# Patient Record
Sex: MALE | Race: WHITE | NOT HISPANIC OR LATINO | Employment: OTHER | URBAN - METROPOLITAN AREA
[De-identification: names, ages, dates, MRNs, and addresses within clinical notes are randomized per-mention and may not be internally consistent; named-entity substitution may affect disease eponyms.]

---

## 2017-01-06 ENCOUNTER — GENERIC CONVERSION - ENCOUNTER (OUTPATIENT)
Dept: OTHER | Facility: OTHER | Age: 82
End: 2017-01-06

## 2017-01-24 ENCOUNTER — GENERIC CONVERSION - ENCOUNTER (OUTPATIENT)
Dept: OTHER | Facility: OTHER | Age: 82
End: 2017-01-24

## 2017-01-24 ENCOUNTER — TRANSCRIBE ORDERS (OUTPATIENT)
Dept: ADMINISTRATIVE | Facility: HOSPITAL | Age: 82
End: 2017-01-24

## 2017-01-24 DIAGNOSIS — N31.9 NEUROGENIC DYSFUNCTION OF THE URINARY BLADDER: Primary | ICD-10-CM

## 2017-01-25 ENCOUNTER — HOSPITAL ENCOUNTER (OUTPATIENT)
Dept: RADIOLOGY | Age: 82
Discharge: HOME | End: 2017-01-25
Payer: MEDICARE

## 2017-01-25 DIAGNOSIS — N31.9 NEUROGENIC DYSFUNCTION OF THE URINARY BLADDER: ICD-10-CM

## 2017-01-25 PROCEDURE — 76770 US EXAM ABDO BACK WALL COMP: CPT

## 2017-03-06 DIAGNOSIS — M16.12 PRIMARY OSTEOARTHRITIS OF LEFT HIP: ICD-10-CM

## 2017-03-06 DIAGNOSIS — I73.9 PERIPHERAL VASCULAR DISEASE (HCC): ICD-10-CM

## 2017-03-06 DIAGNOSIS — J43.9 PULMONARY EMPHYSEMA (HCC): ICD-10-CM

## 2017-03-06 DIAGNOSIS — I35.0 NONRHEUMATIC AORTIC VALVE STENOSIS: ICD-10-CM

## 2017-03-06 DIAGNOSIS — I25.10 ATHEROSCLEROTIC HEART DISEASE OF NATIVE CORONARY ARTERY WITHOUT ANGINA PECTORIS: ICD-10-CM

## 2017-03-06 DIAGNOSIS — K21.9 GASTRO-ESOPHAGEAL REFLUX DISEASE WITHOUT ESOPHAGITIS: ICD-10-CM

## 2017-03-06 DIAGNOSIS — I65.23 OCCLUSION AND STENOSIS OF BILATERAL CAROTID ARTERIES: ICD-10-CM

## 2017-03-06 DIAGNOSIS — R73.01 IMPAIRED FASTING GLUCOSE: ICD-10-CM

## 2017-03-06 DIAGNOSIS — I10 ESSENTIAL (PRIMARY) HYPERTENSION: ICD-10-CM

## 2017-03-06 DIAGNOSIS — I48.91 ATRIAL FIBRILLATION (HCC): ICD-10-CM

## 2017-03-06 DIAGNOSIS — E78.5 HYPERLIPIDEMIA: ICD-10-CM

## 2017-03-06 DIAGNOSIS — N40.0 ENLARGED PROSTATE WITHOUT LOWER URINARY TRACT SYMPTOMS (LUTS): ICD-10-CM

## 2017-03-09 ENCOUNTER — ALLSCRIPTS OFFICE VISIT (OUTPATIENT)
Dept: OTHER | Facility: OTHER | Age: 82
End: 2017-03-09

## 2017-03-21 ENCOUNTER — HOSPITAL ENCOUNTER (OUTPATIENT)
Dept: NON INVASIVE DIAGNOSTICS | Facility: CLINIC | Age: 82
Discharge: HOME | End: 2017-03-21
Payer: MEDICARE

## 2017-03-21 DIAGNOSIS — I65.23 BILATERAL CAROTID ARTERY STENOSIS: ICD-10-CM

## 2017-03-21 DIAGNOSIS — I73.9 PERIPHERAL ARTERIAL DISEASE (HCC): ICD-10-CM

## 2017-03-21 PROCEDURE — 93880 EXTRACRANIAL BILAT STUDY: CPT

## 2017-03-21 PROCEDURE — 93923 UPR/LXTR ART STDY 3+ LVLS: CPT

## 2017-03-21 PROCEDURE — 93925 LOWER EXTREMITY STUDY: CPT

## 2017-03-23 ENCOUNTER — TRANSCRIBE ORDERS (OUTPATIENT)
Dept: ADMINISTRATIVE | Age: 82
End: 2017-03-23

## 2017-03-23 ENCOUNTER — APPOINTMENT (OUTPATIENT)
Dept: LAB | Age: 82
End: 2017-03-23
Payer: MEDICARE

## 2017-03-23 DIAGNOSIS — R73.01 IMPAIRED FASTING GLUCOSE: ICD-10-CM

## 2017-03-23 DIAGNOSIS — M16.12 PRIMARY OSTEOARTHRITIS OF LEFT HIP: ICD-10-CM

## 2017-03-23 DIAGNOSIS — K21.9 GASTRO-ESOPHAGEAL REFLUX DISEASE WITHOUT ESOPHAGITIS: ICD-10-CM

## 2017-03-23 DIAGNOSIS — I48.91 ATRIAL FIBRILLATION (HCC): ICD-10-CM

## 2017-03-23 DIAGNOSIS — I35.0 NONRHEUMATIC AORTIC VALVE STENOSIS: ICD-10-CM

## 2017-03-23 DIAGNOSIS — I25.10 ATHEROSCLEROTIC HEART DISEASE OF NATIVE CORONARY ARTERY WITHOUT ANGINA PECTORIS: ICD-10-CM

## 2017-03-23 DIAGNOSIS — I73.9 PERIPHERAL VASCULAR DISEASE (HCC): ICD-10-CM

## 2017-03-23 DIAGNOSIS — I10 ESSENTIAL (PRIMARY) HYPERTENSION: ICD-10-CM

## 2017-03-23 DIAGNOSIS — J43.9 PULMONARY EMPHYSEMA (HCC): ICD-10-CM

## 2017-03-23 DIAGNOSIS — E78.5 HYPERLIPIDEMIA: ICD-10-CM

## 2017-03-23 DIAGNOSIS — N40.0 ENLARGED PROSTATE WITHOUT LOWER URINARY TRACT SYMPTOMS (LUTS): ICD-10-CM

## 2017-03-23 LAB
ALBUMIN SERPL BCP-MCNC: 3.6 G/DL (ref 3.5–5)
ALP SERPL-CCNC: 107 U/L (ref 46–116)
ALT SERPL W P-5'-P-CCNC: 29 U/L (ref 12–78)
ANION GAP SERPL CALCULATED.3IONS-SCNC: 6 MMOL/L (ref 4–13)
AST SERPL W P-5'-P-CCNC: 19 U/L (ref 5–45)
BASOPHILS # BLD AUTO: 0.01 THOUSANDS/ΜL (ref 0–0.1)
BASOPHILS NFR BLD AUTO: 0 % (ref 0–1)
BILIRUB SERPL-MCNC: 0.47 MG/DL (ref 0.2–1)
BUN SERPL-MCNC: 13 MG/DL (ref 5–25)
CALCIUM SERPL-MCNC: 8.9 MG/DL (ref 8.3–10.1)
CHLORIDE SERPL-SCNC: 105 MMOL/L (ref 100–108)
CHOLEST SERPL-MCNC: 94 MG/DL (ref 50–200)
CO2 SERPL-SCNC: 30 MMOL/L (ref 21–32)
CREAT SERPL-MCNC: 0.86 MG/DL (ref 0.6–1.3)
EOSINOPHIL # BLD AUTO: 0.25 THOUSAND/ΜL (ref 0–0.61)
EOSINOPHIL NFR BLD AUTO: 4 % (ref 0–6)
ERYTHROCYTE [DISTWIDTH] IN BLOOD BY AUTOMATED COUNT: 13.1 % (ref 11.6–15.1)
EST. AVERAGE GLUCOSE BLD GHB EST-MCNC: 123 MG/DL
GFR SERPL CREATININE-BSD FRML MDRD: >60 ML/MIN/1.73SQ M
GLUCOSE P FAST SERPL-MCNC: 102 MG/DL (ref 65–99)
HBA1C MFR BLD: 5.9 % (ref 4.2–6.3)
HCT VFR BLD AUTO: 43.8 % (ref 36.5–49.3)
HDLC SERPL-MCNC: 43 MG/DL (ref 40–60)
HGB BLD-MCNC: 14.7 G/DL (ref 12–17)
LDLC SERPL CALC-MCNC: 34 MG/DL (ref 0–100)
LYMPHOCYTES # BLD AUTO: 1.57 THOUSANDS/ΜL (ref 0.6–4.47)
LYMPHOCYTES NFR BLD AUTO: 24 % (ref 14–44)
MCH RBC QN AUTO: 32.1 PG (ref 26.8–34.3)
MCHC RBC AUTO-ENTMCNC: 33.6 G/DL (ref 31.4–37.4)
MCV RBC AUTO: 96 FL (ref 82–98)
MONOCYTES # BLD AUTO: 0.95 THOUSAND/ΜL (ref 0.17–1.22)
MONOCYTES NFR BLD AUTO: 15 % (ref 4–12)
NEUTROPHILS # BLD AUTO: 3.74 THOUSANDS/ΜL (ref 1.85–7.62)
NEUTS SEG NFR BLD AUTO: 57 % (ref 43–75)
NRBC BLD AUTO-RTO: 0 /100 WBCS
PLATELET # BLD AUTO: 173 THOUSANDS/UL (ref 149–390)
PMV BLD AUTO: 11.1 FL (ref 8.9–12.7)
POTASSIUM SERPL-SCNC: 4.1 MMOL/L (ref 3.5–5.3)
PROT SERPL-MCNC: 7.2 G/DL (ref 6.4–8.2)
RBC # BLD AUTO: 4.58 MILLION/UL (ref 3.88–5.62)
SODIUM SERPL-SCNC: 141 MMOL/L (ref 136–145)
TRIGL SERPL-MCNC: 85 MG/DL
TSH SERPL DL<=0.05 MIU/L-ACNC: 6.74 UIU/ML (ref 0.36–3.74)
WBC # BLD AUTO: 6.53 THOUSAND/UL (ref 4.31–10.16)

## 2017-03-23 PROCEDURE — 80053 COMPREHEN METABOLIC PANEL: CPT

## 2017-03-23 PROCEDURE — 36415 COLL VENOUS BLD VENIPUNCTURE: CPT

## 2017-03-23 PROCEDURE — 80061 LIPID PANEL: CPT

## 2017-03-23 PROCEDURE — 84443 ASSAY THYROID STIM HORMONE: CPT

## 2017-03-23 PROCEDURE — 85025 COMPLETE CBC W/AUTO DIFF WBC: CPT

## 2017-03-23 PROCEDURE — 83036 HEMOGLOBIN GLYCOSYLATED A1C: CPT

## 2017-03-26 ENCOUNTER — GENERIC CONVERSION - ENCOUNTER (OUTPATIENT)
Dept: OTHER | Facility: OTHER | Age: 82
End: 2017-03-26

## 2017-06-23 ENCOUNTER — ALLSCRIPTS OFFICE VISIT (OUTPATIENT)
Dept: OTHER | Facility: OTHER | Age: 82
End: 2017-06-23

## 2017-07-28 ENCOUNTER — ALLSCRIPTS OFFICE VISIT (OUTPATIENT)
Dept: OTHER | Facility: OTHER | Age: 82
End: 2017-07-28

## 2017-09-01 DIAGNOSIS — I48.91 ATRIAL FIBRILLATION (HCC): ICD-10-CM

## 2017-09-01 DIAGNOSIS — E78.5 HYPERLIPIDEMIA: ICD-10-CM

## 2017-09-01 DIAGNOSIS — I25.10 ATHEROSCLEROTIC HEART DISEASE OF NATIVE CORONARY ARTERY WITHOUT ANGINA PECTORIS: ICD-10-CM

## 2017-09-01 DIAGNOSIS — I73.9 PERIPHERAL VASCULAR DISEASE (HCC): ICD-10-CM

## 2017-09-01 DIAGNOSIS — I65.23 OCCLUSION AND STENOSIS OF BILATERAL CAROTID ARTERIES: ICD-10-CM

## 2017-09-01 DIAGNOSIS — G20 PARKINSON'S DISEASE (HCC): ICD-10-CM

## 2017-09-01 DIAGNOSIS — M16.12 PRIMARY OSTEOARTHRITIS OF LEFT HIP: ICD-10-CM

## 2017-09-01 DIAGNOSIS — I10 ESSENTIAL (PRIMARY) HYPERTENSION: ICD-10-CM

## 2017-09-01 DIAGNOSIS — Z00.00 ENCOUNTER FOR GENERAL ADULT MEDICAL EXAMINATION WITHOUT ABNORMAL FINDINGS: ICD-10-CM

## 2017-09-01 DIAGNOSIS — J43.9 PULMONARY EMPHYSEMA (HCC): ICD-10-CM

## 2017-09-01 DIAGNOSIS — N40.0 ENLARGED PROSTATE WITHOUT LOWER URINARY TRACT SYMPTOMS (LUTS): ICD-10-CM

## 2017-09-01 DIAGNOSIS — K21.9 GASTRO-ESOPHAGEAL REFLUX DISEASE WITHOUT ESOPHAGITIS: ICD-10-CM

## 2017-09-01 DIAGNOSIS — I35.0 NONRHEUMATIC AORTIC VALVE STENOSIS: ICD-10-CM

## 2017-09-01 DIAGNOSIS — R73.01 IMPAIRED FASTING GLUCOSE: ICD-10-CM

## 2017-09-07 ENCOUNTER — TRANSCRIBE ORDERS (OUTPATIENT)
Dept: ADMINISTRATIVE | Age: 82
End: 2017-09-07

## 2017-09-07 ENCOUNTER — APPOINTMENT (OUTPATIENT)
Dept: LAB | Age: 82
End: 2017-09-07
Payer: MEDICARE

## 2017-09-07 DIAGNOSIS — I25.10 ATHEROSCLEROTIC HEART DISEASE OF NATIVE CORONARY ARTERY WITHOUT ANGINA PECTORIS: ICD-10-CM

## 2017-09-07 DIAGNOSIS — E78.5 HYPERLIPIDEMIA: ICD-10-CM

## 2017-09-07 DIAGNOSIS — G20 PARKINSON'S DISEASE (HCC): ICD-10-CM

## 2017-09-07 DIAGNOSIS — J43.9 PULMONARY EMPHYSEMA (HCC): ICD-10-CM

## 2017-09-07 DIAGNOSIS — I65.23 OCCLUSION AND STENOSIS OF BILATERAL CAROTID ARTERIES: ICD-10-CM

## 2017-09-07 DIAGNOSIS — I48.91 ATRIAL FIBRILLATION (HCC): ICD-10-CM

## 2017-09-07 DIAGNOSIS — M16.12 PRIMARY OSTEOARTHRITIS OF LEFT HIP: ICD-10-CM

## 2017-09-07 DIAGNOSIS — Z00.00 ENCOUNTER FOR GENERAL ADULT MEDICAL EXAMINATION WITHOUT ABNORMAL FINDINGS: ICD-10-CM

## 2017-09-07 DIAGNOSIS — I10 ESSENTIAL (PRIMARY) HYPERTENSION: ICD-10-CM

## 2017-09-07 DIAGNOSIS — N40.0 ENLARGED PROSTATE WITHOUT LOWER URINARY TRACT SYMPTOMS (LUTS): ICD-10-CM

## 2017-09-07 DIAGNOSIS — I35.0 NONRHEUMATIC AORTIC VALVE STENOSIS: ICD-10-CM

## 2017-09-07 DIAGNOSIS — R73.01 IMPAIRED FASTING GLUCOSE: ICD-10-CM

## 2017-09-07 DIAGNOSIS — I73.9 PERIPHERAL VASCULAR DISEASE (HCC): ICD-10-CM

## 2017-09-07 DIAGNOSIS — K21.9 GASTRO-ESOPHAGEAL REFLUX DISEASE WITHOUT ESOPHAGITIS: ICD-10-CM

## 2017-09-07 LAB
ALBUMIN SERPL BCP-MCNC: 3.7 G/DL (ref 3.5–5)
ALP SERPL-CCNC: 116 U/L (ref 46–116)
ALT SERPL W P-5'-P-CCNC: 26 U/L (ref 12–78)
ANION GAP SERPL CALCULATED.3IONS-SCNC: 7 MMOL/L (ref 4–13)
AST SERPL W P-5'-P-CCNC: 22 U/L (ref 5–45)
BASOPHILS # BLD AUTO: 0 THOUSANDS/ΜL (ref 0–0.1)
BASOPHILS NFR BLD AUTO: 0 % (ref 0–1)
BILIRUB SERPL-MCNC: 0.53 MG/DL (ref 0.2–1)
BUN SERPL-MCNC: 15 MG/DL (ref 5–25)
CALCIUM SERPL-MCNC: 8.9 MG/DL (ref 8.3–10.1)
CHLORIDE SERPL-SCNC: 105 MMOL/L (ref 100–108)
CHOLEST SERPL-MCNC: 91 MG/DL (ref 50–200)
CO2 SERPL-SCNC: 28 MMOL/L (ref 21–32)
CREAT SERPL-MCNC: 1.06 MG/DL (ref 0.6–1.3)
EOSINOPHIL # BLD AUTO: 0.21 THOUSAND/ΜL (ref 0–0.61)
EOSINOPHIL NFR BLD AUTO: 4 % (ref 0–6)
ERYTHROCYTE [DISTWIDTH] IN BLOOD BY AUTOMATED COUNT: 13.1 % (ref 11.6–15.1)
EST. AVERAGE GLUCOSE BLD GHB EST-MCNC: 120 MG/DL
GFR SERPL CREATININE-BSD FRML MDRD: 64 ML/MIN/1.73SQ M
GLUCOSE P FAST SERPL-MCNC: 99 MG/DL (ref 65–99)
HBA1C MFR BLD: 5.8 % (ref 4.2–6.3)
HCT VFR BLD AUTO: 44 % (ref 36.5–49.3)
HDLC SERPL-MCNC: 38 MG/DL (ref 40–60)
HGB BLD-MCNC: 14.6 G/DL (ref 12–17)
LDLC SERPL CALC-MCNC: 34 MG/DL (ref 0–100)
LYMPHOCYTES # BLD AUTO: 1.16 THOUSANDS/ΜL (ref 0.6–4.47)
LYMPHOCYTES NFR BLD AUTO: 23 % (ref 14–44)
MCH RBC QN AUTO: 31.7 PG (ref 26.8–34.3)
MCHC RBC AUTO-ENTMCNC: 33.2 G/DL (ref 31.4–37.4)
MCV RBC AUTO: 95 FL (ref 82–98)
MONOCYTES # BLD AUTO: 0.71 THOUSAND/ΜL (ref 0.17–1.22)
MONOCYTES NFR BLD AUTO: 14 % (ref 4–12)
NEUTROPHILS # BLD AUTO: 2.89 THOUSANDS/ΜL (ref 1.85–7.62)
NEUTS SEG NFR BLD AUTO: 59 % (ref 43–75)
NRBC BLD AUTO-RTO: 0 /100 WBCS
PLATELET # BLD AUTO: 163 THOUSANDS/UL (ref 149–390)
PMV BLD AUTO: 11.1 FL (ref 8.9–12.7)
POTASSIUM SERPL-SCNC: 4.5 MMOL/L (ref 3.5–5.3)
PROT SERPL-MCNC: 7.2 G/DL (ref 6.4–8.2)
RBC # BLD AUTO: 4.61 MILLION/UL (ref 3.88–5.62)
SODIUM SERPL-SCNC: 140 MMOL/L (ref 136–145)
T4 FREE SERPL-MCNC: 1.11 NG/DL (ref 0.76–1.46)
TRIGL SERPL-MCNC: 94 MG/DL
TSH SERPL DL<=0.05 MIU/L-ACNC: 6.66 UIU/ML (ref 0.36–3.74)
WBC # BLD AUTO: 4.99 THOUSAND/UL (ref 4.31–10.16)

## 2017-09-07 PROCEDURE — 80061 LIPID PANEL: CPT

## 2017-09-07 PROCEDURE — 36415 COLL VENOUS BLD VENIPUNCTURE: CPT

## 2017-09-07 PROCEDURE — 83036 HEMOGLOBIN GLYCOSYLATED A1C: CPT

## 2017-09-07 PROCEDURE — 84443 ASSAY THYROID STIM HORMONE: CPT

## 2017-09-07 PROCEDURE — 86376 MICROSOMAL ANTIBODY EACH: CPT

## 2017-09-07 PROCEDURE — 85025 COMPLETE CBC W/AUTO DIFF WBC: CPT

## 2017-09-07 PROCEDURE — 84439 ASSAY OF FREE THYROXINE: CPT

## 2017-09-07 PROCEDURE — 80053 COMPREHEN METABOLIC PANEL: CPT

## 2017-09-08 LAB — THYROPEROXIDASE AB SERPL-ACNC: 7 IU/ML (ref 0–34)

## 2017-09-15 ENCOUNTER — ALLSCRIPTS OFFICE VISIT (OUTPATIENT)
Dept: OTHER | Facility: OTHER | Age: 82
End: 2017-09-15

## 2017-10-27 ENCOUNTER — GENERIC CONVERSION - ENCOUNTER (OUTPATIENT)
Dept: OTHER | Facility: OTHER | Age: 82
End: 2017-10-27

## 2017-10-30 DIAGNOSIS — E03.9 HYPOTHYROIDISM: ICD-10-CM

## 2017-11-03 ENCOUNTER — APPOINTMENT (OUTPATIENT)
Dept: LAB | Age: 82
End: 2017-11-03
Payer: MEDICARE

## 2017-11-03 ENCOUNTER — TRANSCRIBE ORDERS (OUTPATIENT)
Dept: ADMINISTRATIVE | Age: 82
End: 2017-11-03

## 2017-11-03 DIAGNOSIS — E03.9 HYPOTHYROIDISM: ICD-10-CM

## 2017-11-03 LAB
T4 FREE SERPL-MCNC: 1.05 NG/DL (ref 0.76–1.46)
TSH SERPL DL<=0.05 MIU/L-ACNC: 4.52 UIU/ML (ref 0.36–3.74)

## 2017-11-03 PROCEDURE — 84439 ASSAY OF FREE THYROXINE: CPT

## 2017-11-03 PROCEDURE — 84443 ASSAY THYROID STIM HORMONE: CPT

## 2017-11-03 PROCEDURE — 36415 COLL VENOUS BLD VENIPUNCTURE: CPT

## 2017-11-05 ENCOUNTER — GENERIC CONVERSION - ENCOUNTER (OUTPATIENT)
Dept: OTHER | Facility: OTHER | Age: 82
End: 2017-11-05

## 2017-12-18 DIAGNOSIS — E03.9 HYPOTHYROIDISM: ICD-10-CM

## 2017-12-19 ENCOUNTER — APPOINTMENT (OUTPATIENT)
Dept: LAB | Age: 82
End: 2017-12-19
Payer: MEDICARE

## 2017-12-19 ENCOUNTER — TRANSCRIBE ORDERS (OUTPATIENT)
Dept: ADMINISTRATIVE | Age: 82
End: 2017-12-19

## 2017-12-19 DIAGNOSIS — E03.9 HYPOTHYROIDISM: ICD-10-CM

## 2017-12-19 LAB — TSH SERPL DL<=0.05 MIU/L-ACNC: 2.59 UIU/ML (ref 0.36–3.74)

## 2017-12-19 PROCEDURE — 36415 COLL VENOUS BLD VENIPUNCTURE: CPT

## 2017-12-19 PROCEDURE — 84443 ASSAY THYROID STIM HORMONE: CPT

## 2017-12-21 ENCOUNTER — GENERIC CONVERSION - ENCOUNTER (OUTPATIENT)
Dept: OTHER | Facility: OTHER | Age: 82
End: 2017-12-21

## 2018-01-10 NOTE — RESULT NOTES
Message  Mr Dennis Gutierrez I have enclosed a copy of your recent labs  all results are normal except for an elevated fasting blood sugar 107 normal < 100 watch diet  your TSH or thyroid test was borderline abnormal "slightly underactive" this would not require treatment at this time        Results/Data     (1) COMPREHENSIVE METABOLIC PANEL   SODIUM: 606 mmol/L Reference Range 136-145  POTASSIUM: 4 4 mmol/L Reference Range 3 5-5 3  CHLORIDE: 100 mmol/L Reference Range 100-108  CARBON DIOXIDE: 33 mmol/L Abnormal High Reference Range 21-32  ANION GAP (CALC): 4 mmol/L Reference Range 4-13  BLOOD UREA NITROGEN: 12 mg/dL Reference Range 5-25  CREATININE: 1 02 mg/dL Reference Range 0 60-1 30  GLUCOSE,RANDM: 107 mg/dL Reference Range   CALCIUM: 9 1 mg/dL Reference Range 8 3-10 1  AST(SGOT): 15 U/L Reference Range 5-45  ALT (SGPT): 29 U/L Reference Range 12-78  ALK PHOSPHATAS: 103 U/L Reference Range   TOTAL PROTEIN: 7 1 g/dL Reference Range 6 4-8 2  ALBUMIN: 3 7 g/dL Reference Range 3 5-5 0  BILI, TOTAL: 0 46 mg/dL Reference Range 0 20-1 00  eGFR Non-: >60 0 ml/min/1 73sq m  (1) TSH WITH FT4 REFLEX   TSH: 4 180 uIU/mL Abnormal High Reference Range 0 358-3 740  T4,FREE: 1 08 ng/dL Reference Range 0 76-1 46  (1) LIPID PANEL, FASTING   CHOLESTEROL: 94 mg/dL Reference Range   TRIGLYCERIDES: 91 mg/dL Reference Range <=150  HDL,DIRECT: 40 mg/dL Reference Range 40-60  LDL CHOLESTEROL CALCULATED: 36 mg/dL Reference Range 0-100  (1) CBC/PLT/DIFF   WBC COUNT: 5 71 Thousand/uL Reference Range 4 31-10 16  RBC COUNT: 4 59 Million/uL Reference Range 3 88-5 62  HEMOGLOBIN: 14 7 g/dL Reference Range 12 0-17 0  HEMATOCRIT: 43 8 % Reference Range 36 5-49 3  MCV: 95 fL Reference Range 82-98  MCH: 32 0 pg Reference Range 26 8-34 3  MCHC: 33 6 g/dL Reference Range 31 4-37 4  RDW: 12 5 % Reference Range 11 6-15 1  MPV: 10 6 fL Reference Range 8 9-12 7  PLATELET COUNT: 738 Thousands/uL Reference Range 149-390  nRBC AUTOMATED: 0 /100 WBCs  NEUTROPHILS RELATIVE PERCENT: 55 % Reference Range 43-75  LYMPHOCYTES RELATIVE PERCENT: 28 % Reference Range 14-44  MONOCYTES RELATIVE PERCENT: 13 % Abnormal High Reference Range 4-12  EOSINOPHILS RELATIVE PERCENT: 4 % Reference Range 0-6  BASOPHILS RELATIVE PERCENT: 0 % Reference Range 0-1  NEUTROPHILS ABSOLUTE COUNT: 3 10 Thousands/?L Reference Range 1 85-7 62  LYMPHOCYTES ABSOLUTE COUNT: 1 61 Thousands/?L Reference Range 0 60-4 47  MONOCYTES ABSOLUTE COUNT: 0 75 Thousand/?L Reference Range 0 17-1 22  EOSINOPHILS ABSOLUTE COUNT: 0 21 Thousand/?L Reference Range 0 00-0 61  BASOPHILS ABSOLUTE COUNT: 0 02 Thousands/?L Reference Range 0 00-0 10    Signatures   Electronically signed by : SHARRI Velasco ; Sep 23 2016  2:09PM EST                       (Author)

## 2018-01-13 VITALS
DIASTOLIC BLOOD PRESSURE: 78 MMHG | HEIGHT: 66 IN | SYSTOLIC BLOOD PRESSURE: 136 MMHG | WEIGHT: 160.2 LBS | RESPIRATION RATE: 18 BRPM | HEART RATE: 72 BPM | BODY MASS INDEX: 25.75 KG/M2 | TEMPERATURE: 97.1 F

## 2018-01-14 VITALS
HEIGHT: 66 IN | WEIGHT: 159 LBS | TEMPERATURE: 96.1 F | DIASTOLIC BLOOD PRESSURE: 70 MMHG | BODY MASS INDEX: 25.55 KG/M2 | SYSTOLIC BLOOD PRESSURE: 110 MMHG | HEART RATE: 84 BPM | RESPIRATION RATE: 16 BRPM

## 2018-01-14 VITALS
HEIGHT: 66 IN | DIASTOLIC BLOOD PRESSURE: 68 MMHG | HEART RATE: 69 BPM | SYSTOLIC BLOOD PRESSURE: 126 MMHG | BODY MASS INDEX: 25.94 KG/M2 | WEIGHT: 161.38 LBS

## 2018-01-16 ENCOUNTER — ALLSCRIPTS OFFICE VISIT (OUTPATIENT)
Dept: OTHER | Facility: OTHER | Age: 83
End: 2018-01-16

## 2018-01-16 NOTE — RESULT NOTES
Message  Mr Vane Trevino I have enclosed a copy of your recent labs  all results are normal except for fasting blood sugar of 102 normal less than 100 and a mildly abnormal TSH or thyroid test  no treatment needed at this time  I would repeat labs prior to your next visit in 6 months  Plan  Aortic stenosis, Atrial fibrillation, BPH (benign prostatic hypertrophy), Carotid stenosis,  bilateral, Coronary artery disease, Health Maintenance, Esophageal reflux,  Hyperlipidemia, Hypertension, Impaired fasting glucose, Osteoarthritis of left hip,  Parkinson's disease, Peripheral arterial disease, Pulmonary emphysema    · (1) CBC/PLT/DIFF; Status:Active; Requested for:2017;    · (1) COMPREHENSIVE METABOLIC PANEL; Status:Active; Requested for:2017;    · (1) HEMOGLOBIN A1C; Status:Active; Requested for:2017;    · (1) LIPID PANEL, FASTING; Status:Active; Requested for:2017;    · (1) THYROID MICROSOMAL ANTIBODY; Status:Active; Requested for:2017;    · (1) TSH WITH FT4 REFLEX; Status:Active;  Requested for:2017;     Results/Data     (1) COMPREHENSIVE METABOLIC PANEL   SODIUM: 176 mmol/L Reference Range 136-145  POTASSIUM: 4 1 mmol/L Reference Range 3 5-5 3  CHLORIDE: 105 mmol/L Reference Range 100-108  CARBON DIOXIDE: 30 mmol/L Reference Range 21-32  ANION GAP (CALC): 6 mmol/L Reference Range 4-13  BLOOD UREA NITROGEN: 13 mg/dL Reference Range 5-25  CREATININE: 0 86 mg/dL Reference Range 0 60-1 30  GLUCOSE FASTIN mg/dL Abnormal High Reference Range 65-99  CALCIUM: 8 9 mg/dL Reference Range 8 3-10 1  AST(SGOT): 19 U/L Reference Range 5-45  ALT (SGPT): 29 U/L Reference Range 12-78  ALK PHOSPHATAS: 107 U/L Reference Range   TOTAL PROTEIN: 7 2 g/dL Reference Range 6 4-8 2  ALBUMIN: 3 6 g/dL Reference Range 3 5-5 0  BILI, TOTAL: 0 47 mg/dL Reference Range 0 20-1 00  eGFR Non-: >60 0 ml/min/1 73sq m  (1) CBC/PLT/DIFF   WBC COUNT: 6 53 Thousand/uL Reference Range 4 31-10 16  RBC COUNT: 4 58 Million/uL Reference Range 3 88-5 62  HEMOGLOBIN: 14 7 g/dL Reference Range 12 0-17 0  HEMATOCRIT: 43 8 % Reference Range 36 5-49 3  MCV: 96 fL Reference Range 82-98  MCH: 32 1 pg Reference Range 26 8-34 3  MCHC: 33 6 g/dL Reference Range 31 4-37 4  RDW: 13 1 % Reference Range 11 6-15 1  MPV: 11 1 fL Reference Range 8 9-12 7  PLATELET COUNT: 781 Thousands/uL Reference Range 149-390  nRBC AUTOMATED: 0 /100 WBCs  NEUTROPHILS RELATIVE PERCENT: 57 % Reference Range 43-75  LYMPHOCYTES RELATIVE PERCENT: 24 % Reference Range 14-44  MONOCYTES RELATIVE PERCENT: 15 % Abnormal High Reference Range 4-12  EOSINOPHILS RELATIVE PERCENT: 4 % Reference Range 0-6  BASOPHILS RELATIVE PERCENT: 0 % Reference Range 0-1  NEUTROPHILS ABSOLUTE COUNT: 3 74 Thousands/? ??L Reference Range 1 85-7 62  LYMPHOCYTES ABSOLUTE COUNT: 1 57 Thousands/? ??L Reference Range 0 60-4 47  MONOCYTES ABSOLUTE COUNT: 0 95 Thousand/? ??L Reference Range 0 17-1 22  EOSINOPHILS ABSOLUTE COUNT: 0 25 Thousand/? ??L Reference Range 0 00-0 61  BASOPHILS ABSOLUTE COUNT: 0 01 Thousands/? ??L Reference Range 0 00-0 10  (1) LIPID PANEL, FASTING   CHOLESTEROL: 94 mg/dL Reference Range   TRIGLYCERIDES: 85 mg/dL Reference Range <=150  HDL,DIRECT: 43 mg/dL Reference Range 40-60  LDL CHOLESTEROL CALCULATED: 34 mg/dL Reference Range 0-100  (1) TSH   TSH: 6 740 uIU/mL Abnormal High Reference Range 0 358-3 740  (1) HEMOGLOBIN A1C   HEMOGLOBIN A1C: 5 9 % Reference Range 4 2-6 3  EST  AVG   GLUCOSE: 123 mg/dl    Signatures   Electronically signed by : SHARRI Gamez ; Mar 26 2017  8:39AM EST                       (Author)
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
## 2018-01-17 NOTE — PROGRESS NOTES
Assessment   Assessed    1  Atrial fibrillation (427 31) (I48 91)   2  Aortic stenosis (424 1) (I35 0)   3  Coronary artery disease (414 00) (I25 10)   4  Hyperlipidemia (272 4) (E78 5)   5  Hypertension (401 9) (I10)    Plan   Aortic stenosis, Atrial fibrillation, Coronary artery disease, Hyperlipidemia, Hypertension    · Follow-up visit in 6 months Evaluation and Treatment  Follow-up  Status: Complete     Done: 80KXW4440   Ordered; For: Aortic stenosis, Atrial fibrillation, Coronary artery disease, Hyperlipidemia, Hypertension; Ordered By: Madonna Cueva Performed:  Due: 73CVV9105; Last Updated By: Yariel Sutton; 1/16/2018 11:46:11 AM  Atrial fibrillation    · EKG/ECG- POC; Status:Complete;   Done: 89KTN9301   Perform: In Office; SCP:45USL2878; Last Updated By:Nathalia Cole; 1/16/2018 11:28:43 AM;Ordered; For:Atrial fibrillation; Ordered By:Domo Dooley; Discussion/Summary   Cardiology Discussion Summary Free Text Note Form [facility]:    80year old man with coronary artery disease, mild aortic stenosis, and paroxysmal atrial fibrillation returns for follow up  No chest pain, significant shortness of breath, or palpitations  Has had several episodes of chest pain rarely, but not on exertion  Does get dyspneic on exertion  Does have some edema  No improvement with Ranexa  Coronary artery disease - stable  Paroxysmal atrial fibrillation - in normal sinus rhythm  Vasovagal syndrome - stable  Dyslipidemia - on statin  Aortic stenosis - mild to moderate  Peripheral arterial disease - stable  Dyspnea - stable  No obvious ischemic or cardiac etiology  Most likely due to COPD  Continue current medications  Follow up in 6 months  Chief Complaint   Chief Complaint Free Text Note Form: patient at the office for 6 months follow up  patient express shortness of breath  Chief Complaint Chronic Condition [facility]: Patient is here today for follow up of chronic conditions described in HPI        History of Present Illness   Cardiology HPI Free Text Note Form [facility]: Mr Leatha García is an 80year old man with coronary artery disease, mild aortic stenosis, and paroxysmal atrial fibrillation returns for follow up  No chest pain, significant shortness of breath, or palpitations  Has had several episodes of chest pain rarely, but not on exertion  Does get dyspneic on exertion  Does have some edema  No improvement with Ranexa  Review of Systems   Cardiology Male ROS:         Cardiac: has swelling in the , but-- No complaints of chest pain, no palpitations, no fainiting  Skin: No complaints of nonhealing sores or skin rash  Genitourinary: No complaints of recurrent urinary tract infections, frequent urination at night, difficult urination, blood in urine, kidney stones, loss of bladder control, no kidney or prostate problems, no erectile dysfunction  Psychological: No complaints of feeling depressed, anxiety, panic attacks, or difficulty concentrating  General: lack of energy/fatigue  Respiratory: No complaints of shortness of breath, cough with sputum, or wheezing  HEENT: No complaints of serious problems, hearing problems, nose problems, throat problems, or snoring  Gastrointestinal: No complaints of liver problems, nausea, vomiting, heartburn, constipation, bloody stools, diarrhea, problems swallowing, adbominal pain, or rectal bleeding  Hematologic: No complaints of bleeding disorders, anemia, blood clots, or excessive brusing  Neurological: weakness, but-- No complaints of numbness, tingling, dizziness, weakness, seizures, headaches, syncope or fainting, AM fatigue, daytime sleepiness, no witnessed apnea episodes  Musculoskeletal: arthritis      Active Problems   Problems    1  Advance directive discussed with patient (V65 49) (Z71 89)   2  Aortic stenosis (424 1) (I35 0)   3  Atrial fibrillation (427 31) (I48 91)   4  BPH (benign prostatic hypertrophy) (600 00) (N40 0)   5  Carotid stenosis, bilateral (433 10,433 30) (I65 23)   6  Coronary artery disease (414 00) (I25 10)   7  Esophageal reflux (530 81) (K21 9)   8  Generalized osteoarthritis of multiple sites (715 09) (M15 9)   9  Hyperlipidemia (272 4) (E78 5)   10  Hypertension (401 9) (I10)   11  Hypothyroidism (244 9) (E03 9)   12  Impaired fasting glucose (790 21) (R73 01)   13  Left foot drop (736 79) (M21 372)   14  Need for influenza vaccination (V04 81) (Z23)   15  Osteoarthritis of left hip (715 95) (M16 12)   16  Parkinson's disease (332 0) (G20)   17  Peripheral arterial disease (443 9) (I73 9)   18  Pulmonary emphysema (492 8) (J43 9)   19  Right bundle branch block (426 4) (I45 10)   20  Screening for genitourinary condition (V81 6) (Z13 89)   21  Screening for neurological condition (V80 09) (Z13 89)    Past Medical History   Problems    1  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)   2  History of anemia (V12 3) (Z86 2)   3  History of transient cerebral ischemia (V12 54) (Z86 73)   4  History of Peptic Ulcer (V12 71)   5  Personal history of multiple pulmonary nodules (V12 69) (Z87 09)  Active Problems And Past Medical History Reviewed: The active problems and past medical history were reviewed and updated today  Surgical History   Problems    1  History of Back Surgery   2  History of Bypass Graft Using Vein: Femoral-tibial   3  History of CABG   4  History of Elbow Surgery   5  History of Endarterectomy Common Carotid   6  History of Knee Replacement   7  History of Shoulder Surgery   8  History of Simple Bunion Exostectomy (Silver Procedure)   9  History of Spinal Diskectomy Cervical  Surgical History Reviewed: The surgical history was reviewed and updated today  Family History   Sister    1  Family history of Coronary Artery Disease (V17 49)  Family History Reviewed: The family history was reviewed and updated today         Social History   Problems    · Being A Social Drinker   · Former smoker (T25 05) (Y90 158)   · Marital History - Currently   Social History Reviewed: The social history was reviewed and updated today  The social history was reviewed and is unchanged  Current Meds    1  Aspirin 81 MG TABS; TAKE 1 TABLET DAILY; Therapy: (Recorded:11Aug2014) to Recorded   2  Colchicine 0 6 MG Oral Tablet; TAKE 1 TABLET DAILY AS DIRECTED; Therapy: (Recorded:24Jun2015) to Recorded   3  Furosemide 20 MG Oral Tablet; TAKE 1 TABLET DAILY AS DIRECTED; Therapy: 45WYL4231 to (Evaluate:90Ueg8334)  Requested for: 43Uhg5416; Last     Rx:18Ttf8127 Ordered   4  HM Vitamin D3 2000 UNIT Oral Capsule; Take 1 tablet daily; Therapy: (Recorded:30Zre1960) to Recorded   5  Hydrocodone-Acetaminophen 5-325 MG Oral Tablet; TAKE 1 TABLET EVERY 6 HOURS     AS NEEDED FOR PAIN;     Therapy: 72Qou0655 to (Evaluate:55Etp3134); Last Rx:28Ndj7118 Ordered   6  Levothyroxine Sodium 50 MCG Oral Tablet; TAKE 1 TABLET DAILY; Therapy: 34Bnb2131 to (Evaluate:62Mui3354)  Requested for: 20Nov2017; Last     Rx:20Nov2017 Ordered   7  Metoprolol Tartrate 25 MG Oral Tablet; Take 1/2 daily  Requested for: 22Nov2017; Last     Rx:22Nov2017 Ordered   8  Multivital TABS; TAKE 1 TABLET DAILY; Therapy: (Recorded:03Xtd5487) to Recorded   9  Omeprazole 20 MG Oral Tablet Delayed Release; Take 1 tablet daily; Therapy: (Recorded:11Aug2014) to Recorded   10  Simvastatin 20 MG Oral Tablet; TAKE 1 TABLET AT BEDTIME; Therapy: 57JEJ7138 to (([phone] [phone])  Requested for: 05XRX5909; Last      Rx:63Spn5828 Ordered   11  Spiriva Respimat 2 5 MCG/ACT Inhalation Aerosol Solution; inhale 2 puffs by mouth once      daily; Therapy: 71FUW2522 to (Evaluate:03Jan2019)  Requested for: 17HUK3696; Last      Rx:08Jan2018 Ordered   12  Uroxatral 10 MG Oral Tablet Extended Release 24 Hour; TAKE 1 TABLET DAILY; Therapy: (Recorded:53Jxd0092) to Recorded  Medication List Reviewed:     The medication list was reviewed and updated today  Allergies   Medication    1  Aleve CAPS    Vitals   Vital Signs    Recorded: 64PGQ5534 11:28AM   Heart Rate 85   Systolic 860, LUE, Sitting   Diastolic 70, LUE, Sitting   Height 5 ft 6 in   Weight 154 lb 9 oz   BMI Calculated 24 95   BSA Calculated 1 79     Physical Exam        Constitutional      General appearance: No acute distress, well appearing and well nourished  Eyes      Conjunctiva and Sclera examination: Conjunctiva pink, sclera anicteric  Ears, Nose, Mouth, and Throat - External inspection of ears and nose: Normal without deformities or discharge  Neck      Neck and thyroid: Normal, supple, trachea midline, no thyromegaly  Pulmonary      Respiratory effort: No increased work of breathing or signs of respiratory distress  Cardiovascular      Auscultation of heart: Abnormal   A grade 2 systolic murmur was heard at the RUSB  Carotid pulses: Normal, 2+ bilaterally  Pedal pulses: Normal, 2+ bilaterally  Examination of extremities for edema and/or varicosities: Abnormal   bilateral ankle Trace+ pitting edema  Chest - Chest: Normal       Abdomen      Abdomen: Non-tender and no distention  Musculoskeletal Gait and station: Abnormal  Gait evaluation demonstrated left sided weakness  -- Walks with a cane  Skin - Skin and subcutaneous tissue: Normal without rashes or lesions  Skin is warm and well perfused, normal turgor  Neurologic - Speech: Normal        Psychiatric - Orientation to person, place, and time: Normal -- Mood and affect: Normal       Results/Data   ECG Report:      Rhythm and rate:  ventricular rate is 85 beats per minute  -- normal sinus rhythm  QRS: right bundle branch block      Future Appointments      Date/Time Provider Specialty Site   03/21/2018 11:00 AM SHARRI Roldan   [facility] [id number] [facility]     Signatures    Electronically signed by : SHARRI Tolbert ; Jan 16 2018 11:46AM EST (Author)

## 2018-01-22 VITALS
RESPIRATION RATE: 18 BRPM | OXYGEN SATURATION: 94 % | HEIGHT: 66 IN | SYSTOLIC BLOOD PRESSURE: 118 MMHG | BODY MASS INDEX: 24.43 KG/M2 | WEIGHT: 152 LBS | DIASTOLIC BLOOD PRESSURE: 70 MMHG | HEART RATE: 62 BPM | TEMPERATURE: 97.7 F

## 2018-01-22 VITALS
TEMPERATURE: 96.3 F | SYSTOLIC BLOOD PRESSURE: 124 MMHG | HEIGHT: 66 IN | BODY MASS INDEX: 25.07 KG/M2 | DIASTOLIC BLOOD PRESSURE: 72 MMHG | WEIGHT: 156 LBS | HEART RATE: 88 BPM | RESPIRATION RATE: 18 BRPM

## 2018-01-23 VITALS
DIASTOLIC BLOOD PRESSURE: 70 MMHG | HEIGHT: 66 IN | SYSTOLIC BLOOD PRESSURE: 118 MMHG | BODY MASS INDEX: 24.84 KG/M2 | HEART RATE: 85 BPM | WEIGHT: 154.56 LBS

## 2018-01-23 NOTE — CONSULTS
I had the pleasure of evaluating your patient, Patton Siemens  My full evaluation follows:      Chief Complaint  patient at the office for 6 months follow up  patient express shortness of breath  Patient is here today for follow up of chronic conditions described in HPI  History of Present Illness  Mr Olegario Kang is an 80year old man with coronary artery disease, mild aortic stenosis, and paroxysmal atrial fibrillation returns for follow up  No chest pain, significant shortness of breath, or palpitations  Has had several episodes of chest pain rarely, but not on exertion  Does get dyspneic on exertion  Does have some edema  No improvement with Ranexa  Review of Systems      Cardiac: has swelling in the , but No complaints of chest pain, no palpitations, no fainiting  Skin: No complaints of nonhealing sores or skin rash  Genitourinary: No complaints of recurrent urinary tract infections, frequent urination at night, difficult urination, blood in urine, kidney stones, loss of bladder control, no kidney or prostate problems, no erectile dysfunction  Psychological: No complaints of feeling depressed, anxiety, panic attacks, or difficulty concentrating  General: lack of energy/fatigue  Respiratory: No complaints of shortness of breath, cough with sputum, or wheezing  HEENT: No complaints of serious problems, hearing problems, nose problems, throat problems, or snoring  Gastrointestinal: No complaints of liver problems, nausea, vomiting, heartburn, constipation, bloody stools, diarrhea, problems swallowing, adbominal pain, or rectal bleeding  Hematologic: No complaints of bleeding disorders, anemia, blood clots, or excessive brusing  Neurological: weakness, but No complaints of numbness, tingling, dizziness, weakness, seizures, headaches, syncope or fainting, AM fatigue, daytime sleepiness, no witnessed apnea episodes  Musculoskeletal: arthritis      Active Problems    1   Advance directive discussed with patient (V65 49) (Z71 89)   2  Aortic stenosis (424 1) (I35 0)   3  Atrial fibrillation (427 31) (I48 91)   4  BPH (benign prostatic hypertrophy) (600 00) (N40 0)   5  Carotid stenosis, bilateral (433 10,433 30) (I65 23)   6  Coronary artery disease (414 00) (I25 10)   7  Esophageal reflux (530 81) (K21 9)   8  Generalized osteoarthritis of multiple sites (715 09) (M15 9)   9  Hyperlipidemia (272 4) (E78 5)   10  Hypertension (401 9) (I10)   11  Hypothyroidism (244 9) (E03 9)   12  Impaired fasting glucose (790 21) (R73 01)   13  Left foot drop (736 79) (M21 372)   14  Need for influenza vaccination (V04 81) (Z23)   15  Osteoarthritis of left hip (715 95) (M16 12)   16  Parkinson's disease (332 0) (G20)   17  Peripheral arterial disease (443 9) (I73 9)   18  Pulmonary emphysema (492 8) (J43 9)   19  Right bundle branch block (426 4) (I45 10)   20  Screening for genitourinary condition (V81 6) (Z13 89)   21  Screening for neurological condition (V80 09) (Z13 89)    Past Medical History    · 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)   · History of anemia (V12 3) (Z86 2)   · History of transient cerebral ischemia (V12 54) (Z86 73)   · History of Peptic Ulcer (V12 71)   · Personal history of multiple pulmonary nodules (V12 69) (Z87 09)    The active problems and past medical history were reviewed and updated today  Surgical History    · History of Back Surgery   · History of Bypass Graft Using Vein: Femoral-tibial   · History of CABG   · History of Elbow Surgery   · History of Endarterectomy Common Carotid   · History of Knee Replacement   · History of Shoulder Surgery   · History of Simple Bunion Exostectomy (Silver Procedure)   · History of Spinal Diskectomy Cervical    The surgical history was reviewed and updated today  Family History    · Family history of Coronary Artery Disease (V17 49)    The family history was reviewed and updated today         Social History    · Being A Social Drinker   · Former smoker (X99 28) (U82 862)   · Marital History - Currently   The social history was reviewed and updated today  The social history was reviewed and is unchanged  Current Meds   1  Aspirin 81 MG TABS; TAKE 1 TABLET DAILY; Therapy: (Recorded:11Aug2014) to Recorded   2  Colchicine 0 6 MG Oral Tablet; TAKE 1 TABLET DAILY AS DIRECTED; Therapy: (Recorded:24Jun2015) to Recorded   3  Furosemide 20 MG Oral Tablet; TAKE 1 TABLET DAILY AS DIRECTED; Therapy: 60BWQ3596 to (Evaluate:53Xtp8256)  Requested for: 63Bji7247; Last   Rx:20Cxx3460 Ordered   4  HM Vitamin D3 2000 UNIT Oral Capsule; Take 1 tablet daily; Therapy: (Recorded:40Hdy2687) to Recorded   5  Hydrocodone-Acetaminophen 5-325 MG Oral Tablet; TAKE 1 TABLET EVERY 6 HOURS   AS NEEDED FOR PAIN;   Therapy: 25Fsl0717 to (Evaluate:17Gqw3083); Last Rx:77Imu4724 Ordered   6  Levothyroxine Sodium 50 MCG Oral Tablet; TAKE 1 TABLET DAILY; Therapy: 38Pww2635 to (Evaluate:61Wef3487)  Requested for: 20Nov2017; Last   Rx:20Nov2017 Ordered   7  Metoprolol Tartrate 25 MG Oral Tablet; Take 1/2 daily  Requested for: 22Nov2017; Last   Rx:22Nov2017 Ordered   8  Multivital TABS; TAKE 1 TABLET DAILY; Therapy: (Recorded:28Fkt0201) to Recorded   9  Omeprazole 20 MG Oral Tablet Delayed Release; Take 1 tablet daily; Therapy: (Recorded:11Aug2014) to Recorded   10  Simvastatin 20 MG Oral Tablet; TAKE 1 TABLET AT BEDTIME; Therapy: 28IZE7690 to ()  Requested for: 91IGX5358; Last    Rx:03Fwb5174 Ordered   11  Spiriva Respimat 2 5 MCG/ACT Inhalation Aerosol Solution; inhale 2 puffs by mouth once    daily; Therapy: 52YUG1298 to (Evaluate:03Jan2019)  Requested for: 76FCH6752; Last    Rx:08Jan2018 Ordered   12  Uroxatral 10 MG Oral Tablet Extended Release 24 Hour; TAKE 1 TABLET DAILY; Therapy: (Recorded:68Uxa8565) to Recorded    The medication list was reviewed and updated today  Allergies    1   Aleve CAPS    Vitals   Recorded: 77PQR1140 11:28AM   Heart Rate 85   Systolic 511, LUE, Sitting   Diastolic 70, LUE, Sitting   Height 5 ft 6 in   Weight 154 lb 9 oz   BMI Calculated 24 95   BSA Calculated 1 79     Physical Exam    Constitutional   General appearance: No acute distress, well appearing and well nourished  Eyes   Conjunctiva and Sclera examination: Conjunctiva pink, sclera anicteric  Ears, Nose, Mouth, and Throat - External inspection of ears and nose: Normal without deformities or discharge  Neck   Neck and thyroid: Normal, supple, trachea midline, no thyromegaly  Pulmonary   Respiratory effort: No increased work of breathing or signs of respiratory distress  Cardiovascular   Auscultation of heart: Abnormal   A grade 2 systolic murmur was heard at the RUSB  Carotid pulses: Normal, 2+ bilaterally  Pedal pulses: Normal, 2+ bilaterally  Examination of extremities for edema and/or varicosities: Abnormal   bilateral ankle Trace+ pitting edema  Chest - Chest: Normal    Abdomen   Abdomen: Non-tender and no distention  Musculoskeletal Gait and station: Abnormal  Gait evaluation demonstrated left sided weakness  Walks with a cane  Skin - Skin and subcutaneous tissue: Normal without rashes or lesions  Skin is warm and well perfused, normal turgor  Neurologic - Speech: Normal     Psychiatric - Orientation to person, place, and time: Normal  Mood and affect: Normal       Results/Data    Rhythm and rate:  ventricular rate is 85 beats per minute  normal sinus rhythm  QRS: right bundle branch block      Assessment    1  Atrial fibrillation (427 31) (I48 91)   2  Aortic stenosis (424 1) (I35 0)   3  Coronary artery disease (414 00) (I25 10)   4  Hyperlipidemia (272 4) (E78 5)   5   Hypertension (401 9) (I10)    Plan  Aortic stenosis, Atrial fibrillation, Coronary artery disease, Hyperlipidemia, Hypertension    · Follow-up visit in 6 months Evaluation and Treatment  Follow-up  Status: Complete   Done: 68FFM2346 Ordered; For: Aortic stenosis, Atrial fibrillation, Coronary artery disease, Hyperlipidemia, Hypertension; Ordered By: Lisa Horton Performed:  Due: 36UWN7733; Last Updated By: Sabina Urbano; 1/16/2018 11:46:11 AM  Atrial fibrillation    · EKG/ECG- POC; Status:Complete;   Done: 62EHJ2479   Perform: In Office; XWV:84HUW1263; Last Updated By:Dyana Cole; 1/16/2018 11:28:43 AM;Ordered; For:Atrial fibrillation; Ordered By:Domo Dooley; Discussion/Summary    80year old man with coronary artery disease, mild aortic stenosis, and paroxysmal atrial fibrillation returns for follow up  No chest pain, significant shortness of breath, or palpitations  Has had several episodes of chest pain rarely, but not on exertion  Does get dyspneic on exertion  Does have some edema  No improvement with Ranexa  Impression:  1  Coronary artery disease - stable  2  Paroxysmal atrial fibrillation - in normal sinus rhythm  3  Vasovagal syndrome - stable  4  Dyslipidemia - on statin  5  Aortic stenosis - mild to moderate  6  Peripheral arterial disease - stable  7  Dyspnea - stable  No obvious ischemic or cardiac etiology  Most likely due to COPD  Recommendations:  1  Continue current medications  2  Follow up in 6 months  Thank you very much for allowing me to participate in the care of this patient  If you have any questions, please do not hesitate to contact me        Future Appointments    Signatures   Electronically signed by : SHARRI Ward ; Jan 16 2018 11:46AM EST                       (Author)

## 2018-01-23 NOTE — RESULT NOTES
Verified Results  (1) TSH WITH FT4 REFLEX 49CFY0116 12:14PM Piedad Bhat     Test Name Result Flag Reference   TSH 2 590 uIU/mL  0 358-3 740   Patients undergoing fluorescein dye angiography may retain small amounts of fluorescein in the body for 48-72 hours post procedure  Samples containing fluorescein can produce falsely depressed TSH values  If the patient had this procedure,a specimen should be resubmitted post fluorescein clearance

## 2018-01-30 ENCOUNTER — OFFICE VISIT (OUTPATIENT)
Dept: UROLOGY | Facility: CLINIC | Age: 83
End: 2018-01-30
Payer: MEDICARE

## 2018-01-30 ENCOUNTER — APPOINTMENT (OUTPATIENT)
Dept: LAB | Facility: HOSPITAL | Age: 83
End: 2018-01-30
Payer: MEDICARE

## 2018-01-30 VITALS
DIASTOLIC BLOOD PRESSURE: 64 MMHG | WEIGHT: 155 LBS | SYSTOLIC BLOOD PRESSURE: 122 MMHG | BODY MASS INDEX: 24.91 KG/M2 | HEIGHT: 66 IN

## 2018-01-30 DIAGNOSIS — R31.29 MICROSCOPIC HEMATURIA: ICD-10-CM

## 2018-01-30 DIAGNOSIS — Z85.51 HISTORY OF BLADDER CANCER: Primary | ICD-10-CM

## 2018-01-30 PROCEDURE — 87086 URINE CULTURE/COLONY COUNT: CPT | Performed by: UROLOGY

## 2018-01-30 PROCEDURE — 81002 URINALYSIS NONAUTO W/O SCOPE: CPT | Performed by: UROLOGY

## 2018-01-30 PROCEDURE — 52000 CYSTOURETHROSCOPY: CPT | Performed by: UROLOGY

## 2018-01-30 RX ORDER — ALFUZOSIN HYDROCHLORIDE 10 MG/1
1 TABLET, EXTENDED RELEASE ORAL DAILY
COMMUNITY
End: 2018-03-16 | Stop reason: SDUPTHER

## 2018-01-30 RX ORDER — GABAPENTIN 300 MG/1
CAPSULE ORAL
COMMUNITY
Start: 2014-10-14 | End: 2018-05-23 | Stop reason: ALTCHOICE

## 2018-01-30 RX ORDER — SIMVASTATIN 20 MG
1 TABLET ORAL
COMMUNITY
Start: 2011-04-25 | End: 2018-06-27 | Stop reason: SDUPTHER

## 2018-01-30 RX ORDER — KETOCONAZOLE 20 MG/ML
SHAMPOO TOPICAL
COMMUNITY
Start: 2015-04-13 | End: 2018-05-23 | Stop reason: ALTCHOICE

## 2018-01-30 RX ORDER — FUROSEMIDE 20 MG/1
1 TABLET ORAL DAILY
COMMUNITY
Start: 2015-05-08 | End: 2018-10-29 | Stop reason: SDUPTHER

## 2018-01-30 RX ORDER — PREDNISOLONE ACETATE 10 MG/ML
SUSPENSION/ DROPS OPHTHALMIC
Refills: 0 | COMMUNITY
Start: 2017-10-25 | End: 2018-05-23 | Stop reason: ALTCHOICE

## 2018-01-30 RX ORDER — LEVOTHYROXINE SODIUM 0.05 MG/1
1 TABLET ORAL DAILY
COMMUNITY
Start: 2017-09-15 | End: 2018-11-12 | Stop reason: SDUPTHER

## 2018-01-30 RX ORDER — COLCHICINE 0.6 MG/1
1 TABLET ORAL DAILY
COMMUNITY
End: 2018-05-23 | Stop reason: ALTCHOICE

## 2018-01-30 RX ORDER — DOXYCYCLINE HYCLATE 100 MG/1
CAPSULE ORAL
COMMUNITY
Start: 2015-05-20 | End: 2018-05-23 | Stop reason: ALTCHOICE

## 2018-01-30 RX ORDER — LEVOTHYROXINE SODIUM 0.05 MG/1
TABLET ORAL
Refills: 1 | COMMUNITY
Start: 2017-11-20 | End: 2018-05-23 | Stop reason: ALTCHOICE

## 2018-01-30 RX ORDER — OMEPRAZOLE 20 MG/1
1 CAPSULE, DELAYED RELEASE ORAL DAILY
COMMUNITY
End: 2021-02-08

## 2018-01-30 RX ORDER — SIMVASTATIN 20 MG
TABLET ORAL
COMMUNITY
Start: 2012-04-27 | End: 2018-05-23 | Stop reason: ALTCHOICE

## 2018-01-30 RX ORDER — HYDROCODONE BITARTRATE AND ACETAMINOPHEN 5; 325 MG/1; MG/1
1 TABLET ORAL EVERY 6 HOURS PRN
COMMUNITY
Start: 2017-09-15 | End: 2018-05-23 | Stop reason: SDUPTHER

## 2018-01-30 NOTE — PROGRESS NOTES
Cystoscopy  Date/Time: 1/30/2018 5:54 PM  Performed by: Re Smalls  Authorized by: Re Smalls     Procedure details: cystoscopy         this patient has a past history of bladder cancer     he is brought to the cysto room identified and transferred to the table  He was placed supine and the penis was prepped with Betadine and draped  Lidocaine gel was instilled  After several minutes, cystoscopy follows  The urethra is unremarkable  The prostate shows lateral lobe hypertrophy grade 2  Bladder is entered  There is grade 2-3 trabeculation  There is no evidence of recurrence tumor  The ureteral orifices are unremarkable        Os documented history of bladder cancer was high-grade T1 lesion from February 2004

## 2018-01-31 LAB — BACTERIA UR CULT: NORMAL

## 2018-03-01 DIAGNOSIS — I65.23 OCCLUSION AND STENOSIS OF BILATERAL CAROTID ARTERIES: ICD-10-CM

## 2018-03-01 DIAGNOSIS — I35.0 NONRHEUMATIC AORTIC VALVE STENOSIS: ICD-10-CM

## 2018-03-01 DIAGNOSIS — I73.9 PERIPHERAL VASCULAR DISEASE (HCC): ICD-10-CM

## 2018-03-01 DIAGNOSIS — K21.9 GASTRO-ESOPHAGEAL REFLUX DISEASE WITHOUT ESOPHAGITIS: ICD-10-CM

## 2018-03-01 DIAGNOSIS — E03.9 HYPOTHYROIDISM: ICD-10-CM

## 2018-03-01 DIAGNOSIS — E78.5 HYPERLIPIDEMIA: ICD-10-CM

## 2018-03-01 DIAGNOSIS — N40.0 ENLARGED PROSTATE WITHOUT LOWER URINARY TRACT SYMPTOMS (LUTS): ICD-10-CM

## 2018-03-01 DIAGNOSIS — G20 PARKINSON'S DISEASE (HCC): ICD-10-CM

## 2018-03-01 DIAGNOSIS — I25.10 ATHEROSCLEROTIC HEART DISEASE OF NATIVE CORONARY ARTERY WITHOUT ANGINA PECTORIS: ICD-10-CM

## 2018-03-01 DIAGNOSIS — I10 ESSENTIAL (PRIMARY) HYPERTENSION: ICD-10-CM

## 2018-03-01 DIAGNOSIS — R73.01 IMPAIRED FASTING GLUCOSE: ICD-10-CM

## 2018-03-01 DIAGNOSIS — J43.9 PULMONARY EMPHYSEMA (HCC): ICD-10-CM

## 2018-03-01 DIAGNOSIS — I48.91 ATRIAL FIBRILLATION (HCC): ICD-10-CM

## 2018-03-14 ENCOUNTER — APPOINTMENT (OUTPATIENT)
Dept: LAB | Age: 83
End: 2018-03-14
Payer: MEDICARE

## 2018-03-14 ENCOUNTER — TRANSCRIBE ORDERS (OUTPATIENT)
Dept: ADMINISTRATIVE | Age: 83
End: 2018-03-14

## 2018-03-14 DIAGNOSIS — I48.91 ATRIAL FIBRILLATION (HCC): ICD-10-CM

## 2018-03-14 DIAGNOSIS — J43.9 PULMONARY EMPHYSEMA (HCC): ICD-10-CM

## 2018-03-14 DIAGNOSIS — I35.0 NONRHEUMATIC AORTIC VALVE STENOSIS: ICD-10-CM

## 2018-03-14 DIAGNOSIS — R73.01 IMPAIRED FASTING GLUCOSE: ICD-10-CM

## 2018-03-14 DIAGNOSIS — I10 ESSENTIAL (PRIMARY) HYPERTENSION: ICD-10-CM

## 2018-03-14 DIAGNOSIS — I25.10 ATHEROSCLEROTIC HEART DISEASE OF NATIVE CORONARY ARTERY WITHOUT ANGINA PECTORIS: ICD-10-CM

## 2018-03-14 DIAGNOSIS — I73.9 PERIPHERAL VASCULAR DISEASE (HCC): ICD-10-CM

## 2018-03-14 DIAGNOSIS — G20 PARKINSON'S DISEASE (HCC): ICD-10-CM

## 2018-03-14 DIAGNOSIS — I65.23 OCCLUSION AND STENOSIS OF BILATERAL CAROTID ARTERIES: ICD-10-CM

## 2018-03-14 DIAGNOSIS — K21.9 GASTRO-ESOPHAGEAL REFLUX DISEASE WITHOUT ESOPHAGITIS: ICD-10-CM

## 2018-03-14 DIAGNOSIS — E78.5 HYPERLIPIDEMIA: ICD-10-CM

## 2018-03-14 DIAGNOSIS — N40.0 ENLARGED PROSTATE WITHOUT LOWER URINARY TRACT SYMPTOMS (LUTS): ICD-10-CM

## 2018-03-14 LAB
ALBUMIN SERPL BCP-MCNC: 3.6 G/DL (ref 3.5–5)
ALP SERPL-CCNC: 99 U/L (ref 46–116)
ALT SERPL W P-5'-P-CCNC: 23 U/L (ref 12–78)
ANION GAP SERPL CALCULATED.3IONS-SCNC: 7 MMOL/L (ref 4–13)
AST SERPL W P-5'-P-CCNC: 15 U/L (ref 5–45)
BILIRUB SERPL-MCNC: 0.41 MG/DL (ref 0.2–1)
BUN SERPL-MCNC: 14 MG/DL (ref 5–25)
CALCIUM SERPL-MCNC: 8.5 MG/DL (ref 8.3–10.1)
CHLORIDE SERPL-SCNC: 104 MMOL/L (ref 100–108)
CHOLEST SERPL-MCNC: 96 MG/DL (ref 50–200)
CO2 SERPL-SCNC: 29 MMOL/L (ref 21–32)
CREAT SERPL-MCNC: 0.93 MG/DL (ref 0.6–1.3)
ERYTHROCYTE [DISTWIDTH] IN BLOOD BY AUTOMATED COUNT: 12.6 % (ref 11.6–15.1)
EST. AVERAGE GLUCOSE BLD GHB EST-MCNC: 108 MG/DL
GFR SERPL CREATININE-BSD FRML MDRD: 75 ML/MIN/1.73SQ M
GLUCOSE P FAST SERPL-MCNC: 93 MG/DL (ref 65–99)
HBA1C MFR BLD: 5.4 % (ref 4.2–6.3)
HCT VFR BLD AUTO: 42.5 % (ref 36.5–49.3)
HDLC SERPL-MCNC: 40 MG/DL (ref 40–60)
HGB BLD-MCNC: 14.1 G/DL (ref 12–17)
LDLC SERPL CALC-MCNC: 40 MG/DL (ref 0–100)
MCH RBC QN AUTO: 31.8 PG (ref 26.8–34.3)
MCHC RBC AUTO-ENTMCNC: 33.2 G/DL (ref 31.4–37.4)
MCV RBC AUTO: 96 FL (ref 82–98)
PLATELET # BLD AUTO: 210 THOUSANDS/UL (ref 149–390)
PMV BLD AUTO: 10.5 FL (ref 8.9–12.7)
POTASSIUM SERPL-SCNC: 4 MMOL/L (ref 3.5–5.3)
PROT SERPL-MCNC: 7.1 G/DL (ref 6.4–8.2)
RBC # BLD AUTO: 4.44 MILLION/UL (ref 3.88–5.62)
SODIUM SERPL-SCNC: 140 MMOL/L (ref 136–145)
TRIGL SERPL-MCNC: 79 MG/DL
WBC # BLD AUTO: 7.1 THOUSAND/UL (ref 4.31–10.16)

## 2018-03-14 PROCEDURE — 36415 COLL VENOUS BLD VENIPUNCTURE: CPT

## 2018-03-14 PROCEDURE — 80053 COMPREHEN METABOLIC PANEL: CPT

## 2018-03-14 PROCEDURE — 80061 LIPID PANEL: CPT

## 2018-03-14 PROCEDURE — 85027 COMPLETE CBC AUTOMATED: CPT

## 2018-03-14 PROCEDURE — 83036 HEMOGLOBIN GLYCOSYLATED A1C: CPT

## 2018-03-16 DIAGNOSIS — N40.1 BPH WITH OBSTRUCTION/LOWER URINARY TRACT SYMPTOMS: Primary | ICD-10-CM

## 2018-03-16 DIAGNOSIS — N13.8 BPH WITH OBSTRUCTION/LOWER URINARY TRACT SYMPTOMS: Primary | ICD-10-CM

## 2018-03-16 RX ORDER — ALFUZOSIN HYDROCHLORIDE 10 MG/1
10 TABLET, EXTENDED RELEASE ORAL DAILY
Qty: 90 TABLET | Refills: 3 | Status: SHIPPED | OUTPATIENT
Start: 2018-03-16 | End: 2019-03-19 | Stop reason: SDUPTHER

## 2018-03-26 ENCOUNTER — TRANSCRIBE ORDERS (OUTPATIENT)
Dept: ADMINISTRATIVE | Facility: HOSPITAL | Age: 83
End: 2018-03-26

## 2018-03-26 DIAGNOSIS — I73.9 INTERMITTENT CLAUDICATION (HCC): ICD-10-CM

## 2018-03-26 DIAGNOSIS — I65.23 BILATERAL CAROTID ARTERY STENOSIS: Primary | ICD-10-CM

## 2018-04-05 ENCOUNTER — HOSPITAL ENCOUNTER (OUTPATIENT)
Dept: NON INVASIVE DIAGNOSTICS | Facility: CLINIC | Age: 83
Discharge: HOME | End: 2018-04-05
Payer: MEDICARE

## 2018-04-05 DIAGNOSIS — I73.9 INTERMITTENT CLAUDICATION (HCC): ICD-10-CM

## 2018-04-05 DIAGNOSIS — I65.23 BILATERAL CAROTID ARTERY STENOSIS: ICD-10-CM

## 2018-04-05 PROCEDURE — 93880 EXTRACRANIAL BILAT STUDY: CPT

## 2018-04-05 PROCEDURE — 93925 LOWER EXTREMITY STUDY: CPT | Performed by: SURGERY

## 2018-04-05 PROCEDURE — 93922 UPR/L XTREMITY ART 2 LEVELS: CPT | Performed by: SURGERY

## 2018-04-05 PROCEDURE — 93925 LOWER EXTREMITY STUDY: CPT

## 2018-04-05 PROCEDURE — 93923 UPR/LXTR ART STDY 3+ LVLS: CPT

## 2018-04-10 PROCEDURE — 93880 EXTRACRANIAL BILAT STUDY: CPT | Performed by: SURGERY

## 2018-05-09 PROCEDURE — 88305 TISSUE EXAM BY PATHOLOGIST: CPT | Performed by: PATHOLOGY

## 2018-05-10 ENCOUNTER — LAB REQUISITION (OUTPATIENT)
Dept: LAB | Facility: HOSPITAL | Age: 83
End: 2018-05-10
Payer: MEDICARE

## 2018-05-10 DIAGNOSIS — K64.4 RESIDUAL HEMORRHOIDAL SKIN TAGS: ICD-10-CM

## 2018-05-10 DIAGNOSIS — K57.30 DIVERTICULOSIS OF LARGE INTESTINE WITHOUT PERFORATION OR ABSCESS WITHOUT BLEEDING: ICD-10-CM

## 2018-05-10 DIAGNOSIS — Z86.010 HISTORY OF COLONIC POLYPS: ICD-10-CM

## 2018-05-10 DIAGNOSIS — D12.3 BENIGN NEOPLASM OF TRANSVERSE COLON: ICD-10-CM

## 2018-05-15 DIAGNOSIS — E03.9 ACQUIRED HYPOTHYROIDISM: Primary | ICD-10-CM

## 2018-05-15 RX ORDER — LEVOTHYROXINE SODIUM 0.05 MG/1
TABLET ORAL
Qty: 90 TABLET | Refills: 1 | Status: SHIPPED | OUTPATIENT
Start: 2018-05-15 | End: 2018-05-23 | Stop reason: ALTCHOICE

## 2018-05-23 ENCOUNTER — OFFICE VISIT (OUTPATIENT)
Dept: FAMILY MEDICINE CLINIC | Facility: CLINIC | Age: 83
End: 2018-05-23
Payer: MEDICARE

## 2018-05-23 VITALS
BODY MASS INDEX: 26.03 KG/M2 | SYSTOLIC BLOOD PRESSURE: 122 MMHG | TEMPERATURE: 97.1 F | DIASTOLIC BLOOD PRESSURE: 66 MMHG | HEART RATE: 64 BPM | RESPIRATION RATE: 16 BRPM | HEIGHT: 66 IN | WEIGHT: 162 LBS

## 2018-05-23 DIAGNOSIS — E03.9 ACQUIRED HYPOTHYROIDISM: ICD-10-CM

## 2018-05-23 DIAGNOSIS — I65.23 CAROTID STENOSIS, BILATERAL: ICD-10-CM

## 2018-05-23 DIAGNOSIS — M15.9 GENERALIZED OSTEOARTHRITIS OF MULTIPLE SITES: ICD-10-CM

## 2018-05-23 DIAGNOSIS — I35.0 NONRHEUMATIC AORTIC VALVE STENOSIS: ICD-10-CM

## 2018-05-23 DIAGNOSIS — I48.0 PAROXYSMAL ATRIAL FIBRILLATION (HCC): ICD-10-CM

## 2018-05-23 DIAGNOSIS — R73.01 IMPAIRED FASTING GLUCOSE: Primary | ICD-10-CM

## 2018-05-23 DIAGNOSIS — I10 ESSENTIAL HYPERTENSION: ICD-10-CM

## 2018-05-23 DIAGNOSIS — E78.00 HYPERCHOLESTEREMIA: ICD-10-CM

## 2018-05-23 DIAGNOSIS — J43.9 PULMONARY EMPHYSEMA, UNSPECIFIED EMPHYSEMA TYPE (HCC): ICD-10-CM

## 2018-05-23 DIAGNOSIS — I25.10 CORONARY ARTERY DISEASE INVOLVING NATIVE CORONARY ARTERY OF NATIVE HEART WITHOUT ANGINA PECTORIS: ICD-10-CM

## 2018-05-23 PROBLEM — G25.0 ESSENTIAL AND OTHER SPECIFIED FORMS OF TREMOR: Status: ACTIVE | Noted: 2017-01-06

## 2018-05-23 PROBLEM — G25.2 ESSENTIAL AND OTHER SPECIFIED FORMS OF TREMOR: Status: ACTIVE | Noted: 2017-01-06

## 2018-05-23 PROCEDURE — 99214 OFFICE O/P EST MOD 30 MIN: CPT | Performed by: FAMILY MEDICINE

## 2018-05-23 RX ORDER — HYDROCODONE BITARTRATE AND ACETAMINOPHEN 5; 325 MG/1; MG/1
1 TABLET ORAL EVERY 6 HOURS PRN
Qty: 30 TABLET | Refills: 0 | Status: SHIPPED | OUTPATIENT
Start: 2018-05-23 | End: 2018-06-22
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
## 2018-05-24 ENCOUNTER — OFFICE VISIT (OUTPATIENT)
Dept: PULMONOLOGY | Facility: CLINIC | Age: 83
End: 2018-05-24
Payer: MEDICARE

## 2018-05-24 VITALS
BODY MASS INDEX: 26.03 KG/M2 | SYSTOLIC BLOOD PRESSURE: 120 MMHG | TEMPERATURE: 97.7 F | OXYGEN SATURATION: 96 % | DIASTOLIC BLOOD PRESSURE: 60 MMHG | RESPIRATION RATE: 16 BRPM | WEIGHT: 162 LBS | HEIGHT: 66 IN | HEART RATE: 74 BPM

## 2018-05-24 DIAGNOSIS — J43.9 PULMONARY EMPHYSEMA, UNSPECIFIED EMPHYSEMA TYPE (HCC): Primary | ICD-10-CM

## 2018-05-24 PROCEDURE — 99213 OFFICE O/P EST LOW 20 MIN: CPT | Performed by: INTERNAL MEDICINE

## 2018-05-25 NOTE — PROGRESS NOTES
Progress note - Pulmonary Medicine   Arlene Motta 80 y o  male MRN: [id number]       Impression & Plan:     Pulmonary emphysema ([address] )  · He has been stable from respiratory standpoint  · Still with shortness of breath with sustained exertion  · Does developed lower extremity swelling sometimes more so than others  Discussed possibly taking an additional furosemide tablet when legs are more swollen and he has difficulty placing his braces on the legs     I willsee Iban in 6 months for continued follow-up  We will obtain a chest x-ray prior to the next visit as it has been almost 5 years since his last pulmonary imaging    ______________________________________________________________________    HPI:    Arlene Motta presents today for follow-up of emphysema and shortness of breath with exertion  He has been doing well on Spiriva  He reports no new pulmonary symptoms but does get short of breath certain activities, particularly sustain walking  He has concomitant aortic stenosis with some mild congestive heart failure and fluid retention  He is on furosemide but still has significant lower extremity swelling at times  He still reports that he has difficulty placing his lower extremity braces on when the swelling is more significant  The swelling typically goes down at nighttime but will start to return in the morning at breakfast    He does not report any cough phlegm  He is not wheezing  He is on Spiriva which seems to help his breathing somewhat  He has been doing well with this medication  He denies any chest pain or palpitations  He does not have any headache, sore throat, or other upper respiratory complaints  He denies any abdominal pain or GERD symptoms  Review of Systems:  Review of Systems   All other systems reviewed and are negative          Past medical history, surgical history, and family history were reviewed and updated as appropriate    Social history updates:  History Smoking Status    Former Smoker    Packs/day: 1 00    Years: 50 00    Quit date: 1990   Smokeless Tobacco    Never Used       PhysicalExamination:  Vitals:   /60   Pulse 74   Temp 97 7 °F (36 5 °C)   Resp 16   Ht 5' 6" (1 676 m)   Wt 73 5 kg (162 lb)   SpO2 96%   BMI 26 15 kg/m²     Gen: Naa Holt Comfortable on room air Appears well today  Chest:  Chest excursion symmetric  Lungs are hyperinflated  Breath sounds are distant bilaterally but otherwise clear  Hyper-resonant to percussion  Cardiac:  Murmur of aortic stenosis  Regular rate and rhythm  Abdomen: Soft and nontender  Benign  Extremities:  1+ extremity swelling bilaterally  He does have lower leg braces on for foot drop        Diagnostic Data:  Labs:   I personally reviewed the most recent laboratory data pertinent to today's visit    Lab Results   Component Value Date    WBC 7 10 03/14/2018    HGB 14 1 03/14/2018    HCT 42 5 03/14/2018    MCV 96 03/14/2018     03/14/2018     Lab Results   Component Value Date    GLUCOSE 107 09/22/2016    CALCIUM 8 5 03/14/2018     03/14/2018    K 4 0 03/14/2018    CO2 29 03/14/2018     03/14/2018    BUN 14 03/14/2018    CREATININE 0 93 03/14/2018     No results found for: IGE  Lab Results   Component Value Date    ALT 23 03/14/2018    AST 15 03/14/2018    ALKPHOS 99 03/14/2018    BILITOT 0 41 03/14/2018     No recent PFTs or chest x-ray imaging    Cinthia English MD

## 2018-05-25 NOTE — ASSESSMENT & PLAN NOTE
· He has been stable from respiratory standpoint  · Still with shortness of breath with sustained exertion  · Does developed lower extremity swelling sometimes more so than others    Discussed possibly taking an additional furosemide tablet when legs are more swollen and he has difficulty placing his braces on the legs

## 2018-06-27 DIAGNOSIS — E78.5 DYSLIPIDEMIA: Primary | ICD-10-CM

## 2018-06-27 RX ORDER — SIMVASTATIN 20 MG
TABLET ORAL
Qty: 90 TABLET | Refills: 3 | Status: SHIPPED | OUTPATIENT
Start: 2018-06-27 | End: 2019-07-02 | Stop reason: SDUPTHER

## 2018-07-31 ENCOUNTER — OFFICE VISIT (OUTPATIENT)
Dept: UROLOGY | Facility: CLINIC | Age: 83
End: 2018-07-31
Payer: MEDICARE

## 2018-07-31 VITALS — SYSTOLIC BLOOD PRESSURE: 122 MMHG | DIASTOLIC BLOOD PRESSURE: 66 MMHG | HEART RATE: 68 BPM

## 2018-07-31 DIAGNOSIS — N40.1 BPH WITH OBSTRUCTION/LOWER URINARY TRACT SYMPTOMS: Primary | ICD-10-CM

## 2018-07-31 DIAGNOSIS — Z85.51 HISTORY OF BLADDER CANCER: ICD-10-CM

## 2018-07-31 DIAGNOSIS — R31.29 MICROSCOPIC HEMATURIA: ICD-10-CM

## 2018-07-31 DIAGNOSIS — N13.8 BPH WITH OBSTRUCTION/LOWER URINARY TRACT SYMPTOMS: Primary | ICD-10-CM

## 2018-07-31 LAB
POST-VOID RESIDUAL VOLUME, ML POC: 242 ML
SL AMB  POCT GLUCOSE, UA: NORMAL
SL AMB LEUKOCYTE ESTERASE,UA: NORMAL
SL AMB POCT BILIRUBIN,UA: NORMAL
SL AMB POCT BLOOD,UA: NORMAL
SL AMB POCT CLARITY,UA: CLEAR
SL AMB POCT COLOR,UA: YELLOW
SL AMB POCT KETONES,UA: NORMAL
SL AMB POCT NITRITE,UA: NORMAL
SL AMB POCT PH,UA: 6
SL AMB POCT SPECIFIC GRAVITY,UA: 1.01
SL AMB POCT URINE PROTEIN: NORMAL
SL AMB POCT UROBILINOGEN: NORMAL

## 2018-07-31 PROCEDURE — 81002 URINALYSIS NONAUTO W/O SCOPE: CPT | Performed by: UROLOGY

## 2018-07-31 PROCEDURE — 51798 US URINE CAPACITY MEASURE: CPT | Performed by: UROLOGY

## 2018-07-31 PROCEDURE — 99213 OFFICE O/P EST LOW 20 MIN: CPT | Performed by: UROLOGY

## 2018-07-31 NOTE — LETTER
July 31, 2018     Yemi Loya MD  [address] [place] [address]    Patient: Naeem Allan   YOB: 1933   Date of Visit: 7/31/2018       Dear Dr Gayathri Denise:    Thank you for referring [facility] to me for evaluation  Below are my notes for this consultation  If you have questions, please do not hesitate to call me  I look forward to following your patient along with you  Sincerely,        Jan Reese MD        CC: No Recipients  Jan Reese MD  7/31/2018  3:11 PM  Sign at close encounter  Progress Note - Urology  Naeem Allan 80 y o  male MRN: [id number]  Encounter: [id number]      Chief Complaint:   Chief Complaint   Patient presents with    Benign Prostatic Hypertrophy     6 Month Follow Up    Other     Pt was unable to give a urine sample today  HPI:   80-year-old male with past history of bladder cancer last occurrence was 2004  Cystoscopic examination is for 10 years beyond were negative for recurrent disease  His cytology studies also were negative  He does present with progressive outlet symptoms  He is on alpha blocker Uroxatral   His AUA index is 24  Complaining of a weak stream and not feeling empty  No UTI symptoms  No hematuria  Prior cystoscopic evaluations revealed minimal prostatic hypertrophy not judged to be significantly occlusive      MEDS:    Current Outpatient Prescriptions:     alfuzosin (UROXATRAL) 10 mg 24 hr tablet, Take 1 tablet (10 mg total) by mouth daily, Disp: 90 tablet, Rfl: 3    aspirin 81 MG tablet, Take 81 mg by mouth, Disp: , Rfl:     Cholecalciferol (HM VITAMIN D3) 2000 units CAPS, Take 1 tablet by mouth daily, Disp: , Rfl:     furosemide (LASIX) 20 mg tablet, Take 1 tablet by mouth daily, Disp: , Rfl:     levothyroxine 50 mcg tablet, Take 1 tablet by mouth daily, Disp: , Rfl:     metoprolol tartrate (LOPRESSOR) 25 mg tablet, Take 12 5 mg by mouth daily  , Disp: , Rfl:     Multiple Vitamins-Minerals (MULTIVITAL-M) TABS, Take 1 tablet by mouth daily, Disp: , Rfl:     omeprazole (PriLOSEC) 20 mg delayed release capsule, Take 1 tablet by mouth daily, Disp: , Rfl:     simvastatin (ZOCOR) 20 mg tablet, TAKE 1 TABLET BY MOUTH AT BEDTIME, Disp: 90 tablet, Rfl: 3    Tiotropium Bromide Monohydrate (SPIRIVA RESPIMAT) 2 5 MCG/ACT AERS, Inhale 2 Act daily  , Disp: , Rfl:       PMH:  Past Medical History:   Diagnosis Date    Anemia     [facility]     Carotid artery occlusion     Cataract     [facility]     Coronary artery disease     Hyperlipidemia     Hypertension     Hypothyroidism 9/15/2017    Multiple pulmonary nodules     Parkinson disease ([facility] )     Peptic ulcer     Scoliosis     Transient cerebral ischemia     Tremors of nervous system          PSH  Past Surgical History:   Procedure Laterality Date   [facility] SURGERY      1973, 1987, 2005    BUNIONECTOMY Left     Simple exostectomy (silver procedure)    CAROTID ENDARTERECTOMY Right 09/10/2008    Common  Tere LEDBETTER MD    CATARACT EXTRACTION, BILATERAL      CERVICAL DISCECTOMY  1969    CORONARY ARTERY BYPASS GRAFT  10/20/2010    x3 (LIMA-LAD, SVG-OM, SVG-RCA)    CYSTOSCOPY      ELBOW SURGERY Right 07/2012    FEMORAL ARTERY - TIBIAL ARTERY BYPASS GRAFT  12/05/2011    using reversed saphenous vein from right leg  Tere LEDBETTER MD    REPLACEMENT TOTAL KNEE Left     SHOULDER SURGERY Right 1997         ROS:  Review of Systems      Vitals:  Blood pressure 122/66, pulse 68  Physical Exam:     Elderly male in no acute distress using a walker  Abdomen is soft without mass  Genital structures unremarkable for age  Rectal examination shows normal sphincter tone  The prostate is symmetrical benign roughly 28 grams  Lab, Imaging and other studies:  No results found for this or any previous visit (from the past 672 hour(s))  IMPRESSION:    1   Bladder outlet symptoms   2  History transitional cell bladder cancer    3  Hypotonic detrusor    PLAN:    His residual 240 mL today  He had a renal ultrasound 1 year ago which showed a residual of 277 m L  Upper tracts were not dilated  Renal function on recent comp metabolic profile is normal   Urinalysis today shows no hematuria  In view of the circumstances I did discuss with the patient and his wife  We will continue to follow conservatively  I did discuss possibility of a PNE trial to improve bladder emptying but he is not active and will continue with conservative management  He will contact me should there be any change such as infection or bleeding   Follow-up in 6 months check renal function at that time

## 2018-07-31 NOTE — PROGRESS NOTES
Progress Note - Urology  Hope Ferny 80 y o  male MRN: [id number]  Encounter: [id number]      Chief Complaint:   Chief Complaint   Patient presents with    Benign Prostatic Hypertrophy     6 Month Follow Up    Other     Pt was unable to give a urine sample today  HPI:   42-year-old male with past history of bladder cancer last occurrence was 2004  Cystoscopic examination is for 10 years beyond were negative for recurrent disease  His cytology studies also were negative  He does present with progressive outlet symptoms  He is on alpha blocker Uroxatral   His AUA index is 24  Complaining of a weak stream and not feeling empty  No UTI symptoms  No hematuria  Prior cystoscopic evaluations revealed minimal prostatic hypertrophy not judged to be significantly occlusive      MEDS:    Current Outpatient Prescriptions:     alfuzosin (UROXATRAL) 10 mg 24 hr tablet, Take 1 tablet (10 mg total) by mouth daily, Disp: 90 tablet, Rfl: 3    aspirin 81 MG tablet, Take 81 mg by mouth, Disp: , Rfl:     Cholecalciferol (HM VITAMIN D3) 2000 units CAPS, Take 1 tablet by mouth daily, Disp: , Rfl:     furosemide (LASIX) 20 mg tablet, Take 1 tablet by mouth daily, Disp: , Rfl:     levothyroxine 50 mcg tablet, Take 1 tablet by mouth daily, Disp: , Rfl:     metoprolol tartrate (LOPRESSOR) 25 mg tablet, Take 12 5 mg by mouth daily  , Disp: , Rfl:     Multiple Vitamins-Minerals (MULTIVITAL-M) TABS, Take 1 tablet by mouth daily, Disp: , Rfl:     omeprazole (PriLOSEC) 20 mg delayed release capsule, Take 1 tablet by mouth daily, Disp: , Rfl:     simvastatin (ZOCOR) 20 mg tablet, TAKE 1 TABLET BY MOUTH AT BEDTIME, Disp: 90 tablet, Rfl: 3    Tiotropium Bromide Monohydrate (SPIRIVA RESPIMAT) 2 5 MCG/ACT AERS, Inhale 2 Act daily  , Disp: , Rfl:       PMH:  Past Medical History:   Diagnosis Date    Anemia     [facility]     Carotid artery occlusion     Cataract     [facility]     Coronary artery disease     Hyperlipidemia     Hypertension     Hypothyroidism 9/15/2017    Multiple pulmonary nodules     Parkinson disease ([address] )     Peptic ulcer     Scoliosis     Transient cerebral ischemia     Tremors of nervous system          PSH  Past Surgical History:   Procedure Laterality Date   [facility] SURGERY      1973, 1987, 2005    BUNIONECTOMY Left     Simple exostectomy (silver procedure)    CAROTID ENDARTERECTOMY Right 09/10/2008    Common  [place] LEDBETTER MD    CATARACT EXTRACTION, BILATERAL      CERVICAL DISCECTOMY  1969    CORONARY ARTERY BYPASS GRAFT  10/20/2010    x3 (LIMA-LAD, SVG-OM, SVG-RCA)    CYSTOSCOPY      ELBOW SURGERY Right 07/2012    FEMORAL ARTERY - TIBIAL ARTERY BYPASS GRAFT  12/05/2011    using reversed saphenous vein from right leg  [place]annah LEDBETTER MD    REPLACEMENT TOTAL KNEE Left     SHOULDER SURGERY Right 1997         ROS:  Review of Systems      Vitals:  Blood pressure 122/66, pulse 68  Physical Exam:     Elderly male in no acute distress using a walker  Abdomen is soft without mass  Genital structures unremarkable for age  Rectal examination shows normal sphincter tone  The prostate is symmetrical benign roughly 28 grams  Lab, Imaging and other studies:  No results found for this or any previous visit (from the past 672 hour(s))  IMPRESSION:    1  Bladder outlet symptoms   2  History transitional cell bladder cancer    3  Hypotonic detrusor    PLAN:    His residual 240 mL today  He had a renal ultrasound 1 year ago which showed a residual of 277 m L  Upper tracts were not dilated  Renal function on recent comp metabolic profile is normal   Urinalysis today shows no hematuria  In view of the circumstances I did discuss with the patient and his wife  We will continue to follow conservatively    I did discuss possibility of a PNE trial to improve bladder emptying but he is not active and will continue with conservative management  He will contact me should there be any change such as infection or bleeding   Follow-up in 6 months check renal function at that time

## 2018-08-30 ENCOUNTER — OFFICE VISIT (OUTPATIENT)
Dept: CARDIOLOGY CLINIC | Facility: CLINIC | Age: 83
End: 2018-08-30
Payer: MEDICARE

## 2018-08-30 VITALS
SYSTOLIC BLOOD PRESSURE: 122 MMHG | HEART RATE: 83 BPM | HEIGHT: 66 IN | BODY MASS INDEX: 25.38 KG/M2 | WEIGHT: 157.9 LBS | DIASTOLIC BLOOD PRESSURE: 60 MMHG

## 2018-08-30 DIAGNOSIS — I25.10 CORONARY ARTERY DISEASE INVOLVING NATIVE CORONARY ARTERY OF NATIVE HEART WITHOUT ANGINA PECTORIS: ICD-10-CM

## 2018-08-30 DIAGNOSIS — I73.9 PERIPHERAL ARTERIAL DISEASE (HCC): ICD-10-CM

## 2018-08-30 DIAGNOSIS — I10 ESSENTIAL HYPERTENSION: ICD-10-CM

## 2018-08-30 DIAGNOSIS — I48.91 ATRIAL FIBRILLATION, UNSPECIFIED TYPE (HCC): Primary | ICD-10-CM

## 2018-08-30 DIAGNOSIS — I35.0 NONRHEUMATIC AORTIC VALVE STENOSIS: ICD-10-CM

## 2018-08-30 PROBLEM — E78.5 DYSLIPIDEMIA: Status: ACTIVE | Noted: 2018-08-30

## 2018-08-30 PROCEDURE — 93000 ELECTROCARDIOGRAM COMPLETE: CPT | Performed by: INTERNAL MEDICINE

## 2018-08-30 PROCEDURE — 99214 OFFICE O/P EST MOD 30 MIN: CPT | Performed by: INTERNAL MEDICINE

## 2018-08-30 NOTE — PROGRESS NOTES
Cardiology   Leslie Bejarano 80 y o  male MRN: [id number]        Impression:  1  Coronary artery disease - stable  2  Paroxysmal atrial fibrillation - in normal sinus rhythm  3  Vasovagal syndrome - stable  4  Dyslipidemia - on statin  5  Aortic stenosis - mild to moderate  6  Peripheral arterial disease - stable  7  Dyspnea - stable  No obvious ischemic or cardiac etiology  Most likely due to COPD  Recommendations:  1  Continue current medications  2  Follow up in 6 months  HPI: Leslie Bejarano is a 80y o  year old male with coronary artery disease, mild aortic stenosis, and paroxysmal atrial fibrillation returns for follow up  No chest pain, significant shortness of breath, or palpitations  Has had several episodes of chest pain rarely, but not on exertion  Does get dyspneic on exertion  Does have some edema  Review of Systems   Constitutional: Negative  HENT: Negative  Eyes: Negative  Respiratory: Positive for shortness of breath  Negative for chest tightness  Cardiovascular: Positive for leg swelling  Negative for chest pain and palpitations  Gastrointestinal: Negative  Endocrine: Negative  Genitourinary: Negative  Musculoskeletal: Negative  Skin: Negative  Allergic/Immunologic: Negative  Neurological: Negative  Hematological: Negative  Psychiatric/Behavioral: Negative  All other systems reviewed and are negative          Past Medical History:   Diagnosis Date    Anemia     Bladder cancer ([facility] [address] )     Carotid artery occlusion     Cataract     COPD (chronic obstructive pulmonary disease) (HCC)     Coronary artery disease     Hyperlipidemia     Hypertension     Hypothyroidism 9/15/2017    Multiple pulmonary nodules     Parkinson disease ([facility] [address] )     Peptic ulcer     Scoliosis     Transient cerebral ischemia     Tremors of nervous system      Past Surgical History:   Procedure Laterality Date   [facility] SURGERY      1973, 1987, 2005  BUNIONECTOMY Left     Simple exostectomy (silver procedure)    CAROTID ENDARTERECTOMY Right 09/10/2008    Randy LEDBETTER MD    CATARACT EXTRACTION, BILATERAL      CERVICAL DISCECTOMY  1969    CORONARY ARTERY BYPASS GRAFT  10/20/2010    x3 (LIMA-LAD, SVG-OM, SVG-RCA)    CYSTOSCOPY      ELBOW SURGERY Right 07/2012    FEMORAL ARTERY - TIBIAL ARTERY BYPASS GRAFT  12/05/2011    using reversed saphenous vein from right leg  Joann Mathews MD    REPLACEMENT TOTAL KNEE Left     SHOULDER SURGERY Right 1997     History   Alcohol Use    Yes     Comment: Social      History   Drug Use No     History   Smoking Status    Former Smoker    Packs/day: 1 00    Years: 50 00    Quit date: 1990   Smokeless Tobacco    Never Used     Family History   Problem Relation Age of Onset    Cervical cancer Mother     Cervical cancer Sister     Heart disease Sister     Coronary artery disease Sister     Lung cancer Brother        Allergies: Allergies   Allergen Reactions    Aleve [Naproxen]      Other reaction(s): FACIAL SWELLING  Category: Allergy;  Annotation - [id number]: lip/eye edema       Medications:     Current Outpatient Prescriptions:     alfuzosin (UROXATRAL) 10 mg 24 hr tablet, Take 1 tablet (10 mg total) by mouth daily, Disp: 90 tablet, Rfl: 3    aspirin 81 MG tablet, Take 81 mg by mouth, Disp: , Rfl:     Cholecalciferol (HM VITAMIN D3) 2000 units CAPS, Take 1 tablet by mouth daily, Disp: , Rfl:     furosemide (LASIX) 20 mg tablet, Take 1 tablet by mouth daily, Disp: , Rfl:     levothyroxine 50 mcg tablet, Take 1 tablet by mouth daily, Disp: , Rfl:     metoprolol tartrate (LOPRESSOR) 25 mg tablet, Take 12 5 mg by mouth daily  , Disp: , Rfl:     Multiple Vitamins-Minerals (MULTIVITAL-M) TABS, Take 1 tablet by mouth daily, Disp: , Rfl:     omeprazole (PriLOSEC) 20 mg delayed release capsule, Take 1 tablet by mouth daily, Disp: , Rfl:     simvastatin (ZOCOR) 20 mg tablet, TAKE 1 TABLET BY MOUTH AT BEDTIME, Disp: 90 tablet, Rfl: 3    Tiotropium Bromide Monohydrate (SPIRIVA RESPIMAT) 2 5 MCG/ACT AERS, Inhale 2 Act daily  , Disp: , Rfl:       Wt Readings from Last 3 Encounters:   08/30/18 71 6 kg (157 lb 14 4 oz)   05/24/18 73 5 kg (162 lb)   05/23/18 73 5 kg (162 lb)     Temp Readings from Last 3 Encounters:   05/24/18 97 7 °F (36 5 °C)   05/23/18 (!) 97 1 °F (36 2 °C)   10/27/17 97 7 °F (36 5 °C)     BP Readings from Last 3 Encounters:   08/30/18 122/60   07/31/18 122/66   05/24/18 120/60     Pulse Readings from Last 3 Encounters:   08/30/18 83   07/31/18 68   05/24/18 74         Physical Exam   Constitutional: He is oriented to person, place, and time  He appears well-developed  HENT:   Head: Atraumatic  Eyes: EOM are normal    Neck: Normal range of motion  Cardiovascular: Normal rate and regular rhythm  Exam reveals no gallop and no friction rub  Murmur heard  Systolic murmur is present with a grade of 2/6   Trace B LE edema   Pulmonary/Chest: Effort normal and breath sounds normal  No respiratory distress  He has no wheezes  He has no rales  Abdominal: Soft  Musculoskeletal: Normal range of motion  Neurological: He is alert and oriented to person, place, and time  Skin: Skin is warm and dry  Psychiatric: He has a normal mood and affect           Laboratory Studies:  CMP:  Lab Results   Component Value Date     03/14/2018    K 4 0 03/14/2018     03/14/2018    CO2 29 03/14/2018    ANIONGAP 7 06/25/2015    BUN 14 03/14/2018    CREATININE 0 93 03/14/2018    GLUCOSE 95 06/25/2015    AST 15 03/14/2018    ALT 23 03/14/2018    BILITOT 0 47 09/15/2015    EGFR 75 03/14/2018       Lipid Profile:   Lab Results   Component Value Date    CHOL 121 06/25/2015     Lab Results   Component Value Date    HDL 40 03/14/2018     Lab Results   Component Value Date    LDLCALC 40 03/14/2018     Lab Results   Component Value Date    TRIG 79 03/14/2018       Cardiac testing:   EKG reviewed personally: NSR 83 1st deg AVB RBBB  Results for orders placed in visit on 09/16/15   Echo complete with contrast if indicated    Narrative [address]   [facility] [place] [facility]   Phone: [phone] ECHOCARDIOGRAM   2D, M-MODE, DOPPLER, AND COLOR DOPPLER   Study date:  16-Sep-2015   Patient: Arik Vásquez   MR number: [id number]   Account number: [de-identified]   : 1933   Age: 80 years   Gender: Male   Status: Outpatient   Location: [address] [place] [address] [facility] [place]   Height: 68 in   Weight: 159 7 lb   BP: 108/ 68 mmHg   Indications: Assess the aortic valve  Diagnoses: 424 1 - AORTIC VALVE DISORDER   Sonographer:  BINU Mandujano   Primary Physician:  Miriam Amaya MD   Referring Physician:  Antwon Barbour MD   Group:  [id number]   Interpreting Physician:  Sage Dubon MD   SUMMARY   LEFT VENTRICLE:   Systolic function was normal by visual assessment  Ejection fraction was   estimated to be 55 %  There were no regional wall motion abnormalities  Wall thickness was mildly increased  Doppler parameters were consistent with abnormal left ventricular relaxation   (grade 1 diastolic dysfunction)  VENTRICULAR SEPTUM:   There was "bounce" motion  RIGHT VENTRICLE:   The ventricle was mildly to moderately dilated  Systolic function was mildly reduced  Systolic pressure was mildly increased  Estimated peak pressure was 35 mmHg  RIGHT ATRIUM:   The atrium was mildly dilated  MITRAL VALVE:   There was mild regurgitation  TRANSTHORACIC ECHOCARDIOGRAM   Patient: Arik Vásquez   MR number: [id number]    ------ Page 2   AORTIC VALVE:   The valve was trileaflet  Leaflets exhibited mildly to moderately increased   thickness, moderate calcification, and moderately reduced cuspal separation  Transaortic velocity was increased due to valvular stenosis  There was mild to moderate stenosis     There was mild regurgitation  TRICUSPID VALVE:   There was mild to moderate regurgitation  PULMONIC VALVE:   There was trace regurgitation  COMPARISONS:   There has been no significant interval change  Comparison was made with the   previous study of 11-Apr-2014  HISTORY: PRIOR HISTORY: CAD, CABG, hypertension, atrial fibrillation,   dyslipidemia   PROCEDURE: The study was performed in the [address] [facility] [place]  This was a routine study  The transthoracic approach was used  The study   included complete 2D imaging, M-mode, complete spectral Doppler, and color   Doppler  Echocardiographic views were limited due to lung interference  This   was a technically difficult study  LEFT VENTRICLE: Size was normal  Systolic function was normal by visual   assessment  Ejection fraction was estimated to be 55 %  There were no regional   wall motion abnormalities  Wall thickness was mildly increased  DOPPLER: There   was an increased relative contribution of atrial contraction to ventricular   filling  Doppler parameters were consistent with abnormal left ventricular   relaxation (grade 1 diastolic dysfunction)  VENTRICULAR SEPTUM: There was "bounce" motion  RIGHT VENTRICLE: The ventricle was mildly to moderately dilated  Systolic   function was mildly reduced  DOPPLER: Systolic pressure was mildly increased  Estimated peak pressure was 35 mmHg  LEFT ATRIUM: Size was at the upper limits of normal    RIGHT ATRIUM: The atrium was mildly dilated  MITRAL VALVE: Valve structure was normal  There was normal leaflet separation  DOPPLER: The transmitral velocity was within the normal range  There was no   evidence for stenosis  There was mild regurgitation  AORTIC VALVE: The valve was trileaflet   Leaflets exhibited mildly to    moderately   increased thickness, moderate calcification, and moderately reduced cuspal   TRANSTHORACIC ECHOCARDIOGRAM   Patient: Fe Valdez   MR number: [id number]    ------ Page 3   separation  DOPPLER: Transaortic velocity was increased due to valvular   stenosis  There was mild to moderate stenosis  There was mild regurgitation  TRICUSPID VALVE: The valve structure was normal  There was normal leaflet   separation  DOPPLER: The transtricuspid velocity was within the normal range  There was no evidence for stenosis  There was mild to moderate regurgitation  PULMONIC VALVE: Leaflets exhibited normal thickness, no calcification, and   normal cuspal separation  DOPPLER: The transpulmonic velocity was within the   normal range  There was trace regurgitation  PERICARDIUM: There was no pericardial effusion  AORTA: The root exhibited normal size  SYSTEMIC VEINS: IVC: The inferior vena cava was normal in size and course  Respirophasic changes were normal    SYSTEM MEASUREMENT TABLES   2D   %FS: 23 99 %   Ao Diam: 3 35 cm   EDV(Teich): 85 95 ml   EF(Cube): 56 08 %   EF(Teich): 48 04 %   ESV(Cube): 36 47 ml   ESV(Teich): 44 66 ml   IVSd: 1 1 cm   LA Area: 13 1 cm2   LA Diam: 4 06 cm   LVEDV MOD A4C: 107 22 ml   LVEF MOD A4C: 58 55 %   LVESV MOD A4C: 44 44 ml   LVIDd: 4 36 cm   LVIDs: 3 32 cm   LVLd A4C: 7 8 cm   LVLs A4C: 7 39 cm   LVOT Diam: 2 1 cm   LVPWd: 1 09 cm   RA Area: 18 71 cm2   RV Diam : 4 31 cm   SI(Cube): 25 04 ml/m2   SI(Teich): 22 2 ml/m2   SV MOD A4C: 62 78 ml   SV(Cube): 46 57 ml   SV(Teich): 41 29 ml   CW   AV Env  Ti: 340 11 ms   TRANSTHORACIC ECHOCARDIOGRAM   Patient: Tarun Nathan   MR number: [id number]    ------ Page 4   AV VTI: 54 32 cm   AV Vmax: 2 3 m/s   AV Vmean: 1 6 m/s   AV maxP 22 mmHg   AV meanP 31 mmHg   TR Vmax: 2 73 m/s   TR maxP mmHg   MM   TAPSE: 1 4 cm   PW   OSMAR (VTI): 1 12 cm2   OSMAR Vmax: 1 13 cm2   E': 0 07 m/s   E/E': 9 05   HR: 184 43 BPM   LVCI Dopp: 6 04 l/minm2   LVCO Dopp: 11 23 L/min   LVOT Env  Ti: 325 32 ms   LVOT VTI: 17 66 cm   LVOT Vmax: 0 76 m/s   LVOT Vmean: 0 54 m/s   LVOT maxP 29 mmHg   LVOT meanP 31 mmHg LVSI Dopp: 32 73 ml/m2   LVSV Dopp: 60 88 ml   MV A [place]on: 0 73 m/s   MV Dec Hendricks: 2 86 m/s2   MV DecT: 214 47 ms   MV E [place]on: 0 61 m/s   MV E/A Ratio: 0 84   [facility] Accredited Echocardiography Laboratory   Prepared and electronically signed by   Darcel Cranker, MD   Signed 16-Sep-2015 15:59:57

## 2018-10-29 DIAGNOSIS — I10 HTN (HYPERTENSION): Primary | ICD-10-CM

## 2018-10-29 RX ORDER — FUROSEMIDE 20 MG/1
TABLET ORAL
Qty: 90 TABLET | Refills: 0 | Status: SHIPPED | OUTPATIENT
Start: 2018-10-29 | End: 2019-02-01 | Stop reason: SDUPTHER

## 2018-11-07 ENCOUNTER — APPOINTMENT (OUTPATIENT)
Dept: RADIOLOGY | Age: 83
End: 2018-11-07
Payer: MEDICARE

## 2018-11-07 ENCOUNTER — TRANSCRIBE ORDERS (OUTPATIENT)
Dept: ADMINISTRATIVE | Age: 83
End: 2018-11-07

## 2018-11-07 DIAGNOSIS — J43.9 PULMONARY EMPHYSEMA, UNSPECIFIED EMPHYSEMA TYPE (HCC): ICD-10-CM

## 2018-11-07 PROCEDURE — 71046 X-RAY EXAM CHEST 2 VIEWS: CPT

## 2018-11-10 DIAGNOSIS — E03.9 ACQUIRED HYPOTHYROIDISM: ICD-10-CM

## 2018-11-10 RX ORDER — LEVOTHYROXINE SODIUM 0.05 MG/1
TABLET ORAL
Qty: 90 TABLET | Refills: 3 | Status: SHIPPED | OUTPATIENT
Start: 2018-11-10 | End: 2018-11-26 | Stop reason: SDUPTHER

## 2018-11-12 ENCOUNTER — OFFICE VISIT (OUTPATIENT)
Dept: PULMONOLOGY | Facility: CLINIC | Age: 83
End: 2018-11-12
Payer: MEDICARE

## 2018-11-12 VITALS
DIASTOLIC BLOOD PRESSURE: 74 MMHG | HEART RATE: 94 BPM | BODY MASS INDEX: 23.63 KG/M2 | HEIGHT: 66 IN | OXYGEN SATURATION: 94 % | WEIGHT: 147 LBS | SYSTOLIC BLOOD PRESSURE: 118 MMHG | TEMPERATURE: 96.2 F

## 2018-11-12 DIAGNOSIS — J43.9 PULMONARY EMPHYSEMA, UNSPECIFIED EMPHYSEMA TYPE (HCC): Primary | ICD-10-CM

## 2018-11-12 PROCEDURE — 99213 OFFICE O/P EST LOW 20 MIN: CPT | Performed by: INTERNAL MEDICINE

## 2018-11-12 NOTE — PROGRESS NOTES
Progress note - Pulmonary Medicine   Sheyla Griffin 80 y o  male MRN: [id number]       Impression & Plan:     Pulmonary emphysema ([address] )  · Doing well on Spiriva  · Occasional cough throughout the day with some pale yellow phlegm but this is not particularly bothersome to him  · Chest x-ray shows emphysema but otherwise normal  · No new or worsening symptoms  · Up-to-date with influenza and pneumococcal vaccinations  · Follow-up in 6 months    ______________________________________________________________________    HPI:    Sheyla Griffin presents today for follow-up of emphysema  He has not had any new or worsening symptoms  He has a cough typically first thing in the morning and then scattered throughout the daytime  He produces small amounts of pale yellow phlegm with the cough  The cough is not particularly bothersome to him  He does not have chest pain  He denies wheezing  He denies exertional shortness of breath for the amount of activity that he performs  He does have limited exercise capacity due to aortic stenosis as well as abnormality of gait  He has had a stable weight  He denies any change in appetite  He has not had any lightheadedness, dizziness, or headache  He is followed closely by Cardiology  He has no worsening congestive heart failure symptoms  He denies any abdominal pain or GERD symptoms  He tells me he received his influenza vaccination [facility]  He is otherwise up-to-date with his pneumococcal vaccinations  Review of Systems:  Review of Systems   All other systems reviewed and are negative          Past medical history, surgical history, and family history were reviewed and updated as appropriate    Social history updates:  History   Smoking Status    Former Smoker    Packs/day: 1 00    Years: 50 00    Quit date: 1990   Smokeless Tobacco    Never Used       PhysicalExamination:  Vitals:   /74 (BP Location: Left arm, Patient Position: Sitting)   Pulse 94   Temp (!) 96 2 °F (35 7 °C) (Tympanic)   Ht 5' 6" (1 676 m)   Wt 66 7 kg (147 lb)   SpO2 94%   BMI 23 73 kg/m²     Gen:  Appears comfortable on room air at rest  HEENT: PERRL  Oropharynx is clear, moist  Neck: Supple  There is no JVD, lymphadenopathy or thyromegaly  Trachea is midline  Chest:  Chest excursion symmetric  Lungs are hyperinflated  Breath sounds are distant bilaterally but otherwise clear  Hyper-resonant to percussion  Cardiac:  Murmur of aortic stenosis  Abdomen: Soft and nontender  Benign  Extremities: No clubbing, cyanosis or edema  Diagnostic Data:  Labs: I personally reviewed the most recent laboratory data pertinent to today's visit    Lab Results   Component Value Date    WBC 7 10 03/14/2018    HGB 14 1 03/14/2018    HCT 42 5 03/14/2018    MCV 96 03/14/2018     03/14/2018     Lab Results   Component Value Date    GLUCOSE 95 06/25/2015    CALCIUM 8 5 03/14/2018     06/25/2015    K 4 0 03/14/2018    CO2 29 03/14/2018     03/14/2018    BUN 14 03/14/2018    CREATININE 0 93 03/14/2018       PFT results: The most recent pulmonary function tests were reviewed  Last complete pulmonary function test was in 2011  Spirometry demonstrated severe obstruction with an FEV1 of 1 32 L which is 52% predicted  FEV1/FVC ratio 47%  Unable to perform lung volumes  Diffusing capacity 35% predicted      Imaging:  I personally reviewed the images on the [facility] system pertinent to today's visit  Chest x-ray November 2018 was viewed personally on the [facility] system    This demonstrates emphysema but no other abnormality      Ashley John MD

## 2018-11-12 NOTE — ASSESSMENT & PLAN NOTE
· Doing well on Spiriva  · Occasional cough throughout the day with some pale yellow phlegm but this is not particularly bothersome to him    · Chest x-ray shows emphysema but otherwise normal  · No new or worsening symptoms  · Up-to-date with influenza and pneumococcal vaccinations  · Follow-up in 6 months

## 2018-11-19 ENCOUNTER — APPOINTMENT (OUTPATIENT)
Dept: LAB | Age: 83
End: 2018-11-19
Payer: MEDICARE

## 2018-11-19 ENCOUNTER — TRANSCRIBE ORDERS (OUTPATIENT)
Dept: ADMINISTRATIVE | Age: 83
End: 2018-11-19

## 2018-11-19 DIAGNOSIS — R73.01 IMPAIRED FASTING GLUCOSE: ICD-10-CM

## 2018-11-19 DIAGNOSIS — E03.9 MYXEDEMA HEART DISEASE: Primary | ICD-10-CM

## 2018-11-19 DIAGNOSIS — I51.9 MYXEDEMA HEART DISEASE: ICD-10-CM

## 2018-11-19 DIAGNOSIS — I51.9 MYXEDEMA HEART DISEASE: Primary | ICD-10-CM

## 2018-11-19 DIAGNOSIS — E03.9 MYXEDEMA HEART DISEASE: ICD-10-CM

## 2018-11-19 LAB
ALBUMIN SERPL BCP-MCNC: 3.5 G/DL (ref 3.5–5)
ALP SERPL-CCNC: 107 U/L (ref 46–116)
ALT SERPL W P-5'-P-CCNC: 19 U/L (ref 12–78)
ANION GAP SERPL CALCULATED.3IONS-SCNC: 4 MMOL/L (ref 4–13)
AST SERPL W P-5'-P-CCNC: 17 U/L (ref 5–45)
BASOPHILS # BLD AUTO: 0.03 THOUSANDS/ΜL (ref 0–0.1)
BASOPHILS NFR BLD AUTO: 1 % (ref 0–1)
BILIRUB SERPL-MCNC: 0.46 MG/DL (ref 0.2–1)
BUN SERPL-MCNC: 13 MG/DL (ref 5–25)
CALCIUM SERPL-MCNC: 8.3 MG/DL (ref 8.3–10.1)
CHLORIDE SERPL-SCNC: 102 MMOL/L (ref 100–108)
CHOLEST SERPL-MCNC: 90 MG/DL (ref 50–200)
CO2 SERPL-SCNC: 28 MMOL/L (ref 21–32)
CREAT SERPL-MCNC: 0.95 MG/DL (ref 0.6–1.3)
EOSINOPHIL # BLD AUTO: 0.22 THOUSAND/ΜL (ref 0–0.61)
EOSINOPHIL NFR BLD AUTO: 4 % (ref 0–6)
ERYTHROCYTE [DISTWIDTH] IN BLOOD BY AUTOMATED COUNT: 12.2 % (ref 11.6–15.1)
GFR SERPL CREATININE-BSD FRML MDRD: 73 ML/MIN/1.73SQ M
GLUCOSE P FAST SERPL-MCNC: 102 MG/DL (ref 65–99)
HCT VFR BLD AUTO: 43.7 % (ref 36.5–49.3)
HDLC SERPL-MCNC: 38 MG/DL (ref 40–60)
HGB BLD-MCNC: 14.3 G/DL (ref 12–17)
IMM GRANULOCYTES # BLD AUTO: 0.01 THOUSAND/UL (ref 0–0.2)
IMM GRANULOCYTES NFR BLD AUTO: 0 % (ref 0–2)
LDLC SERPL CALC-MCNC: 32 MG/DL (ref 0–100)
LYMPHOCYTES # BLD AUTO: 1.26 THOUSANDS/ΜL (ref 0.6–4.47)
LYMPHOCYTES NFR BLD AUTO: 22 % (ref 14–44)
MCH RBC QN AUTO: 32.2 PG (ref 26.8–34.3)
MCHC RBC AUTO-ENTMCNC: 32.7 G/DL (ref 31.4–37.4)
MCV RBC AUTO: 98 FL (ref 82–98)
MONOCYTES # BLD AUTO: 0.83 THOUSAND/ΜL (ref 0.17–1.22)
MONOCYTES NFR BLD AUTO: 15 % (ref 4–12)
NEUTROPHILS # BLD AUTO: 3.29 THOUSANDS/ΜL (ref 1.85–7.62)
NEUTS SEG NFR BLD AUTO: 58 % (ref 43–75)
NONHDLC SERPL-MCNC: 52 MG/DL
NRBC BLD AUTO-RTO: 0 /100 WBCS
PLATELET # BLD AUTO: 165 THOUSANDS/UL (ref 149–390)
PMV BLD AUTO: 10.1 FL (ref 8.9–12.7)
POTASSIUM SERPL-SCNC: 4.3 MMOL/L (ref 3.5–5.3)
PROT SERPL-MCNC: 7 G/DL (ref 6.4–8.2)
RBC # BLD AUTO: 4.44 MILLION/UL (ref 3.88–5.62)
SODIUM SERPL-SCNC: 134 MMOL/L (ref 136–145)
T4 FREE SERPL-MCNC: 1.08 NG/DL (ref 0.76–1.46)
TRIGL SERPL-MCNC: 99 MG/DL
TSH SERPL DL<=0.05 MIU/L-ACNC: 6.49 UIU/ML (ref 0.36–3.74)
WBC # BLD AUTO: 5.64 THOUSAND/UL (ref 4.31–10.16)

## 2018-11-19 PROCEDURE — 80061 LIPID PANEL: CPT

## 2018-11-19 PROCEDURE — 36415 COLL VENOUS BLD VENIPUNCTURE: CPT

## 2018-11-19 PROCEDURE — 83036 HEMOGLOBIN GLYCOSYLATED A1C: CPT

## 2018-11-19 PROCEDURE — 84443 ASSAY THYROID STIM HORMONE: CPT

## 2018-11-19 PROCEDURE — 84439 ASSAY OF FREE THYROXINE: CPT

## 2018-11-19 PROCEDURE — 85025 COMPLETE CBC W/AUTO DIFF WBC: CPT

## 2018-11-19 PROCEDURE — 80053 COMPREHEN METABOLIC PANEL: CPT

## 2018-11-20 LAB
EST. AVERAGE GLUCOSE BLD GHB EST-MCNC: 117 MG/DL
HBA1C MFR BLD: 5.7 % (ref 4.2–6.3)
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
## 2018-11-26 ENCOUNTER — OFFICE VISIT (OUTPATIENT)
Dept: FAMILY MEDICINE CLINIC | Facility: CLINIC | Age: 83
End: 2018-11-26
Payer: MEDICARE

## 2018-11-26 VITALS
BODY MASS INDEX: 26.03 KG/M2 | DIASTOLIC BLOOD PRESSURE: 66 MMHG | HEART RATE: 74 BPM | RESPIRATION RATE: 16 BRPM | HEIGHT: 66 IN | TEMPERATURE: 96.5 F | SYSTOLIC BLOOD PRESSURE: 110 MMHG | WEIGHT: 162 LBS

## 2018-11-26 DIAGNOSIS — E78.2 MIXED HYPERLIPIDEMIA: ICD-10-CM

## 2018-11-26 DIAGNOSIS — I48.0 PAF (PAROXYSMAL ATRIAL FIBRILLATION) (HCC): ICD-10-CM

## 2018-11-26 DIAGNOSIS — I65.23 CAROTID STENOSIS, BILATERAL: ICD-10-CM

## 2018-11-26 DIAGNOSIS — I73.9 PERIPHERAL ARTERIAL DISEASE (HCC): ICD-10-CM

## 2018-11-26 DIAGNOSIS — I25.10 CORONARY ARTERY DISEASE INVOLVING NATIVE CORONARY ARTERY OF NATIVE HEART WITHOUT ANGINA PECTORIS: ICD-10-CM

## 2018-11-26 DIAGNOSIS — I35.0 NONRHEUMATIC AORTIC VALVE STENOSIS: ICD-10-CM

## 2018-11-26 DIAGNOSIS — I10 ESSENTIAL HYPERTENSION: Primary | ICD-10-CM

## 2018-11-26 DIAGNOSIS — K21.9 GASTROESOPHAGEAL REFLUX DISEASE WITHOUT ESOPHAGITIS: ICD-10-CM

## 2018-11-26 DIAGNOSIS — R73.01 IMPAIRED FASTING GLUCOSE: ICD-10-CM

## 2018-11-26 DIAGNOSIS — E03.9 ACQUIRED HYPOTHYROIDISM: ICD-10-CM

## 2018-11-26 PROBLEM — E78.5 DYSLIPIDEMIA: Status: RESOLVED | Noted: 2018-08-30 | Resolved: 2018-11-26

## 2018-11-26 PROCEDURE — 99214 OFFICE O/P EST MOD 30 MIN: CPT | Performed by: FAMILY MEDICINE

## 2018-11-26 RX ORDER — LEVOTHYROXINE SODIUM 0.07 MG/1
75 TABLET ORAL DAILY
Qty: 90 TABLET | Refills: 3 | Status: SHIPPED | OUTPATIENT
Start: 2018-11-26 | End: 2020-01-12

## 2019-01-07 ENCOUNTER — APPOINTMENT (OUTPATIENT)
Dept: LAB | Age: 84
End: 2019-01-07
Payer: MEDICARE

## 2019-01-07 LAB — TSH SERPL DL<=0.05 MIU/L-ACNC: 2.74 UIU/ML (ref 0.36–3.74)

## 2019-01-07 PROCEDURE — 36415 COLL VENOUS BLD VENIPUNCTURE: CPT | Performed by: FAMILY MEDICINE

## 2019-01-07 PROCEDURE — 84443 ASSAY THYROID STIM HORMONE: CPT | Performed by: FAMILY MEDICINE

## 2019-01-09 ENCOUNTER — TELEPHONE (OUTPATIENT)
Dept: FAMILY MEDICINE CLINIC | Facility: CLINIC | Age: 84
End: 2019-01-09

## 2019-01-29 ENCOUNTER — APPOINTMENT (OUTPATIENT)
Dept: LAB | Age: 84
End: 2019-01-29
Payer: MEDICARE

## 2019-01-29 DIAGNOSIS — N40.1 BPH WITH OBSTRUCTION/LOWER URINARY TRACT SYMPTOMS: ICD-10-CM

## 2019-01-29 DIAGNOSIS — N13.8 BPH WITH OBSTRUCTION/LOWER URINARY TRACT SYMPTOMS: ICD-10-CM

## 2019-01-29 LAB
ANION GAP SERPL CALCULATED.3IONS-SCNC: 4 MMOL/L (ref 4–13)
BUN SERPL-MCNC: 18 MG/DL (ref 5–25)
CALCIUM SERPL-MCNC: 8.7 MG/DL (ref 8.3–10.1)
CHLORIDE SERPL-SCNC: 103 MMOL/L (ref 100–108)
CO2 SERPL-SCNC: 29 MMOL/L (ref 21–32)
CREAT SERPL-MCNC: 0.97 MG/DL (ref 0.6–1.3)
GFR SERPL CREATININE-BSD FRML MDRD: 71 ML/MIN/1.73SQ M
GLUCOSE P FAST SERPL-MCNC: 131 MG/DL (ref 65–99)
POTASSIUM SERPL-SCNC: 4.1 MMOL/L (ref 3.5–5.3)
SODIUM SERPL-SCNC: 136 MMOL/L (ref 136–145)

## 2019-01-29 PROCEDURE — 80048 BASIC METABOLIC PNL TOTAL CA: CPT

## 2019-01-29 PROCEDURE — 36415 COLL VENOUS BLD VENIPUNCTURE: CPT

## 2019-02-01 DIAGNOSIS — I10 HTN (HYPERTENSION): ICD-10-CM

## 2019-02-01 DIAGNOSIS — J44.9 CHRONIC OBSTRUCTIVE PULMONARY DISEASE, UNSPECIFIED COPD TYPE (HCC): Primary | ICD-10-CM

## 2019-02-01 RX ORDER — FUROSEMIDE 20 MG/1
TABLET ORAL
Qty: 90 TABLET | Refills: 0 | Status: SHIPPED | OUTPATIENT
Start: 2019-02-01 | End: 2019-05-06 | Stop reason: SDUPTHER

## 2019-02-01 RX ORDER — TIOTROPIUM BROMIDE INHALATION SPRAY 3.12 UG/1
SPRAY, METERED RESPIRATORY (INHALATION)
Qty: 12 G | Refills: 6 | Status: SHIPPED | OUTPATIENT
Start: 2019-02-01 | End: 2019-05-06 | Stop reason: SDUPTHER

## 2019-02-05 ENCOUNTER — OFFICE VISIT (OUTPATIENT)
Dept: UROLOGY | Facility: CLINIC | Age: 84
End: 2019-02-05
Payer: MEDICARE

## 2019-02-05 VITALS
WEIGHT: 160 LBS | HEIGHT: 66 IN | HEART RATE: 95 BPM | DIASTOLIC BLOOD PRESSURE: 74 MMHG | SYSTOLIC BLOOD PRESSURE: 120 MMHG | BODY MASS INDEX: 25.71 KG/M2

## 2019-02-05 DIAGNOSIS — N40.1 BPH WITH OBSTRUCTION/LOWER URINARY TRACT SYMPTOMS: Primary | ICD-10-CM

## 2019-02-05 DIAGNOSIS — N13.8 BPH WITH OBSTRUCTION/LOWER URINARY TRACT SYMPTOMS: Primary | ICD-10-CM

## 2019-02-05 LAB — POST-VOID RESIDUAL VOLUME, ML POC: 358 ML

## 2019-02-05 PROCEDURE — 99213 OFFICE O/P EST LOW 20 MIN: CPT | Performed by: PHYSICIAN ASSISTANT

## 2019-02-05 PROCEDURE — 51798 US URINE CAPACITY MEASURE: CPT | Performed by: PHYSICIAN ASSISTANT

## 2019-02-06 NOTE — PROGRESS NOTES
UROLOGY PROGRESS NOTE   Patient Identifiers: Ranjana Thorne (MRN [id number])  Date of Service: 2/5/2019    Subjective:     59-year-old man history of bladder cancer with last recurrence 2004  He cleared 10 years of surveillance and urine cytology studies were also negative  He is on Uroxatral for his outlet symptoms  AUA index score is 18  He is comfortable and feels improved  He has no burning and no hematuria  Patient has  no complaints  Objective:     VITALS:    Vitals:    02/05/19 1057   BP: 120/74   Pulse: 95     AUA SYMPTOM SCORE      Most Recent Value   AUA SYMPTOM SCORE   How often have you had a sensation of not emptying your bladder completely after you finished urinating? 1   How often have you had to urinate again less than two hours after you finished urinating? 4   How often have you found you stopped and started again several times when you urinate? 4   How often have you found it difficult to postpone urination? 2   How often have you had a weak urinary stream?  4   How often have you had to push or strain to begin urination? 2   How many times did you most typically get up to urinate from the time you went to bed at night until the time you got up in the morning?   1   Quality of Life: If you were to spend the rest of your life with your urinary condition just the way it is now, how would you feel about that?  5   AUA SYMPTOM SCORE  18            LABS:  Lab Results   Component Value Date    HGB 14 3 11/19/2018    HCT 43 7 11/19/2018    WBC 5 64 11/19/2018     11/19/2018   ]    Lab Results   Component Value Date     06/25/2015    K 4 1 01/29/2019     01/29/2019    CO2 29 01/29/2019    BUN 18 01/29/2019    CREATININE 0 97 01/29/2019    CALCIUM 8 7 01/29/2019    GLUCOSE 95 06/25/2015   ]        INPATIENT MEDS:    Current Outpatient Prescriptions:     alfuzosin (UROXATRAL) 10 mg 24 hr tablet, Take 1 tablet (10 mg total) by mouth daily, Disp: 90 tablet, Rfl: 3   aspirin 81 MG tablet, Take 81 mg by mouth, Disp: , Rfl:     Cholecalciferol (HM VITAMIN D3) 2000 units CAPS, Take 1 tablet by mouth daily, Disp: , Rfl:     furosemide (LASIX) 20 mg tablet, TAKE 1 TABLET BY MOUTH DAILY AS DIRECTED, Disp: 90 tablet, Rfl: 0    levothyroxine 75 mcg tablet, Take 1 tablet (75 mcg total) by mouth daily for 90 days, Disp: 90 tablet, Rfl: 3    metoprolol tartrate (LOPRESSOR) 25 mg tablet, Take 12 5 mg by mouth daily  , Disp: , Rfl:     Multiple Vitamins-Minerals (MULTIVITAL-M) TABS, Take 1 tablet by mouth daily, Disp: , Rfl:     omeprazole (PriLOSEC) 20 mg delayed release capsule, Take 1 tablet by mouth daily, Disp: , Rfl:     simvastatin (ZOCOR) 20 mg tablet, TAKE 1 TABLET BY MOUTH AT BEDTIME, Disp: 90 tablet, Rfl: 3    SPIRIVA RESPIMAT 2 5 MCG/ACT AERS inhaler, INHALE 2 PUFFS BY MOUTH ONCE DAILY, Disp: 12 g, Rfl: 6      Physical Exam:   /74 (BP Location: Right arm, Patient Position: Sitting, Cuff Size: Adult)   Pulse 95   Ht 5' 6" (1 676 m)   Wt 72 6 kg (160 lb)   BMI 25 82 kg/m²   GEN: no acute distress    RESP: breathing comfortably with no accessory muscle use    ABD: soft, non-tender, non-distended   INCISION:    EXT: no significant peripheral edema   (Male): Penis uncircumcised, phallus normal, meatus patent  Testicles descended into scrotum bilaterally without masses nor tenderness  No inguinal hernias bilaterally  PEGGY: Prostate is enlarged at 35 grams  The prostate is not boggy  The prostate is not tender  No nodules noted      RADIOLOGY:     None     Assessment:    1   BPH   2  Personal history of bladder cancer     Plan:   - follow-up in 6 months  -  -  -

## 2019-02-23 DIAGNOSIS — I10 ESSENTIAL HYPERTENSION: Primary | ICD-10-CM

## 2019-03-19 DIAGNOSIS — N40.1 BPH WITH OBSTRUCTION/LOWER URINARY TRACT SYMPTOMS: ICD-10-CM

## 2019-03-19 DIAGNOSIS — N13.8 BPH WITH OBSTRUCTION/LOWER URINARY TRACT SYMPTOMS: ICD-10-CM

## 2019-03-20 RX ORDER — ALFUZOSIN HYDROCHLORIDE 10 MG/1
TABLET, EXTENDED RELEASE ORAL
Qty: 90 TABLET | Refills: 3 | Status: SHIPPED | OUTPATIENT
Start: 2019-03-20 | End: 2020-03-16

## 2019-03-22 ENCOUNTER — OFFICE VISIT (OUTPATIENT)
Dept: CARDIOLOGY CLINIC | Facility: CLINIC | Age: 84
End: 2019-03-22
Payer: MEDICARE

## 2019-03-22 VITALS
SYSTOLIC BLOOD PRESSURE: 100 MMHG | HEIGHT: 66 IN | WEIGHT: 155.6 LBS | BODY MASS INDEX: 25.01 KG/M2 | HEART RATE: 66 BPM | DIASTOLIC BLOOD PRESSURE: 56 MMHG

## 2019-03-22 DIAGNOSIS — I25.10 CORONARY ARTERY DISEASE INVOLVING NATIVE CORONARY ARTERY OF NATIVE HEART WITHOUT ANGINA PECTORIS: ICD-10-CM

## 2019-03-22 DIAGNOSIS — I65.23 CAROTID STENOSIS, BILATERAL: ICD-10-CM

## 2019-03-22 DIAGNOSIS — I48.0 PAF (PAROXYSMAL ATRIAL FIBRILLATION) (HCC): ICD-10-CM

## 2019-03-22 DIAGNOSIS — I10 ESSENTIAL HYPERTENSION: ICD-10-CM

## 2019-03-22 DIAGNOSIS — I73.9 PERIPHERAL ARTERIAL DISEASE (HCC): ICD-10-CM

## 2019-03-22 DIAGNOSIS — I35.0 NONRHEUMATIC AORTIC VALVE STENOSIS: ICD-10-CM

## 2019-03-22 DIAGNOSIS — I48.0 PAROXYSMAL ATRIAL FIBRILLATION (HCC): Primary | ICD-10-CM

## 2019-03-22 PROCEDURE — 99214 OFFICE O/P EST MOD 30 MIN: CPT | Performed by: INTERNAL MEDICINE

## 2019-03-22 PROCEDURE — 93000 ELECTROCARDIOGRAM COMPLETE: CPT | Performed by: INTERNAL MEDICINE

## 2019-03-22 NOTE — PROGRESS NOTES
Cardiology   Joshua Escobedo 80 y o  male MRN: [id number]        Impression:  1  Coronary artery disease - stable  2  Paroxysmal atrial fibrillation - in normal sinus rhythm  3  Vasovagal syndrome - stable  4  Dyslipidemia - on statin  5  Aortic stenosis - mild to moderate  6  Peripheral arterial disease - stable  7  Dyspnea - stable  No obvious ischemic or cardiac etiology  Most likely due to COPD       Recommendations:  1  Continue current medications  2  Follow up in 6 months           HPI: Joshua Escobedo is a 80y o  year old male with coronary artery disease, mild aortic stenosis, and paroxysmal atrial fibrillation returns for follow up  No chest pain, significant shortness of breath, or palpitations  Does get dyspneic on exertion  Twice felt as if he was going to pass out  Has chronic lower extremity edema  Review of Systems   Constitutional: Negative  HENT: Negative  Eyes: Negative  Respiratory: Positive for shortness of breath  Negative for chest tightness  Cardiovascular: Positive for leg swelling  Negative for chest pain and palpitations  Gastrointestinal: Negative  Endocrine: Negative  Genitourinary: Negative  Musculoskeletal: Negative  Skin: Negative  Allergic/Immunologic: Negative  Neurological: Negative  Hematological: Negative  Psychiatric/Behavioral: Negative  All other systems reviewed and are negative          Past Medical History:   Diagnosis Date    Anemia     Bladder [facility] ([facility] [address] )     Carotid artery occlusion     Cataract     COPD (chronic obstructive pulmonary disease) (HCC)     Coronary artery disease     Hyperlipidemia     Hypertension     Hypothyroidism 9/15/2017    Multiple pulmonary nodules     Parkinson disease ([address] )     Peptic ulcer     Scoliosis     Transient cerebral ischemia     Tremors of nervous system      Past Surgical History:   Procedure Laterality Date   [facility] SURGERY      1973, 1987, 2005    BUNIONECTOMY Left     Simple exostectomy (silver procedure)    CAROTID ENDARTERECTOMY Right 09/10/2008    Randy LEDBETTER MD    CATARACT EXTRACTION, BILATERAL      CERVICAL DISCECTOMY  1969    CORONARY ARTERY BYPASS GRAFT  10/20/2010    x3 (LIMA-LAD, SVG-OM, SVG-RCA)    CYSTOSCOPY      ELBOW SURGERY Right 2012    FEMORAL ARTERY - TIBIAL ARTERY BYPASS GRAFT  2011    using reversed saphenous vein from right leg  [employer] [employer] MD    REPLACEMENT TOTAL KNEE Left     SHOULDER SURGERY Right      Social History     Substance and Sexual Activity   Alcohol Use Yes    Comment: Social      Social History     Substance and Sexual Activity   Drug Use No     Social History     Tobacco Use   Smoking Status Former Smoker    Packs/day: 1 00    Years: 50 00    Pack years: 50 00    Last attempt to quit: Christine Foster Years since quittin 2   Smokeless Tobacco Never Used     Family History   Problem Relation Age of Onset    Cervical cancer Mother     Cervical cancer Sister     Heart disease Sister     Coronary artery disease Sister     Lung cancer Brother        Allergies: Allergies   Allergen Reactions    Aleve [Naproxen]      Other reaction(s): FACIAL SWELLING  Category: Allergy;  Annotation - [id number]: lip/eye edema       Medications:     Current Outpatient Medications:     alfuzosin (UROXATRAL) 10 mg 24 hr tablet, TAKE 1 TABLET(10 MG) BY MOUTH DAILY, Disp: 90 tablet, Rfl: 3    aspirin 81 MG tablet, Take 81 mg by mouth, Disp: , Rfl:     Cholecalciferol (HM VITAMIN D3) 2000 units CAPS, Take 1 tablet by mouth daily, Disp: , Rfl:     furosemide (LASIX) 20 mg tablet, TAKE 1 TABLET BY MOUTH DAILY AS DIRECTED, Disp: 90 tablet, Rfl: 0    levothyroxine 75 mcg tablet, Take 1 tablet (75 mcg total) by mouth daily for 90 days, Disp: 90 tablet, Rfl: 3    metoprolol tartrate (LOPRESSOR) 25 mg tablet, TAKE 1/2 TABLET BY MOUTH DAILY, Disp: 45 tablet, Rfl: 0    Multiple Vitamins-Minerals (MULTIVITAL-M) TABS, Take 1 tablet by mouth daily, Disp: , Rfl:     omeprazole (PriLOSEC) 20 mg delayed release capsule, Take 1 tablet by mouth daily, Disp: , Rfl:     simvastatin (ZOCOR) 20 mg tablet, TAKE 1 TABLET BY MOUTH AT BEDTIME, Disp: 90 tablet, Rfl: 3    SPIRIVA RESPIMAT 2 5 MCG/ACT AERS inhaler, INHALE 2 PUFFS BY MOUTH ONCE DAILY, Disp: 12 g, Rfl: 6      Wt Readings from Last 3 Encounters:   03/22/19 70 6 kg (155 lb 9 6 oz)   02/05/19 72 6 kg (160 lb)   11/26/18 73 5 kg (162 lb)     Temp Readings from Last 3 Encounters:   11/26/18 (!) 96 5 °F (35 8 °C)   11/12/18 (!) 96 2 °F (35 7 °C) (Tympanic)   05/24/18 97 7 °F (36 5 °C)     BP Readings from Last 3 Encounters:   03/22/19 100/56   02/05/19 120/74   11/26/18 110/66     Pulse Readings from Last 3 Encounters:   03/22/19 66   02/05/19 95   11/26/18 74         Physical Exam   Constitutional: He is oriented to person, place, and time  He appears well-developed  HENT:   Head: Atraumatic  Eyes: EOM are normal    Neck: Normal range of motion  Cardiovascular: Normal rate and regular rhythm  Exam reveals no gallop and no friction rub  Murmur heard  Systolic murmur is present with a grade of 2/6  Pulmonary/Chest: Effort normal and breath sounds normal  No stridor  No respiratory distress  He has no wheezes  Abdominal: Soft  Musculoskeletal: Normal range of motion  Neurological: He is alert and oriented to person, place, and time  Skin: Skin is warm and dry  Psychiatric: He has a normal mood and affect           Laboratory Studies:  CMP:  Lab Results   Component Value Date     06/25/2015    K 4 1 01/29/2019     01/29/2019    CO2 29 01/29/2019    ANIONGAP 7 06/25/2015    BUN 18 01/29/2019    CREATININE 0 97 01/29/2019    GLUCOSE 95 06/25/2015    AST 17 11/19/2018    ALT 19 11/19/2018    BILITOT 0 47 09/15/2015    EGFR 71 01/29/2019       Lipid Profile:   Lab Results   Component Value Date    CHOL 121 06/25/2015     Lab Results Component Value Date    HDL 38 (L) 11/19/2018     Lab Results   Component Value Date    LDLCALC 32 11/19/2018     Lab Results   Component Value Date    TRIG 99 11/19/2018       Cardiac testing:   EKG reviewed personally: NSR 66 1st deg AV block RBBB

## 2019-03-29 DIAGNOSIS — I65.23 CAROTID STENOSIS, BILATERAL: Primary | ICD-10-CM

## 2019-03-29 DIAGNOSIS — I73.9 PERIPHERAL VASCULAR DISEASE, UNSPECIFIED (HCC): ICD-10-CM

## 2019-04-12 ENCOUNTER — HOSPITAL ENCOUNTER (OUTPATIENT)
Dept: NON INVASIVE DIAGNOSTICS | Facility: CLINIC | Age: 84
Discharge: HOME | End: 2019-04-12
Payer: MEDICARE

## 2019-04-12 DIAGNOSIS — I73.9 PERIPHERAL VASCULAR DISEASE, UNSPECIFIED (HCC): ICD-10-CM

## 2019-04-12 DIAGNOSIS — I65.23 CAROTID STENOSIS, BILATERAL: ICD-10-CM

## 2019-04-12 PROCEDURE — 93925 LOWER EXTREMITY STUDY: CPT | Performed by: SURGERY

## 2019-04-12 PROCEDURE — 93925 LOWER EXTREMITY STUDY: CPT

## 2019-04-12 PROCEDURE — 93922 UPR/L XTREMITY ART 2 LEVELS: CPT | Performed by: SURGERY

## 2019-04-12 PROCEDURE — 93880 EXTRACRANIAL BILAT STUDY: CPT | Performed by: SURGERY

## 2019-04-12 PROCEDURE — 93880 EXTRACRANIAL BILAT STUDY: CPT

## 2019-04-12 PROCEDURE — 93923 UPR/LXTR ART STDY 3+ LVLS: CPT

## 2019-05-06 ENCOUNTER — OFFICE VISIT (OUTPATIENT)
Dept: PULMONOLOGY | Facility: CLINIC | Age: 84
End: 2019-05-06
Payer: MEDICARE

## 2019-05-06 VITALS
WEIGHT: 160 LBS | BODY MASS INDEX: 25.71 KG/M2 | RESPIRATION RATE: 14 BRPM | OXYGEN SATURATION: 99 % | TEMPERATURE: 97.9 F | HEART RATE: 83 BPM | DIASTOLIC BLOOD PRESSURE: 80 MMHG | HEIGHT: 66 IN | SYSTOLIC BLOOD PRESSURE: 120 MMHG

## 2019-05-06 DIAGNOSIS — I10 HTN (HYPERTENSION): ICD-10-CM

## 2019-05-06 DIAGNOSIS — J43.9 PULMONARY EMPHYSEMA, UNSPECIFIED EMPHYSEMA TYPE (HCC): Primary | ICD-10-CM

## 2019-05-06 DIAGNOSIS — J44.9 CHRONIC OBSTRUCTIVE PULMONARY DISEASE, UNSPECIFIED COPD TYPE (HCC): ICD-10-CM

## 2019-05-06 PROCEDURE — 99213 OFFICE O/P EST LOW 20 MIN: CPT | Performed by: INTERNAL MEDICINE

## 2019-05-07 RX ORDER — FUROSEMIDE 20 MG/1
TABLET ORAL
Qty: 90 TABLET | Refills: 0 | Status: SHIPPED | OUTPATIENT
Start: 2019-05-07 | End: 2019-08-15 | Stop reason: SDUPTHER

## 2019-05-25 ENCOUNTER — APPOINTMENT (OUTPATIENT)
Dept: LAB | Age: 84
End: 2019-05-25
Payer: MEDICARE

## 2019-05-25 ENCOUNTER — TRANSCRIBE ORDERS (OUTPATIENT)
Dept: ADMINISTRATIVE | Age: 84
End: 2019-05-25

## 2019-05-25 DIAGNOSIS — E03.9 MYXEDEMA HEART DISEASE: ICD-10-CM

## 2019-05-25 DIAGNOSIS — I51.9 MYXEDEMA HEART DISEASE: ICD-10-CM

## 2019-05-25 DIAGNOSIS — E03.9 MYXEDEMA HEART DISEASE: Primary | ICD-10-CM

## 2019-05-25 DIAGNOSIS — I51.9 MYXEDEMA HEART DISEASE: Primary | ICD-10-CM

## 2019-05-25 DIAGNOSIS — I10 ESSENTIAL HYPERTENSION: ICD-10-CM

## 2019-05-25 LAB
ALBUMIN SERPL BCP-MCNC: 3.8 G/DL (ref 3.5–5)
ALP SERPL-CCNC: 100 U/L (ref 46–116)
ALT SERPL W P-5'-P-CCNC: 21 U/L (ref 12–78)
ANION GAP SERPL CALCULATED.3IONS-SCNC: 6 MMOL/L (ref 4–13)
AST SERPL W P-5'-P-CCNC: 13 U/L (ref 5–45)
BASOPHILS # BLD AUTO: 0.02 THOUSANDS/ΜL (ref 0–0.1)
BASOPHILS NFR BLD AUTO: 0 % (ref 0–1)
BILIRUB SERPL-MCNC: 0.6 MG/DL (ref 0.2–1)
BUN SERPL-MCNC: 15 MG/DL (ref 5–25)
CALCIUM SERPL-MCNC: 8.5 MG/DL (ref 8.3–10.1)
CHLORIDE SERPL-SCNC: 102 MMOL/L (ref 100–108)
CHOLEST SERPL-MCNC: 90 MG/DL (ref 50–200)
CO2 SERPL-SCNC: 27 MMOL/L (ref 21–32)
CREAT SERPL-MCNC: 0.97 MG/DL (ref 0.6–1.3)
EOSINOPHIL # BLD AUTO: 0.13 THOUSAND/ΜL (ref 0–0.61)
EOSINOPHIL NFR BLD AUTO: 2 % (ref 0–6)
ERYTHROCYTE [DISTWIDTH] IN BLOOD BY AUTOMATED COUNT: 12.2 % (ref 11.6–15.1)
EST. AVERAGE GLUCOSE BLD GHB EST-MCNC: 114 MG/DL
GFR SERPL CREATININE-BSD FRML MDRD: 70 ML/MIN/1.73SQ M
GLUCOSE P FAST SERPL-MCNC: 92 MG/DL (ref 65–99)
HBA1C MFR BLD: 5.6 % (ref 4.2–6.3)
HCT VFR BLD AUTO: 43.6 % (ref 36.5–49.3)
HDLC SERPL-MCNC: 39 MG/DL (ref 40–60)
HGB BLD-MCNC: 14.2 G/DL (ref 12–17)
IMM GRANULOCYTES # BLD AUTO: 0.01 THOUSAND/UL (ref 0–0.2)
IMM GRANULOCYTES NFR BLD AUTO: 0 % (ref 0–2)
LDLC SERPL CALC-MCNC: 38 MG/DL (ref 0–100)
LYMPHOCYTES # BLD AUTO: 1.24 THOUSANDS/ΜL (ref 0.6–4.47)
LYMPHOCYTES NFR BLD AUTO: 21 % (ref 14–44)
MCH RBC QN AUTO: 31.8 PG (ref 26.8–34.3)
MCHC RBC AUTO-ENTMCNC: 32.6 G/DL (ref 31.4–37.4)
MCV RBC AUTO: 98 FL (ref 82–98)
MONOCYTES # BLD AUTO: 0.93 THOUSAND/ΜL (ref 0.17–1.22)
MONOCYTES NFR BLD AUTO: 16 % (ref 4–12)
NEUTROPHILS # BLD AUTO: 3.65 THOUSANDS/ΜL (ref 1.85–7.62)
NEUTS SEG NFR BLD AUTO: 61 % (ref 43–75)
NONHDLC SERPL-MCNC: 51 MG/DL
NRBC BLD AUTO-RTO: 0 /100 WBCS
PLATELET # BLD AUTO: 162 THOUSANDS/UL (ref 149–390)
PMV BLD AUTO: 10.5 FL (ref 8.9–12.7)
POTASSIUM SERPL-SCNC: 3.7 MMOL/L (ref 3.5–5.3)
PROT SERPL-MCNC: 7.3 G/DL (ref 6.4–8.2)
RBC # BLD AUTO: 4.47 MILLION/UL (ref 3.88–5.62)
SODIUM SERPL-SCNC: 135 MMOL/L (ref 136–145)
TRIGL SERPL-MCNC: 67 MG/DL
TSH SERPL DL<=0.05 MIU/L-ACNC: 2.84 UIU/ML (ref 0.36–3.74)
WBC # BLD AUTO: 5.98 THOUSAND/UL (ref 4.31–10.16)

## 2019-05-25 PROCEDURE — 85025 COMPLETE CBC W/AUTO DIFF WBC: CPT | Performed by: FAMILY MEDICINE

## 2019-05-25 PROCEDURE — 80061 LIPID PANEL: CPT | Performed by: FAMILY MEDICINE

## 2019-05-25 PROCEDURE — 83036 HEMOGLOBIN GLYCOSYLATED A1C: CPT | Performed by: FAMILY MEDICINE

## 2019-05-25 PROCEDURE — 84443 ASSAY THYROID STIM HORMONE: CPT | Performed by: FAMILY MEDICINE

## 2019-05-25 PROCEDURE — 80053 COMPREHEN METABOLIC PANEL: CPT | Performed by: FAMILY MEDICINE

## 2019-05-25 PROCEDURE — 36415 COLL VENOUS BLD VENIPUNCTURE: CPT | Performed by: FAMILY MEDICINE

## 2019-05-30 ENCOUNTER — OFFICE VISIT (OUTPATIENT)
Dept: FAMILY MEDICINE CLINIC | Facility: CLINIC | Age: 84
End: 2019-05-30
Payer: MEDICARE

## 2019-05-30 VITALS
BODY MASS INDEX: 24.8 KG/M2 | SYSTOLIC BLOOD PRESSURE: 128 MMHG | HEIGHT: 67 IN | TEMPERATURE: 97.1 F | WEIGHT: 158 LBS | HEART RATE: 84 BPM | RESPIRATION RATE: 16 BRPM | DIASTOLIC BLOOD PRESSURE: 80 MMHG

## 2019-05-30 DIAGNOSIS — E03.9 ACQUIRED HYPOTHYROIDISM: ICD-10-CM

## 2019-05-30 DIAGNOSIS — I10 ESSENTIAL HYPERTENSION: Primary | ICD-10-CM

## 2019-05-30 DIAGNOSIS — N40.1 BENIGN PROSTATIC HYPERPLASIA WITH NOCTURIA: ICD-10-CM

## 2019-05-30 DIAGNOSIS — Z00.00 MEDICARE ANNUAL WELLNESS VISIT, SUBSEQUENT: ICD-10-CM

## 2019-05-30 DIAGNOSIS — I65.23 CAROTID STENOSIS, BILATERAL: ICD-10-CM

## 2019-05-30 DIAGNOSIS — K21.9 GASTROESOPHAGEAL REFLUX DISEASE WITHOUT ESOPHAGITIS: ICD-10-CM

## 2019-05-30 DIAGNOSIS — R35.1 BENIGN PROSTATIC HYPERPLASIA WITH NOCTURIA: ICD-10-CM

## 2019-05-30 DIAGNOSIS — E78.2 MIXED HYPERLIPIDEMIA: ICD-10-CM

## 2019-05-30 DIAGNOSIS — I48.0 PAF (PAROXYSMAL ATRIAL FIBRILLATION) (HCC): ICD-10-CM

## 2019-05-30 DIAGNOSIS — M15.9 GENERALIZED OSTEOARTHRITIS OF MULTIPLE SITES: ICD-10-CM

## 2019-05-30 DIAGNOSIS — J43.9 PULMONARY EMPHYSEMA, UNSPECIFIED EMPHYSEMA TYPE (HCC): ICD-10-CM

## 2019-05-30 DIAGNOSIS — I73.9 PERIPHERAL ARTERIAL DISEASE (HCC): ICD-10-CM

## 2019-05-30 DIAGNOSIS — I35.0 NONRHEUMATIC AORTIC VALVE STENOSIS: ICD-10-CM

## 2019-05-30 DIAGNOSIS — R73.01 IMPAIRED FASTING GLUCOSE: ICD-10-CM

## 2019-05-30 DIAGNOSIS — I25.10 CORONARY ARTERY DISEASE INVOLVING NATIVE CORONARY ARTERY OF NATIVE HEART WITHOUT ANGINA PECTORIS: ICD-10-CM

## 2019-05-30 PROCEDURE — 99214 OFFICE O/P EST MOD 30 MIN: CPT | Performed by: FAMILY MEDICINE

## 2019-05-30 PROCEDURE — G0439 PPPS, SUBSEQ VISIT: HCPCS | Performed by: FAMILY MEDICINE

## 2019-07-02 DIAGNOSIS — E78.5 DYSLIPIDEMIA: ICD-10-CM

## 2019-07-02 RX ORDER — SIMVASTATIN 20 MG
TABLET ORAL
Qty: 90 TABLET | Refills: 0 | Status: SHIPPED | OUTPATIENT
Start: 2019-07-02 | End: 2019-09-29 | Stop reason: SDUPTHER

## 2019-08-07 ENCOUNTER — OFFICE VISIT (OUTPATIENT)
Dept: UROLOGY | Facility: CLINIC | Age: 84
End: 2019-08-07
Payer: MEDICARE

## 2019-08-07 VITALS — DIASTOLIC BLOOD PRESSURE: 70 MMHG | SYSTOLIC BLOOD PRESSURE: 122 MMHG | HEART RATE: 93 BPM

## 2019-08-07 DIAGNOSIS — N40.1 BPH WITH OBSTRUCTION/LOWER URINARY TRACT SYMPTOMS: Primary | ICD-10-CM

## 2019-08-07 DIAGNOSIS — N13.8 BPH WITH OBSTRUCTION/LOWER URINARY TRACT SYMPTOMS: Primary | ICD-10-CM

## 2019-08-07 LAB — POST-VOID RESIDUAL VOLUME, ML POC: 178 ML

## 2019-08-07 PROCEDURE — 99213 OFFICE O/P EST LOW 20 MIN: CPT | Performed by: PHYSICIAN ASSISTANT

## 2019-08-07 PROCEDURE — 51798 US URINE CAPACITY MEASURE: CPT | Performed by: PHYSICIAN ASSISTANT

## 2019-08-13 DIAGNOSIS — I10 HTN (HYPERTENSION): ICD-10-CM

## 2019-08-15 DIAGNOSIS — I10 HTN (HYPERTENSION): ICD-10-CM

## 2019-08-15 RX ORDER — FUROSEMIDE 20 MG/1
20 TABLET ORAL DAILY
Qty: 90 TABLET | Refills: 1 | Status: SHIPPED | OUTPATIENT
Start: 2019-08-15 | End: 2020-02-12

## 2019-08-19 DIAGNOSIS — I10 ESSENTIAL HYPERTENSION: ICD-10-CM

## 2019-08-21 DIAGNOSIS — I10 ESSENTIAL HYPERTENSION: ICD-10-CM

## 2019-08-23 RX ORDER — FUROSEMIDE 20 MG/1
TABLET ORAL
Qty: 90 TABLET | Refills: 0 | Status: SHIPPED | OUTPATIENT
Start: 2019-08-23 | End: 2019-09-09 | Stop reason: SDUPTHER

## 2019-08-27 NOTE — PROGRESS NOTES
UROLOGY PROGRESS NOTE   Patient Identifiers: Alexx Dupont (MRN [id number])  Date of Service: 8/27/2019    Subjective:     66-year-old man history of bladder cancer last recurrence in 2004  He is cleared 10 years of surveillance and urine cytology studies were negative  He is on Uroxatral for his outlet symptoms  He seems like his symptoms have improved  Postvoid residual 178 mL  Patient has  no complaints        Objective:     VITALS:    Vitals:    08/07/19 1014   BP: 122/70   Pulse: 93           LABS:  Lab Results   Component Value Date    HGB 14 2 05/25/2019    HCT 43 6 05/25/2019    WBC 5 98 05/25/2019     05/25/2019   ]    Lab Results   Component Value Date     06/25/2015    K 3 7 05/25/2019     05/25/2019    CO2 27 05/25/2019    BUN 15 05/25/2019    CREATININE 0 97 05/25/2019    CALCIUM 8 5 05/25/2019    GLUCOSE 95 06/25/2015   ]        INPATIENT MEDS:    Current Outpatient Medications:     alfuzosin (UROXATRAL) 10 mg 24 hr tablet, TAKE 1 TABLET(10 MG) BY MOUTH DAILY, Disp: 90 tablet, Rfl: 3    aspirin 81 MG tablet, Take 81 mg by mouth, Disp: , Rfl:     Cholecalciferol (HM VITAMIN D3) 2000 units CAPS, Take 1 tablet by mouth daily, Disp: , Rfl:     levothyroxine 75 mcg tablet, Take 1 tablet (75 mcg total) by mouth daily for 90 days, Disp: 90 tablet, Rfl: 3    Multiple Vitamins-Minerals (MULTIVITAL-M) TABS, Take 1 tablet by mouth daily, Disp: , Rfl:     omeprazole (PriLOSEC) 20 mg delayed release capsule, Take 1 tablet by mouth daily, Disp: , Rfl:     simvastatin (ZOCOR) 20 mg tablet, TAKE 1 TABLET BY MOUTH AT BEDTIME, Disp: 90 tablet, Rfl: 0    tiotropium (SPIRIVA RESPIMAT) 2 5 MCG/ACT AERS inhaler, Inhale 2 puffs daily, Disp: 12 g, Rfl: 6    furosemide (LASIX) 20 mg tablet, TAKE 1 TABLET BY MOUTH DAILY AS DIRECTED, Disp: 90 tablet, Rfl: 0    furosemide (LASIX) 20 mg tablet, Take 1 tablet (20 mg total) by mouth daily, Disp: 90 tablet, Rfl: 1    metoprolol tartrate (LOPRESSOR) 25 mg tablet, TAKE 1/2 TABLET BY MOUTH DAILY, Disp: 45 tablet, Rfl: 0    metoprolol tartrate (LOPRESSOR) 25 mg tablet, Take 0 5 tablets (12 5 mg total) by mouth daily, Disp: 45 tablet, Rfl: 1      Physical Exam:   /70 (BP Location: Left arm, Patient Position: Sitting, Cuff Size: Adult)   Pulse 93   GEN: no acute distress    RESP: breathing comfortably with no accessory muscle use    ABD: soft, non-tender, non-distended   INCISION:    EXT: no significant peripheral edema   (Male): Penis circumcised, phallus normal, meatus patent  Testicles descended into scrotum bilaterally without masses nor tenderness  No inguinal hernias bilaterally  PEGGY: Prostate is enlarged at 35 grams  The prostate is not boggy  The prostate is not tender  No nodules noted      RADIOLOGY:     None     Assessment:    1   BPH   2  Personal history of bladder cancer     Plan:   - he is satisfied with his current condition  - follow-up in 1 year for his annual exam  -  -

## 2019-09-03 ENCOUNTER — TELEPHONE (OUTPATIENT)
Dept: CARDIOLOGY CLINIC | Facility: CLINIC | Age: 84
End: 2019-09-03

## 2019-09-03 NOTE — TELEPHONE ENCOUNTER
Pt experienced intermittent CP for 15 minutes on Saturday  No further CP, or any other symptoms since Saturday  Spouse asking for a sooner appt with Dr Rosana Valdez or NP  Scheduled with NP Monday 9/9/19

## 2019-09-09 ENCOUNTER — OFFICE VISIT (OUTPATIENT)
Dept: CARDIOLOGY CLINIC | Facility: CLINIC | Age: 84
End: 2019-09-09
Payer: MEDICARE

## 2019-09-09 VITALS
DIASTOLIC BLOOD PRESSURE: 60 MMHG | SYSTOLIC BLOOD PRESSURE: 108 MMHG | WEIGHT: 157.3 LBS | HEIGHT: 67 IN | BODY MASS INDEX: 24.69 KG/M2 | HEART RATE: 80 BPM | OXYGEN SATURATION: 97 %

## 2019-09-09 DIAGNOSIS — R07.89 CHEST PRESSURE: Primary | ICD-10-CM

## 2019-09-09 DIAGNOSIS — I10 ESSENTIAL HYPERTENSION: ICD-10-CM

## 2019-09-09 DIAGNOSIS — I35.0 NONRHEUMATIC AORTIC VALVE STENOSIS: ICD-10-CM

## 2019-09-09 DIAGNOSIS — E78.5 DYSLIPIDEMIA: ICD-10-CM

## 2019-09-09 PROCEDURE — 99215 OFFICE O/P EST HI 40 MIN: CPT | Performed by: NURSE PRACTITIONER

## 2019-09-09 PROCEDURE — 93000 ELECTROCARDIOGRAM COMPLETE: CPT | Performed by: NURSE PRACTITIONER

## 2019-09-09 RX ORDER — NITROGLYCERIN 0.4 MG/1
0.4 TABLET SUBLINGUAL
Qty: 30 TABLET | Refills: 3 | Status: SHIPPED | OUTPATIENT
Start: 2019-09-09 | End: 2020-10-08 | Stop reason: HOSPADM

## 2019-09-09 NOTE — PROGRESS NOTES
Cardiology Follow Up    Brigido Late  1933  [id number]  [address] [facility]  [address] [facility] TATE Mullins [address] [facility]  [phone]    1  Chest pressure  POCT ECG       Interval History:  Mr Jason Huffman  Presents our office today with complaints of chest pain  Erwin Singh is accompanied by his wife  Erwin Singh began to  Experience mid sternal chest pressure 1 week ago  He last experienced mid  Sternal chest pain last evening after going to bed, pain 3-4/10, radiating to his left arm and associated with dyspnea, lasting a few minutes        CAD  CABG in 2010  Dyslipidemia  PAD  PAF  COPD  9/16/15 TTE LVEF 55%, grade 1 DD  Mild to mod AS  Mild MR  Mild to mod TR    Patient Active Problem List   Diagnosis    Aortic stenosis    PAF (paroxysmal atrial fibrillation) (HCC)    Benign prostatic hyperplasia    Carotid stenosis, bilateral    Coronary artery disease    Esophageal reflux    Essential and other specified forms of tremor    Generalized osteoarthritis of multiple sites    Hyperlipidemia    Hypertension    Hypothyroidism    Impaired fasting glucose    Left foot drop    Mononeuritis    Osteoarthritis of left hip    Parkinson's disease ([address]    Peripheral arterial disease ([address]    Pulmonary emphysema ([address] )    RBBB     Past Medical History:   Diagnosis Date    Anemia     Bladder cancer ([address]     Carotid artery occlusion     Cataract     COPD (chronic obstructive pulmonary disease) ([address]     Coronary artery disease     Hyperlipidemia     Hypertension     Hypothyroidism 9/15/2017    Multiple pulmonary nodules     Parkinson disease ([address]     Peptic ulcer     Scoliosis     Transient cerebral ischemia     Tremors of nervous system      Social History     Socioeconomic History    Marital status: /Civil Union     Spouse name: Not on file    Number of children: Not on file    Years of education: Not on file    Highest education level: Not on file   Occupational History    Occupation: Retired   Social Needs    Financial resource strain: Not on file    Food insecurity:     Worry: Not on file     Inability: Not on file   Age[employer]q needs:     Medical: Not on file     Non-medical: Not on file   Tobacco Use    Smoking status: Former Smoker     Packs/day: 1 00     Years: 50 00     Pack years: 50 00     Types: Cigarettes     Last attempt to quit:      Years since quittin 7    Smokeless tobacco: Never Used   Substance and Sexual Activity    Alcohol use: Yes     Frequency: Monthly or less     Drinks per session: 1 or 2     Binge frequency: Never     Comment: Social     Drug use: No    Sexual activity: Not on file   Lifestyle    Physical activity:     Days per week: Not on file     Minutes per session: Not on file    Stress: Not on file   Relationships    Social connections:     Talks on phone: Not on file     Gets together: Not on file     Attends Bahai service: Not on file     Active member of club or organization: Not on file     Attends meetings of clubs or organizations: Not on file     Relationship status: Not on file    Intimate partner violence:     Fear of current or ex partner: Not on file     Emotionally abused: Not on file     Physically abused: Not on file     Forced sexual activity: Not on file   Other Topics Concern    Not on file   Social History Narrative    Not on file      Family History   Problem Relation Age of Onset    Cervical cancer Mother     Cervical cancer Sister     Heart disease Sister     Coronary artery disease Sister     Lung cancer Brother      Past Surgical History:   Procedure Laterality Date   [facility] SURGERY      , ,     BUNIONECTOMY Left     Simple exostectomy (silver procedure)    CAROTID ENDARTERECTOMY Right 09/10/2008    Randy Bhat MD    CATARACT EXTRACTION, BILATERAL      CERVICAL DISCECTOMY  1969    CORONARY ARTERY BYPASS GRAFT  10/20/2010    x3 (LIMA-LAD, SVG-OM, SVG-RCA)    CYSTOSCOPY      ELBOW SURGERY Right 07/2012    FEMORAL ARTERY - TIBIAL ARTERY BYPASS GRAFT  12/05/2011    using reversed saphenous vein from right leg  Lauren LEDBETTER MD    REPLACEMENT TOTAL KNEE Left     SHOULDER SURGERY Right 1997       Current Outpatient Medications:     alfuzosin (UROXATRAL) 10 mg 24 hr tablet, TAKE 1 TABLET(10 MG) BY MOUTH DAILY, Disp: 90 tablet, Rfl: 3    aspirin 81 MG tablet, Take 81 mg by mouth, Disp: , Rfl:     Cholecalciferol (HM VITAMIN D3) 2000 units CAPS, Take 1 tablet by mouth daily, Disp: , Rfl:     furosemide (LASIX) 20 mg tablet, Take 1 tablet (20 mg total) by mouth daily, Disp: 90 tablet, Rfl: 1    levothyroxine 75 mcg tablet, Take 1 tablet (75 mcg total) by mouth daily for 90 days, Disp: 90 tablet, Rfl: 3    metoprolol tartrate (LOPRESSOR) 25 mg tablet, TAKE 1/2 TABLET BY MOUTH DAILY, Disp: 45 tablet, Rfl: 0    Multiple Vitamins-Minerals (MULTIVITAL-M) TABS, Take 1 tablet by mouth daily, Disp: , Rfl:     omeprazole (PriLOSEC) 20 mg delayed release capsule, Take 1 tablet by mouth daily, Disp: , Rfl:     simvastatin (ZOCOR) 20 mg tablet, TAKE 1 TABLET BY MOUTH AT BEDTIME, Disp: 90 tablet, Rfl: 0    tiotropium (SPIRIVA RESPIMAT) 2 5 MCG/ACT AERS inhaler, Inhale 2 puffs daily, Disp: 12 g, Rfl: 6  Allergies   Allergen Reactions    Aleve [Naproxen]      Other reaction(s): FACIAL SWELLING  Category: Allergy; Annotation - [id number]: lip/eye edema       Labs:  Office Visit on 08/07/2019   Component Date Value    POST-VOID RESIDUAL VOLUM* 08/07/2019 178      Imaging: No results found  Review of Systems:  Review of Systems   HENT: Positive for hearing loss  Respiratory: Positive for shortness of breath  Cardiovascular: Positive for chest pain and leg swelling  Musculoskeletal: Positive for arthralgias and gait problem         Physical Exam:  Physical Exam   Constitutional: He appears well-developed  Elderly male   HENT:   Head: Normocephalic  Neck: Normal range of motion  No JVD present  Cardiovascular: Normal rate, regular rhythm, intact distal pulses and normal pulses  Pulmonary/Chest: Effort normal and breath sounds normal    Abdominal: Soft  Bowel sounds are normal    Musculoskeletal: Normal range of motion  Right lower leg: He exhibits edema  Left lower leg: He exhibits edema  Neurological: He is alert  Skin: Skin is warm and dry  Capillary refill takes less than 2 seconds  Psychiatric: He has a normal mood and affect  His behavior is normal    Vitals reviewed  Discussion/Summary:  1  Chest pain-  Known hx of CAD sp CABG in 2010, chest pain appears typical,   Lexiscan stress test evaluate for ischemia  NTG 0 4mg SL PRN Chest pain  Instructions on proper use of NTG provided  [facility] is aware if CP worsens to present to nearest ED for evaluation   2  Dyslipidemia- 5/25/19  Cholesterol 90, triglycerides 67, HDL 39, LDL 38 continue on Zocor 20mg daily   3  Mild to mod AS asymptomatics continue to monitor   4   HTN- /56, continue on Metoprolol tartrate 12 5mg Q12 hours, instructed on a 2gm sodium diet, hand out information provided

## 2019-09-09 NOTE — PATIENT INSTRUCTIONS
2gm sodium low fat low cholesterol diet, eating fresh is best, fresh fruit, fresh vegetables, lean protein     Stress test, do not take Metoprolol the morning of stress test

## 2019-09-16 ENCOUNTER — HOSPITAL ENCOUNTER (OUTPATIENT)
Dept: NON INVASIVE DIAGNOSTICS | Facility: CLINIC | Age: 84
Discharge: HOME | End: 2019-09-16
Payer: MEDICARE

## 2019-09-16 ENCOUNTER — TELEPHONE (OUTPATIENT)
Dept: CARDIOLOGY CLINIC | Facility: CLINIC | Age: 84
End: 2019-09-16

## 2019-09-16 DIAGNOSIS — I10 ESSENTIAL HYPERTENSION: ICD-10-CM

## 2019-09-16 DIAGNOSIS — R07.89 CHEST PRESSURE: ICD-10-CM

## 2019-09-16 LAB
CHEST PAIN STATEMENT: NORMAL
MAX DIASTOLIC BP: 80 MMHG
MAX HEART RATE: 101 BPM
MAX PREDICTED HEART RATE: 134 BPM
MAX. SYSTOLIC BP: 148 MMHG
PROTOCOL NAME: NORMAL
REASON FOR TERMINATION: NORMAL
TARGET HR FORMULA: NORMAL
TEST INDICATION: NORMAL
TIME IN EXERCISE PHASE: NORMAL

## 2019-09-16 PROCEDURE — 93017 CV STRESS TEST TRACING ONLY: CPT

## 2019-09-16 PROCEDURE — 78452 HT MUSCLE IMAGE SPECT MULT: CPT

## 2019-09-16 PROCEDURE — 78452 HT MUSCLE IMAGE SPECT MULT: CPT | Performed by: INTERNAL MEDICINE

## 2019-09-16 PROCEDURE — A9502 TC99M TETROFOSMIN: HCPCS

## 2019-09-16 PROCEDURE — 93018 CV STRESS TEST I&R ONLY: CPT | Performed by: INTERNAL MEDICINE

## 2019-09-16 PROCEDURE — 93016 CV STRESS TEST SUPVJ ONLY: CPT | Performed by: INTERNAL MEDICINE

## 2019-09-16 RX ADMIN — REGADENOSON 0.4 MG: 0.08 INJECTION, SOLUTION INTRAVENOUS at 13:55

## 2019-09-16 NOTE — TELEPHONE ENCOUNTER
----- Message from [employer] sent at 9/16/2019  4:18 PM EDT -----  Please call Fareed Ramon to inform him the stress test was normal     Called pt and spoke with wife re: normal ST rslts

## 2019-09-29 DIAGNOSIS — E78.5 DYSLIPIDEMIA: ICD-10-CM

## 2019-09-30 RX ORDER — SIMVASTATIN 20 MG
TABLET ORAL
Qty: 90 TABLET | Refills: 0 | Status: SHIPPED | OUTPATIENT
Start: 2019-09-30 | End: 2020-01-03 | Stop reason: SDUPTHER

## 2019-10-16 ENCOUNTER — OFFICE VISIT (OUTPATIENT)
Dept: CARDIOLOGY CLINIC | Facility: CLINIC | Age: 84
End: 2019-10-16
Payer: MEDICARE

## 2019-10-16 VITALS
HEART RATE: 76 BPM | WEIGHT: 159 LBS | BODY MASS INDEX: 24.96 KG/M2 | HEIGHT: 67 IN | SYSTOLIC BLOOD PRESSURE: 118 MMHG | DIASTOLIC BLOOD PRESSURE: 60 MMHG

## 2019-10-16 DIAGNOSIS — I48.0 PAF (PAROXYSMAL ATRIAL FIBRILLATION) (HCC): ICD-10-CM

## 2019-10-16 DIAGNOSIS — I73.9 PERIPHERAL ARTERIAL DISEASE (HCC): ICD-10-CM

## 2019-10-16 DIAGNOSIS — I35.0 NONRHEUMATIC AORTIC VALVE STENOSIS: ICD-10-CM

## 2019-10-16 DIAGNOSIS — I25.10 CORONARY ARTERY DISEASE INVOLVING NATIVE CORONARY ARTERY OF NATIVE HEART WITHOUT ANGINA PECTORIS: ICD-10-CM

## 2019-10-16 DIAGNOSIS — I10 ESSENTIAL HYPERTENSION: ICD-10-CM

## 2019-10-16 DIAGNOSIS — I48.0 PAROXYSMAL ATRIAL FIBRILLATION (HCC): Primary | ICD-10-CM

## 2019-10-16 PROCEDURE — 93000 ELECTROCARDIOGRAM COMPLETE: CPT | Performed by: INTERNAL MEDICINE

## 2019-10-16 PROCEDURE — 99214 OFFICE O/P EST MOD 30 MIN: CPT | Performed by: INTERNAL MEDICINE

## 2019-10-16 NOTE — PROGRESS NOTES
Cardiology   Spenser Rocha 80 y o  male MRN: [id number]        Impression:  1  Coronary artery disease - stable    2  Paroxysmal atrial fibrillation - in normal sinus rhythm    3  Vasovagal syndrome - stable    4  Dyslipidemia - on statin    5  Aortic stenosis - mild to moderate    6  Peripheral arterial disease - stable    7  Dyspnea - stable  No obvious ischemic or cardiac etiology  Most likely due to COPD       Recommendations:  1  Continue current medications    2  Follow up in 6 months        HPI: Spenser Rocha is a 80y o  year old male with coronary artery disease, mild aortic stenosis, and paroxysmal atrial fibrillation returns for follow up  Does get dyspneic on exertion  Had chest pain, but stress test was normal 9/19  No further lightheadedness  Has chronic lower extremity edema  Has dyspnea on exertion  Review of Systems   Constitutional: Negative  HENT: Negative  Eyes: Negative  Respiratory: Positive for shortness of breath  Negative for chest tightness  Cardiovascular: Positive for leg swelling  Negative for chest pain and palpitations  Gastrointestinal: Negative  Endocrine: Negative  Genitourinary: Negative  Musculoskeletal: Negative  Skin: Negative  Allergic/Immunologic: Negative  Neurological: Negative  Hematological: Negative  Psychiatric/Behavioral: Negative  All other systems reviewed and are negative          Past Medical History:   Diagnosis Date    Anemia     [facility] [facility]     Carotid artery occlusion     Cataract     COPD (chronic obstructive pulmonary disease) (HCC)     Coronary artery disease     Hyperlipidemia     Hypertension     Hypothyroidism 9/15/2017    Multiple pulmonary nodules     Parkinson disease ([address]     Peptic ulcer     Scoliosis     Transient cerebral ischemia     Tremors of nervous system      Past Surgical History:   Procedure Laterality Date   [facility] SURGERY      1973, 1987, 2005    BUNIONECTOMY Left     Simple exostectomy (silver procedure)    CAROTID ENDARTERECTOMY Right 09/10/2008    Randy LEDBETTER MD    CATARACT EXTRACTION, BILATERAL      CERVICAL DISCECTOMY  1969    CORONARY ARTERY BYPASS GRAFT  10/20/2010    x3 (LIMA-LAD, SVG-OM, SVG-RCA)    CYSTOSCOPY      ELBOW SURGERY Right 2012    FEMORAL ARTERY - TIBIAL ARTERY BYPASS GRAFT  2011    using reversed saphenous vein from right leg  [id number] MD    REPLACEMENT TOTAL KNEE Left     SHOULDER SURGERY Right      Social History     Substance and Sexual Activity   Alcohol Use Yes    Frequency: Monthly or less    Drinks per session: 1 or 2    Binge frequency: Never    Comment: Social      Social History     Substance and Sexual Activity   Drug Use No     Social History     Tobacco Use   Smoking Status Former Smoker    Packs/day: 1 00    Years: 50 00    Pack years: 50 00    Types: Cigarettes    Last attempt to quit: Donn Lemme Years since quittin 8   Smokeless Tobacco Never Used     Family History   Problem Relation Age of Onset    Cervical cancer Mother     Cervical cancer Sister     Heart disease Sister     Coronary artery disease Sister     Lung cancer Brother        Allergies: Allergies   Allergen Reactions    Aleve [Naproxen]      Other reaction(s): FACIAL SWELLING  Category: Allergy;  [facility] - [id number]: lip/eye edema       Medications:     Current Outpatient Medications:     alfuzosin (UROXATRAL) 10 mg 24 hr tablet, TAKE 1 TABLET(10 MG) BY MOUTH DAILY, Disp: 90 tablet, Rfl: 3    aspirin 81 MG tablet, Take 81 mg by mouth, Disp: , Rfl:     Cholecalciferol (HM VITAMIN D3) 2000 units CAPS, Take 1 tablet by mouth daily, Disp: , Rfl:     furosemide (LASIX) 20 mg tablet, Take 1 tablet (20 mg total) by mouth daily, Disp: 90 tablet, Rfl: 1    levothyroxine 75 mcg tablet, Take 1 tablet (75 mcg total) by mouth daily for 90 days, Disp: 90 tablet, Rfl: 3    metoprolol tartrate (LOPRESSOR) 25 mg tablet, Take 0 5 tablets (12 5 mg total) by mouth every 12 (twelve) hours, Disp: 60 tablet, Rfl: 3    Multiple Vitamins-Minerals (MULTIVITAL-M) TABS, Take 1 tablet by mouth daily, Disp: , Rfl:     omeprazole (PriLOSEC) 20 mg delayed release capsule, Take 1 tablet by mouth daily, Disp: , Rfl:     simvastatin (ZOCOR) 20 mg tablet, TAKE 1 TABLET BY MOUTH AT BEDTIME, Disp: 90 tablet, Rfl: 0    tiotropium (SPIRIVA RESPIMAT) 2 5 MCG/ACT AERS inhaler, Inhale 2 puffs daily, Disp: 12 g, Rfl: 6    nitroglycerin (NITROSTAT) 0 4 mg SL tablet, Place 1 tablet (0 4 mg total) under the tongue every 5 (five) minutes as needed for chest pain (Patient not taking: Reported on 10/16/2019), Disp: 30 tablet, Rfl: 3      Wt Readings from Last 3 Encounters:   10/16/19 72 1 kg (159 lb)   09/09/19 71 4 kg (157 lb 4 8 oz)   05/30/19 71 7 kg (158 lb)     Temp Readings from Last 3 Encounters:   05/30/19 (!) 97 1 °F (36 2 °C)   05/06/19 97 9 °F (36 6 °C)   11/26/18 (!) 96 5 °F (35 8 °C)     BP Readings from Last 3 Encounters:   10/16/19 118/60   09/09/19 108/60   08/07/19 122/70     Pulse Readings from Last 3 Encounters:   10/16/19 76   09/09/19 80   08/07/19 93         Physical Exam   Constitutional: He is oriented to person, place, and time  He appears well-developed  HENT:   Head: Atraumatic  Eyes: EOM are normal    Cardiovascular: Normal rate and regular rhythm  Exam reveals no gallop and no friction rub  Murmur heard  Systolic murmur is present with a grade of 2/6   1+ chronic B LE edema   Pulmonary/Chest: Effort normal and breath sounds normal    Abdominal: Soft  Musculoskeletal: Normal range of motion  Neurological: He is alert and oriented to person, place, and time  Skin: Skin is warm and dry  Psychiatric: He has a normal mood and affect           Laboratory Studies:  CMP:  Lab Results   Component Value Date     06/25/2015    K 3 7 05/25/2019     05/25/2019    CO2 27 05/25/2019    ANIONGAP 7 06/25/2015 BUN 15 05/25/2019    CREATININE 0 97 05/25/2019    GLUCOSE 95 06/25/2015    AST 13 05/25/2019    ALT 21 05/25/2019    BILITOT 0 47 09/15/2015    EGFR 70 05/25/2019       Lipid Profile:   Lab Results   Component Value Date    CHOL 121 06/25/2015     Lab Results   Component Value Date    HDL 39 (L) 05/25/2019     Lab Results   Component Value Date    LDLCALC 38 05/25/2019     Lab Results   Component Value Date    TRIG 67 05/25/2019       Cardiac testing:   EKG reviewed personally:  NSR 61 1st deg AV block RBBB

## 2019-11-04 ENCOUNTER — OFFICE VISIT (OUTPATIENT)
Dept: PULMONOLOGY | Facility: CLINIC | Age: 84
End: 2019-11-04
Payer: MEDICARE

## 2019-11-04 VITALS
HEART RATE: 74 BPM | TEMPERATURE: 98 F | WEIGHT: 157 LBS | OXYGEN SATURATION: 96 % | BODY MASS INDEX: 24.64 KG/M2 | HEIGHT: 67 IN | RESPIRATION RATE: 16 BRPM | SYSTOLIC BLOOD PRESSURE: 124 MMHG | DIASTOLIC BLOOD PRESSURE: 74 MMHG

## 2019-11-04 DIAGNOSIS — J43.9 PULMONARY EMPHYSEMA, UNSPECIFIED EMPHYSEMA TYPE (HCC): Primary | ICD-10-CM

## 2019-11-04 DIAGNOSIS — J44.9 CHRONIC OBSTRUCTIVE PULMONARY DISEASE, UNSPECIFIED COPD TYPE (HCC): ICD-10-CM

## 2019-11-04 PROCEDURE — 99213 OFFICE O/P EST LOW 20 MIN: CPT | Performed by: INTERNAL MEDICINE

## 2019-11-04 NOTE — PROGRESS NOTES
Progress note - Pulmonary Medicine   Ashley Townsend 80 y o  male MRN: [id number]       Impression & Plan:     Pulmonary emphysema ([address] )  · Stable symptoms on Spiriva  · Not particularly active at baseline and gets slightly short of breath if he pushes himself  · Up-to-date with vaccinations  · Continue six-month follow-up        ______________________________________________________________________    HPI:    Ashley Townsend presents today for follow-up of emphysema  He is doing well on Spiriva  He reports no new symptoms  He does get short of breath if he pushes himself but does not typically wheeze or have a chronic cough  No chest pain  No palpitations  He is followed by a cardiologist who recently saw him for his atrial fibrillation and he is doing well  He does have spinal stenosis and has gait dysfunction  He walks with a walker  He reports no other new symptoms  No fever or chills  No weight or appetite changes    He does get cold very easily but that has been a chronic issue for him    Current Medications:    Current Outpatient Medications:     alfuzosin (UROXATRAL) 10 mg 24 hr tablet, TAKE 1 TABLET(10 MG) BY MOUTH DAILY, Disp: 90 tablet, Rfl: 3    aspirin 81 MG tablet, Take 81 mg by mouth, Disp: , Rfl:     Cholecalciferol (HM VITAMIN D3) 2000 units CAPS, Take 1 tablet by mouth daily, Disp: , Rfl:     furosemide (LASIX) 20 mg tablet, Take 1 tablet (20 mg total) by mouth daily, Disp: 90 tablet, Rfl: 1    metoprolol tartrate (LOPRESSOR) 25 mg tablet, Take 0 5 tablets (12 5 mg total) by mouth every 12 (twelve) hours, Disp: 60 tablet, Rfl: 3    Multiple Vitamins-Minerals (MULTIVITAL-M) TABS, Take 1 tablet by mouth daily, Disp: , Rfl:     nitroglycerin (NITROSTAT) 0 4 mg SL tablet, Place 1 tablet (0 4 mg total) under the tongue every 5 (five) minutes as needed for chest pain, Disp: 30 tablet, Rfl: 3    omeprazole (PriLOSEC) 20 mg delayed release capsule, Take 1 tablet by mouth daily, Disp: , Rfl:     simvastatin (ZOCOR) 20 mg tablet, TAKE 1 TABLET BY MOUTH AT BEDTIME, Disp: 90 tablet, Rfl: 0    tiotropium (SPIRIVA RESPIMAT) 2 5 MCG/ACT AERS inhaler, Inhale 2 puffs daily, Disp: 12 g, Rfl: 6    levothyroxine 75 mcg tablet, Take 1 tablet (75 mcg total) by mouth daily for 90 days, Disp: 90 tablet, Rfl: 3    Review of Systems:  Aside from what is mentioned in the HPI is otherwise negative  Past medical history, surgical history, and family history were reviewed and updated as appropriate    Social history updates:  Social History     Tobacco Use   Smoking Status Former Smoker    Packs/day: 1 00    Years: 50 00    Pack years: 50 00    Types: Cigarettes    Last attempt to quit:     Years since quittin 8   Smokeless Tobacco Never Used       PhysicalExamination:  Vitals:   /74   Pulse 74   Temp 98 °F (36 7 °C)   Resp 16   Ht 5' 7" (1 702 m)   Wt 71 2 kg (157 lb)   SpO2 96%   BMI 24 59 kg/m²     Gen: Comfortable  Non-labored  HEENT: PERRL  O/P: clear  moist  Neck: Trachea is midline  No JVD  No adenopathy  Chest:  Breath sounds are distant and hyper-resonant  No wheeze or rhonchi  Cardiac:  Irregular  no murmur  Abdomen: Soft and nontender  Bowel sounds are present  Extremities: No edema  Walks with a rolling walker    Stooped posture    Diagnostic Data:    No new pertinent diagnostic data    Fahad Garibay MD

## 2019-11-04 NOTE — ASSESSMENT & PLAN NOTE
· Stable symptoms on Spiriva  · Not particularly active at baseline and gets slightly short of breath if he pushes himself  · Up-to-date with vaccinations  · Continue six-month follow-up

## 2019-12-18 ENCOUNTER — OFFICE VISIT (OUTPATIENT)
Dept: URGENT CARE | Age: 84
End: 2019-12-18
Payer: MEDICARE

## 2019-12-18 VITALS
TEMPERATURE: 97.9 F | HEART RATE: 73 BPM | DIASTOLIC BLOOD PRESSURE: 57 MMHG | OXYGEN SATURATION: 95 % | RESPIRATION RATE: 18 BRPM | SYSTOLIC BLOOD PRESSURE: 113 MMHG

## 2019-12-18 DIAGNOSIS — J01.00 ACUTE NON-RECURRENT MAXILLARY SINUSITIS: Primary | ICD-10-CM

## 2019-12-18 PROCEDURE — 99203 OFFICE O/P NEW LOW 30 MIN: CPT | Performed by: PHYSICIAN ASSISTANT

## 2019-12-18 PROCEDURE — G0463 HOSPITAL OUTPT CLINIC VISIT: HCPCS | Performed by: PHYSICIAN ASSISTANT

## 2019-12-18 RX ORDER — AMOXICILLIN AND CLAVULANATE POTASSIUM 875; 125 MG/1; MG/1
1 TABLET, FILM COATED ORAL EVERY 12 HOURS SCHEDULED
Qty: 20 TABLET | Refills: 0 | Status: SHIPPED | OUTPATIENT
Start: 2019-12-18 | End: 2019-12-21 | Stop reason: HOSPADM

## 2019-12-18 NOTE — PATIENT INSTRUCTIONS
-start antibiotics as directed  1  Drink plenty fluids  2   Take probiotics [i e  Yogurt, Acidophilus, Florastor (liquid)] daily  3   Over-the-counter medications as needed for symptomatic care  4    Advance activities as tolerated  5    Follow-up with your primary care physician in 3-4 days  6   Go to emergency room if symptoms are worsening

## 2019-12-18 NOTE — PROGRESS NOTES
[email or web address]        NAME: Pasha Armando is a 80 y o  male  : 1933    MRN: [id number]  DATE: 2019  TIME: 12:26 PM    Assessment and Plan   Acute non-recurrent maxillary sinusitis [J01 00]  1  Acute non-recurrent maxillary sinusitis  amoxicillin-clavulanate (AUGMENTIN) 875-125 mg per tablet         Patient Instructions     -start antibiotics instructed  -over-the-counter cold medicines as needed  -drink plenty of  fluids  -probiotics  Follow up with PCP in 3-5 days  Proceed to  ER if symptoms worsen  Chief Complaint     Chief Complaint   Patient presents with    Cold Like Symptoms     x monday, cough, nasal congestion, achy joints, and chest congestion, and sore throat  with coughing          History of Present Illness       Patient presents with his wife for evaluation of a sore throat, sinus congestion, cough since Monday  He states it is getting worse  He denies any shortness of breath or chest pain  He denies any fevers  He has tried NyQuil and Mucinex without relief  Review of Systems   Review of Systems   Constitutional: Negative  HENT: Positive for congestion and sore throat  Respiratory: Positive for cough  Negative for shortness of breath and wheezing  Cardiovascular: Negative  Gastrointestinal: Negative  Musculoskeletal: Negative  Neurological: Negative  Psychiatric/Behavioral: Negative            Current Medications       Current Outpatient Medications:     alfuzosin (UROXATRAL) 10 mg 24 hr tablet, TAKE 1 TABLET(10 MG) BY MOUTH DAILY, Disp: 90 tablet, Rfl: 3    amoxicillin-clavulanate (AUGMENTIN) 875-125 mg per tablet, Take 1 tablet by mouth every 12 (twelve) hours for 10 days, Disp: 20 tablet, Rfl: 0    aspirin 81 MG tablet, Take 81 mg by mouth, Disp: , Rfl:     Cholecalciferol (HM VITAMIN D3) 2000 units CAPS, Take 1 tablet by mouth daily, Disp: , Rfl:     furosemide (LASIX) 20 mg tablet, Take 1 tablet (20 mg total) by mouth daily, Disp: 90 tablet, Rfl: 1    hydrocortisone 2 5 % cream, RENA TO POSTERIOR EARS AND ARMS ONCE A DAY PRN FOR 1-2 WEEKS, Disp: , Rfl: 1    levothyroxine 75 mcg tablet, Take 1 tablet (75 mcg total) by mouth daily for 90 days, Disp: 90 tablet, Rfl: 3    metoprolol tartrate (LOPRESSOR) 25 mg tablet, Take 0 5 tablets (12 5 mg total) by mouth every 12 (twelve) hours, Disp: 60 tablet, Rfl: 3    Multiple Vitamins-Minerals (MULTIVITAL-M) TABS, Take 1 tablet by mouth daily, Disp: , Rfl:     nitroglycerin (NITROSTAT) 0 4 mg SL tablet, Place 1 tablet (0 4 mg total) under the tongue every 5 (five) minutes as needed for chest pain, Disp: 30 tablet, Rfl: 3    omeprazole (PriLOSEC) 20 mg delayed release capsule, Take 1 tablet by mouth daily, Disp: , Rfl:     simvastatin (ZOCOR) 20 mg tablet, TAKE 1 TABLET BY MOUTH AT BEDTIME, Disp: 90 tablet, Rfl: 0    tiotropium (SPIRIVA RESPIMAT) 2 5 MCG/ACT AERS inhaler, Inhale 2 puffs daily, Disp: 12 g, Rfl: 6    Current Allergies     Allergies as of 12/18/2019 - Reviewed 12/18/2019   Allergen Reaction Noted    Aleve [naproxen]  08/24/2013            The following portions of the patient's history were reviewed and updated as appropriate: allergies, current medications, past family history, past medical history, past social history, past surgical history and problem list      Past Medical History:   Diagnosis Date    Anemia     [facility]     Carotid artery occlusion     Cataract     COPD (chronic obstructive pulmonary disease) ([address]     Coronary artery disease     Hyperlipidemia     Hypertension     Hypothyroidism 9/15/2017    Multiple pulmonary nodules     Parkinson disease ([facility]     Peptic ulcer     Scoliosis     Transient cerebral ischemia     Tremors of nervous system        Past Surgical History:   Procedure Laterality Date   [facility] SURGERY      1973, 1987, 2005    BUNIONECTOMY Left     Simple exostectomy (silver procedure)    CAROTID ENDARTERECTOMY Right 09/10/2008    Randy LEDBETTER MD    CATARACT EXTRACTION, BILATERAL      CERVICAL DISCECTOMY  1969    CORONARY ARTERY BYPASS GRAFT  10/20/2010    x3 (LIMA-LAD, SVG-OM, SVG-RCA)    CYSTOSCOPY      ELBOW SURGERY Right 07/2012    FEMORAL ARTERY - TIBIAL ARTERY BYPASS GRAFT  12/05/2011    using reversed saphenous vein from right leg  Rona Mullins MD    REPLACEMENT TOTAL KNEE Left     SHOULDER SURGERY Right 1997       Family History   Problem Relation Age of Onset    Cervical cancer Mother     Cervical cancer Sister     Heart disease Sister     Coronary artery disease Sister     Lung cancer Brother          Medications have been verified  Objective   /57   Pulse 73   Temp 97 9 °F (36 6 °C) (Temporal)   Resp 18   SpO2 95%        Physical Exam     Physical Exam   Constitutional: He is oriented to person, place, and time  He appears well-developed and well-nourished  No distress  HENT:   Head: Normocephalic and atraumatic  Right Ear: External ear normal    Left Ear: External ear normal    Nose: Nose normal    Mouth/Throat: No oropharyngeal exudate  mild erythema pharynx, tenderness palpation of  maxillary sinus   Cardiovascular: Normal rate, regular rhythm and normal heart sounds  Pulmonary/Chest: Effort normal and breath sounds normal    Neurological: He is alert and oriented to person, place, and time  Skin: Skin is warm and dry  He is not diaphoretic  Nursing note and vitals reviewed

## 2019-12-19 ENCOUNTER — APPOINTMENT (EMERGENCY)
Dept: RADIOLOGY | Facility: HOSPITAL | Age: 84
DRG: 202 | End: 2019-12-19
Payer: MEDICARE

## 2019-12-19 ENCOUNTER — HOSPITAL ENCOUNTER (INPATIENT)
Facility: HOSPITAL | Age: 84
LOS: 1 days | Discharge: HOME | DRG: 202 | End: 2019-12-21
Attending: EMERGENCY MEDICINE | Admitting: INTERNAL MEDICINE
Payer: MEDICARE

## 2019-12-19 DIAGNOSIS — G89.29 CHRONIC BACK PAIN: ICD-10-CM

## 2019-12-19 DIAGNOSIS — R50.9 FEVER: ICD-10-CM

## 2019-12-19 DIAGNOSIS — R26.2 AMBULATORY DYSFUNCTION: ICD-10-CM

## 2019-12-19 DIAGNOSIS — R53.1 WEAKNESS: Primary | ICD-10-CM

## 2019-12-19 DIAGNOSIS — M54.9 CHRONIC BACK PAIN: ICD-10-CM

## 2019-12-19 DIAGNOSIS — J21.0 RSV (ACUTE BRONCHIOLITIS DUE TO RESPIRATORY SYNCYTIAL VIRUS): ICD-10-CM

## 2019-12-19 PROBLEM — R65.10 SIRS (SYSTEMIC INFLAMMATORY RESPONSE SYNDROME) (HCC): Status: ACTIVE | Noted: 2019-12-19

## 2019-12-19 LAB
ALBUMIN SERPL BCP-MCNC: 4 G/DL (ref 3.5–5)
ALP SERPL-CCNC: 114 U/L (ref 46–116)
ALT SERPL W P-5'-P-CCNC: 24 U/L (ref 12–78)
ANION GAP SERPL CALCULATED.3IONS-SCNC: 5 MMOL/L (ref 4–13)
AST SERPL W P-5'-P-CCNC: 19 U/L (ref 5–45)
ATRIAL RATE: 93 BPM
BASOPHILS # BLD AUTO: 0.01 THOUSANDS/ΜL (ref 0–0.1)
BASOPHILS NFR BLD AUTO: 0 % (ref 0–1)
BILIRUB SERPL-MCNC: 0.66 MG/DL (ref 0.2–1)
BILIRUB UR QL STRIP: NEGATIVE
BUN SERPL-MCNC: 17 MG/DL (ref 5–25)
CALCIUM SERPL-MCNC: 8.9 MG/DL (ref 8.3–10.1)
CHLORIDE SERPL-SCNC: 98 MMOL/L (ref 100–108)
CLARITY UR: CLEAR
CO2 SERPL-SCNC: 28 MMOL/L (ref 21–32)
COLOR UR: YELLOW
COLOR, POC: NORMAL
CREAT SERPL-MCNC: 1.07 MG/DL (ref 0.6–1.3)
EOSINOPHIL # BLD AUTO: 0.09 THOUSAND/ΜL (ref 0–0.61)
EOSINOPHIL NFR BLD AUTO: 1 % (ref 0–6)
ERYTHROCYTE [DISTWIDTH] IN BLOOD BY AUTOMATED COUNT: 12.5 % (ref 11.6–15.1)
FLUAV RNA NPH QL NAA+PROBE: ABNORMAL
FLUBV RNA NPH QL NAA+PROBE: ABNORMAL
GFR SERPL CREATININE-BSD FRML MDRD: 63 ML/MIN/1.73SQ M
GLUCOSE SERPL-MCNC: 96 MG/DL (ref 65–140)
GLUCOSE UR STRIP-MCNC: NEGATIVE MG/DL
HCT VFR BLD AUTO: 46.2 % (ref 36.5–49.3)
HGB BLD-MCNC: 15.2 G/DL (ref 12–17)
HGB UR QL STRIP.AUTO: NEGATIVE
IMM GRANULOCYTES # BLD AUTO: 0.01 THOUSAND/UL (ref 0–0.2)
IMM GRANULOCYTES NFR BLD AUTO: 0 % (ref 0–2)
KETONES UR STRIP-MCNC: ABNORMAL MG/DL
LACTATE SERPL-SCNC: 1.5 MMOL/L (ref 0.5–2)
LEUKOCYTE ESTERASE UR QL STRIP: NEGATIVE
LIPASE SERPL-CCNC: 31 U/L (ref 73–393)
LYMPHOCYTES # BLD AUTO: 0.36 THOUSANDS/ΜL (ref 0.6–4.47)
LYMPHOCYTES NFR BLD AUTO: 5 % (ref 14–44)
MCH RBC QN AUTO: 31.5 PG (ref 26.8–34.3)
MCHC RBC AUTO-ENTMCNC: 32.9 G/DL (ref 31.4–37.4)
MCV RBC AUTO: 96 FL (ref 82–98)
MONOCYTES # BLD AUTO: 0.43 THOUSAND/ΜL (ref 0.17–1.22)
MONOCYTES NFR BLD AUTO: 6 % (ref 4–12)
NEUTROPHILS # BLD AUTO: 5.77 THOUSANDS/ΜL (ref 1.85–7.62)
NEUTS SEG NFR BLD AUTO: 88 % (ref 43–75)
NITRITE UR QL STRIP: NEGATIVE
NRBC BLD AUTO-RTO: 0 /100 WBCS
P AXIS: 59 DEGREES
PH UR STRIP.AUTO: 6 [PH] (ref 4.5–8)
PLATELET # BLD AUTO: 112 THOUSANDS/UL (ref 149–390)
PLATELET # BLD AUTO: 141 THOUSANDS/UL (ref 149–390)
PMV BLD AUTO: 10 FL (ref 8.9–12.7)
PMV BLD AUTO: 9.9 FL (ref 8.9–12.7)
POTASSIUM SERPL-SCNC: 3.9 MMOL/L (ref 3.5–5.3)
PR INTERVAL: 224 MS
PROCALCITONIN SERPL-MCNC: 0.05 NG/ML
PROT SERPL-MCNC: 8.2 G/DL (ref 6.4–8.2)
PROT UR STRIP-MCNC: NEGATIVE MG/DL
QRS AXIS: -13 DEGREES
QRSD INTERVAL: 146 MS
QT INTERVAL: 382 MS
QTC INTERVAL: 474 MS
RBC # BLD AUTO: 4.83 MILLION/UL (ref 3.88–5.62)
RSV RNA NPH QL NAA+PROBE: DETECTED
SODIUM SERPL-SCNC: 131 MMOL/L (ref 136–145)
SP GR UR STRIP.AUTO: 1.01 (ref 1–1.03)
T WAVE AXIS: 73 DEGREES
TROPONIN I SERPL-MCNC: <0.02 NG/ML
UROBILINOGEN UR QL STRIP.AUTO: 0.2 E.U./DL
VENTRICULAR RATE: 93 BPM
WBC # BLD AUTO: 6.67 THOUSAND/UL (ref 4.31–10.16)

## 2019-12-19 PROCEDURE — 99285 EMERGENCY DEPT VISIT HI MDM: CPT | Performed by: EMERGENCY MEDICINE

## 2019-12-19 PROCEDURE — 96375 TX/PRO/DX INJ NEW DRUG ADDON: CPT

## 2019-12-19 PROCEDURE — 96374 THER/PROPH/DIAG INJ IV PUSH: CPT

## 2019-12-19 PROCEDURE — 71046 X-RAY EXAM CHEST 2 VIEWS: CPT

## 2019-12-19 PROCEDURE — 93010 ELECTROCARDIOGRAM REPORT: CPT | Performed by: INTERNAL MEDICINE

## 2019-12-19 PROCEDURE — 94760 N-INVAS EAR/PLS OXIMETRY 1: CPT

## 2019-12-19 PROCEDURE — 99219 PR INITIAL OBSERVATION CARE/DAY 50 MINUTES: CPT | Performed by: GENERAL PRACTICE

## 2019-12-19 PROCEDURE — 85025 COMPLETE CBC W/AUTO DIFF WBC: CPT | Performed by: EMERGENCY MEDICINE

## 2019-12-19 PROCEDURE — 84484 ASSAY OF TROPONIN QUANT: CPT | Performed by: EMERGENCY MEDICINE

## 2019-12-19 PROCEDURE — 83605 ASSAY OF LACTIC ACID: CPT | Performed by: EMERGENCY MEDICINE

## 2019-12-19 PROCEDURE — 87040 BLOOD CULTURE FOR BACTERIA: CPT | Performed by: EMERGENCY MEDICINE

## 2019-12-19 PROCEDURE — 84145 PROCALCITONIN (PCT): CPT | Performed by: PHYSICIAN ASSISTANT

## 2019-12-19 PROCEDURE — 81003 URINALYSIS AUTO W/O SCOPE: CPT

## 2019-12-19 PROCEDURE — 1124F ACP DISCUSS-NO DSCNMKR DOCD: CPT | Performed by: EMERGENCY MEDICINE

## 2019-12-19 PROCEDURE — 83690 ASSAY OF LIPASE: CPT | Performed by: EMERGENCY MEDICINE

## 2019-12-19 PROCEDURE — 87631 RESP VIRUS 3-5 TARGETS: CPT | Performed by: PHYSICIAN ASSISTANT

## 2019-12-19 PROCEDURE — 96376 TX/PRO/DX INJ SAME DRUG ADON: CPT

## 2019-12-19 PROCEDURE — 96361 HYDRATE IV INFUSION ADD-ON: CPT

## 2019-12-19 PROCEDURE — 74177 CT ABD & PELVIS W/CONTRAST: CPT

## 2019-12-19 PROCEDURE — 93005 ELECTROCARDIOGRAM TRACING: CPT

## 2019-12-19 PROCEDURE — 99285 EMERGENCY DEPT VISIT HI MDM: CPT

## 2019-12-19 PROCEDURE — 85049 AUTOMATED PLATELET COUNT: CPT | Performed by: PHYSICIAN ASSISTANT

## 2019-12-19 PROCEDURE — 80053 COMPREHEN METABOLIC PANEL: CPT | Performed by: EMERGENCY MEDICINE

## 2019-12-19 PROCEDURE — 36415 COLL VENOUS BLD VENIPUNCTURE: CPT | Performed by: EMERGENCY MEDICINE

## 2019-12-19 RX ORDER — ACETAMINOPHEN 325 MG/1
650 TABLET ORAL EVERY 6 HOURS PRN
Status: DISCONTINUED | OUTPATIENT
Start: 2019-12-19 | End: 2019-12-21 | Stop reason: HOSPADM

## 2019-12-19 RX ORDER — ONDANSETRON 2 MG/ML
4 INJECTION INTRAMUSCULAR; INTRAVENOUS ONCE
Status: COMPLETED | OUTPATIENT
Start: 2019-12-19 | End: 2019-12-19

## 2019-12-19 RX ORDER — HEPARIN SODIUM 5000 [USP'U]/ML
5000 INJECTION, SOLUTION INTRAVENOUS; SUBCUTANEOUS EVERY 8 HOURS SCHEDULED
Status: DISCONTINUED | OUTPATIENT
Start: 2019-12-19 | End: 2019-12-21 | Stop reason: HOSPADM

## 2019-12-19 RX ORDER — OSELTAMIVIR PHOSPHATE 30 MG/1
30 CAPSULE ORAL EVERY 12 HOURS SCHEDULED
Status: DISCONTINUED | OUTPATIENT
Start: 2019-12-19 | End: 2019-12-20

## 2019-12-19 RX ORDER — SODIUM CHLORIDE 9 MG/ML
75 INJECTION, SOLUTION INTRAVENOUS ONCE
Status: COMPLETED | OUTPATIENT
Start: 2019-12-19 | End: 2019-12-19

## 2019-12-19 RX ORDER — PANTOPRAZOLE SODIUM 40 MG/1
40 TABLET, DELAYED RELEASE ORAL
Status: DISCONTINUED | OUTPATIENT
Start: 2019-12-20 | End: 2019-12-21 | Stop reason: HOSPADM

## 2019-12-19 RX ORDER — HYDROMORPHONE HCL/PF 1 MG/ML
1 SYRINGE (ML) INJECTION ONCE
Status: COMPLETED | OUTPATIENT
Start: 2019-12-19 | End: 2019-12-19

## 2019-12-19 RX ORDER — PRAVASTATIN SODIUM 40 MG
40 TABLET ORAL
Status: DISCONTINUED | OUTPATIENT
Start: 2019-12-20 | End: 2019-12-21 | Stop reason: HOSPADM

## 2019-12-19 RX ORDER — TAMSULOSIN HYDROCHLORIDE 0.4 MG/1
0.4 CAPSULE ORAL
Status: DISCONTINUED | OUTPATIENT
Start: 2019-12-20 | End: 2019-12-21 | Stop reason: HOSPADM

## 2019-12-19 RX ORDER — HYDROMORPHONE HCL/PF 1 MG/ML
0.5 SYRINGE (ML) INJECTION ONCE
Status: COMPLETED | OUTPATIENT
Start: 2019-12-19 | End: 2019-12-19

## 2019-12-19 RX ORDER — ACETAMINOPHEN 325 MG/1
650 TABLET ORAL ONCE
Status: COMPLETED | OUTPATIENT
Start: 2019-12-19 | End: 2019-12-19

## 2019-12-19 RX ORDER — ASPIRIN 81 MG/1
81 TABLET, CHEWABLE ORAL DAILY
Status: DISCONTINUED | OUTPATIENT
Start: 2019-12-20 | End: 2019-12-21 | Stop reason: HOSPADM

## 2019-12-19 RX ORDER — CALCIUM CARBONATE 200(500)MG
1000 TABLET,CHEWABLE ORAL DAILY PRN
Status: DISCONTINUED | OUTPATIENT
Start: 2019-12-19 | End: 2019-12-20

## 2019-12-19 RX ORDER — LEVOTHYROXINE SODIUM 0.07 MG/1
75 TABLET ORAL
Status: DISCONTINUED | OUTPATIENT
Start: 2019-12-20 | End: 2019-12-21 | Stop reason: HOSPADM

## 2019-12-19 RX ORDER — ONDANSETRON 2 MG/ML
4 INJECTION INTRAMUSCULAR; INTRAVENOUS EVERY 6 HOURS PRN
Status: DISCONTINUED | OUTPATIENT
Start: 2019-12-19 | End: 2019-12-20

## 2019-12-19 RX ORDER — GUAIFENESIN 600 MG
600 TABLET, EXTENDED RELEASE 12 HR ORAL EVERY 12 HOURS SCHEDULED
Status: DISCONTINUED | OUTPATIENT
Start: 2019-12-19 | End: 2019-12-20

## 2019-12-19 RX ORDER — NITROGLYCERIN 0.4 MG/1
0.4 TABLET SUBLINGUAL
Status: DISCONTINUED | OUTPATIENT
Start: 2019-12-19 | End: 2019-12-21 | Stop reason: HOSPADM

## 2019-12-19 RX ADMIN — ACETAMINOPHEN 650 MG: 325 TABLET ORAL at 15:17

## 2019-12-19 RX ADMIN — HYDROMORPHONE HYDROCHLORIDE 1 MG: 1 INJECTION, SOLUTION INTRAMUSCULAR; INTRAVENOUS; SUBCUTANEOUS at 12:36

## 2019-12-19 RX ADMIN — SODIUM CHLORIDE 1000 ML: 0.9 INJECTION, SOLUTION INTRAVENOUS at 10:46

## 2019-12-19 RX ADMIN — ONDANSETRON 4 MG: 2 INJECTION INTRAMUSCULAR; INTRAVENOUS at 10:58

## 2019-12-19 RX ADMIN — HEPARIN SODIUM 5000 UNITS: 5000 INJECTION INTRAVENOUS; SUBCUTANEOUS at 21:22

## 2019-12-19 RX ADMIN — IOHEXOL 100 ML: 350 INJECTION, SOLUTION INTRAVENOUS at 12:56

## 2019-12-19 RX ADMIN — SODIUM CHLORIDE 500 ML: 0.9 INJECTION, SOLUTION INTRAVENOUS at 16:16

## 2019-12-19 RX ADMIN — SODIUM CHLORIDE 75 ML/HR: 0.9 INJECTION, SOLUTION INTRAVENOUS at 17:40

## 2019-12-19 RX ADMIN — GUAIFENESIN 600 MG: 600 TABLET, EXTENDED RELEASE ORAL at 21:22

## 2019-12-19 RX ADMIN — HYDROMORPHONE HYDROCHLORIDE 0.5 MG: 1 INJECTION, SOLUTION INTRAMUSCULAR; INTRAVENOUS; SUBCUTANEOUS at 10:59

## 2019-12-19 RX ADMIN — OSELTAMIVIR PHOSPHATE 30 MG: 30 CAPSULE ORAL at 21:23

## 2019-12-19 NOTE — ASSESSMENT & PLAN NOTE
Present on admission, fever, tachycardia  Started on Augmentin after visiting urgent care 12/18 for sinusitis  Suspect viral syndrome given sinusitis, vomiting, diarrhea   CXR negative, CT a/p negative for infx etio   Rule out influenza, contact precautions  Start Tamiflu empirically, discontinue Augmentin  Check procalcitonin  Lactic acid normal  F/u bl cx  Rule out C diff   Supportive care  IVF bolus x 500cc now, then continuous  Hypotension likely 2/2 dilaudid (for back pain) he received in ER
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
## 2019-12-19 NOTE — ASSESSMENT & PLAN NOTE
Known to Outpatient Neurology  Continue alpha blocker  P r n   Bladder scan, urinary retention protocol

## 2019-12-19 NOTE — ED NOTES
Pt requesting pain medication  Dr Yumiko Mcduffie made aware       Parviz Richards, RN  39/25/37 5479

## 2019-12-19 NOTE — ASSESSMENT & PLAN NOTE
Follows Outpatient Cardiology  Stress test September 2019 normal  Has known mild aortic stenosis  Continue beta-blocker, statin, asa

## 2019-12-19 NOTE — ED NOTES
Per Dr Yanet Basurto, reassess BP in 30 minutes  If pt maintains BP over 90 systolic, pt okay to go to floor       Adrien Savage RN  12/19/19 9649

## 2019-12-19 NOTE — ED NOTES
Admitting team, Dr Olu Wilburn, contacted regarding pt's blood pressures over the past hour     Domingo Paredes, [facility]  12/19/19 8090

## 2019-12-19 NOTE — ED NOTES
Pt provided with lunchbox and updated on why he has not gone upstairs   Pt verbalized understanding     Criss Lesch, RN  12/19/19 0706

## 2019-12-19 NOTE — PLAN OF CARE
Problem: INFECTION - ADULT  Goal: Absence or prevention of progression during hospitalization  Description  INTERVENTIONS:  - Assess and monitor for signs and symptoms of infection  - Monitor lab/diagnostic results  - Monitor all insertion sites, i e  indwelling lines, tubes, and drains  - Monitor endotracheal if appropriate and nasal secretions for changes in amount and color  - [place] appropriate cooling/warming therapies per order  - Administer medications as ordered  - Instruct and encourage patient and family to use good hand hygiene technique  - Identify and instruct in appropriate isolation precautions for identified infection/condition  Outcome: Progressing  Goal: Absence of fever/infection during neutropenic period  Description  INTERVENTIONS:  - Monitor WBC    Outcome: Progressing     Problem: SAFETY ADULT  Goal: Patient will remain free of falls  Description  INTERVENTIONS:  - Assess patient frequently for physical needs  -  Identify cognitive and physical deficits and behaviors that affect risk of falls    -  [place] fall precautions as indicated by assessment   - Educate patient/family on patient safety including physical limitations  - Instruct patient to call for assistance with activity based on assessment  - Modify environment to reduce risk of injury  - Consider OT/PT consult to assist with strengthening/mobility  Outcome: Progressing  Goal: Maintain or return to baseline ADL function  Description  INTERVENTIONS:  -  Assess patient's ability to carry out ADLs; assess patient's baseline for ADL function and identify physical deficits which impact ability to perform ADLs (bathing, care of mouth/teeth, toileting, grooming, dressing, etc )  - Assess/evaluate cause of self-care deficits   - Assess range of motion  - Assess patient's mobility; develop plan if impaired  - Assess patient's need for assistive devices and provide as appropriate  - Encourage maximum independence but intervene and supervise when necessary  - Involve family in performance of ADLs  - Assess for home care needs following discharge   - Consider OT consult to assist with ADL evaluation and planning for discharge  - Provide patient education as appropriate  Outcome: Progressing  Goal: Maintain or return mobility status to optimal level  Description  INTERVENTIONS:  - Assess patient's baseline mobility status (ambulation, transfers, stairs, etc )    - Identify cognitive and physical deficits and behaviors that affect mobility  - Identify mobility aids required to assist with transfers and/or ambulation (gait belt, sit-to-stand, lift, walker, cane, etc )  - [place] fall precautions as indicated by assessment  - Record patient progress and toleration of activity level on Mobility SBAR; progress patient to next Phase/Stage  - Instruct patient to call for assistance with activity based on assessment  - Consider rehabilitation consult to assist with strengthening/weightbearing, etc   Outcome: Progressing     Problem: DISCHARGE PLANNING  Goal: Discharge to home or other facility with appropriate resources  Description  INTERVENTIONS:  - Identify barriers to discharge w/patient and caregiver  - Arrange for needed discharge resources and transportation as appropriate  - Identify discharge learning needs (meds, wound care, etc )  - Arrange for interpretive services to assist at discharge as needed  - Refer to Case Management Department for coordinating discharge planning if the patient needs post-hospital services based on physician/advanced practitioner order or complex needs related to functional status, cognitive ability, or social support system  Outcome: Progressing     Problem: Knowledge Deficit  Goal: Patient/family/caregiver demonstrates understanding of disease process, treatment plan, medications, and discharge instructions  Description  Complete learning assessment and assess knowledge base    Interventions:  - Provide teaching at level of understanding  - Provide teaching via preferred learning methods  Outcome: Progressing

## 2019-12-19 NOTE — ASSESSMENT & PLAN NOTE
Now hypotensive, received 0 5mg dilaudid at 11, and 1mg at 1230 and has pinpoint pupils  Stat 500cc IVF bolus     Hold Lopressor for now    Lasix on hold for IVF hydration

## 2019-12-19 NOTE — ED PROVIDER NOTES
Emergency Department Note- Bradley Ramirez 80 y o  male MRN: [id number]    Unit/Bed#: ED 10 Encounter: [id number]        History of Present Illness   HPI:  Bradley Ramirez is a 80 y o  male who presents with vomiting and diarrhea and generalized weakness  Patient states he has been having upper respiratory symptoms for the last few days and went to the urgent care yesterday at which time he was prescribed Augmentin for a sinusitis  Patient states he has not been feeling febrile but last night after taking the Augmentin and he started having profuse diarrhea and vomiting throughout the night  Patient became so weak that he was unable to even walk with his walker  Patient with no chest pain mild shortness of breath and mild abdominal pain  Patient with no urinary complaints no dysuria  Patient with no numbness tingling or weakness in the extremities  Patient has no history of previous abdominal surgeries however has had previous back surgery carotid endarterectomy and CABG  REVIEW OF SYSTEMS    Constitutional: Negative for chills, fatigue and fever  HENT: Negative for ear pain, sore throat and trouble swallowing  Eyes: Negative for photophobia, pain and visual disturbance  Respiratory: Negative for cough, chest tightness and positive shortness of breath  Cardiovascular: Negative for chest pain and palpitations  Gastrointestinal: Negative for abdominal pain, constipation, positive diarrhea, nausea and vomiting  Genitourinary: Negative for dysuria, flank pain, frequency and hematuria  Musculoskeletal: Negative for back pain and neck pain  Skin: Negative for color change and rash  Neurological: Negative for dizziness, weakness, light-headedness and headaches  Psychiatric/Behavioral: Negative for confusion  The patient is not nervous/anxious      All systems reviewed and negative except as noted above or in HPI         Historical Information   Past Medical History:   Diagnosis Date    Anemia     [facility] cancer ([facility] )     Carotid artery occlusion     Cataract     COPD (chronic obstructive pulmonary disease) (HCC)     Coronary artery disease     Hyperlipidemia     Hypertension     Hypothyroidism 9/15/2017    Multiple pulmonary nodules     Parkinson disease ([facility] )     Peptic ulcer     Scoliosis     Transient cerebral ischemia     Tremors of nervous system      Past Surgical History:   Procedure Laterality Date   [facility] SURGERY      1973, ,     BUNIONECTOMY Left     Simple exostectomy (silver procedure)    CAROTID ENDARTERECTOMY Right 09/10/2008    Common Corrinne Peek P MD    CATARACT EXTRACTION, BILATERAL      CERVICAL DISCECTOMY  1969    CORONARY ARTERY BYPASS GRAFT  10/20/2010    x3 (LIMA-LAD, SVG-OM, SVG-RCA)    CYSTOSCOPY      ELBOW SURGERY Right 2012    FEMORAL ARTERY - TIBIAL ARTERY BYPASS GRAFT  2011    using reversed saphenous vein from right leg  [address] MD    REPLACEMENT TOTAL KNEE Left     SHOULDER SURGERY Right      Social History   Social History     Substance and Sexual Activity   Alcohol Use Yes    Frequency: Monthly or less    Drinks per session: 1 or 2    Binge frequency: Never    Comment: Social      Social History     Substance and Sexual Activity   Drug Use No     Social History     Tobacco Use   Smoking Status Former Smoker    Packs/day: 1 00    Years: 50 00    Pack years: 50 00    Types: Cigarettes    Last attempt to quit: Hope Downing Years since quittin 9   Smokeless Tobacco Never Used     Family History:   Family History   Problem Relation Age of Onset    Cervical cancer Mother     Cervical cancer Sister     Heart disease Sister     Coronary artery disease Sister     Lung cancer Brother        Meds/Allergies     (Not in a hospital admission)  Allergies   Allergen Reactions    Aleve [Naproxen]      Other reaction(s): FACIAL SWELLING  Category: Allergy;  Annotation - [id number]: lip/eye edema Objective   Vitals: Blood pressure 111/61, pulse 94, temperature 98 7 °F (37 1 °C), temperature source Oral, resp  rate 18, weight 65 8 kg (145 lb), SpO2 96 %  PHYSICAL EXAM     General Appearance: alert and oriented, nad, non toxic appearing  Skin:  Warm, dry, intact  HEENT: atraumatic, normocephalic, eomi, perll   Neck: Supple, no JVD, no lymphadenopathy, trachea midline, no bruit  Cardiac: rrr, no murmurs, rub, gallops  Pulmonary: lungs cta, no wheezes, rales, rhonchi  Gastrointestinal: abdomen soft mild diffuse tender, good bs, no mass or bruits, no cva tenderness  Extremities: no pedal edema, good pulses, no calf tenderness, no clubbing, no cyanosis tender paraspinal lumbar  Neuro:  no focal motor or sensory deficits, cn intact  Psych:  Normal mood and affect, normal judgement and insight      Lab Results: Lab Results: I have personally reviewed pertinent lab results  Imaging: I have personally reviewed pertinent reports  EKG, Pathology, and Other Studies: I have personally reviewed pertinent films in PACS    Assessment/Plan     ED Medical Decision Making:  Patient likely dehydrated from vomiting and diarrhea and maybe secondary to his Augmentin  Will check labs cardiac workup and CT abdomen pelvis and give the patient IV fluids and Zofran  ECG 12 Lead Documentation  Date/Time: today/date: 12/19/2019  Performed by: Angeli Najera  Authorized by: Angeli Najera     ECG reviewed by me, the ED Provider: yes    Patient location:  ED   Previous ECG:  Compared to current, no change   Rate:  ECG rate assessment: normal    Rhythm: sinus rhythm    Ectopy:  : none    QRS axis:  Normal  Intervals: normal   Q waves: None   ST segments:  Normal  T waves: normal      Impression:  Right bundle-branch block unchanged from previous      Portions of the record may have been created with voice recognition software   Occasional wrong word or "sound a like" substitutions may have occurred due to the inherent limitations of voice recognition software  Read the chart carefully and recognize, using context, where substitutions have occurred  Alyson Gr MD  12/19/19 1034    Patient with no acute findings on CT scan does have a 4 cm infrarenal aneurysm patient however is still feeling weak and requesting admission  Patient will be admitted under observation for IV fluids and physical therapy evaluation         Alyson Gr MD  12/19/19 4997

## 2019-12-19 NOTE — ED NOTES
Pt c/o nausea after rolling and adjusting patient  Dr Musa Lugo made aware of temp and nausea       Lee Gu RN  22/57/79 3729

## 2019-12-20 PROBLEM — J96.01 ACUTE RESPIRATORY FAILURE WITH HYPOXIA (HCC): Status: ACTIVE | Noted: 2019-12-20

## 2019-12-20 PROBLEM — R09.02 HYPOXIA: Status: ACTIVE | Noted: 2019-12-20

## 2019-12-20 LAB
ANION GAP SERPL CALCULATED.3IONS-SCNC: 5 MMOL/L (ref 4–13)
BASOPHILS # BLD AUTO: 0 THOUSANDS/ΜL (ref 0–0.1)
BASOPHILS NFR BLD AUTO: 0 % (ref 0–1)
BUN SERPL-MCNC: 14 MG/DL (ref 5–25)
CALCIUM SERPL-MCNC: 7.7 MG/DL (ref 8.3–10.1)
CHLORIDE SERPL-SCNC: 105 MMOL/L (ref 100–108)
CO2 SERPL-SCNC: 26 MMOL/L (ref 21–32)
CREAT SERPL-MCNC: 0.88 MG/DL (ref 0.6–1.3)
EOSINOPHIL # BLD AUTO: 0.13 THOUSAND/ΜL (ref 0–0.61)
EOSINOPHIL NFR BLD AUTO: 3 % (ref 0–6)
ERYTHROCYTE [DISTWIDTH] IN BLOOD BY AUTOMATED COUNT: 12.7 % (ref 11.6–15.1)
GFR SERPL CREATININE-BSD FRML MDRD: 78 ML/MIN/1.73SQ M
GLUCOSE P FAST SERPL-MCNC: 91 MG/DL (ref 65–99)
GLUCOSE SERPL-MCNC: 91 MG/DL (ref 65–140)
HCT VFR BLD AUTO: 38.5 % (ref 36.5–49.3)
HGB BLD-MCNC: 12.6 G/DL (ref 12–17)
IMM GRANULOCYTES # BLD AUTO: 0.01 THOUSAND/UL (ref 0–0.2)
IMM GRANULOCYTES NFR BLD AUTO: 0 % (ref 0–2)
LYMPHOCYTES # BLD AUTO: 0.71 THOUSANDS/ΜL (ref 0.6–4.47)
LYMPHOCYTES NFR BLD AUTO: 14 % (ref 14–44)
MCH RBC QN AUTO: 31.7 PG (ref 26.8–34.3)
MCHC RBC AUTO-ENTMCNC: 32.7 G/DL (ref 31.4–37.4)
MCV RBC AUTO: 97 FL (ref 82–98)
MONOCYTES # BLD AUTO: 0.88 THOUSAND/ΜL (ref 0.17–1.22)
MONOCYTES NFR BLD AUTO: 17 % (ref 4–12)
NEUTROPHILS # BLD AUTO: 3.32 THOUSANDS/ΜL (ref 1.85–7.62)
NEUTS SEG NFR BLD AUTO: 66 % (ref 43–75)
NRBC BLD AUTO-RTO: 0 /100 WBCS
PLATELET # BLD AUTO: 113 THOUSANDS/UL (ref 149–390)
PMV BLD AUTO: 10.6 FL (ref 8.9–12.7)
POTASSIUM SERPL-SCNC: 3.8 MMOL/L (ref 3.5–5.3)
PROCALCITONIN SERPL-MCNC: <0.05 NG/ML
RBC # BLD AUTO: 3.97 MILLION/UL (ref 3.88–5.62)
SODIUM SERPL-SCNC: 136 MMOL/L (ref 136–145)
WBC # BLD AUTO: 5.05 THOUSAND/UL (ref 4.31–10.16)

## 2019-12-20 PROCEDURE — 85025 COMPLETE CBC W/AUTO DIFF WBC: CPT | Performed by: PHYSICIAN ASSISTANT

## 2019-12-20 PROCEDURE — 94760 N-INVAS EAR/PLS OXIMETRY 1: CPT

## 2019-12-20 PROCEDURE — G8988 SELF CARE GOAL STATUS: HCPCS

## 2019-12-20 PROCEDURE — G8987 SELF CARE CURRENT STATUS: HCPCS

## 2019-12-20 PROCEDURE — 97167 OT EVAL HIGH COMPLEX 60 MIN: CPT

## 2019-12-20 PROCEDURE — 94668 MNPJ CHEST WALL SBSQ: CPT

## 2019-12-20 PROCEDURE — 94640 AIRWAY INHALATION TREATMENT: CPT

## 2019-12-20 PROCEDURE — 99232 SBSQ HOSP IP/OBS MODERATE 35: CPT | Performed by: PHYSICIAN ASSISTANT

## 2019-12-20 PROCEDURE — 80048 BASIC METABOLIC PNL TOTAL CA: CPT | Performed by: PHYSICIAN ASSISTANT

## 2019-12-20 PROCEDURE — 84145 PROCALCITONIN (PCT): CPT | Performed by: PHYSICIAN ASSISTANT

## 2019-12-20 RX ORDER — SODIUM CHLORIDE FOR INHALATION 0.9 %
3 VIAL, NEBULIZER (ML) INHALATION
Status: DISCONTINUED | OUTPATIENT
Start: 2019-12-20 | End: 2019-12-21 | Stop reason: HOSPADM

## 2019-12-20 RX ORDER — GUAIFENESIN 600 MG
1200 TABLET, EXTENDED RELEASE 12 HR ORAL EVERY 12 HOURS SCHEDULED
Status: DISCONTINUED | OUTPATIENT
Start: 2019-12-20 | End: 2019-12-21 | Stop reason: HOSPADM

## 2019-12-20 RX ORDER — LEVALBUTEROL 1.25 MG/.5ML
1.25 SOLUTION, CONCENTRATE RESPIRATORY (INHALATION)
Status: DISCONTINUED | OUTPATIENT
Start: 2019-12-20 | End: 2019-12-21 | Stop reason: HOSPADM

## 2019-12-20 RX ORDER — ONDANSETRON 2 MG/ML
4 INJECTION INTRAMUSCULAR; INTRAVENOUS EVERY 4 HOURS PRN
Status: DISCONTINUED | OUTPATIENT
Start: 2019-12-20 | End: 2019-12-21 | Stop reason: HOSPADM

## 2019-12-20 RX ORDER — SODIUM CHLORIDE FOR INHALATION 0.9 %
3 VIAL, NEBULIZER (ML) INHALATION ONCE
Status: COMPLETED | OUTPATIENT
Start: 2019-12-20 | End: 2019-12-20

## 2019-12-20 RX ORDER — MAGNESIUM HYDROXIDE/ALUMINUM HYDROXICE/SIMETHICONE 120; 1200; 1200 MG/30ML; MG/30ML; MG/30ML
30 SUSPENSION ORAL EVERY 4 HOURS PRN
Status: DISCONTINUED | OUTPATIENT
Start: 2019-12-20 | End: 2019-12-21 | Stop reason: HOSPADM

## 2019-12-20 RX ORDER — PREDNISONE 20 MG/1
40 TABLET ORAL DAILY
Status: DISCONTINUED | OUTPATIENT
Start: 2019-12-20 | End: 2019-12-21 | Stop reason: HOSPADM

## 2019-12-20 RX ORDER — SODIUM CHLORIDE 9 MG/ML
75 INJECTION, SOLUTION INTRAVENOUS CONTINUOUS
Status: DISCONTINUED | OUTPATIENT
Start: 2019-12-20 | End: 2019-12-20

## 2019-12-20 RX ORDER — CALCIUM CARBONATE 200(500)MG
1000 TABLET,CHEWABLE ORAL 3 TIMES DAILY PRN
Status: DISCONTINUED | OUTPATIENT
Start: 2019-12-20 | End: 2019-12-21 | Stop reason: HOSPADM

## 2019-12-20 RX ORDER — LEVALBUTEROL 1.25 MG/.5ML
1.25 SOLUTION, CONCENTRATE RESPIRATORY (INHALATION) ONCE
Status: COMPLETED | OUTPATIENT
Start: 2019-12-20 | End: 2019-12-20

## 2019-12-20 RX ORDER — BENZONATATE 100 MG/1
200 CAPSULE ORAL 3 TIMES DAILY PRN
Status: DISCONTINUED | OUTPATIENT
Start: 2019-12-20 | End: 2019-12-21 | Stop reason: HOSPADM

## 2019-12-20 RX ADMIN — ONDANSETRON 4 MG: 2 INJECTION INTRAMUSCULAR; INTRAVENOUS at 05:17

## 2019-12-20 RX ADMIN — HEPARIN SODIUM 5000 UNITS: 5000 INJECTION INTRAVENOUS; SUBCUTANEOUS at 05:18

## 2019-12-20 RX ADMIN — PRAVASTATIN SODIUM 40 MG: 40 TABLET ORAL at 17:52

## 2019-12-20 RX ADMIN — ALUMINUM HYDROXIDE, MAGNESIUM HYDROXIDE, AND SIMETHICONE 30 ML: 200; 200; 20 SUSPENSION ORAL at 06:40

## 2019-12-20 RX ADMIN — ASPIRIN 81 MG 81 MG: 81 TABLET ORAL at 09:56

## 2019-12-20 RX ADMIN — LEVALBUTEROL HYDROCHLORIDE 1.25 MG: 1.25 SOLUTION, CONCENTRATE RESPIRATORY (INHALATION) at 19:00

## 2019-12-20 RX ADMIN — HEPARIN SODIUM 5000 UNITS: 5000 INJECTION INTRAVENOUS; SUBCUTANEOUS at 13:43

## 2019-12-20 RX ADMIN — SODIUM CHLORIDE 75 ML/HR: 0.9 INJECTION, SOLUTION INTRAVENOUS at 06:51

## 2019-12-20 RX ADMIN — ISODIUM CHLORIDE 3 ML: 0.03 SOLUTION RESPIRATORY (INHALATION) at 10:25

## 2019-12-20 RX ADMIN — CALCIUM CARBONATE (ANTACID) CHEW TAB 500 MG 1000 MG: 500 CHEW TAB at 14:02

## 2019-12-20 RX ADMIN — LEVOTHYROXINE SODIUM 75 MCG: 125 TABLET ORAL at 05:17

## 2019-12-20 RX ADMIN — LEVALBUTEROL HYDROCHLORIDE 1.25 MG: 1.25 SOLUTION, CONCENTRATE RESPIRATORY (INHALATION) at 14:23

## 2019-12-20 RX ADMIN — TAMSULOSIN HYDROCHLORIDE 0.4 MG: 0.4 CAPSULE ORAL at 17:52

## 2019-12-20 RX ADMIN — GUAIFENESIN 1200 MG: 600 TABLET, EXTENDED RELEASE ORAL at 22:15

## 2019-12-20 RX ADMIN — LEVALBUTEROL HYDROCHLORIDE 1.25 MG: 1.25 SOLUTION, CONCENTRATE RESPIRATORY (INHALATION) at 10:25

## 2019-12-20 RX ADMIN — TIOTROPIUM BROMIDE 18 MCG: 18 CAPSULE ORAL; RESPIRATORY (INHALATION) at 09:58

## 2019-12-20 RX ADMIN — ISODIUM CHLORIDE 3 ML: 0.03 SOLUTION RESPIRATORY (INHALATION) at 19:00

## 2019-12-20 RX ADMIN — HEPARIN SODIUM 5000 UNITS: 5000 INJECTION INTRAVENOUS; SUBCUTANEOUS at 22:15

## 2019-12-20 RX ADMIN — PANTOPRAZOLE SODIUM 40 MG: 40 TABLET, DELAYED RELEASE ORAL at 06:40

## 2019-12-20 RX ADMIN — GUAIFENESIN 1200 MG: 600 TABLET, EXTENDED RELEASE ORAL at 09:56

## 2019-12-20 RX ADMIN — CALCIUM CARBONATE (ANTACID) CHEW TAB 500 MG 1000 MG: 500 CHEW TAB at 18:06

## 2019-12-20 RX ADMIN — BENZONATATE 200 MG: 100 CAPSULE ORAL at 06:40

## 2019-12-20 RX ADMIN — CALCIUM CARBONATE (ANTACID) CHEW TAB 500 MG 1000 MG: 500 CHEW TAB at 05:23

## 2019-12-20 RX ADMIN — ISODIUM CHLORIDE 3 ML: 0.03 SOLUTION RESPIRATORY (INHALATION) at 14:23

## 2019-12-20 RX ADMIN — PREDNISONE 40 MG: 20 TABLET ORAL at 13:42

## 2019-12-20 NOTE — ASSESSMENT & PLAN NOTE
Now hypotensive, received 0 5mg dilaudid at 11, and 1mg at 1230 and has pinpoint pupils      Continue to hold Lopressor for now    Lasix on hold for IVF hydration

## 2019-12-20 NOTE — RESPIRATORY THERAPY NOTE
RT Protocol Note  Monik Casas 80 y o  male MRN: [id number]  Unit/Bed#: -01 Encounter: [id number]    Assessment    Principal Problem:    SIRS (systemic inflammatory response syndrome) (HCC)  Active Problems:     Aortic stenosis    PAF (paroxysmal atrial fibrillation) (HCC)    Benign prostatic hyperplasia    Coronary artery disease    Esophageal reflux    Hyperlipidemia    Hypertension    Hypothyroidism    Parkinson's disease ([facility] )    Peripheral arterial disease (HCC)    Pulmonary emphysema (HCC)    Hypoxia      Home Pulmonary Medications:  spiriva       Past Medical History:   Diagnosis Date    Anemia     Bladder cancer ([place] [address] )     Carotid artery occlusion     Cataract     COPD (chronic obstructive pulmonary disease) ([place] [address] )     Coronary artery disease     Hyperlipidemia     Hypertension     Hypothyroidism 9/15/2017    Multiple pulmonary nodules     Parkinson disease ([place] [address] )     Peptic ulcer     Scoliosis     Transient cerebral ischemia     Tremors of nervous system      Social History     Socioeconomic History    Marital status: /Civil Union     Spouse name: None    Number of children: None    Years of education: None    Highest education level: None   Occupational History    Occupation: Retired   Social Needs    Financial resource strain: None    Food insecurity:     Worry: None     Inability: None    Transportation needs:     Medical: None     Non-medical: None   Tobacco Use    Smoking status: Former Smoker     Packs/day: 1 00     Years: 50 00     Pack years: 50 00     Types: Cigarettes     Last attempt to quit:      Years since quittin 9    Smokeless tobacco: Never Used   Substance and Sexual Activity    Alcohol use: Never     Frequency: Patient refused     Drinks per session: Patient refused     Binge frequency: Patient refused     Comment: Social     Drug use: Never    Sexual activity: Not Currently   Lifestyle    Physical activity:     Days per week: None Minutes per session: None    Stress: None   Relationships    Social connections:     Talks on phone: None     Gets together: None     Attends Jain service: None     Active member of club or organization: None     Attends meetings of clubs or organizations: None     Relationship status: None    Intimate partner violence:     Fear of current or ex partner: None     Emotionally abused: None     Physically abused: None     Forced sexual activity: None   Other Topics Concern    None   Social History Narrative    None       Subjective    Subjective Data: Pt currently in no resp distress, no complaints of SOB or increased WOB  Pt states a loose cough however  BS currently diminished/clear, SpO2 90% on room air  Pt states he takes no recuse inhalers at home, never has taken any  No indication for resp UDN's at this time  Will D/C resp protocol    Objective    Physical Exam:   Assessment Type: Pre-treatment  General Appearance: Alert, Awake  Respiratory Pattern: Normal  Chest Assessment: Chest expansion symmetrical  Bilateral Breath Sounds: Expiratory wheezes  Cough: Productive, Congested    Vitals:  Blood pressure (!) 92/49, pulse 84, temperature 98 2 °F (36 8 °C), resp  rate 16, height 5' 8" (1 727 m), weight 70 9 kg (156 lb 4 9 oz), SpO2 99 %  Imaging and other studies: I have personally reviewed pertinent reports        O2 Device: room air     Plan    Respiratory Plan: Home Bronchodilator Patient pathway  Airway Clearance Plan: Flutter     Resp Comments: pt assessed for flutter valve; frequent cough bringing up mod amt yellow phlegm; BS with exp wheezes; will order airway clearance and resp protocols as well as TID bronchodilators

## 2019-12-20 NOTE — PROGRESS NOTES
Noticed patients O2 stats started dropping into the mid 80s  Walked into patients room to assess  He is sleeping and in no distress  Applied 2L nasal cannula  Stats remained at 87-89  Bumped the oxygen up to 3L which is giving him 91-95 percent oxygen  Will continue to monitor patient throughout the night  Paged [facility] on call to notify them  Awaiting call back

## 2019-12-20 NOTE — PROGRESS NOTES
Progress Note - Claudine Backer 1933, 80 y o  male MRN: [id number]  Unit/Bed#: -01 Encounter: [id number] DOS: 12/20/19  Primary Care Provider: Frederick Boxer, MD   Date and time admitted to hospital: 12/19/2019 10:10 AM    * SIRS (systemic inflammatory response syndrome) ([facility] )  Assessment & Plan  Present on admission, fever, tachycardia  Started on Augmentin after visiting urgent care 12/18 for sinusitis  Suspect 2/2 viral syndrome given sinusitis, vomiting, diarrhea, likely RSV positive tracheobronchitis with mild bronchospasm  CXR negative, CT a/p negative for infx etio   Normal procalcitonin  Lactic acid normal  Abx not indicated  F/u bl cx  Add prednisone 40mg x 5 days   Unlikely c-diff as not diarrhea since admission - d/c PCR and contact   Supportive care    Hypoxia  Assessment & Plan  O2 reportedly 87% on RA per jessica  Unclear if true hypoxia as patient not in respiratory distress or SOB, and fingers are very cold  CXR on admission normal, do not need to repeat at this time   Discontinue IVF   Flutter valve, incentive spirometry     Pulmonary emphysema [facility])  [address] pulmonology in the outpatient setting  Hx tobacco abuse  Continue Spiriva   No acute exacerbation   Respiratory protocol    Hypothyroidism  Assessment & Plan  Continue Synthroid    Hypertension  Assessment & Plan  Now hypotensive, received 0 5mg dilaudid at 11, and 1mg at 1230 and has pinpoint pupils  Continue to hold Lopressor for now    Lasix on hold for IVF hydration     Esophageal reflux  Assessment & Plan  Continue GERD    Coronary artery disease  Assessment & Plan  Follows Outpatient Cardiology  Stress test September 2019 normal  Has known mild aortic stenosis  Continue beta-blocker, statin, asa     Benign prostatic hyperplasia  Assessment & Plan  Known to Outpatient Neurology  Continue alpha blocker  P r n   Bladder scan, urinary retention protocol     PAF (paroxysmal atrial fibrillation) [facility])  Assessment & Plan  Follows Outpatient Cardiology  Continue Lopressor, asa   Not on anticoagulation    VTE Pharmacologic Prophylaxis:   Pharmacologic: Heparin  Mechanical VTE Prophylaxis in Place: Yes    Patient Centered Rounds: I have performed bedside rounds with nursing staff today  Discussions with Specialists or Other Care Team Provider: nursing     Education and Discussions with Family / Patient: patient, call wife Estephanie Williamson     Time Spent for Care: 30 minutes  More than 50% of total time spent on counseling and coordination of care as described above  Current Length of Stay: 0 day(s)    Current Patient Status: Inpatient   Certification Statement: The patient will continue to require additional inpatient hospital stay due to pending improvement in respiratory status with ATC nebulizations, supplemental O2, prednisone, nausea and vomiting unable to toelrate PO     Discharge Plan: pending clinical improvement likely in 24 hours     Code Status: Level 1 - Full Code    Subjective:   Pt seen and examined at bedside  Had some N/V this AM after breakfast  States his breakfast was lousy but has trial of soup for lunch coming  No chest pain or SOB but has thick yellowish sputum and harsh cough  Feels improving slowly  Objective:     Vitals:   Temp (24hrs), Av 8 °F (37 1 °C), Min:97 7 °F (36 5 °C), Max:101 1 °F (38 4 °C)    Temp:  [97 7 °F (36 5 °C)-101 1 °F (38 4 °C)] 98 2 °F (36 8 °C)  HR:  [] 84  Resp:  [16-20] 16  BP: ()/(49-61) 92/49  SpO2:  [87 %-99 %] 99 %  Body mass index is 23 77 kg/m²  Input and Output Summary (last 24 hours): Intake/Output Summary (Last 24 hours) at 2019 1214  Last data filed at 2019 5173  Gross per 24 hour   Intake 1680 ml   Output 950 ml   Net 730 ml     Physical Exam:     Physical Exam   Constitutional: He is oriented to person, place, and time  He appears well-developed and well-nourished  No distress     HENT:   Head: Normocephalic and atraumatic  Eyes: EOM are normal  No scleral icterus  Neck: Normal range of motion  Neck supple  Cardiovascular: Normal rate, regular rhythm and normal heart sounds  No murmur heard  Pulmonary/Chest: Effort normal  He has wheezes  Abdominal: Soft  Bowel sounds are normal    Musculoskeletal: Normal range of motion  He exhibits no edema  Neurological: He is alert and oriented to person, place, and time  Skin: Skin is warm and dry  There is pallor  Psychiatric: He has a normal mood and affect  Additional Data:     Labs:    Results from last 7 days   Lab Units 12/20/19  0653   WBC Thousand/uL 5 05   HEMOGLOBIN g/dL 12 6   HEMATOCRIT % 38 5   PLATELETS Thousands/uL 113*   NEUTROS PCT % 66   LYMPHS PCT % 14   MONOS PCT % 17*   EOS PCT % 3     Results from last 7 days   Lab Units 12/20/19  0653 12/19/19  1033   SODIUM mmol/L 136 131*   POTASSIUM mmol/L 3 8 3 9   CHLORIDE mmol/L 105 98*   CO2 mmol/L 26 28   BUN mg/dL 14 17   CREATININE mg/dL 0 88 1 07   ANION GAP mmol/L 5 5   CALCIUM mg/dL 7 7* 8 9   ALBUMIN g/dL  --  4 0   TOTAL BILIRUBIN mg/dL  --  0 66   ALK PHOS U/L  --  114   ALT U/L  --  24   AST U/L  --  19   GLUCOSE RANDOM mg/dL 91 96                 Results from last 7 days   Lab Units 12/20/19  0653 12/19/19  1548 12/19/19  1033   LACTIC ACID mmol/L  --   --  1 5   PROCALCITONIN ng/ml <0 05 0 05  --      * I Have Reviewed All Lab Data Listed Above  * Additional Pertinent Lab Tests Reviewed: [facility]    Imaging:    Imaging Reports Reviewed Today Include: all  Imaging Personally Reviewed by Myself Includes:  none    Recent Cultures (last 7 days):     Results from last 7 days   Lab Units 12/19/19  1548 12/19/19  1033   BLOOD CULTURE  Received in Microbiology Lab  Culture in Progress  Received in Microbiology Lab  Culture in Progress         Last 24 Hours Medication List:     Current Facility-Administered Medications:  acetaminophen 650 mg Oral Q6H PRN Brett Sauceda JULIAN Morris PA-C   aluminum-magnesium hydroxide-simethicone 30 mL Oral Q4H PRN Ade E Held, THAD   aspirin 81 mg Oral Daily Kosta Morris PA-C   benzonatate 200 mg Oral TID PRN Ade E Held, THAD   calcium carbonate 1,000 mg Oral TID PRN Ade E Held, THAD   guaiFENesin 1,200 mg Oral Q12H [address] Ade E Held, THAD   heparin (porcine) 5,000 Units Subcutaneous Q8H [address] Kosta Morris PA-C   levalbuterol 1 25 mg Nebulization TID [facility]   levothyroxine 75 mcg Oral Early Morning Kosta Morris PA-C   nitroglycerin 0 4 mg Sublingual Q5 Min PRN Kosta Morris PA-C   ondansetron 4 mg Intravenous Q4H PRN Kosta Morris PA-C   pantoprazole 40 mg Oral Daily Before Breakfast Kosta Morris PA-C   pravastatin 40 mg Oral Daily With LandJanet Morris PA-C   predniSONE 40 mg Oral Daily Kosta Morris PA-C   sodium chloride 3 mL Nebulization TID [facility]   tamsulosin 0 4 mg Oral Daily With Dinner Kosta Morris PA-C   tiotropium 18 mcg Inhalation Daily Shawn Sanchez PA-C        Today, Patient Was Seen By: Shawn Sanchez PA-C    ** Please Note: Dictation voice to text software may have been used in the creation of this document   **

## 2019-12-20 NOTE — UTILIZATION REVIEW
Initial Clinical Review    Admission: Date/Time/Statement:  12/19/19 1450  Observation changed to inpatient 12-20-19 1214 for continued treatment of Sirs    Inpatient Admission Once     Transfer Service: General Medicine       Question Answer   Admitting Physician LEROY Oglesby   Level of Care Med Surg   Estimated length of stay More than 2 Midnights   Certification I certify that inpatient services are medically necessary for this patient for a duration of greater than two midnights  See H&P and MD Progress Notes for additional information about the patient's course of treatment  ED Arrival Information     Expected Arrival Acuity Means of Arrival Escorted By Service Admission Type    - 12/19/2019 10:10 Emergent Ambulance [facility] General Medicine Emergency    Arrival Complaint    wheezing        Chief Complaint   Patient presents with    Weakness - Generalized     Pt comes from home, not feeling well  Generalized weakness, too weak to walk  Pt diagnosed with sinusitis and just started augmentin last night  diarrhea yesterday  Assessment/Plan:     80year old male presents to ed from home for evaluation and treatment of vomiting, diarrhea and generalized weakness  On arrival he reports upper respiratory symptoms and was prescribed augmentin for sinusitis by urgent care  His weakness has progressed until now he is short of breath andcan ambulate only a short distance his walker  PMHX  Carotid endarterectomy and CABG  Patient with temp of 101, heart rate 90 to 103,  O2 sats  87 -90% on room air and blood pressure 85/51  + RSV  Blood an durine cultures sent  Treated in ed with iv fluids,  Iv zofran and iv dilaudid, po tylenol  Admit to observation for SIRS    Plan monitor cbc, procalcitonin , rule out c diff, start tamiful, discontinue augmentin    ED Triage Vitals   12/19/19 1017 12/19/19 1017 12/19/19 1017 12/19/19 1017 12/19/19 1017   98 7 °F (37 1 °C) 94 18 111/61 96 %      Oral          Pain Score       8       12/19/19 70 9 kg (156 lb 4 9 oz)     Additional Vital Signs:     12/20/19 0104            91 %  Nasal cannula   12/20/19 0100            87 %Abnormal      12/19/19 21:49:40  97 7 °F (36 5 °C)  62  18  115/61  79  95 %     12/19/19 2014            90 %  None (Room air)   12/19/19 18:57:55  98 1 °F (36 7 °C)  84  16  105/57  73  96 %     12/19/19 1839    94  16  102/53    95 %  None (Room air)   12/19/19 1800  99 3 °F (37 4 °C)  94  17  99/57    96 %  None (Room air)   12/19/19 1727    95  16  91/54    95 %     12/19/19 1655    93  16  85/51  Abnormal     96 %  None (Room air)   12/19/19 1556    103  16  96/52    96 %  None (Room air)   12/19/19 1445  101 1 °F (38 4 °C)Abnormal                12/19/19 1400    102  16  98/56    95 %  None (Room air)   12/19/19 1130    90  22  126/66    95 %          12/19 1400 12/19 1556 12/19 1655 12/19 1800 12/19 1839 12/19 2014 12/20 0104 12/20 0108   O2 Device None (R  None (R  None (R  None (R  None (R  None (R  Nasal c  Nasal c  O2 Flow Rate (L/min) (L/min)       3 3         Pertinent Labs/Diagnostic Test Results:     XR chest 2 views   Final  (12/19 1330)      No acute cardiopulmonary disease  CT abdomen pelvis w contrast   Final  (12/19 1353)         1  No acute abnormality in the abdomen or pelvis  2   4 cm infrarenal abdominal aneurysm with ectasia of the common iliac arteries  Bilateral internal iliac aneurysms also noted the largest measuring 3 cm on the right side  3   Diverticulosis without acute diverticulitis, appendicitis, or bowel obstruction  4   Distended, trabeculated bladder  Collection Time Result Time Vent R Atrial R WI Int  QRSD Int  QT Int  QTC Int   P Axis QRS Axis T Wave Ax    12/19/19 10:27:05 12/19/19 13:12:59 93 93 224 146 382 474 59 -13 73       Sinus rhythm with 1st degree A-V block  Right bundle branch block  Inferior infarct (cited on or before 10-MAY-2015)  T wave abnormality, consider lateral ischemia  Abnormal ECG  When compared with ECG of 10-MAY-2015 08:22,  No significant change since prior tracing        Results from last 7 days   Lab Units 12/20/19  0653 12/19/19  2104 12/19/19  1033   WBC Thousand/uL 5 05  --  6 67   HEMOGLOBIN g/dL 12 6  --  15 2   HEMATOCRIT % 38 5  --  46 2   PLATELETS Thousands/uL 113* 112* 141*   NEUTROS ABS Thousands/µL 3 32  --  5 77         Results from last 7 days   Lab Units 12/20/19  0653 12/19/19  1033   SODIUM mmol/L 136 131*   POTASSIUM mmol/L 3 8 3 9   CHLORIDE mmol/L 105 98*   CO2 mmol/L 26 28   ANION GAP mmol/L 5 5   BUN mg/dL 14 17   CREATININE mg/dL 0 88 1 07   EGFR ml/min/1 73sq m 78 63   CALCIUM mg/dL 7 7* 8 9     Results from last 7 days   Lab Units 12/19/19  1033   AST U/L 19   ALT U/L 24   ALK PHOS U/L 114   TOTAL PROTEIN g/dL 8 2   ALBUMIN g/dL 4 0   TOTAL BILIRUBIN mg/dL 0 66         Results from last 7 days   Lab Units 12/20/19  0653 12/19/19  1033   GLUCOSE RANDOM mg/dL 91 96       Results from last 7 days   Lab Units 12/19/19  1033   TROPONIN I ng/mL <0 02     Results from last 7 days   Lab Units 12/20/19  0653 12/19/19  1548   PROCALCITONIN ng/ml <0 05 0 05     Results from last 7 days   Lab Units 12/19/19  1033   LACTIC ACID mmol/L 1 5     Results from last 7 days   Lab Units 12/19/19  1033   LIPASE u/L 31*     Results from last 7 days   Lab Units 12/19/19  1554 12/19/19  1553   CLARITY UA  Clear  --    COLOR UA  Yellow      SPEC GRAV UA  1 010  --    PH UA  6 0  --    GLUCOSE UA mg/dl Negative  --    KETONES UA mg/dl 15 (1+)*  --    BLOOD UA  Negative  --    PROTEIN UA mg/dl Negative  --    NITRITE UA  Negative  --    BILIRUBIN UA  Negative  --    UROBILINOGEN UA E U /dl 0 2  --    LEUKOCYTES UA  Negative  --      Results from last 7 days   Lab Units 12/19/19  1618   INFLUENZA A PCR  None Detected   RSV PCR  Detected*       Results from last 7 days   Lab Units 12/19/19  1548 12/19/19  1033   BLOOD CULTURE  Received in Microbiology Lab  Culture in Progress  Received in Microbiology Lab  Culture in Progress       ED Treatment:   Medication Administration from 12/19/2019 1010 to 12/19/2019 1845       Date/Time Order Dose Route Action     12/19/2019 1046 sodium chloride 0 9 % bolus 1,000 mL 1,000 mL Intravenous New Bag     12/19/2019 1058 ondansetron (ZOFRAN) injection 4 mg 4 mg Intravenous Given     12/19/2019 1059 HYDROmorphone (DILAUDID) injection 0 5 mg 0 5 mg Intravenous Given     12/19/2019 1236 HYDROmorphone (DILAUDID) injection 1 mg 1 mg Intravenous Given     12/19/2019 1517 acetaminophen (TYLENOL) tablet 650 mg 650 mg Oral Given     12/19/2019 1839 sodium chloride 0 9 % infusion 75 mL/hr Intravenous Rate/Dose Change     12/19/2019 1758 sodium chloride 0 9 % infusion 500 mL/hr Intravenous Rate/Dose Change     12/19/2019 1740 sodium chloride 0 9 % infusion 75 mL/hr Intravenous New Bag     12/19/2019 1616 sodium chloride 0 9 % bolus 500 mL 500 mL Intravenous New Bag        Past Medical History:   Diagnosis Date    Anemia     [facility]     Carotid artery occlusion     Cataract     COPD [facility]chronic obstructive pulmonary disease) [facility]     Coronary artery disease     Hyperlipidemia     Hypertension     Hypothyroidism 9/15/2017    Multiple pulmonary nodules     Parkinson disease ([facility] )     Peptic ulcer     Scoliosis     Transient cerebral ischemia     Tremors of nervous system      Present on Admission:   Aortic stenosis   Benign prostatic hyperplasia   Coronary artery disease   Esophageal reflux   Hyperlipidemia   Hypertension   Hypothyroidism   Parkinson's disease ([facility] )   PAF (paroxysmal atrial fibrillation) ([facility] )   Pulmonary emphysema ([facility] )   [facility]      Admitting Diagnosis:   Wheezing [R06 2]  Weakness [R53 1]  Chronic back pain [M54 9, G89 29]  Fever [R50 9]  Ambulatory dysfunction [R26 2]    Age/Sex: 80 y o  male     Admission Orders:  Scheduled Medications:    Medications:  aspirin 81 mg Oral Daily   guaiFENesin 1,200 mg Oral Q12H [address]   heparin (porcine) 5,000 Units Subcutaneous Q8H [address]   levalbuterol 1 25 mg Nebulization TID   levothyroxine 75 mcg Oral Early Morning   pantoprazole 40 mg Oral Daily Before Breakfast   pravastatin 40 mg Oral Daily With Dinner   sodium chloride 3 mL Nebulization TID   tamsulosin 0 4 mg Oral Daily With Dinner   tiotropium 18 mcg Inhalation Daily     Continuous IV Infusions:     PRN Meds:    acetaminophen 650 mg Oral Q6H PRN   aluminum-magnesium hydroxide-simethicone 30 mL Oral Q4H PRN   benzonatate 200 mg Oral TID PRN   calcium carbonate 1,000 mg Oral TID PRN   nitroglycerin 0 4 mg Sublingual Q5 Min PRN   ondansetron 4 mg Intravenous Q6H PRN       None    Network Utilization Review Department  [email or web address]  ATTENTION: Please call with any questions or concerns to [phone] and carefully listen to the prompts so that you are directed to the right person  All voicemails are confidential   Robert Hallmark all requests for admission clinical reviews, approved or denied determinations and any other requests to dedicated fax number below belonging to the campus where the patient is receiving treatment   List of dedicated fax numbers for the Facilities:  [address] DENIALS (Administrative/Medical Necessity) [phone]   [address] (Maternity/NICU/Pediatrics) [phone]   [place] [facility] [phone]   Sharon Herman [phone]   [facility] [phone]   [address] [place] [address]  [phone]   [address] [place] [address] [phone]   [facility]  [phone]   [address] [facility] [address]  [address] [facility] [address] [facility] [phone]

## 2019-12-20 NOTE — RESPIRATORY THERAPY NOTE
RT Protocol Note  Julio Todd 80 y o  male MRN: [id number]  Unit/Bed#: -01 Encounter: [id number]    Assessment    Principal Problem:    SIRS (systemic inflammatory response syndrome) (HCC)  Active Problems:     Aortic stenosis    PAF (paroxysmal atrial fibrillation) ([facility])    Benign prostatic hyperplasia    Coronary artery disease    Esophageal reflux    Hyperlipidemia    Hypertension    Hypothyroidism    Parkinson's disease ([place] [address] )    Peripheral arterial disease (HCC)    Pulmonary emphysema (HCC)      Home Pulmonary Medications:  Spiriva       Past Medical History:   Diagnosis Date    Anemia     Bladder cancer ([place] [address] )     Carotid artery occlusion     Cataract     COPD (chronic obstructive pulmonary disease) ([place] [address] )     Coronary artery disease     Hyperlipidemia     Hypertension     Hypothyroidism 9/15/2017    Multiple pulmonary nodules     Parkinson disease ([place] [address] )     Peptic ulcer     Scoliosis     Transient cerebral ischemia     Tremors of nervous system      Social History     Socioeconomic History    Marital status: /Civil Union     Spouse name: None    Number of children: None    Years of education: None    Highest education level: None   Occupational History    Occupation: Retired   Social Needs    Financial resource strain: None    Food insecurity:     Worry: None     Inability: None    Transportation needs:     Medical: None     Non-medical: None   Tobacco Use    Smoking status: Former Smoker     Packs/day: 1 00     Years: 50 00     Pack years: 50 00     Types: Cigarettes     Last attempt to quit:      Years since quittin 9    Smokeless tobacco: Never Used   Substance and Sexual Activity    Alcohol use: Yes     Frequency: Monthly or less     Drinks per session: 1 or 2     Binge frequency: Never     Comment: Social     Drug use: No    Sexual activity: None   Lifestyle    Physical activity:     Days per week: None     Minutes per session: None    Stress: None Relationships    Social connections:     Talks on phone: None     Gets together: None     Attends Latter-day service: None     Active member of club or organization: None     Attends meetings of clubs or organizations: None     Relationship status: None    Intimate partner violence:     Fear of current or ex partner: None     Emotionally abused: None     Physically abused: None     Forced sexual activity: None   Other Topics Concern    None   Social History Narrative    None       Subjective    Subjective Data: Pt currently in no resp distress, no complaints of SOB or increased WOB  Pt states a loose cough however  BS currently diminished/clear, SpO2 90% on room air  Pt states he takes no recuse inhalers at home, never has taken any  No indication for resp UDN's at this time  Will D/C resp protocol    Objective    Physical Exam:   Assessment Type: Assess only  General Appearance: Alert, Awake  Respiratory Pattern: Normal  Chest Assessment: Chest expansion symmetrical  Bilateral Breath Sounds: Clear, Diminished  O2 Device: room air    Vitals:  Blood pressure 105/57, pulse 84, temperature 98 1 °F (36 7 °C), temperature source Oral, resp  rate 16, height 5' 8" (1 727 m), weight 70 9 kg (156 lb 4 9 oz), SpO2 90 %  Imaging and other studies: I have personally reviewed pertinent reports  O2 Device: room air     Plan: No resp UDN's indicated at this time  D/C resp protocol             Resp Comments: pt assessed per resp protocol  pt presents with SIRS, r/o flu  Pt has PMH of COPD, follows pulmonary as outpatient  Last visit on 11/4 with Dr Yvette Lopez  Pt takes only Spiriva at home   + smoking hx

## 2019-12-20 NOTE — SOCIAL WORK
Pt reviewed with [facility] provider, pt will plan for d/c tomorrow  Pt lives with spouse, independent pta  No CM needs  CM met with pt at bedside to discuss CM role in dcp  Pt lives in ranch style home, [address]  Pt lives with primary caregiver, Steff Olea [phone] who drives pt, pt reports he does not drive  Pt reports independent with adl's and ambulates with walker at baseline  Pt has rollator that he uses when he leaves his home  Pt confirms PCP and pharmacy; [address] Pt confirms hx with IP [facility] and [facility]  Pt denies IPMH, MH, and drug/alcohol  CM reviewed d/c planning process including the following: identifying help at home, patient preference for d/c planning needs, Discharge Lounge, [facility] Meds to Bed program, availability of treatment team to discuss questions or concerns patient and/or family may have regarding understanding medications and recognizing signs and symptoms once discharged  CM also encouraged patient to follow up with all recommended appointments after discharge  Patient advised of importance for patient and family to participate in managing patients medical well being

## 2019-12-20 NOTE — OCCUPATIONAL THERAPY NOTE
[address] Evaluation     Patient Name: Jg Hugo  SUBQN'Z Date: 12/20/2019  Problem List  Principal Problem:    SIRS (systemic inflammatory response syndrome) ([facility] )  Active Problems: Aortic stenosis    PAF (paroxysmal atrial fibrillation) (HCC)    Benign prostatic hyperplasia    Coronary artery disease    Esophageal reflux    Hyperlipidemia    Hypertension    Hypothyroidism    Parkinson's disease ([facility] )    Peripheral arterial disease ([facility] )    Pulmonary emphysema (HCC)    Hypoxia    Past Medical History  Past Medical History:   Diagnosis Date    Anemia     Bladder cancer ([facility] )     Carotid artery occlusion     Cataract     COPD (chronic obstructive pulmonary disease) ([facility] )     Coronary artery disease     Hyperlipidemia     Hypertension     Hypothyroidism 9/15/2017    Multiple pulmonary nodules     Parkinson disease ([facility] )     Peptic ulcer     Scoliosis     Transient cerebral ischemia     Tremors of nervous system      Past Surgical History  Past Surgical History:   Procedure Laterality Date   [facility] SURGERY      1973, 1987, 2005    BUNIONECTOMY Left     Simple exostectomy (silver procedure)    CAROTID ENDARTERECTOMY Right 09/10/2008    Common  Colton LEDBETTER MD    CATARACT EXTRACTION, BILATERAL      CERVICAL DISCECTOMY  1969    CORONARY ARTERY BYPASS GRAFT  10/20/2010    x3 (LIMA-LAD, SVG-OM, SVG-RCA)    CYSTOSCOPY      ELBOW SURGERY Right 07/2012    FEMORAL ARTERY - TIBIAL ARTERY BYPASS GRAFT  12/05/2011    using reversed saphenous vein from right leg  [address] MD    REPLACEMENT TOTAL KNEE Left     SHOULDER SURGERY Right 1997 12/20/19 1704   Note Type   Note type Eval only   Restrictions/Precautions   Weight Bearing Precautions Per Order No   Other Precautions Bed Alarm;Multiple lines; Fall Risk;O2;Pain   Pain Assessment   Pain Assessment FLACC   Pain Rating: FLACC (Rest) - Face 0   Pain Rating: FLACC (Rest) - Legs 0   Pain Rating: FLACC (Rest) - Activity 0   Pain Rating: FLACC (Rest) - Cry 0   Pain Rating: FLACC (Rest) - Consolability 0   Score: FLACC (Rest) 0   Pain Rating: FLACC (Activity) - Face 1   Pain Rating: FLACC (Activity) - Legs 1   Pain Rating: FLACC (Activity) - Activity 0   Pain Rating: FLACC (Activity) - Cry 1   Pain Rating: FLACC (Activity) - Consolability 1   Score: FLACC (Activity) 4   Home Living   Type of Home House  ([facility] with [address]STE)   [address] [place] [address] chair   Home Equipment Walker   Additional Comments Pt resides with his wife of 72 years in a [facility] with 1STE  Pt reports that his wife is similar age as him, but in good health  Prior Function   Level of [place] Needs assistance with IADLs; Needs assistance with ADLs and functional mobility   Lives With Spouse   Receives Help From Family   ADL Assistance   (PRN A with LB ADLs)   IADLs Needs assistance   Falls in the last 6 months   ((?) 1 that occured 6-8 months ago)   Vocational Retired   Lifestyle   Autonomy Pt reports that he has "bad legs" and on days that he feels weaker his wife provides PRN A with LB ADLs, wife assists with IADLs (including med mgmt and driving) and pt ambulates I'ly with use of rw in the community and rollator in the house  Reciprocal Relationships wife Ty Aden of 72 years    Service to Others retired  for [employer] enjoys fishing, makes his own fishing rods  Goes out to dinner with his wife weekly    Used to enjoy hunting, but reports he had to stop due to difficulty with ambulation on uneven terrain   Psychosocial   Psychosocial (WDL) WDL   ADL   Eating Assistance 5  Supervision/Setup   Grooming Assistance 5  Supervision/Setup   UB Bathing Assistance 5  Supervision/Setup   LB Bathing Assistance 3  Moderate Assistance   UB Dressing Assistance 5  Supervision/Setup   LB Dressing Assistance 2  Maximal Assistance   Toileting Assistance  5  Supervision/Setup   Bed Mobility   Supine to Sit 3  Moderate assistance   Additional items Assist x 1; Increased time required;Verbal cues;LE management   Additional Comments Pt requested to be left seated EOB to eat dinner  Bed alarm activated  Pt is able to sit with no lateral or posterior lean, good endurance   Transfers   Sit to Stand 3  Moderate assistance   Additional items Assist x 1; Increased time required;Verbal cues   Stand to Sit 4  Minimal assistance   Additional items Assist x 1; Increased time required;Verbal cues   Additional Comments Pt requires cues for safe hand placement/positioning during functional transfers with use of rw   Functional Mobility   Functional Mobility 3  Moderate assistance   Additional Comments Ax1 with use of rw to walk ~4 ft forwards and backwards with rw  Pt unsteady and reports pain with L LE movement   Balance   Static Sitting Good   Dynamic Sitting Fair +   Static Standing Fair -   Dynamic Standing Poor +   Ambulatory Poor +   Activity Tolerance   Activity Tolerance Patient limited by pain; Patient limited by fatigue   Nurse Made Aware Pt cleared by SAUL Moreno for OT evaluation and OOB mobility   RUE Assessment   RUE Assessment   (4/5 grossly)   LUE Assessment   LUE Assessment   (4/5 grossly)   Cognition   Overall Cognitive Status [facility]   Arousal/Participation Alert; Responsive; Cooperative   Attention Attends with cues to redirect   Orientation Level Oriented X4   Memory Within functional limits   Following Commands Follows one step commands without difficulty   Comments Pt is very pleasant and cooperative throughout OT evaluation  Assessment   Limitation Decreased ADL status; Decreased endurance;Decreased self-care trans;Decreased high-level ADLs   Prognosis Fair   Assessment Pt is an 80year old male seen for initial OT evaluation 12/20/19 s/p admission to [facility] with n/v and diarrhea  Pt dx'd with SIRs    Additional active problems include: pulmonary emphysema, hypothyroidism, HTN, esophageal reflux, CAD, benign prostatic hyperplasia, and PAF  Pt with active OT orders and cleared by RN Rey Closs for OT evaluation and OOB mobility  Pt resides with his wife in a [facility] with 1STE  Pt reports that his wife assists as needed with ADLs, IADLs, and pt typically ambulates with use of rollator in the house and rw in the community  Pt is currently demonstrating the following occupational deficits: eating with set-up, grooming with set-up, UB bathing with set-up, LB bathing with modA, UB dressing with set-up, LB dressing with maxA, toileting with supervision, bed mobility with modA, functional transfers with modA, and functional mobility with modA  Factors currently limiting pt's independence in these areas include: generalized weakness, endurance, balance, functional mobility, and limited support at home (wife able to assist, but likely not able to provide significant physical assist)  From an OT standpoint, anticipate that pt will progress in order to d/c home with OP OT services  However, presently not safe to d/c to home with wife's assistance  Pt is to continue to benefit from skilled occupational therapy services while in the hospital to maximize functioning and independence with daily activities  See below for OT goals to be addressed 3-5x/wk  Goals   Patient Goals to go home    Plan   Treatment Interventions ADL retraining;Functional transfer training; Endurance training;Patient/family training;Equipment evaluation/education; Compensatory technique education;Continued evaluation; Activityengagement   Goal Expiration Date 12/30/19   OT Treatment Day 1   OT Frequency 3-5x/wk   Recommendation   OT Discharge Recommendation Outpatient OT   OT - OK to Discharge No  (PENDING PROGRESS, NO TO HOME)   Barthel Index   Feeding 10   Bathing 0   Grooming Score 5   Dressing Score 5   Bladder Score 10   Bowels Score 10   Toilet Use Score 5   Transfers (Bed/Chair) Score 5   Mobility (Level Surface) Score 0   Stairs Score 0   Barthel Index Score 50     Goals:  Pt will complete UB self care with Aydee  Pt will complete LB self care with Erik with use of DME/AD as appropriate  Pt will improve functional transfers to Mod I on/off all surfaces using DME as needed w/ G balance/safety     Pt will improve functional mobility during ADL/IADL/leisure tasks to Mod I using DME as needed w/ G balance/safety     Pt will participate in simulated IADL management task to increase independence to Mod I w/ G safety and endurance    Pt will demonstrate G carryover of pt/caregiver education and training as appropriate w/o cues w/ good tolerance to increase safety during functional tasks    Pt will maintain standing with Aydee for at least 5 minutes while completing a dynamic functional activity with good safety, balance and endurance      Mago Dumont, MOT, OTR/L

## 2019-12-20 NOTE — ASSESSMENT & PLAN NOTE
Present on admission, fever, tachycardia  Started on Augmentin after visiting urgent care 12/18 for sinusitis  Suspect 2/2 viral syndrome given sinusitis, vomiting, diarrhea, likely RSV positive tracheobronchitis with mild bronchospasm  CXR negative, CT a/p negative for infx etio   Normal procalcitonin  Lactic acid normal  Abx not indicated  F/u bl cx     Add prednisone 40mg x 5 days   Unlikely c-diff as not diarrhea since admission - d/c PCR and contact   Supportive care

## 2019-12-20 NOTE — ASSESSMENT & PLAN NOTE
O2 reportedly 87% on RA per jessica  Unclear if true hypoxia as patient not in respiratory distress or SOB, and fingers are very cold     CXR on admission normal, do not need to repeat at this time   Discontinue IVF   Flutter valve, incentive spirometry

## 2019-12-20 NOTE — ASSESSMENT & PLAN NOTE
Follows pulmonology in the outpatient setting  Hx tobacco abuse     Continue Spiriva   No acute exacerbation   Respiratory protocol

## 2019-12-20 NOTE — PLAN OF CARE
Problem: OCCUPATIONAL THERAPY ADULT  Goal: Performs self-care activities at highest level of function for planned discharge setting  See evaluation for individualized goals  Description  Treatment Interventions: ADL retraining, Functional transfer training, Endurance training, Patient/family training, Equipment evaluation/education, Compensatory technique education, Continued evaluation, Activityengagement          See flowsheet documentation for full assessment, interventions and recommendations  Note:   Limitation: Decreased ADL status, Decreased endurance, Decreased self-care trans, Decreased high-level ADLs  Prognosis: Fair  Assessment: Pt is an 80year old male seen for initial OT evaluation 12/20/19 s/p admission to [facility] with n/v and diarrhea  Pt dx'd with SIRs  Additional active problems include: pulmonary emphysema, hypothyroidism, HTN, esophageal reflux, CAD, benign prostatic hyperplasia, and PAF  Pt with active OT orders and cleared by SAUL Goodwin for OT evaluation and OOB mobility  Pt resides with his wife in a [facility] with [facility]  Pt reports that his wife assists as needed with ADLs, IADLs, and pt typically ambulates with use of rollator in the house and rw in the community  Pt is currently demonstrating the following occupational deficits: eating with set-up, grooming with set-up, UB bathing with set-up, LB bathing with modA, UB dressing with set-up, LB dressing with maxA, toileting with supervision, bed mobility with modA, functional transfers with modA, and functional mobility with modA  Factors currently limiting pt's independence in these areas include: generalized weakness, endurance, balance, functional mobility, and limited support at home (wife able to assist, but likely not able to provide significant physical assist)  From an OT standpoint, anticipate that pt will progress in order to d/c home with OP OT services  However, presently not safe to d/c to home with wife's assistance    Pt is to continue to benefit from skilled occupational therapy services while in the hospital to maximize functioning and independence with daily activities  See below for OT goals to be addressed 3-5x/wk       OT Discharge Recommendation: Outpatient OT  OT - OK to Discharge: (S) No(PENDING PROGRESS, NO TO HOME)

## 2019-12-21 VITALS
TEMPERATURE: 98.2 F | SYSTOLIC BLOOD PRESSURE: 106 MMHG | OXYGEN SATURATION: 94 % | RESPIRATION RATE: 16 BRPM | WEIGHT: 156.31 LBS | HEART RATE: 80 BPM | DIASTOLIC BLOOD PRESSURE: 60 MMHG | HEIGHT: 68 IN | BODY MASS INDEX: 23.69 KG/M2

## 2019-12-21 PROCEDURE — 94640 AIRWAY INHALATION TREATMENT: CPT

## 2019-12-21 PROCEDURE — 94760 N-INVAS EAR/PLS OXIMETRY 1: CPT

## 2019-12-21 PROCEDURE — 99239 HOSP IP/OBS DSCHRG MGMT >30: CPT | Performed by: PHYSICIAN ASSISTANT

## 2019-12-21 RX ORDER — PREDNISONE 20 MG/1
40 TABLET ORAL DAILY
Qty: 6 TABLET | Refills: 0 | Status: SHIPPED | OUTPATIENT
Start: 2019-12-22 | End: 2019-12-25

## 2019-12-21 RX ADMIN — GUAIFENESIN 1200 MG: 600 TABLET, EXTENDED RELEASE ORAL at 09:41

## 2019-12-21 RX ADMIN — PANTOPRAZOLE SODIUM 40 MG: 40 TABLET, DELAYED RELEASE ORAL at 06:12

## 2019-12-21 RX ADMIN — LEVALBUTEROL HYDROCHLORIDE 1.25 MG: 1.25 SOLUTION, CONCENTRATE RESPIRATORY (INHALATION) at 14:25

## 2019-12-21 RX ADMIN — PRAVASTATIN SODIUM 40 MG: 40 TABLET ORAL at 17:23

## 2019-12-21 RX ADMIN — HEPARIN SODIUM 5000 UNITS: 5000 INJECTION INTRAVENOUS; SUBCUTANEOUS at 06:14

## 2019-12-21 RX ADMIN — ISODIUM CHLORIDE 3 ML: 0.03 SOLUTION RESPIRATORY (INHALATION) at 08:55

## 2019-12-21 RX ADMIN — LEVOTHYROXINE SODIUM 75 MCG: 125 TABLET ORAL at 06:12

## 2019-12-21 RX ADMIN — TIOTROPIUM BROMIDE 18 MCG: 18 CAPSULE ORAL; RESPIRATORY (INHALATION) at 09:48

## 2019-12-21 RX ADMIN — PREDNISONE 40 MG: 20 TABLET ORAL at 09:40

## 2019-12-21 RX ADMIN — TAMSULOSIN HYDROCHLORIDE 0.4 MG: 0.4 CAPSULE ORAL at 17:23

## 2019-12-21 RX ADMIN — LEVALBUTEROL HYDROCHLORIDE 1.25 MG: 1.25 SOLUTION, CONCENTRATE RESPIRATORY (INHALATION) at 08:55

## 2019-12-21 RX ADMIN — CALCIUM CARBONATE (ANTACID) CHEW TAB 500 MG 1000 MG: 500 CHEW TAB at 09:45

## 2019-12-21 RX ADMIN — ISODIUM CHLORIDE 3 ML: 0.03 SOLUTION RESPIRATORY (INHALATION) at 14:25

## 2019-12-21 RX ADMIN — ASPIRIN 81 MG 81 MG: 81 TABLET ORAL at 09:41

## 2019-12-21 NOTE — DISCHARGE SUMMARY
Discharge- Sindi Monsivais 1933, 80 y o  male MRN: [id number]  Unit/Bed#: -01 Encounter: [id number] DOS: 12/21/19  Primary Care Provider: Tori Heath MD   Date and time admitted to hospital: 12/19/2019 10:10 AM    * SIRS (systemic inflammatory response syndrome) ([facility] )  Assessment & Plan  Present on admission, fever, tachycardia  Started on Augmentin after visiting urgent care 12/18 for sinusitis  Suspect 2/2 viral syndrome given sinusitis, vomiting, diarrhea, likely RSV positive tracheobronchitis with mild bronchospasm  CXR negative, CT a/p negative for infx etio   Normal procalcitonin  Lactic acid normal  Abx not indicated  Blood cx neg to date x2  Prednisone 40mg x 5 days   Unlikely c-diff as not diarrhea since admission - d/c PCR    Supportive care    Hypoxia  Assessment & Plan  O2 reportedly 87% on RA per jessica 12/20   Unclear if true hypoxia as patient not in respiratory distress or SOB, and fingers are very cold  CXR on admission normal   Flutter valve, incentive spirometry     Pulmonary emphysema [facility])  Assessment & Plan  Follows pulmonology in the outpatient setting  Hx tobacco abuse  Continue Spiriva   No acute exacerbation   Respiratory protocol    Hypothyroidism  Assessment & Plan  Continue Synthroid    Hypertension  Assessment & Plan  Now hypotensive, received 0 5mg dilaudid at 11, and 1mg at 1230 and has pinpoint pupils      Continue home meds on d/c    Esophageal reflux  Assessment & Plan  Continue GERD    Coronary artery disease  Assessment & Plan  Follows Outpatient Cardiology  Stress test September 2019 normal  Has known mild aortic stenosis  Continue beta-blocker, statin, asa     Benign prostatic hyperplasia  Assessment & Plan  Known to Outpatient urology  Continue alpha blocker    PAF (paroxysmal atrial fibrillation) [facility])  Assessment & Plan  Follows Outpatient Cardiology  Continue Lopressor, asa   Not on anticoagulation    Discharging Physician / Practitioner: Tori Villaseñor Loyd Reed PA-C  PCP: Gerardo Artis MD  Admission Date:   Admission Orders (From admission, onward)     Ordered        12/20/19 1214  Inpatient Admission  Once         12/19/19 1450  Place in Observation  Once                   Discharge Date: 12/21/19    Resolved Problems  Date Reviewed: 12/21/2019    None          Consultations During Hospital Stay:  · None     Procedures Performed:   · None     Significant Findings / Test Results:   · SIRS   · RSV positive tracheobronchitis   · CXR negative for cardiopulm disease   · Dehydration  · Hypotension 2/2 opioid   · bl cx negat 24 hours   · Normal lactic acid     Incidental Findings:   · None      Test Results Pending at Discharge (will require follow up): · None      Outpatient Tests Requested:  · Follow up with PCP as scheduled in Jan     Complications:  None     Reason for Admission: vomiting     Hospital Course:     Arnol Jefferson is a 80 y o  male patient with PMH significant for AS, BPH, CAD, GERD, hyperten, hypothyroidism, emphysema, PAD, who originally presented to the hospital on 12/19/2019 due to N/V/diarrhea  Patient reportedly was seen in urgent care for sinusitis and rx Augmentin  He took 1 dose and developed vomiting and diarrhea and presented to ER  He was found to be dehydrated and hypotensive after getting 1 5mg dilaudid for back pain and was admitted for further workup and IVF hydration  He was found to be RXV positive and pct was normal  He was noted to be hypoxic on room yung 12/20 on jessica however his fingers were cold  He was on room air at time of discharge  He was no SOB or in respiratory distress  He had slight wheezes and started on short burst of steroids  He is medically stable for d/c home with close outpatient follow up  He was recommended for outpatient therapy however he declined  Please see above list of diagnoses and related plan for additional information       Condition at Discharge: stable     Discharge Day Visit / Exam: Subjective:  tolerating reg diet  Cough improved, less sputum, easier to cough up  No fevers or chills  Feels well to go home  Vitals: Blood Pressure: 119/74 (12/21/19 0722)  Pulse: 80 (12/21/19 1425)  Temperature: 97 6 °F (36 4 °C) (12/21/19 0722)  Temp Source: Oral (12/20/19 1536)  Respirations: 19 (12/21/19 0722)  Height: 5' 8" (172 7 cm) (12/19/19 1900)  Weight - Scale: 70 9 kg (156 lb 4 9 oz) (12/19/19 1900)  SpO2: 94 % (12/21/19 1425)    Exam:   Physical Exam   Constitutional: He is oriented to person, place, and time  He appears well-developed and well-nourished  No distress  HENT:   Head: Normocephalic and atraumatic  Eyes: EOM are normal  No scleral icterus  Neck: Normal range of motion  Neck supple  Cardiovascular: Normal rate, regular rhythm and normal heart sounds  No murmur heard  Pulmonary/Chest: Effort normal and breath sounds normal    Abdominal: Soft  Bowel sounds are normal    Musculoskeletal: Normal range of motion  He exhibits no edema  Neurological: He is alert and oriented to person, place, and time  Skin: Skin is warm and dry  Psychiatric: He has a normal mood and affect  Discussion with Family: wife at bedside     Discharge instructions/Information to patient and family:   See after visit summary for information provided to patient and family  Provisions for Follow-Up Care:  See after visit summary for information related to follow-up care and any pertinent home health orders  Disposition:     Home    For Discharges to [facility] SNF:   · Not Applicable to this Patient - Not Applicable to this Patient    Planned Readmission: none      Discharge Statement:  I spent 45 minutes discharging the patient  This time was spent on the day of discharge  I had direct contact with the patient on the day of discharge   Greater than 50% of the total time was spent examining patient, answering all patient questions, arranging and discussing plan of care with patient as well as directly providing post-discharge instructions  Additional time then spent on discharge activities  Discharge Medications:  See after visit summary for reconciled discharge medications provided to patient and family        ** Please Note: This note has been constructed using a voice recognition system **

## 2019-12-21 NOTE — ASSESSMENT & PLAN NOTE
Now hypotensive, received 0 5mg dilaudid at 11, and 1mg at 1230 and has pinpoint pupils      Continue home meds on d/c

## 2019-12-21 NOTE — ASSESSMENT & PLAN NOTE
O2 reportedly 87% on RA per jessica 12/20   Unclear if true hypoxia as patient not in respiratory distress or SOB, and fingers are very cold     CXR on admission normal   Flutter valve, incentive spirometry

## 2019-12-21 NOTE — DISCHARGE INSTRUCTIONS
Acute Bronchitis   WHAT YOU SHOULD KNOW:   Acute bronchitis is swelling and irritation in the air passages of your lungs  This irritation may cause you to cough or have other breathing problems  Acute bronchitis often starts because of another viral illness, such as a cold or the flu  The illness spreads from your nose and throat to your windpipe and airways  Bronchitis is often called a chest cold  Acute bronchitis lasts about 2 weeks and is usually not a serious illness  CARE AGREEMENT:   You have the right to help plan your care  Learn about your health condition and how it may be treated  Discuss treatment options with your caregivers to decide what care you want to receive  You always have the right to refuse treatment  RISKS:   Your bronchitis may turn into a serious infection, such as pneumonia  The chance your bronchitis will become a serious illness is increased if you have other health problems  WHILE YOU ARE HERE:   Informed consent is a legal document that explains the tests, treatments, or procedures that you may need  Informed consent means you understand what will be done and can make decisions about what you want  You give your permission when you sign the consent form  You can have someone sign this form for you if you are not able to sign it  You have the right to understand your medical care in words you know  Before you sign the consent form, understand the risks and benefits of what will be done  Make sure all your questions are answered  An IV is a small tube placed in your vein that is used to give you medicine or liquids  You may need extra oxygen if your blood oxygen level is lower than it should be  You may get oxygen through a mask placed over your nose and mouth or through small tubes placed in your nostrils  Ask your healthcare provider before you take off the mask or oxygen tubing  Rest: Keep the head of your bed raised to help you breathe easier   You can also raise your head and shoulders up on pillows or rest in a reclining chair  If you feel short of breath, let caregivers know right away  Monitoring:   · Vital signs: Caregivers will check your blood pressure, heart rate, breathing rate, and temperature  They will also ask about your pain  These vital signs give caregivers information about your current health       · Pulse oximeter: A pulse oximeter is a device that measures the amount of oxygen in your blood  A cord with a clip or sticky strip is placed on your finger, ear, or toe  The other end of the cord is hooked to a machine  Never turn the pulse oximeter or alarm off  An alarm will sound if your oxygen level is low or cannot be read       · Heart monitor: This is also called an ECG or EKG  Sticky pads placed on your skin record your heart's electrical activity  Tests:   · Blood tests: You may need blood taken to give caregivers information about how your body is working  The blood may be taken from your hand, arm, or IV       · Blood gases: This is also called an arterial blood gas, or ABG  Blood is taken from an artery (blood vessel) in your wrist, arm, or groin  Your blood is tested for the amount of oxygen and carbon dioxide in it  The results can tell caregivers how well your lungs are working      · Chest x-ray: This is a picture of your lungs and heart  Caregivers use it to see how your lungs and heart are doing  Caregivers may use the x-ray to look for signs of infection like pneumonia, or to look for collapsed lungs  Chest x-rays may show tumors, broken ribs, or fluid around the heart and lungs       · Sputum sample: Sputum (mucus from your lungs) is collected in a cup when you cough  The sample is sent to a lab to be tested for the germ that is causing your illness  It can also help your caregiver choose the best medicine to treat the infection  Medicines:   · Ibuprofen or acetaminophen: Ibuprofen or acetaminophen are medicines that help decrease your fever  You do not need a doctor's order for these medicines      · Steroid medicine: Steroid medicine helps open your air passages so you can breathe easier      · Antiviral medicine: Antiviral medicine may be given to fight an infection caused by a virus  Treatments:   · Breathing treatments: You may need breathing treatments to help open your airways so you can breathe easier  A machine is used to change liquid medicine into a mist  You will breathe the mist into your lungs through tubing and a mouthpiece  Inhaled mist medicines act quickly on your airways and lungs to relieve your symptoms       · Deep breathing and coughing: Deep breathing helps open the air passages in your lungs  Coughing helps bring up sputum (mucus) from your lungs  To do this, take a deep breath and hold the breath in as long as you can  Then push the air out of your lungs with a deep, strong cough  Put any coughed-up sputum into a tissue and throw it in the trash  Take 10 deep breaths each hour that you are awake  Follow each deep breath with a cough       · Postural drainage (PD): This treatment uses body position and gravity to help bring up sputum (mucus) from your lungs  Your caregiver will place you in different positions to help the sputum drain to larger air passages  Then you can cough it out more easily  During postural drainage, your caregiver may also lightly clap on your back and chest with their hands, or use a small machine that vibrates on your skin  This breaks up the sputum in your lungs, making it easier to cough up  Postural drainage may make it easier for you to breathe, decrease the chance of infection, and help you get better faster       · Ventilator: If your bronchitis is severe, you may need a ventilator  This is a machine that can breathe for you if you cannot breathe well on your own  You may have an endotracheal tube (ET tube) in your mouth or nose   A tube called a trach may go into an incision in the front of your neck  The ET tube or trach is hooked to a ventilator  Oxygen can also be given to you by the ventilator  © 2014 [employer] is for End User's use only and may not be sold, redistributed or otherwise used for commercial purposes  All illustrations and images included in CareNotes® are the copyrighted property of [employer]  or Dmitriy Montana  The above information is an  only  It is not intended as medical advice for individual conditions or treatments   Talk to your doctor, nurse or pharmacist before following any medical regimen to see if it is safe and effective for you

## 2019-12-21 NOTE — ASSESSMENT & PLAN NOTE
Present on admission, fever, tachycardia  Started on Augmentin after visiting urgent care 12/18 for sinusitis  Suspect 2/2 viral syndrome given sinusitis, vomiting, diarrhea, likely RSV positive tracheobronchitis with mild bronchospasm  CXR negative, CT a/p negative for infx etio   Normal procalcitonin  Lactic acid normal  Abx not indicated     Blood cx neg to date x2  Prednisone 40mg x 5 days   Unlikely c-diff as not diarrhea since admission - d/c PCR    Supportive care

## 2019-12-23 ENCOUNTER — TELEPHONE (OUTPATIENT)
Dept: FAMILY MEDICINE CLINIC | Facility: CLINIC | Age: 84
End: 2019-12-23

## 2019-12-23 ENCOUNTER — TRANSITIONAL CARE MANAGEMENT (OUTPATIENT)
Dept: FAMILY MEDICINE CLINIC | Facility: CLINIC | Age: 84
End: 2019-12-23

## 2019-12-23 NOTE — TELEPHONE ENCOUNTER
Left message for patient or his wife to call back and schedule a TCM appointment  Please send call back to me

## 2019-12-24 LAB
BACTERIA BLD CULT: NORMAL
BACTERIA BLD CULT: NORMAL

## 2019-12-26 ENCOUNTER — OFFICE VISIT (OUTPATIENT)
Dept: FAMILY MEDICINE CLINIC | Facility: CLINIC | Age: 84
End: 2019-12-26
Payer: MEDICARE

## 2019-12-26 VITALS
HEIGHT: 68 IN | HEART RATE: 78 BPM | DIASTOLIC BLOOD PRESSURE: 60 MMHG | BODY MASS INDEX: 23.64 KG/M2 | TEMPERATURE: 97.2 F | WEIGHT: 156 LBS | SYSTOLIC BLOOD PRESSURE: 100 MMHG

## 2019-12-26 DIAGNOSIS — R73.01 IMPAIRED FASTING GLUCOSE: ICD-10-CM

## 2019-12-26 DIAGNOSIS — J06.9 ACUTE URI: ICD-10-CM

## 2019-12-26 DIAGNOSIS — I48.0 PAF (PAROXYSMAL ATRIAL FIBRILLATION) (HCC): ICD-10-CM

## 2019-12-26 DIAGNOSIS — I25.10 CORONARY ARTERY DISEASE INVOLVING NATIVE CORONARY ARTERY OF NATIVE HEART WITHOUT ANGINA PECTORIS: ICD-10-CM

## 2019-12-26 DIAGNOSIS — D69.6 THROMBOCYTOPENIA (HCC): ICD-10-CM

## 2019-12-26 DIAGNOSIS — I71.4 ABDOMINAL AORTIC ANEURYSM (AAA) WITHOUT RUPTURE (HCC): ICD-10-CM

## 2019-12-26 DIAGNOSIS — J43.9 PULMONARY EMPHYSEMA, UNSPECIFIED EMPHYSEMA TYPE (HCC): ICD-10-CM

## 2019-12-26 DIAGNOSIS — G20 PARKINSON'S DISEASE (HCC): ICD-10-CM

## 2019-12-26 DIAGNOSIS — I73.9 PERIPHERAL ARTERIAL DISEASE (HCC): ICD-10-CM

## 2019-12-26 DIAGNOSIS — R65.10 SIRS (SYSTEMIC INFLAMMATORY RESPONSE SYNDROME) (HCC): Primary | ICD-10-CM

## 2019-12-26 DIAGNOSIS — E03.9 ACQUIRED HYPOTHYROIDISM: ICD-10-CM

## 2019-12-26 DIAGNOSIS — I35.0 NONRHEUMATIC AORTIC VALVE STENOSIS: ICD-10-CM

## 2019-12-26 DIAGNOSIS — I10 ESSENTIAL HYPERTENSION: ICD-10-CM

## 2019-12-26 PROCEDURE — 99496 TRANSJ CARE MGMT HIGH F2F 7D: CPT | Performed by: FAMILY MEDICINE

## 2019-12-26 RX ORDER — AZITHROMYCIN 250 MG/1
TABLET, FILM COATED ORAL
Qty: 6 TABLET | Refills: 0 | Status: SHIPPED | OUTPATIENT
Start: 2019-12-26 | End: 2019-12-30
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
## 2019-12-30 NOTE — PHYSICIAN ADVISOR
Current patient class: Inpatient  The patient is currently on Hospital Day: 3 at [address] [place] [address]      The patient was admitted to the hospital at  on 12/20/19 for the following diagnosis:  Wheezing [R06 2]  Weakness [R53 1]  Chronic back pain [M54 9, G89 29]  Fever [R50 9]  Ambulatory dysfunction [R26 2]       There is documentation in the medical record of an expected length of stay of at least 2 midnights  The patient is therefore expected to satisfy the 2 midnight benchmark and given the 2 midnight presumption is appropriate for INPATIENT ADMISSION  Given this expectation of a satisfying stay, CMS instructs us that the patient is most often appropriate for inpatient admission under part A provided medical necessity is documented in the chart  After review of the relevant documentation, labs, vital signs and test results, the patient is appropriate for INPATIENT ADMISSION  Admission to the hospital as an inpatient is a complex decision making process which requires the practitioner to consider the patients presenting complaint, history and physical examination and all relevant testing  With this in mind, in this case, the patient was deemed appropriate for INPATIENT ADMISSION  After review of the documentation and testing available at the time of the admission I concur with this clinical determination of medical necessity  Rationale is as follows: The patient is a 80 yrs old Male who presented to the ED at 12/19/2019 10:10 AM with a chief complaint of Weakness - Generalized (Pt comes from home, not feeling well  Generalized weakness, too weak to walk  Pt diagnosed with sinusitis and just started augmentin last night  diarrhea yesterday  )  Patient also presented with nausea and vomiting  He was noted to have a temperature of a 101° F  Patient came in and had a sepsis workup as well as workup for C diff and the flu    He also did receive a 500 mL fluid bolus in the emergency department  Patient did come back RSV positive and was put on supportive treatment with scheduled nebulizer treatments  On day 2 of admission the patient was still having nausea and vomiting  Patient apparently had to require supplemental oxygen with his oxygen levels dropping to the mid 80s on room air  Given the need for further monitoring an 80year-old male patient came with nausea vomiting with fevers noted to have RSV in need of scheduled nebulizer treatments as well as being hypoxic he did cross the 2 midnight benchmark as set by Medicare and is therefore inpatient status appropriate  The patients vitals on arrival were ED Triage Vitals   Temperature Pulse Respirations Blood Pressure SpO2   12/19/19 1017 12/19/19 1017 12/19/19 1017 12/19/19 1017 12/19/19 1017   98 7 °F (37 1 °C) 94 18 111/61 96 %      Temp Source Heart Rate Source Patient Position - Orthostatic VS BP Location FiO2 (%)   12/19/19 1017 12/19/19 1017 12/19/19 1655 12/19/19 1655 --   Oral Monitor Lying Right arm       Pain Score       12/19/19 1017       8           Past Medical History:   Diagnosis Date    Anemia     Bl[facility] [address] [facility]     Carotid artery occlusion     Cataract     COPD (chronic obstructive pulmonary disease) (HCC)     Coronary artery disease     Hyperlipidemia     Hypertension     Hypothyroidism 9/15/2017    Multiple pulmonary nodules     Parkinson disease ([facility] [address] [facility]     Peptic ulcer     Scoliosis     Transient cerebral ischemia     Tremors of nervous system      Past Surgical History:   Procedure Laterality Date   [facility] SURGERY      1973, 1987, 2005    BUNIONECTOMY Left     Simple exostectomy (silver procedure)    CAROTID ENDARTERECTOMY Right 09/10/2008    [place] [id number] [place] MD    CATARACT EXTRACTION, BILATERAL      CERVICAL DISCECTOMY  1969    CORONARY ARTERY BYPASS GRAFT  10/20/2010    x3 (LIMA-LAD, SVG-OM, SVG-RCA)    CYSTOSCOPY      ELBOW SURGERY Right 07/2012    FEMORAL ARTERY - TIBIAL ARTERY BYPASS GRAFT  12/05/2011    using reversed saphenous vein from right leg  Nhi Sutton MD    REPLACEMENT TOTAL KNEE Left     SHOULDER SURGERY Right 1997           Consults have been placed to:   None    Vitals:    12/21/19 0722 12/21/19 0855 12/21/19 1425 12/21/19 1609   BP: 119/74   106/60   BP Location:       Pulse: 77 78 80    Resp: 19   16   Temp: 97 6 °F (36 4 °C)   98 2 °F (36 8 °C)   TempSrc:       SpO2: 94% 92% 94%    Weight:       Height:           Most recent labs:    No results for input(s): WBC, HGB, HCT, PLT, K, NA, CALCIUM, BUN, CREATININE, LIPASE, AMYLASE, INR, TROPONINI, CKTOTAL, AST, ALT, ALKPHOS, BILITOT in the last 72 hours  Scheduled Meds:  Continuous Infusions:  No current facility-administered medications for this encounter  PRN Meds:      Surgical procedures (if appropriate):

## 2020-01-03 DIAGNOSIS — E78.5 DYSLIPIDEMIA: ICD-10-CM

## 2020-01-03 RX ORDER — SIMVASTATIN 20 MG
20 TABLET ORAL
Qty: 90 TABLET | Refills: 1 | Status: SHIPPED | OUTPATIENT
Start: 2020-01-03 | End: 2020-07-05

## 2020-01-12 DIAGNOSIS — E03.9 ACQUIRED HYPOTHYROIDISM: ICD-10-CM

## 2020-01-12 RX ORDER — LEVOTHYROXINE SODIUM 0.07 MG/1
TABLET ORAL
Qty: 90 TABLET | Refills: 3 | Status: SHIPPED | OUTPATIENT
Start: 2020-01-12 | End: 2021-01-21

## 2020-01-13 ENCOUNTER — TELEPHONE (OUTPATIENT)
Dept: FAMILY MEDICINE CLINIC | Facility: CLINIC | Age: 85
End: 2020-01-13

## 2020-01-13 DIAGNOSIS — J06.9 ACUTE URI: Primary | ICD-10-CM

## 2020-01-13 RX ORDER — AMOXICILLIN AND CLAVULANATE POTASSIUM 875; 125 MG/1; MG/1
1 TABLET, FILM COATED ORAL
Qty: 14 TABLET | Refills: 0 | Status: SHIPPED | OUTPATIENT
Start: 2020-01-13 | End: 2020-01-20

## 2020-01-13 RX ORDER — BENZONATATE 200 MG/1
200 CAPSULE ORAL 3 TIMES DAILY PRN
Qty: 20 CAPSULE | Refills: 0 | Status: SHIPPED | OUTPATIENT
Start: 2020-01-13 | End: 2020-08-25 | Stop reason: ALTCHOICE

## 2020-01-13 NOTE — TELEPHONE ENCOUNTER
Call I sent in a different antibiotic and cough medication  F/u OV if no better in one week or if symptoms worsen   CXR 12/2019 normal

## 2020-01-13 NOTE — TELEPHONE ENCOUNTER
Patient's wife called with update to 12/26/19 visit  Patient finished zpack but still has a terrible productive cough  Wife noticed a little tinge of blood in mucous today  She wants to know if something else could be called in or should he be seen? She noted that he does have a cough normally from COPD but this is worse      Lele Walker [phone]

## 2020-01-14 DIAGNOSIS — J06.9 ACUTE URI: Primary | ICD-10-CM

## 2020-01-14 RX ORDER — DOXYCYCLINE HYCLATE 100 MG/1
100 CAPSULE ORAL
Qty: 14 CAPSULE | Refills: 0 | Status: SHIPPED | OUTPATIENT
Start: 2020-01-14 | End: 2020-01-21

## 2020-01-14 NOTE — TELEPHONE ENCOUNTER
Patient's wife called to report patient had an allergic reaction to AUGMENTIN in the past with vomitting & diarrhea  Please note in chart  Can a different antibiotic be ordered for patient?      [address]

## 2020-01-26 ENCOUNTER — APPOINTMENT (OUTPATIENT)
Dept: LAB | Age: 85
End: 2020-01-26
Payer: MEDICARE

## 2020-01-26 LAB
BASOPHILS # BLD AUTO: 0.02 THOUSANDS/ΜL (ref 0–0.1)
BASOPHILS NFR BLD AUTO: 0 % (ref 0–1)
EOSINOPHIL # BLD AUTO: 0.11 THOUSAND/ΜL (ref 0–0.61)
EOSINOPHIL NFR BLD AUTO: 2 % (ref 0–6)
ERYTHROCYTE [DISTWIDTH] IN BLOOD BY AUTOMATED COUNT: 12.5 % (ref 11.6–15.1)
FOLATE SERPL-MCNC: >20 NG/ML (ref 3.1–17.5)
HCT VFR BLD AUTO: 40.1 % (ref 36.5–49.3)
HGB BLD-MCNC: 13.2 G/DL (ref 12–17)
IMM GRANULOCYTES # BLD AUTO: 0.02 THOUSAND/UL (ref 0–0.2)
IMM GRANULOCYTES NFR BLD AUTO: 0 % (ref 0–2)
LYMPHOCYTES # BLD AUTO: 0.95 THOUSANDS/ΜL (ref 0.6–4.47)
LYMPHOCYTES NFR BLD AUTO: 15 % (ref 14–44)
MCH RBC QN AUTO: 32.3 PG (ref 26.8–34.3)
MCHC RBC AUTO-ENTMCNC: 32.9 G/DL (ref 31.4–37.4)
MCV RBC AUTO: 98 FL (ref 82–98)
MONOCYTES # BLD AUTO: 0.84 THOUSAND/ΜL (ref 0.17–1.22)
MONOCYTES NFR BLD AUTO: 13 % (ref 4–12)
NEUTROPHILS # BLD AUTO: 4.36 THOUSANDS/ΜL (ref 1.85–7.62)
NEUTS SEG NFR BLD AUTO: 70 % (ref 43–75)
NRBC BLD AUTO-RTO: 0 /100 WBCS
PLATELET # BLD AUTO: 196 THOUSANDS/UL (ref 149–390)
PMV BLD AUTO: 10.3 FL (ref 8.9–12.7)
RBC # BLD AUTO: 4.09 MILLION/UL (ref 3.88–5.62)
VIT B12 SERPL-MCNC: 780 PG/ML (ref 100–900)
WBC # BLD AUTO: 6.3 THOUSAND/UL (ref 4.31–10.16)

## 2020-01-26 PROCEDURE — 36415 COLL VENOUS BLD VENIPUNCTURE: CPT | Performed by: FAMILY MEDICINE

## 2020-01-26 PROCEDURE — 82607 VITAMIN B-12: CPT | Performed by: FAMILY MEDICINE

## 2020-01-26 PROCEDURE — 82746 ASSAY OF FOLIC ACID SERUM: CPT | Performed by: FAMILY MEDICINE

## 2020-01-26 PROCEDURE — 85025 COMPLETE CBC W/AUTO DIFF WBC: CPT | Performed by: FAMILY MEDICINE

## 2020-01-28 ENCOUNTER — TELEPHONE (OUTPATIENT)
Dept: FAMILY MEDICINE CLINIC | Facility: CLINIC | Age: 85
End: 2020-01-28

## 2020-01-28 NOTE — TELEPHONE ENCOUNTER
Call re labs  Repeat Hgb normal at 13 2  Additional testing including normal vitamin T18 and folic acid levels  No additional testing needed at this time       Lab Results   Component Value Date    WBC 6 30 01/26/2020    HGB 13 2 01/26/2020    HCT 40 1 01/26/2020    MCV 98 01/26/2020     01/26/2020

## 2020-02-11 DIAGNOSIS — I10 HTN (HYPERTENSION): ICD-10-CM

## 2020-02-12 RX ORDER — FUROSEMIDE 20 MG/1
TABLET ORAL
Qty: 90 TABLET | Refills: 1 | Status: SHIPPED | OUTPATIENT
Start: 2020-02-12 | End: 2020-08-10

## 2020-03-16 DIAGNOSIS — N40.1 BPH WITH OBSTRUCTION/LOWER URINARY TRACT SYMPTOMS: ICD-10-CM

## 2020-03-16 DIAGNOSIS — N13.8 BPH WITH OBSTRUCTION/LOWER URINARY TRACT SYMPTOMS: ICD-10-CM

## 2020-03-16 RX ORDER — ALFUZOSIN HYDROCHLORIDE 10 MG/1
TABLET, EXTENDED RELEASE ORAL
Qty: 90 TABLET | Refills: 3 | Status: SHIPPED | OUTPATIENT
Start: 2020-03-16 | End: 2021-02-08

## 2020-04-06 ENCOUNTER — TELEMEDICINE (OUTPATIENT)
Dept: CARDIOLOGY CLINIC | Facility: CLINIC | Age: 85
End: 2020-04-06
Payer: MEDICARE

## 2020-04-06 DIAGNOSIS — I10 ESSENTIAL HYPERTENSION: ICD-10-CM

## 2020-04-06 DIAGNOSIS — I73.9 PERIPHERAL ARTERIAL DISEASE (HCC): ICD-10-CM

## 2020-04-06 DIAGNOSIS — R07.89 CHEST PRESSURE: ICD-10-CM

## 2020-04-06 DIAGNOSIS — E78.2 MIXED HYPERLIPIDEMIA: ICD-10-CM

## 2020-04-06 DIAGNOSIS — I35.0 NONRHEUMATIC AORTIC VALVE STENOSIS: ICD-10-CM

## 2020-04-06 DIAGNOSIS — I48.0 PAF (PAROXYSMAL ATRIAL FIBRILLATION) (HCC): ICD-10-CM

## 2020-04-06 DIAGNOSIS — I25.10 CORONARY ARTERY DISEASE INVOLVING NATIVE CORONARY ARTERY OF NATIVE HEART WITHOUT ANGINA PECTORIS: Primary | ICD-10-CM

## 2020-04-06 PROCEDURE — 99213 OFFICE O/P EST LOW 20 MIN: CPT | Performed by: INTERNAL MEDICINE

## 2020-04-22 ENCOUNTER — TELEPHONE (OUTPATIENT)
Dept: PULMONOLOGY | Facility: CLINIC | Age: 85
End: 2020-04-22

## 2020-05-04 ENCOUNTER — TELEMEDICINE (OUTPATIENT)
Dept: PULMONOLOGY | Facility: CLINIC | Age: 85
End: 2020-05-04
Payer: MEDICARE

## 2020-05-04 VITALS
HEART RATE: 65 BPM | WEIGHT: 155 LBS | DIASTOLIC BLOOD PRESSURE: 68 MMHG | HEIGHT: 66 IN | BODY MASS INDEX: 24.91 KG/M2 | SYSTOLIC BLOOD PRESSURE: 116 MMHG

## 2020-05-04 DIAGNOSIS — J43.9 PULMONARY EMPHYSEMA, UNSPECIFIED EMPHYSEMA TYPE (HCC): Primary | ICD-10-CM

## 2020-05-04 PROCEDURE — 99213 OFFICE O/P EST LOW 20 MIN: CPT | Performed by: PHYSICIAN ASSISTANT

## 2020-05-20 DIAGNOSIS — I65.23 CAROTID STENOSIS, BILATERAL: Primary | ICD-10-CM

## 2020-05-20 DIAGNOSIS — I73.9 PERIPHERAL ARTERIAL DISEASE (HCC): Primary | ICD-10-CM

## 2020-05-26 ENCOUNTER — TELEPHONE (OUTPATIENT)
Dept: ADMINISTRATIVE | Facility: HOSPITAL | Age: 85
End: 2020-05-26

## 2020-06-12 ENCOUNTER — HOSPITAL ENCOUNTER (OUTPATIENT)
Dept: NON INVASIVE DIAGNOSTICS | Facility: CLINIC | Age: 85
Discharge: HOME | End: 2020-06-12
Payer: MEDICARE

## 2020-06-12 DIAGNOSIS — I65.23 CAROTID STENOSIS, BILATERAL: ICD-10-CM

## 2020-06-12 DIAGNOSIS — I73.9 PERIPHERAL ARTERIAL DISEASE (HCC): ICD-10-CM

## 2020-06-12 PROCEDURE — 93880 EXTRACRANIAL BILAT STUDY: CPT

## 2020-06-12 PROCEDURE — 93925 LOWER EXTREMITY STUDY: CPT | Performed by: SURGERY

## 2020-06-12 PROCEDURE — 93925 LOWER EXTREMITY STUDY: CPT

## 2020-06-12 PROCEDURE — 93923 UPR/LXTR ART STDY 3+ LVLS: CPT

## 2020-06-12 PROCEDURE — 93922 UPR/L XTREMITY ART 2 LEVELS: CPT | Performed by: SURGERY

## 2020-06-12 PROCEDURE — 93880 EXTRACRANIAL BILAT STUDY: CPT | Performed by: SURGERY

## 2020-06-14 DIAGNOSIS — J44.9 CHRONIC OBSTRUCTIVE PULMONARY DISEASE, UNSPECIFIED COPD TYPE (HCC): ICD-10-CM

## 2020-06-15 RX ORDER — TIOTROPIUM BROMIDE INHALATION SPRAY 3.12 UG/1
SPRAY, METERED RESPIRATORY (INHALATION)
Qty: 12 G | Refills: 6 | Status: SHIPPED | OUTPATIENT
Start: 2020-06-15 | End: 2021-07-15 | Stop reason: ALTCHOICE

## 2020-06-17 ENCOUNTER — HOSPITAL ENCOUNTER (OUTPATIENT)
Dept: RADIOLOGY | Age: 85
Discharge: HOME | End: 2020-06-17
Payer: MEDICARE

## 2020-06-17 DIAGNOSIS — I71.4 ABDOMINAL AORTIC ANEURYSM (AAA) WITHOUT RUPTURE (HCC): ICD-10-CM

## 2020-06-17 PROCEDURE — 76775 US EXAM ABDO BACK WALL LIM: CPT

## 2020-06-18 ENCOUNTER — TELEMEDICINE (OUTPATIENT)
Dept: VASCULAR SURGERY | Facility: CLINIC | Age: 85
End: 2020-06-18
Payer: MEDICARE

## 2020-06-18 DIAGNOSIS — I71.4 ABDOMINAL AORTIC ANEURYSM (AAA) WITHOUT RUPTURE (HCC): ICD-10-CM

## 2020-06-18 DIAGNOSIS — I73.9 PERIPHERAL ARTERIAL DISEASE (HCC): Primary | ICD-10-CM

## 2020-06-18 DIAGNOSIS — I65.23 CAROTID STENOSIS, BILATERAL: ICD-10-CM

## 2020-06-18 PROCEDURE — 99214 OFFICE O/P EST MOD 30 MIN: CPT | Performed by: NURSE PRACTITIONER

## 2020-07-05 DIAGNOSIS — E78.5 DYSLIPIDEMIA: ICD-10-CM

## 2020-07-05 RX ORDER — SIMVASTATIN 20 MG
TABLET ORAL
Qty: 90 TABLET | Refills: 1 | Status: SHIPPED | OUTPATIENT
Start: 2020-07-05 | End: 2021-01-21

## 2020-07-20 ENCOUNTER — HOSPITAL ENCOUNTER (OUTPATIENT)
Dept: NON INVASIVE DIAGNOSTICS | Facility: CLINIC | Age: 85
Discharge: HOME | End: 2020-07-20
Payer: MEDICARE

## 2020-07-20 DIAGNOSIS — I35.0 NONRHEUMATIC AORTIC VALVE STENOSIS: ICD-10-CM

## 2020-07-20 PROCEDURE — 93306 TTE W/DOPPLER COMPLETE: CPT | Performed by: INTERNAL MEDICINE

## 2020-07-20 PROCEDURE — 93306 TTE W/DOPPLER COMPLETE: CPT

## 2020-08-10 DIAGNOSIS — I10 HTN (HYPERTENSION): ICD-10-CM

## 2020-08-10 RX ORDER — FUROSEMIDE 20 MG/1
TABLET ORAL
Qty: 90 TABLET | Refills: 1 | Status: ON HOLD | OUTPATIENT
Start: 2020-08-10 | End: 2021-04-18 | Stop reason: SDUPTHER

## 2020-08-12 ENCOUNTER — OFFICE VISIT (OUTPATIENT)
Dept: CARDIOLOGY CLINIC | Facility: CLINIC | Age: 85
End: 2020-08-12
Payer: MEDICARE

## 2020-08-12 VITALS
DIASTOLIC BLOOD PRESSURE: 74 MMHG | HEIGHT: 66 IN | TEMPERATURE: 98.2 F | HEART RATE: 77 BPM | WEIGHT: 152 LBS | SYSTOLIC BLOOD PRESSURE: 112 MMHG | BODY MASS INDEX: 24.43 KG/M2

## 2020-08-12 DIAGNOSIS — I25.10 CORONARY ARTERY DISEASE INVOLVING NATIVE CORONARY ARTERY OF NATIVE HEART WITHOUT ANGINA PECTORIS: ICD-10-CM

## 2020-08-12 DIAGNOSIS — I48.0 PAF (PAROXYSMAL ATRIAL FIBRILLATION) (HCC): Primary | ICD-10-CM

## 2020-08-12 DIAGNOSIS — I73.9 PERIPHERAL ARTERIAL DISEASE (HCC): ICD-10-CM

## 2020-08-12 DIAGNOSIS — E78.2 MIXED HYPERLIPIDEMIA: ICD-10-CM

## 2020-08-12 DIAGNOSIS — I35.0 NONRHEUMATIC AORTIC VALVE STENOSIS: ICD-10-CM

## 2020-08-12 DIAGNOSIS — I71.4 ABDOMINAL AORTIC ANEURYSM (AAA) WITHOUT RUPTURE (HCC): ICD-10-CM

## 2020-08-12 PROCEDURE — 3008F BODY MASS INDEX DOCD: CPT | Performed by: INTERNAL MEDICINE

## 2020-08-12 PROCEDURE — 1036F TOBACCO NON-USER: CPT | Performed by: INTERNAL MEDICINE

## 2020-08-12 PROCEDURE — 93000 ELECTROCARDIOGRAM COMPLETE: CPT | Performed by: INTERNAL MEDICINE

## 2020-08-12 PROCEDURE — 3074F SYST BP LT 130 MM HG: CPT | Performed by: INTERNAL MEDICINE

## 2020-08-12 PROCEDURE — 3078F DIAST BP <80 MM HG: CPT | Performed by: INTERNAL MEDICINE

## 2020-08-12 PROCEDURE — 1160F RVW MEDS BY RX/DR IN RCRD: CPT | Performed by: INTERNAL MEDICINE

## 2020-08-12 PROCEDURE — 99214 OFFICE O/P EST MOD 30 MIN: CPT | Performed by: INTERNAL MEDICINE

## 2020-08-12 PROCEDURE — 4040F PNEUMOC VAC/ADMIN/RCVD: CPT | Performed by: INTERNAL MEDICINE

## 2020-08-12 NOTE — PATIENT INSTRUCTIONS
Recommendations:  1  Continue current medications    2  Refer to Cardiac Surgery for TAVR evaluation     3  Follow up in 3 months

## 2020-08-12 NOTE — PROGRESS NOTES
Cardiology   Cale Sanon 80 y o  male MRN: [id number]        Impression:  1  Coronary artery disease - stable    2  Paroxysmal atrial fibrillation - in normal sinus rhythm    3  Vasovagal syndrome - stable    4  Dyslipidemia - on statin    5  Aortic stenosis - severe  Concerning this is etiology of chest pain     6  Peripheral arterial disease - stable    7  Dyspnea - stable      Recommendations:  1  Continue current medications    2  Refer to Cardiac Surgery for TAVR evaluation  3  Follow up in 3 months          HPI: Cale Sanon is a 80y o  year old male with coronary artery disease, mild aortic stenosis, and paroxysmal atrial fibrillation returns for follow up  Does get dyspneic on exertion  Has been having episodes of chest pain and lightheadedness  Lasts 5 minutes  Took SL NTG with improvement of symptoms  Review of Systems   Constitutional: Negative  HENT: Negative  Eyes: Negative  Respiratory: Negative for shortness of breath  Cardiovascular: Positive for chest pain  Negative for palpitations and leg swelling  Gastrointestinal: Negative  Endocrine: Negative  Genitourinary: Negative  Musculoskeletal: Positive for gait problem  Skin: Negative  Allergic/Immunologic: Negative  Hematological: Negative  Psychiatric/Behavioral: Negative  All other systems reviewed and are negative          Past Medical History:   Diagnosis Date    Anemia     Bladder [facility] ([facility] [address] [facility]     Carotid artery occlusion     Cataract     [facility]     Coronary artery disease     Hyperlipidemia     Hypertension     Hypothyroidism 9/15/2017    Multiple pulmonary nodules     Parkinson disease ([address]     Peptic ulcer     Scoliosis     Transient cerebral ischemia     Tremors of nervous system      Past Surgical History:   Procedure Laterality Date   [facility] SURGERY      1973, 1987, 2005    BUNIONECTOMY Left     Simple exostectomy (silver procedure)    CAROTID ENDARTERECTOMY Right 09/10/2008    Randy LEDBETTER MD    CATARACT EXTRACTION, BILATERAL      CERVICAL DISCECTOMY  1969    CORONARY ARTERY BYPASS GRAFT  10/20/2010    x3 (LIMA-LAD, SVG-OM, SVG-RCA)    CYSTOSCOPY      ELBOW SURGERY Right 2012    FEMORAL ARTERY - TIBIAL ARTERY BYPASS GRAFT  2011    using reversed saphenous vein from right leg  [address] MD    REPLACEMENT TOTAL KNEE Left     SHOULDER SURGERY Right      Social History     Substance and Sexual Activity   Alcohol Use Never    Frequency: Patient refused    Drinks per session: Patient refused    Binge frequency: Patient refused    Comment: Social      Social History     Substance and Sexual Activity   Drug Use Never     Social History     Tobacco Use   Smoking Status Former Smoker    Packs/day: 1 00    Years: 50 00    Pack years: 50 00    Types: Cigarettes    Start date:     Last attempt to quit: [place] Years since quittin 6   Smokeless Tobacco Never Used     Family History   Problem Relation Age of Onset    Cervical cancer Mother     Cervical cancer Sister     Heart disease Sister     Coronary artery disease Sister     Lung cancer Brother        Allergies: Allergies   Allergen Reactions    Aleve [Naproxen]      Other reaction(s): FACIAL SWELLING  Category: Allergy;  Annotation - [id number]: lip/eye edema       Medications:     Current Outpatient Medications:     alfuzosin (UROXATRAL) 10 mg 24 hr tablet, TAKE 1 TABLET(10 MG) BY MOUTH DAILY, Disp: 90 tablet, Rfl: 3    aspirin 81 MG tablet, Take 81 mg by mouth, Disp: , Rfl:     Cholecalciferol (HM VITAMIN D3) 2000 units CAPS, Take 1 tablet by mouth daily, Disp: , Rfl:     furosemide (LASIX) 20 mg tablet, TAKE 1 TABLET(20 MG) BY MOUTH DAILY, Disp: 90 tablet, Rfl: 1    hydrocortisone 2 5 % cream, RENA TO POSTERIOR EARS AND ARMS ONCE A DAY PRN FOR 1-2 WEEKS, Disp: , Rfl: 1    levothyroxine 75 mcg tablet, TAKE 1 TABLET(75 MCG) BY MOUTH DAILY, Disp: 90 tablet, Rfl: 3    metoprolol tartrate (LOPRESSOR) 25 mg tablet, Take 0 5 tablets (12 5 mg total) by mouth every 12 (twelve) hours, Disp: 90 tablet, Rfl: 3    Multiple Vitamins-Minerals (MULTIVITAL-M) TABS, Take 1 tablet by mouth daily, Disp: , Rfl:     nitroglycerin (NITROSTAT) 0 4 mg SL tablet, Place 1 tablet (0 4 mg total) under the tongue every 5 (five) minutes as needed for chest pain, Disp: 30 tablet, Rfl: 3    omeprazole (PriLOSEC) 20 mg delayed release capsule, Take 1 tablet by mouth daily, Disp: , Rfl:     simvastatin (ZOCOR) 20 mg tablet, TAKE 1 TABLET(20 MG) BY MOUTH DAILY AT BEDTIME, Disp: 90 tablet, Rfl: 1    SPIRIVA RESPIMAT 2 5 MCG/ACT AERS inhaler, INHALE 2 PUFFS BY MOUTH DAILY, Disp: 12 g, Rfl: 6    benzonatate (TESSALON) 200 MG capsule, Take 1 capsule (200 mg total) by mouth 3 (three) times a day as needed for cough (Patient not taking: Reported on 5/4/2020), Disp: 20 capsule, Rfl: 0      Wt Readings from Last 3 Encounters:   08/12/20 68 9 kg (152 lb)   05/04/20 70 3 kg (155 lb)   12/26/19 70 8 kg (156 lb)     Temp Readings from Last 3 Encounters:   08/12/20 98 2 °F (36 8 °C)   12/26/19 (!) 97 2 °F (36 2 °C)   12/21/19 98 2 °F (36 8 °C)     BP Readings from Last 3 Encounters:   08/12/20 112/74   05/04/20 116/68   12/26/19 100/60     Pulse Readings from Last 3 Encounters:   08/12/20 77   05/04/20 65   12/26/19 78         Physical Exam  HENT:      Head: Atraumatic  Mouth/Throat:      Mouth: Mucous membranes are moist    Eyes:      Extraocular Movements: Extraocular movements intact  Neck:      Musculoskeletal: Normal range of motion  Cardiovascular:      Rate and Rhythm: Normal rate and regular rhythm  Heart sounds: Murmur present  Systolic murmur present with a grade of 2/6  Comments: Trace B LE edema  Pulmonary:      Effort: Pulmonary effort is normal       Breath sounds: Normal breath sounds     Abdominal:      General: Abdomen is flat    Musculoskeletal: Normal range of motion  Skin:     General: Skin is warm  Neurological:      General: No focal deficit present  Mental Status: He is alert and oriented to person, place, and time  Psychiatric:         Mood and Affect: Mood normal            Laboratory Studies:  CMP:  Lab Results   Component Value Date     2015    K 3 8 2019     2019    CO2 26 2019    ANIONGAP 7 2015    BUN 14 2019    CREATININE 0 88 2019    GLUCOSE 95 2015    AST 19 2019    ALT 24 2019    BILITOT 0 47 09/15/2015    EGFR 78 2019       Lipid Profile:   Lab Results   Component Value Date    CHOL 121 2015     Lab Results   Component Value Date    HDL 39 (L) 2019     Lab Results   Component Value Date    LDLCALC 38 2019     Lab Results   Component Value Date    TRIG 67 2019       Cardiac testing:   EKG reviewed personally: NSR 77 1st deg AV block RBBB  Results for orders placed during the hospital encounter of 20   Echo complete with contrast if indicated    Narrative [facility] [address] [place] [address]  [phone]    Transthoracic Echocardiogram  2D, M-mode, Doppler, and Color Doppler    Study date:  2020    Patient: Archie Edwards  MR number: [id number]  Account number: [de-identified]  : 1933  Age: 80 years  Gender: Male  Status: Outpatient  Location: [address] [place] [address] [facility] [place]  Height: 66 in  Weight: 154 7 lb  BP: 116/ 68 mmHg    Indications:  Aortic valve stenosis    Diagnoses: I35 0 - Nonrheumatic aortic (valve) stenosis    Sonographer:  Breann Mar BS, RDCS, RVT, RDMS  Interpreting Physician:  Clara Stewart MD  Primary Physician:  Marina Golden MD  Referring Physician:  Will Mckeon MD  Group:  [facility]  Cardiology Fellow:  Rodney Judge DO    SUMMARY    COMPARISONS:  Aortic stenosis has worsened  LEFT VENTRICLE:  Systolic function was normal  Ejection fraction was estimated to be 55 %  There were no regional wall motion abnormalities  Wall thickness was mildly increased  There was mild concentric hypertrophy  Left ventricular diastolic function parameters were abnormal     RIGHT VENTRICLE:  The ventricle was mildly dilated  RIGHT ATRIUM:  The atrium was mildly dilated  MITRAL VALVE:  There was mild annular calcification  There was mild regurgitation  AORTIC VALVE:  The valve was probably trileaflet  Leaflets exhibited mildly to moderately increased thickness, moderate calcification, and moderately reduced cuspal separation  There was severe stenosis  There was at least mild regurgitation  Valve mean gradient was 20 mmHg  The aortic valve obstructive index (by VTI) was 0 22  Estimated aortic valve area (by VTI) was 0 84 cmï¾²  TRICUSPID VALVE:  There was mild to moderate regurgitation  Estimated peak PA pressure was 53 mmHg  The findings suggest moderate pulmonary hypertension  PULMONIC VALVE:  There was mild to moderate regurgitation  COMPARISONS:  Comparison was made with the previous study of 16-Sep-2015  Aortic stenosis has worsened  HISTORY: PRIOR HISTORY: CAD, CABG, PAF, RBBB, AAA, TIA, PAD, AS, former smoker, pulmonary emphysema, Parkinson's disease  PROCEDURE: The study was performed in the [address] [facility] [place]  This was a routine study  The transthoracic approach was used  The study included complete 2D imaging, M-mode, complete spectral Doppler, and color Doppler  The  heart rate was 65 bpm, at the start of the study  Images were obtained from the parasternal, apical, subcostal, and suprasternal notch acoustic windows  Image quality was adequate  LEFT VENTRICLE: Size was normal  Systolic function was normal  Ejection fraction was estimated to be 55 %  There were no regional wall motion abnormalities   Wall thickness was mildly increased  There was mild concentric hypertrophy  DOPPLER: Left ventricular diastolic function parameters were abnormal     RIGHT VENTRICLE: The ventricle was mildly dilated  Systolic function was normal  Wall thickness was normal     LEFT ATRIUM: Size was at the upper limits of normal     RIGHT ATRIUM: The atrium was mildly dilated  MITRAL VALVE: There was mild annular calcification  There was normal leaflet separation  DOPPLER: The transmitral velocity was within the normal range  There was no evidence for stenosis  There was mild regurgitation  AORTIC VALVE: The valve was probably trileaflet  Leaflets exhibited mildly to moderately increased thickness, moderate calcification, and moderately reduced cuspal separation  DOPPLER: There was severe stenosis  There was at least mild  regurgitation  TRICUSPID VALVE: The valve structure was normal  There was normal leaflet separation  DOPPLER: The transtricuspid velocity was within the normal range  There was no evidence for stenosis  There was mild to moderate regurgitation  Estimated  peak PA pressure was 53 mmHg  The findings suggest moderate pulmonary hypertension  PULMONIC VALVE: Leaflets exhibited normal thickness, no calcification, and normal cuspal separation  DOPPLER: The transpulmonic velocity was within the normal range  There was mild to moderate regurgitation  PERICARDIUM: There was no pericardial effusion  AORTA: The root exhibited normal size      MEASUREMENT TABLES    2D MEASUREMENTS  LVOT   (Reference normals)  Diam   22 mm   (--)    DOPPLER MEASUREMENTS  LVOT   (Reference normals)  Peak thai   75 cm/s   (--)  Mean thai   46 cm/s   (--)  VTI   18 cm   (--)  Peak gradient   2 mmHg   (--)  Mean gradient   1 mmHg   (--)  Stroke vol   68 42 ml   (--)  Aortic valve   (Reference normals)  Peak thai   299 cm/s   (--)  Mean thai   211 cm/s   (--)  VTI   82 cm   (--)  Peak gradient   36 mmHg   (--)  Mean gradient   20 mmHg   (--)  Obstr index, VTI 0 22    (--)  Valve area, VTI   0 84 cmï¾²   (--)  Obstr index, Vmax   0 25    (--)  Valve area, Vmax   0 95 cmï¾²   (--)  Obstr index, Vmean   0 22    (--)  Valve area, Vmean   0 84 cmï¾²   (--)    SYSTEM MEASUREMENT TABLES    2D  %FS: 39 56 %  Ao Diam: 3 5 cm  EDV(Teich): 72 19 ml  EF(Cube): 77 92 %  EF(Teich): 70 63 %  ESV(Cube): 14 69 ml  ESV(Teich): 21 2 ml  IVSd: 1 12 cm  LA Area: 18 06 cm2  LA Diam: 4 14 cm  LVEDV MOD A4C: 89 36 ml  LVEF MOD A4C: 63 75 %  LVESV MOD A4C: 32 39 ml  LVIDd: 4 05 cm  LVIDs: 2 45 cm  LVLd A4C: 8 38 cm  LVLs A4C: 7 55 cm  LVOT Diam: 2 18 cm  LVPWd: 1 02 cm  RA Area: 21 55 cm2  RV Diam : 4 44 cm  SI(Cube): 28 81 ml/m2  SI(Teich): 28 32 ml/m2  SV MOD A4C: 56 97 ml  SV(Cube): 51 85 ml  SV(Teich): 50 98 ml    CW  AV Env  Ti: 388 17 ms  AV VTI: 81 97 cm  AV Vmax: 2 99 m/s  AV Vmean: 2 11 m/s  AV maxP 78 mmHg  AV meanP 95 mmHg  PRend P 59 mmHg  PRend Vmax: 1 47 m/s  TR Vmax: 3 27 m/s  TR maxP 86 mmHg    MM  TAPSE: 1 47 cm    PW  OSMAR (VTI): 0 8 cm2  OSMAR Vmax: 0 93 cm2  E': 0 07 m/s  E/E': 7 63  LVOT Env  Ti: 379 85 ms  LVOT VTI: 17 56 cm  LVOT Vmax: 0 75 m/s  LVOT Vmean: 0 46 m/s  LVOT maxP 22 mmHg  LVOT meanP 03 mmHg  LVSI Dopp: 36 35 ml/m2  LVSV Dopp: 65 42 ml  MV A [place]: 0 77 m/s  MV Dec Yancey: 1 48 m/s2  MV DecT: 379 83 ms  MV E [place]: 0 56 m/s  MV E/A Ratio: 0 73    [facility] Accredited Echocardiography Laboratory    Prepared and electronically signed by    Marcia Soulier, MD  Signed 2020 15:04:35       No results found for this or any previous visit  No results found for this or any previous visit  No results found for this or any previous visit

## 2020-08-25 ENCOUNTER — OFFICE VISIT (OUTPATIENT)
Dept: FAMILY MEDICINE CLINIC | Facility: CLINIC | Age: 85
End: 2020-08-25
Payer: MEDICARE

## 2020-08-25 VITALS
DIASTOLIC BLOOD PRESSURE: 78 MMHG | TEMPERATURE: 96.9 F | HEART RATE: 72 BPM | BODY MASS INDEX: 23.54 KG/M2 | HEIGHT: 67 IN | WEIGHT: 150 LBS | RESPIRATION RATE: 17 BRPM | SYSTOLIC BLOOD PRESSURE: 118 MMHG | OXYGEN SATURATION: 97 %

## 2020-08-25 DIAGNOSIS — K21.9 GASTROESOPHAGEAL REFLUX DISEASE WITHOUT ESOPHAGITIS: ICD-10-CM

## 2020-08-25 DIAGNOSIS — I72.3 ILIAC ARTERY ANEURYSM, BILATERAL (HCC): ICD-10-CM

## 2020-08-25 DIAGNOSIS — I73.9 PERIPHERAL ARTERIAL DISEASE (HCC): ICD-10-CM

## 2020-08-25 DIAGNOSIS — I25.10 CORONARY ARTERY DISEASE INVOLVING NATIVE CORONARY ARTERY OF NATIVE HEART WITHOUT ANGINA PECTORIS: ICD-10-CM

## 2020-08-25 DIAGNOSIS — J43.9 PULMONARY EMPHYSEMA, UNSPECIFIED EMPHYSEMA TYPE (HCC): ICD-10-CM

## 2020-08-25 DIAGNOSIS — I48.0 PAF (PAROXYSMAL ATRIAL FIBRILLATION) (HCC): ICD-10-CM

## 2020-08-25 DIAGNOSIS — E03.9 ACQUIRED HYPOTHYROIDISM: ICD-10-CM

## 2020-08-25 DIAGNOSIS — G20 PARKINSON'S DISEASE (HCC): ICD-10-CM

## 2020-08-25 DIAGNOSIS — R73.01 IMPAIRED FASTING GLUCOSE: ICD-10-CM

## 2020-08-25 DIAGNOSIS — I35.0 NONRHEUMATIC AORTIC VALVE STENOSIS: ICD-10-CM

## 2020-08-25 DIAGNOSIS — E78.2 MIXED HYPERLIPIDEMIA: Primary | ICD-10-CM

## 2020-08-25 DIAGNOSIS — I65.23 CAROTID STENOSIS, BILATERAL: ICD-10-CM

## 2020-08-25 DIAGNOSIS — I71.4 ABDOMINAL AORTIC ANEURYSM (AAA) WITHOUT RUPTURE (HCC): ICD-10-CM

## 2020-08-25 PROBLEM — D69.6 THROMBOCYTOPENIA (HCC): Status: ACTIVE | Noted: 2020-08-25

## 2020-08-25 PROBLEM — M11.80 ARTHRITIS DUE TO PYROPHOSPHATE CRYSTAL DEPOSITION: Status: ACTIVE | Noted: 2020-08-25

## 2020-08-25 PROBLEM — I65.29 STENOSIS OF CAROTID ARTERY: Status: ACTIVE | Noted: 2020-08-25

## 2020-08-25 PROBLEM — R65.10 SIRS (SYSTEMIC INFLAMMATORY RESPONSE SYNDROME) (HCC): Status: RESOLVED | Noted: 2019-12-19 | Resolved: 2020-08-25

## 2020-08-25 PROBLEM — R09.02 HYPOXIA: Status: RESOLVED | Noted: 2019-12-20 | Resolved: 2020-08-25

## 2020-08-25 PROBLEM — D69.6 THROMBOCYTOPENIA (HCC): Status: RESOLVED | Noted: 2020-08-25 | Resolved: 2020-08-25

## 2020-08-25 PROCEDURE — 3008F BODY MASS INDEX DOCD: CPT | Performed by: FAMILY MEDICINE

## 2020-08-25 PROCEDURE — 3074F SYST BP LT 130 MM HG: CPT | Performed by: FAMILY MEDICINE

## 2020-08-25 PROCEDURE — 3078F DIAST BP <80 MM HG: CPT | Performed by: FAMILY MEDICINE

## 2020-08-25 PROCEDURE — 99214 OFFICE O/P EST MOD 30 MIN: CPT | Performed by: FAMILY MEDICINE

## 2020-08-25 PROCEDURE — 4040F PNEUMOC VAC/ADMIN/RCVD: CPT | Performed by: FAMILY MEDICINE

## 2020-08-25 PROCEDURE — 1036F TOBACCO NON-USER: CPT | Performed by: FAMILY MEDICINE

## 2020-08-25 PROCEDURE — 1160F RVW MEDS BY RX/DR IN RCRD: CPT | Performed by: FAMILY MEDICINE
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
## 2020-08-26 ENCOUNTER — OFFICE VISIT (OUTPATIENT)
Dept: CARDIAC SURGERY | Facility: CLINIC | Age: 85
End: 2020-08-26
Payer: MEDICARE

## 2020-08-26 VITALS
HEIGHT: 66 IN | DIASTOLIC BLOOD PRESSURE: 70 MMHG | HEART RATE: 80 BPM | TEMPERATURE: 97.9 F | RESPIRATION RATE: 18 BRPM | BODY MASS INDEX: 23.95 KG/M2 | SYSTOLIC BLOOD PRESSURE: 110 MMHG | WEIGHT: 149 LBS

## 2020-08-26 DIAGNOSIS — Z01.810 PRE-OPERATIVE CARDIOVASCULAR EXAMINATION: ICD-10-CM

## 2020-08-26 DIAGNOSIS — I35.0 SEVERE AORTIC STENOSIS: Primary | ICD-10-CM

## 2020-08-26 DIAGNOSIS — Z01.818 PREOP TESTING: ICD-10-CM

## 2020-08-26 PROCEDURE — 99204 OFFICE O/P NEW MOD 45 MIN: CPT | Performed by: NURSE PRACTITIONER
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
## 2020-08-28 ENCOUNTER — TELEPHONE (OUTPATIENT)
Dept: FAMILY MEDICINE CLINIC | Facility: CLINIC | Age: 85
End: 2020-08-28

## 2020-08-28 ENCOUNTER — TELEPHONE (OUTPATIENT)
Dept: CARDIOLOGY CLINIC | Facility: CLINIC | Age: 85
End: 2020-08-28

## 2020-08-28 ENCOUNTER — APPOINTMENT (OUTPATIENT)
Dept: LAB | Age: 85
End: 2020-08-28
Payer: MEDICARE

## 2020-08-28 DIAGNOSIS — I35.0 SEVERE AORTIC STENOSIS: ICD-10-CM

## 2020-08-28 DIAGNOSIS — Z01.818 PREOP TESTING: ICD-10-CM

## 2020-08-28 DIAGNOSIS — Z01.810 PRE-OPERATIVE CARDIOVASCULAR EXAMINATION: ICD-10-CM

## 2020-08-28 LAB
ALBUMIN SERPL BCP-MCNC: 3.9 G/DL (ref 3.5–5)
ALP SERPL-CCNC: 101 U/L (ref 46–116)
ALT SERPL W P-5'-P-CCNC: 19 U/L (ref 12–78)
ANION GAP SERPL CALCULATED.3IONS-SCNC: 3 MMOL/L (ref 4–13)
AST SERPL W P-5'-P-CCNC: 14 U/L (ref 5–45)
BILIRUB SERPL-MCNC: 0.59 MG/DL (ref 0.2–1)
BUN SERPL-MCNC: 17 MG/DL (ref 5–25)
CALCIUM SERPL-MCNC: 8.9 MG/DL (ref 8.3–10.1)
CHLORIDE SERPL-SCNC: 104 MMOL/L (ref 100–108)
CHOLEST SERPL-MCNC: 87 MG/DL (ref 50–200)
CO2 SERPL-SCNC: 28 MMOL/L (ref 21–32)
CREAT SERPL-MCNC: 1.05 MG/DL (ref 0.6–1.3)
ERYTHROCYTE [DISTWIDTH] IN BLOOD BY AUTOMATED COUNT: 12.1 % (ref 11.6–15.1)
GFR SERPL CREATININE-BSD FRML MDRD: 64 ML/MIN/1.73SQ M
GLUCOSE P FAST SERPL-MCNC: 99 MG/DL (ref 65–99)
HCT VFR BLD AUTO: 41.9 % (ref 36.5–49.3)
HDLC SERPL-MCNC: 42 MG/DL
HGB BLD-MCNC: 13.9 G/DL (ref 12–17)
LDLC SERPL CALC-MCNC: 29 MG/DL (ref 0–100)
MCH RBC QN AUTO: 32.1 PG (ref 26.8–34.3)
MCHC RBC AUTO-ENTMCNC: 33.2 G/DL (ref 31.4–37.4)
MCV RBC AUTO: 97 FL (ref 82–98)
NONHDLC SERPL-MCNC: 45 MG/DL
PLATELET # BLD AUTO: 149 THOUSANDS/UL (ref 149–390)
PMV BLD AUTO: 10.4 FL (ref 8.9–12.7)
POTASSIUM SERPL-SCNC: 4.3 MMOL/L (ref 3.5–5.3)
PROT SERPL-MCNC: 7.3 G/DL (ref 6.4–8.2)
RBC # BLD AUTO: 4.33 MILLION/UL (ref 3.88–5.62)
SODIUM SERPL-SCNC: 135 MMOL/L (ref 136–145)
T4 FREE SERPL-MCNC: 1.12 NG/DL (ref 0.76–1.46)
TRIGL SERPL-MCNC: 81 MG/DL
TSH SERPL DL<=0.05 MIU/L-ACNC: 4.74 UIU/ML (ref 0.36–3.74)
WBC # BLD AUTO: 5.13 THOUSAND/UL (ref 4.31–10.16)

## 2020-08-28 PROCEDURE — 80053 COMPREHEN METABOLIC PANEL: CPT

## 2020-08-28 PROCEDURE — 85027 COMPLETE CBC AUTOMATED: CPT

## 2020-08-28 PROCEDURE — 80061 LIPID PANEL: CPT | Performed by: FAMILY MEDICINE

## 2020-08-28 PROCEDURE — 84443 ASSAY THYROID STIM HORMONE: CPT | Performed by: FAMILY MEDICINE

## 2020-08-28 PROCEDURE — 84439 ASSAY OF FREE THYROXINE: CPT | Performed by: FAMILY MEDICINE

## 2020-08-28 PROCEDURE — 36415 COLL VENOUS BLD VENIPUNCTURE: CPT

## 2020-08-28 NOTE — TELEPHONE ENCOUNTER
COVID Pre-Visit Screening     1  Is this a family member screening? Yes  2  Have you traveled outside of your state in the past 2 weeks? No  3  Do you presently have a fever or flu-like symptoms? No  4  Do you have symptoms of an upper respiratory infection like runny nose, sore throat, or cough? No  5  Are you suffering from new headache that you have not had in the past?  No  6  Do you have/have you experienced any new shortness of breath recently? No  7  Do you have any new diarrhea, nausea or vomiting? No  8  Have you been in contact with anyone who has been sick or diagnosed with COVID-19? No  9  Do you have any new loss of taste or smell? No  10  Are you able to wear a mask without a valve for the entire visit? Yes    Patient schedule for [facility] on 9/10/20 with Dr Annie Gamboa  Patient wife aware of general instructions and medications holds (Lasix)  Patient cover under medicare

## 2020-08-28 NOTE — TELEPHONE ENCOUNTER
----- Message from Angel Wood sent at 8/26/2020  9:16 AM EDT -----  Regarding: Heart Cath  Please schedule R&L Heart Cath to be done at [facility]  Pt given bloodwork scripts to done this week  He has a f/u appt with our office on 9/18  He has medicare as primary insurance  Thank you!

## 2020-08-28 NOTE — TELEPHONE ENCOUNTER
Call patient regarding labs  TSH or thyroid test slightly out of the range of normal   I would not increase his dose of levothyroxine at this time  make sure he is taking his levothyroxine on empty stomach waiting 30-60 minutes to eat or drink-not to be taken with any other medications or supplements  Repeat TSH in 6 months  cholesterol normal at 87 less than 200 normal   Kidney and liver test normal   No anemia    FBS 99 less than 100 normal    Recent Results (from the past 336 hour(s))   Lipid panel    Collection Time: 08/28/20  7:18 AM   Result Value Ref Range    Cholesterol 87 50 - 200 mg/dL    Triglycerides 81 <=150 mg/dL    HDL, Direct 42 >=40 mg/dL    LDL Calculated 29 0 - 100 mg/dL    Non-HDL-Chol (CHOL-HDL) 45 mg/dl   TSH, 3rd generation with Free T4 reflex    Collection Time: 08/28/20  7:18 AM   Result Value Ref Range    TSH 3RD GENERATON 4 740 (H) 0 358 - 3 740 uIU/mL   CBC    Collection Time: 08/28/20  7:18 AM   Result Value Ref Range    WBC 5 13 4 31 - 10 16 Thousand/uL    RBC 4 33 3 88 - 5 62 Million/uL    Hemoglobin 13 9 12 0 - 17 0 g/dL    Hematocrit 41 9 36 5 - 49 3 %    MCV 97 82 - 98 fL    MCH 32 1 26 8 - 34 3 pg    MCHC 33 2 31 4 - 37 4 g/dL    RDW 12 1 11 6 - 15 1 %    Platelets 778 409 - 328 Thousands/uL    MPV 10 4 8 9 - 12 7 fL   Comprehensive metabolic panel    Collection Time: 08/28/20  7:18 AM   Result Value Ref Range    Sodium 135 (L) 136 - 145 mmol/L    Potassium 4 3 3 5 - 5 3 mmol/L    Chloride 104 100 - 108 mmol/L    CO2 28 21 - 32 mmol/L    ANION GAP 3 (L) 4 - 13 mmol/L    BUN 17 5 - 25 mg/dL    Creatinine 1 05 0 60 - 1 30 mg/dL    Glucose, Fasting 99 65 - 99 mg/dL    Calcium 8 9 8 3 - 10 1 mg/dL    AST 14 5 - 45 U/L    ALT 19 12 - 78 U/L    Alkaline Phosphatase 101 46 - 116 U/L    Total Protein 7 3 6 4 - 8 2 g/dL    Albumin 3 9 3 5 - 5 0 g/dL    Total Bilirubin 0 59 0 20 - 1 00 mg/dL    eGFR 64 ml/min/1 73sq m   T4, free    Collection Time: 08/28/20  7:18 AM   Result Value Ref Range    Free T4 1 12 0 76 - 1 46 ng/dL

## 2020-09-02 ENCOUNTER — OFFICE VISIT (OUTPATIENT)
Dept: UROLOGY | Facility: AMBULATORY SURGERY CENTER | Age: 85
End: 2020-09-02
Payer: MEDICARE

## 2020-09-02 VITALS
WEIGHT: 149 LBS | TEMPERATURE: 97.5 F | DIASTOLIC BLOOD PRESSURE: 72 MMHG | BODY MASS INDEX: 23.95 KG/M2 | SYSTOLIC BLOOD PRESSURE: 110 MMHG | HEART RATE: 72 BPM | HEIGHT: 66 IN

## 2020-09-02 DIAGNOSIS — N40.1 BPH WITH OBSTRUCTION/LOWER URINARY TRACT SYMPTOMS: ICD-10-CM

## 2020-09-02 DIAGNOSIS — N13.8 BPH WITH OBSTRUCTION/LOWER URINARY TRACT SYMPTOMS: ICD-10-CM

## 2020-09-02 DIAGNOSIS — R33.9 URINARY RETENTION: Primary | ICD-10-CM

## 2020-09-02 LAB — POST-VOID RESIDUAL VOLUME, ML POC: 635 ML

## 2020-09-02 PROCEDURE — 99214 OFFICE O/P EST MOD 30 MIN: CPT | Performed by: NURSE PRACTITIONER

## 2020-09-02 PROCEDURE — 51798 US URINE CAPACITY MEASURE: CPT

## 2020-09-02 PROCEDURE — 51702 INSERT TEMP BLADDER CATH: CPT

## 2020-09-02 RX ORDER — FINASTERIDE 5 MG/1
5 TABLET, FILM COATED ORAL DAILY
Qty: 90 TABLET | Refills: 3 | Status: SHIPPED | OUTPATIENT
Start: 2020-09-02 | End: 2021-02-24 | Stop reason: ALTCHOICE

## 2020-09-02 NOTE — PROGRESS NOTES
Bladder catheterization    Date/Time: 9/2/2020 9:30 AM  Performed by: Angelique Lee MA  Authorized by: MANI Sousa     Patient location:  Bedside  Consent:     Consent obtained:  Verbal    Consent given by:  Patient  Pre-procedure details:     Procedure purpose:  Therapeutic    Preparation: Patient was prepped and draped in usual sterile fashion    Anesthesia (see MAR for exact dosages): Anesthesia method:  None  Procedure details:     Bladder irrigation: no      Catheter insertion:  Indwelling    Approach: natural orifice      Catheter type: Porter and latex    Catheter size:  18 Fr    Number of attempts:  1    Successful placement: yes      Urine characteristics:  Clear and yellow  Post-procedure details:     Patient tolerance of procedure: Tolerated well, no immediate complications  Comments:      Attached to leg bag

## 2020-09-03 ENCOUNTER — HOSPITAL ENCOUNTER (OUTPATIENT)
Dept: RADIOLOGY | Facility: HOSPITAL | Age: 85
Discharge: HOME | End: 2020-09-03
Payer: MEDICARE

## 2020-09-03 ENCOUNTER — TELEPHONE (OUTPATIENT)
Dept: UROLOGY | Facility: AMBULATORY SURGERY CENTER | Age: 85
End: 2020-09-03

## 2020-09-03 ENCOUNTER — TRANSCRIBE ORDERS (OUTPATIENT)
Dept: RADIOLOGY | Facility: HOSPITAL | Age: 85
End: 2020-09-03

## 2020-09-03 DIAGNOSIS — Z01.810 PRE-OPERATIVE CARDIOVASCULAR EXAMINATION: ICD-10-CM

## 2020-09-03 DIAGNOSIS — Z01.818 PREOP TESTING: ICD-10-CM

## 2020-09-03 DIAGNOSIS — I35.0 SEVERE AORTIC STENOSIS: ICD-10-CM

## 2020-09-03 DIAGNOSIS — R31.9 HEMATURIA, UNSPECIFIED TYPE: Primary | ICD-10-CM

## 2020-09-03 PROCEDURE — G1004 CDSM NDSC: HCPCS

## 2020-09-03 PROCEDURE — 75572 CT HRT W/3D IMAGE: CPT

## 2020-09-03 PROCEDURE — 74174 CTA ABD&PLVS W/CONTRAST: CPT

## 2020-09-03 RX ADMIN — IODIXANOL 120 ML: 320 INJECTION, SOLUTION INTRAVASCULAR at 11:05

## 2020-09-03 NOTE — TELEPHONE ENCOUNTER
Patient previously managed by Dr Julius De Leon last seen by Daniela Christensen in [facility] [place], [place] with history of BPH  Received call from Deepti in Radiology reporting Patient had chavis placed in office 9/02/2020 and is experiencing hematuria  Spoke with Patient who reports difficulty chavis placement yesterday  States urine was yellow when he left office, slightly red by last night, and is now a "dark red"  Denies clots, pain, or any other complaints at this time and urine is flowing freely  Encouraged Patient to hydrate well with water, as Patient states he only had tea this morning, avoid bladder irritants and monitor  Orders placed for UA C&S to rule out urinary tract infection  Office will follow up as results become available usually within 48-72 hours  Patient encouraged to hydrate well with water and avoid bladder irritants  Patient instructed to call back with worsening symptoms, fever, chills, blood in the urine or difficulty urinating  Patient repeats back understanding and agrees with plan

## 2020-09-03 NOTE — TELEPHONE ENCOUNTER
Spoke to Patient's wife Seble Blunt, as per communication consent, who had some questions about US protocol for upcoming appointment on 9/09/2020  Informed that would behoove her to speak with Radiology to obtain appropriate information and gave radiology's number  Discussed urine testing and utilizing plug to obtain optimal sample - Seble Blunt taught back information  Plug left at desk for Patient to

## 2020-09-03 NOTE — TELEPHONE ENCOUNTER
Agree with nurse's recommendations to obtain urine testing  If hematuria does not improve with increased hydration, he may need to be brought in for PVR assessment and irrigation of bladder with nurse

## 2020-09-03 NOTE — TELEPHONE ENCOUNTER
Patients wife called and is asking for clarification on her  having an [address] on 9/9/20  Please call her back at [phone]

## 2020-09-04 ENCOUNTER — APPOINTMENT (OUTPATIENT)
Dept: LAB | Age: 85
DRG: 699 | End: 2020-09-04
Payer: MEDICARE

## 2020-09-04 DIAGNOSIS — N30.00 ACUTE CYSTITIS WITHOUT HEMATURIA: Primary | ICD-10-CM

## 2020-09-04 DIAGNOSIS — R31.9 HEMATURIA, UNSPECIFIED TYPE: ICD-10-CM

## 2020-09-04 LAB
BACTERIA UR QL AUTO: ABNORMAL /HPF
BILIRUB UR QL STRIP: ABNORMAL
CLARITY UR: ABNORMAL
COLOR UR: ABNORMAL
GLUCOSE UR STRIP-MCNC: NEGATIVE MG/DL
HGB UR QL STRIP.AUTO: ABNORMAL
HYALINE CASTS #/AREA URNS LPF: ABNORMAL /LPF
KETONES UR STRIP-MCNC: ABNORMAL MG/DL
LEUKOCYTE ESTERASE UR QL STRIP: ABNORMAL
NITRITE UR QL STRIP: POSITIVE
NON-SQ EPI CELLS URNS QL MICRO: ABNORMAL /HPF
PH UR STRIP.AUTO: 6.5 [PH]
PROT UR STRIP-MCNC: ABNORMAL MG/DL
RBC #/AREA URNS AUTO: ABNORMAL /HPF
SP GR UR STRIP.AUTO: 1.03 (ref 1–1.03)
UROBILINOGEN UR QL STRIP.AUTO: 1 E.U./DL
WBC #/AREA URNS AUTO: ABNORMAL /HPF

## 2020-09-04 PROCEDURE — 87086 URINE CULTURE/COLONY COUNT: CPT

## 2020-09-04 PROCEDURE — 87077 CULTURE AEROBIC IDENTIFY: CPT

## 2020-09-04 PROCEDURE — 81001 URINALYSIS AUTO W/SCOPE: CPT

## 2020-09-04 PROCEDURE — 87186 SC STD MICRODIL/AGAR DIL: CPT

## 2020-09-04 RX ORDER — NITROFURANTOIN 25; 75 MG/1; MG/1
100 CAPSULE ORAL 2 TIMES DAILY
Qty: 14 CAPSULE | Refills: 0 | Status: SHIPPED | OUTPATIENT
Start: 2020-09-04 | End: 2020-09-10 | Stop reason: HOSPADM

## 2020-09-04 NOTE — TELEPHONE ENCOUNTER
Spoke to Patient's wife Myrtle Davis and relayed provider information and recommendations  Repeats back understanding  Knows we will contact her if different antibiotic is needed and to call office with questions or concerns

## 2020-09-04 NOTE — TELEPHONE ENCOUNTER
Message   Received: Today   Message Contents   Jossue Paniagua, [address]  [facility]               Urine is nitrite positive   I have sent a prescription of Macrobid to the patient's preferred pharmacy  [facility] does not have any previous positive cultures to base this on   We will monitor for culture results and adjust antibiotic if needed

## 2020-09-06 LAB
BACTERIA UR CULT: ABNORMAL
BACTERIA UR CULT: ABNORMAL

## 2020-09-07 ENCOUNTER — NURSE TRIAGE (OUTPATIENT)
Dept: OTHER | Facility: OTHER | Age: 85
End: 2020-09-07

## 2020-09-07 ENCOUNTER — HOSPITAL ENCOUNTER (INPATIENT)
Facility: HOSPITAL | Age: 85
LOS: 3 days | Discharge: NURSING, REHAB OR LONG-TERM CARE | DRG: 699 | End: 2020-09-10
Attending: EMERGENCY MEDICINE | Admitting: INTERNAL MEDICINE
Payer: MEDICARE

## 2020-09-07 DIAGNOSIS — I95.9 HYPOTENSION: ICD-10-CM

## 2020-09-07 DIAGNOSIS — I35.0 SEVERE AORTIC STENOSIS: ICD-10-CM

## 2020-09-07 DIAGNOSIS — Z01.818 PREOP TESTING: ICD-10-CM

## 2020-09-07 DIAGNOSIS — Z01.810 PRE-OPERATIVE CARDIOVASCULAR EXAMINATION: ICD-10-CM

## 2020-09-07 DIAGNOSIS — N39.0 UTI (URINARY TRACT INFECTION): Primary | ICD-10-CM

## 2020-09-07 DIAGNOSIS — R53.1 GENERALIZED WEAKNESS: ICD-10-CM

## 2020-09-07 PROBLEM — E87.1 HYPONATREMIA: Status: ACTIVE | Noted: 2020-09-07

## 2020-09-07 PROBLEM — R33.9 URINE RETENTION: Status: ACTIVE | Noted: 2020-09-07

## 2020-09-07 LAB
ALBUMIN SERPL BCP-MCNC: 3.4 G/DL (ref 3.5–5)
ALP SERPL-CCNC: 94 U/L (ref 46–116)
ALT SERPL W P-5'-P-CCNC: 13 U/L (ref 12–78)
ANION GAP SERPL CALCULATED.3IONS-SCNC: 7 MMOL/L (ref 4–13)
APTT PPP: 41 SECONDS (ref 23–37)
AST SERPL W P-5'-P-CCNC: 17 U/L (ref 5–45)
BACTERIA UR QL AUTO: ABNORMAL /HPF
BASOPHILS # BLD AUTO: 0.01 THOUSANDS/ΜL (ref 0–0.1)
BASOPHILS NFR BLD AUTO: 0 % (ref 0–1)
BILIRUB SERPL-MCNC: 0.84 MG/DL (ref 0.2–1)
BILIRUB UR QL STRIP: NEGATIVE
BUN SERPL-MCNC: 17 MG/DL (ref 5–25)
CALCIUM SERPL-MCNC: 8.4 MG/DL (ref 8.3–10.1)
CHLORIDE SERPL-SCNC: 100 MMOL/L (ref 100–108)
CLARITY UR: ABNORMAL
CO2 SERPL-SCNC: 24 MMOL/L (ref 21–32)
COLOR UR: ABNORMAL
CREAT SERPL-MCNC: 0.94 MG/DL (ref 0.6–1.3)
EOSINOPHIL # BLD AUTO: 0.03 THOUSAND/ΜL (ref 0–0.61)
EOSINOPHIL NFR BLD AUTO: 0 % (ref 0–6)
ERYTHROCYTE [DISTWIDTH] IN BLOOD BY AUTOMATED COUNT: 11.9 % (ref 11.6–15.1)
FINE GRAN CASTS URNS QL MICRO: ABNORMAL /LPF
GFR SERPL CREATININE-BSD FRML MDRD: 73 ML/MIN/1.73SQ M
GLUCOSE SERPL-MCNC: 107 MG/DL (ref 65–140)
GLUCOSE UR STRIP-MCNC: NEGATIVE MG/DL
HCT VFR BLD AUTO: 37.3 % (ref 36.5–49.3)
HGB BLD-MCNC: 12.7 G/DL (ref 12–17)
HGB UR QL STRIP.AUTO: ABNORMAL
IMM GRANULOCYTES # BLD AUTO: 0.03 THOUSAND/UL (ref 0–0.2)
IMM GRANULOCYTES NFR BLD AUTO: 0 % (ref 0–2)
INR PPP: 1.05 (ref 0.84–1.19)
KETONES UR STRIP-MCNC: ABNORMAL MG/DL
LACTATE SERPL-SCNC: 1.3 MMOL/L (ref 0.5–2)
LEUKOCYTE ESTERASE UR QL STRIP: ABNORMAL
LYMPHOCYTES # BLD AUTO: 0.66 THOUSANDS/ΜL (ref 0.6–4.47)
LYMPHOCYTES NFR BLD AUTO: 7 % (ref 14–44)
MCH RBC QN AUTO: 32.2 PG (ref 26.8–34.3)
MCHC RBC AUTO-ENTMCNC: 34 G/DL (ref 31.4–37.4)
MCV RBC AUTO: 95 FL (ref 82–98)
MONOCYTES # BLD AUTO: 1.26 THOUSAND/ΜL (ref 0.17–1.22)
MONOCYTES NFR BLD AUTO: 12 % (ref 4–12)
NEUTROPHILS # BLD AUTO: 8.19 THOUSANDS/ΜL (ref 1.85–7.62)
NEUTS SEG NFR BLD AUTO: 81 % (ref 43–75)
NITRITE UR QL STRIP: POSITIVE
NON-SQ EPI CELLS URNS QL MICRO: ABNORMAL /HPF
NRBC BLD AUTO-RTO: 0 /100 WBCS
PH UR STRIP.AUTO: 7.5 [PH]
PLATELET # BLD AUTO: 143 THOUSANDS/UL (ref 149–390)
PMV BLD AUTO: 10.5 FL (ref 8.9–12.7)
POTASSIUM SERPL-SCNC: 4.1 MMOL/L (ref 3.5–5.3)
PROCALCITONIN SERPL-MCNC: <0.05 NG/ML
PROCALCITONIN SERPL-MCNC: <0.05 NG/ML
PROT SERPL-MCNC: 7 G/DL (ref 6.4–8.2)
PROT UR STRIP-MCNC: ABNORMAL MG/DL
PROTHROMBIN TIME: 13.7 SECONDS (ref 11.6–14.5)
RBC # BLD AUTO: 3.94 MILLION/UL (ref 3.88–5.62)
RBC #/AREA URNS AUTO: ABNORMAL /HPF
SODIUM SERPL-SCNC: 131 MMOL/L (ref 136–145)
SP GR UR STRIP.AUTO: 1.02 (ref 1–1.03)
TSH SERPL DL<=0.05 MIU/L-ACNC: 1.76 UIU/ML (ref 0.36–3.74)
UROBILINOGEN UR QL STRIP.AUTO: 1 E.U./DL
WBC # BLD AUTO: 10.18 THOUSAND/UL (ref 4.31–10.16)
WBC #/AREA URNS AUTO: ABNORMAL /HPF

## 2020-09-07 PROCEDURE — 93005 ELECTROCARDIOGRAM TRACING: CPT

## 2020-09-07 PROCEDURE — 84145 PROCALCITONIN (PCT): CPT | Performed by: EMERGENCY MEDICINE

## 2020-09-07 PROCEDURE — 80053 COMPREHEN METABOLIC PANEL: CPT | Performed by: EMERGENCY MEDICINE

## 2020-09-07 PROCEDURE — 96365 THER/PROPH/DIAG IV INF INIT: CPT

## 2020-09-07 PROCEDURE — 87040 BLOOD CULTURE FOR BACTERIA: CPT | Performed by: EMERGENCY MEDICINE

## 2020-09-07 PROCEDURE — 87077 CULTURE AEROBIC IDENTIFY: CPT

## 2020-09-07 PROCEDURE — 99285 EMERGENCY DEPT VISIT HI MDM: CPT

## 2020-09-07 PROCEDURE — 83605 ASSAY OF LACTIC ACID: CPT | Performed by: EMERGENCY MEDICINE

## 2020-09-07 PROCEDURE — 99223 1ST HOSP IP/OBS HIGH 75: CPT | Performed by: HOSPITALIST

## 2020-09-07 PROCEDURE — 85730 THROMBOPLASTIN TIME PARTIAL: CPT | Performed by: EMERGENCY MEDICINE

## 2020-09-07 PROCEDURE — 85610 PROTHROMBIN TIME: CPT | Performed by: EMERGENCY MEDICINE

## 2020-09-07 PROCEDURE — 84443 ASSAY THYROID STIM HORMONE: CPT | Performed by: HOSPITALIST

## 2020-09-07 PROCEDURE — 87086 URINE CULTURE/COLONY COUNT: CPT | Performed by: EMERGENCY MEDICINE

## 2020-09-07 PROCEDURE — 99285 EMERGENCY DEPT VISIT HI MDM: CPT | Performed by: EMERGENCY MEDICINE

## 2020-09-07 PROCEDURE — 81001 URINALYSIS AUTO W/SCOPE: CPT | Performed by: EMERGENCY MEDICINE

## 2020-09-07 PROCEDURE — 96361 HYDRATE IV INFUSION ADD-ON: CPT

## 2020-09-07 PROCEDURE — 36415 COLL VENOUS BLD VENIPUNCTURE: CPT | Performed by: EMERGENCY MEDICINE

## 2020-09-07 PROCEDURE — 85025 COMPLETE CBC W/AUTO DIFF WBC: CPT | Performed by: EMERGENCY MEDICINE

## 2020-09-07 PROCEDURE — 1124F ACP DISCUSS-NO DSCNMKR DOCD: CPT | Performed by: EMERGENCY MEDICINE

## 2020-09-07 RX ORDER — ACETAMINOPHEN 325 MG/1
650 TABLET ORAL EVERY 6 HOURS PRN
Status: DISCONTINUED | OUTPATIENT
Start: 2020-09-07 | End: 2020-09-09

## 2020-09-07 RX ORDER — SODIUM CHLORIDE, SODIUM GLUCONATE, SODIUM ACETATE, POTASSIUM CHLORIDE, MAGNESIUM CHLORIDE, SODIUM PHOSPHATE, DIBASIC, AND POTASSIUM PHOSPHATE .53; .5; .37; .037; .03; .012; .00082 G/100ML; G/100ML; G/100ML; G/100ML; G/100ML; G/100ML; G/100ML
50 INJECTION, SOLUTION INTRAVENOUS CONTINUOUS
Status: DISPENSED | OUTPATIENT
Start: 2020-09-08 | End: 2020-09-08

## 2020-09-07 RX ORDER — ONDANSETRON 2 MG/ML
4 INJECTION INTRAMUSCULAR; INTRAVENOUS EVERY 6 HOURS PRN
Status: DISCONTINUED | OUTPATIENT
Start: 2020-09-07 | End: 2020-09-10 | Stop reason: HOSPADM

## 2020-09-07 RX ORDER — LEVOTHYROXINE SODIUM 0.07 MG/1
75 TABLET ORAL
Status: DISCONTINUED | OUTPATIENT
Start: 2020-09-08 | End: 2020-09-10 | Stop reason: HOSPADM

## 2020-09-07 RX ORDER — ASPIRIN 81 MG/1
81 TABLET, CHEWABLE ORAL DAILY
Status: DISCONTINUED | OUTPATIENT
Start: 2020-09-08 | End: 2020-09-10 | Stop reason: HOSPADM

## 2020-09-07 RX ORDER — PRAVASTATIN SODIUM 40 MG
40 TABLET ORAL
Status: DISCONTINUED | OUTPATIENT
Start: 2020-09-08 | End: 2020-09-10 | Stop reason: HOSPADM

## 2020-09-07 RX ORDER — PANTOPRAZOLE SODIUM 20 MG/1
20 TABLET, DELAYED RELEASE ORAL
Status: DISCONTINUED | OUTPATIENT
Start: 2020-09-08 | End: 2020-09-10 | Stop reason: HOSPADM

## 2020-09-07 RX ORDER — FINASTERIDE 5 MG/1
5 TABLET, FILM COATED ORAL DAILY
Status: DISCONTINUED | OUTPATIENT
Start: 2020-09-08 | End: 2020-09-10 | Stop reason: HOSPADM

## 2020-09-07 RX ORDER — TAMSULOSIN HYDROCHLORIDE 0.4 MG/1
0.4 CAPSULE ORAL
Status: DISCONTINUED | OUTPATIENT
Start: 2020-09-07 | End: 2020-09-10 | Stop reason: HOSPADM

## 2020-09-07 RX ORDER — MELATONIN
2000 DAILY
Status: DISCONTINUED | OUTPATIENT
Start: 2020-09-08 | End: 2020-09-10 | Stop reason: HOSPADM

## 2020-09-07 RX ADMIN — SODIUM CHLORIDE, SODIUM GLUCONATE, SODIUM ACETATE, POTASSIUM CHLORIDE, MAGNESIUM CHLORIDE, SODIUM PHOSPHATE, DIBASIC, AND POTASSIUM PHOSPHATE 50 ML/HR: .53; .5; .37; .037; .03; .012; .00082 INJECTION, SOLUTION INTRAVENOUS at 23:12

## 2020-09-07 RX ADMIN — CEFTRIAXONE SODIUM 1000 MG: 10 INJECTION, POWDER, FOR SOLUTION INTRAVENOUS at 17:14

## 2020-09-07 RX ADMIN — SODIUM CHLORIDE 1000 ML: 0.9 INJECTION, SOLUTION INTRAVENOUS at 17:17

## 2020-09-07 RX ADMIN — ACETAMINOPHEN 650 MG: 325 TABLET, FILM COATED ORAL at 23:59

## 2020-09-07 RX ADMIN — TAMSULOSIN HYDROCHLORIDE 0.4 MG: 0.4 CAPSULE ORAL at 22:08

## 2020-09-07 RX ADMIN — SODIUM CHLORIDE 1000 ML: 0.9 INJECTION, SOLUTION INTRAVENOUS at 19:09
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
## 2020-09-07 NOTE — TELEPHONE ENCOUNTER
Regardin fever   ----- Message from Max Osborne sent at 2020  2:57 PM EDT -----  " My  has a fever of 102 "
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
## 2020-09-07 NOTE — TELEPHONE ENCOUNTER
I briefly spoke with his wife and she has already called EMS to have him evaluated  at the hospital

## 2020-09-07 NOTE — TELEPHONE ENCOUNTER
Reason for Disposition   Patient already left for the hospital/clinic      Protocols used: NO CONTACT OR DUPLICATE CONTACT CALL-ADULT-

## 2020-09-08 LAB
ANION GAP SERPL CALCULATED.3IONS-SCNC: 9 MMOL/L (ref 4–13)
ATRIAL RATE: 69 BPM
BASOPHILS # BLD AUTO: 0.01 THOUSANDS/ΜL (ref 0–0.1)
BASOPHILS NFR BLD AUTO: 0 % (ref 0–1)
BUN SERPL-MCNC: 13 MG/DL (ref 5–25)
CALCIUM SERPL-MCNC: 8.3 MG/DL (ref 8.3–10.1)
CHLORIDE SERPL-SCNC: 106 MMOL/L (ref 100–108)
CO2 SERPL-SCNC: 24 MMOL/L (ref 21–32)
CREAT SERPL-MCNC: 0.8 MG/DL (ref 0.6–1.3)
EOSINOPHIL # BLD AUTO: 0.03 THOUSAND/ΜL (ref 0–0.61)
EOSINOPHIL NFR BLD AUTO: 0 % (ref 0–6)
ERYTHROCYTE [DISTWIDTH] IN BLOOD BY AUTOMATED COUNT: 12.1 % (ref 11.6–15.1)
GFR SERPL CREATININE-BSD FRML MDRD: 80 ML/MIN/1.73SQ M
GLUCOSE SERPL-MCNC: 98 MG/DL (ref 65–140)
HCT VFR BLD AUTO: 36.7 % (ref 36.5–49.3)
HGB BLD-MCNC: 12.4 G/DL (ref 12–17)
IMM GRANULOCYTES # BLD AUTO: 0.04 THOUSAND/UL (ref 0–0.2)
IMM GRANULOCYTES NFR BLD AUTO: 1 % (ref 0–2)
LYMPHOCYTES # BLD AUTO: 0.47 THOUSANDS/ΜL (ref 0.6–4.47)
LYMPHOCYTES NFR BLD AUTO: 6 % (ref 14–44)
MCH RBC QN AUTO: 32.3 PG (ref 26.8–34.3)
MCHC RBC AUTO-ENTMCNC: 33.8 G/DL (ref 31.4–37.4)
MCV RBC AUTO: 96 FL (ref 82–98)
MONOCYTES # BLD AUTO: 1.17 THOUSAND/ΜL (ref 0.17–1.22)
MONOCYTES NFR BLD AUTO: 14 % (ref 4–12)
NEUTROPHILS # BLD AUTO: 6.82 THOUSANDS/ΜL (ref 1.85–7.62)
NEUTS SEG NFR BLD AUTO: 79 % (ref 43–75)
NRBC BLD AUTO-RTO: 0 /100 WBCS
P AXIS: 64 DEGREES
PLATELET # BLD AUTO: 121 THOUSANDS/UL (ref 149–390)
PMV BLD AUTO: 10.5 FL (ref 8.9–12.7)
POTASSIUM SERPL-SCNC: 3.4 MMOL/L (ref 3.5–5.3)
PR INTERVAL: 250 MS
QRS AXIS: 14 DEGREES
QRSD INTERVAL: 158 MS
QT INTERVAL: 444 MS
QTC INTERVAL: 475 MS
RBC # BLD AUTO: 3.84 MILLION/UL (ref 3.88–5.62)
SODIUM SERPL-SCNC: 139 MMOL/L (ref 136–145)
T WAVE AXIS: 39 DEGREES
VENTRICULAR RATE: 69 BPM
WBC # BLD AUTO: 8.54 THOUSAND/UL (ref 4.31–10.16)

## 2020-09-08 PROCEDURE — 99223 1ST HOSP IP/OBS HIGH 75: CPT | Performed by: PHYSICIAN ASSISTANT

## 2020-09-08 PROCEDURE — 85025 COMPLETE CBC W/AUTO DIFF WBC: CPT | Performed by: HOSPITALIST

## 2020-09-08 PROCEDURE — 80048 BASIC METABOLIC PNL TOTAL CA: CPT | Performed by: HOSPITALIST

## 2020-09-08 PROCEDURE — 97163 PT EVAL HIGH COMPLEX 45 MIN: CPT

## 2020-09-08 PROCEDURE — 97167 OT EVAL HIGH COMPLEX 60 MIN: CPT

## 2020-09-08 PROCEDURE — 99232 SBSQ HOSP IP/OBS MODERATE 35: CPT | Performed by: INTERNAL MEDICINE

## 2020-09-08 PROCEDURE — 97110 THERAPEUTIC EXERCISES: CPT

## 2020-09-08 PROCEDURE — 93010 ELECTROCARDIOGRAM REPORT: CPT | Performed by: INTERNAL MEDICINE

## 2020-09-08 RX ORDER — POTASSIUM CHLORIDE 20 MEQ/1
40 TABLET, EXTENDED RELEASE ORAL ONCE
Status: COMPLETED | OUTPATIENT
Start: 2020-09-08 | End: 2020-09-08

## 2020-09-08 RX ADMIN — Medication 12.5 MG: at 20:07

## 2020-09-08 RX ADMIN — LEVOTHYROXINE SODIUM 75 MCG: 75 TABLET ORAL at 05:34

## 2020-09-08 RX ADMIN — ENOXAPARIN SODIUM 40 MG: 40 INJECTION SUBCUTANEOUS at 09:30

## 2020-09-08 RX ADMIN — POTASSIUM CHLORIDE 40 MEQ: 1500 TABLET, EXTENDED RELEASE ORAL at 09:25

## 2020-09-08 RX ADMIN — TAMSULOSIN HYDROCHLORIDE 0.4 MG: 0.4 CAPSULE ORAL at 17:29

## 2020-09-08 RX ADMIN — CEFTRIAXONE SODIUM 1000 MG: 2 INJECTION, POWDER, FOR SOLUTION INTRAMUSCULAR; INTRAVENOUS at 17:29

## 2020-09-08 RX ADMIN — Medication 1 TABLET: at 09:24

## 2020-09-08 RX ADMIN — ASPIRIN 81 MG CHEWABLE TABLET 81 MG: 81 TABLET CHEWABLE at 09:24

## 2020-09-08 RX ADMIN — PANTOPRAZOLE SODIUM 20 MG: 20 TABLET, DELAYED RELEASE ORAL at 05:34

## 2020-09-08 RX ADMIN — TIOTROPIUM BROMIDE 18 MCG: 18 CAPSULE ORAL; RESPIRATORY (INHALATION) at 09:31

## 2020-09-08 RX ADMIN — FINASTERIDE 5 MG: 5 TABLET, FILM COATED ORAL at 09:25

## 2020-09-08 RX ADMIN — PRAVASTATIN SODIUM 40 MG: 40 TABLET ORAL at 17:29

## 2020-09-08 RX ADMIN — Medication 2000 UNITS: at 09:25

## 2020-09-08 NOTE — ASSESSMENT & PLAN NOTE
· Progressed from moderate to severe in his recent echo performed in July 2020 be  · Referred by Cardiology the cardiothoracic surgery and workup initiated for TAVR  · Monitor volume status while receiving IV fluids  · Currently has some basal crackles, distant breath sounds due to COPD  · With saturating 94% on room air at rest  · Hold home Lasix for now while receiving IV fluids    Took his dose this morning at home  · Check BMP and oxygen status tomorrow morning  · Hope to restart his Lasix by day after tomorrow

## 2020-09-08 NOTE — TELEPHONE ENCOUNTER
Message   Received:  Today   Message Contents   Fadumo Matson, [address] [facility]               Positive culture, however appears patient is currently admitted in the hospital

## 2020-09-08 NOTE — PROGRESS NOTES
Progress Note - Stevo Has 1933, 80 y o  male MRN: [id number]    Unit/Bed#: [facility] 507-01 Encounter: [id number]    Primary Care Provider: Azael Hooks MD   Date and time admitted to hospital: 9/7/2020  4:26 PM        Urine retention  Assessment & Plan  · In a patient with history of bladder cancer status post resection in 2004  · Followed up with urology last week as routine visit during which found to have urinary retention with bladder scan showing more than 600 cc hence Porter catheter was inserted on 09/02/2020  · Continue tamsulosin  and finasteride; awaiting urology recommendations  UTI (urinary tract infection)  Assessment & Plan  · Complicated UTI in the setting of Porter catheter insertion for urinary retention  · Porter inserted 09/02/2020  · Cultures from 09/04/2020 growing Klebsiella oxytoca resistant to ampicillin and Ancef  · Urinalysis again performed today positive for nitrites, leukocytes, innumerable bacteria, 20-30 WBC  Follow up final urine cultures  Also follow-up blood cultures sent from today  · Status post IV ceftriaxone ED, will continue that  · Will consult urology  · Continue Porter catheter    9/8-white count declined 8 54 overnight; patient remains afebrile and hypertension has resolved   -awaiting urology consult; patient with 3 urinary retention on finasteride and Flomax  - Blood cultures and urine cultures pending; will consider deescalating antibiotic therapy to orals based on 9/4 sensitivities      Hyponatremia  Assessment & Plan  · Suspect hypovolemic hyponatremia in the setting of infection  · Treated with IV fluids  · Repeat BMP in a m     9/8-resolved    Pulmonary emphysema (HCC)  Assessment & Plan  · Continue home inhalers  · Not in exacerbation    Peripheral arterial disease ([facility] [address] )  Assessment & Plan  · Follows with vascular as outpatient    Hypertension  Assessment & Plan  Restart home medications once hypotension has resolved    Coronary artery disease  Assessment & Plan  · History of CAD status post CABG in 2010  · Continue aspirin, statin, beta-blocker to be started from tomorrow at low home dose metoprolol 12 5 b i d   · Follows with cardiology as outpatient    Carotid stenosis, bilateral  Assessment & Plan  · Follows with vascular as outpatient  · History of right carotid endarterectomy  · Continue aspirin, statin    PAF (paroxysmal atrial fibrillation) ([facility])  Assessment & Plan  · Continue metoprolol  · Normal sinus rhythm    Severe aortic stenosis  Assessment & Plan  · Progressed from moderate to severe in his recent echo performed in July 2020 be  · Referred by Cardiology the cardiothoracic surgery and workup initiated for TAVR  · Monitor volume status while receiving IV fluids  · Currently has some basal crackles, distant breath sounds due to COPD  · With saturating 94% on room air at rest  · Hold home Lasix for now while receiving IV fluids  Took his dose this morning at home  · Check BMP and oxygen status tomorrow morning  · Plan to restart Lasix tomorrow    * Hypotension  Assessment & Plan  · In the setting of infection suspected UTI  · Doesn't meet all SIRS criteria  Fever of 102 at home  White count 10 1, lactic acid 1 3  · Plus on Lasix and metoprolol at home  · Will treat him with IV fluids, monitor volume status given known case of severe AS  · Status post 1 L IV fluids in ED, another L running at 200 cc an hour  Will order for 50 cc an hour after that for another 8 hours    9/8-resolved overnight        VTE Pharmacologic Prophylaxis:   Pharmacologic: Enoxaparin (Lovenox)  Mechanical VTE Prophylaxis in Place: Yes    Patient Centered Rounds: I have performed bedside rounds with nursing staff today      Discussions with Specialists or Other Care Team Provider:     Education and Discussions with Family / Patient: Discussed treatment plan with family and patient who agree with current plan; encouraged to ask questions and participate  Time Spent for Care: 30 minutes  More than 50% of total time spent on counseling and coordination of care as described above  Current Length of Stay: 1 day(s)    Current Patient Status: Inpatient   Certification Statement: The patient will continue to require additional inpatient hospital stay due to Treatment of UTI and dysuria    Discharge Plan: To be determined    Code Status: Level 1 - Full Code      Subjective:   Patient seen examined bedside, hypotension has resolved  Blood cultures pending and urinary cultures and sensitivities pending as well   sensitivities show Klebsiella; currently treated with Rocephin with a drop in white count and resolution of hypotension overnight  Will consider transitioning to orals; noted the patient has allergy to Augmentin  Also awaiting urology consult as patient with reduced urine flow I was previously had a Porter; may need repeat Porter therapy at this time  Will consider consult Geriatrics as well as patient appear to have dementia on exam   Likely stable for discharge tomorrow; will discuss with family  Objective:     Vitals:   Temp (24hrs), Av 7 °F (37 1 °C), Min:97 7 °F (36 5 °C), Max:100 3 °F (37 9 °C)    Temp:  [97 7 °F (36 5 °C)-100 3 °F (37 9 °C)] 97 8 °F (36 6 °C)  HR:  [59-93] 72  Resp:  [16-22] 18  BP: ()/(43-74) 109/54  SpO2:  [90 %-98 %] 92 %  Body mass index is 23 17 kg/m²  Input and Output Summary (last 24 hours): Intake/Output Summary (Last 24 hours) at 2020 1043  Last data filed at 2020 0303  Gross per 24 hour   Intake 2592 5 ml   Output 2850 ml   Net -257 5 ml       Physical Exam:     Physical Exam  Vitals signs and nursing note reviewed  Constitutional:       Appearance: He is well-developed  HENT:      Head: Normocephalic and atraumatic  Right Ear: External ear normal       Left Ear: External ear normal    Eyes:      Pupils: Pupils are equal, round, and reactive to light     Neck: Musculoskeletal: Normal range of motion and neck supple  Cardiovascular:      Rate and Rhythm: Normal rate  Heart sounds: Normal heart sounds  Pulmonary:      Effort: Pulmonary effort is normal       Breath sounds: Normal breath sounds  Abdominal:      General: Bowel sounds are normal       Palpations: Abdomen is soft  Musculoskeletal: Normal range of motion  Skin:     General: Skin is warm and dry  Neurological:      Mental Status: He is alert  Additional Data:     Labs:    Results from last 7 days   Lab Units 09/08/20  0520   WBC Thousand/uL 8 54   HEMOGLOBIN g/dL 12 4   HEMATOCRIT % 36 7   PLATELETS Thousands/uL 121*   NEUTROS PCT % 79*   LYMPHS PCT % 6*   MONOS PCT % 14*   EOS PCT % 0     Results from last 7 days   Lab Units 09/08/20  0520 09/07/20  1702   SODIUM mmol/L 139 131*   POTASSIUM mmol/L 3 4* 4 1   CHLORIDE mmol/L 106 100   CO2 mmol/L 24 24   BUN mg/dL 13 17   CREATININE mg/dL 0 80 0 94   ANION GAP mmol/L 9 7   CALCIUM mg/dL 8 3 8 4   ALBUMIN g/dL  --  3 4*   TOTAL BILIRUBIN mg/dL  --  0 84   ALK PHOS U/L  --  94   ALT U/L  --  13   AST U/L  --  17   GLUCOSE RANDOM mg/dL 98 107     Results from last 7 days   Lab Units 09/07/20  1702   INR  1 05             Results from last 7 days   Lab Units 09/07/20  1829 09/07/20  1702   LACTIC ACID mmol/L  --  1 3   PROCALCITONIN ng/ml <0 05 <0 05           * I Have Reviewed All Lab Data Listed Above  * Additional Pertinent Lab Tests Reviewed: All Labs Within Last 24 Hours Reviewed    Imaging:    Imaging Reports Reviewed Today Include:   Imaging Personally Reviewed by Myself Includes:      Recent Cultures (last 7 days):     Results from last 7 days   Lab Units 09/07/20  1703 09/07/20  1702 09/04/20  1047   BLOOD CULTURE  Received in Microbiology Lab  Culture in Progress  Received in Microbiology Lab  Culture in Progress    --    URINE CULTURE   --   --  >100,000 cfu/ml Klebsiella oxytoca*  <10,000 cfu/ml        Last 24 Hours Medication List:   Current Facility-Administered Medications   Medication Dose Route Frequency Provider Last Rate    acetaminophen  650 mg Oral Q6H PRN Delana Aase, MD      aspirin  81 mg Oral Daily Anisha George MD      cefTRIAXone  1,000 mg Intravenous Q24H Delana Aase, MD      cholecalciferol  2,000 Units Oral Daily Delana Aase, MD      enoxaparin  40 mg Subcutaneous Daily Delana Aase, MD      finasteride  5 mg Oral Daily Delana Aase, MD      levothyroxine  75 mcg Oral Early Morning Delana Aase, MD      metoprolol tartrate  12 5 mg Oral Q12H [address] Delana Aase, MD      multivitamin-minerals  1 tablet Oral Daily Delana Aase, MD      ondansetron  4 mg Intravenous Q6H PRN Delana Aase, MD      pantoprazole  20 mg Oral Early Morning Delana Aase, MD      pravastatin  40 mg Oral Daily With Rita Wood MD      tamsulosin  0 4 mg Oral Daily With Rita Wood MD      tiotropium  18 mcg Inhalation Daily Delana Aase, MD          Today, Patient Was Seen By: Marlon Gibbs MD    ** Please Note: Dictation voice to text software may have been used in the creation of this document   **

## 2020-09-08 NOTE — TELEPHONE ENCOUNTER
Patient has bee admitted to [facility]  Asking for a clinical staff member to call her      She can be reached at [phone]

## 2020-09-08 NOTE — H&P
H&P- Jovanny Noe 1933, 80 y o  male MRN: [id number]    Unit/Bed#: ED 22 Encounter: [id number]    Primary Care Provider: Alexandria Velasco MD   Date and time admitted to hospital: 9/7/2020  4:26 PM        * Hypotension  Assessment & Plan  · In the setting of infection suspected UTI  · Doesn't meet all SIRS criteria  Fever of 102 at home  White count 10 1, lactic acid 1 3  · Plus on Lasix and metoprolol at home  · Will treat him with IV fluids, monitor volume status given known case of severe AS  · Status post 1 L IV fluids in ED, another L running at 200 cc an hour  Will order for 50 cc an hour after that for another 8 hours    UTI (urinary tract infection)  Assessment & Plan  · Complicated UTI in the setting of Porter catheter insertion for urinary retention  · Porter inserted 09/02/2020  · Cultures from 09/04/2020 growing Klebsiella oxytoca resistant to ampicillin and Ancef  · Urinalysis again performed today positive for nitrites, leukocytes, innumerable bacteria, 20-30 WBC  Follow up final urine cultures  Also follow-up blood cultures sent from today  · Status post IV ceftriaxone ED, will continue that  · Will consult urology  · Continue Porter catheter    Urine retention  Assessment & Plan  · In a patient with history of bladder cancer status post resection in 2004  · Followed up with urology last week as routine visit during which found to have urinary retention with bladder scan showing more than 600 cc hence Porter catheter was inserted on 09/02/2020  · Continue tamsulosin in place of his home alfuzosin and finasteride    Hyponatremia  Assessment & Plan  · Suspect hypovolemic hyponatremia in the setting of infection  · Treated with IV fluids  · Repeat BMP in a m      Pulmonary emphysema ([facility] )  Assessment & Plan  · Continue home inhalers  · Not in exacerbation    Peripheral arterial disease ([facility] )  Assessment & Plan  · Follows with vascular as outpatient    Coronary artery disease  Assessment & Plan  · History of CAD status post CABG in 2010  · Continue aspirin, statin, beta-blocker to be started from tomorrow at low home dose metoprolol 12 5 b i d   · Follows with cardiology as outpatient    Carotid stenosis, bilateral  Assessment & Plan  · Follows with vascular as outpatient  · History of right carotid endarterectomy  · Continue aspirin, statin    PAF (paroxysmal atrial fibrillation) ([facility])  Assessment & Plan  · Continue metoprolol  · Normal sinus rhythm    Severe aortic stenosis  Assessment & Plan  · Progressed from moderate to severe in his recent echo performed in July 2020 be  · Referred by Cardiology the cardiothoracic surgery and workup initiated for TAVR  · Monitor volume status while receiving IV fluids  · Currently has some basal crackles, distant breath sounds due to COPD  · With saturating 94% on room air at rest  · Hold home Lasix for now while receiving IV fluids    Took his dose this morning at home  · Check BMP and oxygen status tomorrow morning  · Hope to restart his Lasix by day after tomorrow          History and Physical - [address] [place] [address] Internal Medicine    Patient Information: Luci Colvin 80 y o  male MRN: [id number]  Unit/Bed#: ED 22 Encounter: [id number]  Admitting Physician: Radha Patel MD  PCP: Jasmin Carver MD  Date of Admission:  09/07/20    Assessment/Plan:    Hospital Problem List:     Principal Problem:    Hypotension  Active Problems:    UTI (urinary tract infection)    Severe aortic stenosis    PAF (paroxysmal atrial fibrillation) (HCC)    Benign prostatic hyperplasia    Carotid stenosis, bilateral    Coronary artery disease    Hypertension    Peripheral arterial disease ([address] )    Pulmonary emphysema ([facility] [address] )    Hyponatremia    Urine retention        VTE Prophylaxis: Enoxaparin (Lovenox)  / sequential compression device   Code Status: FC  POLST: There is no POLST form on file for this patient (pre-hospital)    Anticipated Length of Stay:  Patient will be admitted on an Inpatient basis with an anticipated length of stay of  > 2 midnights  Justification for Hospital Stay: UTI, hypotension    Total Time for Visit, including Counseling / Coordination of Care: 1 hour  Greater than 50% of this total time spent on direct patient counseling and coordination of care  Chief Complaint:   Fever at home with recent outpatient diagnosis of UTI    History of Present Illness:    Stephanie Cornell is a 80 y o  male who presents with fever of 102 at home today  History is obtained from the patient from the daughter at bedside as well as chart review  Patient has history of CAD status post CABG, paroxysmal AFib, recently worsened aortic stenosis now severe undergoing workup for TAVR, carotid disease, carotid disease status post right CEA several years ago, bladder cancer status post resection in 2004; he went for routine urology evaluation last week on 09/02/2020 at which time he was found to have urinary retention of greater than 600 cc during bladder scan secondary to which a Porter catheter was inserted and sent home with  The next day they noticed blood in his urine so the consider urology office would recommended sending it for urinalysis  On 9/4/20 the urinalysis was abnormal with nitrites and leukocytes hence he was given prescription for oral Macrobid to be taken and pending urine culture results  He started taking taking that on 09/04/2020 evening and has received approximately 5 or 6 doses so far  He was doing okay yesterday but today was just getting weaker as the day passed by, was getting more sleep ER and then developed a temp of 102° with decrease in his appetite and activity status  Hence his wife called EMS and they brought him here  Upon arrival in ED was found to have blood pressure with systolic in 31S  He denies any other he specific infectious symptoms  He was feeling weak dizzy    According to the daughter after getting IV fluids in the ED he is more awake alert and stronger than what he was at home before EMS brought him  He is on Lasix at home which she did take this morning  He is undergoing TAVR workup as outpatient  At baseline he walks around with a walker and lives with his wife    Review of Systems:    Review of Systems   Constitutional: Positive for appetite change, fatigue and fever  Negative for chills  HENT: Negative for congestion, rhinorrhea and sore throat  Respiratory: Positive for shortness of breath  Negative for cough and wheezing  Cardiovascular: Positive for chest pain and leg swelling  Negative for palpitations  Gastrointestinal: Negative for abdominal pain, constipation, diarrhea, nausea and vomiting  Genitourinary: Negative for difficulty urinating  Neurological: Positive for dizziness  All other systems reviewed and are negative  Past Medical and Surgical History:     Past Medical History:   Diagnosis Date    Anemia     [facility]     Carotid artery occlusion     Cataract     COPD (chronic obstructive pulmonary disease) ([facility]     Coronary artery disease     Hyperlipidemia     Hypertension     Hypothyroidism 9/15/2017    Multiple pulmonary nodules     Parkinson disease ([facility]     Peptic ulcer     Scoliosis     SIRS (systemic inflammatory response syndrome) ([place] [id number] ) 12/19/2019    Transient cerebral ischemia     Tremors of nervous system        Past Surgical History:   Procedure Laterality Date   [facility] SURGERY      1973, 1987, 2005    BUNIONECTOMY Left     Simple exostectomy (silver procedure)    CAROTID ENDARTERECTOMY Right 09/10/2008    Randy LEDBETTER MD    CATARACT EXTRACTION, BILATERAL      CERVICAL DISCECTOMY  1969    CORONARY ARTERY BYPASS GRAFT  10/20/2010    x3 (LIMA-LAD, SVG-OM, SVG-RCA)    CYSTOSCOPY      ELBOW SURGERY Right 07/2012    FEMORAL ARTERY - TIBIAL ARTERY BYPASS GRAFT  12/05/2011    using reversed saphenous vein from right leg     [facility] Blood REPLACEMENT TOTAL KNEE Left     SHOULDER SURGERY Right 1997       Meds/Allergies:    Prior to Admission medications    Medication Sig Start Date End Date Taking? Authorizing Provider   alfuzosin (UROXATRAL) 10 mg 24 hr tablet TAKE 1 TABLET(10 MG) BY MOUTH DAILY 3/16/20  Yes Steph Perez PA-C   aspirin 81 MG tablet Take 81 mg by mouth 4/27/12  Yes Historical Provider, MD   Cholecalciferol (HM VITAMIN D3) 2000 units CAPS Take 1 tablet by mouth daily   Yes Historical Provider, MD   finasteride (PROSCAR) 5 mg tablet Take 1 tablet (5 mg total) by mouth daily 9/2/20  Yes MANI Jenkins   furosemide (LASIX) 20 mg tablet TAKE 1 TABLET(20 MG) BY MOUTH DAILY 8/10/20  Yes Kavon Antoine MD   hydrocortisone 2 5 % cream RENA TO POSTERIOR EARS AND ARMS ONCE A DAY PRN FOR 1-2 WEEKS 11/5/19  Yes Historical Provider, MD   levothyroxine 75 mcg tablet TAKE 1 TABLET(75 MCG) BY MOUTH DAILY 1/12/20  Yes Berlin Krabbe, MD   metoprolol tartrate (LOPRESSOR) 25 mg tablet Take 0 5 tablets (12 5 mg total) by mouth every 12 (twelve) hours 4/6/20  Yes Dontrell Darling MD   Multiple Vitamins-Minerals (MULTIVITAL-M) TABS Take 1 tablet by mouth daily   Yes Historical Provider, MD   nitrofurantoin (MACROBID) 100 mg capsule Take 1 capsule (100 mg total) by mouth 2 (two) times a day for 7 days 9/4/20 9/11/20 Yes Dee Dee Montiel PA-C   nitroglycerin (NITROSTAT) 0 4 mg SL tablet Place 1 tablet (0 4 mg total) under the tongue every 5 (five) minutes as needed for chest pain 9/9/19  Yes MANI Silverman   omeprazole (PriLOSEC) 20 mg delayed release capsule Take 1 tablet by mouth daily   Yes Historical Provider, MD   simvastatin (ZOCOR) 20 mg tablet TAKE 1 TABLET(20 MG) BY MOUTH DAILY AT BEDTIME 7/5/20  Yes Dontrell Darling MD   SPIRIVA RESPIMAT 2 5 MCG/ACT AERS inhaler INHALE 2 PUFFS BY MOUTH DAILY 6/15/20  Yes Mau Espinosa PA-C     I have reviewed home medications with a medical source (PCP, Pharmacy, other)      Allergies: Allergies   Allergen Reactions    Aleve [Naproxen]      Other reaction(s): FACIAL SWELLING  Category: Allergy; Annotation - [id number]: lip/eye edema    Augmentin [Amoxicillin-Pot Clavulanate] Diarrhea and Vomiting       Social History:     Marital Status: /Civil Union   Occupation:  Retired  Patient Pre-hospital Living Situation:  Lives at home with wife  Patient Pre-hospital Level of Mobility:  Uses walker  Patient Pre-hospital Diet Restrictions:  Low-salt  Substance Use History:   Social History     Substance and Sexual Activity   Alcohol Use Never    Frequency: Patient refused    Drinks per session: Patient refused    Binge frequency: Patient refused    Comment: Social      Social History     Tobacco Use   Smoking Status Former Smoker    Packs/day: 1 00    Years: 50 00    Pack years: 50 00    Types: Cigarettes    Start date: 56    Last attempt to quit: Александр Wood Years since quittin 7   Smokeless Tobacco Never Used     Social History     Substance and Sexual Activity   Drug Use Never       Family History:    Family History   Problem Relation Age of Onset    Cervical cancer Mother     Cervical cancer Sister     Heart disease Sister     Coronary artery disease Sister     Lung cancer Brother        Physical Exam:     Vitals:   Blood Pressure: (!) 91/46 (20)  Pulse: 60 (20)  Temperature: 100 3 °F (37 9 °C) (20)  Temp Source: Rectal (20)  Respirations: 20 (20)  SpO2: 94 % (20)    Physical Exam  Vitals signs reviewed  Constitutional:       Appearance: Normal appearance  HENT:      Head: Normocephalic and atraumatic  Mouth/Throat:      Mouth: Mucous membranes are dry  Eyes:      Extraocular Movements: Extraocular movements intact  Pupils: Pupils are equal, round, and reactive to light  Cardiovascular:      Rate and Rhythm: Normal rate and regular rhythm  Heart sounds: Murmur present     Pulmonary: Effort: Pulmonary effort is normal  No respiratory distress  Breath sounds: Rales present  Comments: Minimal bibasilar rales  Abdominal:      General: There is no distension  Palpations: Abdomen is soft  Tenderness: There is no abdominal tenderness  Musculoskeletal:      Right lower leg: No edema  Left lower leg: No edema  Skin:     General: Skin is warm  Neurological:      Mental Status: He is alert and oriented to person, place, and time  Psychiatric:         Mood and Affect: Mood normal          Additional Data:     Lab Results: I have personally reviewed pertinent reports  Results from last 7 days   Lab Units 09/07/20  1702   WBC Thousand/uL 10 18*   HEMOGLOBIN g/dL 12 7   HEMATOCRIT % 37 3   PLATELETS Thousands/uL 143*   NEUTROS PCT % 81*   LYMPHS PCT % 7*   MONOS PCT % 12   EOS PCT % 0     Results from last 7 days   Lab Units 09/07/20  1702   POTASSIUM mmol/L 4 1   CHLORIDE mmol/L 100   CO2 mmol/L 24   BUN mg/dL 17   CREATININE mg/dL 0 94   CALCIUM mg/dL 8 4   ALK PHOS U/L 94   ALT U/L 13   AST U/L 17     Results from last 7 days   Lab Units 09/07/20  1702   INR  1 05       Imaging: I have personally reviewed pertinent reports  Cta Chest Abdomen Pelvis W Wo Contrast Tavr    Result Date: 9/4/2020  Narrative: CT ANGIOGRAM OF THE CHEST, ABDOMEN AND PELVIS WITH AND WITHOUT IV CONTRAST INDICATION:  Preoperative evaluation for TAVR COMPARISON: CT abdomen pelvis 12/19/2019 TECHNIQUE:  CT angiogram examination of the chest, abdomen and pelvis was performed according to standard protocol with cardiac gating  Axial, sagittal, and coronal reformatted images were submitted for interpretation  3D reconstructions were performed an independent workstation, and are supplied for review  Radiation dose length product (DLP) for this visit:  [id number] 5 mGy-cm     This examination, like all CT scans performed in the [facility], was performed utilizing techniques to minimize radiation dose exposure, including the use of iterative reconstruction and automated exposure control  IV Contrast:  120 mL of iodixanol (VISIPAQUE)    Enteric Contrast: Enteric contrast was not administered  FINDINGS: VASCULAR STRUCTURES:     Annulus: diameter 30 6 x 23 7 mm     area: 585 8 sq mm   Annulus to LCA: 16 5 mm   Annulus to RCA: 19 6 mm   Minimal diameter right iliofemoral segment: 5 7 mm   Minimal diameter left iliofemoral segment: 7 0 mm The ascending aorta is nonaneurysmal measuring 36 mm with minimal atherosclerosis  There is a type III aortic arch with classic branching anatomy and no stenosis in the visualized great vessels  The aortic arch is nonaneurysmal with mild atherosclerosis  The descending thoracic aorta is nonaneurysmal with mild atherosclerosis  The abdominal aorta shows moderate atherosclerosis with infrarenal abdominal aortic aneurysm measuring 4 1 x 3 4 cm, previously 3 9 x 3 2 cm  Bilateral common iliac arteries are ectatic measuring 2 4 cm bilaterally  Bilateral external iliac arteries are patent  There is a 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, previously 2 5 cm, which contains mural thrombus  There is a left internal iliac artery aneurysm measuring 2 0 cm, previously 2 0 cm, which contains mural thrombus  Stable right common femoral artery aneurysm measuring 1 4 cm and containing mural thrombus  Cardiac findings: There is moderate calcification of the aortic valve  The left ventricle is normal   The ventricular septum is normal   No prior valvular surgery  Prior bypass surgery  No pericardial effusion  No cardiac mass or thrombus  OTHER FINDINGS: CHEST LUNGS:  4 mm nodule of right lower lobe (image [id number])  There is no tracheal or endobronchial lesion  Bibasilar atelectasis versus scarring  PLEURA:  Pleural nodularity of right lower lobe  MEDIASTINUM AND OLIVIA:  Unremarkable   CHEST WALL AND LOWER NECK:   Postsurgical changes of prior midline sternotomy  ABDOMEN LIVER/BILIARY TREE:  Unremarkable  GALLBLADDER:  No calcified gallstones  No pericholecystic inflammatory change  SPLEEN:  Unremarkable  PANCREAS:  Unremarkable  ADRENAL GLANDS:  Unremarkable  KIDNEYS/URETERS:  Bilateral hypodense lesions likely represent cysts  No hydronephrosis  STOMACH AND BOWEL:  Colonic diverticulosis  APPENDIX:  No findings to suggest appendicitis  ABDOMINOPELVIC CAVITY:  No ascites or free intraperitoneal air  No lymphadenopathy  PELVIS REPRODUCTIVE ORGANS:  Unremarkable for patient's age  URINARY BLADDER:  Decompressed with Porter catheter in place  ABDOMINAL WALL/INGUINAL REGIONS:  Unremarkable  OSSEOUS STRUCTURES:  There is a linear lucency through the proximal femoral shaft  Degenerative changes of spine  Impression: 1  TVAR measurements as above  2   Interval mild increase in size of abdominal aortic aneurysm measuring 4 1 x 3 4 cm, previously 3 9 x 3 2 cm  3   Mild increase in size of 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, previously 2 5 cm  4   Stable left internal iliac artery aneurysm measuring 2 0 cm  5   Stable right common femoral artery aneurysm measuring 1 4 cm  6   Ectasia of bilateral common iliac arteries  7   Linear lucency through the proximal femoral shaft likely represents a nondisplaced fracture  Workstation performed: [id number]       EKG, Pathology, and Other Studies Reviewed on Admission:   · EKG: reviewed, NSR    Epic / Care Everywhere Records Reviewed: Yes     ** Please Note: This note has been constructed using a voice recognition system   **

## 2020-09-08 NOTE — ASSESSMENT & PLAN NOTE
· History of CAD status post CABG in 2010  · Continue aspirin, statin, beta-blocker to be started from tomorrow at low home dose metoprolol 12 5 b i d   · Follows with cardiology as outpatient
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
## 2020-09-08 NOTE — PLAN OF CARE
Problem: OCCUPATIONAL THERAPY ADULT  Goal: Performs self-care activities at highest level of function for planned discharge setting  See evaluation for individualized goals  Description: Treatment Interventions: ADL retraining, Functional transfer training, UE strengthening/ROM, Endurance training, Cognitive reorientation, Patient/family training, Equipment evaluation/education, Fine motor coordination activities, Compensatory technique education, Activityengagement, Energy conservation          See flowsheet documentation for full assessment, interventions and recommendations  Note: Limitation: Decreased ADL status, Decreased UE strength, Decreased Safe judgement during ADL, Decreased cognition, Decreased endurance, Decreased fine motor control, Decreased high-level ADLs  Prognosis: Fair  Assessment: Pt is a 81 yo male seen for OT eval s/p adm to B on 9/7/2020 w/ fever dx'd w/ hypotension  Pt  has a past medical history of Anemia, Bladder cancer ([facility] ), Carotid artery occlusion, Cataract, COPD (chronic obstructive pulmonary disease) ([facility] ), Coronary artery disease, Hyperlipidemia, Hypertension, Hypothyroidism (9/15/2017), Multiple pulmonary nodules, Parkinson disease ([facility] ), Peptic ulcer, Scoliosis, SIRS (systemic inflammatory response syndrome) ([facility] ) (12/19/2019), Transient cerebral ischemia, and Tremors of nervous system  Pt with active OT orders and up with assistance  orders  Pt is retired and resides w/ spouse in [facility] w/ 1STE w/ HR  Pt reports spouse has been providing A as needed, however can no longer provide assistance upon D/C  Pt required A from spouse w/  ADLS (A w/ LB bathing/dressing) and A w/ all IADLS, does not drive, & required use of DME PTA, including RW for all mobility  Pt is currently demonstrating the following occupational deficits: Min A UB bathing/dressing, Mod-Max A LB bathing/dressing and toileting, Mod A STS, Min A functional mobility w/ RW   These deficits that are impacting pt's baseline areas of occupation are a result of the following impairments: pain, endurance, activity tolerance, functional mobility, forward functional reach, balance, functional standing tolerance, unsupportive home environment, decreased I w/ ADLS/IADLS, strength, cognitive impairments, decreased safety awareness, decreased insight into deficits, impaired fine motor skills and coordination deficits  The following Occupational Performance Areas to address include: grooming, bathing/shower, toilet hygiene, dressing, health maintenance, functional mobility and clothing management  Based on the aforementioned OT evaluation, functional performance deficits, and assessments, pt has been identified as a high complexity evaluation  Recommend STR upon D/C   Pt to continue to benefit from acute immediate OT services to address the following goals 3-5x/week to  w/in 7-10 days:      OT Discharge Recommendation: [address]  OT - OK to Discharge: Yes(when medically cleared)

## 2020-09-08 NOTE — CONSULTS
[address] NOTE   Admission Date: 9/7/2020    Patient Identifiers: Carito Tong (MRN: [id number])  Service Requesting Consultation: Ashley Tian MD  Service Providing Consultation:  Urology, Parag Hoff PA-C  Inpatient consult to Urology  Consult performed by: Parag Hoff PA-C  Consult ordered by: Dixie Jackson MD        Date of Service: 9/8/2020    Reason for Consultation:  Urinary retention and urinary tract infection    History of Present Illness:     Carito Tong is a 80 y o  male who was well known to me with a history of BPH and incomplete bladder emptying  He was seen in the office last week  Bladder scan was 635 mL  A Porter catheter was inserted  Urine culture was obtained and he was started on Macrobid  He has a history of transitional cell bladder cancer  His last cystoscopy showed no evidence of recurrence with an un obstructing prostate  H distress  e now presented from home with a fever of 102  He was brought to the ER because he was getting weaker  Pressure was in the 80s  Currently he sitting up in bed eating breakfast in no apparent distress  There was no imaging done  Creatinine is 0 80  WBC 8 54  Hospital T-max was a 100 3°      Past Medical, Past Surgical History:     Past Medical History:   Diagnosis Date    Anemia     [facility] )     Carotid artery occlusion     Cataract     COPD [facility]ronic [facility] disease) [facility]     Coronary artery disease     Hyperlipidemia     Hypertension     Hypothyroidism 9/15/2017    Multiple pulmonary nodules     Parkinson disease ([facility] )     Peptic ulcer     Scoliosis     SIRS (systemic inflammatory response syndrome) ([facility] ) 12/19/2019    Transient cerebral ischemia     Tremors of nervous system    :    Past Surgical History:   Procedure Laterality Date   [facility] SURGERY      1973, 1987, 2005    BUNIONECTOMY Left     Simple exostectomy (silver procedure)    CAROTID ENDARTERECTOMY Right 09/10/2008    Randy LEDBETTER MD    CATARACT EXTRACTION, BILATERAL      CERVICAL DISCECTOMY  1969    CORONARY ARTERY BYPASS GRAFT  10/20/2010    x3 (LIMA-LAD, SVG-OM, SVG-RCA)    CYSTOSCOPY      ELBOW SURGERY Right 07/2012    FEMORAL ARTERY - TIBIAL ARTERY BYPASS GRAFT  12/05/2011    using reversed saphenous vein from right leg     [address] MD    REPLACEMENT TOTAL KNEE Left     SHOULDER SURGERY Right 1997   :    Medications, Allergies:     Current Facility-Administered Medications:     acetaminophen (TYLENOL) tablet 650 mg, 650 mg, Oral, Q6H PRN, Gabriela Michael MD, 650 mg at 09/07/20 2359    aspirin chewable tablet 81 mg, 81 mg, Oral, Daily, Anisha George MD, 81 mg at 09/08/20 0924    ceftriaxone (ROCEPHIN) 1 g/50 mL in dextrose IVPB, 1,000 mg, Intravenous, Q24H, Anisha George MD    cholecalciferol (VITAMIN D3) tablet 2,000 Units, 2,000 Units, Oral, Daily, Gabriela Michael MD, 2,000 Units at 09/08/20 0925    enoxaparin (LOVENOX) subcutaneous injection 40 mg, 40 mg, Subcutaneous, Daily, Anisha George MD, 40 mg at 09/08/20 0930    finasteride (PROSCAR) tablet 5 mg, 5 mg, Oral, Daily, Gabriela Michael MD, 5 mg at 09/08/20 0925    levothyroxine tablet 75 mcg, 75 mcg, Oral, Early Morning, Anisha George MD, 75 mcg at 09/08/20 0534    metoprolol tartrate (LOPRESSOR) partial tablet 12 5 mg, 12 5 mg, Oral, Q12H [facility], Anisha George MD    multivitamin-minerals (CENTRUM) tablet 1 tablet, 1 tablet, Oral, Daily, Anisha George MD, 1 tablet at 09/08/20 0924    ondansetron (ZOFRAN) injection 4 mg, 4 mg, Intravenous, Q6H PRN, Anisha George MD    pantoprazole (PROTONIX) EC tablet 20 mg, 20 mg, Oral, Early Morning, Anisha George MD, 20 mg at 09/08/20 0534    pravastatin (PRAVACHOL) tablet 40 mg, 40 mg, Oral, Daily With Dinner, Gabriela Michael MD    tamsulosin (FLOMAX) capsule 0 4 mg, 0 4 mg, Oral, Daily With Steffen Ramirez MD, 0 4 mg at 20    tiotropium (SPIRIVA) capsule for inhaler 18 mcg, 18 mcg, Inhalation, Daily, Noe Cruz MD, 18 mcg at 20 0931    Allergies: Allergies   Allergen Reactions    Aleve [Naproxen]      Other reaction(s): FACIAL SWELLING  Category: Allergy; Annotation - [id number]: lip/eye edema    Augmentin [Amoxicillin-Pot Clavulanate] Diarrhea and Vomiting   :    Social and Family History:   Social History:   Social History     Tobacco Use    Smoking status: Former Smoker     Packs/day: 1 00     Years: 50 00     Pack years: 50 00     Types: Cigarettes     Start date:      Last attempt to quit:      Years since quittin 7    Smokeless tobacco: Never Used   Substance Use Topics    Alcohol use: Never     Frequency: Patient refused     Drinks per session: Patient refused     Binge frequency: Patient refused     Comment: Social     Drug use: Never     Social History     Tobacco Use   Smoking Status Former Smoker    Packs/day: 1 00    Years: 50 00    Pack years: 50 00    Types: Cigarettes    Start date:     Last attempt to quit: Malachi Tabby Years since quittin 7   Smokeless Tobacco Never Used       Family History:  Family History   Problem Relation Age of Onset    Cervical cancer Mother     Cervical cancer Sister     Heart disease Sister     Coronary artery disease Sister     Lung cancer Brother    :     Review of Systems:     General: Fever, chills, or night sweats: negative  Cardiac: Negative for chest pain  Pulmonary: Negative for shortness of breath  Gastrointestinal: Abdominal pain negative  Nausea, vomiting, or diarrhea negative,  Genitourinary: See HPI above  Patient does not have hematuria  All other systems queried were negative  Physical Exam:   General: Patient is pleasant and in NAD   Awake and alert  /54   Pulse 72   Temp 97 8 °F (36 6 °C) (Oral)   Resp 18   Ht 5' 7" (1 702 m)   Wt 67 1 kg (147 lb 14 9 oz)   SpO2 92%   BMI 23 17 kg/m²   HEENT:  No scleral icterus conjunctiva are clear  Constitutional:  pleasant and cooperative     no apparent distress  Cardiac: Peripheral edema: negative  Pulmonary: Non-labored breathing  Abdomen: Soft, non-tender, non-distended  No surgical scars  No masses, tenderness, hernias noted  Genitourinary: Negative CVA tenderness, negative suprapubic tenderness  Extremities:  Moves all extremities without any weakness or deformity  Neurological:CNII-XII intact  No numbness or tingling  Essentially non focal neurologic exam  Psychiatric:mood affect and behavior normal    (Male): Penis circumcised, phallus normal, meatus patent  Testicles descended into scrotum bilaterally without masses nor tenderness  No inguinal hernias bilaterally  CARTER: in place draining clear yellow urine  no clots and       Labs:     Lab Results   Component Value Date    HGB 12 4 09/08/2020    HCT 36 7 09/08/2020    WBC 8 54 09/08/2020     (L) 09/08/2020   ]    Lab Results   Component Value Date     06/25/2015    K 3 4 (L) 09/08/2020     09/08/2020    CO2 24 09/08/2020    BUN 13 09/08/2020    CREATININE 0 80 09/08/2020    CALCIUM 8 3 09/08/2020    GLUCOSE 95 06/25/2015   ]    Imaging:   I personally reviewed the images and report of the following studies, and reviewed them with the patient:  None    ASSESSMENT:     1  Urinary tract infection  2  Urinary retention  3  History of TCC bladder with no recurrence for more than 10 years    PLAN:   -maintain Carter catheter  -antibiotic management per the primary service  -will arrange for office follow-up as well as follow-up cystoscopy  Thank you for allowing me to participate in this patients care  Please do not hesitate to call with any additional questions    Epifanio Cameron PA-C

## 2020-09-08 NOTE — CASE MANAGEMENT
Met with pt and wife, explained CM program/CM role  LOS-1  Bundle-no  Unplanned readmission color-green  30 day readmission -no  Resides with wife in ranch home, 2 [address]ERRY,  I PTA for ADL's/ambulates with walker, does not drive, retired  PCP [employer]  Has prescription plan, uses [facility] [address]  [facility]  GISELLE  Has a walker  Denies IP Rehab  Denies MH Illness, D&A abuse  Primary contact wife [phone]  Has a POA-wife, LW, requested copies  Wife will drive home    CM reviewed d/c planning process including the following: identifying help at home, patient preference for d/c planning needs, Discharge Lounge, [facility] Meds to Bed program, availability of treatment team to discuss questions or concerns patient and/or family may have regarding understanding medications and recognizing signs and symptoms once discharged  CM also encouraged patient to follow up with all recommended appointments after discharge  Patient advised of importance for patient and family to participate in managing patients medical well being  Patient/caregiver received discharge checklist  Content reviewed  Patient/caregiver encouraged to participate in discharge plan of care prior to discharge home

## 2020-09-08 NOTE — OCCUPATIONAL THERAPY NOTE
Occupational Therapy Evaluation      Breanne Lanier    2020    Principal Problem:    Hypotension  Active Problems:    Severe aortic stenosis    PAF (paroxysmal atrial fibrillation) ([facility])    Benign prostatic hyperplasia    Carotid stenosis, bilateral    Coronary artery disease    Hypertension    Peripheral arterial disease (HCC)    Pulmonary emphysema (HCC)    Hyponatremia    UTI (urinary tract infection)    Urine retention      Past Medical History:   Diagnosis Date    Anemia     Bladder cancer ([facility] )     Carotid artery occlusion     Cataract     COPD (chronic obstructive pulmonary disease) ([place] [address] )     Coronary artery disease     Hyperlipidemia     Hypertension     Hypothyroidism 9/15/2017    Multiple pulmonary nodules     Parkinson disease ([facility] )     Peptic ulcer     Scoliosis     SIRS (systemic inflammatory response syndrome) ([place] [address] ) 2019    Transient cerebral ischemia     Tremors of nervous system        Past Surgical History:   Procedure Laterality Date   [facility] SURGERY      1973, ,     BUNIONECTOMY Left     Simple exostectomy (silver procedure)    CAROTID ENDARTERECTOMY Right 09/10/2008    Common  Melvena Score P MD    CATARACT EXTRACTION, BILATERAL      CERVICAL DISCECTOMY      CORONARY ARTERY BYPASS GRAFT  10/20/2010    x3 (LIMA-LAD, SVG-OM, SVG-RCA)    CYSTOSCOPY      ELBOW SURGERY Right 2012    FEMORAL ARTERY - TIBIAL ARTERY BYPASS GRAFT  2011    using reversed saphenous vein from right leg  [address] MD    REPLACEMENT TOTAL KNEE Left     SHOULDER SURGERY Right 19 1030   OT Last Visit   OT Visit Date 20  (goals  2020)   Note Type   Note type Eval/Treat   Restrictions/Precautions   Weight Bearing Precautions Per Order No   Other Precautions Cognitive; Chair Alarm; Bed Alarm;Multiple lines;Telemetry; Fall Risk;Pain;Hard of hearing   Pain Assessment   Pain Assessment Tool Pain Assessment not indicated - pt denies pain   Pain Score No Pain   Home Living   Type of [address] One level;Performs ADLs on one level; Able to live on main level with bedroom/bathroom;Stairs to enter with rails  ([facility]; 1STE w/ HR)   Bathroom Shower/Tub Tub/shower unit   Bathroom Toilet Standard   Bathroom Equipment Grab bars in shower; Shower chair   [address] Walker   Additional Comments Pt reports use of RW for all mobility PTA   Prior Function   Level of [place] Needs assistance with ADLs and functional mobility; Needs assistance with IADLs   Lives With Spouse   Receives Help From Family   ADL Assistance Needs assistance   IADLs Needs assistance   Falls in the last 6 months 1 to 4  (1 recently, per Pt)   Vocational Retired   Lifestyle   Autonomy Pt reports requiring A from spouse w/ LB bathing/dressing; spouse A w/ all IADLS; (-) drives; ambulates w/ RW PTA   Reciprocal Relationships Pt lives w/ spouse who is home and has been providing A as needed  Pt reports his spouse told him that she can no long continue to provide physical assist due to her own elderly age and health conditions  Service to Others Pt is retired  for [employer] reports enjoying being home    Psychosocial   Psychosocial (WDL) WDL   Subjective   Subjective "I fell recently and it was a bad fall"   ADL   Where Assessed Chair   Eating Assistance 5  Supervision/Setup   Grooming Assistance 5  Supervision/Setup   UB Bathing Assistance 4  Minimal Assistance   LB Bathing Assistance 3  Moderate Assistance   UB Dressing Assistance 4  Minimal Assistance   LB Dressing Assistance 2  Maximal [address]  2  Maximal Assistance   Bed Mobility   Supine to Sit Unable to assess   Sit to Supine Unable to assess   Additional Comments Pt seated OOB in chair upon OT arrival  Pt seated OOB in chair w/ chair alarm activated and all needs in reach s/p OT session     Transfers Sit to Stand 3  Moderate assistance   Additional items Assist x 1; Increased time required;Verbal cues;Armrests   Stand to Sit 3  Moderate assistance   Additional items Assist x 1; Increased time required;Verbal cues;Armrests   Additional Comments Transfers w/ RW  VC and TC required for safety and hand placement  Functional Mobility   Functional Mobility 4  Minimal assistance   Additional Comments Pt demonstrated short distance household mobility in hallway w/ RW at [address] level w/ SBA of 2nd for close chair follow and SBA of 3rd for line management  Pt w/ no notable LOB, however required VC and TC for safety, safe RW management, and to maintain upright posture t/o mobility  Pt presents w/ SOB and required seated rest break  OT educated Pt on diaphragmatic breathing techniques to decrease SOB - Pt demonstrated fair carryover  Additional items Rolling walker   Balance   Static Sitting Fair   Dynamic Sitting Fair -   Static Standing Poor +   Dynamic Standing Poor +   Ambulatory Poor +   Activity Tolerance   Activity Tolerance Patient limited by fatigue;Treatment limited secondary to medical complications (Comment)  (SOB)   Medical Staff Made Aware PT Gloria Nevarez; MELANIE for safe dispo planning   Nurse Made Aware RN cleared Pt for OT eval   RUE Assessment   RUE Assessment X  (decreased gross strength)   LUE Assessment   LUE Assessment X  (decreased gross strength)   Hand Function   Gross Motor Coordination Functional   Fine Motor Coordination Impaired   Sensation   Light Touch Partial deficits in the LLE  ("everything from my L knee down is completely numb")   Cognition   Overall Cognitive Status Impaired   Arousal/Participation Alert; Cooperative   Attention Attends with cues to redirect   Orientation Level Oriented X4   Memory Decreased recall of precautions;Decreased short term memory;Decreased recall of recent events   Following Commands Follows one step commands with increased time or repetition   Comments Pt is pleasant, cooperative, and motivated to participate in therapy this day  Pt presents w/ decreased insight into deficits and decreased safety awareness  Pt required VC and TC for all safety, sequencing, and redirection to task  Pt required increased time for processing and at times is slow to respond to questions  Pt noted to have difficulty w/ word finding and some STM deficits t/o session  Assessment   Limitation Decreased ADL status; Decreased UE strength;Decreased Safe judgement during ADL;Decreased cognition;Decreased endurance;Decreased fine motor control;Decreased high-level ADLs   Prognosis Fair   Assessment Pt is a 81 yo male seen for OT eval s/p adm to [facility] on 9/7/2020 w/ fever dx'd w/ hypotension  Pt  has a past medical history of Anemia, Bladder cancer ([facility] ), Carotid artery occlusion, Cataract, COPD (chronic obstructive pulmonary disease) ([facility] ), Coronary artery disease, Hyperlipidemia, Hypertension, Hypothyroidism (9/15/2017), Multiple pulmonary nodules, Parkinson disease ([facility] ), Peptic ulcer, Scoliosis, SIRS (systemic inflammatory response syndrome) ([facility] ) (12/19/2019), Transient cerebral ischemia, and Tremors of nervous system  Pt with active OT orders and up with assistance  orders  Pt is retired and resides w/ spouse in [facility] w/ 1STE w/ HR  Pt reports spouse has been providing A as needed, however can no longer provide assistance upon D/C  Pt required A from spouse w/  ADLS (A w/ LB bathing/dressing) and A w/ all IADLS, does not drive, & required use of DME PTA, including RW for all mobility  Pt is currently demonstrating the following occupational deficits: Min A UB bathing/dressing, Mod-Max A LB bathing/dressing and toileting, Mod A STS, Min A functional mobility w/ RW   These deficits that are impacting pt's baseline areas of occupation are a result of the following impairments: pain, endurance, activity tolerance, functional mobility, forward functional reach, balance, functional standing tolerance, unsupportive home environment, decreased I w/ ADLS/IADLS, strength, cognitive impairments, decreased safety awareness, decreased insight into deficits, impaired fine motor skills and coordination deficits  The following Occupational Performance Areas to address include: grooming, bathing/shower, toilet hygiene, dressing, health maintenance, functional mobility and clothing management  Based on the aforementioned OT evaluation, functional performance deficits, and assessments, pt has been identified as a high complexity evaluation  Recommend STR upon D/C  Pt to continue to benefit from acute immediate OT services to address the following goals 3-5x/week to  w/in 7-10 days:    Goals   Patient Goals To feel better   LTG Time Frame 7-10   Long Term Goal #1 Refer to goals below   Plan   Treatment Interventions ADL retraining;Functional transfer training;UE strengthening/ROM; Endurance training;Cognitive reorientation;Patient/family training;Equipment evaluation/education; Fine motor coordination activities; Compensatory technique education; Activityengagement; Energy conservation   Goal Expiration Date 20   OT Frequency 3-5x/wk   Recommendation   OT Discharge Recommendation Post-Acute Rehabilitation Services   OT - OK to Discharge Yes  (when medically cleared)   Modified [place] Scale   Modified [place] Scale 4         GOALS    1) Pt will improve activity tolerance to G for min 30 min txment sessions for increase engagement in functional tasks    2) Pt will complete UB dressing/self care w/ S using adaptive device and DME as needed    2) Pt will complete LB dressing/self care w/ Min A using adaptive device and DME as needed    3) Pt will complete bathing w/ Min A w/ use of AE and DME as needed    4) Pt will complete toileting w/ S w/ G hygiene/thoroughness using DME as needed    5) Pt will improve functional transfers to S on/off all surfaces using DME as needed w/ G balance/safety     6) Pt will improve functional mobility during ADL/IADL/leisure tasks to S using DME as needed w/ G balance/safety     7) Pt will participate in simulated IADL management task to increase independence to Min A w/ G safety and endurance    8) Pt will be attentive 100% of the time during ongoing cognitive assessment w/ G participation to assist w/ safe d/c planning/recommendations    9) Pt will demonstrate G carryover of pt/caregiver education and training as appropriate w/o cues w/ good tolerance to increase safety during functional tasks    10) Pt will demonstrate 100% carryover of energy conservation techniques t/o functional I/ADL/leisure tasks w/o cues s/p skilled education to increase endurance during functional tasks             Tony Mckay, MS, OTR/L

## 2020-09-08 NOTE — ASSESSMENT & PLAN NOTE
· In a patient with history of bladder cancer status post resection in 2004  · Followed up with urology last week as routine visit during which found to have urinary retention with bladder scan showing more than 600 cc hence Porter catheter was inserted on 09/02/2020  · Continue tamsulosin  and finasteride; awaiting urology recommendations

## 2020-09-08 NOTE — PLAN OF CARE
Problem: Potential for Falls  Goal: Patient will remain free of falls  Description: INTERVENTIONS:  - Assess patient frequently for physical needs  -  Identify cognitive and physical deficits and behaviors that affect risk of falls    -  [place] fall precautions as indicated by assessment   - Educate patient/family on patient safety including physical limitations  - Instruct patient to call for assistance with activity based on assessment  - Modify environment to reduce risk of injury  - Consider OT/PT consult to assist with strengthening/mobility  Outcome: Progressing     Problem: Prexisting or High Potential for Compromised Skin Integrity  Goal: Skin integrity is maintained or improved  Description: INTERVENTIONS:  - Identify patients at risk for skin breakdown  - Assess and monitor skin integrity  - Assess and monitor nutrition and hydration status  - Monitor labs   - Assess for incontinence   - Turn and reposition patient  - Assist with mobility/ambulation  - Relieve pressure over bony prominences  - Avoid friction and shearing  - Provide appropriate hygiene as needed including keeping skin clean and dry  - Evaluate need for skin moisturizer/barrier cream  - Collaborate with interdisciplinary team   - Patient/family teaching  - Consider wound care consult   Outcome: Progressing     Problem: GENITOURINARY - ADULT  Goal: Maintains or returns to baseline urinary function  Description: INTERVENTIONS:  - Assess urinary function  - Encourage oral fluids to ensure adequate hydration if ordered  - Administer IV fluids as ordered to ensure adequate hydration  - Administer ordered medications as needed  - Offer frequent toileting  - Follow urinary retention protocol if ordered  Outcome: Progressing  Goal: Absence of urinary retention  Description: INTERVENTIONS:  - Assess patients ability to void and empty bladder  - Monitor I/O  - Bladder scan as needed  - Discuss with physician/AP medications to alleviate retention as needed  - Discuss catheterization for long term situations as appropriate  Outcome: Progressing  Goal: Urinary catheter remains patent  Description: INTERVENTIONS:  - Assess patency of urinary catheter  - If patient has a chronic chavis, consider changing catheter if non-functioning  - Follow guidelines for intermittent irrigation of non-functioning urinary catheter  Outcome: Progressing     Problem: SKIN/TISSUE INTEGRITY - ADULT  Goal: Skin integrity remains intact  Description: INTERVENTIONS  - Identify patients at risk for skin breakdown  - Assess and monitor skin integrity  - Assess and monitor nutrition and hydration status  - Monitor labs (i e  albumin)  - Assess for incontinence   - Turn and reposition patient  - Assist with mobility/ambulation  - Relieve pressure over bony prominences  - Avoid friction and shearing  - Provide appropriate hygiene as needed including keeping skin clean and dry  - Evaluate need for skin moisturizer/barrier cream  - Collaborate with interdisciplinary team (i e  Nutrition, Rehabilitation, etc )   - Patient/family teaching  Outcome: Progressing  Goal: Oral mucous membranes remain intact  Description: INTERVENTIONS  - Assess oral mucosa and hygiene practices  - Implement preventative oral hygiene regimen  - Implement oral medicated treatments as ordered  - Initiate Nutrition services referral as needed  Outcome: Progressing     Problem: MUSCULOSKELETAL - ADULT  Goal: Maintain or return mobility to safest level of function  Description: INTERVENTIONS:  - Assess patient's ability to carry out ADLs; assess patient's baseline for ADL function and identify physical deficits which impact ability to perform ADLs (bathing, care of mouth/teeth, toileting, grooming, dressing, etc )  - Assess/evaluate cause of self-care deficits   - Assess range of motion  - Assess patient's mobility  - Assess patient's need for assistive devices and provide as appropriate  - Encourage maximum independence but intervene and supervise when necessary  - Involve family in performance of ADLs  - Assess for home care needs following discharge   - Consider OT consult to assist with ADL evaluation and planning for discharge  - Provide patient education as appropriate  Outcome: Progressing     Problem: PAIN - ADULT  Goal: Verbalizes/displays adequate comfort level or baseline comfort level  Description: Interventions:  - Encourage patient to monitor pain and request assistance  - Assess pain using appropriate pain scale  - Administer analgesics based on type and severity of pain and evaluate response  - Implement non-pharmacological measures as appropriate and evaluate response  - Consider cultural and social influences on pain and pain management  - Notify physician/advanced practitioner if interventions unsuccessful or patient reports new pain  Outcome: Progressing     Problem: INFECTION - ADULT  Goal: Absence or prevention of progression during hospitalization  Description: INTERVENTIONS:  - Assess and monitor for signs and symptoms of infection  - Monitor lab/diagnostic results  - Monitor all insertion sites, i e  indwelling lines, tubes, and drains  - Monitor endotracheal if appropriate and nasal secretions for changes in amount and color  - [place] appropriate cooling/warming therapies per order  - Administer medications as ordered  - Instruct and encourage patient and family to use good hand hygiene technique  - Identify and instruct in appropriate isolation precautions for identified infection/condition  Outcome: Progressing  Goal: Absence of fever/infection during neutropenic period  Description: INTERVENTIONS:  - Monitor WBC    Outcome: Progressing     Problem: SAFETY ADULT  Goal: Patient will remain free of falls  Description: INTERVENTIONS:  - Assess patient frequently for physical needs  -  Identify cognitive and physical deficits and behaviors that affect risk of falls    -  [place] fall precautions as indicated by assessment   - Educate patient/family on patient safety including physical limitations  - Instruct patient to call for assistance with activity based on assessment  - Modify environment to reduce risk of injury  - Consider OT/PT consult to assist with strengthening/mobility  Outcome: Progressing  Goal: Maintain or return to baseline ADL function  Description: INTERVENTIONS:  -  Assess patient's ability to carry out ADLs; assess patient's baseline for ADL function and identify physical deficits which impact ability to perform ADLs (bathing, care of mouth/teeth, toileting, grooming, dressing, etc )  - Assess/evaluate cause of self-care deficits   - Assess range of motion  - Assess patient's mobility; develop plan if impaired  - Assess patient's need for assistive devices and provide as appropriate  - Encourage maximum independence but intervene and supervise when necessary  - Involve family in performance of ADLs  - Assess for home care needs following discharge   - Consider OT consult to assist with ADL evaluation and planning for discharge  - Provide patient education as appropriate  Outcome: Progressing  Goal: Maintain or return mobility status to optimal level  Description: INTERVENTIONS:  - Assess patient's baseline mobility status (ambulation, transfers, stairs, etc )    - Identify cognitive and physical deficits and behaviors that affect mobility  - Identify mobility aids required to assist with transfers and/or ambulation (gait belt, sit-to-stand, lift, walker, cane, etc )  - [place] fall precautions as indicated by assessment  - Record patient progress and toleration of activity level on Mobility SBAR; progress patient to next Phase/Stage  - Instruct patient to call for assistance with activity based on assessment  - Consider rehabilitation consult to assist with strengthening/weightbearing, etc   Outcome: Progressing     Problem: DISCHARGE PLANNING  Goal: Discharge to home or other facility with appropriate resources  Description: INTERVENTIONS:  - Identify barriers to discharge w/patient and caregiver  - Arrange for needed discharge resources and transportation as appropriate  - Identify discharge learning needs (meds, wound care, etc )  - Arrange for interpretive services to assist at discharge as needed  - Refer to Case Management Department for coordinating discharge planning if the patient needs post-hospital services based on physician/advanced practitioner order or complex needs related to functional status, cognitive ability, or social support system  Outcome: Progressing     Problem: Knowledge Deficit  Goal: Patient/family/caregiver demonstrates understanding of disease process, treatment plan, medications, and discharge instructions  Description: Complete learning assessment and assess knowledge base    Interventions:  - Provide teaching at level of understanding  - Provide teaching via preferred learning methods  Outcome: Progressing

## 2020-09-08 NOTE — RESTORATIVE TECHNICIAN NOTE
Restorative Specialist Mobility Note       Activity: Commode, Stand at bedside, Turn, Chair     Assistive Device: Front wheel walker        Repositioned: Sitting, Up in chair

## 2020-09-08 NOTE — ASSESSMENT & PLAN NOTE
· Follows with vascular as outpatient  · History of right carotid endarterectomy  · Continue aspirin, statin

## 2020-09-08 NOTE — PLAN OF CARE
Problem: PHYSICAL THERAPY ADULT  Goal: Performs mobility at highest level of function for planned discharge setting  See evaluation for individualized goals  Description: Treatment/Interventions: Functional transfer training, LE strengthening/ROM, Therapeutic exercise, Endurance training, Patient/family training, Equipment eval/education, Bed mobility, Gait training, Spoke to nursing  Equipment Recommended: Marsha Abel       See flowsheet documentation for full assessment, interventions and recommendations  Note: Prognosis: Good  Problem List: Decreased strength, Decreased range of motion, Decreased endurance, Impaired balance, Decreased coordination, Decreased mobility, Decreased cognition, Decreased safety awareness, Impaired hearing, Impaired sensation  Assessment: Pt seen for high complexity PT evaluation due to decrease in functional mobility status compared to baseline  Pt with active PT eval/treat and up with assist orders at this time  Pt is a 80 y o  yo M who [facility] with hypotension on 9/7/20  Pt  has a past medical history of Anemia, Bladder cancer ([facility] ), Carotid artery occlusion, Cataract, COPD (chronic obstructive pulmonary disease) ([facility] ), Coronary artery disease, Hyperlipidemia, Hypertension, Hypothyroidism (9/15/2017), Multiple pulmonary nodules, Parkinson disease ([facility] ), Peptic ulcer, Scoliosis, SIRS (systemic inflammatory response syndrome) ([facility] ) (12/19/2019), Transient cerebral ischemia, and Tremors of nervous system  Pt resides with spouse in [facility] with 1STE  Pt presents with decreased strength, balance, endurance that contribute to limitations in bed mobility, functional transfers, functional mobility  Pt requires Mod A for STS and Min A for ambulation with RW at this time  Pt left upright in bedside chair with chair alarm intact and with all needs in reach  Pt will benefit from skilled therapy in order to address current impairments and functional limitations   PT to follow pt and recommending rehab once medically cleared  Barriers to Discharge: Decreased caregiver support, Inaccessible home environment     PT Discharge Recommendation: [id number]     PT - OK to Discharge: Yes(to rehab once medically cleared)    See flowsheet documentation for full assessment

## 2020-09-08 NOTE — ASSESSMENT & PLAN NOTE
· In the setting of infection suspected UTI  · Doesn't meet all SIRS criteria  Fever of 102 at home  White count 10 1, lactic acid 1 3  · Plus on Lasix and metoprolol at home  · Will treat him with IV fluids, monitor volume status given known case of severe AS  · Status post 1 L IV fluids in ED, another L running at 200 cc an hour    Will order for 50 cc an hour after that for another 8 hours

## 2020-09-08 NOTE — ASSESSMENT & PLAN NOTE
· Progressed from moderate to severe in his recent echo performed in July 2020 be  · Referred by Cardiology the cardiothoracic surgery and workup initiated for TAVR  · Monitor volume status while receiving IV fluids  · Currently has some basal crackles, distant breath sounds due to COPD  · With saturating 94% on room air at rest  · Hold home Lasix for now while receiving IV fluids    Took his dose this morning at home  · Check BMP and oxygen status tomorrow morning  · Plan to restart Lasix tomorrow

## 2020-09-08 NOTE — ASSESSMENT & PLAN NOTE
· Complicated UTI in the setting of Porter catheter insertion for urinary retention  · Porter inserted 09/02/2020  · Cultures from 09/04/2020 growing Klebsiella oxytoca resistant to ampicillin and Ancef  · Urinalysis again performed today positive for nitrites, leukocytes, innumerable bacteria, 20-30 WBC  Follow up final urine cultures  Also follow-up blood cultures sent from today  · Status post IV ceftriaxone ED, will continue that  · Will consult urology  · Continue Porter catheter    9/8-white count declined 8 54 overnight; patient remains afebrile and hypertension has resolved   -awaiting urology consult; patient with 3 urinary retention on finasteride and Flomax  - Blood cultures and urine cultures pending; will consider deescalating antibiotic therapy to orals based on 9/4 sensitivities

## 2020-09-08 NOTE — TELEPHONE ENCOUNTER
Spoke to Patient's wife Nirmala Madrigal who reported patient currently hospitalized and getting IV antibiotics  She wanted to let us know he will not be getting his US tomorrow and wondered if he could have it done in the hospital - encouraged to inform his nurse to question if it could be done otherwise just schedule after discharge and before upcoming cysto  Patient also requesting if chavis could possibly be removed prior to cysto as they are worried about infection  Discussed need for chavis, upcoming cysto, and void trials at length with Patient  Informed that would route to provider to review and advise and call back with recommendations  Repeats back understanding and agrees with plan

## 2020-09-08 NOTE — ASSESSMENT & PLAN NOTE
· In the setting of infection suspected UTI  · Doesn't meet all SIRS criteria  Fever of 102 at home  White count 10 1, lactic acid 1 3  · Plus on Lasix and metoprolol at home  · Will treat him with IV fluids, monitor volume status given known case of severe AS  · Status post 1 L IV fluids in ED, another L running at 200 cc an hour    Will order for 50 cc an hour after that for another 8 hours    9/8-resolved overnight

## 2020-09-08 NOTE — ASSESSMENT & PLAN NOTE
· In a patient with history of bladder cancer status post resection in 2004  · Followed up with urology last week as routine visit during which found to have urinary retention with bladder scan showing more than 600 cc hence Porter catheter was inserted on 09/02/2020  · Continue tamsulosin in place of his home alfuzosin and finasteride

## 2020-09-08 NOTE — ASSESSMENT & PLAN NOTE
· Suspect hypovolemic hyponatremia in the setting of infection  · Treated with IV fluids  · Repeat BMP in a [facility]

## 2020-09-08 NOTE — ASSESSMENT & PLAN NOTE
· Complicated UTI in the setting of Porter catheter insertion for urinary retention  · Porter inserted 09/02/2020  · Cultures from 09/04/2020 growing Klebsiella oxytoca resistant to ampicillin and Ancef  · Urinalysis again performed today positive for nitrites, leukocytes, innumerable bacteria, 20-30 WBC  Follow up final urine cultures    Also follow-up blood cultures sent from today  · Status post IV ceftriaxone ED, will continue that  · Will consult urology  · Continue Porter catheter

## 2020-09-09 ENCOUNTER — APPOINTMENT (OUTPATIENT)
Dept: NON INVASIVE DIAGNOSTICS | Facility: HOSPITAL | Age: 85
DRG: 699 | End: 2020-09-09
Payer: MEDICARE

## 2020-09-09 LAB
ALBUMIN SERPL BCP-MCNC: 2.8 G/DL (ref 3.5–5)
ALP SERPL-CCNC: 91 U/L (ref 46–116)
ALT SERPL W P-5'-P-CCNC: 17 U/L (ref 12–78)
ANION GAP SERPL CALCULATED.3IONS-SCNC: 4 MMOL/L (ref 4–13)
AST SERPL W P-5'-P-CCNC: 22 U/L (ref 5–45)
BASOPHILS # BLD AUTO: 0.01 THOUSANDS/ΜL (ref 0–0.1)
BASOPHILS NFR BLD AUTO: 0 % (ref 0–1)
BILIRUB SERPL-MCNC: 0.4 MG/DL (ref 0.2–1)
BUN SERPL-MCNC: 14 MG/DL (ref 5–25)
CALCIUM SERPL-MCNC: 8.5 MG/DL (ref 8.3–10.1)
CHLORIDE SERPL-SCNC: 108 MMOL/L (ref 100–108)
CO2 SERPL-SCNC: 25 MMOL/L (ref 21–32)
CREAT SERPL-MCNC: 0.78 MG/DL (ref 0.6–1.3)
EOSINOPHIL # BLD AUTO: 0.21 THOUSAND/ΜL (ref 0–0.61)
EOSINOPHIL NFR BLD AUTO: 3 % (ref 0–6)
ERYTHROCYTE [DISTWIDTH] IN BLOOD BY AUTOMATED COUNT: 12.3 % (ref 11.6–15.1)
GFR SERPL CREATININE-BSD FRML MDRD: 81 ML/MIN/1.73SQ M
GLUCOSE SERPL-MCNC: 95 MG/DL (ref 65–140)
HCT VFR BLD AUTO: 36 % (ref 36.5–49.3)
HGB BLD-MCNC: 11.8 G/DL (ref 12–17)
IMM GRANULOCYTES # BLD AUTO: 0.02 THOUSAND/UL (ref 0–0.2)
IMM GRANULOCYTES NFR BLD AUTO: 0 % (ref 0–2)
LYMPHOCYTES # BLD AUTO: 0.79 THOUSANDS/ΜL (ref 0.6–4.47)
LYMPHOCYTES NFR BLD AUTO: 11 % (ref 14–44)
MAGNESIUM SERPL-MCNC: 2.4 MG/DL (ref 1.6–2.6)
MCH RBC QN AUTO: 31.8 PG (ref 26.8–34.3)
MCHC RBC AUTO-ENTMCNC: 32.8 G/DL (ref 31.4–37.4)
MCV RBC AUTO: 97 FL (ref 82–98)
MONOCYTES # BLD AUTO: 1.24 THOUSAND/ΜL (ref 0.17–1.22)
MONOCYTES NFR BLD AUTO: 17 % (ref 4–12)
NEUTROPHILS # BLD AUTO: 4.92 THOUSANDS/ΜL (ref 1.85–7.62)
NEUTS SEG NFR BLD AUTO: 69 % (ref 43–75)
NRBC BLD AUTO-RTO: 0 /100 WBCS
PHOSPHATE SERPL-MCNC: 2.3 MG/DL (ref 2.3–4.1)
PLATELET # BLD AUTO: 133 THOUSANDS/UL (ref 149–390)
PMV BLD AUTO: 10.4 FL (ref 8.9–12.7)
POTASSIUM SERPL-SCNC: 4.1 MMOL/L (ref 3.5–5.3)
PROT SERPL-MCNC: 6.3 G/DL (ref 6.4–8.2)
RBC # BLD AUTO: 3.71 MILLION/UL (ref 3.88–5.62)
SODIUM SERPL-SCNC: 137 MMOL/L (ref 136–145)
WBC # BLD AUTO: 7.19 THOUSAND/UL (ref 4.31–10.16)

## 2020-09-09 PROCEDURE — C1894 INTRO/SHEATH, NON-LASER: HCPCS | Performed by: NURSE PRACTITIONER

## 2020-09-09 PROCEDURE — 80053 COMPREHEN METABOLIC PANEL: CPT | Performed by: INTERNAL MEDICINE

## 2020-09-09 PROCEDURE — 83735 ASSAY OF MAGNESIUM: CPT | Performed by: INTERNAL MEDICINE

## 2020-09-09 PROCEDURE — 99153 MOD SED SAME PHYS/QHP EA: CPT | Performed by: NURSE PRACTITIONER

## 2020-09-09 PROCEDURE — 93455 CORONARY ART/GRFT ANGIO S&I: CPT | Performed by: INTERNAL MEDICINE

## 2020-09-09 PROCEDURE — B215YZZ FLUOROSCOPY OF LEFT HEART USING OTHER CONTRAST: ICD-10-PCS | Performed by: INTERNAL MEDICINE

## 2020-09-09 PROCEDURE — 4A0335C MEASUREMENT OF ARTERIAL FLOW, CORONARY, PERCUTANEOUS APPROACH: ICD-10-PCS | Performed by: INTERNAL MEDICINE

## 2020-09-09 PROCEDURE — C1769 GUIDE WIRE: HCPCS | Performed by: NURSE PRACTITIONER

## 2020-09-09 PROCEDURE — 99152 MOD SED SAME PHYS/QHP 5/>YRS: CPT | Performed by: NURSE PRACTITIONER

## 2020-09-09 PROCEDURE — 84100 ASSAY OF PHOSPHORUS: CPT | Performed by: INTERNAL MEDICINE

## 2020-09-09 PROCEDURE — 99232 SBSQ HOSP IP/OBS MODERATE 35: CPT | Performed by: INTERNAL MEDICINE

## 2020-09-09 PROCEDURE — 99152 MOD SED SAME PHYS/QHP 5/>YRS: CPT | Performed by: INTERNAL MEDICINE

## 2020-09-09 PROCEDURE — 4A023N7 MEASUREMENT OF CARDIAC SAMPLING AND PRESSURE, LEFT HEART, PERCUTANEOUS APPROACH: ICD-10-PCS | Performed by: INTERNAL MEDICINE

## 2020-09-09 PROCEDURE — 99232 SBSQ HOSP IP/OBS MODERATE 35: CPT | Performed by: PHYSICIAN ASSISTANT

## 2020-09-09 PROCEDURE — B211YZZ FLUOROSCOPY OF MULTIPLE CORONARY ARTERIES USING OTHER CONTRAST: ICD-10-PCS | Performed by: INTERNAL MEDICINE

## 2020-09-09 PROCEDURE — 85025 COMPLETE CBC W/AUTO DIFF WBC: CPT | Performed by: INTERNAL MEDICINE

## 2020-09-09 PROCEDURE — B218YZZ FLUOROSCOPY OF LEFT INTERNAL MAMMARY BYPASS GRAFT USING OTHER CONTRAST: ICD-10-PCS | Performed by: INTERNAL MEDICINE

## 2020-09-09 PROCEDURE — B213YZZ FLUOROSCOPY OF MULTIPLE CORONARY ARTERY BYPASS GRAFTS USING OTHER CONTRAST: ICD-10-PCS | Performed by: INTERNAL MEDICINE

## 2020-09-09 PROCEDURE — 93455 CORONARY ART/GRFT ANGIO S&I: CPT | Performed by: NURSE PRACTITIONER

## 2020-09-09 RX ORDER — MIDAZOLAM HYDROCHLORIDE 2 MG/2ML
INJECTION, SOLUTION INTRAMUSCULAR; INTRAVENOUS CODE/TRAUMA/SEDATION MEDICATION
Status: COMPLETED | OUTPATIENT
Start: 2020-09-09 | End: 2020-09-09

## 2020-09-09 RX ORDER — FENTANYL CITRATE 50 UG/ML
INJECTION, SOLUTION INTRAMUSCULAR; INTRAVENOUS CODE/TRAUMA/SEDATION MEDICATION
Status: COMPLETED | OUTPATIENT
Start: 2020-09-09 | End: 2020-09-09

## 2020-09-09 RX ORDER — SODIUM CHLORIDE 9 MG/ML
75 INJECTION, SOLUTION INTRAVENOUS CONTINUOUS
Status: DISPENSED | OUTPATIENT
Start: 2020-09-09 | End: 2020-09-09

## 2020-09-09 RX ORDER — NITROFURANTOIN 25; 75 MG/1; MG/1
100 CAPSULE ORAL 2 TIMES DAILY WITH MEALS
Status: DISCONTINUED | OUTPATIENT
Start: 2020-09-09 | End: 2020-09-09

## 2020-09-09 RX ORDER — LIDOCAINE HYDROCHLORIDE 10 MG/ML
INJECTION, SOLUTION EPIDURAL; INFILTRATION; INTRACAUDAL; PERINEURAL CODE/TRAUMA/SEDATION MEDICATION
Status: COMPLETED | OUTPATIENT
Start: 2020-09-09 | End: 2020-09-09

## 2020-09-09 RX ORDER — ONDANSETRON 2 MG/ML
4 INJECTION INTRAMUSCULAR; INTRAVENOUS EVERY 6 HOURS PRN
Status: DISCONTINUED | OUTPATIENT
Start: 2020-09-09 | End: 2020-09-09

## 2020-09-09 RX ORDER — ACETAMINOPHEN 325 MG/1
650 TABLET ORAL EVERY 4 HOURS PRN
Status: DISCONTINUED | OUTPATIENT
Start: 2020-09-09 | End: 2020-09-10 | Stop reason: HOSPADM

## 2020-09-09 RX ORDER — CEFDINIR 300 MG/1
300 CAPSULE ORAL EVERY 12 HOURS SCHEDULED
Status: DISCONTINUED | OUTPATIENT
Start: 2020-09-09 | End: 2020-09-10 | Stop reason: HOSPADM

## 2020-09-09 RX ADMIN — PANTOPRAZOLE SODIUM 20 MG: 20 TABLET, DELAYED RELEASE ORAL at 05:09

## 2020-09-09 RX ADMIN — Medication 12.5 MG: at 08:40

## 2020-09-09 RX ADMIN — LIDOCAINE HYDROCHLORIDE 1 ML: 10 INJECTION, SOLUTION EPIDURAL; INFILTRATION; INTRACAUDAL; PERINEURAL at 11:01

## 2020-09-09 RX ADMIN — TIOTROPIUM BROMIDE 18 MCG: 18 CAPSULE ORAL; RESPIRATORY (INHALATION) at 08:41

## 2020-09-09 RX ADMIN — ENOXAPARIN SODIUM 40 MG: 40 INJECTION SUBCUTANEOUS at 08:41

## 2020-09-09 RX ADMIN — SODIUM CHLORIDE 75 ML/HR: 0.9 INJECTION, SOLUTION INTRAVENOUS at 12:06

## 2020-09-09 RX ADMIN — IOHEXOL 80 ML: 350 INJECTION, SOLUTION INTRAVENOUS at 11:32

## 2020-09-09 RX ADMIN — Medication 2000 UNITS: at 08:40

## 2020-09-09 RX ADMIN — TAMSULOSIN HYDROCHLORIDE 0.4 MG: 0.4 CAPSULE ORAL at 16:52

## 2020-09-09 RX ADMIN — ASPIRIN 81 MG CHEWABLE TABLET 81 MG: 81 TABLET CHEWABLE at 08:40

## 2020-09-09 RX ADMIN — FINASTERIDE 5 MG: 5 TABLET, FILM COATED ORAL at 08:40

## 2020-09-09 RX ADMIN — MIDAZOLAM 1 MG: 1 INJECTION INTRAMUSCULAR; INTRAVENOUS at 10:59

## 2020-09-09 RX ADMIN — CEFDINIR 300 MG: 300 CAPSULE ORAL at 16:52

## 2020-09-09 RX ADMIN — PRAVASTATIN SODIUM 40 MG: 40 TABLET ORAL at 16:52

## 2020-09-09 RX ADMIN — Medication 1 TABLET: at 08:40

## 2020-09-09 RX ADMIN — LEVOTHYROXINE SODIUM 75 MCG: 75 TABLET ORAL at 05:09

## 2020-09-09 RX ADMIN — FENTANYL CITRATE 50 MCG: 50 INJECTION, SOLUTION INTRAMUSCULAR; INTRAVENOUS at 10:59

## 2020-09-09 NOTE — PROGRESS NOTES
UROLOGY PROGRESS NOTE   Patient Identifiers: Kendra Rosario (MRN [id number])  Date of Service: 9/9/2020    Subjective:    Awake and alert  No complaint of pain  Afebrile  Creatinine 0 78  WBC 7 19  Urine is clear  Patient has  no complaints        Objective:     VITALS:    Vitals:    09/09/20 0755   BP: 131/62   Pulse: 75   Resp: 18   Temp: 98 2 °F (36 8 °C)   SpO2: 93%           LABS:  Lab Results   Component Value Date    HGB 11 8 (L) 09/09/2020    HCT 36 0 (L) 09/09/2020    WBC 7 19 09/09/2020     (L) 09/09/2020   ]    Lab Results   Component Value Date     06/25/2015    K 4 1 09/09/2020     09/09/2020    CO2 25 09/09/2020    BUN 14 09/09/2020    CREATININE 0 78 09/09/2020    CALCIUM 8 5 09/09/2020    GLUCOSE 95 06/25/2015   ]        INPATIENT MEDS:    Current Facility-Administered Medications:     acetaminophen (TYLENOL) tablet 650 mg, 650 mg, Oral, Q6H PRN, Rome Frederick MD, 650 mg at 09/07/20 2359    aspirin chewable tablet 81 mg, 81 mg, Oral, Daily, Rome Frederick MD, 81 mg at 09/09/20 0840    cholecalciferol (VITAMIN D3) tablet 2,000 Units, 2,000 Units, Oral, Daily, Rome Frederick MD, 2,000 Units at 09/09/20 0840    enoxaparin (LOVENOX) subcutaneous injection 40 mg, 40 mg, Subcutaneous, Daily, Rome Frederick MD, 40 mg at 09/09/20 0841    finasteride (PROSCAR) tablet 5 mg, 5 mg, Oral, Daily, Rome Frederick MD, 5 mg at 09/09/20 0840    levothyroxine tablet 75 mcg, 75 mcg, Oral, Early Morning, Anisha George MD, 75 mcg at 09/09/20 0509    metoprolol tartrate (LOPRESSOR) partial tablet 12 5 mg, 12 5 mg, Oral, Q12H [address], Rome Frederick MD, 12 5 mg at 09/09/20 0840    multivitamin-minerals (CENTRUM) tablet 1 tablet, 1 tablet, Oral, Daily, Rome Frederick MD, 1 tablet at 09/09/20 0840    nitrofurantoin (MACROBID) extended-release capsule 100 mg, 100 mg, Oral, BID With Meals, Mariusz Smiley MD    ondansetron [facility]) injection 4 mg, 4 mg, Intravenous, Q6H PRN, Shantanu Machado MD    pantoprazole (PROTONIX) EC tablet 20 mg, 20 mg, Oral, Early Morning, Anisha George MD, 20 mg at 09/09/20 0509    pravastatin (PRAVACHOL) tablet 40 mg, 40 mg, Oral, Daily With Yoli Lynn MD, 40 mg at 09/08/20 1729    tamsulosin (FLOMAX) capsule 0 4 mg, 0 4 mg, Oral, Daily With Yoli Lynn MD, 0 4 mg at 09/08/20 1729    tiotropium (SPIRIVA) capsule for inhaler 18 mcg, 18 mcg, Inhalation, Daily, Shantanu Machado MD, 18 mcg at 09/09/20 0841      Physical Exam:   /62 (BP Location: Right arm)   Pulse 75   Temp 98 2 °F (36 8 °C) (Oral)   Resp 18   Ht 5' 7" (1 702 m)   Wt 67 1 kg (147 lb 14 9 oz)   SpO2 93%   BMI 23 17 kg/m²   GEN: no acute distress    RESP: breathing comfortably with no accessory muscle use    ABD: soft, non-tender, non-distended   INCISION:    EXT: no significant peripheral edema     CARTER: in place draining clear yellow urine  no clots and       RADIOLOGY:   None     Assessment:   1  Urinary tract infection  2  Urinary retention  3   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

## 2020-09-09 NOTE — PROGRESS NOTES
Progress Note - Deb Ear 1933, 80 y o  male MRN: [id number]    Unit/Bed#: [facility] 507-01 Encounter: [id number]    Primary Care Provider: Mikayla Mendenhall MD   Date and time admitted to hospital: 9/7/2020  4:26 PM        Urine retention  Assessment & Plan  · In a patient with history of bladder cancer status post resection in 2004  · Followed up with urology last week as routine visit during which found to have urinary retention with bladder scan showing more than 600 cc hence Porter catheter was inserted on 09/02/2020  · Continue tamsulosin  and finasteride; awaiting urology recommendations  9/9-appreciate Neurology recommendations, have started following and will schedule for outpatient follow-up when patient stable for discharge  UTI (urinary tract infection)  Assessment & Plan  · Complicated UTI in the setting of Porter catheter insertion for urinary retention  · Porter inserted 09/02/2020  · Cultures from 09/04/2020 growing Klebsiella oxytoca resistant to ampicillin and Ancef  · Urinalysis again performed today positive for nitrites, leukocytes, innumerable bacteria, 20-30 WBC  Follow up final urine cultures  Also follow-up blood cultures sent from today  · Status post IV ceftriaxone ED, will continue that  · Will consult urology  · Continue Porter catheter    9/8-white count declined 8 54 overnight; patient remains afebrile and hypertension has resolved   -awaiting urology consult; patient with 3 urinary retention on finasteride and Flomax  - Blood cultures and urine cultures pending; will consider deescalating antibiotic therapy to orals based on 9/4 sensitivities  9/9-blood cultures negative X 2 at 24 hours  ; will transition to oral antibiotics    Hyponatremia  Assessment & Plan  · Suspect hypovolemic hyponatremia in the setting of infection  · Treated with IV fluids  · Repeat BMP in a m     9/8-resolved    Pulmonary emphysema (HCC)  Assessment & Plan  · Continue home inhalers  · Not in exacerbation    Peripheral arterial disease ([facility] [address] )  Assessment & Plan  · Follows with vascular as outpatient    Hypertension  Assessment & Plan  Restart home medications once hypotension has resolved    Coronary artery disease  Assessment & Plan  · History of CAD status post CABG in 2010  · Continue aspirin, statin, beta-blocker to be started from tomorrow at low home dose metoprolol 12 5 b i d   · Follows with cardiology as outpatient    Carotid stenosis, bilateral  Assessment & Plan  · Follows with vascular as outpatient  · History of right carotid endarterectomy  · Continue aspirin, statin    Benign prostatic hyperplasia  Assessment & Plan  Appreciate urology assessment; Porter placement; patient to DC with Porter with outpatient follow-up in cystoscopy  PAF (paroxysmal atrial fibrillation) ([facility])  Assessment & Plan  · Continue metoprolol  · Normal sinus rhythm    Severe aortic stenosis  Assessment & Plan  · Progressed from moderate to severe in his recent echo performed in July 2020 be  · Referred by Cardiology the cardiothoracic surgery and workup initiated for TAVR  · Monitor volume status while receiving IV fluids  · Currently has some basal crackles, distant breath sounds due to COPD  · With saturating 94% on room air at rest  · Hold home Lasix for now while receiving IV fluids  Took his dose this morning at home  · Check BMP and oxygen status tomorrow morning  · Plan to restart Lasix tomorrow    9/9-patient scheduled for outpatient catheterization on 09/10; however cards may be able to do appropriate studies today   -made NPO for possible cath lab this afternoon; TAVR workup  -vital signs stable    * Hypotension  Assessment & Plan  · In the setting of infection suspected UTI  · Doesn't meet all SIRS criteria  Fever of 102 at home    White count 10 1, lactic acid 1 3  · Plus on Lasix and metoprolol at home  · Will treat him with IV fluids, monitor volume status given known case of severe AS  · Status post 1 L IV fluids in ED, another L running at 200 cc an hour  Will order for 50 cc an hour after that for another 8 hours    -resolved overnight    -appreciate PT/OT recs; recommend follow-up rehabilitation  Case management seeking appropriate facilities  VTE Pharmacologic Prophylaxis:   Pharmacologic: Enoxaparin (Lovenox)  Mechanical VTE Prophylaxis in Place: Yes    Patient Centered Rounds: I have performed bedside rounds with nursing staff today  Discussions with Specialists or Other Care Team Provider: cardiology    Education and Discussions with Family / Patient: Discussed treatment plan with family and patient who agree with current plan; encouraged to ask questions and participate  Time Spent for Care: 30 minutes  More than 50% of total time spent on counseling and coordination of care as described above  Current Length of Stay: 2 day(s)    Current Patient Status: Inpatient   Certification Statement: The patient will continue to require additional inpatient hospital stay due to Treatment of a UTI    Discharge Plan: To be determined    Code Status: Level 1 - Full Code      Subjective:   Patient seen examined bedside, no acute distress or discomfort noted  Vital signs stable and labs improved; procalcitonin negative  Have transition patient from IV antibiotics to cefdinir b i d  For completion of urosepsis treatment  Contacted by Cardiology today; will like to do heart catheterization in preparation for possible TAVR procedure in the near future  Patient underwent catheterization and will discuss with Cardiology further tomorrow  PT/OT recommendations are for rehabilitation; however wife at bedside reports patient did not have the appropriate shoes and will have him retested wearing his normal footwear  Likely stable for discharge tomorrow to either rehab or home with home health      Objective:     Vitals:   Temp (24hrs), Av 2 °F (36 8 °C), Min:97 8 °F (36 6 °C), Max:98 7 °F (37 1 °C)    Temp:  [97 8 °F (36 6 °C)-98 7 °F (37 1 °C)] 97 8 °F (36 6 °C)  HR:  [57-85] 57  Resp:  [14-18] 14  BP: ()/(49-62) 115/55  SpO2:  [93 %-97 %] 97 %  Body mass index is 23 17 kg/m²  Input and Output Summary (last 24 hours): Intake/Output Summary (Last 24 hours) at 9/9/2020 1603  Last data filed at 9/9/2020 1420  Gross per 24 hour   Intake 1127 5 ml   Output 400 ml   Net 727 5 ml       Physical Exam:     Physical Exam  Vitals signs and nursing note reviewed  Constitutional:       Appearance: He is well-developed  HENT:      Head: Normocephalic and atraumatic  Right Ear: External ear normal       Left Ear: External ear normal    Eyes:      Pupils: Pupils are equal, round, and reactive to light  Neck:      Musculoskeletal: Normal range of motion and neck supple  Cardiovascular:      Rate and Rhythm: Normal rate  Heart sounds: Normal heart sounds  Pulmonary:      Effort: Pulmonary effort is normal       Breath sounds: Normal breath sounds  Abdominal:      General: Bowel sounds are normal       Palpations: Abdomen is soft  Musculoskeletal: Normal range of motion  Skin:     General: Skin is warm and dry  Neurological:      Mental Status: He is alert             Additional Data:     Labs:    Results from last 7 days   Lab Units 09/09/20  0503   WBC Thousand/uL 7 19   HEMOGLOBIN g/dL 11 8*   HEMATOCRIT % 36 0*   PLATELETS Thousands/uL 133*   NEUTROS PCT % 69   LYMPHS PCT % 11*   MONOS PCT % 17*   EOS PCT % 3     Results from last 7 days   Lab Units 09/09/20  0503   SODIUM mmol/L 137   POTASSIUM mmol/L 4 1   CHLORIDE mmol/L 108   CO2 mmol/L 25   BUN mg/dL 14   CREATININE mg/dL 0 78   ANION GAP mmol/L 4   CALCIUM mg/dL 8 5   ALBUMIN g/dL 2 8*   TOTAL BILIRUBIN mg/dL 0 40   ALK PHOS U/L 91   ALT U/L 17   AST U/L 22   GLUCOSE RANDOM mg/dL 95     Results from last 7 days   Lab Units 09/07/20  1702   INR  1 05             Results from last 7 days   Lab Units 09/07/20  2763 09/07/20  1702   LACTIC ACID mmol/L  --  1 3   PROCALCITONIN ng/ml <0 05 <0 05           * I Have Reviewed All Lab Data Listed Above  * Additional Pertinent Lab Tests Reviewed: All Labs Within Last 24 Hours Reviewed    Imaging:    Imaging Reports Reviewed Today Include:   Imaging Personally Reviewed by Myself Includes:      Recent Cultures (last 7 days):     Results from last 7 days   Lab Units 09/07/20  1703 09/07/20  1702 09/04/20  1047   BLOOD CULTURE  No Growth at 24 hrs  No Growth at 24 hrs   --    URINE CULTURE   --   --  >100,000 cfu/ml Klebsiella oxytoca*  <10,000 cfu/ml        Last 24 Hours Medication List:   Current Facility-Administered Medications   Medication Dose Route Frequency Provider Last Rate    acetaminophen  650 mg Oral Q4H PRN Hiral Mars MD      aspirin  81 mg Oral Daily Ravindra Greene MD      cefdinir  300 mg Oral Q12H [facility] Celine Otero MD      cholecalciferol  2,000 Units Oral Daily Ravindra Greene MD      enoxaparin  40 mg Subcutaneous Daily Ravindra Greene MD      finasteride  5 mg Oral Daily Ravindra Greene MD      levothyroxine  75 mcg Oral Early Morning Ravindra Greene MD      metoprolol tartrate  12 5 mg Oral Q12H [facility] Ravindra Greene MD      multivitamin-minerals  1 tablet Oral Daily Anisha George MD      ondansetron  4 mg Intravenous Q6H PRN Ravindra Greene MD      pantoprazole  20 mg Oral Early Morning Ravindra Greene MD      pravastatin  40 mg Oral Daily With Ajay Lloyd MD      sodium chloride  75 mL/hr Intravenous Continuous Hiral Mars MD 75 mL/hr (09/09/20 1206)    tamsulosin  0 4 mg Oral Daily With Ajay Lloyd MD      tiotropium  18 mcg Inhalation Daily Ravindra Greene MD          Today, Patient Was Seen By: Raymundo Love MD    ** Please Note: Dictation voice to text software may have been used in the creation of this document   **

## 2020-09-09 NOTE — ASSESSMENT & PLAN NOTE
· In the setting of infection suspected UTI  · Doesn't meet all SIRS criteria  Fever of 102 at home  White count 10 1, lactic acid 1 3  · Plus on Lasix and metoprolol at home  · Will treat him with IV fluids, monitor volume status given known case of severe AS  · Status post 1 L IV fluids in ED, another L running at 200 cc an hour  Will order for 50 cc an hour after that for another 8 hours    9/8-resolved overnight    9/9-appreciate PT/OT recs; recommend follow-up rehabilitation  Case management seeking appropriate facilities

## 2020-09-09 NOTE — ASSESSMENT & PLAN NOTE
Appreciate urology assessment; Porter placement; patient to DC with Porter with outpatient follow-up in cystoscopy

## 2020-09-09 NOTE — CASE MANAGEMENT
A post acute care recommendation was made by your care team for STR  Discussed Freedom of Choice with both patient and caregiver  List of facilities given to both patient and caregiver via in person  both patient and caregiver aware the list is custom filtered for them by zip code location and that [facility] post acute providers are designated  Per spouse Jeff Justin pt was in a lot of pain when he worked with therapy and was not wearing his special shoe as one of his leg is shorter than the other  Terrylouie Sick would like for pt to work with therapy again before making a decision  Cm sent message to therapist [facility]

## 2020-09-09 NOTE — ASSESSMENT & PLAN NOTE
· Progressed from moderate to severe in his recent echo performed in July 2020 be  · Referred by Cardiology the cardiothoracic surgery and workup initiated for TAVR  · Monitor volume status while receiving IV fluids  · Currently has some basal crackles, distant breath sounds due to COPD  · With saturating 94% on room air at rest  · Hold home Lasix for now while receiving IV fluids    Took his dose this morning at home  · Check BMP and oxygen status tomorrow morning  · Plan to restart Lasix tomorrow    9/9-patient scheduled for outpatient catheterization on 09/10; however cards may be able to do appropriate studies today   -made NPO for possible cath lab this afternoon; TAVR workup  -vital signs stable

## 2020-09-09 NOTE — ASSESSMENT & PLAN NOTE
· Suspect hypovolemic hyponatremia in the setting of infection  · Treated with IV fluids  · Repeat BMP in eben Ansari

## 2020-09-09 NOTE — ASSESSMENT & PLAN NOTE
· In a patient with history of bladder cancer status post resection in 2004  · Followed up with urology last week as routine visit during which found to have urinary retention with bladder scan showing more than 600 cc hence Porter catheter was inserted on 09/02/2020  · Continue tamsulosin  and finasteride; awaiting urology recommendations  9/9-appreciate Neurology recommendations, have started following and will schedule for outpatient follow-up when patient stable for discharge

## 2020-09-09 NOTE — ASSESSMENT & PLAN NOTE
· Complicated UTI in the setting of Porter catheter insertion for urinary retention  · Porter inserted 09/02/2020  · Cultures from 09/04/2020 growing Klebsiella oxytoca resistant to ampicillin and Ancef  · Urinalysis again performed today positive for nitrites, leukocytes, innumerable bacteria, 20-30 WBC  Follow up final urine cultures  Also follow-up blood cultures sent from today  · Status post IV ceftriaxone ED, will continue that  · Will consult urology  · Continue Porter catheter    9/8-white count declined 8 54 overnight; patient remains afebrile and hypertension has resolved   -awaiting urology consult; patient with 3 urinary retention on finasteride and Flomax  - Blood cultures and urine cultures pending; will consider deescalating antibiotic therapy to orals based on 9/4 sensitivities  9/9-blood cultures negative X 2 at 24 hours  ; will transition to oral antibiotics

## 2020-09-10 ENCOUNTER — TELEPHONE (OUTPATIENT)
Dept: OTHER | Facility: OTHER | Age: 85
End: 2020-09-10

## 2020-09-10 VITALS
TEMPERATURE: 97.4 F | OXYGEN SATURATION: 98 % | SYSTOLIC BLOOD PRESSURE: 111 MMHG | DIASTOLIC BLOOD PRESSURE: 56 MMHG | HEART RATE: 61 BPM | WEIGHT: 147.93 LBS | RESPIRATION RATE: 14 BRPM | HEIGHT: 67 IN | BODY MASS INDEX: 23.22 KG/M2

## 2020-09-10 LAB
ANION GAP SERPL CALCULATED.3IONS-SCNC: 7 MMOL/L (ref 4–13)
BASOPHILS # BLD AUTO: 0.02 THOUSANDS/ΜL (ref 0–0.1)
BASOPHILS NFR BLD AUTO: 0 % (ref 0–1)
BUN SERPL-MCNC: 10 MG/DL (ref 5–25)
CALCIUM SERPL-MCNC: 8.3 MG/DL (ref 8.3–10.1)
CHLORIDE SERPL-SCNC: 108 MMOL/L (ref 100–108)
CO2 SERPL-SCNC: 23 MMOL/L (ref 21–32)
CREAT SERPL-MCNC: 0.8 MG/DL (ref 0.6–1.3)
EOSINOPHIL # BLD AUTO: 0.26 THOUSAND/ΜL (ref 0–0.61)
EOSINOPHIL NFR BLD AUTO: 4 % (ref 0–6)
ERYTHROCYTE [DISTWIDTH] IN BLOOD BY AUTOMATED COUNT: 12.5 % (ref 11.6–15.1)
GFR SERPL CREATININE-BSD FRML MDRD: 80 ML/MIN/1.73SQ M
GLUCOSE SERPL-MCNC: 99 MG/DL (ref 65–140)
HCT VFR BLD AUTO: 37.1 % (ref 36.5–49.3)
HGB BLD-MCNC: 12.5 G/DL (ref 12–17)
IMM GRANULOCYTES # BLD AUTO: 0.01 THOUSAND/UL (ref 0–0.2)
IMM GRANULOCYTES NFR BLD AUTO: 0 % (ref 0–2)
LYMPHOCYTES # BLD AUTO: 0.89 THOUSANDS/ΜL (ref 0.6–4.47)
LYMPHOCYTES NFR BLD AUTO: 14 % (ref 14–44)
MCH RBC QN AUTO: 32.4 PG (ref 26.8–34.3)
MCHC RBC AUTO-ENTMCNC: 33.7 G/DL (ref 31.4–37.4)
MCV RBC AUTO: 96 FL (ref 82–98)
MONOCYTES # BLD AUTO: 1.02 THOUSAND/ΜL (ref 0.17–1.22)
MONOCYTES NFR BLD AUTO: 16 % (ref 4–12)
NEUTROPHILS # BLD AUTO: 4.32 THOUSANDS/ΜL (ref 1.85–7.62)
NEUTS SEG NFR BLD AUTO: 66 % (ref 43–75)
NRBC BLD AUTO-RTO: 0 /100 WBCS
PLATELET # BLD AUTO: 150 THOUSANDS/UL (ref 149–390)
PMV BLD AUTO: 10.6 FL (ref 8.9–12.7)
POTASSIUM SERPL-SCNC: 3.7 MMOL/L (ref 3.5–5.3)
RBC # BLD AUTO: 3.86 MILLION/UL (ref 3.88–5.62)
SARS-COV-2 RNA RESP QL NAA+PROBE: NEGATIVE
SODIUM SERPL-SCNC: 138 MMOL/L (ref 136–145)
WBC # BLD AUTO: 6.52 THOUSAND/UL (ref 4.31–10.16)

## 2020-09-10 PROCEDURE — 85025 COMPLETE CBC W/AUTO DIFF WBC: CPT | Performed by: INTERNAL MEDICINE

## 2020-09-10 PROCEDURE — U0003 INFECTIOUS AGENT DETECTION BY NUCLEIC ACID (DNA OR RNA); SEVERE ACUTE RESPIRATORY SYNDROME CORONAVIRUS 2 (SARS-COV-2) (CORONAVIRUS DISEASE [COVID-19]), AMPLIFIED PROBE TECHNIQUE, MAKING USE OF HIGH THROUGHPUT TECHNOLOGIES AS DESCRIBED BY CMS-2020-01-R: HCPCS | Performed by: INTERNAL MEDICINE

## 2020-09-10 PROCEDURE — 97535 SELF CARE MNGMENT TRAINING: CPT

## 2020-09-10 PROCEDURE — 97530 THERAPEUTIC ACTIVITIES: CPT

## 2020-09-10 PROCEDURE — 99232 SBSQ HOSP IP/OBS MODERATE 35: CPT | Performed by: INTERNAL MEDICINE

## 2020-09-10 PROCEDURE — 99239 HOSP IP/OBS DSCHRG MGMT >30: CPT | Performed by: INTERNAL MEDICINE

## 2020-09-10 PROCEDURE — 97116 GAIT TRAINING THERAPY: CPT

## 2020-09-10 PROCEDURE — 80048 BASIC METABOLIC PNL TOTAL CA: CPT | Performed by: INTERNAL MEDICINE

## 2020-09-10 RX ORDER — CEFDINIR 300 MG/1
300 CAPSULE ORAL EVERY 12 HOURS SCHEDULED
Qty: 8 CAPSULE | Refills: 0 | Status: SHIPPED | OUTPATIENT
Start: 2020-09-10 | End: 2020-09-14

## 2020-09-10 RX ADMIN — ENOXAPARIN SODIUM 40 MG: 40 INJECTION SUBCUTANEOUS at 09:35

## 2020-09-10 RX ADMIN — Medication 2000 UNITS: at 09:35

## 2020-09-10 RX ADMIN — Medication 12.5 MG: at 09:35

## 2020-09-10 RX ADMIN — Medication 1 TABLET: at 09:35

## 2020-09-10 RX ADMIN — FINASTERIDE 5 MG: 5 TABLET, FILM COATED ORAL at 09:35

## 2020-09-10 RX ADMIN — ASPIRIN 81 MG CHEWABLE TABLET 81 MG: 81 TABLET CHEWABLE at 09:35

## 2020-09-10 RX ADMIN — LEVOTHYROXINE SODIUM 75 MCG: 75 TABLET ORAL at 05:00

## 2020-09-10 RX ADMIN — TIOTROPIUM BROMIDE 18 MCG: 18 CAPSULE ORAL; RESPIRATORY (INHALATION) at 09:39

## 2020-09-10 RX ADMIN — CEFDINIR 300 MG: 300 CAPSULE ORAL at 05:31

## 2020-09-10 RX ADMIN — PANTOPRAZOLE SODIUM 20 MG: 20 TABLET, DELAYED RELEASE ORAL at 05:31

## 2020-09-10 NOTE — CASE MANAGEMENT
A post acute care recommendation was made by your care team for STR  Discussed Freedom of Choice with both patient and caregiver  List of facilities given to both patient and caregiver via in person  both patient and caregiver aware the list is custom filtered for them by zip code location and that [facility] post acute providers are designated  Spouse Emeterio Lerner and pt requested referral to 1  [facility] 2  [facility] 3  [address] able to accept pt today, cm made pt and Emeterio Lerner aware  COVID test pending  Cm scheduled [facility] transport with [facility] for 1630  Cm made MD, bedside nurse and family aware

## 2020-09-10 NOTE — PROGRESS NOTES
Progress Note - Mary Richardson 1933, 80 y o  male MRN: [id number]    Unit/Bed#: [facility] 507-01 Encounter: [id number]    Primary Care Provider: Margot Martel MD   Date and time admitted to hospital: 9/7/2020  4:26 PM        Urine retention  Assessment & Plan  · In a patient with history of bladder cancer status post resection in 2004  · Followed up with urology last week as routine visit during which found to have urinary retention with bladder scan showing more than 600 cc hence Porter catheter was inserted on 09/02/2020  · Continue tamsulosin  and finasteride; awaiting urology recommendations  9/9-appreciate urology recommendations, have started following and will schedule for outpatient follow-up when patient stable for discharge  UTI (urinary tract infection)  Assessment & Plan  · Complicated UTI in the setting of Porter catheter insertion for urinary retention  · Porter inserted 09/02/2020  · Cultures from 09/04/2020 growing Klebsiella oxytoca resistant to ampicillin and Ancef  · Urinalysis again performed today positive for nitrites, leukocytes, innumerable bacteria, 20-30 WBC  Follow up final urine cultures  Also follow-up blood cultures sent from today  · Status post IV ceftriaxone ED, will continue that  · Will consult urology  · Continue Porter catheter    9/8-white count declined 8 54 overnight; patient remains afebrile and hypertension has resolved   -awaiting urology consult; patient with 3 urinary retention on finasteride and Flomax  - Blood cultures and urine cultures pending; will consider deescalating antibiotic therapy to orals based on 9/4 sensitivities  9/9-blood cultures negative X 2 at 24 hours  ; will transition to oral antibiotics    Hyponatremia  Assessment & Plan  · Suspect hypovolemic hyponatremia in the setting of infection  · Treated with IV fluids  · Repeat BMP in a m     9/8-resolved    Pulmonary emphysema (HCC)  Assessment & Plan  · Continue home inhalers  · Not in exacerbation    Peripheral arterial disease ([facility] [address] )  Assessment & Plan  · Follows with vascular as outpatient    Hypertension  Assessment & Plan  Restart home medications once hypotension has resolved    Coronary artery disease  Assessment & Plan  · History of CAD status post CABG in 2010  · Continue aspirin, statin, beta-blocker to be started from tomorrow at low home dose metoprolol 12 5 b i d   · Follows with cardiology as outpatient    Carotid stenosis, bilateral  Assessment & Plan  · Follows with vascular as outpatient  · History of right carotid endarterectomy  · Continue aspirin, statin    Benign prostatic hyperplasia  Assessment & Plan  Appreciate urology assessment; Porter placement; patient to DC with Porter with outpatient follow-up in cystoscopy  PAF (paroxysmal atrial fibrillation) ([facility])  Assessment & Plan  · Continue metoprolol  · Normal sinus rhythm  · Review of outpatient medications reveal no anticoagulation; defer to Cardiology    Severe aortic stenosis  Assessment & Plan  · Progressed from moderate to severe in his recent echo performed in July 2020 be  · Referred by Cardiology the cardiothoracic surgery and workup initiated for TAVR  · Monitor volume status while receiving IV fluids  · Currently has some basal crackles, distant breath sounds due to COPD  · With saturating 94% on room air at rest  · Hold home Lasix for now while receiving IV fluids  Took his dose this morning at home  · Check BMP and oxygen status tomorrow morning  · Plan to restart Lasix tomorrow    9/9-patient scheduled for outpatient catheterization on 09/10; however cards may be able to do appropriate studies today   -made NPO for possible cath lab this afternoon; TAVR workup  -vital signs stable    * Hypotension  Assessment & Plan  · In the setting of infection suspected UTI  · Doesn't meet all SIRS criteria  Fever of 102 at home    White count 10 1, lactic acid 1 3  · Plus on Lasix and metoprolol at home  · Will treat him with IV fluids, monitor volume status given known case of severe AS  · Status post 1 L IV fluids in ED, another L running at 200 cc an hour  Will order for 50 cc an hour after that for another 8 hours    -resolved overnight    -appreciate PT/OT recs; recommend follow-up rehabilitation  Case management seeking appropriate facilities  VTE Pharmacologic Prophylaxis:   Pharmacologic: Enoxaparin (Lovenox)  Mechanical VTE Prophylaxis in Place: Yes    Patient Centered Rounds: I have performed bedside rounds with nursing staff today  Discussions with Specialists or Other Care Team Provider:     Education and Discussions with Family / Patient: Discussed treatment plan with family and patient who agree with current plan; encouraged to ask questions and participate  Time Spent for Care: 30 minutes  More than 50% of total time spent on counseling and coordination of care as described above  Current Length of Stay: 3 day(s)    Current Patient Status: Inpatient   Certification Statement: The patient will continue to require additional inpatient hospital stay due to Awaiting rehab recommendations    Discharge Plan: To be determined, pending rehab recommendations may be discharged today    Code Status: Level 1 - Full Code      Subjective:   Patient seen examined bedside, no acute distress or discomfort noted  Patient is now normotensive and UTI treated; underwent right heart catheterization yesterday  To have TAVR workup and procedure outpatient  Plan to discharge with oral antibiotics for additional 3 days of therapy; PT/OT recommending acute rehab facility  However, patient would like to be retested and, pending PT/OT recommendations, may be stable for discharge today      Objective:     Vitals:   Temp (24hrs), Av 9 °F (36 6 °C), Min:97 4 °F (36 3 °C), Max:98 4 °F (36 9 °C)    Temp:  [97 4 °F (36 3 °C)-98 4 °F (36 9 °C)] 97 4 °F (36 3 °C)  HR:  [57-80] 80  Resp:  [12-18] 18  BP: ()/(49-72) 105/72  SpO2:  [93 %-97 %] 93 %  Body mass index is 23 17 kg/m²  Input and Output Summary (last 24 hours): Intake/Output Summary (Last 24 hours) at 9/10/2020 1014  Last data filed at 9/10/2020 0451  Gross per 24 hour   Intake 922 5 ml   Output 1800 ml   Net -877 5 ml       Physical Exam:     Physical Exam  Vitals signs and nursing note reviewed  Constitutional:       Appearance: He is well-developed  HENT:      Head: Normocephalic and atraumatic  Right Ear: External ear normal       Left Ear: External ear normal    Eyes:      Pupils: Pupils are equal, round, and reactive to light  Neck:      Musculoskeletal: Normal range of motion and neck supple  Cardiovascular:      Rate and Rhythm: Normal rate  Heart sounds: Normal heart sounds  Pulmonary:      Effort: Pulmonary effort is normal       Breath sounds: Normal breath sounds  Abdominal:      General: Bowel sounds are normal       Palpations: Abdomen is soft  Musculoskeletal: Normal range of motion  Skin:     General: Skin is warm and dry  Neurological:      Mental Status: He is alert           Additional Data:     Labs:    Results from last 7 days   Lab Units 09/10/20  0447   WBC Thousand/uL 6 52   HEMOGLOBIN g/dL 12 5   HEMATOCRIT % 37 1   PLATELETS Thousands/uL 150   NEUTROS PCT % 66   LYMPHS PCT % 14   MONOS PCT % 16*   EOS PCT % 4     Results from last 7 days   Lab Units 09/10/20  0447 09/09/20  0503   SODIUM mmol/L 138 137   POTASSIUM mmol/L 3 7 4 1   CHLORIDE mmol/L 108 108   CO2 mmol/L 23 25   BUN mg/dL 10 14   CREATININE mg/dL 0 80 0 78   ANION GAP mmol/L 7 4   CALCIUM mg/dL 8 3 8 5   ALBUMIN g/dL  --  2 8*   TOTAL BILIRUBIN mg/dL  --  0 40   ALK PHOS U/L  --  91   ALT U/L  --  17   AST U/L  --  22   GLUCOSE RANDOM mg/dL 99 95     Results from last 7 days   Lab Units 09/07/20  1702   INR  1 05             Results from last 7 days   Lab Units 09/07/20  1829 09/07/20  1702   LACTIC ACID mmol/L  --  1 3   PROCALCITONIN ng/ml <0 05 <0 05           * I Have Reviewed All Lab Data Listed Above  * Additional Pertinent Lab Tests Reviewed: All Labs Within Last 24 Hours Reviewed    Imaging:    Imaging Reports Reviewed Today Include:  Right heart catheterization report  Imaging Personally Reviewed by Myself Includes:  None    Recent Cultures (last 7 days):     Results from last 7 days   Lab Units 09/07/20  1703 09/07/20  1702 09/04/20  1047   BLOOD CULTURE  No Growth at 48 hrs  No Growth at 48 hrs  --    URINE CULTURE   --   --  >100,000 cfu/ml Klebsiella oxytoca*  <10,000 cfu/ml        Last 24 Hours Medication List:   Current Facility-Administered Medications   Medication Dose Route Frequency Provider Last Rate    acetaminophen  650 mg Oral Q4H PRN Chevy Miner MD      aspirin  81 mg Oral Daily Becky Winters MD      cefdinir  300 mg Oral Q12H [address] Laurita Crockett MD      cholecalciferol  2,000 Units Oral Daily Becky Winters MD      enoxaparin  40 mg Subcutaneous Daily Becky Winters MD      finasteride  5 mg Oral Daily Becky Winters MD      levothyroxine  75 mcg Oral Early Morning Becky Winters MD      metoprolol tartrate  12 5 mg Oral Q12H [address] Becky Winters MD      multivitamin-minerals  1 tablet Oral Daily Becky Winters MD      ondansetron  4 mg Intravenous Q6H PRN Becky Winters MD      pantoprazole  20 mg Oral Early Morning Becky Winters MD      pravastatin  40 mg Oral Daily With Ani Shelley MD      tamsulosin  0 4 mg Oral Daily With Ani Shelley MD      tiotropium  18 mcg Inhalation Daily Becky Winters MD          Today, Patient Was Seen By: Betty Ceballos MD    ** Please Note: Dictation voice to text software may have been used in the creation of this document   **

## 2020-09-10 NOTE — ASSESSMENT & PLAN NOTE
· In a patient with history of bladder cancer status post resection in 2004  · Followed up with urology last week as routine visit during which found to have urinary retention with bladder scan showing more than 600 cc hence Porter catheter was inserted on 09/02/2020  · Continue tamsulosin  and finasteride; awaiting urology recommendations  9/9-appreciate urology recommendations, have started following and will schedule for outpatient follow-up when patient stable for discharge

## 2020-09-10 NOTE — NURSING NOTE
Discharged to SNF for rehab  IV removed  Report called  Instructions faxed  Patient discharged with partha

## 2020-09-10 NOTE — PHYSICAL THERAPY NOTE
PHYSICAL THERAPY TREATMENT NOTE          Patient Name: Holli ANDREWSL'X Date: 9/10/2020     09/10/20 1109   PT Last Visit   PT Visit Date 09/10/20   Pain Assessment   Pain Assessment Tool Pain Assessment not indicated - pt denies pain   Restrictions/Precautions   Weight Bearing Precautions Per Order No   Other Precautions Cognitive; Chair Alarm; Bed Alarm; Fall Risk;Hard of hearing   General   Chart Reviewed Yes   Response to Previous Treatment Patient with no complaints from previous session  Family/Caregiver Present No   Cognition   Overall Cognitive Status Impaired   Arousal/Participation Alert   Attention Attends with cues to redirect   Memory Decreased recall of precautions;Decreased recall of recent events;Decreased short term memory   Following Commands Follows one step commands with increased time or repetition   Comments Pt with decreased safety awareness and insight into deficits, requries cues for safety throughout session  Pt presents forgetful and requires increased time to process at times  Subjective   Subjective Pt pleasant and agreeable to participate in therapy session  Bed Mobility   Additional Comments OOB in chair upon PT arrival   Pt left upright in bedside chair with chair alarm intact and all needs in reach at end of therapy session  Transfers   Sit to Stand 4  Minimal assistance  (CGA progressing to supervision)   Additional items Assist x 1; Increased time required;Verbal cues;Armrests   Stand to Sit 4  Minimal assistance  (CGA)   Additional items Assist x 1; Increased time required;Verbal cues;Armrests   Toilet transfer 4  Minimal assistance   Additional items Assist x 1; Increased time required;Verbal cues; Commode  (commode over toilet)   Additional Comments Transfers with RW   VC for hand placement and safety  Ambulation/Elevation   Gait pattern Excessively slow; Short stride;Decreased foot clearance; Step to; Foward flexed; Improper Weight shift; Poor UE support   Gait Assistance 4  Minimal assist  (CGA)   Additional items Assist x 1;Verbal cues; Tactile cues  (assist of second for chair follow)   Assistive Device Rolling walker   Distance 50 ft x 2 with sitting rest break   Stair Management Assistance 4  Minimal assist   Additional items Assist x 1;Verbal cues; Tactile cues   Stair Management Technique Step to pattern; Foreward  (one rail, one HHA)   Number of Stairs 2   Balance   Static Sitting Fair +   Dynamic Sitting Fair   Static Standing Fair -   Dynamic Standing Fair -   Ambulatory Poor +   Endurance Deficit   Endurance Deficit Yes   Endurance Deficit Description fatigue, weakness, SOB   Activity Tolerance   Activity Tolerance Patient limited by fatigue   Medical Staff Made Aware OT Carmina   Nurse Made Aware RN cleared pt to be seen by PT   Assessment   Prognosis Good   Problem List Decreased strength;Decreased endurance; Impaired balance;Decreased mobility; Decreased range of motion;Decreased safety awareness; Impaired hearing   Assessment Pt seen for PT treatment session with focus on functional transfers, ambulation, and stair negotiation  Pt making good progress toward goals this session  Pt able to perform STS transfers with decreased assist this session as a result of improved strength  Pt ambulated increased distance this session, however, continues to require sitting rest break 2/2 decreased endurance  Pt noted to be with improved quality of gait with shoe with lift donned this session compared to last session ambulating without shoes  Pt wife present, PT provided pt and family education on rehab vs DC home; pt and family demonstrates understanding  Pt left upright in bedside chair with chair alarm, intact and with all needs in reach  Pt will benefit from skilled therapy in order to address current impairments and functional limitations  PT to follow pt and recommending rehab once medically cleared     Barriers to Discharge Inaccessible home environment;Decreased caregiver support   Goals   Patient Goals to get stronger to be able to get heart operation   STG Expiration Date 09/22/20   Short Term Goal #2 In addition to goals from initial evaluation: 1  Pt will demonstrate ability to negotiate 2 steps with/without HR and supervision in order to return home sfely  Plan   Treatment/Interventions Functional transfer training;LE strengthening/ROM; Elevations; Therapeutic exercise; Endurance training;Patient/family training;Equipment eval/education; Bed mobility;Gait training;Spoke to nursing   Progress Progressing toward goals   PT Frequency Other (Comment)  (3-5x/wk)   Recommendation   PT Discharge Recommendation Post-Acute Rehabilitation Services   Equipment Recommended Walker   PT - OK to Discharge Yes  (to rehab once medically cleared)     Keon Mcmillan, PT, DPT

## 2020-09-10 NOTE — OCCUPATIONAL THERAPY NOTE
Occupational Therapy Treatment Note      Cale Sanon    9/10/2020    Principal Problem:    Hypotension  Active Problems:    Severe aortic stenosis    PAF (paroxysmal atrial fibrillation) (HCC)    Benign prostatic hyperplasia    Carotid stenosis, bilateral    Coronary artery disease    Hypertension    Peripheral arterial disease (HCC)    Pulmonary emphysema (HCC)    Hyponatremia    UTI (urinary tract infection)    Urine retention      Past Medical History:   Diagnosis Date    Anemia     [facility]     Carotid artery occlusion     Cataract     [facility] obstructive [facility] disease[facility]     Coronary artery disease     Hyperlipidemia     Hypertension     Hypothyroidism 9/15/2017    Multiple pulmonary nodules     Parkinson disease ([facility] )     Peptic ulcer     Scoliosis     SIRS (systemic inflammatory response syndrome) ([facility] ) 12/19/2019    Transient cerebral ischemia     Tremors of nervous system        Past Surgical History:   Procedure Laterality Date   [facility] SURGERY      1973, 1987, 2005    BUNIONECTOMY Left     Simple exostectomy (silver procedure)    CAROTID ENDARTERECTOMY Right 09/10/2008    Randy LEDBETTER MD    CATARACT EXTRACTION, BILATERAL      CERVICAL DISCECTOMY  1969    CORONARY ARTERY BYPASS GRAFT  10/20/2010    x3 (LIMA-LAD, SVG-OM, SVG-RCA)    CYSTOSCOPY      ELBOW SURGERY Right 07/2012    FEMORAL ARTERY - TIBIAL ARTERY BYPASS GRAFT  12/05/2011    using reversed saphenous vein from right leg  [address] MD    REPLACEMENT TOTAL KNEE Left     SHOULDER SURGERY Right 1997        09/10/20 1111   OT Last Visit   OT Visit Date 09/10/20   Restrictions/Precautions   Weight Bearing Precautions Per Order No   Other Precautions Cognitive; Chair Alarm; Bed Alarm;Multiple lines;Hard of hearing; Fall Risk;Pain   Lifestyle   Autonomy Pt reports requiring A from spouse w/ LB bathing/dressing; spouse A w/ all IADLS; (-) drives; ambulates w/ RW PTA Reciprocal Relationships Pt lives w/ spouse who is home and has been providing A as needed  Pt reports his spouse told him that she can no long continue to provide physical assist due to her own elderly age and health conditions  Service to Others Pt is retired  for [employer] reports enjoying being home    Pain Assessment   Pain Assessment Tool 0-10   Pain Score 3   Pain Location/Orientation Orientation: Right;Location: Knee   ADL   Grooming Assistance 5  Supervision/Setup   Grooming Deficit Setup;Verbal cueing;Supervision/safety; Increased time to complete;Standing with assistive device; Wash/dry hands   Grooming Comments Pt tolerated standing sinkside to wash B/L hands while leaning body against sink w/ support of RW     LB Dressing Assistance 2  Maximal Assistance   LB Dressing Deficit Setup;Verbal cueing;Supervision/safety; Increased time to complete; Don/doff R sock; Don/doff L sock; Don/doff R shoe;Don/doff L shoe   LB Dressing Comments Donning/doffing B/L compression socks and shoes (including R lift shoe)  Toileting Assistance  2  Maximal Assistance   Toileting Deficit Setup;Verbal cueing;Supervison/safety; Increased time to complete;Clothing management up;Clothing management down;Bedside commode;Perineal hygiene   Toileting Comments Pt had small bowel movement and urinated  Pt required A for all clothing management and A w/ alirio/buttock hygiene for thoroughness  Functional Standing Tolerance   Time ~1-2 minutes   Activity Performing hand hygiene while standing sinkside  Bed Mobility   Supine to Sit Unable to assess   Sit to Supine Unable to assess   Additional Comments Pt seated OOB in chair upon OT arrival  Pt seated OOB in chair w/ chair alarm activated and all needs in reach s/p OT session  Transfers   Sit to Stand 4  Minimal assistance  (progressed to CGA/S)   Additional items Assist x 1; Increased time required;Verbal cues;Armrests   Stand to Sit 4 Minimal assistance  (progressed to CGA/S)   Additional items Assist x 1; Increased time required;Verbal cues;Armrests   Toilet transfer 4  Minimal assistance   Additional items Assist x 1; Increased time required;Verbal cues; Commode;Armrests  (BSC over toilet)   Additional Comments Transfers w/ RW  VC and TC required for safety and hand placement - Pt demonstrated poor carryover of education  Functional Mobility   Functional Mobility 4  Minimal assistance   Additional Comments Pt demonstrated x2 short distance household mobility in hallway w/ RW at [address] A level and SBA of 2nd for close chair follow  Pt required x2 seated rest breaks 2' SOB and fatigue  OT educated Pt on use of diaphragmatic breathing techniques to decrease SOB - Pt demonstrated poor carryover despite education  Pt required frequent VC for safe RW management  Additional items Rolling walker   Cognition   Overall Cognitive Status Impaired   Arousal/Participation Alert; Cooperative   Attention Attends with cues to redirect   Orientation Level Oriented to person;Oriented to place;Oriented to situation   Memory Decreased recall of precautions;Decreased recall of recent events;Decreased short term memory   Following Commands Follows one step commands with increased time or repetition   Comments Pt is pleasant, cooperative, and motivated to participate in therapy this day  Pt continues to present w/ STM deficits, difficulty w/ word finding, decreased safety awareness, and decreased insight into deficits      Activity Tolerance   Activity Tolerance Patient limited by fatigue;Patient limited by pain;Treatment limited secondary to medical complications (Comment)  (cog deficits)   Medical Staff Made Aware PT Bettina Acuña CM for safe dispo planning; RN cleared Pt for OT sessino   Assessment   Assessment Patient participated in Skilled OT session this date with interventions consisting of ADL re training with the use of correct body mechnaics, Energy Conservation techniques, deep breathing technique, safety awareness and fall prevention techniques, one handed dressing technique,  therapeutic activities to: increase activity tolerance, increase standing tolerance time with unilateral UE support to complete sink level ADLs, increase dynamic sit/ stand balance during functional activity , increase postural control and increase OOB/ sitting tolerance   Patient agreeable to OT treatment session, upon arrival patient was found seated OOB to Chair  Pt participated in functional transfers, functional mobility, LB dressing, toileting, and grooming standing sinkside  Please refer to chart for functional levels  Pt's spouse present at end of session  OT educated PT and spouse on Pt's current functional status, benefits of STR, and safe discharge planning  Pt's spouse reports that she is concerned about being Pt's only social support upon D/C and would prefer if Pt went to rehab to increase strength, endurance, and independence w/ daily activities  Pt eventually agreeable to rehab placement  Patient requiring frequent re direction, verbal cues for safety, verbal cues for correct technique, verbal cues for pacing thru activity steps, cognitive assistance to anticipate next step, one step directives and frequent rest periods  Patient continues to be functioning below baseline level, occupational performance remains limited secondary to factors listed above and increased risk for falls and injury  From OT standpoint, recommendation at time of d/c would be Short Term Rehab  Patient to benefit from continued Occupational Therapy treatment while in the hospital to address deficits as defined above and maximize level of functional independence with ADLs and functional mobility  Plan   Treatment Interventions ADL retraining;Functional transfer training;UE strengthening/ROM; Endurance training;Cognitive reorientation;Patient/family training;Equipment evaluation/education; Fine motor coordination activities; Compensatory technique education; Activityengagement; Energy conservation   Goal Expiration Date 09/18/20   OT Treatment Day 1   OT Frequency 3-5x/wk   Recommendation   OT Discharge Recommendation Post-Acute Rehabilitation Services   OT - OK to Discharge Yes  (when medically cleared)   Modified Estuardo Scale   Modified Broome Scale 4         Cheyenne Bruce MS, OTR/L

## 2020-09-10 NOTE — PLAN OF CARE
Problem: OCCUPATIONAL THERAPY ADULT  Goal: Performs self-care activities at highest level of function for planned discharge setting  See evaluation for individualized goals  Description: Treatment Interventions: ADL retraining, Functional transfer training, UE strengthening/ROM, Endurance training, Cognitive reorientation, Patient/family training, Equipment evaluation/education, Fine motor coordination activities, Compensatory technique education, Activityengagement, Energy conservation          See flowsheet documentation for full assessment, interventions and recommendations  Outcome: Progressing  Note: Limitation: Decreased ADL status, Decreased UE strength, Decreased Safe judgement during ADL, Decreased cognition, Decreased endurance, Decreased fine motor control, Decreased high-level ADLs  Prognosis: Fair  Assessment: Patient participated in Skilled OT session this date with interventions consisting of ADL re training with the use of correct body mechnaics, Energy Conservation techniques, deep breathing technique, safety awareness and fall prevention techniques, one handed dressing technique,  therapeutic activities to: increase activity tolerance, increase standing tolerance time with unilateral UE support to complete sink level ADLs, increase dynamic sit/ stand balance during functional activity , increase postural control and increase OOB/ sitting tolerance   Patient agreeable to OT treatment session, upon arrival patient was found seated OOB to Chair  Pt participated in functional transfers, functional mobility, LB dressing, toileting, and grooming standing sinkside  Please refer to chart for functional levels  Pt's spouse present at end of session  OT educated PT and spouse on Pt's current functional status, benefits of STR, and safe discharge planning   Pt's spouse reports that she is concerned about being Pt's only social support upon D/C and would prefer if Pt went to rehab to increase strength, endurance, and independence w/ daily activities  Pt eventually agreeable to rehab placement  Patient requiring frequent re direction, verbal cues for safety, verbal cues for correct technique, verbal cues for pacing thru activity steps, cognitive assistance to anticipate next step, one step directives and frequent rest periods  Patient continues to be functioning below baseline level, occupational performance remains limited secondary to factors listed above and increased risk for falls and injury  From OT standpoint, recommendation at time of d/c would be Short Term Rehab  Patient to benefit from continued Occupational Therapy treatment while in the hospital to address deficits as defined above and maximize level of functional independence with ADLs and functional mobility        OT Discharge Recommendation: Post-Acute Rehabilitation Services  OT - OK to Discharge: Yes(when medically cleared)

## 2020-09-10 NOTE — DISCHARGE INSTR - ACTIVITY
Assist of one with roller walker Occupational Therapy Discharge Summary    Mary Singleton  MRN: [id number]   Hypercalcemia   Patient Discharged from acute Occupational Therapy on 3/9/2017.  Please refer to prior OT note dated on 3/9/2017 for functional status.     Assessment:   Patient has not met goals.  GOALS:   Occupational Therapy Goals        Problem: Occupational Therapy Goal    Goal Priority Disciplines Outcome Interventions   Occupational Therapy Goal     OT, PT/OT Ongoing (interventions implemented as appropriate)    Description:  Goals to be met by 4/10/2017    1. Feeding with Minimum Assistance.  2. Sitting balance with SBA with verbal prompts for 15 minutes.   3. Assess transfer to bedside commode. ---Goal met 3/7/2017  NEW GOAL:  BSC stand pivot tf/ with CGA.---Gaol met 3/9/2017  4. Assess UBD.----Goal met 3/7/2017  NEW GOAL:  UBD Min  Assist supported sitting.   5. CGA with RW for grooming at sink (NEW GOAL 03/08/17)  6. Min assist toilet hygiene and clothing mgmt (NEW GOAL 03/08/17) ---Goal met 3/9/2017            Reasons for Discontinuation of Therapy Services  Transfer to alternate level of care.      Plan:  Patient Discharged to: Skilled Nursing Facility.

## 2020-09-10 NOTE — TELEPHONE ENCOUNTER
Called pt's wife and relayed AP's message  She understands and will schedule his US after he is discharged

## 2020-09-10 NOTE — DISCHARGE INSTRUCTIONS
Remember to take medications as directed on your discharge summary and follow up with your PCP within 1-2 weeks  Urology appointment on 09/30/20 at 10:30 a m  Please keep Porter catheter inserted until seen in follow-up by Urology  See medication list for any medication changes

## 2020-09-10 NOTE — ASSESSMENT & PLAN NOTE
· Continue metoprolol  · Normal sinus rhythm  · Review of outpatient medications reveal no anticoagulation; defer to Cardiology

## 2020-09-10 NOTE — PLAN OF CARE
Problem: PHYSICAL THERAPY ADULT  Goal: Performs mobility at highest level of function for planned discharge setting  See evaluation for individualized goals  Description: Treatment/Interventions: Functional transfer training, LE strengthening/ROM, Therapeutic exercise, Endurance training, Patient/family training, Equipment eval/education, Bed mobility, Gait training, Spoke to nursing  Equipment Recommended: Beck Myers       See flowsheet documentation for full assessment, interventions and recommendations  Outcome: Progressing  Note: Prognosis: Good  Problem List: Decreased strength, Decreased endurance, Impaired balance, Decreased mobility, Decreased range of motion, Decreased safety awareness, Impaired hearing  Assessment: Pt seen for PT treatment session with focus on functional transfers, ambulation, and stair negotiation  Pt making good progress toward goals this session  Pt able to perform STS transfers with decreased assist this session as a result of improved strength  Pt ambulated increased distance this session, however, continues to require sitting rest break 2/2 decreased endurance  Pt noted to be with improved quality of gait with shoe with lift donned this session compared to last session ambulating without shoes  Pt wife present, PT provided pt and family education on rehab vs DC home; pt and family demonstrates understanding  Pt left upright in bedside chair with chair alarm, intact and with all needs in reach  Pt will benefit from skilled therapy in order to address current impairments and functional limitations  PT to follow pt and recommending rehab once medically cleared  Barriers to Discharge: Inaccessible home environment, Decreased caregiver support     PT Discharge Recommendation: [id number]     PT - OK to Discharge: Yes(to rehab once medically cleared)    See flowsheet documentation for full assessment

## 2020-09-10 NOTE — ASSESSMENT & PLAN NOTE
· Suspect hypovolemic hyponatremia in the setting of infection  · Treated with IV fluids  · [facility]

## 2020-09-10 NOTE — PLAN OF CARE
Problem: Potential for Falls  Goal: Patient will remain free of falls  Description: INTERVENTIONS:  - Assess patient frequently for physical needs  -  Identify cognitive and physical deficits and behaviors that affect risk of falls    -  [place] fall precautions as indicated by assessment   - Educate patient/family on patient safety including physical limitations  - Instruct patient to call for assistance with activity based on assessment  - Modify environment to reduce risk of injury  - Consider OT/PT consult to assist with strengthening/mobility  Outcome: Progressing

## 2020-09-11 ENCOUNTER — NURSING HOME VISIT (OUTPATIENT)
Dept: GERIATRICS | Facility: OTHER | Age: 85
End: 2020-09-11
Payer: MEDICARE

## 2020-09-11 VITALS
DIASTOLIC BLOOD PRESSURE: 53 MMHG | OXYGEN SATURATION: 93 % | TEMPERATURE: 97.5 F | HEART RATE: 56 BPM | SYSTOLIC BLOOD PRESSURE: 111 MMHG | RESPIRATION RATE: 20 BRPM

## 2020-09-11 DIAGNOSIS — R33.9 URINE RETENTION: ICD-10-CM

## 2020-09-11 DIAGNOSIS — N30.01 ACUTE CYSTITIS WITH HEMATURIA: Primary | ICD-10-CM

## 2020-09-11 DIAGNOSIS — Z71.89 GOALS OF CARE, COUNSELING/DISCUSSION: ICD-10-CM

## 2020-09-11 DIAGNOSIS — I25.10 CORONARY ARTERY DISEASE INVOLVING NATIVE CORONARY ARTERY OF NATIVE HEART WITHOUT ANGINA PECTORIS: ICD-10-CM

## 2020-09-11 DIAGNOSIS — J43.8 OTHER EMPHYSEMA (HCC): ICD-10-CM

## 2020-09-11 DIAGNOSIS — I35.0 SEVERE AORTIC STENOSIS: ICD-10-CM

## 2020-09-11 DIAGNOSIS — I65.23 CAROTID STENOSIS, BILATERAL: ICD-10-CM

## 2020-09-11 DIAGNOSIS — T83.89XD: ICD-10-CM

## 2020-09-11 DIAGNOSIS — E03.8 OTHER SPECIFIED HYPOTHYROIDISM: ICD-10-CM

## 2020-09-11 DIAGNOSIS — E87.1 HYPONATREMIA: ICD-10-CM

## 2020-09-11 PROBLEM — T83.89XA INDWELLING FOLEY CATHETER CALCIFICATION (HCC): Status: ACTIVE | Noted: 2020-09-11

## 2020-09-11 PROCEDURE — 99306 1ST NF CARE HIGH MDM 50: CPT | Performed by: INTERNAL MEDICINE

## 2020-09-11 NOTE — ASSESSMENT & PLAN NOTE
1 - Matters most:  Patient request full code status  Goal is to return home with the ability to urinate  Lives on a one floor home with his wife  2 - Mobility:  Uses walker with front wheels at home  Until recently states he would not get short of breath when walking around home with walker  3 - Memory:  Patient feels his short-term memory is worsening  His wife takes care of bills (historically always has)  4 - Medications:  Alpha blockers may drop pressure  Continue to use inhalers for COPD

## 2020-09-11 NOTE — ASSESSMENT & PLAN NOTE
Patient with hx of bladder ca resected in 2004  Had 600cc of residual urine had chavis cath inserted 9/2/2020  Patient will be getting active follow-up with Urology

## 2020-09-11 NOTE — ASSESSMENT & PLAN NOTE
Patient had a complicated urinary tract infection in the setting only catheter insertion  Cultures were done on September 4, 2020 grew Klebsiella oxytoca resistant to ampicillin and Ancef  Patient received IV ceftriaxone in the Emergency will and they recommended a follow-up with Urology and for the Porter to be kept in place

## 2020-09-11 NOTE — ASSESSMENT & PLAN NOTE
Patient had a previous right carotid endarterectomy he follows with vascular as an outpatient he should continue with aspirin and statin therapy as prescribed

## 2020-09-11 NOTE — ASSESSMENT & PLAN NOTE
Patient with history of hypothyroidism last was 1 760 approximately 4 days ago which is within normal range does done on 09/07/2020

## 2020-09-11 NOTE — ASSESSMENT & PLAN NOTE
Patient has history of moderate to severe a as on echo done in July 2020  He is being worked up at present for a TAVR  Patient was able to saturate 94% on room air  Patient had been set up for outpatient catheterization on September 10th    A
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
## 2020-09-11 NOTE — ASSESSMENT & PLAN NOTE
Patient with history of coronary artery disease had a CABG performed in 2010 he continues on aspirin, statin beta-blocker  He does follow with Cardiology as an outpatient

## 2020-09-11 NOTE — ASSESSMENT & PLAN NOTE
Patient with history of previous diffusing capacity of 35%  Patient currently on Spiriva  On pulmonary function test he has evidence of severe COPD

## 2020-09-11 NOTE — ASSESSMENT & PLAN NOTE
Patient had evidence of hyponatremia on admission it was treated with IV fluids and they recommended repeating a BMP in the morning

## 2020-09-11 NOTE — PATIENT INSTRUCTIONS
1 -physical and occupational therapy  2 -will need urology follow-up  3 -to have a [place] evaluation

## 2020-09-12 LAB
BACTERIA BLD CULT: NORMAL
BACTERIA BLD CULT: NORMAL

## 2020-09-13 NOTE — ASSESSMENT & PLAN NOTE
· Suspect hypovolemic hyponatremia in the setting of infection  · Treated with IV fluids  · Repeat BMP in a [facility]

## 2020-09-13 NOTE — DISCHARGE SUMMARY
Discharge- [facility] 1933, 80 y o  male MRN: [id number]    Unit/Bed#: [facility] 507-01 Encounter: [id number]    Primary Care Provider: Berlin Krabbe, MD   Date and time admitted to hospital: 9/7/2020  4:26 PM        Urine retention  Assessment & Plan  · In a patient with history of bladder cancer status post resection in 2004  · Followed up with urology last week as routine visit during which found to have urinary retention with bladder scan showing more than 600 cc hence Porter catheter was inserted on 09/02/2020  · Continue tamsulosin  and finasteride; awaiting urology recommendations  9/9-appreciate urology recommendations, have started following and will schedule for outpatient follow-up when patient stable for discharge  UTI (urinary tract infection)  Assessment & Plan  · Complicated UTI in the setting of Porter catheter insertion for urinary retention  · Porter inserted 09/02/2020  · Cultures from 09/04/2020 growing Klebsiella oxytoca resistant to ampicillin and Ancef  · Urinalysis again performed today positive for nitrites, leukocytes, innumerable bacteria, 20-30 WBC  Follow up final urine cultures  Also follow-up blood cultures sent from today  · Status post IV ceftriaxone ED, will continue that  · Will consult urology  · Continue Porter catheter    9/8-white count declined 8 54 overnight; patient remains afebrile and hypertension has resolved   -awaiting urology consult; patient with 3 urinary retention on finasteride and Flomax  - Blood cultures and urine cultures pending; will consider deescalating antibiotic therapy to orals based on 9/4 sensitivities  9/9-blood cultures negative X 2 at 24 hours  ; will transition to oral antibiotics    Hyponatremia  Assessment & Plan  · Suspect hypovolemic hyponatremia in the setting of infection  · Treated with IV fluids  · Repeat BMP in a m     9/8-resolved    Pulmonary emphysema (HCC)  Assessment & Plan  · Continue home inhalers  · Not in exacerbation    Peripheral arterial disease ([facility] [address] )  Assessment & Plan  · Follows with vascular as outpatient    Hypertension  Assessment & Plan  Restart home medications once hypotension has resolved    Coronary artery disease  Assessment & Plan  · History of CAD status post CABG in 2010  · Continue aspirin, statin, beta-blocker to be started from tomorrow at low home dose metoprolol 12 5 b i d   · Follows with cardiology as outpatient    Carotid stenosis, bilateral  Assessment & Plan  · Follows with vascular as outpatient  · History of right carotid endarterectomy  · Continue aspirin, statin    Benign prostatic hyperplasia  Assessment & Plan  Appreciate urology assessment; Porter placement; patient to DC with Porter with outpatient follow-up in cystoscopy  PAF (paroxysmal atrial fibrillation) ([facility])  Assessment & Plan  · Continue metoprolol  · Normal sinus rhythm  · Review of outpatient medications reveal no anticoagulation; defer to Cardiology    Severe aortic stenosis  Assessment & Plan  · Progressed from moderate to severe in his recent echo performed in July 2020 be  · Referred by Cardiology the cardiothoracic surgery and workup initiated for TAVR  · Monitor volume status while receiving IV fluids  · Currently has some basal crackles, distant breath sounds due to COPD  · With saturating 94% on room air at rest  · Hold home Lasix for now while receiving IV fluids  Took his dose this morning at home  · Check BMP and oxygen status tomorrow morning  · Plan to restart Lasix tomorrow    9/9-patient scheduled for outpatient catheterization on 09/10; however cards may be able to do appropriate studies today   -made NPO for possible cath lab this afternoon; TAVR workup  -vital signs stable    ---PCI performed while patient admitted; see below for results  No further PCI required prior for TAVR workup  * Hypotension  Assessment & Plan  · In the setting of infection suspected UTI  · Doesn't meet all SIRS criteria  Fever of 102 at home  White count 10 1, lactic acid 1 3  · Plus on Lasix and metoprolol at home  · Will treat him with IV fluids, monitor volume status given known case of severe AS  · Status post 1 L IV fluids in ED, another L running at 200 cc an hour  Will order for 50 cc an hour after that for another 8 hours    9/8-resolved overnight    9/9-appreciate PT/OT recs; recommend follow-up rehabilitation  Case management seeking appropriate facilities  Discharging Physician / Practitioner: Vic Andino MD  PCP: Anson Severance, MD  Admission Date:   Admission Orders (From admission, onward)     Ordered        09/1933  Inpatient Admission  Once                   Discharge Date: 09/10/20    Resolved Problems  Date Reviewed: 9/11/2020    None          Consultations During Hospital Stay:  · Urology, Cardiology    Procedures Performed:   · CTA chest abdomen pelvis  IMPRESSION:  1  TVAR measurements as above  2   Interval mild increase in size of abdominal aortic aneurysm measuring 4 1 x 3 4 cm, previously 3 9 x 3 2 cm  3   Mild increase in size of 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, previously 2 5 cm   4   Stable left internal iliac artery aneurysm measuring 2 0 cm   5   Stable right common femoral artery aneurysm measuring 1 4 cm  6   Ectasia of bilateral common iliac arteries  7   Linear lucency through the proximal femoral shaft likely represents a nondisplaced fracture  Significant Findings / Test Results:   · Urosepsis    Incidental Findings:   · Urinary retention    Test Results Pending at Discharge (will require follow up): · None     Outpatient Tests Requested:  · CBC/CMP    Complications:  None    Reason for Admission:  Hypotension    Hospital Course:         Please see above list of diagnoses and related plan for additional information       Condition at Discharge: stable     Discharge Day Visit / Exam:     * Please refer to separate progress note for these details *    Discussion with Family: Discussed treatment plan with family and patient who agree with current plan; encouraged to ask questions and participate  Discharge instructions/Information to patient and family:   See after visit summary for information provided to patient and family  Provisions for Follow-Up Care:  See after visit summary for information related to follow-up care and any pertinent home health orders  Disposition:     Acute Rehab at [facility]    For Discharges to [facility] SNF:   · Not Applicable to this Patient - Not Applicable to this Patient    Planned Readmission:  None     Discharge Statement:  I spent 35 minutes discharging the patient  This time was spent on the day of discharge  I had direct contact with the patient on the day of discharge  Greater than 50% of the total time was spent examining patient, answering all patient questions, arranging and discussing plan of care with patient as well as directly providing post-discharge instructions  Additional time then spent on discharge activities  Discharge Medications:  See after visit summary for reconciled discharge medications provided to patient and family        ** Please Note: This note has been constructed using a voice recognition system **

## 2020-09-13 NOTE — ASSESSMENT & PLAN NOTE
· Progressed from moderate to severe in his recent echo performed in July 2020 be  · Referred by Cardiology the cardiothoracic surgery and workup initiated for TAVR  · Monitor volume status while receiving IV fluids  · Currently has some basal crackles, distant breath sounds due to COPD  · With saturating 94% on room air at rest  · Hold home Lasix for now while receiving IV fluids  Took his dose this morning at home  · Check BMP and oxygen status tomorrow morning  · Plan to restart Lasix tomorrow    9/9-patient scheduled for outpatient catheterization on 09/10; however cards may be able to do appropriate studies today   -made NPO for possible cath lab this afternoon; TAVR workup  -vital signs stable    ---PCI performed while patient admitted; see below for results  No further PCI required prior for TAVR workup

## 2020-09-14 ENCOUNTER — OFFICE VISIT (OUTPATIENT)
Dept: GERIATRICS | Facility: CLINIC | Age: 85
End: 2020-09-14
Payer: MEDICARE

## 2020-09-14 ENCOUNTER — TRANSITIONAL CARE MANAGEMENT (OUTPATIENT)
Dept: FAMILY MEDICINE CLINIC | Facility: CLINIC | Age: 85
End: 2020-09-14

## 2020-09-14 VITALS
DIASTOLIC BLOOD PRESSURE: 65 MMHG | SYSTOLIC BLOOD PRESSURE: 105 MMHG | RESPIRATION RATE: 20 BRPM | WEIGHT: 145.4 LBS | HEART RATE: 67 BPM | TEMPERATURE: 97.7 F | BODY MASS INDEX: 22.77 KG/M2 | OXYGEN SATURATION: 93 %

## 2020-09-14 DIAGNOSIS — G20 PARKINSON'S DISEASE (HCC): ICD-10-CM

## 2020-09-14 DIAGNOSIS — J43.8 OTHER EMPHYSEMA (HCC): ICD-10-CM

## 2020-09-14 DIAGNOSIS — I10 ESSENTIAL HYPERTENSION: ICD-10-CM

## 2020-09-14 DIAGNOSIS — I48.0 PAF (PAROXYSMAL ATRIAL FIBRILLATION) (HCC): ICD-10-CM

## 2020-09-14 DIAGNOSIS — N30.01 ACUTE CYSTITIS WITH HEMATURIA: Primary | ICD-10-CM

## 2020-09-14 DIAGNOSIS — R35.1 BENIGN PROSTATIC HYPERPLASIA WITH NOCTURIA: ICD-10-CM

## 2020-09-14 DIAGNOSIS — N40.1 BENIGN PROSTATIC HYPERPLASIA WITH NOCTURIA: ICD-10-CM

## 2020-09-14 LAB — MISCELLANEOUS LAB TEST RESULT: NORMAL

## 2020-09-14 PROCEDURE — 99309 SBSQ NF CARE MODERATE MDM 30: CPT | Performed by: NURSE PRACTITIONER

## 2020-09-14 NOTE — ASSESSMENT & PLAN NOTE
Patient continues on cefdinir until 9/14  Continue to monitor for further signs symptoms of tract    Porter catheter remains at this time

## 2020-09-14 NOTE — ASSESSMENT & PLAN NOTE
No increased for muscle rigidity  Does not on Sinemet at present  Continue to monitor    Continue PT OT

## 2020-09-14 NOTE — ASSESSMENT & PLAN NOTE
No complaints of retention at present  Has Porter catheter  Continue alfuzosin and finasteride    Continue to follow-up with urology

## 2020-09-14 NOTE — PROGRESS NOTES
[facility]  [address]  [phone]  [facility] Progress Note  code31      NAME: Mercy Garcia  AGE: 80 y o  SEX: male    DATE OF ENCOUNTER: 9/14/2020    Assessment and Plan     Problem List Items Addressed This Visit        Respiratory    Pulmonary emphysema ([address] )     Breathing stable present no increased shortness of breath or cough  Continue on Spiriva            Cardiovascular and Mediastinum    PAF (paroxysmal atrial fibrillation) ([facility] [address] )     Rate controlled at present  Continues on beta-blocker and aspirin therapy         Hypertension     BP stable at present  Continue to monitor  Continue Lopressor, diuretic            Nervous and Auditory    Parkinson's disease ([facility] [address] )     No increased for muscle rigidity  Does not on Sinemet at present  Continue to monitor  Continue PT OT            Genitourinary    Benign prostatic hyperplasia     No complaints of retention at present  Has Porter catheter  Continue alfuzosin and finasteride  Continue to follow-up with urology         UTI (urinary tract infection) - Primary     Patient continues on cefdinir until 9/14  Continue to monitor for further signs symptoms of tract  Porter catheter remains at this time               No orders of the defined types were placed in this encounter       - Counseling Documentation: patient was counseled regarding: instructions for management, patient and family education and impressions    Chief Complaint     No chief complaint on file  History of Present Illness     Mr Tian Nunez is an 80-year-old gentleman comes to [facility] for rehab status post hospitalization for UTI after retention  Patient currently has leg bag  Comorbidities include Parkinson's AFib, COPD  Patient currently denies pain  Denies GI  symptoms  Denies CP/SOB/cough  States he is doing well is here for rehab  Reports mild FLORES at times therapy otherwise no complaints reports good appetite and sleep  States he is ambulating up patton with walker and assist without difficulty  The following portions of the patient's history were reviewed and updated as appropriate: allergies, current medications, past family history, past medical history, past social history, past surgical history and problem list     Review of Systems     Review of Systems   Constitutional: Negative for activity change, appetite change, chills and fatigue  HENT: Negative for congestion  Respiratory: Negative for cough and shortness of breath  Cardiovascular: Negative for chest pain  Gastrointestinal: Negative for abdominal pain, constipation, diarrhea, nausea and vomiting  Genitourinary: Negative for difficulty urinating  Musculoskeletal: Positive for gait problem  Negative for arthralgias and back pain  Neurological: Negative for dizziness and light-headedness  Psychiatric/Behavioral: Positive for decreased concentration (forgetful- mild)         Active Problem List     Patient Active Problem List   Diagnosis    Severe aortic stenosis    PAF (paroxysmal atrial fibrillation) (HCC)    Benign prostatic hyperplasia    Carotid stenosis, bilateral    Coronary artery disease    Esophageal reflux    Essential and other specified forms of tremor    Generalized osteoarthritis of multiple sites    Hyperlipidemia    Hypertension    Hypothyroidism    Impaired fasting glucose    Left foot drop    Mononeuritis    Osteoarthritis of left hip    Parkinson's disease ([address] )    Peripheral arterial disease ([address]    Pulmonary emphysema ([address]    RBBB    Abdominal aortic aneurysm (AAA) without rupture ([address] )    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    Stenosis of carotid artery    Iliac artery aneurysm, bilateral (HCC)    Hyponatremia    Hypotension    UTI (urinary tract infection)    Urine retention    Indwelling Porter catheter calcification (HCC)    Goals of care, counseling/discussion       Objective     BP 105/65   Pulse 67   Temp 97 7 °F (36 5 °C)   Resp 20   Wt 66 kg (145 lb 6 4 oz)   SpO2 93%   BMI 22 77 kg/m²     Physical Exam  Vitals signs and nursing note reviewed  Constitutional:       General: He is not in acute distress  Appearance: Normal appearance  He is well-developed  He is not diaphoretic  HENT:      Head: Normocephalic  Neck:      Musculoskeletal: Normal range of motion  Cardiovascular:      Rate and Rhythm: Normal rate  Heart sounds: No friction rub  No gallop  Pulmonary:      Effort: Pulmonary effort is normal  No respiratory distress  Breath sounds: Normal breath sounds  No wheezing or rales  Abdominal:      General: Bowel sounds are normal  There is no distension  Palpations: Abdomen is soft  Tenderness: There is no abdominal tenderness  Genitourinary:     Comments: Urinary cath - leg bag intact  Musculoskeletal: Normal range of motion  Skin:     General: Skin is warm and dry  Neurological:      General: No focal deficit present  Mental Status: He is alert  Mental status is at baseline  Comments: Oriented to self, mostly tps  Anxious - mild   Psychiatric:         Mood and Affect: Mood normal          Behavior: Behavior normal          Pertinent Laboratory/Diagnostic Studies:  Labs reviewed in facility paper chart  Current Medications   Medications were reviewed and updated  Please refer to paper chart for updated list of medications      MANI Sutton  9/14/2020 3:50 PM

## 2020-09-15 ENCOUNTER — TELEPHONE (OUTPATIENT)
Dept: UROLOGY | Facility: MEDICAL CENTER | Age: 85
End: 2020-09-15

## 2020-09-15 DIAGNOSIS — R33.9 URINARY RETENTION: Primary | ICD-10-CM

## 2020-09-15 NOTE — TELEPHONE ENCOUNTER
Patient last seen [facility]  Patient's daughter Shanell Avitia called in requesting to speak with Tomas Bowen since her father seen him several times  She would like to discuss a treatment care plan    Shanell Avitia can be reached at [phone]

## 2020-09-15 NOTE — TELEPHONE ENCOUNTER
Patient seen most recently at the [place] office, patient known for urinary retention and BPH with LUTS, history of bladder cancer  Patient last office visit 9/2/2020  During visit patient had PVR of 635 ml , chavis catheter placed at this time  Patient has upcoming appointment for chavis change on 9/30/2020  Patient has cysto scheduled 10/28/2020    Patient daughter requesting to speak with Arian Millan to discuss plan of care for patient

## 2020-09-16 ENCOUNTER — OFFICE VISIT (OUTPATIENT)
Dept: GERIATRICS | Facility: CLINIC | Age: 85
End: 2020-09-16
Payer: MEDICARE

## 2020-09-16 VITALS
DIASTOLIC BLOOD PRESSURE: 57 MMHG | WEIGHT: 145.4 LBS | RESPIRATION RATE: 18 BRPM | BODY MASS INDEX: 22.77 KG/M2 | HEART RATE: 65 BPM | SYSTOLIC BLOOD PRESSURE: 122 MMHG | OXYGEN SATURATION: 96 % | TEMPERATURE: 98.4 F

## 2020-09-16 DIAGNOSIS — N30.01 ACUTE CYSTITIS WITH HEMATURIA: ICD-10-CM

## 2020-09-16 DIAGNOSIS — R33.9 URINE RETENTION: Primary | ICD-10-CM

## 2020-09-16 DIAGNOSIS — I35.0 SEVERE AORTIC STENOSIS: ICD-10-CM

## 2020-09-16 DIAGNOSIS — R26.2 AMBULATORY DYSFUNCTION: ICD-10-CM

## 2020-09-16 DIAGNOSIS — I10 ESSENTIAL HYPERTENSION: ICD-10-CM

## 2020-09-16 PROCEDURE — 99309 SBSQ NF CARE MODERATE MDM 30: CPT | Performed by: NURSE PRACTITIONER

## 2020-09-16 NOTE — ASSESSMENT & PLAN NOTE
Denies cp/sob  Frustrated that no interventions have occurred yet because of urinary retention and subsequent UTI    Needs to f/u with cards after rehab

## 2020-09-16 NOTE — ASSESSMENT & PLAN NOTE
Antibiotics now complete  No Gu complaints  Has indwelling cath  Cont to monitor    Follow up with uro

## 2020-09-16 NOTE — PROGRESS NOTES
[facility]  [address]  [phone]  [facility] Progress Note  code31      NAME: Ramona Sr  AGE: 80 y o  SEX: male    DATE OF ENCOUNTER: 9/16/2020    Assessment and Plan     Problem List Items Addressed This Visit        Cardiovascular and Mediastinum    Severe aortic stenosis     Denies cp/sob  Frustrated that no interventions have occurred yet because of urinary retention and subsequent UTI  Needs to f/u with cards after rehab         Hypertension     Stable at present  Cont lopressor, diuretic  Cont to monitor            Genitourinary    UTI (urinary tract infection)     Antibiotics now complete  No Gu complaints  Has indwelling cath  Cont to monitor  Follow up with uro         Urine retention - Primary     Chavis remains  Cont alfuzosin, finasteride  Cont to monitor for infection            Other    Ambulatory dysfunction     Cont pt/ot at skilled level for strength and balance training  Fall precautions               No orders of the defined types were placed in this encounter       - Counseling Documentation: patient was counseled regarding: instructions for management, patient and family education and impressions    Chief Complaint     No chief complaint on file  History of Present Illness     Mr Rachna Moralez is an 79yo male at [facility]  Comorbidities include urinary retention w/chavis, afib, amb dys  Patient denies pain  Denies CP/SOB/cough  Denies GI/ distress  Frustrated as he was to have had AS corrected presently, but this was put off d/t other conditions including retention  Doing well with therapy    Appetite and sleep and      The following portions of the patient's history were reviewed and updated as appropriate: allergies, current medications, past family history, past medical history, past social history, past surgical history and problem list     Review of Systems     Review of Systems   Constitutional: Negative for activity change, appetite change, chills and fatigue  HENT: Negative for congestion  Respiratory: Positive for shortness of breath (mild ram)  Negative for cough  Cardiovascular: Negative for chest pain  Gastrointestinal: Negative for abdominal pain, constipation, diarrhea, nausea and vomiting  Genitourinary: Negative for difficulty urinating, dysuria and hematuria  Musculoskeletal: Positive for gait problem  Negative for arthralgias and back pain  Neurological: Negative for dizziness and light-headedness  Psychiatric/Behavioral: Positive for decreased concentration (forgetful, mild)  Active Problem List     Patient Active Problem List   Diagnosis    Severe aortic stenosis    PAF (paroxysmal atrial fibrillation) (HCC)    Benign prostatic hyperplasia    Carotid stenosis, bilateral    Coronary artery disease    Esophageal reflux    Essential and other specified forms of tremor    Generalized osteoarthritis of multiple sites    Hyperlipidemia    Hypertension    Hypothyroidism    Impaired fasting glucose    Left foot drop    Mononeuritis    Osteoarthritis of left hip    Parkinson's disease ([facility] [address] )    Peripheral arterial disease ([facility] [address] )    Pulmonary emphysema ([facility] [address] )    RBBB    Abdominal aortic aneurysm (AAA) without rupture ([facility] [address] )    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    Stenosis of carotid artery    Iliac artery aneurysm, bilateral (HCC)    Hyponatremia    Hypotension    UTI (urinary tract infection)    Urine retention    Indwelling Porter catheter calcification ([facility])    Goals of care, counseling/discussion    Ambulatory dysfunction       Objective     /57   Pulse 65   Temp 98 4 °F (36 9 °C)   Resp 18   Wt 66 kg (145 lb 6 4 oz)   SpO2 96%   BMI 22 77 kg/m²     Physical Exam  Vitals signs and nursing note reviewed  Constitutional:       General: He is not in acute distress  Appearance: Normal appearance  He is well-developed  He is not diaphoretic  HENT:      Head: Normocephalic  Neck:      Musculoskeletal: Normal range of motion  Cardiovascular:      Rate and Rhythm: Normal rate  Heart sounds: Murmur present  No friction rub  No gallop  Pulmonary:      Effort: Pulmonary effort is normal  No respiratory distress  Breath sounds: Normal breath sounds  No wheezing or rales  Abdominal:      General: Bowel sounds are normal  There is no distension  Palpations: Abdomen is soft  Tenderness: There is no abdominal tenderness  Genitourinary:     Comments: Indwelling chavis  Musculoskeletal: Normal range of motion  Skin:     General: Skin is warm and dry  Neurological:      General: No focal deficit present  Mental Status: He is alert and oriented to person, place, and time  Mental status is at baseline  Psychiatric:         Mood and Affect: Mood normal          Behavior: Behavior normal          Pertinent Laboratory/Diagnostic Studies:  Labs reviewed in facility paper chart  Current Medications   Medications were reviewed and updated  Please refer to paper chart for updated list of medications      MANI Friend  9/16/2020 3:04 PM

## 2020-09-17 NOTE — TELEPHONE ENCOUNTER
Called and spoke to patient's daughter at length  Patient previously managed by Dr Srinivasa Starr with remote Tcc of the bladder  He was seen for routine FU 9/2/2020 and found to be in urinary retention at that time  Porter catheter placed and patient scheduled for first available cystoscopy for further evaluation of urinary retention and h/o bladder cancer (scheduled 10/28 with Dr Jorge Madison)  Renal US also ordered at that time  Shortly thereafter patient developed hematuria and subsequent UTI that eventually required hospital stay and rehab stay thereafter  This hospital stay really affected patient given his age and "took a lot out of him," per daughter  These urological events coincided with cardiology issues as well  He was seen around the same time in early September and told he needed a new heart valve placed  However, the hospital admission and infection has now delayed the heart valve procedure  Currently, per Dgtr, patient is doing well in rehab  They have meetings to discuss discharge and FU with cardiology to readdress and reschedule Valve replacement  The issue becomes prioritizing his urological vs  Cardiac issues because once he has the valve procedure he was told he will not be able to have cystoscopy for 3 months time  I reviewed with daughter that given urinary retention and h/o bladder cancer, cystoscopy ordered to both further evaluate possible reasoning behind urinary retention (I e  BPH vs  End stage bladder with trabeculation) and determine plan for catheter vs  Treating urinary retention,  as well as evaluate for possible signs of bladder cancer recurrence  Renal US ordered as both upper tract imaging for bladder cancer surveillance and to ensure no lasting issue from urinary retention, etc      I reviewed that we can try a void trial to see how he does prior to cardiac procedure per Jolly Kauffman at next visit if family wishes    I also reviewed it is not unreasonable to leave the catheter in for now and routinely change until cardiac issues are dealt with to attempt to avoid further delays  I also stressed as a family and now discussing the risks of delaying the cystoscopy (I e  possible bladder cancer recurrence/leaving catheter in until cystoscopy can be completed, etc) it is reasonable for them to choose to delay cystoscopy if cardiac valve needs to be prioritized  Daughter was very appreciative of discussion  She will discuss with her parents  At this time she is leaning towards chavis change only on 9/30 as scheduled and likely delaying cysto to proceed with valve procedure  She did not want to cancel/reschedule cysto yet until meeting with cardiac provider and until they have dates for valve procedure and know for sure there will be no further delays  She will also try to have renal US at least completed prior to valve procedure to ensure no major abnormalities that may make dealing with urological issues more urgent  We agreed this was a reasonable plan  She understands if patient wishes to attempt void trial on 9/30 that we need to change the time of appts slightly  She also understands that if patient still at skilled nursing facility they may be able to change the catheter there, she thinks this is unlikely but will be in touch with the office if plan needs to change further  Will review all this with RISHI Robledo to ensure he is in agreement with this plan  Patient will likely wish to follow up with him routinely moving forward but dgtr understands they will need to see a physician for diagnostic cystoscopy when the time comes

## 2020-09-17 NOTE — TELEPHONE ENCOUNTER
Want we give Chacorta trial of voiding and then I will see him on September 30th I think he has an appointment    Cysto as scheduled next month with Katie

## 2020-09-21 ENCOUNTER — OFFICE VISIT (OUTPATIENT)
Dept: GERIATRICS | Facility: CLINIC | Age: 85
End: 2020-09-21
Payer: MEDICARE

## 2020-09-21 VITALS
DIASTOLIC BLOOD PRESSURE: 52 MMHG | TEMPERATURE: 97.2 F | SYSTOLIC BLOOD PRESSURE: 109 MMHG | HEART RATE: 79 BPM | WEIGHT: 147.8 LBS | BODY MASS INDEX: 23.15 KG/M2 | RESPIRATION RATE: 20 BRPM | OXYGEN SATURATION: 93 %

## 2020-09-21 DIAGNOSIS — N30.01 ACUTE CYSTITIS WITH HEMATURIA: Primary | ICD-10-CM

## 2020-09-21 DIAGNOSIS — I10 ESSENTIAL HYPERTENSION: ICD-10-CM

## 2020-09-21 DIAGNOSIS — I35.0 SEVERE AORTIC STENOSIS: ICD-10-CM

## 2020-09-21 DIAGNOSIS — N40.1 BENIGN PROSTATIC HYPERPLASIA WITH NOCTURIA: ICD-10-CM

## 2020-09-21 DIAGNOSIS — R35.1 BENIGN PROSTATIC HYPERPLASIA WITH NOCTURIA: ICD-10-CM

## 2020-09-21 DIAGNOSIS — I48.0 PAF (PAROXYSMAL ATRIAL FIBRILLATION) (HCC): ICD-10-CM

## 2020-09-21 DIAGNOSIS — R33.9 URINE RETENTION: ICD-10-CM

## 2020-09-21 DIAGNOSIS — Z71.89 GOALS OF CARE, COUNSELING/DISCUSSION: ICD-10-CM

## 2020-09-21 DIAGNOSIS — G20 PARKINSON'S DISEASE (HCC): ICD-10-CM

## 2020-09-21 DIAGNOSIS — R26.2 AMBULATORY DYSFUNCTION: ICD-10-CM

## 2020-09-21 DIAGNOSIS — J43.8 OTHER EMPHYSEMA (HCC): ICD-10-CM

## 2020-09-21 PROCEDURE — 99316 NF DSCHRG MGMT 30 MIN+: CPT | Performed by: NURSE PRACTITIONER

## 2020-09-21 NOTE — PROGRESS NOTES
[facility]  [address]  [phone]  [facility] DISCHARGE Note  [id number]      NAME: Erin Freitas  AGE: 80 y o  SEX: male    DATE OF ENCOUNTER: 9/21/2020    Assessment and Plan     Problem List Items Addressed This Visit        Respiratory    Pulmonary emphysema ([facility] [address] )     No increase sob/cough during stay  Cont Spiriva  Energy conservation techniques            Cardiovascular and Mediastinum    Severe aortic stenosis     +ram, no cp  Has f/u with cards pending for valve replacement  Cont to monitor         PAF (paroxysmal atrial fibrillation) ([facility] [address] )     Rate controlled at present  Cont BB, asa therapy  F/u with cards         Hypertension     Stable at present  Cont lopressor, diuretic  Cont to monitor            Nervous and Auditory    Parkinson's disease ([facility] [address] )     Stable at present without increased rigidity  Cont to f/u with neuro as directed  Cont pt/ot for strength and balance training            Genitourinary    Benign prostatic hyperplasia     Cont with alfuzosin and finasteride  Cont with indwelling chavis  Has f/u with uro as outpt         UTI (urinary tract infection) - Primary     No further ss infection  abx course completed at rehab  Cont to monitor  Encourage fluids, periarea hygiene  SN teaching via home care for cath care  Urine retention     Chavis remains - recommend home care for assist with management under uro guidance  F/u with uro            Other    Goals of care, counseling/discussion     What matters most:  To return to home environment with wife, lead independent lifestyle  Right now would like urinary issues resolved and cont to pursue valve replacement  Mobility:  Walker with supervision  Supervision with adls  Mentation:  A&Ox3, occ forgetful  Medications:  Diuretic, BB, BPH meds- monitor         Ambulatory dysfunction     Cont pt/ot via home care for cont strength and balance training    Taxing effort for pt to leave home secondary to use of assistive device  No orders of the defined types were placed in this encounter       - Counseling Documentation: patient was counseled regarding: instructions for management, patient and family education and impressions    Chief Complaint     No chief complaint on file  History of Present Illness     Mr Julissa Murillo is an 81yo male who [facility] for rehab s/p UTI   comorbidities include PD, severe AS, bph  Pt was admitted 9/10/2020 and will discharge 9/22/2020 to home with wife  During visit vital signs were monitored and found to be stable  Connelly was monitored and continues  Antibiotics for UTi completed  Patient participated in therapy  He is currently supervision for adl's, use of walker  Patient seen today for discharge eval   He is looking forward to going home and feels strong enough to do so  He denies cp/sob/cough  Denies gi/gu distress  Is ambulating with walker without difficulty  Appetite and sleep are good  The following portions of the patient's history were reviewed and updated as appropriate: allergies, current medications, past family history, past medical history, past social history, past surgical history and problem list     Review of Systems     Review of Systems   Constitutional: Negative for activity change, appetite change, chills and fatigue  HENT: Negative for congestion  Respiratory: Negative for cough and shortness of breath  Cardiovascular: Negative for chest pain  Gastrointestinal: Negative for abdominal pain, constipation, diarrhea, nausea and vomiting  Genitourinary: Negative for difficulty urinating  Musculoskeletal: Positive for gait problem  Negative for arthralgias and back pain  Neurological: Negative for dizziness and light-headedness  Psychiatric/Behavioral: Positive for decreased concentration (forgetful- mild)         Active Problem List     Patient Active Problem List   Diagnosis    Severe aortic stenosis    PAF (paroxysmal atrial fibrillation) (HCC)    Benign prostatic hyperplasia    Carotid stenosis, bilateral    Coronary artery disease    Esophageal reflux    Essential and other specified forms of tremor    Generalized osteoarthritis of multiple sites    Hyperlipidemia    Hypertension    Hypothyroidism    Impaired fasting glucose    Left foot drop    Mononeuritis    Osteoarthritis of left hip    Parkinson's disease ([facility] [address] )    Peripheral arterial disease ([facility] [address] )    Pulmonary emphysema ([facility] [address] )    RBBB    [facility] ([facility]    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    Stenosis of carotid artery    Iliac artery aneurysm, bilateral (HCC)    Hyponatremia    Hypotension    UTI (urinary tract infection)    Urine retention    Indwelling Chavis catheter calcification (HCC)    Goals of care, counseling/discussion    Ambulatory dysfunction       Objective     /52   Pulse 79   Temp (!) 97 2 °F (36 2 °C)   Resp 20   Wt 67 kg (147 lb 12 8 oz)   SpO2 93%   BMI 23 15 kg/m²     Physical Exam  Vitals signs and nursing note reviewed  Constitutional:       General: He is not in acute distress  Appearance: He is well-developed  He is not diaphoretic  HENT:      Head: Normocephalic  Neck:      Musculoskeletal: Normal range of motion  Cardiovascular:      Rate and Rhythm: Normal rate  Heart sounds: Murmur present  No friction rub  No gallop  Pulmonary:      Effort: Pulmonary effort is normal  No respiratory distress  Breath sounds: Normal breath sounds  No wheezing or rales  Abdominal:      General: Bowel sounds are normal  There is no distension  Palpations: Abdomen is soft  Tenderness: There is no abdominal tenderness  Genitourinary:     Comments: Indwelling chavis  Musculoskeletal: Normal range of motion  General: No swelling  Skin:     General: Skin is warm and dry     Neurological: General: No focal deficit present  Mental Status: He is alert and oriented to person, place, and time  Mental status is at baseline  Psychiatric:         Mood and Affect: Mood normal          Behavior: Behavior normal          Pertinent Laboratory/Diagnostic Studies:  Labs reviewed in facility paper chart  Current Medications   Medications were reviewed and updated  Current Outpatient Medications:     alfuzosin (UROXATRAL) 10 mg 24 hr tablet, TAKE 1 TABLET(10 MG) BY MOUTH DAILY, Disp: 90 tablet, Rfl: 3    aspirin 81 MG tablet, Take 81 mg by mouth, Disp: , Rfl:     Cholecalciferol (HM VITAMIN D3) 2000 units CAPS, Take 1 tablet by mouth daily, Disp: , Rfl:     finasteride (PROSCAR) 5 mg tablet, Take 1 tablet (5 mg total) by mouth daily, Disp: 90 tablet, Rfl: 3    furosemide (LASIX) 20 mg tablet, TAKE 1 TABLET(20 MG) BY MOUTH DAILY, Disp: 90 tablet, Rfl: 1    hydrocortisone 2 5 % cream, RENA TO POSTERIOR EARS AND ARMS ONCE A DAY PRN FOR 1-2 WEEKS, Disp: , Rfl: 1    levothyroxine 75 mcg tablet, TAKE 1 TABLET(75 MCG) BY MOUTH DAILY, Disp: 90 tablet, Rfl: 3    metoprolol tartrate (LOPRESSOR) 25 mg tablet, Take 0 5 tablets (12 5 mg total) by mouth every 12 (twelve) hours, Disp: 90 tablet, Rfl: 3    Multiple Vitamins-Minerals (MULTIVITAL-M) TABS, Take 1 tablet by mouth daily, Disp: , Rfl:     nitroglycerin (NITROSTAT) 0 4 mg SL tablet, Place 1 tablet (0 4 mg total) under the tongue every 5 (five) minutes as needed for chest pain, Disp: 30 tablet, Rfl: 3    omeprazole (PriLOSEC) 20 mg delayed release capsule, Take 1 tablet by mouth daily, Disp: , Rfl:     simvastatin (ZOCOR) 20 mg tablet, TAKE 1 TABLET(20 MG) BY MOUTH DAILY AT BEDTIME, Disp: 90 tablet, Rfl: 1    SPIRIVA RESPIMAT 2 5 MCG/ACT AERS inhaler, INHALE 2 PUFFS BY MOUTH DAILY, Disp: 12 g, Rfl: 6    >30min spent on discharge:  Pt visit, med rec, coordination of care, chart review      PCP update:  pcp notified of discharge via YINKA Quintana to fax all rehab info upon discharge    MANI Koroma  9/22/2020 9:12 AM

## 2020-09-22 NOTE — ASSESSMENT & PLAN NOTE
Cont pt/ot via home care for cont strength and balance training  Taxing effort for pt to leave home secondary to use of assistive device

## 2020-09-22 NOTE — ASSESSMENT & PLAN NOTE
No further ss infection  abx course completed at rehab  Cont to monitor  Encourage fluids, periarea hygiene  SN teaching via home care for cath care

## 2020-09-22 NOTE — ASSESSMENT & PLAN NOTE
Stable at present without increased rigidity  Cont to f/u with neuro as directed  Cont pt/ot for strength and balance training

## 2020-09-22 NOTE — ASSESSMENT & PLAN NOTE
What matters most:  To return to home environment with wife, lead independent lifestyle  Right now would like urinary issues resolved and cont to pursue valve replacement  Mobility:  Walker with supervision  Supervision with adls    Mentation:  A&Ox3, occ forgetful  Medications:  Diuretic, BB, BPH meds- monitor

## 2020-09-24 ENCOUNTER — TELEPHONE (OUTPATIENT)
Dept: UROLOGY | Facility: AMBULATORY SURGERY CENTER | Age: 85
End: 2020-09-24

## 2020-09-24 NOTE — TELEPHONE ENCOUNTER
[facility] Visiting nurse would like to know if catheter changes can be done in the home    Patient is scheduled for office on 9/30/20

## 2020-09-24 NOTE — TELEPHONE ENCOUNTER
Deidre Eastland 7 minutes ago (10:58 AM)          Eliezer Machado Visiting nurse would like to know if catheter changes can be done in the home    Patient is scheduled for office on 9/30/20

## 2020-09-24 NOTE — TELEPHONE ENCOUNTER
Call placed to daughter to advise that we had received message from ERIKA regarding possibility of changing chavis at home  Asked that she call office back to advise if they are agreeable to this plan  Office number provided on voicemail  Call placed to [place] nurse  Left message to advise that we have reached out to family to ensure that they are agreeable to plan  Once we hear back, we can place orders  Office number provided on voicemail

## 2020-09-24 NOTE — TELEPHONE ENCOUNTER
Tatianna Augustin, patients wife, returning call      She can be reached [phone] or [phone]

## 2020-09-24 NOTE — TELEPHONE ENCOUNTER
· Routine catheter change, every 4 weeks, [id number], 10 mL balloon  · Routine catheter care  · May irrigate with 60 mL Normal strength saline for clog, sediment, hematuria  · May change catheter PRN for clog or dislodged catheter  · Call the office with any urological concerns     · Next routine catheter change due approximately 9/30/20

## 2020-09-24 NOTE — TELEPHONE ENCOUNTER
Had talked with Laurian Spurling and she has decided that it would be easier for [facility] to change catheter and orders had been [placed for [facility]

## 2020-09-25 ENCOUNTER — TELEPHONE (OUTPATIENT)
Dept: FAMILY MEDICINE CLINIC | Facility: CLINIC | Age: 85
End: 2020-09-25

## 2020-09-25 ENCOUNTER — TRANSITIONAL CARE MANAGEMENT (OUTPATIENT)
Dept: FAMILY MEDICINE CLINIC | Facility: CLINIC | Age: 85
End: 2020-09-25

## 2020-09-25 ENCOUNTER — HOSPITAL ENCOUNTER (OUTPATIENT)
Dept: RADIOLOGY | Age: 85
Discharge: HOME | End: 2020-09-25
Payer: MEDICARE

## 2020-09-25 DIAGNOSIS — N40.1 BPH WITH OBSTRUCTION/LOWER URINARY TRACT SYMPTOMS: ICD-10-CM

## 2020-09-25 DIAGNOSIS — R33.9 URINARY RETENTION: ICD-10-CM

## 2020-09-25 DIAGNOSIS — N13.8 BPH WITH OBSTRUCTION/LOWER URINARY TRACT SYMPTOMS: ICD-10-CM

## 2020-09-25 PROCEDURE — 76770 US EXAM ABDO BACK WALL COMP: CPT
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
## 2020-09-28 ENCOUNTER — OFFICE VISIT (OUTPATIENT)
Dept: FAMILY MEDICINE CLINIC | Facility: CLINIC | Age: 85
End: 2020-09-28
Payer: MEDICARE

## 2020-09-28 VITALS
RESPIRATION RATE: 22 BRPM | BODY MASS INDEX: 23.34 KG/M2 | HEART RATE: 88 BPM | TEMPERATURE: 97.3 F | SYSTOLIC BLOOD PRESSURE: 118 MMHG | WEIGHT: 149 LBS | DIASTOLIC BLOOD PRESSURE: 72 MMHG

## 2020-09-28 DIAGNOSIS — E03.9 ACQUIRED HYPOTHYROIDISM: ICD-10-CM

## 2020-09-28 DIAGNOSIS — I10 ESSENTIAL HYPERTENSION: ICD-10-CM

## 2020-09-28 DIAGNOSIS — N30.01 ACUTE CYSTITIS WITH HEMATURIA: Primary | ICD-10-CM

## 2020-09-28 DIAGNOSIS — R33.9 URINE RETENTION: ICD-10-CM

## 2020-09-28 DIAGNOSIS — R73.01 IMPAIRED FASTING GLUCOSE: ICD-10-CM

## 2020-09-28 DIAGNOSIS — N40.1 BENIGN PROSTATIC HYPERPLASIA WITH URINARY RETENTION: ICD-10-CM

## 2020-09-28 DIAGNOSIS — R26.2 AMBULATORY DYSFUNCTION: ICD-10-CM

## 2020-09-28 DIAGNOSIS — I73.9 PERIPHERAL ARTERIAL DISEASE (HCC): ICD-10-CM

## 2020-09-28 DIAGNOSIS — I48.0 PAF (PAROXYSMAL ATRIAL FIBRILLATION) (HCC): ICD-10-CM

## 2020-09-28 DIAGNOSIS — I25.10 CORONARY ARTERY DISEASE INVOLVING NATIVE CORONARY ARTERY OF NATIVE HEART WITHOUT ANGINA PECTORIS: ICD-10-CM

## 2020-09-28 DIAGNOSIS — I35.0 SEVERE AORTIC STENOSIS: ICD-10-CM

## 2020-09-28 DIAGNOSIS — I71.4 ABDOMINAL AORTIC ANEURYSM (AAA) WITHOUT RUPTURE (HCC): ICD-10-CM

## 2020-09-28 DIAGNOSIS — I72.3 ILIAC ARTERY ANEURYSM, BILATERAL (HCC): ICD-10-CM

## 2020-09-28 DIAGNOSIS — R33.8 BENIGN PROSTATIC HYPERPLASIA WITH URINARY RETENTION: ICD-10-CM

## 2020-09-28 PROBLEM — E87.1 HYPONATREMIA: Status: RESOLVED | Noted: 2020-09-07 | Resolved: 2020-09-28

## 2020-09-28 PROBLEM — I95.9 HYPOTENSION: Status: RESOLVED | Noted: 2020-09-07 | Resolved: 2020-09-28

## 2020-09-28 PROBLEM — Z71.89 GOALS OF CARE, COUNSELING/DISCUSSION: Status: RESOLVED | Noted: 2020-09-11 | Resolved: 2020-09-28

## 2020-09-28 PROCEDURE — 99496 TRANSJ CARE MGMT HIGH F2F 7D: CPT | Performed by: FAMILY MEDICINE

## 2020-10-01 ENCOUNTER — OFFICE VISIT (OUTPATIENT)
Dept: CARDIAC SURGERY | Facility: CLINIC | Age: 85
End: 2020-10-01
Payer: MEDICARE

## 2020-10-01 ENCOUNTER — LAB REQUISITION (OUTPATIENT)
Dept: LAB | Facility: HOSPITAL | Age: 85
End: 2020-10-01
Payer: MEDICARE

## 2020-10-01 ENCOUNTER — APPOINTMENT (OUTPATIENT)
Dept: LAB | Facility: CLINIC | Age: 85
End: 2020-10-01
Payer: MEDICARE

## 2020-10-01 ENCOUNTER — TRANSCRIBE ORDERS (OUTPATIENT)
Dept: LAB | Facility: CLINIC | Age: 85
End: 2020-10-01

## 2020-10-01 VITALS
SYSTOLIC BLOOD PRESSURE: 126 MMHG | TEMPERATURE: 96.5 F | BODY MASS INDEX: 23.23 KG/M2 | WEIGHT: 148 LBS | DIASTOLIC BLOOD PRESSURE: 74 MMHG | OXYGEN SATURATION: 95 % | RESPIRATION RATE: 12 BRPM | HEIGHT: 67 IN | HEART RATE: 78 BPM

## 2020-10-01 DIAGNOSIS — Z01.818 OTHER SPECIFIED PRE-OPERATIVE EXAMINATION: ICD-10-CM

## 2020-10-01 DIAGNOSIS — I35.0 SEVERE AORTIC VALVE STENOSIS: Primary | ICD-10-CM

## 2020-10-01 DIAGNOSIS — Z01.818 PRE-OP TESTING: ICD-10-CM

## 2020-10-01 DIAGNOSIS — Z11.59 SCREENING FOR VIRAL DISEASE: ICD-10-CM

## 2020-10-01 DIAGNOSIS — Z01.810 PRE-OPERATIVE CARDIOVASCULAR EXAMINATION: ICD-10-CM

## 2020-10-01 DIAGNOSIS — I35.0 SEVERE AORTIC STENOSIS: ICD-10-CM

## 2020-10-01 DIAGNOSIS — Z01.818 ENCOUNTER FOR OTHER PREPROCEDURAL EXAMINATION: ICD-10-CM

## 2020-10-01 DIAGNOSIS — I35.0 SEVERE AORTIC STENOSIS: Primary | ICD-10-CM

## 2020-10-01 DIAGNOSIS — I35.0 SEVERE AORTIC VALVE STENOSIS: ICD-10-CM

## 2020-10-01 DIAGNOSIS — I35.0 NONRHEUMATIC AORTIC (VALVE) STENOSIS: ICD-10-CM

## 2020-10-01 LAB
ABO GROUP BLD: NORMAL
BACTERIA UR QL AUTO: ABNORMAL /HPF
BILIRUB UR QL STRIP: NEGATIVE
BLD GP AB SCN SERPL QL: NEGATIVE
CLARITY UR: CLEAR
COLOR UR: ABNORMAL
GLUCOSE UR STRIP-MCNC: NEGATIVE MG/DL
HGB UR QL STRIP.AUTO: ABNORMAL
KETONES UR STRIP-MCNC: NEGATIVE MG/DL
LEUKOCYTE ESTERASE UR QL STRIP: ABNORMAL
MUCOUS THREADS UR QL AUTO: ABNORMAL
NITRITE UR QL STRIP: POSITIVE
NON-SQ EPI CELLS URNS QL MICRO: ABNORMAL /HPF
PH UR STRIP.AUTO: 6 [PH]
PROT UR STRIP-MCNC: NEGATIVE MG/DL
RBC #/AREA URNS AUTO: ABNORMAL /HPF
RH BLD: POSITIVE
SP GR UR STRIP.AUTO: 1.01 (ref 1–1.03)
SPECIMEN EXPIRATION DATE: NORMAL
UROBILINOGEN UR QL STRIP.AUTO: 0.2 E.U./DL
WBC #/AREA URNS AUTO: ABNORMAL /HPF

## 2020-10-01 PROCEDURE — 81001 URINALYSIS AUTO W/SCOPE: CPT | Performed by: THORACIC SURGERY (CARDIOTHORACIC VASCULAR SURGERY)

## 2020-10-01 PROCEDURE — 87081 CULTURE SCREEN ONLY: CPT

## 2020-10-01 PROCEDURE — 86850 RBC ANTIBODY SCREEN: CPT | Performed by: NURSE PRACTITIONER

## 2020-10-01 PROCEDURE — 99215 OFFICE O/P EST HI 40 MIN: CPT | Performed by: NURSE PRACTITIONER

## 2020-10-01 PROCEDURE — U0003 INFECTIOUS AGENT DETECTION BY NUCLEIC ACID (DNA OR RNA); SEVERE ACUTE RESPIRATORY SYNDROME CORONAVIRUS 2 (SARS-COV-2) (CORONAVIRUS DISEASE [COVID-19]), AMPLIFIED PROBE TECHNIQUE, MAKING USE OF HIGH THROUGHPUT TECHNOLOGIES AS DESCRIBED BY CMS-2020-01-R: HCPCS | Performed by: NURSE PRACTITIONER

## 2020-10-01 PROCEDURE — 86901 BLOOD TYPING SEROLOGIC RH(D): CPT | Performed by: NURSE PRACTITIONER

## 2020-10-01 PROCEDURE — 36415 COLL VENOUS BLD VENIPUNCTURE: CPT

## 2020-10-01 PROCEDURE — 86900 BLOOD TYPING SEROLOGIC ABO: CPT | Performed by: NURSE PRACTITIONER

## 2020-10-01 RX ORDER — CHLORHEXIDINE GLUCONATE 0.12 MG/ML
15 RINSE ORAL ONCE
Status: CANCELLED | OUTPATIENT
Start: 2020-10-01 | End: 2020-10-01

## 2020-10-02 ENCOUNTER — TELEPHONE (OUTPATIENT)
Dept: UROLOGY | Facility: MEDICAL CENTER | Age: 85
End: 2020-10-02

## 2020-10-02 ENCOUNTER — TELEPHONE (OUTPATIENT)
Dept: PULMONOLOGY | Facility: CLINIC | Age: 85
End: 2020-10-02

## 2020-10-02 LAB — SARS-COV-2 RNA SPEC QL NAA+PROBE: NOT DETECTED

## 2020-10-03 LAB — MRSA NOSE QL CULT: NORMAL

## 2020-10-06 ENCOUNTER — APPOINTMENT (OUTPATIENT)
Dept: NON INVASIVE DIAGNOSTICS | Facility: HOSPITAL | Age: 85
DRG: 267 | End: 2020-10-06
Payer: MEDICARE

## 2020-10-06 ENCOUNTER — ANESTHESIA EVENT (OUTPATIENT)
Dept: PERIOP | Facility: HOSPITAL | Age: 85
DRG: 267 | End: 2020-10-06
Payer: MEDICARE

## 2020-10-06 ENCOUNTER — HOSPITAL ENCOUNTER (INPATIENT)
Facility: HOSPITAL | Age: 85
LOS: 2 days | Discharge: HOME HEALTH | DRG: 267 | End: 2020-10-08
Attending: THORACIC SURGERY (CARDIOTHORACIC VASCULAR SURGERY) | Admitting: THORACIC SURGERY (CARDIOTHORACIC VASCULAR SURGERY)
Payer: MEDICARE

## 2020-10-06 ENCOUNTER — ANESTHESIA (OUTPATIENT)
Dept: PERIOP | Facility: HOSPITAL | Age: 85
DRG: 267 | End: 2020-10-06
Payer: MEDICARE

## 2020-10-06 ENCOUNTER — APPOINTMENT (INPATIENT)
Dept: RADIOLOGY | Facility: HOSPITAL | Age: 85
DRG: 267 | End: 2020-10-06
Payer: MEDICARE

## 2020-10-06 VITALS — HEART RATE: 75 BPM

## 2020-10-06 DIAGNOSIS — I35.0 AORTIC STENOSIS, SEVERE: ICD-10-CM

## 2020-10-06 DIAGNOSIS — Z95.2 S/P TAVR (TRANSCATHETER AORTIC VALVE REPLACEMENT): Primary | ICD-10-CM

## 2020-10-06 DIAGNOSIS — D69.6 THROMBOCYTOPENIA (HCC): ICD-10-CM

## 2020-10-06 PROBLEM — E83.51 HYPOCALCEMIA: Status: ACTIVE | Noted: 2020-10-06

## 2020-10-06 PROBLEM — E87.8 HYPOCHLOREMIA: Status: ACTIVE | Noted: 2020-10-06

## 2020-10-06 PROBLEM — D64.9 ANEMIA: Status: ACTIVE | Noted: 2020-10-06

## 2020-10-06 LAB
ANION GAP SERPL CALCULATED.3IONS-SCNC: 5 MMOL/L (ref 4–13)
ATRIAL RATE: 56 BPM
ATRIAL RATE: 62 BPM
BACTERIA UR QL AUTO: ABNORMAL /HPF
BASE EXCESS BLDA CALC-SCNC: -2 MMOL/L (ref -2–3)
BASE EXCESS BLDA CALC-SCNC: -2 MMOL/L (ref -2–3)
BASE EXCESS BLDA CALC-SCNC: 0 MMOL/L (ref -2–3)
BILIRUB UR QL STRIP: NEGATIVE
BUN SERPL-MCNC: 16 MG/DL (ref 5–25)
CA-I BLD-SCNC: 1.13 MMOL/L (ref 1.12–1.32)
CA-I BLD-SCNC: 1.13 MMOL/L (ref 1.12–1.32)
CALCIUM SERPL-MCNC: 7.9 MG/DL (ref 8.3–10.1)
CHLORIDE SERPL-SCNC: 110 MMOL/L (ref 100–108)
CLARITY UR: ABNORMAL
CO2 SERPL-SCNC: 25 MMOL/L (ref 21–32)
COLOR UR: ABNORMAL
CREAT SERPL-MCNC: 0.75 MG/DL (ref 0.6–1.3)
GFR SERPL CREATININE-BSD FRML MDRD: 82 ML/MIN/1.73SQ M
GLUCOSE SERPL-MCNC: 103 MG/DL (ref 65–140)
GLUCOSE SERPL-MCNC: 118 MG/DL (ref 65–140)
GLUCOSE SERPL-MCNC: 81 MG/DL (ref 65–140)
GLUCOSE SERPL-MCNC: 82 MG/DL (ref 65–140)
GLUCOSE SERPL-MCNC: 86 MG/DL (ref 65–140)
GLUCOSE SERPL-MCNC: 95 MG/DL (ref 65–140)
GLUCOSE SERPL-MCNC: 95 MG/DL (ref 65–140)
GLUCOSE UR STRIP-MCNC: NEGATIVE MG/DL
HCO3 BLDA-SCNC: 24.6 MMOL/L (ref 22–28)
HCO3 BLDA-SCNC: 25.1 MMOL/L (ref 22–28)
HCO3 BLDA-SCNC: 25.2 MMOL/L (ref 22–28)
HCT VFR BLD AUTO: 32.3 % (ref 36.5–49.3)
HCT VFR BLD CALC: 28 % (ref 36.5–49.3)
HCT VFR BLD CALC: 29 % (ref 36.5–49.3)
HCT VFR BLD CALC: 31 % (ref 36.5–49.3)
HGB BLD-MCNC: 10.9 G/DL (ref 12–17)
HGB BLDA-MCNC: 10.5 G/DL (ref 12–17)
HGB BLDA-MCNC: 9.5 G/DL (ref 12–17)
HGB BLDA-MCNC: 9.9 G/DL (ref 12–17)
HGB UR QL STRIP.AUTO: ABNORMAL
HYALINE CASTS #/AREA URNS LPF: ABNORMAL /LPF
KCT BLD-ACNC: 132 SEC (ref 89–137)
KCT BLD-ACNC: 138 SEC (ref 89–137)
KCT BLD-ACNC: 364 SEC (ref 89–137)
KETONES UR STRIP-MCNC: NEGATIVE MG/DL
LEUKOCYTE ESTERASE UR QL STRIP: ABNORMAL
NITRITE UR QL STRIP: POSITIVE
NON-SQ EPI CELLS URNS QL MICRO: ABNORMAL /HPF
P AXIS: 56 DEGREES
P AXIS: 78 DEGREES
PCO2 BLD: 26 MMOL/L (ref 21–32)
PCO2 BLD: 27 MMOL/L (ref 21–32)
PCO2 BLD: 27 MMOL/L (ref 21–32)
PCO2 BLD: 38.3 MM HG (ref 36–44)
PCO2 BLD: 50.4 MM HG (ref 36–44)
PCO2 BLD: 54.7 MM HG (ref 36–44)
PH BLD: 7.27 [PH] (ref 7.35–7.45)
PH BLD: 7.3 [PH] (ref 7.35–7.45)
PH BLD: 7.42 [PH] (ref 7.35–7.45)
PH UR STRIP.AUTO: 6.5 [PH]
PLATELET # BLD AUTO: 133 THOUSANDS/UL (ref 149–390)
PMV BLD AUTO: 9.7 FL (ref 8.9–12.7)
PO2 BLD: 272 MM HG (ref 75–129)
PO2 BLD: 296 MM HG (ref 75–129)
PO2 BLD: 385 MM HG (ref 75–129)
POTASSIUM BLD-SCNC: 3.4 MMOL/L (ref 3.5–5.3)
POTASSIUM BLD-SCNC: 3.4 MMOL/L (ref 3.5–5.3)
POTASSIUM BLD-SCNC: 3.6 MMOL/L (ref 3.5–5.3)
POTASSIUM SERPL-SCNC: 3.7 MMOL/L (ref 3.5–5.3)
PR INTERVAL: 242 MS
PR INTERVAL: 258 MS
PROT UR STRIP-MCNC: ABNORMAL MG/DL
QRS AXIS: -40 DEGREES
QRS AXIS: -48 DEGREES
QRSD INTERVAL: 158 MS
QRSD INTERVAL: 163 MS
QT INTERVAL: 488 MS
QT INTERVAL: 540 MS
QTC INTERVAL: 496 MS
QTC INTERVAL: 521 MS
RBC #/AREA URNS AUTO: ABNORMAL /HPF
SAO2 % BLD FROM PO2: 100 % (ref 60–85)
SODIUM BLD-SCNC: 138 MMOL/L (ref 136–145)
SODIUM BLD-SCNC: 140 MMOL/L (ref 136–145)
SODIUM BLD-SCNC: 140 MMOL/L (ref 136–145)
SODIUM SERPL-SCNC: 140 MMOL/L (ref 136–145)
SP GR UR STRIP.AUTO: 1.02 (ref 1–1.03)
SPECIMEN SOURCE: ABNORMAL
SPECIMEN SOURCE: NORMAL
T WAVE AXIS: 55 DEGREES
T WAVE AXIS: 70 DEGREES
UROBILINOGEN UR QL STRIP.AUTO: 1 E.U./DL
VENTRICULAR RATE: 56 BPM
VENTRICULAR RATE: 62 BPM
WBC #/AREA URNS AUTO: ABNORMAL /HPF

## 2020-10-06 PROCEDURE — 71045 X-RAY EXAM CHEST 1 VIEW: CPT

## 2020-10-06 PROCEDURE — 02HV33Z INSERTION OF INFUSION DEVICE INTO SUPERIOR VENA CAVA, PERCUTANEOUS APPROACH: ICD-10-PCS | Performed by: ANESTHESIOLOGY

## 2020-10-06 PROCEDURE — 02RF38Z REPLACEMENT OF AORTIC VALVE WITH ZOOPLASTIC TISSUE, PERCUTANEOUS APPROACH: ICD-10-PCS | Performed by: THORACIC SURGERY (CARDIOTHORACIC VASCULAR SURGERY)

## 2020-10-06 PROCEDURE — C1769 GUIDE WIRE: HCPCS | Performed by: THORACIC SURGERY (CARDIOTHORACIC VASCULAR SURGERY)

## 2020-10-06 PROCEDURE — G9197 ORDER FOR CEPH: HCPCS | Performed by: INTERNAL MEDICINE

## 2020-10-06 PROCEDURE — 84295 ASSAY OF SERUM SODIUM: CPT

## 2020-10-06 PROCEDURE — 82948 REAGENT STRIP/BLOOD GLUCOSE: CPT

## 2020-10-06 PROCEDURE — 82803 BLOOD GASES ANY COMBINATION: CPT

## 2020-10-06 PROCEDURE — 85014 HEMATOCRIT: CPT

## 2020-10-06 PROCEDURE — 85347 COAGULATION TIME ACTIVATED: CPT

## 2020-10-06 PROCEDURE — 84132 ASSAY OF SERUM POTASSIUM: CPT

## 2020-10-06 PROCEDURE — 85014 HEMATOCRIT: CPT | Performed by: PHYSICIAN ASSISTANT

## 2020-10-06 PROCEDURE — 76377 3D RENDER W/INTRP POSTPROCES: CPT

## 2020-10-06 PROCEDURE — 33361 REPLACE AORTIC VALVE PERQ: CPT | Performed by: THORACIC SURGERY (CARDIOTHORACIC VASCULAR SURGERY)

## 2020-10-06 PROCEDURE — 86923 COMPATIBILITY TEST ELECTRIC: CPT

## 2020-10-06 PROCEDURE — 85049 AUTOMATED PLATELET COUNT: CPT | Performed by: PHYSICIAN ASSISTANT

## 2020-10-06 PROCEDURE — 80048 BASIC METABOLIC PNL TOTAL CA: CPT | Performed by: PHYSICIAN ASSISTANT

## 2020-10-06 PROCEDURE — C1894 INTRO/SHEATH, NON-LASER: HCPCS | Performed by: THORACIC SURGERY (CARDIOTHORACIC VASCULAR SURGERY)

## 2020-10-06 PROCEDURE — C1760 CLOSURE DEV, VASC: HCPCS | Performed by: THORACIC SURGERY (CARDIOTHORACIC VASCULAR SURGERY)

## 2020-10-06 PROCEDURE — 81001 URINALYSIS AUTO W/SCOPE: CPT | Performed by: NURSE PRACTITIONER

## 2020-10-06 PROCEDURE — 82330 ASSAY OF CALCIUM: CPT

## 2020-10-06 PROCEDURE — 82947 ASSAY GLUCOSE BLOOD QUANT: CPT

## 2020-10-06 PROCEDURE — 85018 HEMOGLOBIN: CPT | Performed by: PHYSICIAN ASSISTANT

## 2020-10-06 PROCEDURE — 93355 ECHO TRANSESOPHAGEAL (TEE): CPT

## 2020-10-06 PROCEDURE — 33361 REPLACE AORTIC VALVE PERQ: CPT | Performed by: INTERNAL MEDICINE

## 2020-10-06 PROCEDURE — NC001 PR NO CHARGE: Performed by: PHYSICIAN ASSISTANT

## 2020-10-06 PROCEDURE — 93005 ELECTROCARDIOGRAM TRACING: CPT

## 2020-10-06 PROCEDURE — 93010 ELECTROCARDIOGRAM REPORT: CPT | Performed by: INTERNAL MEDICINE

## 2020-10-06 PROCEDURE — G9197 ORDER FOR CEPH: HCPCS | Performed by: THORACIC SURGERY (CARDIOTHORACIC VASCULAR SURGERY)

## 2020-10-06 DEVICE — VALVE TAVR SAPIEN 3 ULTRA W/ CMNDR DLV SYS 26MM: Type: IMPLANTABLE DEVICE | Site: HEART | Status: FUNCTIONAL

## 2020-10-06 DEVICE — PERCLOSE PROGLIDE™ SUTURE-MEDIATED CLOSURE SYSTEM
Type: IMPLANTABLE DEVICE | Site: ARTERIAL | Status: FUNCTIONAL
Brand: PERCLOSE PROGLIDE™

## 2020-10-06 RX ORDER — ROCURONIUM BROMIDE 10 MG/ML
INJECTION, SOLUTION INTRAVENOUS AS NEEDED
Status: DISCONTINUED | OUTPATIENT
Start: 2020-10-06 | End: 2020-10-06

## 2020-10-06 RX ORDER — FENTANYL CITRATE/PF 50 MCG/ML
12.5 SYRINGE (ML) INJECTION
Status: DISCONTINUED | OUTPATIENT
Start: 2020-10-06 | End: 2020-10-06 | Stop reason: HOSPADM

## 2020-10-06 RX ORDER — HEPARIN SODIUM 1000 [USP'U]/ML
400 INJECTION, SOLUTION INTRAVENOUS; SUBCUTANEOUS ONCE
Status: DISCONTINUED | OUTPATIENT
Start: 2020-10-06 | End: 2020-10-06 | Stop reason: HOSPADM

## 2020-10-06 RX ORDER — BISACODYL 10 MG
10 SUPPOSITORY, RECTAL RECTAL DAILY PRN
Status: DISCONTINUED | OUTPATIENT
Start: 2020-10-06 | End: 2020-10-08 | Stop reason: HOSPADM

## 2020-10-06 RX ORDER — PROTAMINE SULFATE 10 MG/ML
INJECTION, SOLUTION INTRAVENOUS AS NEEDED
Status: DISCONTINUED | OUTPATIENT
Start: 2020-10-06 | End: 2020-10-06

## 2020-10-06 RX ORDER — PRAVASTATIN SODIUM 40 MG
40 TABLET ORAL
Status: DISCONTINUED | OUTPATIENT
Start: 2020-10-06 | End: 2020-10-08 | Stop reason: HOSPADM

## 2020-10-06 RX ORDER — POLYETHYLENE GLYCOL 3350 17 G/17G
17 POWDER, FOR SOLUTION ORAL DAILY
Status: DISCONTINUED | OUTPATIENT
Start: 2020-10-06 | End: 2020-10-08 | Stop reason: HOSPADM

## 2020-10-06 RX ORDER — SODIUM CHLORIDE, SODIUM LACTATE, POTASSIUM CHLORIDE, CALCIUM CHLORIDE 600; 310; 30; 20 MG/100ML; MG/100ML; MG/100ML; MG/100ML
INJECTION, SOLUTION INTRAVENOUS CONTINUOUS PRN
Status: DISCONTINUED | OUTPATIENT
Start: 2020-10-06 | End: 2020-10-06

## 2020-10-06 RX ORDER — SODIUM CHLORIDE, SODIUM GLUCONATE, SODIUM ACETATE, POTASSIUM CHLORIDE, MAGNESIUM CHLORIDE, SODIUM PHOSPHATE, DIBASIC, AND POTASSIUM PHOSPHATE .53; .5; .37; .037; .03; .012; .00082 G/100ML; G/100ML; G/100ML; G/100ML; G/100ML; G/100ML; G/100ML
50 INJECTION, SOLUTION INTRAVENOUS CONTINUOUS
Status: DISCONTINUED | OUTPATIENT
Start: 2020-10-06 | End: 2020-10-07

## 2020-10-06 RX ORDER — PANTOPRAZOLE SODIUM 40 MG/1
40 TABLET, DELAYED RELEASE ORAL DAILY
Status: DISCONTINUED | OUTPATIENT
Start: 2020-10-06 | End: 2020-10-08 | Stop reason: HOSPADM

## 2020-10-06 RX ORDER — MIDAZOLAM HYDROCHLORIDE 2 MG/2ML
INJECTION, SOLUTION INTRAMUSCULAR; INTRAVENOUS AS NEEDED
Status: DISCONTINUED | OUTPATIENT
Start: 2020-10-06 | End: 2020-10-06

## 2020-10-06 RX ORDER — TAMSULOSIN HYDROCHLORIDE 0.4 MG/1
0.4 CAPSULE ORAL
Status: DISCONTINUED | OUTPATIENT
Start: 2020-10-06 | End: 2020-10-08 | Stop reason: HOSPADM

## 2020-10-06 RX ORDER — SODIUM CHLORIDE 9 MG/ML
INJECTION, SOLUTION INTRAVENOUS CONTINUOUS PRN
Status: DISCONTINUED | OUTPATIENT
Start: 2020-10-06 | End: 2020-10-06

## 2020-10-06 RX ORDER — FINASTERIDE 5 MG/1
5 TABLET, FILM COATED ORAL DAILY
Status: DISCONTINUED | OUTPATIENT
Start: 2020-10-06 | End: 2020-10-08 | Stop reason: HOSPADM

## 2020-10-06 RX ORDER — CHLORHEXIDINE GLUCONATE 0.12 MG/ML
15 RINSE ORAL ONCE
Status: COMPLETED | OUTPATIENT
Start: 2020-10-06 | End: 2020-10-06

## 2020-10-06 RX ORDER — ASPIRIN 81 MG/1
81 TABLET ORAL DAILY
Status: DISCONTINUED | OUTPATIENT
Start: 2020-10-06 | End: 2020-10-08 | Stop reason: HOSPADM

## 2020-10-06 RX ORDER — HYDRALAZINE HYDROCHLORIDE 20 MG/ML
5 INJECTION INTRAMUSCULAR; INTRAVENOUS EVERY 6 HOURS SCHEDULED
Status: DISCONTINUED | OUTPATIENT
Start: 2020-10-06 | End: 2020-10-07

## 2020-10-06 RX ORDER — ACETAMINOPHEN 325 MG/1
650 TABLET ORAL EVERY 4 HOURS PRN
Status: DISCONTINUED | OUTPATIENT
Start: 2020-10-06 | End: 2020-10-07

## 2020-10-06 RX ORDER — ONDANSETRON 2 MG/ML
4 INJECTION INTRAMUSCULAR; INTRAVENOUS EVERY 6 HOURS PRN
Status: DISCONTINUED | OUTPATIENT
Start: 2020-10-06 | End: 2020-10-08 | Stop reason: HOSPADM

## 2020-10-06 RX ORDER — FENTANYL CITRATE 50 UG/ML
INJECTION, SOLUTION INTRAMUSCULAR; INTRAVENOUS AS NEEDED
Status: DISCONTINUED | OUTPATIENT
Start: 2020-10-06 | End: 2020-10-06

## 2020-10-06 RX ORDER — ONDANSETRON 2 MG/ML
4 INJECTION INTRAMUSCULAR; INTRAVENOUS ONCE AS NEEDED
Status: DISCONTINUED | OUTPATIENT
Start: 2020-10-06 | End: 2020-10-06

## 2020-10-06 RX ORDER — SODIUM CHLORIDE 9 MG/ML
50 INJECTION, SOLUTION INTRAVENOUS CONTINUOUS
Status: DISCONTINUED | OUTPATIENT
Start: 2020-10-06 | End: 2020-10-07

## 2020-10-06 RX ORDER — PROPOFOL 10 MG/ML
INJECTION, EMULSION INTRAVENOUS AS NEEDED
Status: DISCONTINUED | OUTPATIENT
Start: 2020-10-06 | End: 2020-10-06

## 2020-10-06 RX ORDER — ALBUMIN, HUMAN INJ 5% 5 %
12.5 SOLUTION INTRAVENOUS ONCE
Status: COMPLETED | OUTPATIENT
Start: 2020-10-07 | End: 2020-10-07

## 2020-10-06 RX ORDER — ONDANSETRON 2 MG/ML
4 INJECTION INTRAMUSCULAR; INTRAVENOUS ONCE AS NEEDED
Status: DISCONTINUED | OUTPATIENT
Start: 2020-10-06 | End: 2020-10-06 | Stop reason: HOSPADM

## 2020-10-06 RX ORDER — LEVOTHYROXINE SODIUM 0.07 MG/1
75 TABLET ORAL
Status: DISCONTINUED | OUTPATIENT
Start: 2020-10-06 | End: 2020-10-08 | Stop reason: HOSPADM

## 2020-10-06 RX ORDER — LIDOCAINE HYDROCHLORIDE 10 MG/ML
INJECTION, SOLUTION EPIDURAL; INFILTRATION; INTRACAUDAL; PERINEURAL
Status: COMPLETED | OUTPATIENT
Start: 2020-10-06 | End: 2020-10-06

## 2020-10-06 RX ORDER — NITROGLYCERIN 0.4 MG/1
0.4 TABLET SUBLINGUAL
Status: DISCONTINUED | OUTPATIENT
Start: 2020-10-06 | End: 2020-10-07

## 2020-10-06 RX ORDER — HEPARIN SODIUM 5000 [USP'U]/ML
5000 INJECTION, SOLUTION INTRAVENOUS; SUBCUTANEOUS EVERY 8 HOURS SCHEDULED
Status: DISCONTINUED | OUTPATIENT
Start: 2020-10-07 | End: 2020-10-07

## 2020-10-06 RX ORDER — CHLORHEXIDINE GLUCONATE 0.12 MG/ML
15 RINSE ORAL 2 TIMES DAILY
Status: DISCONTINUED | OUTPATIENT
Start: 2020-10-06 | End: 2020-10-08 | Stop reason: HOSPADM

## 2020-10-06 RX ORDER — GLYCOPYRROLATE 0.2 MG/ML
INJECTION INTRAMUSCULAR; INTRAVENOUS AS NEEDED
Status: DISCONTINUED | OUTPATIENT
Start: 2020-10-06 | End: 2020-10-06

## 2020-10-06 RX ORDER — NEOSTIGMINE METHYLSULFATE 1 MG/ML
INJECTION INTRAVENOUS AS NEEDED
Status: DISCONTINUED | OUTPATIENT
Start: 2020-10-06 | End: 2020-10-06

## 2020-10-06 RX ORDER — CLOPIDOGREL BISULFATE 75 MG/1
75 TABLET ORAL DAILY
Status: DISCONTINUED | OUTPATIENT
Start: 2020-10-06 | End: 2020-10-08 | Stop reason: HOSPADM

## 2020-10-06 RX ADMIN — CLOPIDOGREL BISULFATE 75 MG: 75 TABLET ORAL at 17:44

## 2020-10-06 RX ADMIN — Medication 2000 MG: at 11:42

## 2020-10-06 RX ADMIN — GLYCOPYRROLATE 0.6 MG: 0.2 INJECTION, SOLUTION INTRAMUSCULAR; INTRAVENOUS at 12:19

## 2020-10-06 RX ADMIN — ONDANSETRON 4 MG: 2 INJECTION INTRAMUSCULAR; INTRAVENOUS at 22:28

## 2020-10-06 RX ADMIN — SODIUM CHLORIDE: 0.9 INJECTION, SOLUTION INTRAVENOUS at 11:33

## 2020-10-06 RX ADMIN — PRAVASTATIN SODIUM 40 MG: 40 TABLET ORAL at 16:25

## 2020-10-06 RX ADMIN — SODIUM CHLORIDE, SODIUM GLUCONATE, SODIUM ACETATE, POTASSIUM CHLORIDE, MAGNESIUM CHLORIDE, SODIUM PHOSPHATE, DIBASIC, AND POTASSIUM PHOSPHATE 50 ML/HR: .53; .5; .37; .037; .03; .012; .00082 INJECTION, SOLUTION INTRAVENOUS at 13:00

## 2020-10-06 RX ADMIN — ACETAMINOPHEN 650 MG: 325 TABLET, FILM COATED ORAL at 19:55

## 2020-10-06 RX ADMIN — MIDAZOLAM 1 MG: 1 INJECTION INTRAMUSCULAR; INTRAVENOUS at 11:19

## 2020-10-06 RX ADMIN — PROPOFOL 150 MG: 10 INJECTION, EMULSION INTRAVENOUS at 11:28

## 2020-10-06 RX ADMIN — IOHEXOL 38 ML: 350 INJECTION, SOLUTION INTRAVENOUS at 12:37

## 2020-10-06 RX ADMIN — CHLORHEXIDINE GLUCONATE 15 ML: 1.2 RINSE ORAL at 09:47

## 2020-10-06 RX ADMIN — PROTAMINE SULFATE 140 MG: 10 INJECTION, SOLUTION INTRAVENOUS at 12:19

## 2020-10-06 RX ADMIN — MIDAZOLAM 1 MG: 1 INJECTION INTRAMUSCULAR; INTRAVENOUS at 11:28

## 2020-10-06 RX ADMIN — SODIUM CHLORIDE 5 MG/HR: 0.9 INJECTION, SOLUTION INTRAVENOUS at 13:00

## 2020-10-06 RX ADMIN — MUPIROCIN 1 APPLICATION: 20 OINTMENT TOPICAL at 09:48

## 2020-10-06 RX ADMIN — Medication 12.5 MCG: at 14:00

## 2020-10-06 RX ADMIN — PHENYLEPHRINE HYDROCHLORIDE 20 MCG/MIN: 10 INJECTION INTRAVENOUS at 11:51

## 2020-10-06 RX ADMIN — NEOSTIGMINE METHYLSULFATE 4 MG: 1 INJECTION, SOLUTION INTRAVENOUS at 12:19

## 2020-10-06 RX ADMIN — ROCURONIUM BROMIDE 30 MG: 10 INJECTION, SOLUTION INTRAVENOUS at 11:28

## 2020-10-06 RX ADMIN — FINASTERIDE 5 MG: 5 TABLET, FILM COATED ORAL at 16:23

## 2020-10-06 RX ADMIN — Medication 12.5 MCG: at 14:09

## 2020-10-06 RX ADMIN — ASPIRIN 81 MG: 81 TABLET ORAL at 16:25

## 2020-10-06 RX ADMIN — MUPIROCIN 1 APPLICATION: 20 OINTMENT TOPICAL at 17:44

## 2020-10-06 RX ADMIN — SODIUM CHLORIDE, SODIUM LACTATE, POTASSIUM CHLORIDE, AND CALCIUM CHLORIDE: .6; .31; .03; .02 INJECTION, SOLUTION INTRAVENOUS at 11:19

## 2020-10-06 RX ADMIN — CHLORHEXIDINE GLUCONATE 15 ML: 1.2 RINSE ORAL at 17:44

## 2020-10-06 RX ADMIN — LIDOCAINE HYDROCHLORIDE 0.5 ML: 10 INJECTION, SOLUTION EPIDURAL; INFILTRATION; INTRACAUDAL; PERINEURAL at 11:39

## 2020-10-06 RX ADMIN — PANTOPRAZOLE SODIUM 40 MG: 40 TABLET, DELAYED RELEASE ORAL at 16:25

## 2020-10-06 RX ADMIN — FENTANYL CITRATE 50 MCG: 50 INJECTION, SOLUTION INTRAMUSCULAR; INTRAVENOUS at 11:28

## 2020-10-06 RX ADMIN — TAMSULOSIN HYDROCHLORIDE 0.4 MG: 0.4 CAPSULE ORAL at 16:25

## 2020-10-06 RX ADMIN — FENTANYL CITRATE 50 MCG: 50 INJECTION, SOLUTION INTRAMUSCULAR; INTRAVENOUS at 11:19

## 2020-10-06 RX ADMIN — Medication 12.5 MG: at 16:24

## 2020-10-06 RX ADMIN — CEFAZOLIN SODIUM 2000 MG: 10 INJECTION, POWDER, FOR SOLUTION INTRAVENOUS at 19:55

## 2020-10-07 ENCOUNTER — TELEPHONE (OUTPATIENT)
Dept: FAMILY MEDICINE CLINIC | Facility: CLINIC | Age: 85
End: 2020-10-07

## 2020-10-07 ENCOUNTER — APPOINTMENT (INPATIENT)
Dept: RADIOLOGY | Facility: HOSPITAL | Age: 85
DRG: 267 | End: 2020-10-07
Payer: MEDICARE

## 2020-10-07 ENCOUNTER — APPOINTMENT (INPATIENT)
Dept: NON INVASIVE DIAGNOSTICS | Facility: HOSPITAL | Age: 85
DRG: 267 | End: 2020-10-07
Payer: MEDICARE

## 2020-10-07 DIAGNOSIS — I35.0 AORTIC STENOSIS, SEVERE: ICD-10-CM

## 2020-10-07 DIAGNOSIS — Z95.2 S/P TAVR (TRANSCATHETER AORTIC VALVE REPLACEMENT): Primary | ICD-10-CM

## 2020-10-07 LAB
ABO GROUP BLD BPU: NORMAL
ANION GAP SERPL CALCULATED.3IONS-SCNC: 0 MMOL/L (ref 4–13)
ANION GAP SERPL CALCULATED.3IONS-SCNC: 7 MMOL/L (ref 4–13)
APTT PPP: 35 SECONDS (ref 23–37)
ATRIAL RATE: 102 BPM
ATRIAL RATE: 80 BPM
BASOPHILS # BLD AUTO: 0 THOUSANDS/ΜL (ref 0–0.1)
BASOPHILS # BLD AUTO: 0 THOUSANDS/ΜL (ref 0–0.1)
BASOPHILS NFR BLD AUTO: 0 % (ref 0–1)
BASOPHILS NFR BLD AUTO: 0 % (ref 0–1)
BPU ID: NORMAL
BUN SERPL-MCNC: 20 MG/DL (ref 5–25)
BUN SERPL-MCNC: 20 MG/DL (ref 5–25)
CALCIUM SERPL-MCNC: 7.8 MG/DL (ref 8.3–10.1)
CALCIUM SERPL-MCNC: 8 MG/DL (ref 8.3–10.1)
CHLORIDE SERPL-SCNC: 107 MMOL/L (ref 100–108)
CHLORIDE SERPL-SCNC: 108 MMOL/L (ref 100–108)
CO2 SERPL-SCNC: 21 MMOL/L (ref 21–32)
CO2 SERPL-SCNC: 23 MMOL/L (ref 21–32)
CREAT SERPL-MCNC: 0.92 MG/DL (ref 0.6–1.3)
CREAT SERPL-MCNC: 0.99 MG/DL (ref 0.6–1.3)
CROSSMATCH: NORMAL
EOSINOPHIL # BLD AUTO: 0 THOUSAND/ΜL (ref 0–0.61)
EOSINOPHIL # BLD AUTO: 0 THOUSAND/ΜL (ref 0–0.61)
EOSINOPHIL NFR BLD AUTO: 0 % (ref 0–6)
EOSINOPHIL NFR BLD AUTO: 0 % (ref 0–6)
ERYTHROCYTE [DISTWIDTH] IN BLOOD BY AUTOMATED COUNT: 12 % (ref 11.6–15.1)
ERYTHROCYTE [DISTWIDTH] IN BLOOD BY AUTOMATED COUNT: 12.1 % (ref 11.6–15.1)
GFR SERPL CREATININE-BSD FRML MDRD: 68 ML/MIN/1.73SQ M
GFR SERPL CREATININE-BSD FRML MDRD: 75 ML/MIN/1.73SQ M
GLUCOSE SERPL-MCNC: 121 MG/DL (ref 65–140)
GLUCOSE SERPL-MCNC: 139 MG/DL (ref 65–140)
GLUCOSE SERPL-MCNC: 143 MG/DL (ref 65–140)
GLUCOSE SERPL-MCNC: 159 MG/DL (ref 65–140)
GLUCOSE SERPL-MCNC: 173 MG/DL (ref 65–140)
GLUCOSE SERPL-MCNC: 174 MG/DL (ref 65–140)
HCT VFR BLD AUTO: 33.5 % (ref 36.5–49.3)
HCT VFR BLD AUTO: 33.8 % (ref 36.5–49.3)
HGB BLD-MCNC: 11.3 G/DL (ref 12–17)
HGB BLD-MCNC: 11.4 G/DL (ref 12–17)
IMM GRANULOCYTES # BLD AUTO: 0.01 THOUSAND/UL (ref 0–0.2)
IMM GRANULOCYTES # BLD AUTO: 0.05 THOUSAND/UL (ref 0–0.2)
IMM GRANULOCYTES NFR BLD AUTO: 0 % (ref 0–2)
IMM GRANULOCYTES NFR BLD AUTO: 1 % (ref 0–2)
LYMPHOCYTES # BLD AUTO: 0.27 THOUSANDS/ΜL (ref 0.6–4.47)
LYMPHOCYTES # BLD AUTO: 0.48 THOUSANDS/ΜL (ref 0.6–4.47)
LYMPHOCYTES NFR BLD AUTO: 2 % (ref 14–44)
LYMPHOCYTES NFR BLD AUTO: 38 % (ref 14–44)
MAGNESIUM SERPL-MCNC: 2 MG/DL (ref 1.6–2.6)
MAGNESIUM SERPL-MCNC: 2.1 MG/DL (ref 1.6–2.6)
MCH RBC QN AUTO: 32.1 PG (ref 26.8–34.3)
MCH RBC QN AUTO: 32.3 PG (ref 26.8–34.3)
MCHC RBC AUTO-ENTMCNC: 33.7 G/DL (ref 31.4–37.4)
MCHC RBC AUTO-ENTMCNC: 33.7 G/DL (ref 31.4–37.4)
MCV RBC AUTO: 95 FL (ref 82–98)
MCV RBC AUTO: 96 FL (ref 82–98)
MONOCYTES # BLD AUTO: 0.04 THOUSAND/ΜL (ref 0.17–1.22)
MONOCYTES # BLD AUTO: 1 THOUSAND/ΜL (ref 0.17–1.22)
MONOCYTES NFR BLD AUTO: 3 % (ref 4–12)
MONOCYTES NFR BLD AUTO: 9 % (ref 4–12)
NEUTROPHILS # BLD AUTO: 0.73 THOUSANDS/ΜL (ref 1.85–7.62)
NEUTROPHILS # BLD AUTO: 10.08 THOUSANDS/ΜL (ref 1.85–7.62)
NEUTS SEG NFR BLD AUTO: 58 % (ref 43–75)
NEUTS SEG NFR BLD AUTO: 89 % (ref 43–75)
NRBC BLD AUTO-RTO: 0 /100 WBCS
NRBC BLD AUTO-RTO: 0 /100 WBCS
P AXIS: 38 DEGREES
P AXIS: 60 DEGREES
PLATELET # BLD AUTO: 117 THOUSANDS/UL (ref 149–390)
PLATELET # BLD AUTO: 142 THOUSANDS/UL (ref 149–390)
PMV BLD AUTO: 10.1 FL (ref 8.9–12.7)
PMV BLD AUTO: 10.5 FL (ref 8.9–12.7)
POTASSIUM SERPL-SCNC: 3.6 MMOL/L (ref 3.5–5.3)
POTASSIUM SERPL-SCNC: 3.7 MMOL/L (ref 3.5–5.3)
PR INTERVAL: 172 MS
PR INTERVAL: 292 MS
QRS AXIS: -6 DEGREES
QRS AXIS: 57 DEGREES
QRSD INTERVAL: 144 MS
QRSD INTERVAL: 158 MS
QT INTERVAL: 372 MS
QT INTERVAL: 460 MS
QTC INTERVAL: 484 MS
QTC INTERVAL: 530 MS
RBC # BLD AUTO: 3.5 MILLION/UL (ref 3.88–5.62)
RBC # BLD AUTO: 3.55 MILLION/UL (ref 3.88–5.62)
SODIUM SERPL-SCNC: 131 MMOL/L (ref 136–145)
SODIUM SERPL-SCNC: 135 MMOL/L (ref 136–145)
T WAVE AXIS: 37 DEGREES
T WAVE AXIS: 67 DEGREES
UNIT DISPENSE STATUS: NORMAL
UNIT PRODUCT CODE: NORMAL
UNIT RH: NORMAL
VENTRICULAR RATE: 102 BPM
VENTRICULAR RATE: 80 BPM
WBC # BLD AUTO: 1.26 THOUSAND/UL (ref 4.31–10.16)
WBC # BLD AUTO: 11.4 THOUSAND/UL (ref 4.31–10.16)

## 2020-10-07 PROCEDURE — 83735 ASSAY OF MAGNESIUM: CPT | Performed by: PHYSICIAN ASSISTANT

## 2020-10-07 PROCEDURE — 97535 SELF CARE MNGMENT TRAINING: CPT

## 2020-10-07 PROCEDURE — 99222 1ST HOSP IP/OBS MODERATE 55: CPT | Performed by: INTERNAL MEDICINE

## 2020-10-07 PROCEDURE — 99233 SBSQ HOSP IP/OBS HIGH 50: CPT | Performed by: THORACIC SURGERY (CARDIOTHORACIC VASCULAR SURGERY)

## 2020-10-07 PROCEDURE — 94640 AIRWAY INHALATION TREATMENT: CPT

## 2020-10-07 PROCEDURE — 97530 THERAPEUTIC ACTIVITIES: CPT

## 2020-10-07 PROCEDURE — 97166 OT EVAL MOD COMPLEX 45 MIN: CPT

## 2020-10-07 PROCEDURE — 85730 THROMBOPLASTIN TIME PARTIAL: CPT | Performed by: STUDENT IN AN ORGANIZED HEALTH CARE EDUCATION/TRAINING PROGRAM

## 2020-10-07 PROCEDURE — 97163 PT EVAL HIGH COMPLEX 45 MIN: CPT

## 2020-10-07 PROCEDURE — 99291 CRITICAL CARE FIRST HOUR: CPT | Performed by: INTERNAL MEDICINE

## 2020-10-07 PROCEDURE — 80048 BASIC METABOLIC PNL TOTAL CA: CPT | Performed by: PHYSICIAN ASSISTANT

## 2020-10-07 PROCEDURE — 71045 X-RAY EXAM CHEST 1 VIEW: CPT

## 2020-10-07 PROCEDURE — 93005 ELECTROCARDIOGRAM TRACING: CPT

## 2020-10-07 PROCEDURE — 94760 N-INVAS EAR/PLS OXIMETRY 1: CPT

## 2020-10-07 PROCEDURE — 93306 TTE W/DOPPLER COMPLETE: CPT

## 2020-10-07 PROCEDURE — 85025 COMPLETE CBC W/AUTO DIFF WBC: CPT | Performed by: PHYSICIAN ASSISTANT

## 2020-10-07 PROCEDURE — 82948 REAGENT STRIP/BLOOD GLUCOSE: CPT

## 2020-10-07 PROCEDURE — 85025 COMPLETE CBC W/AUTO DIFF WBC: CPT | Performed by: STUDENT IN AN ORGANIZED HEALTH CARE EDUCATION/TRAINING PROGRAM

## 2020-10-07 PROCEDURE — 93306 TTE W/DOPPLER COMPLETE: CPT | Performed by: INTERNAL MEDICINE

## 2020-10-07 PROCEDURE — 93010 ELECTROCARDIOGRAM REPORT: CPT | Performed by: INTERNAL MEDICINE

## 2020-10-07 RX ORDER — POTASSIUM CHLORIDE 20 MEQ/1
20 TABLET, EXTENDED RELEASE ORAL DAILY
Status: DISCONTINUED | OUTPATIENT
Start: 2020-10-07 | End: 2020-10-08 | Stop reason: HOSPADM

## 2020-10-07 RX ORDER — DOCUSATE SODIUM 100 MG/1
100 CAPSULE, LIQUID FILLED ORAL 2 TIMES DAILY
Status: DISCONTINUED | OUTPATIENT
Start: 2020-10-07 | End: 2020-10-08 | Stop reason: HOSPADM

## 2020-10-07 RX ORDER — HYDROXYZINE HYDROCHLORIDE 25 MG/1
25 TABLET, FILM COATED ORAL 3 TIMES DAILY
Status: DISCONTINUED | OUTPATIENT
Start: 2020-10-07 | End: 2020-10-08 | Stop reason: HOSPADM

## 2020-10-07 RX ORDER — ACETAMINOPHEN 325 MG/1
975 TABLET ORAL EVERY 8 HOURS SCHEDULED
Status: DISCONTINUED | OUTPATIENT
Start: 2020-10-07 | End: 2020-10-08 | Stop reason: HOSPADM

## 2020-10-07 RX ORDER — SODIUM CHLORIDE FOR INHALATION 0.9 %
VIAL, NEBULIZER (ML) INHALATION
Status: COMPLETED
Start: 2020-10-07 | End: 2020-10-07

## 2020-10-07 RX ORDER — ACETAMINOPHEN 325 MG/1
650 TABLET ORAL EVERY 6 HOURS PRN
Status: DISCONTINUED | OUTPATIENT
Start: 2020-10-07 | End: 2020-10-07

## 2020-10-07 RX ORDER — HEPARIN SODIUM 5000 [USP'U]/ML
5000 INJECTION, SOLUTION INTRAVENOUS; SUBCUTANEOUS EVERY 8 HOURS SCHEDULED
Status: DISCONTINUED | OUTPATIENT
Start: 2020-10-07 | End: 2020-10-08 | Stop reason: HOSPADM

## 2020-10-07 RX ORDER — ONDANSETRON 2 MG/ML
4 INJECTION INTRAMUSCULAR; INTRAVENOUS ONCE
Status: DISCONTINUED | OUTPATIENT
Start: 2020-10-07 | End: 2020-10-07

## 2020-10-07 RX ORDER — ALBUMIN, HUMAN INJ 5% 5 %
SOLUTION INTRAVENOUS
Status: COMPLETED
Start: 2020-10-07 | End: 2020-10-07

## 2020-10-07 RX ORDER — ACETAMINOPHEN 325 MG/1
650 TABLET ORAL EVERY 4 HOURS PRN
Status: DISCONTINUED | OUTPATIENT
Start: 2020-10-07 | End: 2020-10-08 | Stop reason: HOSPADM

## 2020-10-07 RX ORDER — POTASSIUM CHLORIDE 20 MEQ/1
20 TABLET, EXTENDED RELEASE ORAL ONCE
Status: COMPLETED | OUTPATIENT
Start: 2020-10-07 | End: 2020-10-07

## 2020-10-07 RX ORDER — LIDOCAINE 50 MG/G
1 PATCH TOPICAL DAILY
Status: COMPLETED | OUTPATIENT
Start: 2020-10-07 | End: 2020-10-07

## 2020-10-07 RX ORDER — EPINEPHRINE 1 MG/ML
INJECTION, SOLUTION, CONCENTRATE INTRAVENOUS
Status: DISPENSED
Start: 2020-10-07 | End: 2020-10-07

## 2020-10-07 RX ORDER — DIPHENHYDRAMINE HYDROCHLORIDE 50 MG/ML
25 INJECTION INTRAMUSCULAR; INTRAVENOUS EVERY 6 HOURS PRN
Status: DISCONTINUED | OUTPATIENT
Start: 2020-10-07 | End: 2020-10-08 | Stop reason: HOSPADM

## 2020-10-07 RX ORDER — FUROSEMIDE 20 MG/1
20 TABLET ORAL DAILY
Status: DISCONTINUED | OUTPATIENT
Start: 2020-10-07 | End: 2020-10-08 | Stop reason: HOSPADM

## 2020-10-07 RX ORDER — PHENYLEPHRINE HYDROCHLORIDE 10 MG/ML
INJECTION INTRAVENOUS
Status: DISPENSED
Start: 2020-10-07 | End: 2020-10-07

## 2020-10-07 RX ORDER — EPINEPHRINE 0.1 MG/ML
SYRINGE (ML) INJECTION
Status: DISPENSED
Start: 2020-10-07 | End: 2020-10-07

## 2020-10-07 RX ORDER — EPINEPHRINE 1 MG/ML
INJECTION, SOLUTION, CONCENTRATE INTRAVENOUS
Status: COMPLETED
Start: 2020-10-07 | End: 2020-10-07

## 2020-10-07 RX ADMIN — LIDOCAINE 5% 1 PATCH: 700 PATCH TOPICAL at 09:39

## 2020-10-07 RX ADMIN — LEVOTHYROXINE SODIUM 75 MCG: 75 TABLET ORAL at 06:27

## 2020-10-07 RX ADMIN — CHLORHEXIDINE GLUCONATE 15 ML: 1.2 RINSE ORAL at 17:21

## 2020-10-07 RX ADMIN — ALBUTEROL SULFATE 2.5 MG: 2.5 SOLUTION RESPIRATORY (INHALATION) at 02:30

## 2020-10-07 RX ADMIN — HYDROCORTISONE SODIUM SUCCINATE 25 MG: 100 INJECTION, POWDER, FOR SOLUTION INTRAMUSCULAR; INTRAVENOUS at 15:33

## 2020-10-07 RX ADMIN — HYDROCORTISONE SODIUM SUCCINATE 25 MG: 100 INJECTION, POWDER, FOR SOLUTION INTRAMUSCULAR; INTRAVENOUS at 08:39

## 2020-10-07 RX ADMIN — MUPIROCIN 1 APPLICATION: 20 OINTMENT TOPICAL at 21:32

## 2020-10-07 RX ADMIN — PRAVASTATIN SODIUM 40 MG: 40 TABLET ORAL at 15:33

## 2020-10-07 RX ADMIN — MUPIROCIN 1 APPLICATION: 20 OINTMENT TOPICAL at 08:38

## 2020-10-07 RX ADMIN — HYDROCORTISONE SODIUM SUCCINATE 25 MG: 100 INJECTION, POWDER, FOR SOLUTION INTRAMUSCULAR; INTRAVENOUS at 21:32

## 2020-10-07 RX ADMIN — POTASSIUM CHLORIDE 20 MEQ: 1500 TABLET, EXTENDED RELEASE ORAL at 12:11

## 2020-10-07 RX ADMIN — ACETAMINOPHEN 650 MG: 325 TABLET, FILM COATED ORAL at 03:39

## 2020-10-07 RX ADMIN — FAMOTIDINE 20 MG: 10 INJECTION INTRAVENOUS at 00:37

## 2020-10-07 RX ADMIN — EPINEPHRINE 1 MCG/ML: 1 INJECTION, SOLUTION, CONCENTRATE INTRAVENOUS at 00:39

## 2020-10-07 RX ADMIN — ALBUMIN (HUMAN) 12.5 G: 12.5 INJECTION, SOLUTION INTRAVENOUS at 00:01

## 2020-10-07 RX ADMIN — ACETAMINOPHEN 975 MG: 325 TABLET, FILM COATED ORAL at 15:32

## 2020-10-07 RX ADMIN — ISODIUM CHLORIDE 3 ML: 0.03 SOLUTION RESPIRATORY (INHALATION) at 02:30

## 2020-10-07 RX ADMIN — FINASTERIDE 5 MG: 5 TABLET, FILM COATED ORAL at 08:38

## 2020-10-07 RX ADMIN — FAMOTIDINE 20 MG: 10 INJECTION INTRAVENOUS at 21:32

## 2020-10-07 RX ADMIN — ACETAMINOPHEN 975 MG: 325 TABLET, FILM COATED ORAL at 21:32

## 2020-10-07 RX ADMIN — DIPHENHYDRAMINE HYDROCHLORIDE 25 MG: 50 INJECTION, SOLUTION INTRAMUSCULAR; INTRAVENOUS at 00:38

## 2020-10-07 RX ADMIN — CHLORHEXIDINE GLUCONATE 15 ML: 1.2 RINSE ORAL at 08:38

## 2020-10-07 RX ADMIN — HEPARIN SODIUM 5000 UNITS: 5000 INJECTION INTRAVENOUS; SUBCUTANEOUS at 06:27

## 2020-10-07 RX ADMIN — HYDROXYZINE HYDROCHLORIDE 25 MG: 25 TABLET, FILM COATED ORAL at 08:39

## 2020-10-07 RX ADMIN — HEPARIN SODIUM 5000 UNITS: 5000 INJECTION INTRAVENOUS; SUBCUTANEOUS at 15:33

## 2020-10-07 RX ADMIN — ALBUMIN (HUMAN): 12.5 INJECTION, SOLUTION INTRAVENOUS at 01:28

## 2020-10-07 RX ADMIN — FAMOTIDINE 20 MG: 10 INJECTION INTRAVENOUS at 12:12

## 2020-10-07 RX ADMIN — ASPIRIN 81 MG: 81 TABLET ORAL at 08:38

## 2020-10-07 RX ADMIN — HYDROXYZINE HYDROCHLORIDE 25 MG: 25 TABLET, FILM COATED ORAL at 21:32

## 2020-10-07 RX ADMIN — HYDROCORTISONE SODIUM SUCCINATE 100 MG: 100 INJECTION, POWDER, FOR SOLUTION INTRAMUSCULAR; INTRAVENOUS at 00:37

## 2020-10-07 RX ADMIN — TIOTROPIUM BROMIDE 18 MCG: 18 CAPSULE ORAL; RESPIRATORY (INHALATION) at 08:47

## 2020-10-07 RX ADMIN — FUROSEMIDE 20 MG: 20 TABLET ORAL at 12:11

## 2020-10-07 RX ADMIN — HYDROXYZINE HYDROCHLORIDE 25 MG: 25 TABLET, FILM COATED ORAL at 15:33

## 2020-10-07 RX ADMIN — ACETAMINOPHEN 975 MG: 325 TABLET, FILM COATED ORAL at 09:39

## 2020-10-07 RX ADMIN — CLOPIDOGREL BISULFATE 75 MG: 75 TABLET ORAL at 08:39

## 2020-10-07 RX ADMIN — HEPARIN SODIUM 5000 UNITS: 5000 INJECTION INTRAVENOUS; SUBCUTANEOUS at 21:32

## 2020-10-07 RX ADMIN — TAMSULOSIN HYDROCHLORIDE 0.4 MG: 0.4 CAPSULE ORAL at 15:33

## 2020-10-07 RX ADMIN — PANTOPRAZOLE SODIUM 40 MG: 40 TABLET, DELAYED RELEASE ORAL at 08:38

## 2020-10-08 ENCOUNTER — TRANSITIONAL CARE MANAGEMENT (OUTPATIENT)
Dept: FAMILY MEDICINE CLINIC | Facility: CLINIC | Age: 85
End: 2020-10-08

## 2020-10-08 VITALS
OXYGEN SATURATION: 94 % | RESPIRATION RATE: 20 BRPM | WEIGHT: 148.59 LBS | HEART RATE: 64 BPM | BODY MASS INDEX: 23.32 KG/M2 | HEIGHT: 67 IN | SYSTOLIC BLOOD PRESSURE: 148 MMHG | TEMPERATURE: 97.8 F | DIASTOLIC BLOOD PRESSURE: 66 MMHG

## 2020-10-08 PROBLEM — D69.6 THROMBOCYTOPENIA (HCC): Status: ACTIVE | Noted: 2020-10-08

## 2020-10-08 LAB
ANION GAP SERPL CALCULATED.3IONS-SCNC: 5 MMOL/L (ref 4–13)
ATRIAL RATE: 53 BPM
ATRIAL RATE: 54 BPM
BUN SERPL-MCNC: 20 MG/DL (ref 5–25)
CALCIUM SERPL-MCNC: 8.4 MG/DL (ref 8.3–10.1)
CHLORIDE SERPL-SCNC: 107 MMOL/L (ref 100–108)
CO2 SERPL-SCNC: 27 MMOL/L (ref 21–32)
CREAT SERPL-MCNC: 0.97 MG/DL (ref 0.6–1.3)
ERYTHROCYTE [DISTWIDTH] IN BLOOD BY AUTOMATED COUNT: 12.5 % (ref 11.6–15.1)
GFR SERPL CREATININE-BSD FRML MDRD: 70 ML/MIN/1.73SQ M
GLUCOSE SERPL-MCNC: 103 MG/DL (ref 65–140)
GLUCOSE SERPL-MCNC: 116 MG/DL (ref 65–140)
HCT VFR BLD AUTO: 30 % (ref 36.5–49.3)
HGB BLD-MCNC: 10.2 G/DL (ref 12–17)
MCH RBC QN AUTO: 32.4 PG (ref 26.8–34.3)
MCHC RBC AUTO-ENTMCNC: 34 G/DL (ref 31.4–37.4)
MCV RBC AUTO: 95 FL (ref 82–98)
P AXIS: 84 DEGREES
P AXIS: 86 DEGREES
PLATELET # BLD AUTO: 100 THOUSANDS/UL (ref 149–390)
PMV BLD AUTO: 10.6 FL (ref 8.9–12.7)
POTASSIUM SERPL-SCNC: 4.1 MMOL/L (ref 3.5–5.3)
PR INTERVAL: 232 MS
PR INTERVAL: 234 MS
QRS AXIS: -16 DEGREES
QRS AXIS: -17 DEGREES
QRSD INTERVAL: 162 MS
QRSD INTERVAL: 164 MS
QT INTERVAL: 530 MS
QT INTERVAL: 538 MS
QTC INTERVAL: 502 MS
QTC INTERVAL: 504 MS
RBC # BLD AUTO: 3.15 MILLION/UL (ref 3.88–5.62)
SODIUM SERPL-SCNC: 139 MMOL/L (ref 136–145)
T WAVE AXIS: 59 DEGREES
T WAVE AXIS: 69 DEGREES
VENTRICULAR RATE: 53 BPM
VENTRICULAR RATE: 54 BPM
WBC # BLD AUTO: 7.34 THOUSAND/UL (ref 4.31–10.16)

## 2020-10-08 PROCEDURE — 97116 GAIT TRAINING THERAPY: CPT

## 2020-10-08 PROCEDURE — 99238 HOSP IP/OBS DSCHRG MGMT 30/<: CPT | Performed by: THORACIC SURGERY (CARDIOTHORACIC VASCULAR SURGERY)

## 2020-10-08 PROCEDURE — 82948 REAGENT STRIP/BLOOD GLUCOSE: CPT

## 2020-10-08 PROCEDURE — NC001 PR NO CHARGE: Performed by: PHYSICIAN ASSISTANT

## 2020-10-08 PROCEDURE — 93010 ELECTROCARDIOGRAM REPORT: CPT | Performed by: INTERNAL MEDICINE

## 2020-10-08 PROCEDURE — 97530 THERAPEUTIC ACTIVITIES: CPT

## 2020-10-08 PROCEDURE — 85027 COMPLETE CBC AUTOMATED: CPT | Performed by: PHYSICIAN ASSISTANT

## 2020-10-08 PROCEDURE — 80048 BASIC METABOLIC PNL TOTAL CA: CPT | Performed by: PHYSICIAN ASSISTANT

## 2020-10-08 RX ORDER — ACETAMINOPHEN 325 MG/1
650 TABLET ORAL EVERY 4 HOURS PRN
Refills: 0 | COMMUNITY
Start: 2020-10-08 | End: 2021-02-08

## 2020-10-08 RX ORDER — CLOPIDOGREL BISULFATE 75 MG/1
75 TABLET ORAL DAILY
Qty: 30 TABLET | Refills: 2 | Status: SHIPPED | OUTPATIENT
Start: 2020-10-09 | End: 2021-02-24 | Stop reason: ALTCHOICE

## 2020-10-08 RX ORDER — POTASSIUM CHLORIDE 750 MG/1
10 TABLET, EXTENDED RELEASE ORAL DAILY
Qty: 30 TABLET | Refills: 1 | Status: SHIPPED | OUTPATIENT
Start: 2020-10-09 | End: 2021-02-24 | Stop reason: SDUPTHER

## 2020-10-08 RX ORDER — DOCUSATE SODIUM 100 MG/1
100 CAPSULE, LIQUID FILLED ORAL 2 TIMES DAILY
Qty: 10 CAPSULE | Refills: 0 | COMMUNITY
Start: 2020-10-08 | End: 2021-02-08

## 2020-10-08 RX ADMIN — MUPIROCIN 1 APPLICATION: 20 OINTMENT TOPICAL at 08:41

## 2020-10-08 RX ADMIN — Medication 12.5 MG: at 08:41

## 2020-10-08 RX ADMIN — FINASTERIDE 5 MG: 5 TABLET, FILM COATED ORAL at 08:41

## 2020-10-08 RX ADMIN — CLOPIDOGREL BISULFATE 75 MG: 75 TABLET ORAL at 08:42

## 2020-10-08 RX ADMIN — FUROSEMIDE 20 MG: 20 TABLET ORAL at 08:42

## 2020-10-08 RX ADMIN — CHLORHEXIDINE GLUCONATE 15 ML: 1.2 RINSE ORAL at 08:41

## 2020-10-08 RX ADMIN — FAMOTIDINE 20 MG: 10 INJECTION INTRAVENOUS at 08:41

## 2020-10-08 RX ADMIN — HEPARIN SODIUM 5000 UNITS: 5000 INJECTION INTRAVENOUS; SUBCUTANEOUS at 05:07

## 2020-10-08 RX ADMIN — HYDROXYZINE HYDROCHLORIDE 25 MG: 25 TABLET, FILM COATED ORAL at 08:41

## 2020-10-08 RX ADMIN — PANTOPRAZOLE SODIUM 40 MG: 40 TABLET, DELAYED RELEASE ORAL at 08:41

## 2020-10-08 RX ADMIN — HYDROCORTISONE SODIUM SUCCINATE 25 MG: 100 INJECTION, POWDER, FOR SOLUTION INTRAMUSCULAR; INTRAVENOUS at 05:07

## 2020-10-08 RX ADMIN — ACETAMINOPHEN 975 MG: 325 TABLET, FILM COATED ORAL at 05:07

## 2020-10-08 RX ADMIN — LEVOTHYROXINE SODIUM 75 MCG: 75 TABLET ORAL at 05:07

## 2020-10-08 RX ADMIN — TIOTROPIUM BROMIDE 18 MCG: 18 CAPSULE ORAL; RESPIRATORY (INHALATION) at 09:10

## 2020-10-08 RX ADMIN — ASPIRIN 81 MG: 81 TABLET ORAL at 08:41

## 2020-10-08 RX ADMIN — POTASSIUM CHLORIDE 20 MEQ: 1500 TABLET, EXTENDED RELEASE ORAL at 08:41

## 2020-10-10 ENCOUNTER — TELEPHONE (OUTPATIENT)
Dept: OTHER | Facility: OTHER | Age: 85
End: 2020-10-10

## 2020-10-11 ENCOUNTER — TELEPHONE (OUTPATIENT)
Dept: OTHER | Facility: HOSPITAL | Age: 85
End: 2020-10-11

## 2020-10-12 ENCOUNTER — LAB (OUTPATIENT)
Dept: LAB | Age: 85
End: 2020-10-12
Payer: MEDICARE

## 2020-10-12 ENCOUNTER — TRANSCRIBE ORDERS (OUTPATIENT)
Dept: ADMINISTRATIVE | Age: 85
End: 2020-10-12

## 2020-10-12 DIAGNOSIS — D69.6 THROMBOCYTOPENIA, UNSPECIFIED (HCC): ICD-10-CM

## 2020-10-12 DIAGNOSIS — Z95.2 HEART VALVE REPLACED BY TRANSPLANT: Primary | ICD-10-CM

## 2020-10-12 DIAGNOSIS — Z95.2 HEART VALVE REPLACED BY TRANSPLANT: ICD-10-CM

## 2020-10-12 LAB
BASOPHILS # BLD AUTO: 0.01 THOUSANDS/ΜL (ref 0–0.1)
BASOPHILS NFR BLD AUTO: 0 % (ref 0–1)
EOSINOPHIL # BLD AUTO: 0.32 THOUSAND/ΜL (ref 0–0.61)
EOSINOPHIL NFR BLD AUTO: 4 % (ref 0–6)
ERYTHROCYTE [DISTWIDTH] IN BLOOD BY AUTOMATED COUNT: 13 % (ref 11.6–15.1)
HCT VFR BLD AUTO: 35 % (ref 36.5–49.3)
HGB BLD-MCNC: 11.2 G/DL (ref 12–17)
IMM GRANULOCYTES # BLD AUTO: 0.03 THOUSAND/UL (ref 0–0.2)
IMM GRANULOCYTES NFR BLD AUTO: 0 % (ref 0–2)
LYMPHOCYTES # BLD AUTO: 0.95 THOUSANDS/ΜL (ref 0.6–4.47)
LYMPHOCYTES NFR BLD AUTO: 12 % (ref 14–44)
MCH RBC QN AUTO: 31.7 PG (ref 26.8–34.3)
MCHC RBC AUTO-ENTMCNC: 32 G/DL (ref 31.4–37.4)
MCV RBC AUTO: 99 FL (ref 82–98)
MONOCYTES # BLD AUTO: 1.08 THOUSAND/ΜL (ref 0.17–1.22)
MONOCYTES NFR BLD AUTO: 13 % (ref 4–12)
NEUTROPHILS # BLD AUTO: 5.65 THOUSANDS/ΜL (ref 1.85–7.62)
NEUTS SEG NFR BLD AUTO: 71 % (ref 43–75)
NRBC BLD AUTO-RTO: 0 /100 WBCS
PMV BLD AUTO: 11.7 FL (ref 8.9–12.7)
RBC # BLD AUTO: 3.53 MILLION/UL (ref 3.88–5.62)
WBC # BLD AUTO: 8.04 THOUSAND/UL (ref 4.31–10.16)

## 2020-10-12 PROCEDURE — 85025 COMPLETE CBC W/AUTO DIFF WBC: CPT

## 2020-10-12 PROCEDURE — 36415 COLL VENOUS BLD VENIPUNCTURE: CPT

## 2020-10-14 ENCOUNTER — OFFICE VISIT (OUTPATIENT)
Dept: FAMILY MEDICINE CLINIC | Facility: CLINIC | Age: 85
End: 2020-10-14
Payer: MEDICARE

## 2020-10-14 VITALS
RESPIRATION RATE: 20 BRPM | BODY MASS INDEX: 23.81 KG/M2 | TEMPERATURE: 97.6 F | WEIGHT: 152 LBS | HEART RATE: 88 BPM | DIASTOLIC BLOOD PRESSURE: 68 MMHG | SYSTOLIC BLOOD PRESSURE: 138 MMHG

## 2020-10-14 DIAGNOSIS — I25.10 CORONARY ARTERY DISEASE INVOLVING NATIVE CORONARY ARTERY OF NATIVE HEART WITHOUT ANGINA PECTORIS: ICD-10-CM

## 2020-10-14 DIAGNOSIS — I10 ESSENTIAL HYPERTENSION: ICD-10-CM

## 2020-10-14 DIAGNOSIS — Z95.2 S/P TAVR (TRANSCATHETER AORTIC VALVE REPLACEMENT): ICD-10-CM

## 2020-10-14 DIAGNOSIS — J43.9 PULMONARY EMPHYSEMA, UNSPECIFIED EMPHYSEMA TYPE (HCC): ICD-10-CM

## 2020-10-14 DIAGNOSIS — I71.4 ABDOMINAL AORTIC ANEURYSM (AAA) WITHOUT RUPTURE (HCC): ICD-10-CM

## 2020-10-14 DIAGNOSIS — I35.0 SEVERE AORTIC STENOSIS: Primary | ICD-10-CM

## 2020-10-14 DIAGNOSIS — I73.9 PERIPHERAL ARTERIAL DISEASE (HCC): ICD-10-CM

## 2020-10-14 DIAGNOSIS — M15.9 GENERALIZED OSTEOARTHRITIS OF MULTIPLE SITES: ICD-10-CM

## 2020-10-14 DIAGNOSIS — E03.9 ACQUIRED HYPOTHYROIDISM: ICD-10-CM

## 2020-10-14 DIAGNOSIS — D69.6 THROMBOCYTOPENIA (HCC): ICD-10-CM

## 2020-10-14 DIAGNOSIS — D53.9 MACROCYTIC ANEMIA: ICD-10-CM

## 2020-10-14 DIAGNOSIS — I48.0 PAF (PAROXYSMAL ATRIAL FIBRILLATION) (HCC): ICD-10-CM

## 2020-10-14 DIAGNOSIS — Z23 NEED FOR INFLUENZA VACCINATION: ICD-10-CM

## 2020-10-14 DIAGNOSIS — R26.2 AMBULATORY DYSFUNCTION: ICD-10-CM

## 2020-10-14 DIAGNOSIS — I65.23 CAROTID STENOSIS, BILATERAL: ICD-10-CM

## 2020-10-14 PROCEDURE — 99214 OFFICE O/P EST MOD 30 MIN: CPT | Performed by: FAMILY MEDICINE

## 2020-10-14 PROCEDURE — G0008 ADMIN INFLUENZA VIRUS VAC: HCPCS | Performed by: FAMILY MEDICINE

## 2020-10-14 PROCEDURE — 90662 IIV NO PRSV INCREASED AG IM: CPT | Performed by: FAMILY MEDICINE

## 2020-10-15 ENCOUNTER — TELEPHONE (OUTPATIENT)
Dept: CARDIAC SURGERY | Facility: CLINIC | Age: 85
End: 2020-10-15

## 2020-10-15 ENCOUNTER — TELEPHONE (OUTPATIENT)
Dept: FAMILY MEDICINE CLINIC | Facility: CLINIC | Age: 85
End: 2020-10-15

## 2020-10-15 DIAGNOSIS — M15.9 GENERALIZED OSTEOARTHRITIS OF MULTIPLE SITES: Primary | ICD-10-CM

## 2020-10-15 PROBLEM — D53.9 MACROCYTIC ANEMIA: Status: ACTIVE | Noted: 2020-10-06

## 2020-10-15 RX ORDER — HYDROCODONE BITARTRATE AND ACETAMINOPHEN 5; 325 MG/1; MG/1
1 TABLET ORAL 2 TIMES DAILY PRN
Qty: 60 TABLET | Refills: 0 | Status: ON HOLD | OUTPATIENT
Start: 2020-10-15 | End: 2021-02-05 | Stop reason: SDUPTHER

## 2020-10-20 ENCOUNTER — APPOINTMENT (EMERGENCY)
Dept: RADIOLOGY | Facility: HOSPITAL | Age: 85
End: 2020-10-20
Payer: MEDICARE

## 2020-10-20 ENCOUNTER — HOSPITAL ENCOUNTER (OUTPATIENT)
Facility: HOSPITAL | Age: 85
Setting detail: OBSERVATION
Discharge: HOME | End: 2020-10-21
Attending: EMERGENCY MEDICINE | Admitting: INTERNAL MEDICINE
Payer: MEDICARE

## 2020-10-20 ENCOUNTER — TELEPHONE (OUTPATIENT)
Dept: CARDIOLOGY CLINIC | Facility: CLINIC | Age: 85
End: 2020-10-20

## 2020-10-20 DIAGNOSIS — R07.9 CHEST PAIN: Primary | ICD-10-CM

## 2020-10-20 DIAGNOSIS — E87.6 HYPOKALEMIA: ICD-10-CM

## 2020-10-20 DIAGNOSIS — Z95.3 STATUS POST TRANSCATHETER AORTIC VALVE REPLACEMENT (TAVR) USING BIOPROSTHESIS: ICD-10-CM

## 2020-10-20 LAB
ALBUMIN SERPL BCP-MCNC: 2.7 G/DL (ref 3.5–5)
ALP SERPL-CCNC: 78 U/L (ref 46–116)
ALT SERPL W P-5'-P-CCNC: 13 U/L (ref 12–78)
ANION GAP SERPL CALCULATED.3IONS-SCNC: 7 MMOL/L (ref 4–13)
AST SERPL W P-5'-P-CCNC: 21 U/L (ref 5–45)
ATRIAL RATE: 68 BPM
BASOPHILS # BLD AUTO: 0.02 THOUSANDS/ΜL (ref 0–0.1)
BASOPHILS NFR BLD AUTO: 0 % (ref 0–1)
BILIRUB SERPL-MCNC: 0.52 MG/DL (ref 0.2–1)
BUN SERPL-MCNC: 13 MG/DL (ref 5–25)
CALCIUM ALBUM COR SERPL-MCNC: 7.4 MG/DL (ref 8.3–10.1)
CALCIUM SERPL-MCNC: 6.4 MG/DL (ref 8.3–10.1)
CHLORIDE SERPL-SCNC: 117 MMOL/L (ref 100–108)
CO2 SERPL-SCNC: 21 MMOL/L (ref 21–32)
CREAT SERPL-MCNC: 0.51 MG/DL (ref 0.6–1.3)
D DIMER PPP FEU-MCNC: 2.69 UG/ML FEU
EOSINOPHIL # BLD AUTO: 0.2 THOUSAND/ΜL (ref 0–0.61)
EOSINOPHIL NFR BLD AUTO: 3 % (ref 0–6)
ERYTHROCYTE [DISTWIDTH] IN BLOOD BY AUTOMATED COUNT: 13.6 % (ref 11.6–15.1)
GFR SERPL CREATININE-BSD FRML MDRD: 97 ML/MIN/1.73SQ M
GLUCOSE SERPL-MCNC: 83 MG/DL (ref 65–140)
HCT VFR BLD AUTO: 33.2 % (ref 36.5–49.3)
HGB BLD-MCNC: 11 G/DL (ref 12–17)
IMM GRANULOCYTES # BLD AUTO: 0.02 THOUSAND/UL (ref 0–0.2)
IMM GRANULOCYTES NFR BLD AUTO: 0 % (ref 0–2)
LYMPHOCYTES # BLD AUTO: 1.06 THOUSANDS/ΜL (ref 0.6–4.47)
LYMPHOCYTES NFR BLD AUTO: 14 % (ref 14–44)
MCH RBC QN AUTO: 32.4 PG (ref 26.8–34.3)
MCHC RBC AUTO-ENTMCNC: 33.1 G/DL (ref 31.4–37.4)
MCV RBC AUTO: 98 FL (ref 82–98)
MONOCYTES # BLD AUTO: 1.02 THOUSAND/ΜL (ref 0.17–1.22)
MONOCYTES NFR BLD AUTO: 14 % (ref 4–12)
NEUTROPHILS # BLD AUTO: 5.16 THOUSANDS/ΜL (ref 1.85–7.62)
NEUTS SEG NFR BLD AUTO: 69 % (ref 43–75)
NRBC BLD AUTO-RTO: 0 /100 WBCS
NT-PROBNP SERPL-MCNC: 300 PG/ML
P AXIS: 80 DEGREES
PLATELET # BLD AUTO: 151 THOUSANDS/UL (ref 149–390)
PMV BLD AUTO: 10.6 FL (ref 8.9–12.7)
POTASSIUM SERPL-SCNC: 2.7 MMOL/L (ref 3.5–5.3)
PR INTERVAL: 258 MS
PROT SERPL-MCNC: 5.3 G/DL (ref 6.4–8.2)
QRS AXIS: -12 DEGREES
QRSD INTERVAL: 148 MS
QT INTERVAL: 466 MS
QTC INTERVAL: 495 MS
RBC # BLD AUTO: 3.4 MILLION/UL (ref 3.88–5.62)
SODIUM SERPL-SCNC: 145 MMOL/L (ref 136–145)
T WAVE AXIS: 64 DEGREES
TROPONIN I SERPL-MCNC: <0.02 NG/ML
TROPONIN I SERPL-MCNC: <0.02 NG/ML
VENTRICULAR RATE: 68 BPM
WBC # BLD AUTO: 7.48 THOUSAND/UL (ref 4.31–10.16)

## 2020-10-20 PROCEDURE — 76604 US EXAM CHEST: CPT | Performed by: EMERGENCY MEDICINE

## 2020-10-20 PROCEDURE — 85379 FIBRIN DEGRADATION QUANT: CPT | Performed by: EMERGENCY MEDICINE

## 2020-10-20 PROCEDURE — 84484 ASSAY OF TROPONIN QUANT: CPT | Performed by: EMERGENCY MEDICINE

## 2020-10-20 PROCEDURE — 99285 EMERGENCY DEPT VISIT HI MDM: CPT | Performed by: EMERGENCY MEDICINE

## 2020-10-20 PROCEDURE — 84484 ASSAY OF TROPONIN QUANT: CPT | Performed by: INTERNAL MEDICINE

## 2020-10-20 PROCEDURE — 99220 PR INITIAL OBSERVATION CARE/DAY 70 MINUTES: CPT | Performed by: INTERNAL MEDICINE

## 2020-10-20 PROCEDURE — 84100 ASSAY OF PHOSPHORUS: CPT | Performed by: INTERNAL MEDICINE

## 2020-10-20 PROCEDURE — 96365 THER/PROPH/DIAG IV INF INIT: CPT

## 2020-10-20 PROCEDURE — 80053 COMPREHEN METABOLIC PANEL: CPT | Performed by: EMERGENCY MEDICINE

## 2020-10-20 PROCEDURE — 71045 X-RAY EXAM CHEST 1 VIEW: CPT

## 2020-10-20 PROCEDURE — 36415 COLL VENOUS BLD VENIPUNCTURE: CPT | Performed by: EMERGENCY MEDICINE

## 2020-10-20 PROCEDURE — 96366 THER/PROPH/DIAG IV INF ADDON: CPT

## 2020-10-20 PROCEDURE — 71275 CT ANGIOGRAPHY CHEST: CPT

## 2020-10-20 PROCEDURE — 76706 US ABDL AORTA SCREEN AAA: CPT | Performed by: EMERGENCY MEDICINE

## 2020-10-20 PROCEDURE — 83880 ASSAY OF NATRIURETIC PEPTIDE: CPT | Performed by: INTERNAL MEDICINE

## 2020-10-20 PROCEDURE — G1004 CDSM NDSC: HCPCS

## 2020-10-20 PROCEDURE — 85025 COMPLETE CBC W/AUTO DIFF WBC: CPT | Performed by: EMERGENCY MEDICINE

## 2020-10-20 PROCEDURE — 99285 EMERGENCY DEPT VISIT HI MDM: CPT

## 2020-10-20 PROCEDURE — 93005 ELECTROCARDIOGRAM TRACING: CPT

## 2020-10-20 PROCEDURE — 93010 ELECTROCARDIOGRAM REPORT: CPT | Performed by: INTERNAL MEDICINE

## 2020-10-20 RX ORDER — POTASSIUM CHLORIDE 14.9 MG/ML
20 INJECTION INTRAVENOUS ONCE
Status: COMPLETED | OUTPATIENT
Start: 2020-10-20 | End: 2020-10-20

## 2020-10-20 RX ORDER — MAGNESIUM HYDROXIDE/ALUMINUM HYDROXICE/SIMETHICONE 120; 1200; 1200 MG/30ML; MG/30ML; MG/30ML
30 SUSPENSION ORAL EVERY 6 HOURS PRN
Status: DISCONTINUED | OUTPATIENT
Start: 2020-10-20 | End: 2020-10-21 | Stop reason: HOSPADM

## 2020-10-20 RX ORDER — LEVOTHYROXINE SODIUM 0.07 MG/1
75 TABLET ORAL
Status: DISCONTINUED | OUTPATIENT
Start: 2020-10-21 | End: 2020-10-21 | Stop reason: HOSPADM

## 2020-10-20 RX ORDER — DOCUSATE SODIUM 100 MG/1
100 CAPSULE, LIQUID FILLED ORAL 2 TIMES DAILY
Status: DISCONTINUED | OUTPATIENT
Start: 2020-10-20 | End: 2020-10-21 | Stop reason: HOSPADM

## 2020-10-20 RX ORDER — FUROSEMIDE 20 MG/1
20 TABLET ORAL DAILY
Status: DISCONTINUED | OUTPATIENT
Start: 2020-10-21 | End: 2020-10-21 | Stop reason: HOSPADM

## 2020-10-20 RX ORDER — CLOPIDOGREL BISULFATE 75 MG/1
75 TABLET ORAL DAILY
Status: DISCONTINUED | OUTPATIENT
Start: 2020-10-21 | End: 2020-10-21 | Stop reason: HOSPADM

## 2020-10-20 RX ORDER — POTASSIUM CHLORIDE 20 MEQ/1
40 TABLET, EXTENDED RELEASE ORAL ONCE
Status: COMPLETED | OUTPATIENT
Start: 2020-10-20 | End: 2020-10-20

## 2020-10-20 RX ORDER — ASPIRIN 81 MG/1
81 TABLET, CHEWABLE ORAL DAILY
Status: DISCONTINUED | OUTPATIENT
Start: 2020-10-21 | End: 2020-10-21 | Stop reason: HOSPADM

## 2020-10-20 RX ORDER — TAMSULOSIN HYDROCHLORIDE 0.4 MG/1
0.4 CAPSULE ORAL
Status: DISCONTINUED | OUTPATIENT
Start: 2020-10-21 | End: 2020-10-21 | Stop reason: HOSPADM

## 2020-10-20 RX ORDER — ONDANSETRON 2 MG/ML
4 INJECTION INTRAMUSCULAR; INTRAVENOUS EVERY 6 HOURS PRN
Status: DISCONTINUED | OUTPATIENT
Start: 2020-10-20 | End: 2020-10-21 | Stop reason: HOSPADM

## 2020-10-20 RX ORDER — ACETAMINOPHEN 325 MG/1
650 TABLET ORAL EVERY 6 HOURS PRN
Status: DISCONTINUED | OUTPATIENT
Start: 2020-10-20 | End: 2020-10-21 | Stop reason: HOSPADM

## 2020-10-20 RX ORDER — PANTOPRAZOLE SODIUM 40 MG/1
40 TABLET, DELAYED RELEASE ORAL
Status: DISCONTINUED | OUTPATIENT
Start: 2020-10-21 | End: 2020-10-21 | Stop reason: HOSPADM

## 2020-10-20 RX ORDER — FINASTERIDE 5 MG/1
5 TABLET, FILM COATED ORAL DAILY
Status: DISCONTINUED | OUTPATIENT
Start: 2020-10-21 | End: 2020-10-21 | Stop reason: HOSPADM

## 2020-10-20 RX ORDER — HYDROCODONE BITARTRATE AND ACETAMINOPHEN 5; 325 MG/1; MG/1
1 TABLET ORAL 2 TIMES DAILY PRN
Status: DISCONTINUED | OUTPATIENT
Start: 2020-10-20 | End: 2020-10-21 | Stop reason: HOSPADM

## 2020-10-20 RX ORDER — CALCIUM GLUCONATE 20 MG/ML
1 INJECTION, SOLUTION INTRAVENOUS ONCE
Status: COMPLETED | OUTPATIENT
Start: 2020-10-20 | End: 2020-10-21

## 2020-10-20 RX ORDER — PRAVASTATIN SODIUM 40 MG
40 TABLET ORAL
Status: DISCONTINUED | OUTPATIENT
Start: 2020-10-21 | End: 2020-10-21 | Stop reason: HOSPADM

## 2020-10-20 RX ORDER — SENNOSIDES 8.6 MG
1 TABLET ORAL DAILY
Status: DISCONTINUED | OUTPATIENT
Start: 2020-10-21 | End: 2020-10-21 | Stop reason: HOSPADM

## 2020-10-20 RX ADMIN — POTASSIUM CHLORIDE 20 MEQ: 14.9 INJECTION, SOLUTION INTRAVENOUS at 12:56

## 2020-10-20 RX ADMIN — POTASSIUM CHLORIDE 40 MEQ: 1500 TABLET, EXTENDED RELEASE ORAL at 12:57

## 2020-10-20 RX ADMIN — Medication 12.5 MG: at 22:40

## 2020-10-20 RX ADMIN — IOHEXOL 85 ML: 350 INJECTION, SOLUTION INTRAVENOUS at 13:48

## 2020-10-20 RX ADMIN — DOCUSATE SODIUM 100 MG: 100 CAPSULE, LIQUID FILLED ORAL at 22:40

## 2020-10-21 VITALS
RESPIRATION RATE: 18 BRPM | SYSTOLIC BLOOD PRESSURE: 90 MMHG | WEIGHT: 144.62 LBS | DIASTOLIC BLOOD PRESSURE: 54 MMHG | OXYGEN SATURATION: 95 % | HEART RATE: 62 BPM | TEMPERATURE: 97.3 F | BODY MASS INDEX: 22.65 KG/M2

## 2020-10-21 LAB
ALBUMIN SERPL BCP-MCNC: 3.4 G/DL (ref 3.5–5)
ALP SERPL-CCNC: 97 U/L (ref 46–116)
ALT SERPL W P-5'-P-CCNC: 19 U/L (ref 12–78)
ANION GAP SERPL CALCULATED.3IONS-SCNC: 5 MMOL/L (ref 4–13)
AST SERPL W P-5'-P-CCNC: 20 U/L (ref 5–45)
ATRIAL RATE: 62 BPM
ATRIAL RATE: 76 BPM
BASOPHILS # BLD AUTO: 0.02 THOUSANDS/ΜL (ref 0–0.1)
BASOPHILS NFR BLD AUTO: 0 % (ref 0–1)
BILIRUB SERPL-MCNC: 0.74 MG/DL (ref 0.2–1)
BUN SERPL-MCNC: 12 MG/DL (ref 5–25)
CALCIUM ALBUM COR SERPL-MCNC: 8.8 MG/DL (ref 8.3–10.1)
CALCIUM SERPL-MCNC: 8.3 MG/DL (ref 8.3–10.1)
CHLORIDE SERPL-SCNC: 106 MMOL/L (ref 100–108)
CHOLEST SERPL-MCNC: 96 MG/DL (ref 50–200)
CO2 SERPL-SCNC: 26 MMOL/L (ref 21–32)
CREAT SERPL-MCNC: 0.68 MG/DL (ref 0.6–1.3)
EOSINOPHIL # BLD AUTO: 0.26 THOUSAND/ΜL (ref 0–0.61)
EOSINOPHIL NFR BLD AUTO: 4 % (ref 0–6)
ERYTHROCYTE [DISTWIDTH] IN BLOOD BY AUTOMATED COUNT: 13.8 % (ref 11.6–15.1)
GFR SERPL CREATININE-BSD FRML MDRD: 86 ML/MIN/1.73SQ M
GLUCOSE SERPL-MCNC: 80 MG/DL (ref 65–140)
HCT VFR BLD AUTO: 30.3 % (ref 36.5–49.3)
HDLC SERPL-MCNC: 41 MG/DL
HGB BLD-MCNC: 10.2 G/DL (ref 12–17)
IMM GRANULOCYTES # BLD AUTO: 0.06 THOUSAND/UL (ref 0–0.2)
IMM GRANULOCYTES NFR BLD AUTO: 1 % (ref 0–2)
LDLC SERPL CALC-MCNC: 43 MG/DL (ref 0–100)
LYMPHOCYTES # BLD AUTO: 0.93 THOUSANDS/ΜL (ref 0.6–4.47)
LYMPHOCYTES NFR BLD AUTO: 15 % (ref 14–44)
MAGNESIUM SERPL-MCNC: 2.2 MG/DL (ref 1.6–2.6)
MCH RBC QN AUTO: 32.7 PG (ref 26.8–34.3)
MCHC RBC AUTO-ENTMCNC: 33.7 G/DL (ref 31.4–37.4)
MCV RBC AUTO: 97 FL (ref 82–98)
MONOCYTES # BLD AUTO: 1 THOUSAND/ΜL (ref 0.17–1.22)
MONOCYTES NFR BLD AUTO: 16 % (ref 4–12)
NEUTROPHILS # BLD AUTO: 3.93 THOUSANDS/ΜL (ref 1.85–7.62)
NEUTS SEG NFR BLD AUTO: 64 % (ref 43–75)
NRBC BLD AUTO-RTO: 0 /100 WBCS
P AXIS: 81 DEGREES
P AXIS: 82 DEGREES
PHOSPHATE SERPL-MCNC: 2.6 MG/DL (ref 2.3–4.1)
PHOSPHATE SERPL-MCNC: 2.7 MG/DL (ref 2.3–4.1)
PLATELET # BLD AUTO: 142 THOUSANDS/UL (ref 149–390)
PMV BLD AUTO: 10.9 FL (ref 8.9–12.7)
POTASSIUM SERPL-SCNC: 4.2 MMOL/L (ref 3.5–5.3)
PR INTERVAL: 250 MS
PR INTERVAL: 264 MS
PROT SERPL-MCNC: 6.5 G/DL (ref 6.4–8.2)
PTH-INTACT SERPL-MCNC: 33.1 PG/ML (ref 18.4–80.1)
QRS AXIS: -40 DEGREES
QRS AXIS: 9 DEGREES
QRSD INTERVAL: 154 MS
QRSD INTERVAL: 162 MS
QT INTERVAL: 418 MS
QT INTERVAL: 472 MS
QTC INTERVAL: 470 MS
QTC INTERVAL: 479 MS
RBC # BLD AUTO: 3.12 MILLION/UL (ref 3.88–5.62)
SODIUM SERPL-SCNC: 137 MMOL/L (ref 136–145)
T WAVE AXIS: 59 DEGREES
T WAVE AXIS: 65 DEGREES
TRIGL SERPL-MCNC: 60 MG/DL
TROPONIN I SERPL-MCNC: <0.02 NG/ML
TROPONIN I SERPL-MCNC: <0.02 NG/ML
VENTRICULAR RATE: 62 BPM
VENTRICULAR RATE: 76 BPM
WBC # BLD AUTO: 6.2 THOUSAND/UL (ref 4.31–10.16)

## 2020-10-21 PROCEDURE — 99215 OFFICE O/P EST HI 40 MIN: CPT | Performed by: INTERNAL MEDICINE

## 2020-10-21 PROCEDURE — 93010 ELECTROCARDIOGRAM REPORT: CPT | Performed by: INTERNAL MEDICINE

## 2020-10-21 PROCEDURE — 80053 COMPREHEN METABOLIC PANEL: CPT | Performed by: INTERNAL MEDICINE

## 2020-10-21 PROCEDURE — 83970 ASSAY OF PARATHORMONE: CPT | Performed by: INTERNAL MEDICINE

## 2020-10-21 PROCEDURE — 80061 LIPID PANEL: CPT | Performed by: INTERNAL MEDICINE

## 2020-10-21 PROCEDURE — 84484 ASSAY OF TROPONIN QUANT: CPT | Performed by: INTERNAL MEDICINE

## 2020-10-21 PROCEDURE — 84100 ASSAY OF PHOSPHORUS: CPT | Performed by: INTERNAL MEDICINE

## 2020-10-21 PROCEDURE — 85025 COMPLETE CBC W/AUTO DIFF WBC: CPT | Performed by: INTERNAL MEDICINE

## 2020-10-21 PROCEDURE — 93005 ELECTROCARDIOGRAM TRACING: CPT

## 2020-10-21 PROCEDURE — 83735 ASSAY OF MAGNESIUM: CPT | Performed by: INTERNAL MEDICINE

## 2020-10-21 PROCEDURE — 99239 HOSP IP/OBS DSCHRG MGMT >30: CPT | Performed by: FAMILY MEDICINE

## 2020-10-21 PROCEDURE — 97163 PT EVAL HIGH COMPLEX 45 MIN: CPT

## 2020-10-21 RX ADMIN — TIOTROPIUM BROMIDE 18 MCG: 18 CAPSULE ORAL; RESPIRATORY (INHALATION) at 09:34

## 2020-10-21 RX ADMIN — SENNOSIDES 8.6 MG: 8.6 TABLET, FILM COATED ORAL at 09:34

## 2020-10-21 RX ADMIN — CLOPIDOGREL BISULFATE 75 MG: 75 TABLET ORAL at 09:34

## 2020-10-21 RX ADMIN — DOCUSATE SODIUM 100 MG: 100 CAPSULE, LIQUID FILLED ORAL at 09:35

## 2020-10-21 RX ADMIN — ENOXAPARIN SODIUM 40 MG: 40 INJECTION SUBCUTANEOUS at 09:34

## 2020-10-21 RX ADMIN — CALCIUM GLUCONATE 1 G: 20 INJECTION, SOLUTION INTRAVENOUS at 00:24

## 2020-10-21 RX ADMIN — FINASTERIDE 5 MG: 5 TABLET, FILM COATED ORAL at 09:34

## 2020-10-21 RX ADMIN — LEVOTHYROXINE SODIUM 75 MCG: 75 TABLET ORAL at 06:18

## 2020-10-21 RX ADMIN — PANTOPRAZOLE SODIUM 40 MG: 40 TABLET, DELAYED RELEASE ORAL at 06:18

## 2020-10-21 RX ADMIN — ASPIRIN 81 MG CHEWABLE TABLET 81 MG: 81 TABLET CHEWABLE at 09:34

## 2020-10-21 RX ADMIN — Medication 12.5 MG: at 09:34

## 2020-10-21 RX ADMIN — FUROSEMIDE 20 MG: 20 TABLET ORAL at 09:34

## 2020-10-23 ENCOUNTER — TRANSITIONAL CARE MANAGEMENT (OUTPATIENT)
Dept: FAMILY MEDICINE CLINIC | Facility: CLINIC | Age: 85
End: 2020-10-23

## 2020-10-27 ENCOUNTER — PROCEDURE VISIT (OUTPATIENT)
Dept: UROLOGY | Facility: AMBULATORY SURGERY CENTER | Age: 85
End: 2020-10-27
Payer: MEDICARE

## 2020-10-27 VITALS
SYSTOLIC BLOOD PRESSURE: 112 MMHG | DIASTOLIC BLOOD PRESSURE: 58 MMHG | WEIGHT: 148.5 LBS | TEMPERATURE: 97.5 F | HEART RATE: 65 BPM | BODY MASS INDEX: 23.26 KG/M2

## 2020-10-27 DIAGNOSIS — R33.9 URINARY RETENTION: Primary | ICD-10-CM

## 2020-10-27 LAB — POST-VOID RESIDUAL VOLUME, ML POC: 366 ML

## 2020-10-27 PROCEDURE — 51798 US URINE CAPACITY MEASURE: CPT | Performed by: UROLOGY

## 2020-10-27 PROCEDURE — 51705 CHANGE OF BLADDER TUBE: CPT | Performed by: UROLOGY

## 2020-10-28 ENCOUNTER — OFFICE VISIT (OUTPATIENT)
Dept: CARDIOLOGY CLINIC | Facility: CLINIC | Age: 85
End: 2020-10-28
Payer: MEDICARE

## 2020-10-28 VITALS
BODY MASS INDEX: 23.21 KG/M2 | TEMPERATURE: 97.5 F | HEIGHT: 67 IN | HEART RATE: 82 BPM | SYSTOLIC BLOOD PRESSURE: 110 MMHG | OXYGEN SATURATION: 96 % | DIASTOLIC BLOOD PRESSURE: 50 MMHG | WEIGHT: 147.9 LBS

## 2020-10-28 DIAGNOSIS — I50.32 CHRONIC DIASTOLIC CONGESTIVE HEART FAILURE (HCC): ICD-10-CM

## 2020-10-28 DIAGNOSIS — Z95.2 S/P TAVR (TRANSCATHETER AORTIC VALVE REPLACEMENT): ICD-10-CM

## 2020-10-28 DIAGNOSIS — E87.6 HYPOKALEMIA: ICD-10-CM

## 2020-10-28 DIAGNOSIS — I48.0 PAROXYSMAL ATRIAL FIBRILLATION (HCC): ICD-10-CM

## 2020-10-28 DIAGNOSIS — E78.5 HYPERLIPIDEMIA, UNSPECIFIED HYPERLIPIDEMIA TYPE: ICD-10-CM

## 2020-10-28 DIAGNOSIS — Z95.2 S/P TAVR (TRANSCATHETER AORTIC VALVE REPLACEMENT): Primary | ICD-10-CM

## 2020-10-28 DIAGNOSIS — I10 HYPERTENSION, UNSPECIFIED TYPE: ICD-10-CM

## 2020-10-28 PROCEDURE — 99215 OFFICE O/P EST HI 40 MIN: CPT | Performed by: NURSE PRACTITIONER

## 2020-10-28 RX ORDER — METOPROLOL SUCCINATE 25 MG/1
TABLET, EXTENDED RELEASE ORAL
Qty: 30 TABLET | Refills: 3 | Status: SHIPPED | OUTPATIENT
Start: 2020-10-28 | End: 2020-10-28

## 2020-10-28 RX ORDER — METOPROLOL SUCCINATE 25 MG/1
TABLET, EXTENDED RELEASE ORAL
Qty: 45 TABLET | Refills: 3 | Status: ON HOLD | OUTPATIENT
Start: 2020-10-28 | End: 2021-04-18 | Stop reason: SDUPTHER

## 2020-11-02 ENCOUNTER — LAB (OUTPATIENT)
Dept: LAB | Age: 85
End: 2020-11-02
Payer: MEDICARE

## 2020-11-02 ENCOUNTER — TRANSCRIBE ORDERS (OUTPATIENT)
Dept: ADMINISTRATIVE | Age: 85
End: 2020-11-02

## 2020-11-02 DIAGNOSIS — I35.0 AORTIC STENOSIS, SEVERE: ICD-10-CM

## 2020-11-02 DIAGNOSIS — Z95.2 S/P TAVR (TRANSCATHETER AORTIC VALVE REPLACEMENT): ICD-10-CM

## 2020-11-02 LAB
ANION GAP SERPL CALCULATED.3IONS-SCNC: 6 MMOL/L (ref 4–13)
ATRIAL RATE: 77 BPM
BASOPHILS # BLD AUTO: 0.03 THOUSANDS/ΜL (ref 0–0.1)
BASOPHILS NFR BLD AUTO: 1 % (ref 0–1)
BUN SERPL-MCNC: 16 MG/DL (ref 5–25)
CALCIUM SERPL-MCNC: 8.5 MG/DL (ref 8.3–10.1)
CHLORIDE SERPL-SCNC: 104 MMOL/L (ref 100–108)
CO2 SERPL-SCNC: 28 MMOL/L (ref 21–32)
CREAT SERPL-MCNC: 1.01 MG/DL (ref 0.6–1.3)
EOSINOPHIL # BLD AUTO: 0.35 THOUSAND/ΜL (ref 0–0.61)
EOSINOPHIL NFR BLD AUTO: 7 % (ref 0–6)
ERYTHROCYTE [DISTWIDTH] IN BLOOD BY AUTOMATED COUNT: 13.9 % (ref 11.6–15.1)
GFR SERPL CREATININE-BSD FRML MDRD: 67 ML/MIN/1.73SQ M
GLUCOSE P FAST SERPL-MCNC: 98 MG/DL (ref 65–99)
HCT VFR BLD AUTO: 35.3 % (ref 36.5–49.3)
HGB BLD-MCNC: 11.3 G/DL (ref 12–17)
IMM GRANULOCYTES # BLD AUTO: 0.02 THOUSAND/UL (ref 0–0.2)
IMM GRANULOCYTES NFR BLD AUTO: 0 % (ref 0–2)
LYMPHOCYTES # BLD AUTO: 0.83 THOUSANDS/ΜL (ref 0.6–4.47)
LYMPHOCYTES NFR BLD AUTO: 16 % (ref 14–44)
MCH RBC QN AUTO: 31.7 PG (ref 26.8–34.3)
MCHC RBC AUTO-ENTMCNC: 32 G/DL (ref 31.4–37.4)
MCV RBC AUTO: 99 FL (ref 82–98)
MONOCYTES # BLD AUTO: 0.73 THOUSAND/ΜL (ref 0.17–1.22)
MONOCYTES NFR BLD AUTO: 14 % (ref 4–12)
NEUTROPHILS # BLD AUTO: 3.39 THOUSANDS/ΜL (ref 1.85–7.62)
NEUTS SEG NFR BLD AUTO: 62 % (ref 43–75)
NRBC BLD AUTO-RTO: 0 /100 WBCS
P AXIS: 69 DEGREES
PLATELET # BLD AUTO: 145 THOUSANDS/UL (ref 149–390)
PMV BLD AUTO: 11.1 FL (ref 8.9–12.7)
POTASSIUM SERPL-SCNC: 4.2 MMOL/L (ref 3.5–5.3)
PR INTERVAL: 266 MS
QRS AXIS: -20 DEGREES
QRSD INTERVAL: 154 MS
QT INTERVAL: 434 MS
QTC INTERVAL: 491 MS
RBC # BLD AUTO: 3.56 MILLION/UL (ref 3.88–5.62)
SODIUM SERPL-SCNC: 138 MMOL/L (ref 136–145)
T WAVE AXIS: 42 DEGREES
VENTRICULAR RATE: 77 BPM
WBC # BLD AUTO: 5.35 THOUSAND/UL (ref 4.31–10.16)

## 2020-11-02 PROCEDURE — 85025 COMPLETE CBC W/AUTO DIFF WBC: CPT

## 2020-11-02 PROCEDURE — 93010 ELECTROCARDIOGRAM REPORT: CPT | Performed by: INTERNAL MEDICINE

## 2020-11-02 PROCEDURE — 80048 BASIC METABOLIC PNL TOTAL CA: CPT

## 2020-11-02 PROCEDURE — 36415 COLL VENOUS BLD VENIPUNCTURE: CPT

## 2020-11-02 PROCEDURE — 93005 ELECTROCARDIOGRAM TRACING: CPT

## 2020-11-04 ENCOUNTER — OFFICE VISIT (OUTPATIENT)
Dept: CARDIAC SURGERY | Facility: CLINIC | Age: 85
End: 2020-11-04
Payer: MEDICARE

## 2020-11-04 ENCOUNTER — HOSPITAL ENCOUNTER (OUTPATIENT)
Dept: NON INVASIVE DIAGNOSTICS | Facility: HOSPITAL | Age: 85
Discharge: HOME | End: 2020-11-04
Payer: MEDICARE

## 2020-11-04 ENCOUNTER — TELEPHONE (OUTPATIENT)
Dept: UROLOGY | Facility: MEDICAL CENTER | Age: 85
End: 2020-11-04

## 2020-11-04 VITALS
HEART RATE: 88 BPM | WEIGHT: 148.8 LBS | DIASTOLIC BLOOD PRESSURE: 56 MMHG | BODY MASS INDEX: 23.35 KG/M2 | SYSTOLIC BLOOD PRESSURE: 130 MMHG | TEMPERATURE: 96 F | OXYGEN SATURATION: 100 % | RESPIRATION RATE: 18 BRPM | HEIGHT: 67 IN

## 2020-11-04 DIAGNOSIS — Z95.2 S/P TAVR (TRANSCATHETER AORTIC VALVE REPLACEMENT): Primary | ICD-10-CM

## 2020-11-04 DIAGNOSIS — I35.0 AORTIC STENOSIS, SEVERE: ICD-10-CM

## 2020-11-04 DIAGNOSIS — Z95.2 S/P TAVR (TRANSCATHETER AORTIC VALVE REPLACEMENT): ICD-10-CM

## 2020-11-04 DIAGNOSIS — I35.0 NONRHEUMATIC AORTIC VALVE STENOSIS: ICD-10-CM

## 2020-11-04 LAB
ATRIAL RATE: 65 BPM
P AXIS: 76 DEGREES
PR INTERVAL: 266 MS
QRS AXIS: -18 DEGREES
QRSD INTERVAL: 162 MS
QT INTERVAL: 458 MS
QTC INTERVAL: 476 MS
T WAVE AXIS: 40 DEGREES
VENTRICULAR RATE: 65 BPM

## 2020-11-04 PROCEDURE — 99215 OFFICE O/P EST HI 40 MIN: CPT | Performed by: PHYSICIAN ASSISTANT

## 2020-11-04 PROCEDURE — 93005 ELECTROCARDIOGRAM TRACING: CPT

## 2020-11-04 PROCEDURE — 93010 ELECTROCARDIOGRAM REPORT: CPT | Performed by: INTERNAL MEDICINE

## 2020-11-04 PROCEDURE — 93306 TTE W/DOPPLER COMPLETE: CPT | Performed by: INTERNAL MEDICINE

## 2020-11-04 PROCEDURE — 93306 TTE W/DOPPLER COMPLETE: CPT

## 2020-11-05 ENCOUNTER — CLINICAL SUPPORT (OUTPATIENT)
Dept: CARDIAC REHAB | Age: 85
End: 2020-11-05
Payer: MEDICARE

## 2020-11-05 DIAGNOSIS — Z95.2 S/P TAVR (TRANSCATHETER AORTIC VALVE REPLACEMENT): ICD-10-CM

## 2020-11-05 PROCEDURE — 93797 PHYS/QHP OP CAR RHAB WO ECG: CPT

## 2020-11-09 ENCOUNTER — CLINICAL SUPPORT (OUTPATIENT)
Dept: CARDIAC REHAB | Age: 85
End: 2020-11-09
Payer: MEDICARE

## 2020-11-09 DIAGNOSIS — Z95.2 S/P TAVR (TRANSCATHETER AORTIC VALVE REPLACEMENT): ICD-10-CM

## 2020-11-09 PROCEDURE — 93798 PHYS/QHP OP CAR RHAB W/ECG: CPT

## 2020-11-11 ENCOUNTER — CLINICAL SUPPORT (OUTPATIENT)
Dept: CARDIAC REHAB | Age: 85
End: 2020-11-11
Payer: MEDICARE

## 2020-11-11 DIAGNOSIS — Z95.2 S/P TAVR (TRANSCATHETER AORTIC VALVE REPLACEMENT): ICD-10-CM

## 2020-11-11 PROCEDURE — 93798 PHYS/QHP OP CAR RHAB W/ECG: CPT

## 2020-11-13 ENCOUNTER — APPOINTMENT (OUTPATIENT)
Dept: CARDIAC REHAB | Age: 85
End: 2020-11-13
Payer: MEDICARE

## 2020-11-13 ENCOUNTER — TELEPHONE (OUTPATIENT)
Dept: CARDIOLOGY CLINIC | Facility: CLINIC | Age: 85
End: 2020-11-13

## 2020-11-16 ENCOUNTER — CLINICAL SUPPORT (OUTPATIENT)
Dept: CARDIAC REHAB | Age: 85
End: 2020-11-16
Payer: MEDICARE

## 2020-11-16 DIAGNOSIS — Z95.2 S/P TAVR (TRANSCATHETER AORTIC VALVE REPLACEMENT): ICD-10-CM

## 2020-11-16 PROCEDURE — 93798 PHYS/QHP OP CAR RHAB W/ECG: CPT

## 2020-11-18 ENCOUNTER — APPOINTMENT (OUTPATIENT)
Dept: CARDIAC REHAB | Age: 85
End: 2020-11-18
Payer: MEDICARE

## 2020-11-20 ENCOUNTER — CLINICAL SUPPORT (OUTPATIENT)
Dept: CARDIAC REHAB | Age: 85
End: 2020-11-20
Payer: MEDICARE

## 2020-11-20 DIAGNOSIS — Z95.2 S/P TAVR (TRANSCATHETER AORTIC VALVE REPLACEMENT): ICD-10-CM

## 2020-11-20 PROCEDURE — 93798 PHYS/QHP OP CAR RHAB W/ECG: CPT

## 2020-11-22 ENCOUNTER — NURSE TRIAGE (OUTPATIENT)
Dept: OTHER | Facility: OTHER | Age: 85
End: 2020-11-22

## 2020-11-22 PROCEDURE — 99283 EMERGENCY DEPT VISIT LOW MDM: CPT

## 2020-11-23 ENCOUNTER — HOSPITAL ENCOUNTER (EMERGENCY)
Facility: HOSPITAL | Age: 85
Discharge: HOME | End: 2020-11-23
Attending: EMERGENCY MEDICINE
Payer: MEDICARE

## 2020-11-23 ENCOUNTER — APPOINTMENT (OUTPATIENT)
Dept: CARDIAC REHAB | Age: 85
End: 2020-11-23
Payer: MEDICARE

## 2020-11-23 VITALS
OXYGEN SATURATION: 96 % | HEIGHT: 67 IN | DIASTOLIC BLOOD PRESSURE: 72 MMHG | HEART RATE: 73 BPM | BODY MASS INDEX: 23.54 KG/M2 | RESPIRATION RATE: 18 BRPM | TEMPERATURE: 97.4 F | WEIGHT: 150 LBS | SYSTOLIC BLOOD PRESSURE: 174 MMHG

## 2020-11-23 DIAGNOSIS — T83.9XXA PROBLEM WITH FOLEY CATHETER, INITIAL ENCOUNTER (HCC): Primary | ICD-10-CM

## 2020-11-23 DIAGNOSIS — N39.0 UTI (URINARY TRACT INFECTION): ICD-10-CM

## 2020-11-23 LAB
ANION GAP SERPL CALCULATED.3IONS-SCNC: 5 MMOL/L (ref 4–13)
BACTERIA UR QL AUTO: ABNORMAL /HPF
BILIRUB UR QL STRIP: NEGATIVE
BUN SERPL-MCNC: 18 MG/DL (ref 5–25)
CALCIUM SERPL-MCNC: 8.7 MG/DL (ref 8.3–10.1)
CHLORIDE SERPL-SCNC: 100 MMOL/L (ref 100–108)
CLARITY UR: ABNORMAL
CO2 SERPL-SCNC: 28 MMOL/L (ref 21–32)
COLOR UR: YELLOW
COLOR, POC: YELLOW
CREAT SERPL-MCNC: 1.22 MG/DL (ref 0.6–1.3)
GFR SERPL CREATININE-BSD FRML MDRD: 53 ML/MIN/1.73SQ M
GLUCOSE SERPL-MCNC: 105 MG/DL (ref 65–140)
GLUCOSE UR STRIP-MCNC: NEGATIVE MG/DL
HGB UR QL STRIP.AUTO: ABNORMAL
HYALINE CASTS #/AREA URNS LPF: ABNORMAL /LPF
KETONES UR STRIP-MCNC: NEGATIVE MG/DL
LEUKOCYTE ESTERASE UR QL STRIP: ABNORMAL
NITRITE UR QL STRIP: NEGATIVE
NON-SQ EPI CELLS URNS QL MICRO: ABNORMAL /HPF
PH UR STRIP.AUTO: >=9 [PH] (ref 4.5–8)
POTASSIUM SERPL-SCNC: 4.3 MMOL/L (ref 3.5–5.3)
PROT UR STRIP-MCNC: ABNORMAL MG/DL
RBC #/AREA URNS AUTO: ABNORMAL /HPF
SODIUM SERPL-SCNC: 133 MMOL/L (ref 136–145)
SP GR UR STRIP.AUTO: 1.01 (ref 1–1.03)
UROBILINOGEN UR QL STRIP.AUTO: 0.2 E.U./DL
WBC #/AREA URNS AUTO: ABNORMAL /HPF

## 2020-11-23 PROCEDURE — 99284 EMERGENCY DEPT VISIT MOD MDM: CPT | Performed by: EMERGENCY MEDICINE

## 2020-11-23 PROCEDURE — 36415 COLL VENOUS BLD VENIPUNCTURE: CPT | Performed by: EMERGENCY MEDICINE

## 2020-11-23 PROCEDURE — 81001 URINALYSIS AUTO W/SCOPE: CPT

## 2020-11-23 PROCEDURE — 87086 URINE CULTURE/COLONY COUNT: CPT

## 2020-11-23 PROCEDURE — 80048 BASIC METABOLIC PNL TOTAL CA: CPT | Performed by: EMERGENCY MEDICINE

## 2020-11-23 RX ORDER — SULFAMETHOXAZOLE AND TRIMETHOPRIM 800; 160 MG/1; MG/1
1 TABLET ORAL 2 TIMES DAILY
Qty: 20 TABLET | Refills: 0 | Status: SHIPPED | OUTPATIENT
Start: 2020-11-23 | End: 2020-12-03

## 2020-11-23 RX ORDER — SULFAMETHOXAZOLE AND TRIMETHOPRIM 800; 160 MG/1; MG/1
1 TABLET ORAL ONCE
Status: COMPLETED | OUTPATIENT
Start: 2020-11-23 | End: 2020-11-23

## 2020-11-23 RX ADMIN — SULFAMETHOXAZOLE AND TRIMETHOPRIM 1 TABLET: 800; 160 TABLET ORAL at 02:32

## 2020-11-24 LAB — BACTERIA UR CULT: NORMAL

## 2020-11-25 ENCOUNTER — CLINICAL SUPPORT (OUTPATIENT)
Dept: CARDIAC REHAB | Age: 85
End: 2020-11-25
Payer: MEDICARE

## 2020-11-25 DIAGNOSIS — Z95.2 S/P TAVR (TRANSCATHETER AORTIC VALVE REPLACEMENT): ICD-10-CM

## 2020-11-25 PROCEDURE — 93798 PHYS/QHP OP CAR RHAB W/ECG: CPT

## 2020-11-27 ENCOUNTER — CLINICAL SUPPORT (OUTPATIENT)
Dept: CARDIAC REHAB | Age: 85
End: 2020-11-27
Payer: MEDICARE

## 2020-11-27 DIAGNOSIS — Z95.2 S/P TAVR (TRANSCATHETER AORTIC VALVE REPLACEMENT): ICD-10-CM

## 2020-11-27 PROCEDURE — 93798 PHYS/QHP OP CAR RHAB W/ECG: CPT

## 2020-11-30 ENCOUNTER — CLINICAL SUPPORT (OUTPATIENT)
Dept: CARDIAC REHAB | Age: 85
End: 2020-11-30
Payer: MEDICARE

## 2020-11-30 DIAGNOSIS — Z95.2 S/P TAVR (TRANSCATHETER AORTIC VALVE REPLACEMENT): ICD-10-CM

## 2020-11-30 PROCEDURE — 93798 PHYS/QHP OP CAR RHAB W/ECG: CPT

## 2020-12-02 ENCOUNTER — CLINICAL SUPPORT (OUTPATIENT)
Dept: CARDIAC REHAB | Age: 85
End: 2020-12-02
Payer: MEDICARE

## 2020-12-02 DIAGNOSIS — Z95.2 S/P TAVR (TRANSCATHETER AORTIC VALVE REPLACEMENT): ICD-10-CM

## 2020-12-02 PROCEDURE — 93798 PHYS/QHP OP CAR RHAB W/ECG: CPT

## 2020-12-03 ENCOUNTER — OFFICE VISIT (OUTPATIENT)
Dept: PULMONOLOGY | Facility: CLINIC | Age: 85
End: 2020-12-03
Payer: MEDICARE

## 2020-12-03 VITALS
TEMPERATURE: 97.7 F | DIASTOLIC BLOOD PRESSURE: 60 MMHG | OXYGEN SATURATION: 94 % | RESPIRATION RATE: 16 BRPM | WEIGHT: 150 LBS | BODY MASS INDEX: 23.49 KG/M2 | HEART RATE: 84 BPM | SYSTOLIC BLOOD PRESSURE: 100 MMHG

## 2020-12-03 DIAGNOSIS — J43.9 PULMONARY EMPHYSEMA, UNSPECIFIED EMPHYSEMA TYPE (HCC): Primary | ICD-10-CM

## 2020-12-03 PROBLEM — R07.9 CHEST PAIN: Status: RESOLVED | Noted: 2020-10-20 | Resolved: 2020-12-03

## 2020-12-03 PROCEDURE — 99213 OFFICE O/P EST LOW 20 MIN: CPT | Performed by: INTERNAL MEDICINE

## 2020-12-04 ENCOUNTER — CLINICAL SUPPORT (OUTPATIENT)
Dept: CARDIAC REHAB | Age: 85
End: 2020-12-04
Payer: MEDICARE

## 2020-12-04 DIAGNOSIS — Z95.2 S/P TAVR (TRANSCATHETER AORTIC VALVE REPLACEMENT): ICD-10-CM

## 2020-12-04 PROCEDURE — 93798 PHYS/QHP OP CAR RHAB W/ECG: CPT

## 2020-12-07 ENCOUNTER — CLINICAL SUPPORT (OUTPATIENT)
Dept: CARDIAC REHAB | Age: 85
End: 2020-12-07
Payer: MEDICARE

## 2020-12-07 DIAGNOSIS — Z95.2 S/P TAVR (TRANSCATHETER AORTIC VALVE REPLACEMENT): ICD-10-CM

## 2020-12-07 PROCEDURE — 93798 PHYS/QHP OP CAR RHAB W/ECG: CPT

## 2020-12-08 ENCOUNTER — PROCEDURE VISIT (OUTPATIENT)
Dept: UROLOGY | Facility: AMBULATORY SURGERY CENTER | Age: 85
End: 2020-12-08
Payer: MEDICARE

## 2020-12-08 VITALS — DIASTOLIC BLOOD PRESSURE: 64 MMHG | HEART RATE: 77 BPM | SYSTOLIC BLOOD PRESSURE: 130 MMHG

## 2020-12-08 DIAGNOSIS — R33.9 URINARY RETENTION: Primary | ICD-10-CM

## 2020-12-08 LAB — POST-VOID RESIDUAL VOLUME, ML POC: 300 ML

## 2020-12-08 PROCEDURE — 99211 OFF/OP EST MAY X REQ PHY/QHP: CPT

## 2020-12-08 PROCEDURE — 51798 US URINE CAPACITY MEASURE: CPT

## 2020-12-09 ENCOUNTER — CLINICAL SUPPORT (OUTPATIENT)
Dept: CARDIAC REHAB | Age: 85
End: 2020-12-09
Payer: MEDICARE

## 2020-12-09 DIAGNOSIS — Z95.2 S/P TAVR (TRANSCATHETER AORTIC VALVE REPLACEMENT): ICD-10-CM

## 2020-12-09 PROCEDURE — 93798 PHYS/QHP OP CAR RHAB W/ECG: CPT

## 2020-12-10 ENCOUNTER — OFFICE VISIT (OUTPATIENT)
Dept: FAMILY MEDICINE CLINIC | Facility: CLINIC | Age: 85
End: 2020-12-10
Payer: MEDICARE

## 2020-12-10 VITALS
HEART RATE: 66 BPM | TEMPERATURE: 96.7 F | OXYGEN SATURATION: 97 % | SYSTOLIC BLOOD PRESSURE: 130 MMHG | WEIGHT: 155 LBS | HEIGHT: 67 IN | DIASTOLIC BLOOD PRESSURE: 70 MMHG | BODY MASS INDEX: 24.33 KG/M2

## 2020-12-10 DIAGNOSIS — S80.11XA HEMATOMA OF RIGHT LOWER LEG: Primary | ICD-10-CM

## 2020-12-10 PROCEDURE — 99213 OFFICE O/P EST LOW 20 MIN: CPT | Performed by: PHYSICIAN ASSISTANT

## 2020-12-11 ENCOUNTER — PROCEDURE VISIT (OUTPATIENT)
Dept: UROLOGY | Facility: AMBULATORY SURGERY CENTER | Age: 85
End: 2020-12-11
Payer: MEDICARE

## 2020-12-11 ENCOUNTER — APPOINTMENT (OUTPATIENT)
Dept: CARDIAC REHAB | Age: 85
End: 2020-12-11
Payer: MEDICARE

## 2020-12-11 VITALS
SYSTOLIC BLOOD PRESSURE: 130 MMHG | BODY MASS INDEX: 24.33 KG/M2 | WEIGHT: 155 LBS | HEIGHT: 67 IN | HEART RATE: 70 BPM | DIASTOLIC BLOOD PRESSURE: 68 MMHG

## 2020-12-11 DIAGNOSIS — R33.9 URINARY RETENTION: Primary | ICD-10-CM

## 2020-12-11 LAB
BACTERIA UR QL AUTO: ABNORMAL /HPF
BILIRUB UR QL STRIP: NEGATIVE
CLARITY UR: ABNORMAL
COLOR UR: YELLOW
GLUCOSE UR STRIP-MCNC: NEGATIVE MG/DL
HGB UR QL STRIP.AUTO: NEGATIVE
HYALINE CASTS #/AREA URNS LPF: ABNORMAL /LPF
KETONES UR STRIP-MCNC: NEGATIVE MG/DL
LEUKOCYTE ESTERASE UR QL STRIP: ABNORMAL
NITRITE UR QL STRIP: NEGATIVE
NON-SQ EPI CELLS URNS QL MICRO: ABNORMAL /HPF
PH UR STRIP.AUTO: 6.5 [PH]
POST-VOID RESIDUAL VOLUME, ML POC: 348 ML
PROT UR STRIP-MCNC: NEGATIVE MG/DL
RBC #/AREA URNS AUTO: ABNORMAL /HPF
SP GR UR STRIP.AUTO: 1.01 (ref 1–1.03)
UROBILINOGEN UR QL STRIP.AUTO: 1 E.U./DL
WBC #/AREA URNS AUTO: ABNORMAL /HPF

## 2020-12-11 PROCEDURE — 51702 INSERT TEMP BLADDER CATH: CPT

## 2020-12-11 PROCEDURE — 87086 URINE CULTURE/COLONY COUNT: CPT | Performed by: NURSE PRACTITIONER

## 2020-12-11 PROCEDURE — 87186 SC STD MICRODIL/AGAR DIL: CPT | Performed by: NURSE PRACTITIONER

## 2020-12-11 PROCEDURE — 51798 US URINE CAPACITY MEASURE: CPT

## 2020-12-11 PROCEDURE — 81001 URINALYSIS AUTO W/SCOPE: CPT | Performed by: NURSE PRACTITIONER

## 2020-12-11 PROCEDURE — 87077 CULTURE AEROBIC IDENTIFY: CPT | Performed by: NURSE PRACTITIONER

## 2020-12-13 ENCOUNTER — TELEPHONE (OUTPATIENT)
Dept: UROLOGY | Facility: AMBULATORY SURGERY CENTER | Age: 85
End: 2020-12-13

## 2020-12-13 DIAGNOSIS — N39.0 URINARY TRACT INFECTION WITHOUT HEMATURIA, SITE UNSPECIFIED: Primary | ICD-10-CM

## 2020-12-14 ENCOUNTER — APPOINTMENT (OUTPATIENT)
Dept: CARDIAC REHAB | Age: 85
End: 2020-12-14
Payer: MEDICARE

## 2020-12-14 RX ORDER — NITROFURANTOIN 25; 75 MG/1; MG/1
100 CAPSULE ORAL 2 TIMES DAILY
Qty: 14 CAPSULE | Refills: 0 | Status: SHIPPED | OUTPATIENT
Start: 2020-12-14 | End: 2020-12-21

## 2020-12-16 ENCOUNTER — APPOINTMENT (OUTPATIENT)
Dept: CARDIAC REHAB | Age: 85
End: 2020-12-16
Payer: MEDICARE

## 2020-12-16 LAB
BACTERIA UR CULT: ABNORMAL
BACTERIA UR CULT: ABNORMAL

## 2020-12-18 ENCOUNTER — CLINICAL SUPPORT (OUTPATIENT)
Dept: CARDIAC REHAB | Age: 85
End: 2020-12-18
Payer: MEDICARE

## 2020-12-18 DIAGNOSIS — Z95.2 S/P TAVR (TRANSCATHETER AORTIC VALVE REPLACEMENT): ICD-10-CM

## 2020-12-18 PROCEDURE — 93798 PHYS/QHP OP CAR RHAB W/ECG: CPT

## 2020-12-21 ENCOUNTER — CLINICAL SUPPORT (OUTPATIENT)
Dept: CARDIAC REHAB | Age: 85
End: 2020-12-21
Payer: MEDICARE

## 2020-12-21 DIAGNOSIS — Z95.2 S/P TAVR (TRANSCATHETER AORTIC VALVE REPLACEMENT): ICD-10-CM

## 2020-12-21 PROCEDURE — 93798 PHYS/QHP OP CAR RHAB W/ECG: CPT

## 2020-12-23 ENCOUNTER — CLINICAL SUPPORT (OUTPATIENT)
Dept: CARDIAC REHAB | Age: 85
End: 2020-12-23
Payer: MEDICARE

## 2020-12-23 DIAGNOSIS — Z95.2 S/P TAVR (TRANSCATHETER AORTIC VALVE REPLACEMENT): ICD-10-CM

## 2020-12-23 PROCEDURE — 93798 PHYS/QHP OP CAR RHAB W/ECG: CPT

## 2020-12-28 ENCOUNTER — APPOINTMENT (OUTPATIENT)
Dept: CARDIAC REHAB | Age: 85
End: 2020-12-28
Payer: MEDICARE

## 2020-12-29 ENCOUNTER — CLINICAL SUPPORT (OUTPATIENT)
Dept: CARDIAC REHAB | Age: 85
End: 2020-12-29
Payer: MEDICARE

## 2020-12-29 DIAGNOSIS — Z95.2 S/P TAVR (TRANSCATHETER AORTIC VALVE REPLACEMENT): ICD-10-CM

## 2020-12-29 PROCEDURE — 93798 PHYS/QHP OP CAR RHAB W/ECG: CPT

## 2020-12-30 ENCOUNTER — APPOINTMENT (OUTPATIENT)
Dept: CARDIAC REHAB | Age: 85
End: 2020-12-30
Payer: MEDICARE

## 2020-12-31 ENCOUNTER — CLINICAL SUPPORT (OUTPATIENT)
Dept: CARDIAC REHAB | Age: 85
End: 2020-12-31
Payer: MEDICARE

## 2020-12-31 DIAGNOSIS — Z95.2 S/P TAVR (TRANSCATHETER AORTIC VALVE REPLACEMENT): Primary | ICD-10-CM

## 2020-12-31 PROCEDURE — 93798 PHYS/QHP OP CAR RHAB W/ECG: CPT

## 2021-01-04 ENCOUNTER — APPOINTMENT (OUTPATIENT)
Dept: CARDIAC REHAB | Age: 86
End: 2021-01-04
Payer: MEDICARE

## 2021-01-04 ENCOUNTER — TELEPHONE (OUTPATIENT)
Dept: CARDIOLOGY CLINIC | Facility: CLINIC | Age: 86
End: 2021-01-04

## 2021-01-04 NOTE — TELEPHONE ENCOUNTER
I would have him call Dr Trejo End office - they have a strict protocol, so they will be able to answer that question  Thanks

## 2021-01-04 NOTE — TELEPHONE ENCOUNTER
Pts wife Monisha Lopez called asking if pt was to stop taking the Plavix post TAVR 10/6/20 as per her verbal discharge instructions, pt was to take this X3 months only  Last OV 10/28/20 with Yamileth Quintero states to continue on Plavix  Seen 11/4/20 by Dr Patrica Duverney, no mention of Plavix course- to f/u in one year  Please advise

## 2021-01-05 ENCOUNTER — TELEMEDICINE (OUTPATIENT)
Dept: FAMILY MEDICINE CLINIC | Facility: CLINIC | Age: 86
End: 2021-01-05
Payer: MEDICARE

## 2021-01-05 DIAGNOSIS — K59.00 CONSTIPATION, UNSPECIFIED CONSTIPATION TYPE: Primary | ICD-10-CM

## 2021-01-05 PROCEDURE — 99213 OFFICE O/P EST LOW 20 MIN: CPT | Performed by: PHYSICIAN ASSISTANT

## 2021-01-05 NOTE — PROGRESS NOTES
Virtual Regular Visit      Assessment/Plan:    Problem List Items Addressed This Visit     None      Visit Diagnoses     Constipation, unspecified constipation type    -  Primary      Denies any red flag symptoms  On several antibiotics the past several months due to repeated UTIs  Encouraged continued use with Metamucil/Benefiber  Discussed dietary guidelines and bleeding increased fiber intake with fruits and vegetables  Patient wishes to try dietary changes, discussed avoidance of dairy  FU if symptoms persist for the next several days         Reason for visit is   Chief Complaint   Patient presents with    Virtual Regular Visit        Encounter provider Naa Briceno PA-C    Provider located at [place] [address]  [phone]      Recent Visits  No visits were found meeting these conditions  Showing recent visits within past 7 days and meeting all other requirements     Today's Visits  Date Type Provider Dept   01/05/21 Telemedicine [facility]   Showing today's visits and meeting all other requirements     Future Appointments  No visits were found meeting these conditions  Showing future appointments within next 150 days and meeting all other requirements        The patient was identified by name and date of birth  Miguel Ackerman was informed that this is a telemedicine visit and that the visit is being conducted through [address] [place] [address] and patient was informed that this is not a secure, HIPAA-compliant platform  He agrees to proceed     My office door was closed  No one else was in the room  He acknowledged consent and understanding of privacy and security of the video platform  The patient has agreed to participate and understands they can discontinue the visit at any time  Patient is aware this is a billable service       Subjective  Miguel Ackerman is a 80 y o  male for Intermittent diarrhea and constipation for 7 days  Watery BM yesterday and semi-formed today  Denies any abdominal discomfort or upset stomach  Denies any vomiting, fever, chills  Weight is stable  Diet is meat, vegetables and starch  Using probiotics for several weeks  He started metamucil 4 days ago  Past Medical History:   Diagnosis Date    Anemia     Bladder cancer ([facility] )     Cancer ([facility] )     bladder    Carotid artery occlusion     Cataract     Colon polyp     COPD (chronic obstructive pulmonary disease) (HCC)     Coronary artery disease     Disease of thyroid gland     History of transfusion     Hyperlipidemia     Hypertension     Hypothyroidism 9/15/2017    Multiple pulmonary nodules     Peptic ulcer     Scoliosis     SIRS (systemic inflammatory response syndrome) ([facility] ) 12/19/2019    Transient cerebral ischemia     Tremors of nervous system        Past Surgical History:   Procedure Laterality Date   [facility] SURGERY      1973, 1987, 2005    BUNIONECTOMY Left     Simple exostectomy (silver procedure)    CAROTID ENDARTERECTOMY Right 09/10/2008    Randy LEDBETTER MD    CATARACT EXTRACTION      CATARACT EXTRACTION, BILATERAL      CERVICAL DISCECTOMY  1969    COLONOSCOPY      CORONARY ARTERY BYPASS GRAFT  10/20/2010    x3 (LIMA-LAD, SVG-OM, SVG-RCA)    CYSTOSCOPY      ELBOW SURGERY Right 07/2012    FEMORAL ARTERY - TIBIAL ARTERY BYPASS GRAFT  12/05/2011    using reversed saphenous vein from right leg  Kings Galeana MD    [place] ECHO TRANSESOPHAG R-T 2D W/PRB IMG ACQUISJ I&R N/A 10/6/2020    Procedure: TRANSESOPHAGEAL ECHOCARDIOGRAM (DAVID); Surgeon: Niya Patrick DO;  [facility]: [facility];  Service: Cardiac Surgery    [place] REPLACE AORTIC VALVE OPENFEMORAL ARTERY APPROACH N/A 10/6/2020    Procedure: REPLACEMENT AORTIC VALVE TRANSCATHETER (TAVR) TRANSFEMORAL W/ 26MM MONTALVO BREE S3 ULTRA VALVE(ACCESS ON LEFT);   Surgeon: Niya Patrick DO;  [facility]: [facility];  Service: Cardiac Surgery    REPLACEMENT TOTAL KNEE Left     SHOULDER SURGERY Right 1997       Current Outpatient Medications   Medication Sig Dispense Refill    acetaminophen (TYLENOL) 325 mg tablet Take 2 tablets (650 mg total) by mouth every 4 (four) hours as needed for mild pain, headaches or fever (temperature greater than 101 F)  0    alfuzosin (UROXATRAL) 10 mg 24 hr tablet TAKE 1 TABLET(10 MG) BY MOUTH DAILY 90 tablet 3    aspirin 81 MG tablet Take 81 mg by mouth daily       Cholecalciferol (HM VITAMIN D3) 2000 units CAPS Take 1 tablet by mouth daily      clopidogrel (PLAVIX) 75 mg tablet Take 1 tablet (75 mg total) by mouth daily 30 tablet 2    docusate sodium (COLACE) 100 mg capsule Take 1 capsule (100 mg total) by mouth 2 (two) times a day (Patient not taking: Reported on 11/4/2020) 10 capsule 0    finasteride (PROSCAR) 5 mg tablet Take 1 tablet (5 mg total) by mouth daily 90 tablet 3    furosemide (LASIX) 20 mg tablet TAKE 1 TABLET(20 MG) BY MOUTH DAILY 90 tablet 1    HYDROcodone-acetaminophen (NORCO) 5-325 mg per tablet Take 1 tablet by mouth 2 (two) times a day as needed for painMax Daily Amount: 2 tablets 60 tablet 0    hydrocortisone 2 5 % cream RENA TO POSTERIOR EARS AND ARMS ONCE A DAY PRN FOR 1-2 WEEKS  1    levothyroxine 75 mcg tablet TAKE 1 TABLET(75 MCG) BY MOUTH DAILY 90 tablet 3    metoprolol succinate (TOPROL-XL) 25 mg 24 hr tablet TAKE 1/2 TABLET BY MOUTH DAILY 45 tablet 3    omeprazole (PriLOSEC) 20 mg delayed release capsule Take 1 tablet by mouth daily      potassium chloride (K-DUR,KLOR-CON) 10 mEq tablet Take 1 tablet (10 mEq total) by mouth daily 30 tablet 1    simvastatin (ZOCOR) 20 mg tablet TAKE 1 TABLET(20 MG) BY MOUTH DAILY AT BEDTIME 90 tablet 1    SPIRIVA RESPIMAT 2 5 MCG/ACT AERS inhaler INHALE 2 PUFFS BY MOUTH DAILY 12 g 6     No current facility-administered medications for this visit           Allergies   Allergen Reactions    Aleve [Naproxen]      Other reaction(s): FACIAL SWELLING  Category: Allergy; Annotation - [id number]: lip/eye edema    Ancef [Cefazolin] Anaphylaxis     Hypotension, rash, itching    Augmentin [Amoxicillin-Pot Clavulanate] Diarrhea and Vomiting       Review of Systems   Constitutional: Negative for activity change, fatigue, fever and unexpected weight change  Respiratory: Negative for cough, shortness of breath and wheezing  Cardiovascular: Negative for chest pain, palpitations and leg swelling  Gastrointestinal: Positive for constipation and vomiting  Negative for abdominal distention, abdominal pain, anal bleeding, blood in stool, diarrhea and nausea  Musculoskeletal: Negative for back pain, neck pain and neck stiffness  Skin: Negative for rash  Video Exam    There were no vitals filed for this visit  Physical Exam  Constitutional:       General: He is not in acute distress  Appearance: He is well-developed  HENT:      Head: Normocephalic and atraumatic  Pulmonary:      Effort: Pulmonary effort is normal  No respiratory distress  Breath sounds: No wheezing  Neurological:      Mental Status: He is alert  Psychiatric:         Behavior: Behavior normal          Thought Content: Thought content normal           I spent 15 minutes directly with the patient during this visit      [facility] acknowledges that he has consented to an online visit or consultation  He understands that the online visit is based solely on information provided by him, and that, in the absence of a face-to-face physical evaluation by the physician, the diagnosis he receives is both limited and provisional in terms of accuracy and completeness  This is not intended to replace a full medical face-to-face evaluation by the physician  Spenser Rocha understands and accepts these terms

## 2021-01-06 ENCOUNTER — CLINICAL SUPPORT (OUTPATIENT)
Dept: CARDIAC REHAB | Age: 86
End: 2021-01-06
Payer: MEDICARE

## 2021-01-06 DIAGNOSIS — Z95.2 S/P TAVR (TRANSCATHETER AORTIC VALVE REPLACEMENT): ICD-10-CM

## 2021-01-06 PROCEDURE — 93798 PHYS/QHP OP CAR RHAB W/ECG: CPT

## 2021-01-08 ENCOUNTER — CLINICAL SUPPORT (OUTPATIENT)
Dept: CARDIAC REHAB | Age: 86
End: 2021-01-08
Payer: MEDICARE

## 2021-01-08 DIAGNOSIS — Z95.2 S/P TAVR (TRANSCATHETER AORTIC VALVE REPLACEMENT): ICD-10-CM

## 2021-01-08 PROCEDURE — 93798 PHYS/QHP OP CAR RHAB W/ECG: CPT

## 2021-01-11 ENCOUNTER — CLINICAL SUPPORT (OUTPATIENT)
Dept: CARDIAC REHAB | Age: 86
End: 2021-01-11
Payer: MEDICARE

## 2021-01-11 DIAGNOSIS — Z95.2 S/P TAVR (TRANSCATHETER AORTIC VALVE REPLACEMENT): ICD-10-CM

## 2021-01-11 PROCEDURE — 93798 PHYS/QHP OP CAR RHAB W/ECG: CPT

## 2021-01-13 ENCOUNTER — CLINICAL SUPPORT (OUTPATIENT)
Dept: CARDIAC REHAB | Age: 86
End: 2021-01-13
Payer: MEDICARE

## 2021-01-13 DIAGNOSIS — Z95.2 S/P TAVR (TRANSCATHETER AORTIC VALVE REPLACEMENT): ICD-10-CM

## 2021-01-13 PROCEDURE — 93798 PHYS/QHP OP CAR RHAB W/ECG: CPT

## 2021-01-15 ENCOUNTER — CLINICAL SUPPORT (OUTPATIENT)
Dept: CARDIAC REHAB | Age: 86
End: 2021-01-15
Payer: MEDICARE

## 2021-01-15 DIAGNOSIS — Z95.2 S/P TAVR (TRANSCATHETER AORTIC VALVE REPLACEMENT): ICD-10-CM

## 2021-01-15 PROCEDURE — 93798 PHYS/QHP OP CAR RHAB W/ECG: CPT

## 2021-01-18 ENCOUNTER — CLINICAL SUPPORT (OUTPATIENT)
Dept: CARDIAC REHAB | Age: 86
End: 2021-01-18
Payer: MEDICARE

## 2021-01-18 DIAGNOSIS — Z95.2 S/P TAVR (TRANSCATHETER AORTIC VALVE REPLACEMENT): ICD-10-CM

## 2021-01-18 PROCEDURE — 93798 PHYS/QHP OP CAR RHAB W/ECG: CPT

## 2021-01-19 ENCOUNTER — TELEPHONE (OUTPATIENT)
Dept: FAMILY MEDICINE CLINIC | Facility: CLINIC | Age: 86
End: 2021-01-19

## 2021-01-19 NOTE — TELEPHONE ENCOUNTER
Call  I reviewed patient's medication allergies  I believe the benefits of the COVID vaccine outweighs the risk  Cases of severe allergic reactions have been reported but are rare    Patient could premedicate with Benadryl prior to the vaccine

## 2021-01-19 NOTE — TELEPHONE ENCOUNTER
Wife phoned concerned about  receiving COVID vaccine due to him having anaphylactic reaction (to med?) in October   She asked if Dr Mary Hernandez can review his records and advise if ok if he receives the vaccine

## 2021-01-20 ENCOUNTER — CLINICAL SUPPORT (OUTPATIENT)
Dept: CARDIAC REHAB | Age: 86
End: 2021-01-20
Payer: MEDICARE

## 2021-01-20 DIAGNOSIS — Z95.2 S/P TAVR (TRANSCATHETER AORTIC VALVE REPLACEMENT): ICD-10-CM

## 2021-01-20 PROCEDURE — 93798 PHYS/QHP OP CAR RHAB W/ECG: CPT

## 2021-01-20 NOTE — TELEPHONE ENCOUNTER
Patient's wife, Malachi Curiel, informed  Suggested Ashok Hashimoto have a  if he is taking Benadryl before the appointment due to potential drowsiness  Stated they would both like to schedule their vaccines so I passed their information to clerical staff to schedule them

## 2021-01-21 DIAGNOSIS — E78.5 DYSLIPIDEMIA: ICD-10-CM

## 2021-01-21 DIAGNOSIS — E03.9 ACQUIRED HYPOTHYROIDISM: ICD-10-CM

## 2021-01-21 RX ORDER — SIMVASTATIN 20 MG
TABLET ORAL
Qty: 90 TABLET | Refills: 1 | Status: SHIPPED | OUTPATIENT
Start: 2021-01-21 | End: 2021-09-25 | Stop reason: HOSPADM

## 2021-01-21 RX ORDER — LEVOTHYROXINE SODIUM 0.07 MG/1
TABLET ORAL
Qty: 90 TABLET | Refills: 3 | Status: SHIPPED | OUTPATIENT
Start: 2021-01-21 | End: 2021-05-14

## 2021-01-22 ENCOUNTER — CLINICAL SUPPORT (OUTPATIENT)
Dept: CARDIAC REHAB | Age: 86
End: 2021-01-22
Payer: MEDICARE

## 2021-01-22 DIAGNOSIS — Z95.2 S/P TAVR (TRANSCATHETER AORTIC VALVE REPLACEMENT): ICD-10-CM

## 2021-01-22 PROCEDURE — 93798 PHYS/QHP OP CAR RHAB W/ECG: CPT

## 2021-01-23 ENCOUNTER — IMMUNIZATIONS (OUTPATIENT)
Dept: FAMILY MEDICINE CLINIC | Facility: HOSPITAL | Age: 86
End: 2021-01-23

## 2021-01-23 DIAGNOSIS — Z23 ENCOUNTER FOR IMMUNIZATION: Primary | ICD-10-CM

## 2021-01-23 PROCEDURE — 91301 SARS-COV-2 / COVID-19 MRNA VACCINE (MODERNA) 100 MCG: CPT

## 2021-01-23 PROCEDURE — 0011A SARS-COV-2 / COVID-19 MRNA VACCINE (MODERNA) 100 MCG: CPT

## 2021-01-25 ENCOUNTER — APPOINTMENT (OUTPATIENT)
Dept: CARDIAC REHAB | Age: 86
End: 2021-01-25
Payer: MEDICARE

## 2021-01-25 ENCOUNTER — PROCEDURE VISIT (OUTPATIENT)
Dept: UROLOGY | Facility: AMBULATORY SURGERY CENTER | Age: 86
End: 2021-01-25
Payer: MEDICARE

## 2021-01-25 ENCOUNTER — TELEPHONE (OUTPATIENT)
Dept: UROLOGY | Facility: AMBULATORY SURGERY CENTER | Age: 86
End: 2021-01-25

## 2021-01-25 VITALS
WEIGHT: 147 LBS | SYSTOLIC BLOOD PRESSURE: 132 MMHG | HEART RATE: 56 BPM | HEIGHT: 67 IN | DIASTOLIC BLOOD PRESSURE: 64 MMHG | BODY MASS INDEX: 23.07 KG/M2

## 2021-01-25 DIAGNOSIS — R33.9 URINARY RETENTION: Primary | ICD-10-CM

## 2021-01-25 LAB — POST-VOID RESIDUAL VOLUME, ML POC: 250 ML

## 2021-01-25 PROCEDURE — 51798 US URINE CAPACITY MEASURE: CPT | Performed by: UROLOGY

## 2021-01-25 PROCEDURE — 52000 CYSTOURETHROSCOPY: CPT | Performed by: UROLOGY

## 2021-01-25 NOTE — PROGRESS NOTES
1/25/2021    Sindi Monsivais  1933  [id number]    Diagnosis  Chief Complaint     Urinary Retention          Patient presents for PVR managed by Dr Roberto Begum  Patient has routine catheter changes with     Procedure    PVR    Patient returned this afternoon  Patient states unable to void  Patient voided 0 ml while in office  Bladder ultrasound performed and PVR measured 250 ml  Patient was in much distress, 12 F coude catheter inserted  250 returned  Patient also had diarrhea and stomach was gurgling  Attached to leg bag  Wife wanted to know the next step is  Explained that it could be a SPT  Reviewed placement in abdomen vs penis    Told her would check with provider and call in am  They will continue to have VNA exchange catheter    Recent Results (from the past 4 hour(s))   POCT Measure PVR    Collection Time: 01/25/21  4:09 PM   Result Value Ref Range    POST-VOID RESIDUAL VOLUME, ML  mL           Vitals:    01/25/21 0931   BP: 132/64   Pulse: 56   Weight: 66 7 kg (147 lb)   Height: 5' 7" (1 702 m)           Marija Newell RN

## 2021-01-25 NOTE — TELEPHONE ENCOUNTER
Patients wife called and her  had not voided and I sent a message to Deepti and she said to tell the patient to come back in to the office  He will be in [place] in 15 minutes

## 2021-01-25 NOTE — LETTER
January 25, 2021     Joseline Cates MD  [address] [place] [address]    Patient: Marilin Lr   YOB: 1933   Date of Visit: 1/25/2021       Dear Dr Ricco Crespo:    Thank you for referring Marciano Ricardo to me for evaluation  Below are my notes for this consultation  If you have questions, please do not hesitate to call me  I look forward to following your patient along with you  Sincerely,        Ema Fong MD        CC: No Recipients  Ema Fong MD  1/25/2021 10:17 AM  Sign when Signing Visit  Patient of Dr Tony Weems with history of bladder cancer, last recurrence greater than 10 years ago  More recently over the past several months he has had issues with urinary retention  He has failed multiple void trials returns today for cystoscopy and ultrasound to evaluate for possible intervention  On my review of his chart however, he does not seem to be candidate for surgical intervention  He has severe aortic stenosis  Additionally, I reviewed his CT scan images and prostate seems to be quite small  Cystoscopy     Date/Time 1/25/2021 10:08 AM     Performed by  Ema Fong MD     Authorized by Ema Fong MD          Procedure Details:  Procedure type: cystoscopy    Additional Procedure Details: Patient is urinary catheter was removed  He was prepped with Betadine  2% lidocaine jelly was instilled per urethra  The 16 [place]ian flexible cystoscope was placed  There is no urethral abnormality noted  Prostate is noted to be widely patent, with short length, and no evidence of obstructive lateral lobes or median lobe  Evaluation of the bladder however reveals trabeculation and cellule formation  There is no suspicious mass or lesion noted  The bladder was filled with over 300 mL  Patient felt urge to void  Patient was allowed to void and voided over 250 mL  Bladder scan postvoid residual 15 mL  Plan  Catheter was not replaced  Patient and his wife will observe for symptoms of urinary retention or infection at home  Plan follow-up in about 1 week for postvoid residual assessment to ensure he is doing well  If he has recurrent retention, will need to discuss possible suprapubic tube placement, as he does not seem to be a candidate for any surgical intervention, nor would it be indicated  He may discontinue finasteride

## 2021-01-25 NOTE — TELEPHONE ENCOUNTER
Pt seen by Dr Juan May today, pt to be seen for  PVR next week   he can not do Monday Feb 1, any other day and time is ok    please advise on appt

## 2021-01-25 NOTE — PROGRESS NOTES
Patient of Dr Reyes Mutter with history of bladder cancer, last recurrence greater than 10 years ago  More recently over the past several months he has had issues with urinary retention  He has failed multiple void trials returns today for cystoscopy and ultrasound to evaluate for possible intervention  On my review of his chart however, he does not seem to be candidate for surgical intervention  He has severe aortic stenosis  Additionally, I reviewed his CT scan images and prostate seems to be quite small  Cystoscopy     Date/Time 1/25/2021 10:08 AM     Performed by  Denver Fey, MD     Authorized by Denver Fey, MD          Procedure Details:  Procedure type: cystoscopy    Additional Procedure Details: Patient is urinary catheter was removed  He was prepped with Betadine  2% lidocaine jelly was instilled per urethra  The 16 Sudanese flexible cystoscope was placed  There is no urethral abnormality noted  Prostate is noted to be widely patent, with short length, and no evidence of obstructive lateral lobes or median lobe  Evaluation of the bladder however reveals trabeculation and cellule formation  There is no suspicious mass or lesion noted  The bladder was filled with over 300 mL  Patient felt urge to void  Patient was allowed to void and voided over 250 mL  Bladder scan postvoid residual 15 mL  Plan  Catheter was not replaced  Patient and his wife will observe for symptoms of urinary retention or infection at home  Plan follow-up in about 1 week for postvoid residual assessment to ensure he is doing well  If he has recurrent retention, will need to discuss possible suprapubic tube placement, as he does not seem to be a candidate for any surgical intervention, nor would it be indicated  He may discontinue finasteride

## 2021-01-26 ENCOUNTER — APPOINTMENT (EMERGENCY)
Dept: RADIOLOGY | Facility: HOSPITAL | Age: 86
DRG: 872 | End: 2021-01-26
Payer: MEDICARE

## 2021-01-26 ENCOUNTER — HOSPITAL ENCOUNTER (INPATIENT)
Facility: HOSPITAL | Age: 86
LOS: 11 days | Discharge: NURSING, REHAB OR LONG-TERM CARE | DRG: 872 | End: 2021-02-06
Attending: EMERGENCY MEDICINE | Admitting: GENERAL PRACTICE
Payer: MEDICARE

## 2021-01-26 ENCOUNTER — TELEPHONE (OUTPATIENT)
Dept: UROLOGY | Facility: AMBULATORY SURGERY CENTER | Age: 86
End: 2021-01-26

## 2021-01-26 DIAGNOSIS — R50.9 FEVER: ICD-10-CM

## 2021-01-26 DIAGNOSIS — R33.9 URINE RETENTION: ICD-10-CM

## 2021-01-26 DIAGNOSIS — R00.0 TACHYCARDIA: ICD-10-CM

## 2021-01-26 DIAGNOSIS — M25.552 BILATERAL HIP PAIN: ICD-10-CM

## 2021-01-26 DIAGNOSIS — M25.551 RIGHT HIP PAIN: ICD-10-CM

## 2021-01-26 DIAGNOSIS — M15.9 GENERALIZED OSTEOARTHRITIS OF MULTIPLE SITES: ICD-10-CM

## 2021-01-26 DIAGNOSIS — A41.9 SEPSIS (HCC): Primary | ICD-10-CM

## 2021-01-26 DIAGNOSIS — M25.551 BILATERAL HIP PAIN: ICD-10-CM

## 2021-01-26 DIAGNOSIS — N39.0 UTI (URINARY TRACT INFECTION): ICD-10-CM

## 2021-01-26 LAB
ALBUMIN SERPL BCP-MCNC: 4.1 G/DL (ref 3.5–5)
ALP SERPL-CCNC: 110 U/L (ref 46–116)
ALT SERPL W P-5'-P-CCNC: 19 U/L (ref 12–78)
ANION GAP SERPL CALCULATED.3IONS-SCNC: 2 MMOL/L (ref 4–13)
AST SERPL W P-5'-P-CCNC: 24 U/L (ref 5–45)
ATRIAL RATE: 250 BPM
BACTERIA UR QL AUTO: ABNORMAL /HPF
BASOPHILS # BLD AUTO: 0.02 THOUSANDS/ΜL (ref 0–0.1)
BASOPHILS NFR BLD AUTO: 0 % (ref 0–1)
BILIRUB SERPL-MCNC: 0.73 MG/DL (ref 0.2–1)
BILIRUB UR QL STRIP: NEGATIVE
BUN SERPL-MCNC: 14 MG/DL (ref 5–25)
CALCIUM SERPL-MCNC: 9 MG/DL (ref 8.3–10.1)
CHLORIDE SERPL-SCNC: 102 MMOL/L (ref 100–108)
CLARITY UR: ABNORMAL
CO2 SERPL-SCNC: 28 MMOL/L (ref 21–32)
COLOR UR: YELLOW
CREAT SERPL-MCNC: 1.06 MG/DL (ref 0.6–1.3)
EOSINOPHIL # BLD AUTO: 0.07 THOUSAND/ΜL (ref 0–0.61)
EOSINOPHIL NFR BLD AUTO: 1 % (ref 0–6)
ERYTHROCYTE [DISTWIDTH] IN BLOOD BY AUTOMATED COUNT: 13.1 % (ref 11.6–15.1)
FLUAV RNA RESP QL NAA+PROBE: NEGATIVE
FLUBV RNA RESP QL NAA+PROBE: NEGATIVE
GFR SERPL CREATININE-BSD FRML MDRD: 63 ML/MIN/1.73SQ M
GLUCOSE SERPL-MCNC: 100 MG/DL (ref 65–140)
GLUCOSE UR STRIP-MCNC: NEGATIVE MG/DL
HCT VFR BLD AUTO: 35.8 % (ref 36.5–49.3)
HGB BLD-MCNC: 11.6 G/DL (ref 12–17)
HGB UR QL STRIP.AUTO: ABNORMAL
HYALINE CASTS #/AREA URNS LPF: ABNORMAL /LPF
IMM GRANULOCYTES # BLD AUTO: 0.04 THOUSAND/UL (ref 0–0.2)
IMM GRANULOCYTES NFR BLD AUTO: 1 % (ref 0–2)
KETONES UR STRIP-MCNC: ABNORMAL MG/DL
LACTATE SERPL-SCNC: 1.1 MMOL/L (ref 0.5–2)
LEUKOCYTE ESTERASE UR QL STRIP: ABNORMAL
LYMPHOCYTES # BLD AUTO: 0.32 THOUSANDS/ΜL (ref 0.6–4.47)
LYMPHOCYTES NFR BLD AUTO: 4 % (ref 14–44)
MCH RBC QN AUTO: 31.3 PG (ref 26.8–34.3)
MCHC RBC AUTO-ENTMCNC: 32.4 G/DL (ref 31.4–37.4)
MCV RBC AUTO: 97 FL (ref 82–98)
MONOCYTES # BLD AUTO: 0.72 THOUSAND/ΜL (ref 0.17–1.22)
MONOCYTES NFR BLD AUTO: 9 % (ref 4–12)
NEUTROPHILS # BLD AUTO: 7.18 THOUSANDS/ΜL (ref 1.85–7.62)
NEUTS SEG NFR BLD AUTO: 85 % (ref 43–75)
NITRITE UR QL STRIP: POSITIVE
NON-SQ EPI CELLS URNS QL MICRO: ABNORMAL /HPF
NRBC BLD AUTO-RTO: 0 /100 WBCS
PH UR STRIP.AUTO: 6.5 [PH]
PLATELET # BLD AUTO: 111 THOUSANDS/UL (ref 149–390)
PMV BLD AUTO: 11.1 FL (ref 8.9–12.7)
POTASSIUM SERPL-SCNC: 4 MMOL/L (ref 3.5–5.3)
PROCALCITONIN SERPL-MCNC: 0.09 NG/ML
PROT SERPL-MCNC: 7.5 G/DL (ref 6.4–8.2)
PROT UR STRIP-MCNC: ABNORMAL MG/DL
QRS AXIS: 31 DEGREES
QRSD INTERVAL: 148 MS
QT INTERVAL: 414 MS
QTC INTERVAL: 542 MS
RBC # BLD AUTO: 3.71 MILLION/UL (ref 3.88–5.62)
RBC #/AREA URNS AUTO: ABNORMAL /HPF
RSV RNA RESP QL NAA+PROBE: NEGATIVE
SARS-COV-2 RNA RESP QL NAA+PROBE: NEGATIVE
SODIUM SERPL-SCNC: 132 MMOL/L (ref 136–145)
SP GR UR STRIP.AUTO: 1.01 (ref 1–1.03)
T WAVE AXIS: 60 DEGREES
UROBILINOGEN UR QL STRIP.AUTO: 1 E.U./DL
VENTRICULAR RATE: 103 BPM
WBC # BLD AUTO: 8.35 THOUSAND/UL (ref 4.31–10.16)
WBC #/AREA URNS AUTO: ABNORMAL /HPF

## 2021-01-26 PROCEDURE — 85025 COMPLETE CBC W/AUTO DIFF WBC: CPT | Performed by: EMERGENCY MEDICINE

## 2021-01-26 PROCEDURE — 99285 EMERGENCY DEPT VISIT HI MDM: CPT

## 2021-01-26 PROCEDURE — 83605 ASSAY OF LACTIC ACID: CPT | Performed by: EMERGENCY MEDICINE

## 2021-01-26 PROCEDURE — 0241U HB NFCT DS VIR RESP RNA 4 TRGT: CPT | Performed by: EMERGENCY MEDICINE

## 2021-01-26 PROCEDURE — 81001 URINALYSIS AUTO W/SCOPE: CPT | Performed by: EMERGENCY MEDICINE

## 2021-01-26 PROCEDURE — 99232 SBSQ HOSP IP/OBS MODERATE 35: CPT | Performed by: PHYSICIAN ASSISTANT

## 2021-01-26 PROCEDURE — 36415 COLL VENOUS BLD VENIPUNCTURE: CPT | Performed by: EMERGENCY MEDICINE

## 2021-01-26 PROCEDURE — 93005 ELECTROCARDIOGRAM TRACING: CPT

## 2021-01-26 PROCEDURE — 80053 COMPREHEN METABOLIC PANEL: CPT | Performed by: EMERGENCY MEDICINE

## 2021-01-26 PROCEDURE — 96374 THER/PROPH/DIAG INJ IV PUSH: CPT

## 2021-01-26 PROCEDURE — 71045 X-RAY EXAM CHEST 1 VIEW: CPT

## 2021-01-26 PROCEDURE — 99285 EMERGENCY DEPT VISIT HI MDM: CPT | Performed by: EMERGENCY MEDICINE

## 2021-01-26 PROCEDURE — 87086 URINE CULTURE/COLONY COUNT: CPT | Performed by: EMERGENCY MEDICINE

## 2021-01-26 PROCEDURE — 93010 ELECTROCARDIOGRAM REPORT: CPT | Performed by: INTERNAL MEDICINE

## 2021-01-26 PROCEDURE — 87040 BLOOD CULTURE FOR BACTERIA: CPT | Performed by: EMERGENCY MEDICINE

## 2021-01-26 PROCEDURE — 99222 1ST HOSP IP/OBS MODERATE 55: CPT | Performed by: GENERAL PRACTICE

## 2021-01-26 PROCEDURE — 96367 TX/PROPH/DG ADDL SEQ IV INF: CPT

## 2021-01-26 PROCEDURE — 96365 THER/PROPH/DIAG IV INF INIT: CPT

## 2021-01-26 PROCEDURE — 96375 TX/PRO/DX INJ NEW DRUG ADDON: CPT

## 2021-01-26 PROCEDURE — 84145 PROCALCITONIN (PCT): CPT | Performed by: EMERGENCY MEDICINE

## 2021-01-26 RX ORDER — VANCOMYCIN HYDROCHLORIDE 1 G/200ML
15 INJECTION, SOLUTION INTRAVENOUS ONCE
Status: COMPLETED | OUTPATIENT
Start: 2021-01-26 | End: 2021-01-26

## 2021-01-26 RX ORDER — FUROSEMIDE 20 MG/1
20 TABLET ORAL DAILY
Status: DISCONTINUED | OUTPATIENT
Start: 2021-01-26 | End: 2021-02-06 | Stop reason: HOSPADM

## 2021-01-26 RX ORDER — HYDROMORPHONE HCL/PF 1 MG/ML
0.5 SYRINGE (ML) INJECTION ONCE
Status: COMPLETED | OUTPATIENT
Start: 2021-01-26 | End: 2021-01-26

## 2021-01-26 RX ORDER — ACETAMINOPHEN 325 MG/1
650 TABLET ORAL EVERY 4 HOURS PRN
Status: DISCONTINUED | OUTPATIENT
Start: 2021-01-26 | End: 2021-01-29

## 2021-01-26 RX ORDER — ASPIRIN 81 MG/1
81 TABLET ORAL DAILY
Status: DISCONTINUED | OUTPATIENT
Start: 2021-01-26 | End: 2021-02-06 | Stop reason: HOSPADM

## 2021-01-26 RX ORDER — HYDROCODONE BITARTRATE AND ACETAMINOPHEN 5; 325 MG/1; MG/1
1 TABLET ORAL EVERY 6 HOURS PRN
Status: DISCONTINUED | OUTPATIENT
Start: 2021-01-26 | End: 2021-01-29

## 2021-01-26 RX ORDER — SODIUM CHLORIDE, SODIUM GLUCONATE, SODIUM ACETATE, POTASSIUM CHLORIDE, MAGNESIUM CHLORIDE, SODIUM PHOSPHATE, DIBASIC, AND POTASSIUM PHOSPHATE .53; .5; .37; .037; .03; .012; .00082 G/100ML; G/100ML; G/100ML; G/100ML; G/100ML; G/100ML; G/100ML
1000 INJECTION, SOLUTION INTRAVENOUS ONCE
Status: COMPLETED | OUTPATIENT
Start: 2021-01-26 | End: 2021-01-26

## 2021-01-26 RX ORDER — TAMSULOSIN HYDROCHLORIDE 0.4 MG/1
0.4 CAPSULE ORAL
Status: DISCONTINUED | OUTPATIENT
Start: 2021-01-26 | End: 2021-02-06 | Stop reason: HOSPADM

## 2021-01-26 RX ORDER — METOPROLOL SUCCINATE 25 MG/1
12.5 TABLET, EXTENDED RELEASE ORAL DAILY
Status: DISCONTINUED | OUTPATIENT
Start: 2021-01-26 | End: 2021-02-06 | Stop reason: HOSPADM

## 2021-01-26 RX ORDER — LEVOTHYROXINE SODIUM 0.07 MG/1
75 TABLET ORAL
Status: DISCONTINUED | OUTPATIENT
Start: 2021-01-27 | End: 2021-02-06 | Stop reason: HOSPADM

## 2021-01-26 RX ORDER — POTASSIUM CHLORIDE 750 MG/1
10 TABLET, EXTENDED RELEASE ORAL DAILY
Status: DISCONTINUED | OUTPATIENT
Start: 2021-01-26 | End: 2021-02-06 | Stop reason: HOSPADM

## 2021-01-26 RX ORDER — ALBUTEROL SULFATE 90 UG/1
2 AEROSOL, METERED RESPIRATORY (INHALATION) EVERY 4 HOURS PRN
Status: DISCONTINUED | OUTPATIENT
Start: 2021-01-26 | End: 2021-02-06 | Stop reason: HOSPADM

## 2021-01-26 RX ORDER — PRAVASTATIN SODIUM 40 MG
40 TABLET ORAL
Status: DISCONTINUED | OUTPATIENT
Start: 2021-01-26 | End: 2021-02-06 | Stop reason: HOSPADM

## 2021-01-26 RX ORDER — FINASTERIDE 5 MG/1
5 TABLET, FILM COATED ORAL DAILY
Status: DISCONTINUED | OUTPATIENT
Start: 2021-01-26 | End: 2021-02-06 | Stop reason: HOSPADM

## 2021-01-26 RX ORDER — MELATONIN
2000 DAILY
Status: DISCONTINUED | OUTPATIENT
Start: 2021-01-26 | End: 2021-02-06 | Stop reason: HOSPADM

## 2021-01-26 RX ORDER — GABAPENTIN 100 MG/1
100 CAPSULE ORAL
Status: DISCONTINUED | OUTPATIENT
Start: 2021-01-26 | End: 2021-02-06 | Stop reason: HOSPADM

## 2021-01-26 RX ORDER — PANTOPRAZOLE SODIUM 40 MG/1
40 TABLET, DELAYED RELEASE ORAL
Status: DISCONTINUED | OUTPATIENT
Start: 2021-01-27 | End: 2021-02-06 | Stop reason: HOSPADM

## 2021-01-26 RX ORDER — ONDANSETRON 2 MG/ML
4 INJECTION INTRAMUSCULAR; INTRAVENOUS EVERY 6 HOURS PRN
Status: DISCONTINUED | OUTPATIENT
Start: 2021-01-26 | End: 2021-02-06 | Stop reason: HOSPADM

## 2021-01-26 RX ADMIN — POTASSIUM CHLORIDE 10 MEQ: 750 TABLET, EXTENDED RELEASE ORAL at 18:34

## 2021-01-26 RX ADMIN — CEFTRIAXONE SODIUM 1000 MG: 10 INJECTION, POWDER, FOR SOLUTION INTRAVENOUS at 11:29

## 2021-01-26 RX ADMIN — SODIUM CHLORIDE, SODIUM LACTATE, POTASSIUM CHLORIDE, AND CALCIUM CHLORIDE 1000 ML: .6; .31; .03; .02 INJECTION, SOLUTION INTRAVENOUS at 10:22

## 2021-01-26 RX ADMIN — HYDROCODONE BITARTRATE AND ACETAMINOPHEN 1 TABLET: 5; 325 TABLET ORAL at 17:46

## 2021-01-26 RX ADMIN — METOPROLOL SUCCINATE 12.5 MG: 25 TABLET, EXTENDED RELEASE ORAL at 18:35

## 2021-01-26 RX ADMIN — GABAPENTIN 100 MG: 100 CAPSULE ORAL at 21:14

## 2021-01-26 RX ADMIN — FUROSEMIDE 20 MG: 20 TABLET ORAL at 18:35

## 2021-01-26 RX ADMIN — VANCOMYCIN HYDROCHLORIDE 1000 MG: 1 INJECTION, SOLUTION INTRAVENOUS at 16:48

## 2021-01-26 RX ADMIN — SODIUM CHLORIDE, SODIUM GLUCONATE, SODIUM ACETATE, POTASSIUM CHLORIDE, MAGNESIUM CHLORIDE, SODIUM PHOSPHATE, DIBASIC, AND POTASSIUM PHOSPHATE 1000 ML: .53; .5; .37; .037; .03; .012; .00082 INJECTION, SOLUTION INTRAVENOUS at 12:08

## 2021-01-26 RX ADMIN — MORPHINE SULFATE 2 MG: 2 INJECTION, SOLUTION INTRAMUSCULAR; INTRAVENOUS at 10:22

## 2021-01-26 RX ADMIN — HYDROMORPHONE HYDROCHLORIDE 0.5 MG: 1 INJECTION, SOLUTION INTRAMUSCULAR; INTRAVENOUS; SUBCUTANEOUS at 12:04

## 2021-01-26 RX ADMIN — Medication 2000 UNITS: at 18:34

## 2021-01-26 RX ADMIN — TAMSULOSIN HYDROCHLORIDE 0.4 MG: 0.4 CAPSULE ORAL at 18:34

## 2021-01-26 RX ADMIN — PRAVASTATIN SODIUM 40 MG: 40 TABLET ORAL at 18:35

## 2021-01-26 RX ADMIN — ENOXAPARIN SODIUM 40 MG: 40 INJECTION SUBCUTANEOUS at 18:35

## 2021-01-26 RX ADMIN — TIOTROPIUM BROMIDE 18 MCG: 18 CAPSULE ORAL; RESPIRATORY (INHALATION) at 18:36

## 2021-01-26 NOTE — TELEPHONE ENCOUNTER
Julio Todd is an 27-year-old male status post cystoscopy yesterday and catheter removal   He presented to the emergency room with fulminant retention and is admitted for UTI  Porter catheter was replaced  Per Dr Hood Dominguez, patient developed recurrent urinary retention will require discussion for possible permanent suprapubic tube  Please contact patient/caregiver with office follow-up with Dr Hood Dominguez to arrange for chronic SP tube  Thank you

## 2021-01-26 NOTE — ASSESSMENT & PLAN NOTE
POA e/b fever and tachycardia  2/2 UTI  F/u U Cx and B CX  Anaphylaxis w/ Ancef but tolerates Rocephin  Rocephin and Vanco  F/u AM procal - today procal negative

## 2021-01-26 NOTE — PROGRESS NOTES
Vancomycin Assessment    Jessica Ferrer is a 80 y o  male who is currently receiving vancomycin   for urinary tract infection     Relevant clinical data and objective history reviewed:  Creatinine   Date Value Ref Range Status   01/26/2021 1 06 0 60 - 1 30 mg/dL Final     Comment:     Standardized to IDMS reference method   11/23/2020 1 22 0 60 - 1 30 mg/dL Final     Comment:     Standardized to IDMS reference method   11/02/2020 1 01 0 60 - 1 30 mg/dL Final     Comment:     Standardized to IDMS reference method   09/15/2015 0 96 0 60 - 1 30 mg/dL Final     Comment:     Standardized to IDMS reference method   06/25/2015 0 78 0 60 - 1 30 mg/dL Final     Comment:     Standardized to IDMS reference method   05/10/2015 0 88 0 60 - 1 30 mg/dL Final     Comment:     Standardized to IDMS reference method     /69   Pulse 96   Temp (!) 101 7 °F (38 7 °C) (Rectal)   Resp 17   SpO2 98%   No intake/output data recorded  Lab Results   Component Value Date/Time    BUN 14 01/26/2021 10:23 AM    BUN 16 06/25/2015 07:56 AM    WBC 8 35 01/26/2021 10:23 AM    WBC 8 12 09/15/2015 07:46 AM    HGB 11 6 (L) 01/26/2021 10:23 AM    HGB 14 6 09/15/2015 07:46 AM    HCT 35 8 (L) 01/26/2021 10:23 AM    HCT 44 2 09/15/2015 07:46 AM    MCV 97 01/26/2021 10:23 AM    MCV 96 09/15/2015 07:46 AM     (L) 01/26/2021 10:23 AM     09/15/2015 07:46 AM     Temp Readings from Last 3 Encounters:   01/26/21 (!) 101 7 °F (38 7 °C) (Rectal)   12/10/20 (!) 96 7 °F (35 9 °C)   12/03/20 97 7 °F (36 5 °C)     Vancomycin Days of Therapy: 1    Assessment/Plan  The patient is currently on vancomycin utilizing scheduled dosing based on actual body weight  Baseline risks associated with therapy include: advanced age  The patient is currently receiving   and after clinical evaluation will be changed to 750 mg q12    10mg kg  Pharmacy will also follow closely for s/sx of nephrotoxicity, infusion reactions, and appropriateness of therapy  BMP and CBC will be ordered per protocol  Plan for trough as patient approaches steady state, prior to the 5th  dose at approximately [id number] [phone]  Due to infection severity, will target a trough of 15-20 (appropriate for most indications)   Pharmacy will continue to follow the patients culture results and clinical progress daily      Governor Frame, Pharmacist

## 2021-01-26 NOTE — H&P
H&P- Renee Franklin 1933, 80 y o  male MRN: [id number]    Unit/Bed#: ED 09 Encounter: [id number]    Primary Care Provider: Leo Bella MD   Date and time admitted to hospital: 1/26/2021  9:29 AM        * Sepsis without acute organ dysfunction [facility])  Assessment & Plan  POA e/b fever and tachycardia  2/2 UTI  F/u U Cx and B CX  Anaphylaxis w/ Ancef but tolerates Rocephin  Rocephin and Vanco  F/u AM procal - today procal negative    UTI (urinary tract infection)  Assessment & Plan  In setting of cysto yesterday  Rocephin and Vanco - Pt has hx Enterococcus UTI  F/u U Cx    Urine retention  Assessment & Plan  Uroxatral equivalent  Chavis replaced yesterday after failed void trial after cysto  Urology consult    Pulmonary emphysema ([facility] [address] )  Assessment & Plan  Resp protocol    Osteoarthritis of left hip  Assessment & Plan  Norco prn  Start Neurontin qhs    Coronary artery disease  Assessment & Plan  BB, ASA, statin    Severe aortic stenosis  Assessment & Plan  S/p TAVR  BASA    VTE Prophylaxis: Enoxaparin (Lovenox)  / sequential compression device   Code Status: Full  POLST: POLST form is not discussed and not completed at this time  Discussion with family: yes    Anticipated Length of Stay:  Patient will be admitted on an Inpatient basis with an anticipated length of stay of  At least 2 midnights  Justification for Hospital Stay: sepsis    Total Time for Visit, including Counseling / Coordination of Care: 45 minutes  Greater than 50% of this total time spent on direct patient counseling and coordination of care  Chief Complaint:   pain    History of Present Illness:    Renee Franklin is a 80 y o  male s/p cysto yesterday for urinary retention who presents with fever this AM   Pt had chronic chavis that was removed after cysto but pt failed void trial so chavis replaced  Pt admits to chronic back and hip pain for which he takes Norco   Pt also had abd pain and nausea  No CP or SOB    Pain relieved w/ dilaudid given in ER  Review of Systems:    Review of Systems   Constitutional: Positive for fever  HENT: Negative  Eyes: Negative  Respiratory: Negative  Cardiovascular: Negative  Gastrointestinal: Positive for nausea  Endocrine: Negative  Genitourinary: Negative  Musculoskeletal: Positive for back pain  Skin: Negative  Allergic/Immunologic: Negative  Neurological: Negative  Hematological: Negative  Psychiatric/Behavioral: Negative  Past Medical and Surgical History:     Past Medical History:   Diagnosis Date    Anemia     Bladder cancer ([facility] [address] )     Cancer ([facility] [address] )     bladder    Carotid artery occlusion     Cataract     Colon polyp     COPD (chronic obstructive pulmonary disease) (HCC)     Coronary artery disease     Disease of thyroid gland     History of transfusion     Hyperlipidemia     Hypertension     Hypothyroidism 9/15/2017    Multiple pulmonary nodules     Peptic ulcer     Scoliosis     SIRS (systemic inflammatory response syndrome) ([facility] [address] ) 12/19/2019    Transient cerebral ischemia     Tremors of nervous system        Past Surgical History:   Procedure Laterality Date   [facility] SURGERY      1973, 1987, 2005    BUNIONECTOMY Left     Simple exostectomy (silver procedure)    CAROTID ENDARTERECTOMY Right 09/10/2008    Common  Joss LEDBETTER MD    CATARACT EXTRACTION      CATARACT EXTRACTION, BILATERAL      CERVICAL DISCECTOMY  1969    COLONOSCOPY      CORONARY ARTERY BYPASS GRAFT  10/20/2010    x3 (LIMA-LAD, SVG-OM, SVG-RCA)    CYSTOSCOPY      ELBOW SURGERY Right 07/2012    FEMORAL ARTERY - TIBIAL ARTERY BYPASS GRAFT  12/05/2011    using reversed saphenous vein from right leg  Jack Hsu MD    IN ECHO TRANSESOPHAG R-T 2D W/PRB IMG ACQUISMARIA EUGENIA I&R N/A 10/6/2020    Procedure: TRANSESOPHAGEAL ECHOCARDIOGRAM (DAVID);   Surgeon: Tung Raza DO;  Location: [facility];  Service: Cardiac Surgery    IN REPLACE AORTIC VALVE OPENFEMORAL ARTERY APPROACH N/A 10/6/2020    Procedure: REPLACEMENT AORTIC VALVE TRANSCATHETER (TAVR) TRANSFEMORAL W/ 26MM MONTALVO BREE S3 ULTRA VALVE(ACCESS ON LEFT); Surgeon: Carly Harris DO;  Location: [facility];  Service: Cardiac Surgery    REPLACEMENT TOTAL KNEE Left     SHOULDER SURGERY Right 1997       Meds/Allergies:    Prior to Admission medications    Medication Sig Start Date End Date Taking?  Authorizing Provider   acetaminophen (TYLENOL) 325 mg tablet Take 2 tablets (650 mg total) by mouth every 4 (four) hours as needed for mild pain, headaches or fever (temperature greater than 101 F) 10/8/20   Vanessa Kingsley PA-C   alfuzosin (UROXATRAL) 10 mg 24 hr tablet TAKE 1 TABLET(10 MG) BY MOUTH DAILY 3/16/20   Shaka Muniz PA-C   aspirin 81 MG tablet Take 81 mg by mouth daily  4/27/12   Historical Provider, MD   Cholecalciferol (HM VITAMIN D3) 2000 units CAPS Take 1 tablet by mouth daily    Historical Provider, MD   clopidogrel (PLAVIX) 75 mg tablet Take 1 tablet (75 mg total) by mouth daily 10/9/20 1/7/21  Vanessa Kingsley PA-C   docusate sodium (COLACE) 100 mg capsule Take 1 capsule (100 mg total) by mouth 2 (two) times a day  Patient not taking: Reported on 11/4/2020 10/8/20   Vanessa Kingsley PA-C   finasteride (PROSCAR) 5 mg tablet Take 1 tablet (5 mg total) by mouth daily 9/2/20   Severa Patient, CRNP   furosemide (LASIX) 20 mg tablet TAKE 1 TABLET(20 MG) BY MOUTH DAILY 8/10/20   Jian Leavitt MD   HYDROcodone-acetaminophen [facility]) 5-325 mg per tablet Take 1 tablet by mouth 2 (two) times a day as needed for painMax Daily Amount: 2 tablets 10/15/20   Jeimy Estrella MD   hydrocortisone 2 5 % cream RENA TO POSTERIOR EARS AND ARMS ONCE A DAY PRN FOR 1-2 WEEKS 11/5/19   Historical Provider, MD   levothyroxine 75 mcg tablet TAKE 1 TABLET(75 MCG) BY MOUTH DAILY 1/21/21   Jeimy Estrella MD   metoprolol succinate (TOPROL-XL) 25 mg 24 hr tablet TAKE 1/2 TABLET BY MOUTH DAILY 10/28/20   Gustavo Santana MANI Guzman   metoprolol tartrate (LOPRESSOR) 25 mg tablet  20   Historical Provider, MD   omeprazole (PriLOSEC) 20 mg delayed release capsule Take 1 tablet by mouth daily    Historical Provider, MD   potassium chloride (K-DUR,KLOR-CON) 10 mEq tablet Take 1 tablet (10 mEq total) by mouth daily 10/9/20   Perla Roberto PA-C   simvastatin (ZOCOR) 20 mg tablet TAKE 1 TABLET(20 MG) BY MOUTH DAILY AT BEDTIME 21   Josephine Woods MD   SPIRIVA RESPIMAT 2 5 MCG/ACT AERS inhaler INHALE 2 PUFFS BY MOUTH DAILY 6/15/20   Terry Eastman PA-C     I have reviewed home medications with patient personally  Allergies: Allergies   Allergen Reactions    Aleve [Naproxen]      Other reaction(s): FACIAL SWELLING  Category: Allergy;  Annotation - [id number]: lip/eye edema    Ancef [Cefazolin] Anaphylaxis     Hypotension, rash, itching    Augmentin [Amoxicillin-Pot Clavulanate] Diarrhea and Vomiting       Social History:     Marital Status: /Civil Union     Substance Use History:   Social History     Substance and Sexual Activity   Alcohol Use Not Currently    Alcohol/week: 0 0 standard drinks    Comment: Social      Social History     Tobacco Use   Smoking Status Former Smoker    Packs/day: 1 00    Years: 50 00    Pack years: 50 00    Types: Cigarettes    Start date: 56    Quit date: 200    Years since quittin 0   Smokeless Tobacco Never Used     Social History     Substance and Sexual Activity   Drug Use Never       Family History:    Family History   Problem Relation Age of Onset    Cervical cancer Mother     Cervical cancer Sister     Heart disease Sister     Coronary artery disease Sister     Lung cancer Brother        Physical Exam:     Vitals:   Blood Pressure: 152/69 (21 1230)  Pulse: 96 (21 1230)  Temperature: (!) 101 7 °F (38 7 °C) (21 0936)  Temp Source: Rectal (21 0936)  Respirations: 17 (21 1230)  SpO2: 98 % (21 1230)    Physical Exam  HENT:      Head: Normocephalic and atraumatic  Nose: Nose normal       Mouth/Throat:      Mouth: Mucous membranes are moist    Eyes:      Extraocular Movements: Extraocular movements intact  Conjunctiva/sclera: Conjunctivae normal    Neck:      Musculoskeletal: Normal range of motion and neck supple  Cardiovascular:      Rate and Rhythm: Normal rate and regular rhythm  Pulmonary:      Effort: Pulmonary effort is normal       Breath sounds: Normal breath sounds  No wheezing or rales  Abdominal:      General: Bowel sounds are normal  There is no distension  Palpations: Abdomen is soft  Tenderness: There is no abdominal tenderness  Musculoskeletal: Normal range of motion  Right lower leg: No edema  Left lower leg: No edema  Skin:     General: Skin is warm and dry  Neurological:      Mental Status: He is alert and oriented to person, place, and time  Additional Data:     Lab Results: I have personally reviewed pertinent reports  Results from last 7 days   Lab Units 01/26/21  1023   WBC Thousand/uL 8 35   HEMOGLOBIN g/dL 11 6*   HEMATOCRIT % 35 8*   PLATELETS Thousands/uL 111*   NEUTROS PCT % 85*   LYMPHS PCT % 4*   MONOS PCT % 9   EOS PCT % 1     Results from last 7 days   Lab Units 01/26/21  1023   SODIUM mmol/L 132*   POTASSIUM mmol/L 4 0   CHLORIDE mmol/L 102   CO2 mmol/L 28   BUN mg/dL 14   CREATININE mg/dL 1 06   ANION GAP mmol/L 2*   CALCIUM mg/dL 9 0   ALBUMIN g/dL 4 1   TOTAL BILIRUBIN mg/dL 0 73   ALK PHOS U/L 110   ALT U/L 19   AST U/L 24   GLUCOSE RANDOM mg/dL 100                 Results from last 7 days   Lab Units 01/26/21  1023   LACTIC ACID mmol/L 1 1   PROCALCITONIN ng/ml 0 09       Imaging: I have personally reviewed pertinent reports  XR chest 1 view portable   ED Interpretation by Ranjith Sierra MD (01/26 1212)   No pneumonia      Final Result by Alee Danielson MD (01/26 1050)      No acute cardiopulmonary disease  Workstation performed: [id number]             EKG, Pathology, and Other Studies Reviewed on Admission:   · EKG: pending    Allscripts / Epic Records Reviewed: Yes     ** Please Note: This note has been constructed using a voice recognition system   **

## 2021-01-26 NOTE — ED ATTENDING ATTESTATION
Final Diagnosis:  1  Sepsis ([facility] [address] )    2  UTI (urinary tract infection)    3  Fever    4  Tachycardia    5  Urine retention    6  Bilateral hip pain    7  Right hip pain      ED Course as of Feb 02 1733   Tue Jan 26, 2021   1121 WBC, UA(!): Innumerable   1121 Bacteria, UA(!): Innumerable   1121 Leukocytes, UA(!): Large   1121 Nitrite, UA(!): Positive   1211 Procalcitonin: 0 09       I, Reina Newman MD, saw and evaluated the patient  All available labs and X-rays were ordered by me or the resident and have been reviewed by myself  I discussed the patient with the resident / non-physician and agree with the resident's / non-physician practitioner's findings and plan as documented in the resident's / non-physician practicitioner's note, except where noted  At this point, I agree with the current assessment done in the ED  I was present during key portions of all procedures performed unless otherwise stated  Chief Complaint   Patient presents with    Fever - 75 years or older     Fever of 104 for EMS PTA  Tyelnol given  Pt complains of generalized pain/weakness  Recieved first COVID vaccine on Saturday  This is a 80 y o  male presenting for evaluation of fever and weakness  The patient had his COVID vaccine today  He had valve failure recently and had to have a replacement of it; also has bilateral hip pain that is chronic  Anyways, has been feeling warm x2 days  +nausea  +vomiting (NBNB a couple times)  No CP/belly pain  Denies any upper respiratory tract infection symptoms (cough, congestion, rhinorrhea, sore throat)     +catheter in place for BPH  In terms of fever, he thinks it's due to an infection, not related to the vaccine     PMH:   has a past medical history of Anemia, Bladder cancer ([facility] [address] ), Cancer ([facility] [address] ), Carotid artery occlusion, Cataract, Colon polyp, COPD (chronic obstructive pulmonary disease) ([facility] [address] ), Coronary artery disease, Disease of thyroid gland, History of transfusion, Hyperlipidemia, Hypertension, Hypothyroidism (9/15/2017), Multiple pulmonary nodules, Peptic ulcer, Scoliosis, SIRS (systemic inflammatory response syndrome) ([facility] [address] ) (2019), Transient cerebral ischemia, and Tremors of nervous system  PSH:   has a past surgical history that includes Carotid endarterectomy (Right, 09/10/2008); Coronary artery bypass graft (10/20/2010); Femoral artery - tibial artery bypass graft (2011); Cataract extraction, bilateral; Cystoscopy; Back surgery; Elbow surgery (Right, 2012); Replacement total knee (Left); Shoulder surgery (Right, ); Bunionectomy (Left); Cervical discectomy (); Colonoscopy; Cataract extraction; pr replace aortic valve openfemoral artery approach (N/A, 10/6/2020); and pr echo transesophag r-t 2d w/prb img acquisj i&r (N/A, 10/6/2020)  Social:  Social History     Substance and Sexual Activity   Alcohol Use Not Currently    Alcohol/week: 0 0 standard drinks    Comment: Social      Social History     Tobacco Use   Smoking Status Former Smoker    Packs/day: 1 00    Years: 50 00    Pack years: 50 00    Types: Cigarettes    Start date: 56    Quit date: 200    Years since quittin 1   Smokeless Tobacco Never Used     Social History     Substance and Sexual Activity   Drug Use Never     PE:  Vitals:    21 2100 21 0718 21 0937 21 1700   BP: 147/64  133/57 129/61   BP Location: Left arm  Left arm Left arm   Pulse: 64  73 74   Resp: 16  16 16   Temp: (!) 97 4 °F (36 3 °C)  97 7 °F (36 5 °C) 97 5 °F (36 4 °C)   TempSrc: Oral  Oral Oral   SpO2: 96% 96%  94%   Weight:       Height:       General: VSS, NAD, awake, alert  Well-nourished, well-developed  Appears stated age  Head: Normocephalic, atraumatic, nontender  Eyes: PERRL, EOM-I  No diplopia  No hyphema  No subconjunctival hemorrhages  Symmetrical lids  ENTAtraumatic external nose and ears  MMM  No stridor  Normal phonation  No drooling     Base of mouth is soft  No mastoid tenderness  Neck: Symmetric, trachea midline  No JVD  CV: Peripheral pulses +2 throughout  No chest wall tenderness  Vertical scar noted   Lungs:   Unlabored   No retractions  No crepitus  No tachypnea  No paradoxical motion  Abd: +BS, soft, NT/ND    MSK:   Extremities without tenderness or gross deformity  FROM   L>R pitting lower extremity edema (chronic per patient)  Back:   No CVAT  Skin: Dry, intact  Neuro: AAOx3, GCS 15, CN II-XII grossly intact  Motor grossly intact  Psychiatric/Behavioral: Appropriate mood and affect   Exam: chavis with clear urine present   A:  - sepsis? P:  - Septic workup  - Tylenol  - Motrin  - dispo     - 13 point ROS was performed and all are normal unless stated in the history above  - Nursing note reviewed  Vitals reviewed  - Orders placed by myself and/or advanced practitioner / resident     - Previous chart was reviewed  - No language barrier    - History obtained from patient  - There are no limitations to the history obtained  - Critical care time: Not applicable for this patient       Code Status: Level 1 - Full Code  Advance Directive and Living Will: Yes    Power of :    POLST:      Medications   tamsulosin (FLOMAX) capsule 0 4 mg (0 4 mg Oral Given 2/2/21 1721)   aspirin (ECOTRIN LOW STRENGTH) EC tablet 81 mg (81 mg Oral Given 2/2/21 0942)   cholecalciferol (VITAMIN D3) tablet 2,000 Units (2,000 Units Oral Given 2/2/21 0942)   finasteride (PROSCAR) tablet 5 mg (5 mg Oral Given 2/2/21 0942)   furosemide (LASIX) tablet 20 mg (20 mg Oral Given 2/2/21 0942)   levothyroxine tablet 75 mcg (75 mcg Oral Given 2/2/21 0528)   metoprolol succinate (TOPROL-XL) 24 hr tablet 12 5 mg (12 5 mg Oral Given 2/2/21 0942)   pantoprazole (PROTONIX) EC tablet 40 mg (40 mg Oral Given 2/2/21 0528)   potassium chloride (K-DUR,KLOR-CON) CR tablet 10 mEq (10 mEq Oral Given 2/2/21 0942)   pravastatin (PRAVACHOL) tablet 40 mg (40 mg Oral Given 2/2/21 1721)   tiotropium (SPIRIVA) capsule for inhaler 18 mcg (18 mcg Inhalation Given 2/2/21 0946)   gabapentin (NEURONTIN) capsule 100 mg (100 mg Oral Given 2/1/21 2120)   enoxaparin (LOVENOX) subcutaneous injection 40 mg (40 mg Subcutaneous Given 2/2/21 0943)   ondansetron (ZOFRAN) injection 4 mg (has no administration in time range)   albuterol (PROVENTIL HFA,VENTOLIN HFA) inhaler 2 puff (has no administration in time range)   clopidogrel (PLAVIX) tablet 75 mg (75 mg Oral Given 2/2/21 0942)   acetaminophen (TYLENOL) tablet 975 mg (975 mg Oral Refused 2/2/21 1345)   oxyCODONE (ROXICODONE) IR tablet 2 5 mg (2 5 mg Oral Given 2/1/21 1625)   docusate sodium (COLACE) capsule 100 mg (100 mg Oral Given 2/2/21 1721)   polyethylene glycol (MIRALAX) packet 17 g (17 g Oral Given 2/2/21 0943)   lactulose oral solution 30 g (30 g Oral Refused 2/2/21 1345)   lactated ringers bolus 1,000 mL (0 mL Intravenous Stopped 1/26/21 1128)   morphine injection 2 mg (2 mg Intravenous Given 1/26/21 1022)   ceftriaxone (ROCEPHIN) 1 g/50 mL in dextrose IVPB (0 mg Intravenous Stopped 1/26/21 1201)   HYDROmorphone (DILAUDID) injection 0 5 mg (0 5 mg Intravenous Given 1/26/21 1204)   multi-electrolyte (ISOLYTE-S PH 7 4) bolus 1,000 mL (0 mL Intravenous Stopped 1/26/21 1836)   vancomycin (VANCOCIN) IVPB (premix in dextrose) 1,000 mg 200 mL (0 mg/kg × 66 7 kg Intravenous Stopped 1/26/21 1749)   lactulose oral solution 30 g (30 g Oral Given 2/1/21 2120)     XR spine lumbar 2 or 3 views injury   Final Result      No acute osseous abnormality  Degenerative changes as described  Workstation performed: [id number]         XR femur 2 vw right   Final Result      No acute osseous abnormality  Workstation performed: [id number]         XR hip/pelv 2-3 vws left if performed   Final Result      No acute osseous abnormality  Mild left hip degenerative change              Workstation performed: [id number]         XR chest 1 view portable   ED Interpretation   No pneumonia      Final Result      No acute cardiopulmonary disease  Workstation performed: [id number]           Orders Placed This Encounter   Procedures    COVID19, Influenza A/B, RSV PCR, [facility]    Blood culture #1    Blood culture #2    Urine culture    XR chest 1 view portable    XR femur 2 vw right    XR hip/pelv 2-3 vws left if performed    XR spine lumbar 2 or 3 views injury    CBC and differential    Comprehensive metabolic panel    UA w Reflex to Microscopic w Reflex to Culture    Lactic acid    Procalcitonin with AM Reflex    Urine Microscopic    Procalcitonin Reflex    Basic metabolic panel    Magnesium    Phosphorus    CBC (With Platelets)    CBC and differential    Basic metabolic panel    Comprehensive metabolic panel    CBC and differential    Diet Cardiovascular; Cardiac    Nursing communication Continue IV as ordered      Notify admitting physician    Notify admitting physician on arrival    Vital Signs per unit routine    Up and OOB as tolerated    Apply SCD or Foot pumps    Wound care follow-up    Wound care    Turn patient    Wound care    Skin care    Elevate heels off of bed    Skin care    Level 1-Full Code: all life saving measures are indicated    Pharmacy general consult    Inpatient consult to Urology    Inpatient consult to Orthopedic Surgery    OT eval and treat    PT eval and treat    PT eval and treat    ECG 12 lead    ECG 12 lead    Inpatient Consult to Wound Care Nurse    Inpatient Admission     Labs Reviewed   CBC AND DIFFERENTIAL - Abnormal       Result Value Ref Range Status    WBC 8 35  4 31 - 10 16 Thousand/uL Final    RBC 3 71 (*) 3 88 - 5 62 Million/uL Final    Hemoglobin 11 6 (*) 12 0 - 17 0 g/dL Final    Hematocrit 35 8 (*) 36 5 - 49 3 % Final    MCV 97  82 - 98 fL Final    MCH 31 3  26 8 - 34 3 pg Final    MCHC 32 4  31 4 - 37 4 g/dL Final    RDW 13 1  11 6 - 15 1 % Final    MPV 11 1  8 9 - 12 7 fL Final    Platelets 086 (*) 814 - 390 Thousands/uL Final    nRBC 0  /100 WBCs Final    Neutrophils Relative 85 (*) 43 - 75 % Final    Immat GRANS % 1  0 - 2 % Final    Lymphocytes Relative 4 (*) 14 - 44 % Final    Monocytes Relative 9  4 - 12 % Final    Eosinophils Relative 1  0 - 6 % Final    Basophils Relative 0  0 - 1 % Final    Neutrophils Absolute 7 18  1 85 - 7 62 Thousands/µL Final    Immature Grans Absolute 0 04  0 00 - 0 20 Thousand/uL Final    Lymphocytes Absolute 0 32 (*) 0 60 - 4 47 Thousands/µL Final    Monocytes Absolute 0 72  0 17 - 1 22 Thousand/µL Final    Eosinophils Absolute 0 07  0 00 - 0 61 Thousand/µL Final    Basophils Absolute 0 02  0 00 - 0 10 Thousands/µL Final   COMPREHENSIVE METABOLIC PANEL - Abnormal    Sodium 132 (*) 136 - 145 mmol/L Final    Potassium 4 0  3 5 - 5 3 mmol/L Final    Chloride 102  100 - 108 mmol/L Final    CO2 28  21 - 32 mmol/L Final    ANION GAP 2 (*) 4 - 13 mmol/L Final    BUN 14  5 - 25 mg/dL Final    Creatinine 1 06  0 60 - 1 30 mg/dL Final    Comment: Standardized to IDMS reference method    Glucose 100  65 - 140 mg/dL Final    Comment: If the patient is fasting, the ADA then defines impaired fasting glucose as > 100 mg/dL and diabetes as > or equal to 123 mg/dL  Specimen collection should occur prior to Sulfasalazine administration due to the potential for falsely depressed results  Specimen collection should occur prior to Sulfapyridine administration due to the potential for falsely elevated results  Calcium 9 0  8 3 - 10 1 mg/dL Final    AST 24  5 - 45 U/L Final    Comment: Specimen collection should occur prior to Sulfasalazine administration due to the potential for falsely depressed results  ALT 19  12 - 78 U/L Final    Comment: Specimen collection should occur prior to Sulfasalazine and/or Sulfapyridine administration due to the potential for falsely depressed results       Alkaline Phosphatase 110  46 - 116 U/L Final    Total Protein 7 5  6 4 - 8 2 g/dL Final    Albumin 4 1  3 5 - 5 0 g/dL Final    Total Bilirubin 0 73  0 20 - 1 00 mg/dL Final    Comment: Use of this assay is not recommended for patients undergoing treatment with eltrombopag due to the potential for falsely elevated results      eGFR 63  ml/min/1 73sq m Final    Narrative:     National Kidney Disease Foundation guidelines for Chronic Kidney Disease (CKD):     Stage 1 with normal or high GFR (GFR > 90 mL/min/1 73 square meters)    Stage 2 Mild CKD (GFR = 60-89 mL/min/1 73 square meters)    Stage 3A Moderate CKD (GFR = 45-59 mL/min/1 73 square meters)    Stage 3B Moderate CKD (GFR = 30-44 mL/min/1 73 square meters)    Stage 4 Severe CKD (GFR = 15-29 mL/min/1 73 square meters)    Stage 5 End Stage CKD (GFR <15 mL/min/1 73 square meters)  Note: GFR calculation is accurate only with a steady state creatinine   UA W REFLEX TO MICROSCOPIC WITH REFLEX TO CULTURE - Abnormal    Color, UA Yellow   Final    Clarity, UA Cloudy   Final    Specific Richards, UA 1 014  1 003 - 1 030 Final    pH, UA 6 5  4 5, 5 0, 5 5, 6 0, 6 5, 7 0, 7 5, 8 0 Final    Leukocytes, UA Large (*) Negative Final    Nitrite, UA Positive (*) Negative Final    Protein, UA Trace (*) Negative mg/dl Final    Glucose, UA Negative  Negative mg/dl Final    Ketones, UA Trace (*) Negative mg/dl Final    Urobilinogen, UA 1 0  0 2, 1 0 E U /dl E U /dl Final    Bilirubin, UA Negative  Negative Final    Blood, UA Moderate (*) Negative Final   URINE MICROSCOPIC - Abnormal    RBC, UA 4-10 (*) None Seen, 2-4 /hpf Final    WBC, UA Innumerable (*) None Seen, 2-4 /hpf Final    Epithelial Cells None Seen  None Seen, Occasional /hpf Final    Bacteria, UA Innumerable (*) None Seen, Occasional /hpf Final    Hyaline Casts, UA 10-25 (*) None Seen /lpf Final   COVID19, INFLUENZA A/B, RSV PCR, [facility] - Normal    SARS-CoV-2 Negative  Negative Final    INFLUENZA A PCR Negative  Negative Final    INFLUENZA B PCR Negative  Negative Final RSV PCR Negative  Negative Final    Narrative: This test has been authorized by FDA under an EUA (Emergency Use Assay) for use by authorized laboratories  Clinical caution and judgement should be used with the interpretation of these results with consideration of the clinical impression and other laboratory testing  Testing reported as "Positive" or "Negative" has been proven to be accurate according to standard laboratory validation requirements  All testing is performed with control materials showing appropriate reactivity at standard intervals  LACTIC ACID, PLASMA - Normal    LACTIC ACID 1 1  0 5 - 2 0 mmol/L Final    Narrative:     Result may be elevated if tourniquet was used during collection  PROCALCITONIN TEST - Normal    Procalcitonin 0 09  <=0 25 ng/ml Final    Comment: Suspected Lower Respiratory Tract Infection (LRTI):  - LESS than or EQUAL to 0 25 ng/mL:   low likelihood for bacterial LRTI; antibiotics DISCOURAGED   - GREATER than 0 25 ng/mL:   increased likelihood for bacterial LRTI; antibiotics ENCOURAGED  Suspected Sepsis:  - Strongly consider initiating antibiotics in ALL UNSTABLE patients  - LESS than or EQUAL to 0 5 ng/mL:   low likelihood for bacterial sepsis; antibiotics DISCOURAGED   - GREATER than 0 5 ng/mL:   increased likelihood for bacterial sepsis; antibiotics ENCOURAGED   - GREATER than 2 ng/mL:   high risk for severe sepsis / septic shock; antibiotics strongly ENCOURAGED  Decisions on antibiotic use should not be based solely on Procalcitonin (PCT) levels  If PCT is low but uncertainty exists with stopping antibiotics, repeat PCT in 6-24 hours to confirm the low level   If antibiotics are administered (regardless if initial PCT was high or low), repeat PCT every 1-2 days to consider early antibiotic cessation (when GREATER than 80% decrease from the peak OR when PCT drops below designated cutoffs, whichever comes first), so long as the infection is NOT one that typically requires prolonged treatment durations (e g , bone/joint infections, endocarditis, Staph  aureus bacteremia)      Situations of FALSE-POSITIVE Procalcitonin values:  1) Newborns < 67 hours old  2) Massive stress from severe trauma / burns, major surgery, acute pancreatitis, cardiogenic / hemorrhagic shock, sickle cell crisis, or other organ perfusion abnormalities  3) Malaria and some Candidal infections  4) Treatment with agents that stimulate cytokines (e g , OKT3, anti-lymphocyte globulins, alemtuzumab, IL-2, granulocyte transfusion [NOT GCSFs])  5) Chronic renal disease causes elevated baseline levels (consider GREATER than 0 75 ng/mL as an abnormal cut-off); initiating HD/CRRT may cause transient decreases  6) Paraneoplastic syndromes from medullary thyroid or SCLC, some forms of vasculitis, and acute [id number] disease    Situations of FALSE-NEGATIVE Procalcitonin values:  1) Too early in clinical course for PCT to have reached its peak (may repeat in 6-24 hours to confirm low level)  2) Localized infection WITHOUT systemic (SIRS / sepsis) response (e g , an abscess, osteomyelitis, cystitis)  3) Mycobacteria (e g , Tuberculosis, MAC)  4) Cystic fibrosis exacerbations       Time reflects when diagnosis was documented in both MDM as applicable and the Disposition within this note     Time User Action Codes Description Comment    1/26/2021 12:14 PM Thedi Strong Add [A41 9] Sepsis ([address] )     1/26/2021 12:14 PM Marco Mosher Add [N39 0] UTI (urinary tract infection)     1/26/2021 12:14 PM Marco Mosher Add [R50 9] Fever     1/26/2021 12:14 PM Marco Mosher Add [R00 0] Tachycardia     1/26/2021  2:19 PM Nina Poisson Add [R33 9] Urine retention     1/29/2021  5:22 PM Ann-Marie Staton Add [M25 551,  M25 552] Bilateral hip pain     1/29/2021  5:23 PM Ann-Marie Staton Add [M25 551] Right hip pain       ED Disposition     ED Disposition Condition Date/Time Comment    Admit Stable Tue Jan 26, 2021 12:13 PM Case was discussed with RANGEL and the patient's admission status was agreed to be Admission Status: inpatient status to the service of Dr Nedra Abdul          Follow-up Information     Follow up With Specialties Details Why Bobbyras Nik Tracy MD Orthopedic Surgery Call  [facility] [place] [address] [phone]          Current Discharge Medication List      CONTINUE these medications which have NOT CHANGED    Details   acetaminophen (TYLENOL) 325 mg tablet Take 2 tablets (650 mg total) by mouth every 4 (four) hours as needed for mild pain, headaches or fever (temperature greater than 101 F)  Qty:  , Refills: 0    Associated Diagnoses: S/P TAVR (transcatheter aortic valve replacement)      alfuzosin (UROXATRAL) 10 mg 24 hr tablet TAKE 1 TABLET(10 MG) BY MOUTH DAILY  Qty: 90 tablet, Refills: 3    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      aspirin 81 MG tablet Take 81 mg by mouth daily       Cholecalciferol (HM VITAMIN D3) 2000 units CAPS Take 1 tablet by mouth daily      clopidogrel (PLAVIX) 75 mg tablet Take 1 tablet (75 mg total) by mouth daily  Qty: 30 tablet, Refills: 2    Associated Diagnoses: S/P TAVR (transcatheter aortic valve replacement)      docusate sodium (COLACE) 100 mg capsule Take 1 capsule (100 mg total) by mouth 2 (two) times a day  Qty: 10 capsule, Refills: 0    Associated Diagnoses: S/P TAVR (transcatheter aortic valve replacement)      finasteride (PROSCAR) 5 mg tablet Take 1 tablet (5 mg total) by mouth daily  Qty: 90 tablet, Refills: 3    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; Urinary retention      furosemide (LASIX) 20 mg tablet TAKE 1 TABLET(20 MG) BY MOUTH DAILY  Qty: 90 tablet, Refills: 1    Associated Diagnoses: HTN (hypertension)      HYDROcodone-acetaminophen (NORCO) 5-325 mg per tablet Take 1 tablet by mouth 2 (two) times a day as needed for painMax Daily Amount: 2 tablets  Qty: 60 tablet, Refills: 0    Associated Diagnoses: Generalized osteoarthritis of multiple sites      hydrocortisone 2 5 % cream RENA TO POSTERIOR EARS AND ARMS ONCE A DAY PRN FOR 1-2 WEEKS  Refills: 1      levothyroxine 75 mcg tablet TAKE 1 TABLET(75 MCG) BY MOUTH DAILY  Qty: 90 tablet, Refills: 3    Associated Diagnoses: Acquired hypothyroidism      metoprolol succinate (TOPROL-XL) 25 mg 24 hr tablet TAKE 1/2 TABLET BY MOUTH DAILY  Qty: 45 tablet, Refills: 3    Comments: **Patient requests 90 days supply**  Associated Diagnoses: S/P TAVR (transcatheter aortic valve replacement)      metoprolol tartrate (LOPRESSOR) 25 mg tablet       omeprazole (PriLOSEC) 20 mg delayed release capsule Take 1 tablet by mouth daily      potassium chloride (K-DUR,KLOR-CON) 10 mEq tablet Take 1 tablet (10 mEq total) by mouth daily  Qty: 30 tablet, Refills: 1    Associated Diagnoses: S/P TAVR (transcatheter aortic valve replacement)      simvastatin (ZOCOR) 20 mg tablet TAKE 1 TABLET(20 MG) BY MOUTH DAILY AT BEDTIME  Qty: 90 tablet, Refills: 1    Associated Diagnoses: Dyslipidemia      SPIRIVA RESPIMAT 2 5 MCG/ACT AERS inhaler INHALE 2 PUFFS BY MOUTH DAILY  Qty: 12 g, Refills: 6    Associated Diagnoses: Chronic obstructive pulmonary disease, unspecified COPD type (HCC)           No discharge procedures on file  Prior to Admission Medications   Prescriptions Last Dose Informant Patient Reported? Taking?    Cholecalciferol (HM VITAMIN D3) 2000 units CAPS  Spouse/Significant Other Yes No   Sig: Take 1 tablet by mouth daily   HYDROcodone-acetaminophen (NORCO) 5-325 mg per tablet  Spouse/Significant Other No No   Sig: Take 1 tablet by mouth 2 (two) times a day as needed for painMax Daily Amount: 2 tablets   SPIRIVA RESPIMAT 2 5 MCG/ACT AERS inhaler  Spouse/Significant Other No No   Sig: INHALE 2 PUFFS BY MOUTH DAILY   acetaminophen (TYLENOL) 325 mg tablet  Spouse/Significant Other No No   Sig: Take 2 tablets (650 mg total) by mouth every 4 (four) hours as needed for mild pain, headaches or fever (temperature greater than 101 F)   alfuzosin (UROXATRAL) 10 mg 24 hr tablet  Spouse/Significant Other No No   Sig: TAKE 1 TABLET(10 MG) BY MOUTH DAILY   aspirin 81 MG tablet  Spouse/Significant Other Yes No   Sig: Take 81 mg by mouth daily    clopidogrel (PLAVIX) 75 mg tablet  Spouse/Significant Other No No   Sig: Take 1 tablet (75 mg total) by mouth daily   docusate sodium (COLACE) 100 mg capsule  Spouse/Significant Other No No   Sig: Take 1 capsule (100 mg total) by mouth 2 (two) times a day   Patient not taking: Reported on 11/4/2020   finasteride (PROSCAR) 5 mg tablet  Spouse/Significant Other No No   Sig: Take 1 tablet (5 mg total) by mouth daily   furosemide (LASIX) 20 mg tablet  Spouse/Significant Other No No   Sig: TAKE 1 TABLET(20 MG) BY MOUTH DAILY   hydrocortisone 2 5 % cream  Spouse/Significant Other Yes No   Sig: RENA TO POSTERIOR EARS AND ARMS ONCE A DAY PRN FOR 1-2 WEEKS   levothyroxine 75 mcg tablet   No No   Sig: TAKE 1 TABLET(75 MCG) BY MOUTH DAILY   metoprolol succinate (TOPROL-XL) 25 mg 24 hr tablet  Spouse/Significant Other No No   Sig: TAKE 1/2 TABLET BY MOUTH DAILY   metoprolol tartrate (LOPRESSOR) 25 mg tablet   Yes No   omeprazole (PriLOSEC) 20 mg delayed release capsule  Spouse/Significant Other Yes No   Sig: Take 1 tablet by mouth daily   potassium chloride (K-DUR,KLOR-CON) 10 mEq tablet  Spouse/Significant Other No No   Sig: Take 1 tablet (10 mEq total) by mouth daily   simvastatin (ZOCOR) 20 mg tablet   No No   Sig: TAKE 1 TABLET(20 MG) BY MOUTH DAILY AT BEDTIME      Facility-Administered Medications: None       Portions of the record may have been created with voice recognition software  Occasional wrong word or "sound a like" substitutions may have occurred due to the inherent limitations of voice recognition software  Read the chart carefully and recognize, using context, where substitutions have occurred      Electronically signed by:  Jessica Mahan Rossana Solis

## 2021-01-26 NOTE — ED PROVIDER NOTES
Final Diagnosis:  1  Sepsis ([address] )    2  UTI (urinary tract infection)    3  Fever    4  Tachycardia        Chief Complaint   Patient presents with    Fever - 75 years or older     Fever of 104 for EMS PTA  Tyelnol given  Pt complains of generalized pain/weakness  Recieved first COVID vaccine on Saturday  HPI  Patient presents for evaluation of fever  Patient tells me that he has had a fever for the past couple days and he believes it is secondary to urinary tract infection  I asked him if he was on any antibiotics and he says that he does not know at this time  The patient's main complaint to me is bilateral hip pain which appears to be chronic in nature secondary to osteoporosis  He denies any cough, congestion, shortness of breath, chest pain, abdominal pain, constipation or diarrhea  Patient does endorse some nausea with intermittent vomiting  Patient states that he has a history of open heart surgery approximately 10 years ago and recently had a bowel failure which need to be replaced  He also has an indwelling Porter  I asked him why he has this Porter catheter in he is unable to tell me the 0 he believes it is likely secondary to something like BPH     - No language barrier    - History obtained from patient  - There are no limitations to the history obtained  - Previous charting was reviewed    PMH:   has a past medical history of Anemia, Bladder cancer ([address] ), Cancer ([address] ), Carotid artery occlusion, Cataract, Colon polyp, COPD (chronic obstructive pulmonary disease) ([address] ), Coronary artery disease, Disease of thyroid gland, History of transfusion, Hyperlipidemia, Hypertension, Hypothyroidism (9/15/2017), Multiple pulmonary nodules, Peptic ulcer, Scoliosis, SIRS (systemic inflammatory response syndrome) ([address] ) (12/19/2019), Transient cerebral ischemia, and Tremors of nervous system  PSH:   has a past surgical history that includes Carotid endarterectomy (Right, 09/10/2008);  Coronary artery bypass graft (10/20/2010); Femoral artery - tibial artery bypass graft (12/05/2011); Cataract extraction, bilateral; Cystoscopy; Back surgery; Elbow surgery (Right, 07/2012); Replacement total knee (Left); Shoulder surgery (Right, 1997); Bunionectomy (Left); Cervical discectomy (1969); Colonoscopy; Cataract extraction; pr replace aortic valve openfemoral artery approach (N/A, 10/6/2020); and pr echo transesophag r-t 2d w/prb img acquisj i&r (N/A, 10/6/2020)  ROS:  Review of Systems   Constitutional: Positive for fatigue and fever  Negative for activity change and chills  Respiratory: Negative for cough and shortness of breath  Cardiovascular: Negative for chest pain and palpitations  Gastrointestinal: Negative for abdominal distention, abdominal pain, constipation, diarrhea, nausea and vomiting  Genitourinary: Negative for dysuria, flank pain, frequency and hematuria  Musculoskeletal: Positive for arthralgias and gait problem  Negative for myalgias and neck pain  Neurological: Negative for dizziness, syncope, light-headedness and headaches  All other systems reviewed and are negative  PE:   Vitals:    01/26/21 0936 01/26/21 1103 01/26/21 1230   BP: 166/70 (!) 172/83 152/69   Pulse: 99 100 96   Resp: 16 16 17   Temp: (!) 101 7 °F (38 7 °C)     TempSrc: Rectal     SpO2: 94% 93% 98%     Vitals reviewed by me  Physical Exam  Vitals signs reviewed  Constitutional:       General: He is not in acute distress  Appearance: He is well-developed  He is not diaphoretic  HENT:      Head: Normocephalic and atraumatic  Right Ear: External ear normal       Left Ear: External ear normal    Eyes:      General:         Right eye: No discharge  Left eye: No discharge  Conjunctiva/sclera: Conjunctivae normal       Pupils: Pupils are equal, round, and reactive to light  Neck:      Musculoskeletal: Normal range of motion and neck supple  No muscular tenderness        Vascular: No JVD       Trachea: No tracheal deviation  Cardiovascular:      Rate and Rhythm: Regular rhythm  Tachycardia present  Heart sounds: Normal heart sounds  No murmur  No friction rub  No gallop  Pulmonary:      Effort: Pulmonary effort is normal  No respiratory distress  Breath sounds: Normal breath sounds  No wheezing or rales  Comments: No conversational dyspnea, no accessory muscle usage, saturating well on room air  Abdominal:      General: Bowel sounds are normal  There is no distension  Palpations: Abdomen is soft  There is no mass  Tenderness: There is no abdominal tenderness  There is no guarding  Musculoskeletal: Normal range of motion  General: No tenderness or deformity  Right lower leg: Edema present  Left lower leg: Edema present  Comments: Patient with chronic, bilateral edema  Pitting up to mid tib-fib  Patient states that this is the normal level  There is no erythema, signs of infection  Neurological:      Mental Status: He is alert and oriented to person, place, and time  Cranial Nerves: No cranial nerve deficit  Sensory: No sensory deficit  Motor: No abnormal muscle tone  Coordination: Coordination normal    Psychiatric:         Behavior: Behavior normal          Thought Content: Thought content normal          Judgment: Judgment normal           A:  - Nursing note reviewed  -                   ED Course as of Jan 26 1355   Tue Jan 26, 2021   1101 Leukocytes, UA(!): Large   1101 Nitrite, UA(!): Positive   1101 WBC, UA(!): Innumerable   1101 Bacteria, UA(!): Innumerable   1101 LACTIC ACID: 1 1     XR chest 1 view portable   ED Interpretation   No pneumonia      Final Result      No acute cardiopulmonary disease                  Workstation performed: [id number]           Orders Placed This Encounter   Procedures    COVID19, Influenza A/B, RSV PCR, [facility]    Blood culture #1    Blood culture #2    Urine culture    XR chest 1 view portable    CBC and differential    Comprehensive metabolic panel    UA w Reflex to Microscopic w Reflex to Culture    Lactic acid    Procalcitonin with AM Reflex    Urine Microscopic    Procalcitonin Reflex    Insert peripheral IV    Inpatient Admission     Labs Reviewed   CBC AND DIFFERENTIAL - Abnormal       Result Value Ref Range Status    WBC 8 35  4 31 - 10 16 Thousand/uL Final    RBC 3 71 (*) 3 88 - 5 62 Million/uL Final    Hemoglobin 11 6 (*) 12 0 - 17 0 g/dL Final    Hematocrit 35 8 (*) 36 5 - 49 3 % Final    MCV 97  82 - 98 fL Final    MCH 31 3  26 8 - 34 3 pg Final    MCHC 32 4  31 4 - 37 4 g/dL Final    RDW 13 1  11 6 - 15 1 % Final    MPV 11 1  8 9 - 12 7 fL Final    Platelets 135 (*) 781 - 390 Thousands/uL Final    nRBC 0  /100 WBCs Final    Neutrophils Relative 85 (*) 43 - 75 % Final    Immat GRANS % 1  0 - 2 % Final    Lymphocytes Relative 4 (*) 14 - 44 % Final    Monocytes Relative 9  4 - 12 % Final    Eosinophils Relative 1  0 - 6 % Final    Basophils Relative 0  0 - 1 % Final    Neutrophils Absolute 7 18  1 85 - 7 62 Thousands/µL Final    Immature Grans Absolute 0 04  0 00 - 0 20 Thousand/uL Final    Lymphocytes Absolute 0 32 (*) 0 60 - 4 47 Thousands/µL Final    Monocytes Absolute 0 72  0 17 - 1 22 Thousand/µL Final    Eosinophils Absolute 0 07  0 00 - 0 61 Thousand/µL Final    Basophils Absolute 0 02  0 00 - 0 10 Thousands/µL Final   COMPREHENSIVE METABOLIC PANEL - Abnormal    Sodium 132 (*) 136 - 145 mmol/L Final    Potassium 4 0  3 5 - 5 3 mmol/L Final    Chloride 102  100 - 108 mmol/L Final    CO2 28  21 - 32 mmol/L Final    ANION GAP 2 (*) 4 - 13 mmol/L Final    BUN 14  5 - 25 mg/dL Final    Creatinine 1 06  0 60 - 1 30 mg/dL Final    Comment: Standardized to IDMS reference method    Glucose 100  65 - 140 mg/dL Final    Comment: If the patient is fasting, the ADA then defines impaired fasting glucose as > 100 mg/dL and diabetes as > or equal to 123 mg/dL    Specimen collection should occur prior to Sulfasalazine administration due to the potential for falsely depressed results  Specimen collection should occur prior to Sulfapyridine administration due to the potential for falsely elevated results  Calcium 9 0  8 3 - 10 1 mg/dL Final    AST 24  5 - 45 U/L Final    Comment: Specimen collection should occur prior to Sulfasalazine administration due to the potential for falsely depressed results  ALT 19  12 - 78 U/L Final    Comment: Specimen collection should occur prior to Sulfasalazine and/or Sulfapyridine administration due to the potential for falsely depressed results  Alkaline Phosphatase 110  46 - 116 U/L Final    Total Protein 7 5  6 4 - 8 2 g/dL Final    Albumin 4 1  3 5 - 5 0 g/dL Final    Total Bilirubin 0 73  0 20 - 1 00 mg/dL Final    Comment: Use of this assay is not recommended for patients undergoing treatment with eltrombopag due to the potential for falsely elevated results      eGFR 63  ml/min/1 73sq m Final    Narrative:     National Kidney Disease Foundation guidelines for Chronic Kidney Disease (CKD):     Stage 1 with normal or high GFR (GFR > 90 mL/min/1 73 square meters)    Stage 2 Mild CKD (GFR = 60-89 mL/min/1 73 square meters)    Stage 3A Moderate CKD (GFR = 45-59 mL/min/1 73 square meters)    Stage 3B Moderate CKD (GFR = 30-44 mL/min/1 73 square meters)    Stage 4 Severe CKD (GFR = 15-29 mL/min/1 73 square meters)    Stage 5 End Stage CKD (GFR <15 mL/min/1 73 square meters)  Note: GFR calculation is accurate only with a steady state creatinine   UA W REFLEX TO MICROSCOPIC WITH REFLEX TO CULTURE - Abnormal    Color, UA Yellow   Final    Clarity, UA Cloudy   Final    Specific Shelly, UA 1 014  1 003 - 1 030 Final    pH, UA 6 5  4 5, 5 0, 5 5, 6 0, 6 5, 7 0, 7 5, 8 0 Final    Leukocytes, UA Large (*) Negative Final    Nitrite, UA Positive (*) Negative Final    Protein, UA Trace (*) Negative mg/dl Final    Glucose, UA Negative  Negative mg/dl Final    Ketones, UA Trace (*) Negative mg/dl Final    Urobilinogen, UA 1 0  0 2, 1 0 E U /dl E U /dl Final    Bilirubin, UA Negative  Negative Final    Blood, UA Moderate (*) Negative Final   URINE MICROSCOPIC - Abnormal    RBC, UA 4-10 (*) None Seen, 2-4 /hpf Final    WBC, UA Innumerable (*) None Seen, 2-4 /hpf Final    Epithelial Cells None Seen  None Seen, Occasional /hpf Final    Bacteria, UA Innumerable (*) None Seen, Occasional /hpf Final    Hyaline Casts, UA 10-25 (*) None Seen /lpf Final   COVID19, INFLUENZA A/B, RSV PCR, [facility] - Normal    SARS-CoV-2 Negative  Negative Final    INFLUENZA A PCR Negative  Negative Final    INFLUENZA B PCR Negative  Negative Final    RSV PCR Negative  Negative Final    Narrative: This test has been authorized by FDA under an EUA (Emergency Use Assay) for use by authorized laboratories  Clinical caution and judgement should be used with the interpretation of these results with consideration of the clinical impression and other laboratory testing  Testing reported as "Positive" or "Negative" has been proven to be accurate according to standard laboratory validation requirements  All testing is performed with control materials showing appropriate reactivity at standard intervals  LACTIC ACID, PLASMA - Normal    LACTIC ACID 1 1  0 5 - 2 0 mmol/L Final    Narrative:     Result may be elevated if tourniquet was used during collection  PROCALCITONIN TEST - Normal    Procalcitonin 0 09  <=0 25 ng/ml Final    Comment: Suspected Lower Respiratory Tract Infection (LRTI):  - LESS than or EQUAL to 0 25 ng/mL:   low likelihood for bacterial LRTI; antibiotics DISCOURAGED   - GREATER than 0 25 ng/mL:   increased likelihood for bacterial LRTI; antibiotics ENCOURAGED  Suspected Sepsis:  - Strongly consider initiating antibiotics in ALL UNSTABLE patients    - LESS than or EQUAL to 0 5 ng/mL:   low likelihood for bacterial sepsis; antibiotics DISCOURAGED   - GREATER than 0 5 ng/mL:   increased likelihood for bacterial sepsis; antibiotics ENCOURAGED   - GREATER than 2 ng/mL:   high risk for severe sepsis / septic shock; antibiotics strongly ENCOURAGED  Decisions on antibiotic use should not be based solely on Procalcitonin (PCT) levels  If PCT is low but uncertainty exists with stopping antibiotics, repeat PCT in 6-24 hours to confirm the low level  If antibiotics are administered (regardless if initial PCT was high or low), repeat PCT every 1-2 days to consider early antibiotic cessation (when GREATER than 80% decrease from the peak OR when PCT drops below designated cutoffs, whichever comes first), so long as the infection is NOT one that typically requires prolonged treatment durations (e g , bone/joint infections, endocarditis, Staph  aureus bacteremia)  Situations of FALSE-POSITIVE Procalcitonin values:  1) Newborns < 67 hours old  2) Massive stress from severe trauma / burns, major surgery, acute pancreatitis, cardiogenic / hemorrhagic shock, sickle cell crisis, or other organ perfusion abnormalities  3) Malaria and some Candidal infections  4) Treatment with agents that stimulate cytokines (e g , OKT3, anti-lymphocyte globulins, alemtuzumab, IL-2, granulocyte transfusion [NOT GCSFs])  5) Chronic renal disease causes elevated baseline levels (consider GREATER than 0 75 ng/mL as an abnormal cut-off); initiating HD/CRRT may cause transient decreases  6) Paraneoplastic syndromes from medullary thyroid or SCLC, some forms of vasculitis, and acute uxllc-ve-rnip disease    Situations of FALSE-NEGATIVE Procalcitonin values:  1) Too early in clinical course for PCT to have reached its peak (may repeat in 6-24 hours to confirm low level)  2) Localized infection WITHOUT systemic (SIRS / sepsis) response (e g , an abscess, osteomyelitis, cystitis)  3) Mycobacteria (e g , Tuberculosis, MAC)  4) Cystic fibrosis exacerbations     BLOOD CULTURE    Blood Culture Received in Microbiology Lab  Culture in Progress  Preliminary   BLOOD CULTURE    Blood Culture Received in Microbiology Lab  Culture in Progress  Preliminary   URINE CULTURE   PROCALCITONIN REFLEX         Final Diagnosis:  1  Sepsis ([facility] )    2  UTI (urinary tract infection)    3  Fever    4  Tachycardia        P:  - patient tachycardic, febrile here upon arrival   On exam he complains only of hip pain  Patient appears septic, likely secondary to UTI or pulmonary infection  Will provide septic workup, IV fluids, antibiotics, and likely admission to the hospital for further treatment and management  Medications   lactated ringers bolus 1,000 mL (0 mL Intravenous Stopped 1/26/21 1128)   morphine injection 2 mg (2 mg Intravenous Given 1/26/21 1022)   ceftriaxone (ROCEPHIN) 1 g/50 mL in dextrose IVPB (0 mg Intravenous Stopped 1/26/21 1201)   HYDROmorphone (DILAUDID) injection 0 5 mg (0 5 mg Intravenous Given 1/26/21 1204)   multi-electrolyte (ISOLYTE-S PH 7 4) bolus 1,000 mL (1,000 mL Intravenous New Bag 1/26/21 1208)     Time reflects when diagnosis was documented in both MDM as applicable and the Disposition within this note     Time User Action Codes Description Comment    1/26/2021 12:14 PM Rory Duet Add [A41 9] Sepsis ([facility] [address] )     1/26/2021 12:14 PM Wendy Moshert Add [N39 0] UTI (urinary tract infection)     1/26/2021 12:14 PM Wendy Moshert Add [R50 9] Fever     1/26/2021 12:14 PM Wendy Moshert Add [R00 0] Tachycardia       ED Disposition     ED Disposition Condition Date/Time Comment    Admit Stable Tue Jan 26, 2021 12:13 PM Case was discussed with RANGEL and the patient's admission status was agreed to be Admission Status: inpatient status to the service of Dr Tom Keller  Follow-up Information    None       Patient's Medications   Discharge Prescriptions    No medications on file     No discharge procedures on file  Prior to Admission Medications   Prescriptions Last Dose Informant Patient Reported? Taking? Cholecalciferol ( VITAMIN D3) 2000 units CAPS  Spouse/Significant Other Yes No   Sig: Take 1 tablet by mouth daily   HYDROcodone-acetaminophen (NORCO) 5-325 mg per tablet  Spouse/Significant Other No No   Sig: Take 1 tablet by mouth 2 (two) times a day as needed for painMax Daily Amount: 2 tablets   SPIRIVA RESPIMAT 2 5 MCG/ACT AERS inhaler  Spouse/Significant Other No No   Sig: INHALE 2 PUFFS BY MOUTH DAILY   acetaminophen (TYLENOL) 325 mg tablet  Spouse/Significant Other No No   Sig: Take 2 tablets (650 mg total) by mouth every 4 (four) hours as needed for mild pain, headaches or fever (temperature greater than 101 F)   alfuzosin (UROXATRAL) 10 mg 24 hr tablet  Spouse/Significant Other No No   Sig: TAKE 1 TABLET(10 MG) BY MOUTH DAILY   aspirin 81 MG tablet  Spouse/Significant Other Yes No   Sig: Take 81 mg by mouth daily    clopidogrel (PLAVIX) 75 mg tablet  Spouse/Significant Other No No   Sig: Take 1 tablet (75 mg total) by mouth daily   docusate sodium (COLACE) 100 mg capsule  Spouse/Significant Other No No   Sig: Take 1 capsule (100 mg total) by mouth 2 (two) times a day   Patient not taking: Reported on 11/4/2020   finasteride (PROSCAR) 5 mg tablet  Spouse/Significant Other No No   Sig: Take 1 tablet (5 mg total) by mouth daily   furosemide (LASIX) 20 mg tablet  Spouse/Significant Other No No   Sig: TAKE 1 TABLET(20 MG) BY MOUTH DAILY   hydrocortisone 2 5 % cream  Spouse/Significant Other Yes No   Sig: RENA TO POSTERIOR EARS AND ARMS ONCE A DAY PRN FOR 1-2 WEEKS   levothyroxine 75 mcg tablet   No No   Sig: TAKE 1 TABLET(75 MCG) BY MOUTH DAILY   metoprolol succinate (TOPROL-XL) 25 mg 24 hr tablet  Spouse/Significant Other No No   Sig: TAKE 1/2 TABLET BY MOUTH DAILY   metoprolol tartrate (LOPRESSOR) 25 mg tablet   Yes No   omeprazole (PriLOSEC) 20 mg delayed release capsule  Spouse/Significant Other Yes No   Sig: Take 1 tablet by mouth daily   potassium chloride (K-DUR,KLOR-CON) 10 mEq tablet Spouse/Significant Other No No   Sig: Take 1 tablet (10 mEq total) by mouth daily   simvastatin (ZOCOR) 20 mg tablet   No No   Sig: TAKE 1 TABLET(20 MG) BY MOUTH DAILY AT BEDTIME      Facility-Administered Medications: None       Portions of the record may have been created with voice recognition software  Occasional wrong word or "sound a like" substitutions may have occurred due to the inherent limitations of voice recognition software  Read the chart carefully and recognize, using context, where substitutions have occurred      Electronically signed by:  Breanna Abdul, PGY 3, MD Cj Neff MD  01/26/21 5380

## 2021-01-26 NOTE — CONSULTS
Inpatient consult to Urology  Consult performed by: Carlos Padilla PA-C  Consult ordered by: Evon Kehr, DO      Consult: Leisa Zayas 80 y o  male [id number]   Unit/Bed #: ED 09  Encounter: [id number]        Assessment  & Plan  :  UTI:  -patient reports to the emergency room with fever s/p cystoscopy yesterday   -patient has chronic indwelling Porter since November   -most recent Porter change was yesterday as patient had failed voiding trial  -medical optimization with IV antibiotics and fluids, no need for Porter removal at this time, as it was recently exchanged yesterday   -patient to follow-up outpatient for discussion of chronic Porter with suprapubic catheter  -urine culture pending, UA positive for leukocytes, nitrates, blood    Subjective :    CC:  "Pain all over"    Renee Franklin  is a 80 y o  male known to our practice with a significant urologic history of high-grade T1 bladder cancer back in 2004  Last cystoscopy was performed yesterday by Dr Cliff Mcwilliams   Cystoscopy findings included no urothelial abnormality, prostate widely patent no evidence of obstructive lateral lobes and median lobes  Positive for trabeculation and cellule  Formation  No suspicious mass or lesion noted  At that time a voiding trial was performed the patient later failed his voiding trial with reinsertion of urethral Porter  24 hours later patient presents now with fever post cystoscopy  This information was provided by the patient's daughter  Patient currently reports pain all over  Patient has chronic pain from pelvis down  Patient unable to give reliable history  Review of Systems   Constitutional: Positive for fever  Pain   HENT: Negative  Eyes: Negative  Respiratory: Negative  Cardiovascular: Negative  Gastrointestinal: Negative  Endocrine: Negative  Genitourinary: Negative  Musculoskeletal: Negative  Skin: Negative  Hematological: Negative  Psychiatric/Behavioral: Negative  Objective     Physical Exam     Constitution:  Patient currently oriented to self  Mental status change due to fever and exacerbated pain  HEENT:  Atraumatic, normocephalic, no scleral icterus  Heart:  Regular rate and rhythm  No murmurs, gallops, rubs  Lungs:  Clear auscultation in all lung fields  No wheezes, rales, crackles  Abdomen:  Soft nontender, nondistended, bowel sounds present throughout  :  Urethral Porter catheter in place, draining to gravity, draining clear yellow urine  Extremities:  Edema present(chronic), no erythema, no calf tenderness    Imaging:  Reviewed pertinent imaging      Labs:  Lab Results   Component Value Date    SODIUM 132 (L) 01/26/2021    K 4 0 01/26/2021     01/26/2021    CO2 28 01/26/2021    BUN 14 01/26/2021    CREATININE 1 06 01/26/2021    GLUC 100 01/26/2021    CALCIUM 9 0 01/26/2021       Lab Results   Component Value Date    WBC 8 35 01/26/2021    HGB 11 6 (L) 01/26/2021    HCT 35 8 (L) 01/26/2021    MCV 97 01/26/2021     (L) 01/26/2021         VTE Pharmacologic Prophylaxis:  None  VTE Mechanical Prophylaxis: sequential compression device    Swati Pierre PA-C

## 2021-01-26 NOTE — ASSESSMENT & PLAN NOTE
Uroxatral equivalent  Porter replaced yesterday after failed void trial after cysto  Urology consult

## 2021-01-26 NOTE — TELEPHONE ENCOUNTER
----- Message from Brii Marshall PA-C sent at 1/26/2021  3:01 PM EST -----  Josy Carrillo 80year-old male who presented yesterday for cystoscopy presents to the ER today with fever  His daughter reports that Dr Melanie Stephenson would prescribe prophylactic antibiotics prior to cystoscopy, would like in the future for him to have prophylactic antibiotics with his cystoscopy  Would you be able to make a note in the chart for future reference       Thank you

## 2021-01-27 ENCOUNTER — APPOINTMENT (OUTPATIENT)
Dept: CARDIAC REHAB | Age: 86
End: 2021-01-27
Payer: MEDICARE

## 2021-01-27 LAB
ANION GAP SERPL CALCULATED.3IONS-SCNC: 7 MMOL/L (ref 4–13)
BACTERIA UR CULT: NORMAL
BUN SERPL-MCNC: 11 MG/DL (ref 5–25)
CALCIUM SERPL-MCNC: 8.1 MG/DL (ref 8.3–10.1)
CHLORIDE SERPL-SCNC: 103 MMOL/L (ref 100–108)
CO2 SERPL-SCNC: 23 MMOL/L (ref 21–32)
CREAT SERPL-MCNC: 0.73 MG/DL (ref 0.6–1.3)
ERYTHROCYTE [DISTWIDTH] IN BLOOD BY AUTOMATED COUNT: 13.2 % (ref 11.6–15.1)
GFR SERPL CREATININE-BSD FRML MDRD: 83 ML/MIN/1.73SQ M
GLUCOSE SERPL-MCNC: 92 MG/DL (ref 65–140)
HCT VFR BLD AUTO: 33 % (ref 36.5–49.3)
HGB BLD-MCNC: 10.6 G/DL (ref 12–17)
MAGNESIUM SERPL-MCNC: 2.3 MG/DL (ref 1.6–2.6)
MCH RBC QN AUTO: 31.6 PG (ref 26.8–34.3)
MCHC RBC AUTO-ENTMCNC: 32.1 G/DL (ref 31.4–37.4)
MCV RBC AUTO: 99 FL (ref 82–98)
PHOSPHATE SERPL-MCNC: 2.8 MG/DL (ref 2.3–4.1)
PLATELET # BLD AUTO: 84 THOUSANDS/UL (ref 149–390)
PMV BLD AUTO: 12.2 FL (ref 8.9–12.7)
POTASSIUM SERPL-SCNC: 3.9 MMOL/L (ref 3.5–5.3)
PROCALCITONIN SERPL-MCNC: 0.09 NG/ML
RBC # BLD AUTO: 3.35 MILLION/UL (ref 3.88–5.62)
SODIUM SERPL-SCNC: 133 MMOL/L (ref 136–145)
WBC # BLD AUTO: 3.38 THOUSAND/UL (ref 4.31–10.16)

## 2021-01-27 PROCEDURE — 84145 PROCALCITONIN (PCT): CPT | Performed by: GENERAL PRACTICE

## 2021-01-27 PROCEDURE — 99232 SBSQ HOSP IP/OBS MODERATE 35: CPT | Performed by: FAMILY MEDICINE

## 2021-01-27 PROCEDURE — 97167 OT EVAL HIGH COMPLEX 60 MIN: CPT

## 2021-01-27 PROCEDURE — 94760 N-INVAS EAR/PLS OXIMETRY 1: CPT

## 2021-01-27 PROCEDURE — 83735 ASSAY OF MAGNESIUM: CPT | Performed by: GENERAL PRACTICE

## 2021-01-27 PROCEDURE — 80048 BASIC METABOLIC PNL TOTAL CA: CPT | Performed by: GENERAL PRACTICE

## 2021-01-27 PROCEDURE — 97163 PT EVAL HIGH COMPLEX 45 MIN: CPT

## 2021-01-27 PROCEDURE — 97110 THERAPEUTIC EXERCISES: CPT

## 2021-01-27 PROCEDURE — 85027 COMPLETE CBC AUTOMATED: CPT | Performed by: GENERAL PRACTICE

## 2021-01-27 PROCEDURE — 84100 ASSAY OF PHOSPHORUS: CPT | Performed by: GENERAL PRACTICE

## 2021-01-27 RX ORDER — CLOPIDOGREL BISULFATE 75 MG/1
75 TABLET ORAL DAILY
Status: DISCONTINUED | OUTPATIENT
Start: 2021-01-28 | End: 2021-02-06 | Stop reason: HOSPADM

## 2021-01-27 RX ADMIN — ENOXAPARIN SODIUM 40 MG: 40 INJECTION SUBCUTANEOUS at 09:01

## 2021-01-27 RX ADMIN — METOPROLOL SUCCINATE 12.5 MG: 25 TABLET, EXTENDED RELEASE ORAL at 09:00

## 2021-01-27 RX ADMIN — HYDROCODONE BITARTRATE AND ACETAMINOPHEN 1 TABLET: 5; 325 TABLET ORAL at 21:43

## 2021-01-27 RX ADMIN — TIOTROPIUM BROMIDE 18 MCG: 18 CAPSULE ORAL; RESPIRATORY (INHALATION) at 12:49

## 2021-01-27 RX ADMIN — PANTOPRAZOLE SODIUM 40 MG: 40 TABLET, DELAYED RELEASE ORAL at 06:02

## 2021-01-27 RX ADMIN — FUROSEMIDE 20 MG: 20 TABLET ORAL at 09:00

## 2021-01-27 RX ADMIN — CEFTRIAXONE SODIUM 1000 MG: 10 INJECTION, POWDER, FOR SOLUTION INTRAVENOUS at 12:49

## 2021-01-27 RX ADMIN — PRAVASTATIN SODIUM 40 MG: 40 TABLET ORAL at 17:20

## 2021-01-27 RX ADMIN — VANCOMYCIN HYDROCHLORIDE 750 MG: 750 INJECTION, SOLUTION INTRAVENOUS at 16:39

## 2021-01-27 RX ADMIN — ASPIRIN 81 MG: 81 TABLET, COATED ORAL at 09:00

## 2021-01-27 RX ADMIN — TAMSULOSIN HYDROCHLORIDE 0.4 MG: 0.4 CAPSULE ORAL at 17:20

## 2021-01-27 RX ADMIN — HYDROCODONE BITARTRATE AND ACETAMINOPHEN 1 TABLET: 5; 325 TABLET ORAL at 09:02

## 2021-01-27 RX ADMIN — VANCOMYCIN HYDROCHLORIDE 750 MG: 750 INJECTION, SOLUTION INTRAVENOUS at 03:00

## 2021-01-27 RX ADMIN — LEVOTHYROXINE SODIUM 75 MCG: 75 TABLET ORAL at 06:02

## 2021-01-27 RX ADMIN — POTASSIUM CHLORIDE 10 MEQ: 750 TABLET, EXTENDED RELEASE ORAL at 09:00

## 2021-01-27 RX ADMIN — Medication 2000 UNITS: at 09:00

## 2021-01-27 RX ADMIN — GABAPENTIN 100 MG: 100 CAPSULE ORAL at 21:43

## 2021-01-27 RX ADMIN — FINASTERIDE 5 MG: 5 TABLET, FILM COATED ORAL at 09:00

## 2021-01-27 NOTE — PROGRESS NOTES
Vancomycin IV Pharmacy-to-Dose Consultation    Rosa Contreras is a 80 y o  male who is currently receiving Vancomycin IV with management by the Pharmacy Consult service for enterococcus UTI    Assessment/Plan:  The patient was reviewed  Renal function is stable and no signs or symptoms of nephrotoxicity and/or infusion reactions were documented in the chart  Based on todays assessment, continue current vancomycin (day # 2) dosing of 750mg q12hrs, with a plan for trough to be drawn at 1145 on 1/28 prior to 5th total dose  We will continue to follow the patients culture results and clinical progress daily      Camelia Jackson, Pharmacist

## 2021-01-27 NOTE — ASSESSMENT & PLAN NOTE
· Uroxatral equivalent  · Porter replaced after failed void trial after cystoscopy  · Urology follow-up outpatient  · Patient may require suprapubic catheter per Urology

## 2021-01-27 NOTE — PROGRESS NOTES
Progress Note - Allen Vargas 1933, 80 y o  male MRN: [id number]    Unit/Bed#: [facility] 320-01 Encounter: [id number]    Primary Care Provider: Brian Renee MD   Date and time admitted to hospital: 1/26/2021  9:29 AM        * Sepsis without acute organ dysfunction [facility])  Assessment & Plan  POA e/b fever and tachycardia  Suspected secondary to UTI  Patient noted for leukopenia today  Blood cultures no growth at 24 hours, urine cultures mixed contaminants  Anaphylaxis w/ Ancef but tolerates Rocephin  Continue Rocephin for now  Discontinue Vancomycin given negative blood cultures and infectious workup  Monitor CBC and VS; procalcitonin negative x2  Anticipate transitioning to oral antibiotics and discharge home in the next 24 hours     Urine retention  Assessment & Plan  Uroxatral equivalent  Porter replaced after failed void trial after cystoscopy  Urology consulted - recommendations to follow-up on outpatient basis with considerations for suprapubic catheter    UTI (urinary tract infection)  Assessment & Plan  Status post cystoscopy day prior to admission  Initially started on Rocephin and Vanco - patient has history of Enterococcus faecalis and E coli UTI as well as prior history of Klebsiella UTI  Urine cultures showing 100K colonies mixed organisms, - continue Rocephin and discontinue vancomycin with negative blood cultures and negative infectious workup thus far  Continue Rocephin alone  Given clinical improvement anticipate transitioning to oral antibiotics in next 24 hours, potential discharge tomorrow     Pulmonary emphysema ([address] )  Assessment & Plan  Does not appear in exacerbation  Resp protocol    Osteoarthritis of left hip  Assessment & Plan  Norco prn  Started on Neurontin qhs    Coronary artery disease  Assessment & Plan  BB, ASA,statin  Resume Plavix, the patient is on dual anti-platelet therapy home regimen    Severe aortic stenosis  Assessment & Plan  S/p TAVR        VTE Pharmacologic Prophylaxis:   Pharmacologic: Enoxaparin (Lovenox)  Mechanical VTE Prophylaxis in Place: Yes    Patient Centered Rounds: I have performed bedside rounds with nursing staff today  Discussions with Specialists or Other Care Team Provider:  Reviewed urology recommendations    Education and Discussions with Family / Patient:  Patient's wife via telephone    Time Spent for Care: 45 minutes  More than 50% of total time spent on counseling and coordination of care as described above  Current Length of Stay: 1 day(s)    Current Patient Status: Inpatient   Certification Statement: The patient will continue to require additional inpatient hospital stay due to IV antibiotics, close monitoring    Discharge Plan:  24-48 hours    Code Status: Level 1 - Full Code      Subjective:   Patient seen and examined  He reports feeling overall improved  He is getting ready to have his lunch  He is afebrile  Objective:     Vitals:   Temp (24hrs), Av 8 °F (37 1 °C), Min:98 1 °F (36 7 °C), Max:99 9 °F (37 7 °C)    Temp:  [98 1 °F (36 7 °C)-99 9 °F (37 7 °C)] 98 2 °F (36 8 °C)  HR:  [] 78  Resp:  [13-20] 20  BP: (111-152)/(55-67) 118/56  SpO2:  [91 %-99 %] 91 %  Body mass index is 24 13 kg/m²  Input and Output Summary (last 24 hours): Intake/Output Summary (Last 24 hours) at 2021 1631  Last data filed at 2021 0601  Gross per 24 hour   Intake 240 ml   Output 1800 ml   Net -1560 ml       Physical Exam:     Physical Exam  Constitutional:       General: He is not in acute distress  HENT:      Head: Normocephalic and atraumatic  Nose: No congestion  Mouth/Throat:      Pharynx: Oropharynx is clear  Eyes:      Conjunctiva/sclera: Conjunctivae normal    Cardiovascular:      Rate and Rhythm: Normal rate and regular rhythm  Heart sounds: No murmur  Pulmonary:      Effort: No respiratory distress  Breath sounds: No wheezing or rales  Abdominal:      General: There is no distension  Tenderness: There is no abdominal tenderness  There is no guarding  Genitourinary:     Comments: Porter catheter draining yellow urine  Musculoskeletal:      Right lower leg: No edema  Left lower leg: No edema  Skin:     General: Skin is warm and dry  Neurological:      Mental Status: He is oriented to person, place, and time  Psychiatric:         Mood and Affect: Mood normal          Additional Data:     Labs:    Results from last 7 days   Lab Units 01/27/21  0601 01/26/21  1023   WBC Thousand/uL 3 38* 8 35   HEMOGLOBIN g/dL 10 6* 11 6*   HEMATOCRIT % 33 0* 35 8*   PLATELETS Thousands/uL 84* 111*   NEUTROS PCT %  --  85*   LYMPHS PCT %  --  4*   MONOS PCT %  --  9   EOS PCT %  --  1     Results from last 7 days   Lab Units 01/27/21  0601 01/26/21  1023   SODIUM mmol/L 133* 132*   POTASSIUM mmol/L 3 9 4 0   CHLORIDE mmol/L 103 102   CO2 mmol/L 23 28   BUN mg/dL 11 14   CREATININE mg/dL 0 73 1 06   ANION GAP mmol/L 7 2*   CALCIUM mg/dL 8 1* 9 0   ALBUMIN g/dL  --  4 1   TOTAL BILIRUBIN mg/dL  --  0 73   ALK PHOS U/L  --  110   ALT U/L  --  19   AST U/L  --  24   GLUCOSE RANDOM mg/dL 92 100                 Results from last 7 days   Lab Units 01/27/21  0601 01/26/21  1023   LACTIC ACID mmol/L  --  1 1   PROCALCITONIN ng/ml 0 09 0 09           * I Have Reviewed All Lab Data Listed Above  * Additional Pertinent Lab Tests Reviewed: [address] Admission Reviewed    Imaging:    Imaging Reports Reviewed Today Include:  Chest x-ray    Recent Cultures (last 7 days):     Results from last 7 days   Lab Units 01/26/21  1032 01/26/21  1023   BLOOD CULTURE   --  No Growth at 24 hrs  No Growth at 24 hrs     URINE CULTURE  >100,000 cfu/ml   --        Last 24 Hours Medication List:   Current Facility-Administered Medications   Medication Dose Route Frequency Provider Last Rate    acetaminophen  650 mg Oral Q4H PRN DO Lefty      albuterol  2 puff Inhalation Q4H PRN DO Lefty      aspirin  81 mg Oral Daily Bose Ream, DO      cefTRIAXone  1,000 mg Intravenous Q24H Bose Ream, DO 1,000 mg (01/27/21 1249)    cholecalciferol  2,000 Units Oral Daily Bose Ream, DO      [START ON 1/28/2021] clopidogrel  75 mg Oral Daily Michael Steward MD      enoxaparin  40 mg Subcutaneous Daily Bose Ream, DO      finasteride  5 mg Oral Daily Bose Ream, DO      furosemide  20 mg Oral Daily Bose Ream, DO      gabapentin  100 mg Oral HS Bose Ream, DO      HYDROcodone-acetaminophen  1 tablet Oral Q6H PRN Bose Ream, DO      levothyroxine  75 mcg Oral Early Morning Bose Ream, DO      metoprolol succinate  12 5 mg Oral Daily Bose Ream, DO      ondansetron  4 mg Intravenous Q6H PRN Bose Ream, DO      pantoprazole  40 mg Oral Early Morning Bose Ream, DO      potassium chloride  10 mEq Oral Daily Bose Ream, DO      pravastatin  40 mg Oral Daily With [employer], DO      tamsulosin  0 4 mg Oral Daily With [employer], DO      tiotropium  18 mcg Inhalation Daily Bose Ream, DO      vancomycin  10 mg/kg Intravenous Q12H Bose Ream,  mg (01/27/21 0300)        Today, Patient Was Seen By: Jhon Bailon MD    ** Please Note: Dictation voice to text software may have been used in the creation of this document   **

## 2021-01-27 NOTE — TELEPHONE ENCOUNTER
Call placed to patient, left message to call office to discuss plan once discharged  Office number provided on voicemail  Patient remains inpatient at this time  Will continue to monitor for discharge and follow up with patient at that time  Per Dr Vic Callahan, if patient is agreeable, we can place order for IR consult spt placement without patient coming into office

## 2021-01-27 NOTE — ASSESSMENT & PLAN NOTE
POA e/b fever and tachycardia  2/2 UTI  Patient noted for leukopenia today  Blood cultures no growth at 24 hours, urine cultures mixed contaminants  Anaphylaxis w/ Ancef but tolerates Rocephin  Continue Rocephin and Vanco  Monitor CBC, procalcitonin and VS  Anticipate transitioning to oral antibiotics and discharge home in the next 24 hours

## 2021-01-27 NOTE — PLAN OF CARE
Problem: Potential for Falls  Goal: Patient will remain free of falls  Description: INTERVENTIONS:  - Assess patient frequently for physical needs  -  Identify cognitive and physical deficits and behaviors that affect risk of falls  -  [place] fall precautions as indicated by assessment   - Educate patient/family on patient safety including physical limitations  - Instruct patient to call for assistance with activity based on assessment  - Modify environment to reduce risk of injury  - Consider OT/PT consult to assist with strengthening/mobility  Outcome: Progressing     Problem: Prexisting or High Potential for Compromised Skin Integrity  Goal: Skin integrity is maintained or improved  Description: INTERVENTIONS:  - Identify patients at risk for skin breakdown  - Assess and monitor skin integrity  - Assess and monitor nutrition and hydration status  - Monitor labs   - Assess for incontinence   - Turn and reposition patient  - Assist with mobility/ambulation  - Relieve pressure over bony prominences  - Avoid friction and shearing  - Provide appropriate hygiene as needed including keeping skin clean and dry  - Evaluate need for skin moisturizer/barrier cream  - Collaborate with interdisciplinary team   - Patient/family teaching  - Consider wound care consult   Outcome: Progressing     Problem: Nutrition/Hydration-ADULT  Goal: Nutrient/Hydration intake appropriate for improving, restoring or maintaining nutritional needs  Description: Monitor and assess patient's nutrition/hydration status for malnutrition  Collaborate with interdisciplinary team and initiate plan and interventions as ordered  Monitor patient's weight and dietary intake as ordered or per policy  Utilize nutrition screening tool and intervene as necessary  Determine patient's food preferences and provide high-protein, high-caloric foods as appropriate       INTERVENTIONS:  - Monitor oral intake, urinary output, labs, and treatment plans  - Assess nutrition and hydration status and recommend course of action  - Evaluate amount of meals eaten  - Assist patient with eating if necessary   - Allow adequate time for meals  - Recommend/ encourage appropriate diets, oral nutritional supplements, and vitamin/mineral supplements  - Order, calculate, and assess calorie counts as needed  - Recommend, monitor, and adjust tube feedings and TPN/PPN based on assessed needs  - Assess need for intravenous fluids  - Provide specific nutrition/hydration education as appropriate  - Include patient/family/caregiver in decisions related to nutrition  Outcome: Progressing     Problem: PAIN - ADULT  Goal: Verbalizes/displays adequate comfort level or baseline comfort level  Description: Interventions:  - Encourage patient to monitor pain and request assistance  - Assess pain using appropriate pain scale  - Administer analgesics based on type and severity of pain and evaluate response  - Implement non-pharmacological measures as appropriate and evaluate response  - Consider cultural and social influences on pain and pain management  - Notify physician/advanced practitioner if interventions unsuccessful or patient reports new pain  Outcome: Progressing     Problem: INFECTION - ADULT  Goal: Absence or prevention of progression during hospitalization  Description: INTERVENTIONS:  - Assess and monitor for signs and symptoms of infection  - Monitor lab/diagnostic results  - Monitor all insertion sites, i e  indwelling lines, tubes, and drains  - Monitor endotracheal if appropriate and nasal secretions for changes in amount and color  - [place] appropriate cooling/warming therapies per order  - Administer medications as ordered  - Instruct and encourage patient and family to use good hand hygiene technique  - Identify and instruct in appropriate isolation precautions for identified infection/condition  Outcome: Progressing  Goal: Absence of fever/infection during neutropenic period  Description: INTERVENTIONS:  - Monitor WBC    Outcome: Progressing     Problem: SAFETY ADULT  Goal: Maintain or return to baseline ADL function  Description: INTERVENTIONS:  -  Assess patient's ability to carry out ADLs; assess patient's baseline for ADL function and identify physical deficits which impact ability to perform ADLs (bathing, care of mouth/teeth, toileting, grooming, dressing, etc )  - Assess/evaluate cause of self-care deficits   - Assess range of motion  - Assess patient's mobility; develop plan if impaired  - Assess patient's need for assistive devices and provide as appropriate  - Encourage maximum independence but intervene and supervise when necessary  - Involve family in performance of ADLs  - Assess for home care needs following discharge   - Consider OT consult to assist with ADL evaluation and planning for discharge  - Provide patient education as appropriate  Outcome: Progressing  Goal: Maintain or return mobility status to optimal level  Description: INTERVENTIONS:  - Assess patient's baseline mobility status (ambulation, transfers, stairs, etc )    - Identify cognitive and physical deficits and behaviors that affect mobility  - Identify mobility aids required to assist with transfers and/or ambulation (gait belt, sit-to-stand, lift, walker, cane, etc )  - [place] fall precautions as indicated by assessment  - Record patient progress and toleration of activity level on Mobility SBAR; progress patient to next Phase/Stage  - Instruct patient to call for assistance with activity based on assessment  - Consider rehabilitation consult to assist with strengthening/weightbearing, etc   Outcome: Progressing     Problem: DISCHARGE PLANNING  Goal: Discharge to home or other facility with appropriate resources  Description: INTERVENTIONS:  - Identify barriers to discharge w/patient and caregiver  - Arrange for needed discharge resources and transportation as appropriate  - Identify discharge learning needs (meds, wound care, etc )  - Arrange for interpretive services to assist at discharge as needed  - Refer to Case Management Department for coordinating discharge planning if the patient needs post-hospital services based on physician/advanced practitioner order or complex needs related to functional status, cognitive ability, or social support system  Outcome: Progressing     Problem: Knowledge Deficit  Goal: Patient/family/caregiver demonstrates understanding of disease process, treatment plan, medications, and discharge instructions  Description: Complete learning assessment and assess knowledge base    Interventions:  - Provide teaching at level of understanding  - Provide teaching via preferred learning methods  Outcome: Progressing

## 2021-01-27 NOTE — ASSESSMENT & PLAN NOTE
· Status post cystoscopy day prior to admission  · Rocephin and Vanco - patient has history of Enterococcus faecalis and E coli UTI as well as prior history of Klebsiella UTI  · Urine cultures showing 100K colonies mixed organisms, blood cultures no growth 48  hours, continue Rocephin and discontinued vancomycin  1/27- given clinical improvement anticipate transitioning to oral antibiotics in next 24 hours, potential discharge tomorrow

## 2021-01-27 NOTE — PHYSICAL THERAPY NOTE
Physical Therapy Evaluation     Patient's Name: Saurabh Saucedo    Admitting Diagnosis  Urine retention [R33 9]  UTI (urinary tract infection) [N39 0]  Tachycardia [R00 0]  Fever [R50 9]  Sepsis ([facility] ) [A41 9]    Problem List  Patient Active Problem List   Diagnosis    Severe aortic stenosis    PAF (paroxysmal atrial fibrillation) ([place] [address] )    Benign prostatic hyperplasia    Carotid stenosis, bilateral    Coronary artery disease    Esophageal reflux    Essential and other specified forms of tremor    Generalized osteoarthritis of multiple sites    Hyperlipidemia    Hypertension    Hypothyroidism    Impaired fasting glucose    Left foot drop    Mononeuritis    Osteoarthritis of left hip    Peripheral arterial disease ([place] [address] )    Pulmonary emphysema ([place] [address] )    RBBB    Abdominal aortic aneurysm (AAA) without rupture ([place] [address] )    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    Stenosis of carotid artery    Iliac artery aneurysm, bilateral (HCC)    UTI (urinary tract infection)    Urine retention    Indwelling Porter catheter calcification (HCC)    Ambulatory dysfunction    S/P TAVR (transcatheter aortic valve replacement)    Macrocytic anemia    Hypochloremia    Hypocalcemia    Thrombocytopenia (HCC)    Hypokalemia    Sepsis without acute organ dysfunction [facility])       Past Medical History  Past Medical History:   Diagnosis Date    Anemia     Bladder cancer ([place] [address] )     Cancer ([place] [address] )     bladder    Carotid artery occlusion     Cataract     Colon polyp     COPD (chronic obstructive pulmonary disease) ([place] [address] )     Coronary artery disease     Disease of thyroid gland     History of transfusion     Hyperlipidemia     Hypertension     Hypothyroidism 9/15/2017    Multiple pulmonary nodules     Peptic ulcer     Scoliosis     SIRS (systemic inflammatory response syndrome) ([place] [address] ) 12/19/2019    Transient cerebral ischemia     Tremors of nervous system        Past Surgical History  Past Surgical History:   Procedure Laterality Date   [facility] SURGERY      1973, 1987, 2005    BUNIONECTOMY Left     Simple exostectomy (silver procedure)    CAROTID ENDARTERECTOMY Right 09/10/2008    Randy LEDBETTER MD    CATARACT EXTRACTION      CATARACT EXTRACTION, BILATERAL      CERVICAL DISCECTOMY  1969    COLONOSCOPY      CORONARY ARTERY BYPASS GRAFT  10/20/2010    x3 (LIMA-LAD, SVG-OM, SVG-RCA)    CYSTOSCOPY      ELBOW SURGERY Right 07/2012    FEMORAL ARTERY - TIBIAL ARTERY BYPASS GRAFT  12/05/2011    using reversed saphenous vein from right leg  Gretchen Murphy MD    CA ECHO TRANSESOPHAG R-T 2D W/PRB IMG ACQUISJ I&R N/A 10/6/2020    Procedure: TRANSESOPHAGEAL ECHOCARDIOGRAM (DAVID); Surgeon: Kilo Saez DO;  Location: [facility];  Service: Cardiac Surgery    CA REPLACE AORTIC VALVE OPENFEMORAL ARTERY APPROACH N/A 10/6/2020    Procedure: REPLACEMENT AORTIC VALVE TRANSCATHETER (TAVR) TRANSFEMORAL W/ 26MM MONTALVO BREE S3 ULTRA VALVE(ACCESS ON LEFT); Surgeon: Kilo Saez DO;  [facility];  Service: Cardiac Surgery    REPLACEMENT TOTAL KNEE Left     SHOULDER SURGERY Right 1997 01/27/21 0938   PT Last Visit   PT Visit Date 01/27/21   Note Type   Note type Evaluation   Pain Assessment   Pain Assessment Tool 0-10   Pain Score 9   Pain Location/Orientation Location: Back;Orientation: Bilateral;Location: Hip;Location: Leg   Patient's Stated Pain Goal No pain   Hospital Pain Intervention(s) Repositioned; Ambulation/increased activity; Rest;Emotional support   Home Living   Type of [address] One level  (1STE)   Bathroom Shower/Tub Tub/shower unit   Bathroom Toilet Standard   Bathroom Equipment Grab bars in shower; Shower chair;Commode;Grab bars around toilet   Home Equipment Walker;Cane;Other (Comment)  (rollator)   Additional Comments Pt reports primary use of RW, however, uses rollator for longer distances     Prior Function   Level of [place] Needs assistance with IADLs; Needs assistance with ADLs and functional mobility   Lives With Spouse   Receives Help From Family   ADL Assistance Needs assistance   IADLs Needs assistance   Falls in the last 6 months 1 to 4  (2 per pt repot)   Comments Pt reports spouse assists with ADLs   Restrictions/Precautions   Weight Bearing Precautions Per Order No   Other Precautions Cognitive; Chair Alarm; Bed Alarm;Pain; Fall Risk;Multiple lines   General   Family/Caregiver Present No   Cognition   Overall Cognitive Status Impaired   Arousal/Participation Alert   Attention Attends with cues to redirect   Orientation Level Oriented to person;Oriented to place; Disoriented to time;Disoriented to situation   Memory Decreased recall of precautions;Decreased recall of recent events;Decreased short term memory   Following Commands Follows one step commands with increased time or repetition   Comments Pt with decreased safety awareness and insight into deficits, requires cues for safety throughout session  RLE Assessment   RLE Assessment   (functionally 2-/5)   LLE Assessment   LLE Assessment   (functionally 2-/5)   Bed Mobility   Supine to Sit 2  Maximal assistance   Additional items Assist x 2; Increased time required;Verbal cues;LE management;HOB elevated; Bedrails   Sit to Supine 2  Maximal assistance   Additional items Assist x 2; Increased time required;Verbal cues;LE management;HOB elevated   Additional Comments Supine in bed upon PT arrival   PT left supine in bed with all needs in reach at end of therapy session  Transfers   Sit to Stand 2  Maximal assistance   Additional items Assist x 2; Increased time required;Verbal cues   Stand to Sit 2  Maximal assistance   Additional items Assist x 2; Increased time required;Verbal cues   Additional Comments Transfers with RW   VC for hand placement and safety  Ambulation/Elevation   Gait pattern Excessively slow; Short stride; Step to;Decreased foot clearance  (side step)   Gait Assistance 2 Maximal assist   Additional items Assist x 2;Verbal cues; Tactile cues   Assistive Device Rolling walker   Distance 2 small side steps to [facility]   Balance   Static Sitting Fair   Dynamic Sitting Fair -   Static Standing Poor -   Dynamic Standing Poor -   Ambulatory Poor -   Endurance Deficit   Endurance Deficit Yes   Endurance Deficit Description fatigue, pain, weakness   Activity Tolerance   Activity Tolerance Patient limited by fatigue;Patient limited by pain   Medical Staff Made Aware OT   Nurse Made Aware RN cleared pt to be seen by PT   Assessment   Prognosis Fair   Problem List Decreased strength;Decreased endurance; Impaired balance;Decreased mobility; Decreased safety awareness;Decreased cognition;Pain   Assessment Pt seen for high complexity PT evaluation due to decrease in functional mobility status compared to baseline  Pt with active PT eval/treat and up and OOB as tolerated orders at this time  Pt is a 80 y o  M who presented to [facility] with fever s/p recent cysto for urinary retention on 1/26/21  Pt primary dx is sepsis without acute organ dysfunction  Pt  has a past medical history of Anemia, Bladder cancer ([facility] ), Cancer ([facility] ), Carotid artery occlusion, Cataract, Colon polyp, COPD (chronic obstructive pulmonary disease) ([facility] ), Coronary artery disease, Disease of thyroid gland, History of transfusion, Hyperlipidemia, Hypertension, Hypothyroidism (9/15/2017), Multiple pulmonary nodules, Peptic ulcer, Scoliosis, SIRS (systemic inflammatory response syndrome) ([facility] ) (12/19/2019), Transient cerebral ischemia, and Tremors of nervous system  Pt resides with spouse in [facility] with 1STE  Pt presents with decreased strength, balance, endurance that contribute to limitations in bed mobility, functional transfers, functional mobility  Pt requires Max A x 2 for supine<>sit, STS, and small side steps with RW at this time  Pt left supine in bed with all needs in reach    Pt will benefit from skilled therapy in order to address current impairments and functional limitations  PT to follow pt and recommending rehab once medically cleared  The patient's AM-PAC Basic Mobility Inpatient Short Form Low Function Raw Score 15 , Standardized Score is 23 9  A standardized score less 42 9 suggests the patient may benefit from discharge to post-acute rehab services  Please also refer to the recommendation of the Physical Therapist for safe discharge planning  Goals   Patient Goals to get better   [facility] Expiration Date 02/10/21   Short Term Goal #1 1  Pt will demonstrate ability to perform all aspects of bed mobility with Min A in order to increase independence and decrease burden on caregivers  2  Pt will demonstrate ability to perform functional transfers with Min A in order to increase independence and decrease burden on caregivers  3  Pt will demonstrate ability to ambulate 20+ ft with least restrictive AD with Min A in order to return to mobility safely  4  Pt will demonstrate improved balance by one grade order to decrease risk of falls  5  Pt will increase b/l LE strength by 1 grade in order to increase ease of functional mobility and transfers  Plan   Treatment/Interventions LE strengthening/ROM; Functional transfer training; Therapeutic exercise; Endurance training;Cognitive reorientation;Patient/family training;Equipment eval/education; Bed mobility;Spoke to nursing;Gait training   PT Frequency Other (Comment)  (3-5x/wk)   Recommendation   PT Discharge Recommendation Post-Acute Rehabilitation Services   PT - OK to Discharge Yes  (to rehab once medically cleared)   AM-PAC Basic Mobility Inpatient   Turning in Bed Without Bedrails 2   Lying on Back to Sitting on Edge of Flat Bed 1   Moving Bed to Chair 1   Standing Up From Chair 1   Walk in Room 1   Climb 3-5 Stairs 1   Basic Mobility Inpatient Raw Score 7   Turning Head Towards Sound 4   Follow Simple Instructions 4   Low Function Basic Mobility Raw Score 15   Low Function Basic Mobility Standardized Score 23 9   Modified Estuardo Scale   Modified Huggins Scale 4     Time In: 928  Time Out: 938  Total Time: 10 min      S:  Pt agreeable to LE therex  O:  Pt educated on and performed the following b/l LE therex in supine: ankle pumps, hip ABD/ADD (AAROM), quad sets, glute sets (15x1)  Pt educated on frequency with which to perform  A:  Pt with difficulty performing exercises 2/2 strength deficits, does require some AAROM  P:  PT to continue to follow and recommending rehab once medically cleared          Kerry Watts, PT, DPT

## 2021-01-27 NOTE — CASE MANAGEMENT
LOS: Day 1  Bundle: pt is not a bundle  Readmission risk: pt is not a 30 day readmit    CM reviewed role with pt at bedside  Pt was alert and oriented during the conversation  Pt reported his wife, Noble Moyer as his emergency contact [phone]  Pt reported that he lives with his wife in a 1 level ranch home with 1 CHERRY to enter  Pt reported that his wife is his caretaker, pt is also open to [facility]  Pt uses a walker, BSC and shower chair  Pt reported that his wife drives  PCP is Dr Kavitha Dai; pharmacy of choice is [facility] [address] in [place]  Pt reported that he is current with [facility] for OPPT which he currently attends 3x/week  Pt denied MH and substance abuse  CM reviewed d/c planning process including the following: identifying help at home, patient preference for d/c planning needs, Discharge Lounge, [facility] Meds to Bed program, availability of treatment team to discuss questions or concerns patient and/or family may have regarding understanding medications and recognizing signs and symptoms once discharged  CM also encouraged patient to follow up with all recommended appointments after discharge  Patient advised of importance for patient and family to participate in managing patients medical well being  Patient/caregiver received discharge checklist  Content reviewed  Patient/caregiver encouraged to participate in discharge plan of care prior to discharge home

## 2021-01-27 NOTE — ASSESSMENT & PLAN NOTE
Uroxatral equivalent  Porter replaced after failed void trial after cystoscopy  Urology consulted - recommendations to follow-up on outpatient basis with considerations for suprapubic catheter

## 2021-01-27 NOTE — ASSESSMENT & PLAN NOTE
POA e/b fever and tachycardia  2/2 UTI  Patient noted for leukopenia today  Blood cultures no growth at 24 hours, urine cultures mixed contaminants  Anaphylaxis w/ Ancef but tolerates Rocephin  Continue Rocephin, discontinue vancomycin given thus far negative infectious workup  Monitor CBC, procalcitonin and VS  Anticipate transitioning to oral antibiotics and discharge home in the next 24 hours

## 2021-01-27 NOTE — PLAN OF CARE
Problem: PHYSICAL THERAPY ADULT  Goal: Performs mobility at highest level of function for planned discharge setting  See evaluation for individualized goals  Description: Treatment/Interventions: LE strengthening/ROM, Functional transfer training, Therapeutic exercise, Endurance training, Cognitive reorientation, Patient/family training, Equipment eval/education, Bed mobility, Spoke to nursing, Gait training          See flowsheet documentation for full assessment, interventions and recommendations  Note: Prognosis: Fair  Problem List: Decreased strength, Decreased endurance, Impaired balance, Decreased mobility, Decreased safety awareness, Decreased cognition, Pain  Assessment: Pt seen for high complexity PT evaluation due to decrease in functional mobility status compared to baseline  Pt with active PT eval/treat and up and OOB as tolerated orders at this time  Pt is a 80 y o  M who presented to [facility] with fever s/p recent cysto for urinary retention on 1/26/21  Pt primary dx is sepsis without acute organ dysfunction  Pt  has a past medical history of Anemia, Bladder cancer ([facility] ), Cancer ([facility] ), Carotid artery occlusion, Cataract, Colon polyp, COPD (chronic obstructive pulmonary disease) ([facility] ), Coronary artery disease, Disease of thyroid gland, History of transfusion, Hyperlipidemia, Hypertension, Hypothyroidism (9/15/2017), Multiple pulmonary nodules, Peptic ulcer, Scoliosis, SIRS (systemic inflammatory response syndrome) ([facility] ) (12/19/2019), Transient cerebral ischemia, and Tremors of nervous system  Pt resides with spouse in [facility] with 1STE  Pt presents with decreased strength, balance, endurance that contribute to limitations in bed mobility, functional transfers, functional mobility  Pt requires Max A x 2 for supine<>sit, STS, and small side steps with RW at this time  Pt left supine in bed with all needs in reach    Pt will benefit from skilled therapy in order to address current impairments and functional limitations  PT to follow pt and recommending rehab once medically cleared  The patient's AM-PAC Basic Mobility Inpatient Short Form Low Function Raw Score 15 , Standardized Score is 23 9  A standardized score less 42 9 suggests the patient may benefit from discharge to post-acute rehab services  Please also refer to the recommendation of the Physical Therapist for safe discharge planning  PT Discharge Recommendation: Post-Acute Rehabilitation Services     PT - OK to Discharge: Yes(to rehab once medically cleared)    See flowsheet documentation for full assessment

## 2021-01-27 NOTE — PLAN OF CARE
IP Medical Nutrition Therapy  Suggest Enlive BID/kallie/vanilla flavors      Problem: Nutrition/Hydration-ADULT  Goal: Nutrient/Hydration intake appropriate for improving, restoring or maintaining nutritional needs  Description: Monitor and assess patient's nutrition/hydration status for malnutrition  Collaborate with interdisciplinary team and initiate plan and interventions as ordered  Monitor patient's weight and dietary intake as ordered or per policy  Utilize nutrition screening tool and intervene as necessary  Determine patient's food preferences and provide high-protein, high-caloric foods as appropriate       INTERVENTIONS:  - Monitor oral intake, urinary output, labs, and treatment plans  - Assess nutrition and hydration status and recommend course of action  - Evaluate amount of meals eaten  - Assist patient with eating if necessary   - Allow adequate time for meals  - Recommend/ encourage appropriate diets, oral nutritional supplements, and vitamin/mineral supplements  - Order, calculate, and assess calorie counts as needed  - Recommend, monitor, and adjust tube feedings and TPN/PPN based on assessed needs  - Assess need for intravenous fluids  - Provide specific nutrition/hydration education as appropriate  - Include patient/family/caregiver in decisions related to nutrition  Outcome: Progressing

## 2021-01-27 NOTE — PLAN OF CARE
Problem: OCCUPATIONAL THERAPY ADULT  Goal: Performs self-care activities at highest level of function for planned discharge setting  See evaluation for individualized goals  Description: Treatment Interventions: ADL retraining, Functional transfer training, UE strengthening/ROM, Endurance training, Cognitive reorientation, Patient/family training, Equipment evaluation/education, Compensatory technique education, Energy conservation, Activityengagement          See flowsheet documentation for full assessment, interventions and recommendations  Note: Limitation: Decreased ADL status, Decreased UE strength, Decreased Safe judgement during ADL, Decreased cognition, Decreased endurance, Decreased self-care trans, Decreased high-level ADLs  Prognosis: Good  Assessment: Pt is a 80 y o  male who was admitted to [facility] on 1/26/2021 with Sepsis 2' UTI  Pt with chronic indwelling chavis catheter  Pt  has a past medical history of Anemia, Bladder cancer ([facility] ), Cancer ([facility] ), Carotid artery occlusion, Cataract, Colon polyp, COPD (chronic obstructive pulmonary disease) ([facility] ), Coronary artery disease, Disease of thyroid gland, History of transfusion, Hyperlipidemia, Hypertension, Hypothyroidism (9/15/2017), Multiple pulmonary nodules, Peptic ulcer, Scoliosis, SIRS (systemic inflammatory response syndrome) ([facility] ) (12/19/2019), Transient cerebral ischemia, and Tremors of nervous system  At baseline pt was completing UB ADLs IND, LB ADLs with assist, ambulating MOD IND with RW, (-) driving  Pt lives with his spouse in a 1 SH with 1 CHERRY  Currently pt requires MOD-MAX A for overall ADLS and MAX Ax2 for functional mobility/transfers   Of note, pt reports he has a leg length discrepancy and has a Right lift shoe; not present in hospital  Pt currently presents with impairments in the following categories -steps to enter environment, difficulty performing ADLS and health management  activity tolerance, endurance, standing balance/tolerance, sitting balance/tolerance, UE strength and memory  These impairments, as well as pt's fatigue, pain, risk for falls and home environment  limit pt's ability to safely engage in all baseline areas of occupation, includinggrooming, bathing, dressing, toileting, functional mobility/transfers, community mobility and leisure activities  From OT standpoint, recommend [phone] upon D/C  OT will continue to follow to address the below stated goals        OT Discharge Recommendation: Post-Acute Rehabilitation Services

## 2021-01-27 NOTE — OCCUPATIONAL THERAPY NOTE
Occupational Therapy Evaluation     Patient Name: Josy ROMEROP Date: 1/27/2021  Problem List  Principal Problem:    Sepsis without acute organ dysfunction [facility])  Active Problems:    Severe aortic stenosis    Coronary artery disease    Osteoarthritis of left hip    Pulmonary emphysema (HCC)    UTI (urinary tract infection)    Urine retention    Past Medical History  Past Medical History:   Diagnosis Date    Anemia     Bladder cancer ([facility] )     Cancer ([facility] )     bladder    Carotid artery occlusion     Cataract     Colon polyp     COPD (chronic obstructive pulmonary disease) ([facility] )     Coronary artery disease     Disease of thyroid gland     History of transfusion     Hyperlipidemia     Hypertension     Hypothyroidism 9/15/2017    Multiple pulmonary nodules     Peptic ulcer     Scoliosis     SIRS (systemic inflammatory response syndrome) ([facility] ) 12/19/2019    Transient cerebral ischemia     Tremors of nervous system      Past Surgical History  Past Surgical History:   Procedure Laterality Date   [facility] SURGERY      1973, 1987, 2005    BUNIONECTOMY Left     Simple exostectomy (silver procedure)    CAROTID ENDARTERECTOMY Right 09/10/2008    Randy LEDBETTER MD    CATARACT EXTRACTION      CATARACT EXTRACTION, BILATERAL      CERVICAL DISCECTOMY  1969    COLONOSCOPY      CORONARY ARTERY BYPASS GRAFT  10/20/2010    x3 (LIMA-LAD, SVG-OM, SVG-RCA)    CYSTOSCOPY      ELBOW SURGERY Right 07/2012    FEMORAL ARTERY - TIBIAL ARTERY BYPASS GRAFT  12/05/2011    using reversed saphenous vein from right leg  Thanh Shipman MD    VT ECHO TRANSESOPHAG R-T 2D W/PRB IMG ACQUISMARIA EUGENIA I&R N/A 10/6/2020    Procedure: TRANSESOPHAGEAL ECHOCARDIOGRAM (DAVID);   Surgeon: Vani Yanez DO;  [facility];  Service: Cardiac Surgery    VT REPLACE AORTIC VALVE OPENFEMORAL ARTERY APPROACH N/A 10/6/2020    Procedure: REPLACEMENT AORTIC VALVE TRANSCATHETER (TAVR) TRANSFEMORAL W/ 26MM MONTALVO BREE S3 ULTRA VALVE(ACCESS ON LEFT); Surgeon: Kaveh Rubi, DO;  Location: [facility];  Service: Cardiac Surgery    REPLACEMENT TOTAL KNEE Left     SHOULDER SURGERY Right 1997           01/27/21 0932   OT Last Visit   OT Visit Date 01/27/21   Note Type   Note type Evaluation   Restrictions/Precautions   Weight Bearing Precautions Per Order No   Other Precautions Chair Alarm; Bed Alarm; Fall Risk;O2;Multiple lines  (Of note, pt has R lift shoe at home - not present in hospital)   Pain Assessment   Pain Assessment Tool 0-10   Pain Score 9   Pain Location/Orientation Orientation: Lower; Location: Back; Location: Hip;Orientation: Bilateral   Hospital Pain Intervention(s) Repositioned; Ambulation/increased activity   Home Living   Type of [facility] One level  (1 CHERRY)   Bathroom Shower/Tub Tub/shower unit   Bathroom Toilet Standard   Bathroom Equipment Grab bars in shower;Grab bars around toilet; Shower chair;Commode  (pt reports use of [place] PTA)   Home Equipment Walker;Cane  (& rollator)   Prior Function   Level of [place] Needs assistance with ADLs and functional mobility   Lives With Spouse   Receives Help From Family   ADL Assistance Needs assistance   IADLs Needs assistance   Falls in the last 6 months 1 to 4  (2 per pt)   Comments Pt's spouse is home throughout the day and able to assist as needed  Pt's spouse assists pt with LB ADLs and IADLs   Lifestyle   Autonomy At baseline pt was completing UB ADLs IND, LB ADLs with assist, ambulating MOD IND with RW, (-) driving      Reciprocal Relationships support from spouse   Service to Others retired   Intrinsic Gratification pt enjoys watching TV   Psychosocial   Psychosocial (WDL) WDL   ADL   Eating Assistance 5  [address] 3  Moderate Assistance   LB [address] 2  Maximal Assistance   [address]  Moderate [address] Total Assistance   Toileting Assistance  1  Total Assistance   Bed Mobility   Supine to Sit 2  Maximal assistance   Additional items Assist x 2;HOB elevated; Increased time required;Verbal cues;LE management   Sit to Supine 2  Maximal assistance   Additional items Assist x 2;HOB elevated; Increased time required;Verbal cues;LE management   Transfers   Sit to Stand 2  Maximal assistance   Additional items Assist x 2; Increased time required;Verbal cues   Stand to Sit 2  Maximal assistance   Additional items Assist x 2; Increased time required;Verbal cues   Additional Comments RW - VCs for safe hand placement  Functional Mobility   Functional Mobility 2  Maximal assistance   Additional Comments Ax2  Lateral steps towards [facility]   Additional items Rolling walker   Balance   Static Sitting Fair   Dynamic Sitting Fair -   Static Standing Poor -   Dynamic Standing Poor -   Activity Tolerance   Activity Tolerance Patient limited by fatigue;Patient limited by pain   Medical Staff Made Aware PT   Nurse Made Aware Per RN pt appropriate to be seen   RUE Assessment   RUE Assessment WFL  (4-/5 throughout)   LUE Assessment   LUE Assessment WFL  (4-/5 throughout)   Hand Function   Gross Motor Coordination Functional   Fine Motor Coordination Functional   Cognition   Overall Cognitive Status Impaired   Arousal/Participation Alert; Cooperative   Attention Attends with cues to redirect   Orientation Level Oriented to person;Oriented to place; Disoriented to time;Oriented to situation  (states date as "December" "2001")   Memory Decreased short term memory;Decreased recall of recent events   Following Commands Follows one step commands with increased time or repetition   Comments Pt requiring VCs for safety  Pt with decreased recall and required increased time to process  Used humor to mask deficits at times   Assessment   Limitation Decreased ADL status; Decreased UE strength;Decreased Safe judgement during ADL;Decreased cognition;Decreased endurance;Decreased self-care trans;Decreased high-level ADLs   Prognosis Good   Assessment Pt is a 80 y o  male who was [facility] to [facility] on 1/26/2021 with Sepsis 2' UTI  Pt with chronic indwelling chavis catheter  Pt  has a past medical history of Anemia, Bladder cancer ([facility] ), Cancer ([facility] ), Carotid artery occlusion, Cataract, Colon polyp, COPD (chronic obstructive pulmonary disease) ([facility] ), Coronary artery disease, Disease of thyroid gland, History of transfusion, Hyperlipidemia, Hypertension, Hypothyroidism (9/15/2017), Multiple pulmonary nodules, Peptic ulcer, Scoliosis, SIRS (systemic inflammatory response syndrome) ([facility] ) (12/19/2019), Transient cerebral ischemia, and Tremors of nervous system  At baseline pt was completing UB ADLs IND, LB ADLs with assist, ambulating MOD IND with RW, (-) driving  Pt lives with his spouse in a 1 SH with 1 CHERRY  Currently pt requires MOD-MAX A for overall ADLS and MAX Ax2 for functional mobility/transfers  Of note, pt reports he has a leg length discrepancy and has a Right lift shoe; not present in hospital  Pt currently presents with impairments in the following categories -steps to enter environment, difficulty performing ADLS and health management  activity tolerance, endurance, standing balance/tolerance, sitting balance/tolerance, UE strength and memory  These impairments, as well as pt's fatigue, pain, risk for falls and home environment  limit pt's ability to safely engage in all baseline areas of occupation, includinggrooming, bathing, dressing, toileting, functional mobility/transfers, community mobility and leisure activities  From OT standpoint, recommend [address] upon D/C  OT will continue to follow to address the below stated goals  Goals   Patient Goals to go home   LTG Time Frame 10-14   Long Term Goal #1 see goals below   Plan   Treatment Interventions ADL retraining;Functional transfer training;UE strengthening/ROM; Endurance training;Cognitive reorientation;Patient/family training;Equipment evaluation/education; Compensatory technique education; Energy conservation; Activityengagement   Goal Expiration Date 02/10/21   OT Frequency 3-5x/wk   Recommendation   OT Discharge Recommendation Post-Acute Rehabilitation Services   Modified Estuardo Scale   Modified Estuardo Scale 4         GOALS     Pt will improve activity tolerance to G for min 30 min txment sessions     Pt will complete UB ADLs with SUP/set up and use of adaptive device and DME as needed     Pt will complete toileting w/ MIN A w/ G hygiene/thoroughness using DME as needed     Pt will improve functional transfers to MIN A on/off all surfaces using DME as needed w/ G safety      Pt will improve functional mobility during ADL/IADL/leisure tasks to MIN A using DME as needed w/ G safety      Pt will demonstrate G carryover of pt/caregiver education and training as appropriate w/ mod I w/o cues w/ good tolerance     Pt to participate in ongoing functional cognitive assessment with Good attention/participation to assist with safe D/C planning      Pt will improve bed mobility to MIN A with Fair+ sitting balance EOB to engage in seated ADL tasks        Elaine Monzon, OT

## 2021-01-27 NOTE — ASSESSMENT & PLAN NOTE
Status post cystoscopy day prior to admission  Rocephin and Jefeo - patient has history of AFib Colles and E coli UTI as well as prior history of Klebsiella UTI  Urine cultures showing 100K colonies mixed organisms, continue antibiotics as above, given clinical improvement anticipate transitioning to oral antibiotics in next 24 hours, potential discharge tomorrow

## 2021-01-28 ENCOUNTER — APPOINTMENT (INPATIENT)
Dept: RADIOLOGY | Facility: HOSPITAL | Age: 86
DRG: 872 | End: 2021-01-28
Payer: MEDICARE

## 2021-01-28 LAB
ANION GAP SERPL CALCULATED.3IONS-SCNC: 3 MMOL/L (ref 4–13)
BASOPHILS # BLD AUTO: 0.01 THOUSANDS/ΜL (ref 0–0.1)
BASOPHILS NFR BLD AUTO: 0 % (ref 0–1)
BUN SERPL-MCNC: 11 MG/DL (ref 5–25)
CALCIUM SERPL-MCNC: 8 MG/DL (ref 8.3–10.1)
CHLORIDE SERPL-SCNC: 103 MMOL/L (ref 100–108)
CO2 SERPL-SCNC: 29 MMOL/L (ref 21–32)
CREAT SERPL-MCNC: 0.83 MG/DL (ref 0.6–1.3)
EOSINOPHIL # BLD AUTO: 0.36 THOUSAND/ΜL (ref 0–0.61)
EOSINOPHIL NFR BLD AUTO: 7 % (ref 0–6)
ERYTHROCYTE [DISTWIDTH] IN BLOOD BY AUTOMATED COUNT: 13.2 % (ref 11.6–15.1)
GFR SERPL CREATININE-BSD FRML MDRD: 79 ML/MIN/1.73SQ M
GLUCOSE SERPL-MCNC: 95 MG/DL (ref 65–140)
HCT VFR BLD AUTO: 31.7 % (ref 36.5–49.3)
HGB BLD-MCNC: 10.5 G/DL (ref 12–17)
IMM GRANULOCYTES # BLD AUTO: 0.01 THOUSAND/UL (ref 0–0.2)
IMM GRANULOCYTES NFR BLD AUTO: 0 % (ref 0–2)
LYMPHOCYTES # BLD AUTO: 0.88 THOUSANDS/ΜL (ref 0.6–4.47)
LYMPHOCYTES NFR BLD AUTO: 17 % (ref 14–44)
MCH RBC QN AUTO: 31.7 PG (ref 26.8–34.3)
MCHC RBC AUTO-ENTMCNC: 33.1 G/DL (ref 31.4–37.4)
MCV RBC AUTO: 96 FL (ref 82–98)
MONOCYTES # BLD AUTO: 0.93 THOUSAND/ΜL (ref 0.17–1.22)
MONOCYTES NFR BLD AUTO: 18 % (ref 4–12)
NEUTROPHILS # BLD AUTO: 2.92 THOUSANDS/ΜL (ref 1.85–7.62)
NEUTS SEG NFR BLD AUTO: 58 % (ref 43–75)
NRBC BLD AUTO-RTO: 0 /100 WBCS
PLATELET # BLD AUTO: 87 THOUSANDS/UL (ref 149–390)
PMV BLD AUTO: 11.8 FL (ref 8.9–12.7)
POTASSIUM SERPL-SCNC: 3.7 MMOL/L (ref 3.5–5.3)
RBC # BLD AUTO: 3.31 MILLION/UL (ref 3.88–5.62)
SODIUM SERPL-SCNC: 135 MMOL/L (ref 136–145)
WBC # BLD AUTO: 5.11 THOUSAND/UL (ref 4.31–10.16)

## 2021-01-28 PROCEDURE — 73552 X-RAY EXAM OF FEMUR 2/>: CPT

## 2021-01-28 PROCEDURE — 85025 COMPLETE CBC W/AUTO DIFF WBC: CPT | Performed by: FAMILY MEDICINE

## 2021-01-28 PROCEDURE — 80048 BASIC METABOLIC PNL TOTAL CA: CPT | Performed by: FAMILY MEDICINE

## 2021-01-28 PROCEDURE — 94760 N-INVAS EAR/PLS OXIMETRY 1: CPT

## 2021-01-28 PROCEDURE — 73502 X-RAY EXAM HIP UNI 2-3 VIEWS: CPT

## 2021-01-28 PROCEDURE — 97530 THERAPEUTIC ACTIVITIES: CPT

## 2021-01-28 PROCEDURE — 99232 SBSQ HOSP IP/OBS MODERATE 35: CPT | Performed by: NURSE PRACTITIONER

## 2021-01-28 PROCEDURE — 97535 SELF CARE MNGMENT TRAINING: CPT

## 2021-01-28 RX ADMIN — POTASSIUM CHLORIDE 10 MEQ: 750 TABLET, EXTENDED RELEASE ORAL at 08:18

## 2021-01-28 RX ADMIN — FINASTERIDE 5 MG: 5 TABLET, FILM COATED ORAL at 08:18

## 2021-01-28 RX ADMIN — GABAPENTIN 100 MG: 100 CAPSULE ORAL at 22:09

## 2021-01-28 RX ADMIN — TAMSULOSIN HYDROCHLORIDE 0.4 MG: 0.4 CAPSULE ORAL at 17:28

## 2021-01-28 RX ADMIN — PRAVASTATIN SODIUM 40 MG: 40 TABLET ORAL at 17:28

## 2021-01-28 RX ADMIN — FUROSEMIDE 20 MG: 20 TABLET ORAL at 08:18

## 2021-01-28 RX ADMIN — CLOPIDOGREL BISULFATE 75 MG: 75 TABLET ORAL at 08:18

## 2021-01-28 RX ADMIN — HYDROCODONE BITARTRATE AND ACETAMINOPHEN 1 TABLET: 5; 325 TABLET ORAL at 23:21

## 2021-01-28 RX ADMIN — TIOTROPIUM BROMIDE 18 MCG: 18 CAPSULE ORAL; RESPIRATORY (INHALATION) at 08:18

## 2021-01-28 RX ADMIN — ENOXAPARIN SODIUM 40 MG: 40 INJECTION SUBCUTANEOUS at 08:18

## 2021-01-28 RX ADMIN — CEFTRIAXONE SODIUM 1000 MG: 10 INJECTION, POWDER, FOR SOLUTION INTRAVENOUS at 12:04

## 2021-01-28 RX ADMIN — LEVOTHYROXINE SODIUM 75 MCG: 75 TABLET ORAL at 05:11

## 2021-01-28 RX ADMIN — HYDROCODONE BITARTRATE AND ACETAMINOPHEN 1 TABLET: 5; 325 TABLET ORAL at 17:28

## 2021-01-28 RX ADMIN — PANTOPRAZOLE SODIUM 40 MG: 40 TABLET, DELAYED RELEASE ORAL at 05:11

## 2021-01-28 RX ADMIN — Medication 2000 UNITS: at 08:18

## 2021-01-28 RX ADMIN — ASPIRIN 81 MG: 81 TABLET, COATED ORAL at 08:18

## 2021-01-28 RX ADMIN — METOPROLOL SUCCINATE 12.5 MG: 25 TABLET, EXTENDED RELEASE ORAL at 08:19

## 2021-01-28 RX ADMIN — HYDROCODONE BITARTRATE AND ACETAMINOPHEN 1 TABLET: 5; 325 TABLET ORAL at 09:18

## 2021-01-28 NOTE — PLAN OF CARE
Problem: OCCUPATIONAL THERAPY ADULT  Goal: Performs self-care activities at highest level of function for planned discharge setting  See evaluation for individualized goals  Description: Treatment Interventions: ADL retraining, Functional transfer training, UE strengthening/ROM, Endurance training, Cognitive reorientation, Patient/family training, Equipment evaluation/education, Compensatory technique education, Energy conservation, Activityengagement          See flowsheet documentation for full assessment, interventions and recommendations  Outcome: Progressing  Note: Limitation: Decreased ADL status, Decreased UE strength, Decreased Safe judgement during ADL, Decreased cognition, Decreased endurance, Decreased self-care trans, Decreased high-level ADLs  Prognosis: Good  Assessment: Pt was seen this date for OT tx session focusing on self care tasks, bed mobility, sit to stand progressions, standing tolerance and balance, safety awareness, compensatory techniques, energy conservation, and overall activity tolerance  Pt presents supine, completes previously mentioned tasks at documented assist levels please see above in flow sheet  Pt with notable improvements in overall activity tolerance this date  Continues to require significant assist for bed mobility and sit to stand progression this date  Pt resting in supine at end of session with all needs in reach  Would benefit from continued OT tx to improve overall functional abilities  Continue to follow with current POC          OT Discharge Recommendation: Post-Acute Rehabilitation Services

## 2021-01-28 NOTE — PLAN OF CARE
Problem: Potential for Falls  Goal: Patient will remain free of falls  Description: INTERVENTIONS:  - Assess patient frequently for physical needs  -  Identify cognitive and physical deficits and behaviors that affect risk of falls  -  [place] fall precautions as indicated by assessment   - Educate patient/family on patient safety including physical limitations  - Instruct patient to call for assistance with activity based on assessment  - Modify environment to reduce risk of injury  - Consider OT/PT consult to assist with strengthening/mobility  Outcome: Progressing     Problem: Prexisting or High Potential for Compromised Skin Integrity  Goal: Skin integrity is maintained or improved  Description: INTERVENTIONS:  - Identify patients at risk for skin breakdown  - Assess and monitor skin integrity  - Assess and monitor nutrition and hydration status  - Monitor labs   - Assess for incontinence   - Turn and reposition patient  - Assist with mobility/ambulation  - Relieve pressure over bony prominences  - Avoid friction and shearing  - Provide appropriate hygiene as needed including keeping skin clean and dry  - Evaluate need for skin moisturizer/barrier cream  - Collaborate with interdisciplinary team   - Patient/family teaching  - Consider wound care consult   Outcome: Progressing     Problem: Nutrition/Hydration-ADULT  Goal: Nutrient/Hydration intake appropriate for improving, restoring or maintaining nutritional needs  Description: Monitor and assess patient's nutrition/hydration status for malnutrition  Collaborate with interdisciplinary team and initiate plan and interventions as ordered  Monitor patient's weight and dietary intake as ordered or per policy  Utilize nutrition screening tool and intervene as necessary  Determine patient's food preferences and provide high-protein, high-caloric foods as appropriate       INTERVENTIONS:  - Monitor oral intake, urinary output, labs, and treatment plans  - Assess nutrition and hydration status and recommend course of action  - Evaluate amount of meals eaten  - Assist patient with eating if necessary   - Allow adequate time for meals  - Recommend/ encourage appropriate diets, oral nutritional supplements, and vitamin/mineral supplements  - Order, calculate, and assess calorie counts as needed  - Recommend, monitor, and adjust tube feedings and TPN/PPN based on assessed needs  - Assess need for intravenous fluids  - Provide specific nutrition/hydration education as appropriate  - Include patient/family/caregiver in decisions related to nutrition  Outcome: Progressing     Problem: PAIN - ADULT  Goal: Verbalizes/displays adequate comfort level or baseline comfort level  Description: Interventions:  - Encourage patient to monitor pain and request assistance  - Assess pain using appropriate pain scale  - Administer analgesics based on type and severity of pain and evaluate response  - Implement non-pharmacological measures as appropriate and evaluate response  - Consider cultural and social influences on pain and pain management  - Notify physician/advanced practitioner if interventions unsuccessful or patient reports new pain  Outcome: Progressing     Problem: INFECTION - ADULT  Goal: Absence or prevention of progression during hospitalization  Description: INTERVENTIONS:  - Assess and monitor for signs and symptoms of infection  - Monitor lab/diagnostic results  - Monitor all insertion sites, i e  indwelling lines, tubes, and drains  - Monitor endotracheal if appropriate and nasal secretions for changes in amount and color  - [place] appropriate cooling/warming therapies per order  - Administer medications as ordered  - Instruct and encourage patient and family to use good hand hygiene technique  - Identify and instruct in appropriate isolation precautions for identified infection/condition  Outcome: Progressing  Goal: Absence of fever/infection during neutropenic period  Description: INTERVENTIONS:  - Monitor WBC    Outcome: Progressing     Problem: SAFETY ADULT  Goal: Maintain or return to baseline ADL function  Description: INTERVENTIONS:  -  Assess patient's ability to carry out ADLs; assess patient's baseline for ADL function and identify physical deficits which impact ability to perform ADLs (bathing, care of mouth/teeth, toileting, grooming, dressing, etc )  - Assess/evaluate cause of self-care deficits   - Assess range of motion  - Assess patient's mobility; develop plan if impaired  - Assess patient's need for assistive devices and provide as appropriate  - Encourage maximum independence but intervene and supervise when necessary  - Involve family in performance of ADLs  - Assess for home care needs following discharge   - Consider OT consult to assist with ADL evaluation and planning for discharge  - Provide patient education as appropriate  Outcome: Progressing  Goal: Maintain or return mobility status to optimal level  Description: INTERVENTIONS:  - Assess patient's baseline mobility status (ambulation, transfers, stairs, etc )    - Identify cognitive and physical deficits and behaviors that affect mobility  - Identify mobility aids required to assist with transfers and/or ambulation (gait belt, sit-to-stand, lift, walker, cane, etc )  - [place] fall precautions as indicated by assessment  - Record patient progress and toleration of activity level on Mobility SBAR; progress patient to next Phase/Stage  - Instruct patient to call for assistance with activity based on assessment  - Consider rehabilitation consult to assist with strengthening/weightbearing, etc   Outcome: Progressing     Problem: DISCHARGE PLANNING  Goal: Discharge to home or other facility with appropriate resources  Description: INTERVENTIONS:  - Identify barriers to discharge w/patient and caregiver  - Arrange for needed discharge resources and transportation as appropriate  - Identify discharge learning needs (meds, wound care, etc )  - Arrange for interpretive services to assist at discharge as needed  - Refer to Case Management Department for coordinating discharge planning if the patient needs post-hospital services based on physician/advanced practitioner order or complex needs related to functional status, cognitive ability, or social support system  Outcome: Progressing     Problem: Knowledge Deficit  Goal: Patient/family/caregiver demonstrates understanding of disease process, treatment plan, medications, and discharge instructions  Description: Complete learning assessment and assess knowledge base    Interventions:  - Provide teaching at level of understanding  - Provide teaching via preferred learning methods  Outcome: Progressing

## 2021-01-28 NOTE — ASSESSMENT & PLAN NOTE
· Norco prn  · Started on Neurontin qhs  · Patient has chronic history bilateral hip pain sees [facility] orthopedic physician  · - however now complaining of increased right hip versus femur pain  · - very difficult for physical therapy to work with patient as he is barely able to stand, patient reports that at home he is fairly independent with roller walker  · - patient also states that he has had some falls had seen outpatient orthopedics for left hip and they felt everything was okay and he did fall since but cannot give me and date or how long ago but not within the last week  · Will obtain bilateral hip x-rays and right femur x-ray  ·

## 2021-01-28 NOTE — PROGRESS NOTES
Progress Note - Eartha Jeans 1933, 80 y o  male MRN: [id number]    Unit/Bed#: [facility] 320-01 Encounter: [id number]    Primary Care Provider: Di Adair MD   Date and time admitted to hospital: 1/26/2021  9:29 AM        Bilateral hip pain  Assessment & Plan  · Norco prn  · Started on Neurontin qhs  · Patient has chronic history bilateral hip pain sees [facility] orthopedic physician  · - however now complaining of increased right hip versus femur pain  · - very difficult for physical therapy to work with patient as he is barely able to stand, patient reports that at home he is fairly independent with roller walker  · - patient also states that he has had some falls had seen outpatient orthopedics for left hip and they felt everything was okay and he did fall since but cannot give me and date or how long ago but not within the last week  · Will obtain bilateral hip x-rays and right femur x-ray  ·     * Sepsis without acute organ dysfunction ([facility] [address] )  Assessment & Plan  POA e/b fever and tachycardia  2/2 UTI  Patient noted for leukopenia today  Blood cultures no growth at 24 hours, urine cultures mixed contaminants  Anaphylaxis w/ Ancef but tolerates Rocephin  Continue Rocephin, discontinue vancomycin given thus far negative infectious workup  Monitor CBC, procalcitonin and VS  Anticipate transitioning to oral antibiotics and discharge home in the next 24 hours     Urine retention  Assessment & Plan  · Uroxatral equivalent  · Porter replaced after failed void trial after cystoscopy  · Urology follow-up outpatient  · Patient may require suprapubic catheter per Urology    UTI (urinary tract infection)  Assessment & Plan  · Status post cystoscopy day prior to admission  · Rocephin and Vanco - patient has history of Enterococcus faecalis and E coli UTI as well as prior history of Klebsiella UTI  · Urine cultures showing 100K colonies mixed organisms, blood cultures no growth 48  hours, continue Rocephin and discontinued vancomycin  - given clinical improvement anticipate transitioning to oral antibiotics in next 24 hours, potential discharge tomorrow     Pulmonary emphysema ([address] )  Assessment & Plan  · Does not appear in exacerbation  · Resp protocol    Coronary artery disease  Assessment & Plan  BB, ASA,statin  Resume Plavix, the patient is on dual anti-platelet therapy home regimen    Severe aortic stenosis  Assessment & Plan  S/p TAVR        VTE Pharmacologic Prophylaxis:   Pharmacologic: Enoxaparin (Lovenox)  Mechanical VTE Prophylaxis in Place: Yes    Patient Centered Rounds: I have performed bedside rounds with nursing staff today  Discussions with Specialists or Other Care Team Provider: nursing     Education and Discussions with Family / Patient: patient Called daughter Mychal Varela who conference in Cee Ross pts wife from home in same call   Time Spent for Care: 45 minutes  More than 50% of total time spent on counseling and coordination of care as described above  Current Length of Stay: 2 day(s)    Current Patient Status: Inpatient   Certification Statement: The patient will continue to require additional inpatient hospital stay due to Need for imaging of bilateral hips due to a pain and inability to ambulate    Discharge Plan:  Pending official report from physical therapy    Code Status: Level 1 - Full Code      Subjective:   Patient has difficulty articulating exact fax  However he reports that his pain is worse in right lateral femur area  He does recollect history of chronic hip pain bilaterally secondary to prior trauma many many years ago  Patient with specialized shoes right leg shorter than left currently tender to palpation on right femur area    No nausea vomiting diarrhea no burning continued Porter    Objective:     Vitals:   Temp (24hrs), Av 6 °F (37 °C), Min:98 2 °F (36 8 °C), Max:99 4 °F (37 4 °C)    Temp:  [98 2 °F (36 8 °C)-99 4 °F (37 4 °C)] 98 2 °F (36 8 °C)  HR:  [72-91] 72  Resp:  [15-20] 16  BP: (106-118)/(54-57) 115/57  SpO2:  [90 %-92 %] 92 %  Body mass index is 24 13 kg/m²  Input and Output Summary (last 24 hours): Intake/Output Summary (Last 24 hours) at 1/28/2021 1209  Last data filed at 1/28/2021 0900  Gross per 24 hour   Intake 360 ml   Output 300 ml   Net 60 ml       Physical Exam:     Physical Exam  Constitutional:       General: He is not in acute distress  Appearance: He is not ill-appearing, toxic-appearing or diaphoretic  HENT:      Head: Normocephalic and atraumatic  Eyes:      Conjunctiva/sclera: Conjunctivae normal    Cardiovascular:      Rate and Rhythm: Normal rate  Heart sounds: No murmur  No friction rub  No gallop  Pulmonary:      Effort: No respiratory distress  Breath sounds: No stridor  No wheezing, rhonchi or rales  Chest:      Chest wall: No tenderness  Abdominal:      General: There is no distension  Tenderness: There is no abdominal tenderness  There is no right CVA tenderness or guarding  Musculoskeletal:      Right lower leg: Edema present  Left lower leg: Edema present  Comments: Right leg shorter than left and pain in right lateral femur tender    Skin:     Coloration: Skin is not jaundiced or pale  Findings: No bruising, erythema, lesion or rash  Neurological:      Mental Status: He is alert and oriented to person, place, and time             Additional Data:     Labs:    Results from last 7 days   Lab Units 01/28/21  0537   WBC Thousand/uL 5 11   HEMOGLOBIN g/dL 10 5*   HEMATOCRIT % 31 7*   PLATELETS Thousands/uL 87*   NEUTROS PCT % 58   LYMPHS PCT % 17   MONOS PCT % 18*   EOS PCT % 7*     Results from last 7 days   Lab Units 01/28/21  0537  01/26/21  1023   SODIUM mmol/L 135*   < > 132*   POTASSIUM mmol/L 3 7   < > 4 0   CHLORIDE mmol/L 103   < > 102   CO2 mmol/L 29   < > 28   BUN mg/dL 11   < > 14   CREATININE mg/dL 0 83   < > 1 06   ANION GAP mmol/L 3*   < > 2*   CALCIUM mg/dL 8 0*   < > 9  0   ALBUMIN g/dL  --   --  4 1   TOTAL BILIRUBIN mg/dL  --   --  0 73   ALK PHOS U/L  --   --  110   ALT U/L  --   --  19   AST U/L  --   --  24   GLUCOSE RANDOM mg/dL 95   < > 100    < > = values in this interval not displayed  Results from last 7 days   Lab Units 01/27/21  0601 01/26/21  1023   LACTIC ACID mmol/L  --  1 1   PROCALCITONIN ng/ml 0 09 0 09           * I Have Reviewed All Lab Data Listed Above  * Additional Pertinent Lab Tests Reviewed: All Labs Within Last 24 Hours Reviewed    Imaging:    Imaging Reports Reviewed Today Include: reviewed     Recent Cultures (last 7 days):     Results from last 7 days   Lab Units 01/26/21  1032 01/26/21  1023   BLOOD CULTURE   --  No Growth at 48 hrs  No Growth at 48 hrs     URINE CULTURE  >100,000 cfu/ml   --        Last 24 Hours Medication List:   Current Facility-Administered Medications   Medication Dose Route Frequency Provider Last Rate    acetaminophen  650 mg Oral Q4H PRN [place], [place]      albuterol  2 puff Inhalation Q4H PRN [place], DO      aspirin  81 mg Oral Daily [place], [place]      cefTRIAXone  1,000 mg Intravenous Q24H [place], DO 1,000 mg (01/28/21 1204)    cholecalciferol  2,000 Units Oral Daily [place] DO      clopidogrel  75 mg Oral Daily Lj Ford MD      enoxaparin  40 mg Subcutaneous Daily [place], DO      finasteride  5 mg Oral Daily [place], [place]      furosemide  20 mg Oral Daily [place]      gabapentin  100 mg Oral HS [place], [place]      HYDROcodone-acetaminophen  1 tablet Oral Q6H PRN [place], DO      levothyroxine  75 mcg Oral Early Morning [place], DO      metoprolol succinate  12 5 mg Oral Daily [place], DO      ondansetron  4 mg Intravenous Q6H PRN [place], DO      pantoprazole  40 mg Oral Early Morning [place], DO      potassium chloride  10 mEq Oral Daily [place], DO      pravastatin  40 mg Oral Daily With Sesar Pineda      tamsulosin  0 4 mg Oral Daily With Joelle Muñoz DO      tiotropium  18 mcg Inhalation Daily [place] DO Carlie          Today, Patient Was Seen By: MANI Clancy    ** Please Note: Dictation voice to text software may have been used in the creation of this document   **

## 2021-01-28 NOTE — OCCUPATIONAL THERAPY NOTE
Occupational Therapy Treatment Note:       01/28/21 1115   OT Last Visit   OT Visit Date 01/28/21   Note Type   Note Type Treatment   Restrictions/Precautions   Weight Bearing Precautions Per Order No   Other Precautions Cognitive; Fall Risk;Pain   Pain Assessment   Pain Score 5   Pain Location/Orientation Orientation: Bilateral;Location: Leg   ADL   Where Assessed Edge of bed   Grooming Assistance 5  Supervision/Setup   Grooming Deficit Wash/dry face   Grooming Comments seated   LB Dressing Assistance 2  Maximal Assistance   LB Dressing Deficit Don/doff R sock; Don/doff L sock; Don/doff R shoe;Don/doff L shoe   LB Dressing Comments Pt reports he requires assist with same at his baseline, REports wife asists with ADLS at baseline   Functional Standing Tolerance   Time ~ 2 mins   Activity static standing    Comments RW level, with lift custom lifted shoe    Bed Mobility   Supine to Sit 2  Maximal assistance   Additional items Assist x 2   Sit to Supine 2  Maximal assistance   Additional items Assist x 2   Additional Comments Sat EOB approx 15 mins   Transfers   Sit to Stand 2  Maximal assistance   Additional items Assist x 2   Stand to Sit 2  Maximal assistance   Additional items Assist x 2   Additional Comments Statis standing at RW level, attempted to side step however pt c o  increased pain in LE and unable to weight shift to complete same   Cognition   Overall Cognitive Status Impaired   Arousal/Participation Alert; Cooperative   Attention Attends with cues to redirect   Orientation Level Oriented to person;Oriented to place;Oriented to time  (grossly oriented to time, state the year is 2021, guess feb )   Memory Decreased recall of recent events   Following Commands Follows one step commands without difficulty   Comments Plesant and cooperative, Tangential and verbose, cues to redirect [id number]    Activity Tolerance   Activity Tolerance Patient limited by fatigue   Medical Staff Made Aware NSG aware   Assessment Assessment Pt was seen this date for OT tx session focusing on self care tasks, bed mobility, sit to stand progressions, standing tolerance and balance, safety awareness, compensatory techniques, energy conservation, and overall activity tolerance  Pt presents supine, completes previously mentioned tasks at documented assist levels please see above in flow sheet  Pt with notable improvements in overall activity tolerance this date  Continues to require significant assist for bed mobility and sit to stand progression this date  Pt resting in supine at end of session with all needs in reach  Would benefit from continued OT tx to improve overall functional abilities  Continue to follow with current POC        Plan   Treatment Interventions ADL retraining   Goal Expiration Date 02/10/21   OT Treatment Day 1   OT Frequency 3-5x/wk   Recommendation   OT Discharge Recommendation Post-Acute Rehabilitation Services     Serafin Mast

## 2021-01-28 NOTE — TELEPHONE ENCOUNTER
Patient remains inpatient at this time  Will continue to monitor for discharge and follow up with patient at that time  Per Dr Willy Arndt, if patient is agreeable, we can place order for IR consult spt placement without patient coming into office

## 2021-01-28 NOTE — CASE MANAGEMENT
CM received TC from [facility] to call pt's family as there are concerns about pt's level of functioning here at the hospital  Cm TC to pt's dtr and wife  Family expressed concerns that pt may not be full communicating his pain levels in an effort to return home  Family continued to express concerns that pt's hip pain should be evaluated with more testing  CM reviewed therapy notes with both the dtr and the wife and discussed options for rehab vs  STR  Family reported that they prefer pt to return home with continued nursing/PT/OT, however if his needs are exceeding, then family is open discussing STR  CM TT both therapy and attending to follow up  CM will continue to follow for dcp

## 2021-01-29 ENCOUNTER — APPOINTMENT (OUTPATIENT)
Dept: CARDIAC REHAB | Age: 86
End: 2021-01-29
Payer: MEDICARE

## 2021-01-29 PROCEDURE — 99232 SBSQ HOSP IP/OBS MODERATE 35: CPT | Performed by: NURSE PRACTITIONER

## 2021-01-29 PROCEDURE — 94760 N-INVAS EAR/PLS OXIMETRY 1: CPT

## 2021-01-29 RX ORDER — OXYCODONE HYDROCHLORIDE 5 MG/1
2.5 TABLET ORAL EVERY 6 HOURS PRN
Status: DISCONTINUED | OUTPATIENT
Start: 2021-01-29 | End: 2021-02-06 | Stop reason: HOSPADM

## 2021-01-29 RX ORDER — ACETAMINOPHEN 325 MG/1
975 TABLET ORAL EVERY 8 HOURS SCHEDULED
Status: DISCONTINUED | OUTPATIENT
Start: 2021-01-29 | End: 2021-02-06 | Stop reason: HOSPADM

## 2021-01-29 RX ADMIN — ACETAMINOPHEN 975 MG: 325 TABLET, FILM COATED ORAL at 17:38

## 2021-01-29 RX ADMIN — TIOTROPIUM BROMIDE 18 MCG: 18 CAPSULE ORAL; RESPIRATORY (INHALATION) at 10:09

## 2021-01-29 RX ADMIN — TAMSULOSIN HYDROCHLORIDE 0.4 MG: 0.4 CAPSULE ORAL at 16:04

## 2021-01-29 RX ADMIN — ASPIRIN 81 MG: 81 TABLET, COATED ORAL at 10:07

## 2021-01-29 RX ADMIN — FUROSEMIDE 20 MG: 20 TABLET ORAL at 10:07

## 2021-01-29 RX ADMIN — ACETAMINOPHEN 975 MG: 325 TABLET, FILM COATED ORAL at 21:38

## 2021-01-29 RX ADMIN — METOPROLOL SUCCINATE 12.5 MG: 25 TABLET, EXTENDED RELEASE ORAL at 10:05

## 2021-01-29 RX ADMIN — POTASSIUM CHLORIDE 10 MEQ: 750 TABLET, EXTENDED RELEASE ORAL at 10:06

## 2021-01-29 RX ADMIN — ENOXAPARIN SODIUM 40 MG: 40 INJECTION SUBCUTANEOUS at 10:08

## 2021-01-29 RX ADMIN — Medication 2000 UNITS: at 10:08

## 2021-01-29 RX ADMIN — CLOPIDOGREL BISULFATE 75 MG: 75 TABLET ORAL at 10:07

## 2021-01-29 RX ADMIN — GABAPENTIN 100 MG: 100 CAPSULE ORAL at 21:45

## 2021-01-29 RX ADMIN — PANTOPRAZOLE SODIUM 40 MG: 40 TABLET, DELAYED RELEASE ORAL at 05:19

## 2021-01-29 RX ADMIN — OXYCODONE HYDROCHLORIDE 2.5 MG: 5 TABLET ORAL at 17:38

## 2021-01-29 RX ADMIN — LEVOTHYROXINE SODIUM 75 MCG: 75 TABLET ORAL at 05:19

## 2021-01-29 RX ADMIN — PRAVASTATIN SODIUM 40 MG: 40 TABLET ORAL at 16:03

## 2021-01-29 RX ADMIN — HYDROCODONE BITARTRATE AND ACETAMINOPHEN 1 TABLET: 5; 325 TABLET ORAL at 05:21

## 2021-01-29 RX ADMIN — CEFTRIAXONE SODIUM 1000 MG: 10 INJECTION, POWDER, FOR SOLUTION INTRAVENOUS at 15:26

## 2021-01-29 RX ADMIN — FINASTERIDE 5 MG: 5 TABLET, FILM COATED ORAL at 10:05

## 2021-01-29 RX ADMIN — HYDROCODONE BITARTRATE AND ACETAMINOPHEN 1 TABLET: 5; 325 TABLET ORAL at 12:00

## 2021-01-29 NOTE — PLAN OF CARE
Problem: Potential for Falls  Goal: Patient will remain free of falls  Description: INTERVENTIONS:  - Assess patient frequently for physical needs  -  Identify cognitive and physical deficits and behaviors that affect risk of falls  -  [place] fall precautions as indicated by assessment   - Educate patient/family on patient safety including physical limitations  - Instruct patient to call for assistance with activity based on assessment  - Modify environment to reduce risk of injury  - Consider OT/PT consult to assist with strengthening/mobility  Outcome: Progressing     Problem: Prexisting or High Potential for Compromised Skin Integrity  Goal: Skin integrity is maintained or improved  Description: INTERVENTIONS:  - Identify patients at risk for skin breakdown  - Assess and monitor skin integrity  - Assess and monitor nutrition and hydration status  - Monitor labs   - Assess for incontinence   - Turn and reposition patient  - Assist with mobility/ambulation  - Relieve pressure over bony prominences  - Avoid friction and shearing  - Provide appropriate hygiene as needed including keeping skin clean and dry  - Evaluate need for skin moisturizer/barrier cream  - Collaborate with interdisciplinary team   - Patient/family teaching  - Consider wound care consult   Outcome: Progressing     Problem: Nutrition/Hydration-ADULT  Goal: Nutrient/Hydration intake appropriate for improving, restoring or maintaining nutritional needs  Description: Monitor and assess patient's nutrition/hydration status for malnutrition  Collaborate with interdisciplinary team and initiate plan and interventions as ordered  Monitor patient's weight and dietary intake as ordered or per policy  Utilize nutrition screening tool and intervene as necessary  Determine patient's food preferences and provide high-protein, high-caloric foods as appropriate       INTERVENTIONS:  - Monitor oral intake, urinary output, labs, and treatment plans  - Assess nutrition and hydration status and recommend course of action  - Evaluate amount of meals eaten  - Assist patient with eating if necessary   - Allow adequate time for meals  - Recommend/ encourage appropriate diets, oral nutritional supplements, and vitamin/mineral supplements  - Order, calculate, and assess calorie counts as needed  - Recommend, monitor, and adjust tube feedings and TPN/PPN based on assessed needs  - Assess need for intravenous fluids  - Provide specific nutrition/hydration education as appropriate  - Include patient/family/caregiver in decisions related to nutrition  Outcome: Progressing     Problem: PAIN - ADULT  Goal: Verbalizes/displays adequate comfort level or baseline comfort level  Description: Interventions:  - Encourage patient to monitor pain and request assistance  - Assess pain using appropriate pain scale  - Administer analgesics based on type and severity of pain and evaluate response  - Implement non-pharmacological measures as appropriate and evaluate response  - Consider cultural and social influences on pain and pain management  - Notify physician/advanced practitioner if interventions unsuccessful or patient reports new pain  Outcome: Progressing     Problem: INFECTION - ADULT  Goal: Absence or prevention of progression during hospitalization  Description: INTERVENTIONS:  - Assess and monitor for signs and symptoms of infection  - Monitor lab/diagnostic results  - Monitor all insertion sites, i e  indwelling lines, tubes, and drains  - Monitor endotracheal if appropriate and nasal secretions for changes in amount and color  - [place] appropriate cooling/warming therapies per order  - Administer medications as ordered  - Instruct and encourage patient and family to use good hand hygiene technique  - Identify and instruct in appropriate isolation precautions for identified infection/condition  Outcome: Progressing  Goal: Absence of fever/infection during neutropenic period  Description: INTERVENTIONS:  - Monitor WBC    Outcome: Progressing     Problem: SAFETY ADULT  Goal: Maintain or return to baseline ADL function  Description: INTERVENTIONS:  -  Assess patient's ability to carry out ADLs; assess patient's baseline for ADL function and identify physical deficits which impact ability to perform ADLs (bathing, care of mouth/teeth, toileting, grooming, dressing, etc )  - Assess/evaluate cause of self-care deficits   - Assess range of motion  - Assess patient's mobility; develop plan if impaired  - Assess patient's need for assistive devices and provide as appropriate  - Encourage maximum independence but intervene and supervise when necessary  - Involve family in performance of ADLs  - Assess for home care needs following discharge   - Consider OT consult to assist with ADL evaluation and planning for discharge  - Provide patient education as appropriate  Outcome: Progressing  Goal: Maintain or return mobility status to optimal level  Description: INTERVENTIONS:  - Assess patient's baseline mobility status (ambulation, transfers, stairs, etc )    - Identify cognitive and physical deficits and behaviors that affect mobility  - Identify mobility aids required to assist with transfers and/or ambulation (gait belt, sit-to-stand, lift, walker, cane, etc )  - [place] fall precautions as indicated by assessment  - Record patient progress and toleration of activity level on Mobility SBAR; progress patient to next Phase/Stage  - Instruct patient to call for assistance with activity based on assessment  - Consider rehabilitation consult to assist with strengthening/weightbearing, etc   Outcome: Progressing     Problem: DISCHARGE PLANNING  Goal: Discharge to home or other facility with appropriate resources  Description: INTERVENTIONS:  - Identify barriers to discharge w/patient and caregiver  - Arrange for needed discharge resources and transportation as appropriate  - Identify discharge learning needs (meds, wound care, etc )  - Arrange for interpretive services to assist at discharge as needed  - Refer to Case Management Department for coordinating discharge planning if the patient needs post-hospital services based on physician/advanced practitioner order or complex needs related to functional status, cognitive ability, or social support system  Outcome: Progressing     Problem: Knowledge Deficit  Goal: Patient/family/caregiver demonstrates understanding of disease process, treatment plan, medications, and discharge instructions  Description: Complete learning assessment and assess knowledge base    Interventions:  - Provide teaching at level of understanding  - Provide teaching via preferred learning methods  Outcome: Progressing

## 2021-01-29 NOTE — PROGRESS NOTES
Pastoral Care Progress Note    2021  Patient: Patricia Bright : 1933  Admission Date & Time: 20213  MRN: [id number] CSN: [id number]             Suzi Libman visited Pt                21 1300   Clinical Encounter Type   Visited With Patient   M[employer]on Encounters   M[employer]on Needs Prayer   Sacramental Encounters   Sacrament of Sick-Anointing Anointed

## 2021-01-29 NOTE — CONSULTS
Orthopedics   Aziza Machado 80 y o  male MRN: [id number]  Unit/Bed#: [facility] 320-01      Chief Complaint:   Right hip and thigh pain    HPI:   80 y  o male complaining of bilateral hip and thigh pain  We are consulted by primary team as family requested evaluation for acute on chronic pain in the right hip and thigh  Per reports, pt is able to ambulate with a rollator at home  He has hx of a femoral shaft fracture multiple years ago  He follows up with [facility] for history of bilateral hip pain  Unable to localize pain, as "all of my leg hurts"  He was admitted 3 days ago for concerns of UTI  He has a very complex list of medical problems as detailed in his chart  Review Of Systems:   · Skin: Normal  · Neuro: See HPI  · Musculoskeletal: See HPI  · 14 point review of systems negative except as stated above     Past Medical History:   Past Medical History:   Diagnosis Date    Anemia     Bladder cancer ([facility] )     Cancer ([facility] )     bladder    Carotid artery occlusion     Cataract     Colon polyp     COPD (chronic obstructive pulmonary disease) ([facility] )     Coronary artery disease     Disease of thyroid gland     History of transfusion     Hyperlipidemia     Hypertension     Hypothyroidism 9/15/2017    Multiple pulmonary nodules     Peptic ulcer     Scoliosis     SIRS [facility]systemic inflammatory response syndrome) ([facility] ) 12/19/2019    Transient cerebral ischemia     Tremors of nervous system        Past Surgical History:   Past Surgical History:   Procedure Laterality Date   [facility] SURGERY      1973, 1987, 2005    BUNIONECTOMY Left     Simple exostectomy (silver procedure)    CAROTID ENDARTERECTOMY Right 09/10/2008    Randy Berry MD    CATARACT EXTRACTION      CATARACT EXTRACTION, BILATERAL      CERVICAL DISCECTOMY  1969    COLONOSCOPY      CORONARY ARTERY BYPASS GRAFT  10/20/2010    x3 (LIMA-LAD, SVG-OM, SVG-RCA)    CYSTOSCOPY      ELBOW SURGERY Right 07/2012    FEMORAL ARTERY - TIBIAL ARTERY BYPASS GRAFT  2011    using reversed saphenous vein from right leg  Yojana Zamroa MD    AK ECHO TRANSESOPHAG R-T 2D W/PRB IMG ACQUISJ I&R N/A 10/6/2020    Procedure: TRANSESOPHAGEAL ECHOCARDIOGRAM (DAVID); Surgeon: Yumiko Cuevas DO;  Location: [facility];  Service: Cardiac Surgery    AK REPLACE AORTIC VALVE OPENFEMORAL ARTERY APPROACH N/A 10/6/2020    Procedure: REPLACEMENT AORTIC VALVE TRANSCATHETER (TAVR) TRANSFEMORAL W/ 26MM MONTALVO BREE S3 ULTRA VALVE(ACCESS ON LEFT);   Surgeon: Yumiko Cuevas DO;  Location: [facility];  Service: Cardiac Surgery    REPLACEMENT TOTAL KNEE Left     SHOULDER SURGERY Right 1997       Family History:  Family history reviewed and non-contributory  Family History   Problem Relation Age of Onset    Cervical cancer Mother     Cervical cancer Sister     Heart disease Sister     Coronary artery disease Sister     Lung cancer Brother        Social History:  Social History     Socioeconomic History    Marital status: /Civil Union     Spouse name: None    Number of children: None    Years of education: None    Highest education level: None   Occupational History    Occupation: Retired   Social Needs    Financial resource strain: None    Food insecurity     Worry: None     Inability: None    Transportation needs     Medical: None     Non-medical: None   Tobacco Use    Smoking status: Former Smoker     Packs/day: 1 00     Years: 50 00     Pack years: 50 00     Types: Cigarettes     Start date: 56     Quit date:      Years since quittin 0    Smokeless tobacco: Never Used   Substance and Sexual Activity    Alcohol use: Not Currently     Alcohol/week: 0 0 standard drinks     Comment: Social     Drug use: Never    Sexual activity: Not Currently   Lifestyle    Physical activity     Days per week: None     Minutes per session: None    Stress: None   Relationships    Social connections     Talks on phone: None Gets together: None     Attends Taoism service: None     Active member of club or organization: None     Attends meetings of clubs or organizations: None     Relationship status: None    Intimate partner violence     Fear of current or ex partner: None     Emotionally abused: None     Physically abused: None     Forced sexual activity: None   Other Topics Concern    None   Social History Narrative    None       Allergies: Allergies   Allergen Reactions    Aleve [Naproxen]      Other reaction(s): FACIAL SWELLING  Category: Allergy;  Annotation - [id number]: lip/eye edema    Ancef [Cefazolin] Anaphylaxis     Hypotension, rash, itching    Augmentin [Amoxicillin-Pot Clavulanate] Diarrhea and Vomiting           Labs:  0   Lab Value Date/Time    HCT 31 7 (L) 01/28/2021 0537    HCT 33 0 (L) 01/27/2021 0601    HCT 35 8 (L) 01/26/2021 1023    HCT 44 2 09/15/2015 0746    HCT 43 7 06/25/2015 0756    HCT 38 9 05/10/2015 0509    HGB 10 5 (L) 01/28/2021 0537    HGB 10 6 (L) 01/27/2021 0601    HGB 11 6 (L) 01/26/2021 1023    HGB 14 6 09/15/2015 0746    HGB 14 4 06/25/2015 0756    HGB 12 9 05/10/2015 0509    INR 1 05 09/07/2020 1702    WBC 5 11 01/28/2021 0537    WBC 3 38 (L) 01/27/2021 0601    WBC 8 35 01/26/2021 1023    WBC 8 12 09/15/2015 0746    WBC 8 24 06/25/2015 0756    WBC 32,240 (H) 05/10/2015 1853    WBC 10 10 05/10/2015 0509    ESR 3 09/15/2015 0746    CRP <3 0 09/15/2015 0746       Meds:    Current Facility-Administered Medications:     acetaminophen (TYLENOL) tablet 975 mg, 975 mg, Oral, Q8H [address], Suzanne Steele, CRNP, 975 mg at 01/29/21 1738    albuterol (PROVENTIL HFA,VENTOLIN HFA) inhaler 2 puff, 2 puff, Inhalation, Q4H PRN, Red Goon, DO    aspirin (ECOTRIN LOW STRENGTH) EC tablet 81 mg, 81 mg, Oral, Daily, Red Goon, DO, 81 mg at 01/29/21 1007    ceftriaxone (ROCEPHIN) 1 g/50 mL in dextrose IVPB, 1,000 mg, Intravenous, Q24H, Emeka Greenwood DO, Last Rate: 100 mL/hr at 01/29/21 1526, 1,000 mg at 01/29/21 1526    cholecalciferol (VITAMIN D3) tablet 2,000 Units, 2,000 Units, Oral, Daily, Bevely Carlie, DO, 2,000 Units at 01/29/21 1008    clopidogrel (PLAVIX) tablet 75 mg, 75 mg, Oral, Daily, Earl Becker MD, 75 mg at 01/29/21 1007    enoxaparin (LOVENOX) subcutaneous injection 40 mg, 40 mg, Subcutaneous, Daily, Bevely Carlie, DO, 40 mg at 01/29/21 1008    finasteride (PROSCAR) tablet 5 mg, 5 mg, Oral, Daily, Bevely Carlie, DO, 5 mg at 01/29/21 1005    furosemide (LASIX) tablet 20 mg, 20 mg, Oral, Daily, Bevely Carlie, DO, 20 mg at 01/29/21 1007    gabapentin (NEURONTIN) capsule 100 mg, 100 mg, Oral, HS, Bevely Carlei, DO, 100 mg at 01/28/21 2209    levothyroxine tablet 75 mcg, 75 mcg, Oral, Early Morning, Bevely Carlie, DO, 75 mcg at 01/29/21 0519    metoprolol succinate (TOPROL-XL) 24 hr tablet 12 5 mg, 12 5 mg, Oral, Daily, Bevely Carlie, DO, 12 5 mg at 01/29/21 1005    ondansetron (ZOFRAN) injection 4 mg, 4 mg, Intravenous, Q6H PRN, Betiti Carlie, DO    oxyCODONE (ROXICODONE) IR tablet 2 5 mg, 2 5 mg, Oral, Q6H PRN, MANI Clancy, 2 5 mg at 01/29/21 1738    pantoprazole (PROTONIX) EC tablet 40 mg, 40 mg, Oral, Early Morning, Bevely Carlie, DO, 40 mg at 01/29/21 0519    potassium chloride (K-DUR,KLOR-CON) CR tablet 10 mEq, 10 mEq, Oral, Daily, Bevely Carlie, DO, 10 mEq at 01/29/21 1006    pravastatin (PRAVACHOL) tablet 40 mg, 40 mg, Oral, Daily With Dinner, Bevely Carlie, DO, 40 mg at 01/29/21 1603    tamsulosin (FLOMAX) capsule 0 4 mg, 0 4 mg, Oral, Daily With Dinner, Bevely Carlie, DO, 0 4 mg at 01/29/21 1604    tiotropium (SPIRIVA) capsule for inhaler 18 mcg, 18 mcg, Inhalation, Daily, Alvin Sexton DO, 18 mcg at 01/29/21 1009    Blood Culture:   Lab Results   Component Value Date    BLOODCX No Growth at 72 hrs  01/26/2021    BLOODCX No Growth at 72 hrs  01/26/2021       Wound Culture:   No results found for: WOUNDCULT    Ins and Outs:  I/O last 24 hours:   In: 770 [P O :770]  Out: 800 [Urine:800]          Physical Exam:   /59 (BP Location: Left arm)   Pulse 62   Temp 98 1 °F (36 7 °C)   Resp 18   Ht 5' 5" (1 651 m)   Wt 65 8 kg (145 lb)   SpO2 92%   BMI 24 13 kg/m²   Gen: Alert and oriented to person, place, slow to respond  HEENT: EOMI, eyes clear, moist mucus membranes, hearing intact  Respiratory: Bilateral chest rise  No audible wheezing found  Cardiovascular: Regular Rate and Rhythm  Abdomen: soft nontender/nondistended  Musculoskeletal: right lower extremity  · Pt lying in lateral decubitus position with right side up  · Skin intact over the hip and knee  · Painful range of motion of hip joint but no micromotion tenderness  · TTP along the thigh, hip and knee, nonfocal  · Sensation appears grossly intact to light touch  · [facility]    Radiology:   I personally reviewed the films  X-rays of right hip and right femur shows moderate degenerative changes in the hip joint, an old femoral shaft fracture with callus formation and significant shortening    _*_*_*_*_*_*_*_*_*_*_*_*_*_*_*_*_*_*_*_*_*_*_*_*_*_*_*_*_*_*_*_*_*_*_*_*_*_*_*_*_*    Assessment:  80 y  o male with right hip and thigh chronic pain likely multifactorial  No interventions warranted at this time    Plan:   · WBAT BL LE  · PT  · Pain control per primary team  · Ortho signing off      Jesika Celis MD

## 2021-01-29 NOTE — ASSESSMENT & PLAN NOTE
· Status post cystoscopy day prior to admission  · Rocephin and Vanco - patient has history of Enterococcus faecalis and E coli UTI as well as prior history of Klebsiella UTI  · Urine cultures showing 100K colonies mixed organisms, blood cultures no growth 48  hours, continue Rocephin and discontinued vancomycin  1/27- given clinical improvement anticipate transitioning to oral antibiotics in next 24 hours,  · Will need rehab per pt recommendations

## 2021-01-29 NOTE — ASSESSMENT & PLAN NOTE
· Significant pain in right lateral hip / thigh area  · - will consult orthopedics for input in regard to increased pain   · Will transition norco at this time as pt is now with pin prick pupils and mood change   · - still able to answer questions appropriately   · Start atc tylenol and reduce dose of oxy to 2 5mg q 8hrs (from norco 1 tab q 8hrs)   · Started on admission on Neurontin qhs  · Patient has chronic history bilateral hip pain sees [facility] orthopedic physician  · - however now complaining of increased right hip versus femur pain  · - very difficult for physical therapy to work with patient as he is barely able to stand, patient reports that at home he is fairly independent with roller walker  · - Pt reports prior fall but was seen by his orthopedist outpt for the left hip   · Obtained left hip and right femur / hip xray with no new acute fractures or concerns  · Left hip xray: No acute osseous abnormality   Mild left hip degenerative change  · Right femur xray: There is no acute fracture or dislocation  Gilda Gaston is an old mid femoral shaft fracture with marked deformity due to displacement and bridging callus formation

## 2021-01-29 NOTE — WOUND OSTOMY CARE
Consult Note - Wound   Spenser Rocha 80 y o  male MRN: [id number]  Unit/Bed#: [facility] 320-01 Encounter: [id number]      History and Present Illness:  Patient is seen for skin assessment  Patient examine dinb ed with primary nurse present  Patient is continent and has an urinary catheter in place  Patient does not like repositioning in bed secondary to pain  Assessment Findings:   1  Mucosal pressure injury vs MASD on penis  Two areas are present on the underside of his penis            2  HA DTI on buttocks          See flowsheets for details  Skin care Plan:    1-Cleanse sacro-buttocks with soap and water  Apply Allevyn foam  Abad with T for treatment  Change every three days and PRN  check skin q-shift  2-Turn/reposition q2h or when medically stable for pressure re-distribution on skin   3-Elevate heels to offload pressure  4-Moisturize skin daily with skin nourishing cream  5-Ehob cushion in chair when out of bed  6-Hydraguard to bilateral heels and penis BID and PRN  Call or tigertext with any questions  Wound Care will continue to follow    Wound 01/29/21 Penis Posterior (Active)   Wound Image    01/29/21 1224   Wound Description Dry;Fragile;Pink;Slough; White 01/29/21 1224   Anisa-wound Assessment Clean;Dry; Intact 01/29/21 1224   Wound Length (cm) 0 5 cm 01/29/21 1224   Wound Width (cm) 0 5 cm 01/29/21 1224   Wound Surface Area (cm^2) 0 25 cm^2 01/29/21 1224   Non-staged Wound Description Partial thickness 01/29/21 1224   Treatments Cleansed 01/29/21 1224   Dressing Moisture barrier 01/29/21 1224       Wound 01/29/21 Pressure Injury Perineum (Active)   Wound Image   01/29/21 1230   Wound Description Clean;Dry; Intact;Fragile;Light purple;Non-blanchable erythema 01/29/21 1238   Pressure Injury Stage DTPI 01/29/21 1230   Anisa-wound Assessment Clean;Dry; Intact 01/29/21 1238   Wound Length (cm) 9 cm 01/29/21 1230   Wound Width (cm) 7 cm 01/29/21 1230   Wound Surface Area (cm^2) 63 cm^2 01/29/21 1230 Dressing Foam, Silicon (eg   Allevyn, etc) 01/29/21 4622

## 2021-01-29 NOTE — ASSESSMENT & PLAN NOTE
· POA e/b fever and tachycardia  · 2/2 UTI  · Blood cultures no growth at 72 hours, urine cultures mixed contaminants  · Anaphylaxis w/ Ancef but tolerates Rocephin currently day #3  · Continue Rocephin, discontinue vancomycin given thus far negative infectious workup  · Monitor CBC, procalcitonin and VS  · Anticipate transitioning to oral antibiotics

## 2021-01-29 NOTE — PROGRESS NOTES
Cardiac Rehabilitation Plan of Care   90 day       Today's date: 2021   # of Exercise Sessions Completed: 25  Patient name: Parrish Hanson      : 1933  Age: 80 y o  MRN: [id number]  Referring Physician: Manuel Pryro DO  Cardiologist: Dr Barbra Camargo  Provider: Estela Holloway  Clinician: Garret Gomez RN    Dx:   Encounter Diagnosis   Name Primary?  S/P TAVR (transcatheter aortic valve replacement)      Date of onset: 10/6/2020      SUMMARY OF PROGRESS: Flavio Beauchamp is compliant attending cardiac rehab exercise sessions 3x/wk  He has been out for the last week as he is currently hospitalized  He tolerates 40 mins at 1 6 - 1 9 METs plus wt training  Resting BP always well controlled, 122/60 - 124/76 with appropriate response to exercise reaching 128/60 - 144/70  NSR, RBBB on tele with occ PVCs and rare couplet observed  RHR 79 - 85 ExHR 85 - 99  He is tolerating progression of intensity levels to maintain RPE 4-6  No cardiac complaints  Patient has been working on  dietary modifications with the goal of rare red/processed meats, low fat dairy, reduced added sugars and refined flours  When addressed, the patient denies  feelings of depression/anxiety  Patient reports excellent social/emotional support  Patient attends group educational classes on cardiac risk factor modification  He has added home exercise 5 days/wk  His exercise program will be progressed as tolerated to maintain RPE 4-6  Pt will continue to be educated on lifestyle modifications and encouraged to supplement with a home exercise program to reach the following goals: resume ADLs with increased strength and walking daily laps in his house in the next 30 days       Medication compliance: Yes   Comments:   Fall Risk: High   Comments: walks with walker    EKG changes: 1st degree AV block, RBBB, occ PVCs, rare couplet      EXERCISE ASSESSMENT and PLAN    Current Exercise Program in Rehab:       Frequency: 3 days/week       Minutes: 35 continuous mins       METS: 1 6-1 9           HR: 85-99   RPE: 4-5         Modalities: UBE and NuStep      Exercise Progression 30 Day Goals :    Frequency: 3 days/week       Minutes: 40   METS: 1 8-2 3   HR:     RPE: 4-5   Modalities: UBE, NuStep and Room walking    Strength training: Will be added following at least 8 weeks post surgery and 8-10 monitored sessions  as tolerated   Modalities: Chest Press, Lateral Raise, Seated Row, Sit to Stands and Wall push-ups  Progressing:  Pt is progressing and showing improvement  toward the following goals:  10% improvement in functional capacity, Reduced Dyspnea with physical activity  0-110, improved DASI score by 10%, Increase in peak CR METs by 40%, Improved 6MWT distance by 10%, Resume ADLs with increased strength and Exercise 5 days/wk, >150mins/wk   , Will continue to educate and progress as tolerated  Home Exercise: walking laps inside the house 2-3 times/day    Goals: 10% improvement in functional capacity, Reduced Dyspnea with physical activity  0-10, improved DASI score by 10%, Increase in peak CR METs by 40%, Improved 6MWT distance by 10%, Resume ADLs with increased strength and Exercise 5 days/wk, >150mins/wk  Education: Benefit of exercise for CAD risk factors, signs and sxs and RPE scale   Plan:Education class: Risk Factors for Heart Disease  Readiness to change: Preparation:  (Getting ready to change)       NUTRITION ASSESSMENT AND PLAN    Weight control:    Starting weight: 149 4 lb   Current weight:   147  Waist circumference:    Startin in   Current:    Diabetes: N/A  Lipid management: Last lipid profile 10/21/2020  Chol 96  TRG 60  HDL 41  LDL 43  Goals:Improved Rate Your Plate score  >28  Education: heart healthy eating  low sodium diet  hydration  healthy choices while dining out  Education Class: Heart Healthy Eating  Education Class:  Label Reading  Progressing: yes - consistent wt   Wife is following heart healthy cooking, nicole attended heart healthy eating class  Plan:  Increase PUFA and MUFA, increase fruits/vegs, Reduce added sugars <25g/day and reduce dietary sodium  Readiness to change: Action:  (Changing behavior)      PSYCHOSOCIAL ASSESSMENT AND PLAN    Emotional:  Depression assessment:  PHQ-9 = 0 = Minimal Depression            Anxiety measure:  HENRIQUE-7 = 0 = Not anxious  Self-reported stress level:  1  Social support: Excellent  Goals:  Physical Fitness in [place] Score < 3, Daily Activity in [place] Score < 3, Pain in [place] Score < 3, Overall Health in [place] Score < 3, Quality of Life in [place] Score < 3 , Improved appetite and increased energy  Education: signs/sxs of depression, benefits of positive support system, coping mechanisms and depression and CAD  Progressing: Pt is progressing and showing improvement  toward the following goals:  Physical Fitness in [place] Score < 3, Daily Activity in [place] Score < 3, Pain in [place] Score < 3, Overall Health in [place] Score < 3, Quality of Life in [place] Score < 3 , Improved appetite and increased energy  , Will continue to educate and progress as tolerated      Plan: Class: Stress and Your Health, Class: Relaxation and Practice relaxation techniques  Readiness to change: Action:  (Changing behavior)      OTHER CORE COMPONENTS     Tobacco:   Social History     Tobacco Use   Smoking Status Former Smoker    Packs/day: 1 00    Years: 50 00    Pack years: 50 00    Types: Cigarettes    Start date:     Quit date: Saba Clark Years since quittin 0   Smokeless Tobacco Never Used       Tobacco Use Intervention:   N/A:  Patient quit smoking 25 yr ago    Blood pressure:    Restin//76   Exercise: 128//70    Goals: consistent BP < 130/80, reduced dietary sodium <2300mg, consistent exercise >150 mins/wk and medication compliance  Education:  understanding HTN and CAD and low sodium diet and HTN  Progressing: Pt is progressing and showing improvement  toward the following goals:  consistent BP <130/80, reduced dietary sodium, medication compliance   , Will continue to educate and progress as tolerated    Plan: Class: Understanding Heart Disease and Class: Common Heart Medications, monitor daily wt and resting BP  Readiness to change: Action:  (Changing behavior)

## 2021-01-30 ENCOUNTER — APPOINTMENT (INPATIENT)
Dept: RADIOLOGY | Facility: HOSPITAL | Age: 86
DRG: 872 | End: 2021-01-30
Payer: MEDICARE

## 2021-01-30 LAB
ALBUMIN SERPL BCP-MCNC: 2.9 G/DL (ref 3.5–5)
ALP SERPL-CCNC: 94 U/L (ref 46–116)
ALT SERPL W P-5'-P-CCNC: 37 U/L (ref 12–78)
ANION GAP SERPL CALCULATED.3IONS-SCNC: 3 MMOL/L (ref 4–13)
AST SERPL W P-5'-P-CCNC: 82 U/L (ref 5–45)
BASOPHILS # BLD AUTO: 0.01 THOUSANDS/ΜL (ref 0–0.1)
BASOPHILS NFR BLD AUTO: 0 % (ref 0–1)
BILIRUB SERPL-MCNC: 0.58 MG/DL (ref 0.2–1)
BUN SERPL-MCNC: 13 MG/DL (ref 5–25)
CALCIUM ALBUM COR SERPL-MCNC: 9.5 MG/DL (ref 8.3–10.1)
CALCIUM SERPL-MCNC: 8.6 MG/DL (ref 8.3–10.1)
CHLORIDE SERPL-SCNC: 102 MMOL/L (ref 100–108)
CO2 SERPL-SCNC: 28 MMOL/L (ref 21–32)
CREAT SERPL-MCNC: 0.92 MG/DL (ref 0.6–1.3)
EOSINOPHIL # BLD AUTO: 0.35 THOUSAND/ΜL (ref 0–0.61)
EOSINOPHIL NFR BLD AUTO: 7 % (ref 0–6)
ERYTHROCYTE [DISTWIDTH] IN BLOOD BY AUTOMATED COUNT: 12.8 % (ref 11.6–15.1)
GFR SERPL CREATININE-BSD FRML MDRD: 75 ML/MIN/1.73SQ M
GLUCOSE SERPL-MCNC: 81 MG/DL (ref 65–140)
HCT VFR BLD AUTO: 34.1 % (ref 36.5–49.3)
HGB BLD-MCNC: 11.2 G/DL (ref 12–17)
IMM GRANULOCYTES # BLD AUTO: 0.02 THOUSAND/UL (ref 0–0.2)
IMM GRANULOCYTES NFR BLD AUTO: 0 % (ref 0–2)
LYMPHOCYTES # BLD AUTO: 0.62 THOUSANDS/ΜL (ref 0.6–4.47)
LYMPHOCYTES NFR BLD AUTO: 12 % (ref 14–44)
MCH RBC QN AUTO: 31.5 PG (ref 26.8–34.3)
MCHC RBC AUTO-ENTMCNC: 32.8 G/DL (ref 31.4–37.4)
MCV RBC AUTO: 96 FL (ref 82–98)
MONOCYTES # BLD AUTO: 0.75 THOUSAND/ΜL (ref 0.17–1.22)
MONOCYTES NFR BLD AUTO: 15 % (ref 4–12)
NEUTROPHILS # BLD AUTO: 3.28 THOUSANDS/ΜL (ref 1.85–7.62)
NEUTS SEG NFR BLD AUTO: 66 % (ref 43–75)
NRBC BLD AUTO-RTO: 0 /100 WBCS
PLATELET # BLD AUTO: 95 THOUSANDS/UL (ref 149–390)
PMV BLD AUTO: 11.7 FL (ref 8.9–12.7)
POTASSIUM SERPL-SCNC: 3.7 MMOL/L (ref 3.5–5.3)
PROT SERPL-MCNC: 6.4 G/DL (ref 6.4–8.2)
RBC # BLD AUTO: 3.56 MILLION/UL (ref 3.88–5.62)
SODIUM SERPL-SCNC: 133 MMOL/L (ref 136–145)
WBC # BLD AUTO: 5.03 THOUSAND/UL (ref 4.31–10.16)

## 2021-01-30 PROCEDURE — 80053 COMPREHEN METABOLIC PANEL: CPT | Performed by: NURSE PRACTITIONER

## 2021-01-30 PROCEDURE — 99232 SBSQ HOSP IP/OBS MODERATE 35: CPT | Performed by: NURSE PRACTITIONER

## 2021-01-30 PROCEDURE — NC001 PR NO CHARGE: Performed by: ORTHOPAEDIC SURGERY

## 2021-01-30 PROCEDURE — 72100 X-RAY EXAM L-S SPINE 2/3 VWS: CPT

## 2021-01-30 PROCEDURE — 99222 1ST HOSP IP/OBS MODERATE 55: CPT | Performed by: ORTHOPAEDIC SURGERY

## 2021-01-30 PROCEDURE — 94760 N-INVAS EAR/PLS OXIMETRY 1: CPT

## 2021-01-30 PROCEDURE — 85025 COMPLETE CBC W/AUTO DIFF WBC: CPT | Performed by: NURSE PRACTITIONER

## 2021-01-30 RX ADMIN — TAMSULOSIN HYDROCHLORIDE 0.4 MG: 0.4 CAPSULE ORAL at 17:30

## 2021-01-30 RX ADMIN — ACETAMINOPHEN 975 MG: 325 TABLET, FILM COATED ORAL at 13:12

## 2021-01-30 RX ADMIN — OXYCODONE HYDROCHLORIDE 2.5 MG: 5 TABLET ORAL at 08:32

## 2021-01-30 RX ADMIN — LEVOTHYROXINE SODIUM 75 MCG: 75 TABLET ORAL at 07:18

## 2021-01-30 RX ADMIN — CEFTRIAXONE SODIUM 1000 MG: 10 INJECTION, POWDER, FOR SOLUTION INTRAVENOUS at 12:00

## 2021-01-30 RX ADMIN — TIOTROPIUM BROMIDE 18 MCG: 18 CAPSULE ORAL; RESPIRATORY (INHALATION) at 08:34

## 2021-01-30 RX ADMIN — GABAPENTIN 100 MG: 100 CAPSULE ORAL at 21:24

## 2021-01-30 RX ADMIN — Medication 2000 UNITS: at 08:33

## 2021-01-30 RX ADMIN — PRAVASTATIN SODIUM 40 MG: 40 TABLET ORAL at 17:30

## 2021-01-30 RX ADMIN — FINASTERIDE 5 MG: 5 TABLET, FILM COATED ORAL at 08:32

## 2021-01-30 RX ADMIN — ASPIRIN 81 MG: 81 TABLET, COATED ORAL at 08:33

## 2021-01-30 RX ADMIN — METOPROLOL SUCCINATE 12.5 MG: 25 TABLET, EXTENDED RELEASE ORAL at 08:34

## 2021-01-30 RX ADMIN — OXYCODONE HYDROCHLORIDE 2.5 MG: 5 TABLET ORAL at 02:11

## 2021-01-30 RX ADMIN — ENOXAPARIN SODIUM 40 MG: 40 INJECTION SUBCUTANEOUS at 08:33

## 2021-01-30 RX ADMIN — OXYCODONE HYDROCHLORIDE 2.5 MG: 5 TABLET ORAL at 21:23

## 2021-01-30 RX ADMIN — CLOPIDOGREL BISULFATE 75 MG: 75 TABLET ORAL at 08:34

## 2021-01-30 RX ADMIN — ACETAMINOPHEN 975 MG: 325 TABLET, FILM COATED ORAL at 21:24

## 2021-01-30 RX ADMIN — FUROSEMIDE 20 MG: 20 TABLET ORAL at 08:32

## 2021-01-30 RX ADMIN — PANTOPRAZOLE SODIUM 40 MG: 40 TABLET, DELAYED RELEASE ORAL at 07:18

## 2021-01-30 RX ADMIN — ACETAMINOPHEN 975 MG: 325 TABLET, FILM COATED ORAL at 07:17

## 2021-01-30 RX ADMIN — POTASSIUM CHLORIDE 10 MEQ: 750 TABLET, EXTENDED RELEASE ORAL at 08:32

## 2021-01-30 NOTE — ASSESSMENT & PLAN NOTE
· Significant pain in right lateral hip / thigh area  · - will consult orthopedics for input in regard to increased pain   · Will transition norco at this time as pt is now with pin prick pupils and mood change   · - still able to answer questions appropriately   · Start atc tylenol and reduce dose of oxy to 2 5mg q 8hrs (from norco 1 tab q 8hrs)   · Started on admission on Neurontin qhs  · Patient has chronic history bilateral hip pain sees [facility] orthopedic physician  · - however now complaining of increased right hip versus femur pain  · - very difficult for physical therapy to work with patient as he is barely able to stand, patient reports that at home he is fairly independent with roller walker  · - Pt reports prior fall but was seen by his orthopedist outpt for the left hip   · Obtained left hip and right femur / hip xray with no new acute fractures or concerns  · Left hip xray: No acute osseous abnormality   Mild left hip degenerative change  · Right femur xray: There is no acute fracture or dislocation  Arianneia [place] is an old mid femoral shaft fracture with marked deformity due to displacement and bridging callus formation     · Lumbar xray spine today

## 2021-01-30 NOTE — PROGRESS NOTES
Progress Note - Josy Carrillo 1933, 80 y o  male MRN: [id number]    Unit/Bed#: [facility] 320-01 Encounter: [id number]    Primary Care Provider: Isabel Jordan MD   Date and time admitted to hospital: 1/26/2021  9:29 AM        * Sepsis without acute organ dysfunction ([address] )  Assessment & Plan  · POA e/b fever and tachycardia  · 2/2 UTI  · Blood cultures no growth at 72 hours, urine cultures mixed contaminants  · Anaphylaxis w/ Ancef but tolerates Rocephin currently day #3  · Continue Rocephin, discontinue vancomycin given thus far negative infectious workup discontinue tomorrow day #4/5   · Monitor CBC, procalcitonin and VS  · Anticipate transitioning to oral antibiotics      Urine retention  Assessment & Plan  · Uroxatral equivalent  · Porter replaced after failed void trial after cystoscopy  · Urology follow-up outpatient  · Patient may require suprapubic catheter per Urology    UTI (urinary tract infection)  Assessment & Plan  · Status post cystoscopy day prior to admission  · Rocephin and Vanco - patient has history of Enterococcus faecalis and E coli UTI as well as prior history of Klebsiella UTI  · Urine cultures showing 100K colonies mixed organisms, blood cultures no growth 48  hours, continue Rocephin and discontinued vancomycin  1/27- given clinical improvement anticipate transitioning to oral antibiotics in next 24 hours,  · Will need rehab per pt recommendations     Bilateral hip pain  Assessment & Plan  · Significant pain in right lateral hip / thigh area     · - will consult orthopedics for input in regard to increased pain   · Will transition norco at this time as pt is now with pin prick pupils and mood change   · - still able to answer questions appropriately   · Start atc tylenol and reduce dose of oxy to 2 5mg q 8hrs (from norco 1 tab q 8hrs)   · Started on admission on Neurontin qhs  · Patient has chronic history bilateral hip pain sees [facility] orthopedic physician  · - however now complaining of increased right hip versus femur pain  · - very difficult for physical therapy to work with patient as he is barely able to stand, patient reports that at home he is fairly independent with roller walker  · - Pt reports prior fall but was seen by his orthopedist outpt for the left hip   · Obtained left hip and right femur / hip xray with no new acute fractures or concerns  · Left hip xray: No acute osseous abnormality   Mild left hip degenerative change  · Right femur xray: There is no acute fracture or dislocation  Theresa Mad is an old mid femoral shaft fracture with marked deformity due to displacement and bridging callus formation  · Lumbar xray spine today     Hyponatremia  Assessment & Plan  · Sodium improved   ·     Pulmonary emphysema (HCC)  Assessment & Plan  · Does not appear in exacerbation  · Resp protocol    Coronary artery disease  Assessment & Plan  · BB, ASA,statin  · Resume Plavix, the patient is on dual anti-platelet therapy home regimen    Severe aortic stenosis  Assessment & Plan  S/p TAVR        VTE Pharmacologic Prophylaxis:   Pharmacologic: Enoxaparin (Lovenox)  Mechanical VTE Prophylaxis in Place: Yes    Patient Centered Rounds: I have performed bedside rounds with nursing staff today  Discussions with Specialists or Other Care Team Provider: nursing     Education and Discussions with Family / Patient: patient and spoke with wife and daughter on the phone     Time Spent for Care: 45 minutes  More than 50% of total time spent on counseling and coordination of care as described above  Current Length of Stay: 4 day(s)    Current Patient Status: Inpatient   Certification Statement: The patient will continue to require additional inpatient hospital stay due to pending insurance     Discharge Plan: discharge to rehab will have pt resee pt need to get oob need assist to get oob       Code Status: Level 1 - Full Code      Subjective:   Patient's mental status is a little more improved than yesterday  Pupils are still small but improved from day prior  Patient reports some discomfort when laying on his left side he complains of the pain on his right side  Discussed flipping over to other side to avoid skin breakdown  Patient states it will be difficult but he will try  Ordered lumbar spine x-rays per Orthopedics notation  Patient reports that he is eating and drinking  Discussion with family had as patient had reported that orthopedics mentions some shots possibly in the knees some eye air stated that I would talk with Orthopedics    Objective:     Vitals:   Temp (24hrs), Av 2 °F (36 8 °C), Min:98 1 °F (36 7 °C), Max:98 3 °F (36 8 °C)    Temp:  [98 1 °F (36 7 °C)-98 3 °F (36 8 °C)] 98 2 °F (36 8 °C)  HR:  [61-69] 69  Resp:  [16-18] 18  BP: ()/(44-59) 126/44  SpO2:  [92 %-95 %] 95 %  Body mass index is 24 13 kg/m²  Input and Output Summary (last 24 hours): Intake/Output Summary (Last 24 hours) at 2021 1204  Last data filed at 2021 0836  Gross per 24 hour   Intake 470 ml   Output 1900 ml   Net -1430 ml       Physical Exam:     Physical Exam  Constitutional:       General: He is not in acute distress  Appearance: He is not ill-appearing, toxic-appearing or diaphoretic  HENT:      Head: Normocephalic and atraumatic  Mouth/Throat:      Pharynx: Oropharynx is clear  Eyes:      Conjunctiva/sclera: Conjunctivae normal    Cardiovascular:      Rate and Rhythm: Normal rate  Heart sounds: No murmur  No friction rub  No gallop  Pulmonary:      Effort: No respiratory distress  Breath sounds: No stridor  No wheezing, rhonchi or rales  Chest:      Chest wall: No tenderness  Abdominal:      General: There is no distension  Palpations: There is no mass  Tenderness: There is no abdominal tenderness  There is no right CVA tenderness, left CVA tenderness, guarding or rebound  Hernia: No hernia is present     Musculoskeletal: General: Tenderness (right lateral thigh and hip ) and deformity (right leg shorter than left ) present  No swelling or signs of injury  Right lower leg: No edema  Left lower leg: No edema  Psychiatric:      Comments: Lethargic            Additional Data:     Labs:    Results from last 7 days   Lab Units 01/30/21  0610   WBC Thousand/uL 5 03   HEMOGLOBIN g/dL 11 2*   HEMATOCRIT % 34 1*   PLATELETS Thousands/uL 95*   NEUTROS PCT % 66   LYMPHS PCT % 12*   MONOS PCT % 15*   EOS PCT % 7*     Results from last 7 days   Lab Units 01/30/21  0610   SODIUM mmol/L 133*   POTASSIUM mmol/L 3 7   CHLORIDE mmol/L 102   CO2 mmol/L 28   BUN mg/dL 13   CREATININE mg/dL 0 92   ANION GAP mmol/L 3*   CALCIUM mg/dL 8 6   ALBUMIN g/dL 2 9*   TOTAL BILIRUBIN mg/dL 0 58   ALK PHOS U/L 94   ALT U/L 37   AST U/L 82*   GLUCOSE RANDOM mg/dL 81                 Results from last 7 days   Lab Units 01/27/21  0601 01/26/21  1023   LACTIC ACID mmol/L  --  1 1   PROCALCITONIN ng/ml 0 09 0 09           * I Have Reviewed All Lab Data Listed Above  * Additional Pertinent Lab Tests Reviewed: All Labs Within Last 24 Hours Reviewed    Imaging:    Imaging Reports Reviewed Today Include: reviewed     Recent Cultures (last 7 days):     Results from last 7 days   Lab Units 01/26/21  1032 01/26/21  1023   BLOOD CULTURE   --  No Growth After 4 Days  No Growth After 4 Days     URINE CULTURE  >100,000 cfu/ml   --        Last 24 Hours Medication List:   Current Facility-Administered Medications   Medication Dose Route Frequency Provider Last Rate    acetaminophen  975 mg Oral [facility] MANI Carmona      albuterol  2 puff Inhalation Q4H PRN Sethi Search, DO      aspirin  81 mg Oral Daily Sethi Search, DO      cefTRIAXone  1,000 mg Intravenous Q24H Sethi Search, DO 1,000 mg (01/29/21 1526)    cholecalciferol  2,000 Units Oral Daily Sethi Search, DO      clopidogrel  75 mg Oral Daily Patricio Vo MD      enoxaparin  40 mg Subcutaneous Daily Lance Hope, DO      finasteride  5 mg Oral Daily Lance Hope, DO      furosemide  20 mg Oral Daily Lance Hope, DO      gabapentin  100 mg Oral HS Lance Hope, DO      levothyroxine  75 mcg Oral Early Morning Lance Hope, DO      metoprolol succinate  12 5 mg Oral Daily Lance Hope, DO      ondansetron  4 mg Intravenous Q6H PRN Lance Hope, DO      oxyCODONE  2 5 mg Oral Q6H PRN MANI Leal      pantoprazole  40 mg Oral Early Morning Lance Hope, DO      potassium chloride  10 mEq Oral Daily Lance Hope, DO      pravastatin  40 mg Oral Daily [facility], DO      tamsulosin  0 4 mg Oral Daily With [facility], DO      tiotropium  18 mcg Inhalation Daily Lance Hope, DO          Today, Patient Was Seen By: MANI Leal    ** Please Note: Dictation voice to text software may have been used in the creation of this document   **

## 2021-01-30 NOTE — DISCHARGE INSTRUCTIONS
You can follow up with Dr Gregorio Hoffmann the orthopedic spine surgeon or the Neurosurgeon that you have seen in the past

## 2021-01-30 NOTE — PROGRESS NOTES
Progress Note - Samul Osgood 1933, 80 y o  male MRN: [id number]    Unit/Bed#: [facility] 320-01 Encounter: [id number]    Primary Care Provider: Burak Hermosillo MD   Date and time admitted to hospital: 1/26/2021  9:29 AM        * Sepsis without acute organ dysfunction ([address] )  Assessment & Plan  · POA e/b fever and tachycardia  · 2/2 UTI  · Blood cultures no growth at 72 hours, urine cultures mixed contaminants  · Anaphylaxis w/ Ancef but tolerates Rocephin currently day #3  · Continue Rocephin, discontinue vancomycin given thus far negative infectious workup  · Monitor CBC, procalcitonin and VS  · Anticipate transitioning to oral antibiotics      Urine retention  Assessment & Plan  · Uroxatral equivalent  · Porter replaced after failed void trial after cystoscopy  · Urology follow-up outpatient  · Patient may require suprapubic catheter per Urology    UTI (urinary tract infection)  Assessment & Plan  · Status post cystoscopy day prior to admission  · Rocephin and Vanco - patient has history of Enterococcus faecalis and E coli UTI as well as prior history of Klebsiella UTI  · Urine cultures showing 100K colonies mixed organisms, blood cultures no growth 48  hours, continue Rocephin and discontinued vancomycin  1/27- given clinical improvement anticipate transitioning to oral antibiotics in next 24 hours,  · Will need rehab per pt recommendations     Bilateral hip pain  Assessment & Plan  · Significant pain in right lateral hip / thigh area     · - will consult orthopedics for input in regard to increased pain   · Will transition norco at this time as pt is now with pin prick pupils and mood change   · - still able to answer questions appropriately   · Start atc tylenol and reduce dose of oxy to 2 5mg q 8hrs (from norco 1 tab q 8hrs)   · Started on admission on Neurontin qhs  · Patient has chronic history bilateral hip pain sees [facility] orthopedic physician  · - however now complaining of increased right hip versus femur pain  · - very difficult for physical therapy to work with patient as he is barely able to stand, patient reports that at home he is fairly independent with roller walker  · - Pt reports prior fall but was seen by his orthopedist outpt for the left hip   · Obtained left hip and right femur / hip xray with no new acute fractures or concerns  · Left hip xray: No acute osseous abnormality   Mild left hip degenerative change  · Right femur xray: There is no acute fracture or dislocation  Nancy Cobb is an old mid femoral shaft fracture with marked deformity due to displacement and bridging callus formation  Hyponatremia  Assessment & Plan  · Sodium improved   ·     Pulmonary emphysema (HCC)  Assessment & Plan  · Does not appear in exacerbation  · Resp protocol    Coronary artery disease  Assessment & Plan  · BB, ASA,statin  · Resume Plavix, the patient is on dual anti-platelet therapy home regimen    Severe aortic stenosis  Assessment & Plan  S/p TAVR        VTE Pharmacologic Prophylaxis:   Pharmacologic: Enoxaparin (Lovenox)  Mechanical VTE Prophylaxis in Place: Yes    Patient Centered Rounds: I have performed bedside rounds with nursing staff today  Discussions with Specialists or Other Care Team Provider: nursing /orthopedics     Education and Discussions with Family / Patient: patient called wife and pts daughter to update     Time Spent for Care: 45 minutes  More than 50% of total time spent on counseling and coordination of care as described above  Current Length of Stay: 3 day(s)    Current Patient Status: Inpatient   Certification Statement: The patient will continue to require additional inpatient hospital stay due to inability to stand and weakness pending rehab     Discharge Plan: will have referrals placed for rehab     Code Status: Level 1 - Full Code      Subjective:   Pt is more lethargic today no n/v/d but does have ongoing pain in right hip   Pts pupils pinprick pt still insisting he has pain in right lateral hip   He states he has pain in left but the right hip is with severe pain   No sob no chest pain or palpitations     Objective:     Vitals:   Temp (24hrs), Av 4 °F (36 9 °C), Min:98 1 °F (36 7 °C), Max:98 8 °F (37 1 °C)    Temp:  [98 1 °F (36 7 °C)-98 8 °F (37 1 °C)] 98 1 °F (36 7 °C)  HR:  [62-80] 62  Resp:  [18-20] 18  BP: (100-121)/(50-59) 121/59  SpO2:  [89 %-92 %] 92 %  Body mass index is 24 13 kg/m²  Input and Output Summary (last 24 hours): Intake/Output Summary (Last 24 hours) at 2021  Last data filed at 2021 1445  Gross per 24 hour   Intake 530 ml   Output 800 ml   Net -270 ml       Physical Exam:     Physical Exam  Constitutional:       General: He is not in acute distress  Appearance: He is not ill-appearing, toxic-appearing or diaphoretic  HENT:      Head: Normocephalic and atraumatic  Eyes:      Conjunctiva/sclera: Conjunctivae normal    Cardiovascular:      Rate and Rhythm: Normal rate  Heart sounds: No murmur  No gallop  Pulmonary:      Effort: No respiratory distress  Breath sounds: No stridor  No wheezing, rhonchi or rales  Chest:      Chest wall: No tenderness  Abdominal:      General: There is no distension  Palpations: There is no mass  Tenderness: There is no abdominal tenderness  There is no right CVA tenderness, left CVA tenderness, guarding or rebound  Hernia: No hernia is present  Musculoskeletal:         General: No swelling, tenderness, deformity or signs of injury  Right lower leg: No edema  Left lower leg: No edema  Skin:     Coloration: Skin is pale  Skin is not jaundiced  Findings: No bruising, erythema, lesion or rash  Neurological:      Mental Status: He is alert             Additional Data:     Labs:    Results from last 7 days   Lab Units 21  0537   WBC Thousand/uL 5 11   HEMOGLOBIN g/dL 10 5*   HEMATOCRIT % 31 7*   PLATELETS Thousands/uL 87*   NEUTROS PCT % 58   LYMPHS PCT % 17   MONOS PCT % 18*   EOS PCT % 7*     Results from last 7 days   Lab Units 01/28/21  0537  01/26/21  1023   SODIUM mmol/L 135*   < > 132*   POTASSIUM mmol/L 3 7   < > 4 0   CHLORIDE mmol/L 103   < > 102   CO2 mmol/L 29   < > 28   BUN mg/dL 11   < > 14   CREATININE mg/dL 0 83   < > 1 06   ANION GAP mmol/L 3*   < > 2*   CALCIUM mg/dL 8 0*   < > 9 0   ALBUMIN g/dL  --   --  4 1   TOTAL BILIRUBIN mg/dL  --   --  0 73   ALK PHOS U/L  --   --  110   ALT U/L  --   --  19   AST U/L  --   --  24   GLUCOSE RANDOM mg/dL 95   < > 100    < > = values in this interval not displayed  Results from last 7 days   Lab Units 01/27/21  0601 01/26/21  1023   LACTIC ACID mmol/L  --  1 1   PROCALCITONIN ng/ml 0 09 0 09           * I Have Reviewed All Lab Data Listed Above  * Additional Pertinent Lab Tests Reviewed: All Labs Within Last 24 Hours Reviewed    Imaging:    Imaging Reports Reviewed Today Include: reviewed     Recent Cultures (last 7 days):     Results from last 7 days   Lab Units 01/26/21  1032 01/26/21  1023   BLOOD CULTURE   --  No Growth at 72 hrs  No Growth at 72 hrs     URINE CULTURE  >100,000 cfu/ml   --        Last 24 Hours Medication List:   Current Facility-Administered Medications   Medication Dose Route Frequency Provider Last Rate    acetaminophen  975 mg Oral Q8H [facility] Tao Travis, MANI      albuterol  2 puff Inhalation Q4H PRN Fredi Hicks DO      aspirin  81 mg Oral Daily Fredi Hicks DO      cefTRIAXone  1,000 mg Intravenous Q24H Fredi Hicks DO 1,000 mg (01/29/21 1526)    cholecalciferol  2,000 Units Oral Daily Fredi Hicks DO      clopidogrel  75 mg Oral Daily Eliseo Vogel MD      enoxaparin  40 mg Subcutaneous Daily Fredi Hicks DO      finasteride  5 mg Oral Daily Fredi Hicks DO      furosemide  20 mg Oral Daily Fredi Hicks DO      gabapentin  100 mg Oral HS Fredi Hicks DO      levothyroxine  75 mcg Oral Early Morning Fredi Hicks DO  metoprolol succinate  12 5 mg Oral Daily Argie Ventura, DO      ondansetron  4 mg Intravenous Q6H PRN Argie Ventura, DO      oxyCODONE  2 5 mg Oral Q6H PRN MANI Benz      pantoprazole  40 mg Oral Early Morning Argie Ventura, DO      potassium chloride  10 mEq Oral Daily Argie Ventura, DO      pravastatin  40 mg Oral Daily [facility], DO      tamsulosin  0 4 mg Oral Daily With [facility], DO      tiotropium  18 mcg Inhalation Daily Argie Ventura, DO          Today, Patient Was Seen By: MANI Benz    ** Please Note: Dictation voice to text software may have been used in the creation of this document   **

## 2021-01-30 NOTE — ASSESSMENT & PLAN NOTE
· POA e/b fever and tachycardia  · 2/2 UTI  · Blood cultures no growth at 72 hours, urine cultures mixed contaminants  · Anaphylaxis w/ Ancef but tolerates Rocephin currently day #3  · Continue Rocephin, discontinue vancomycin given thus far negative infectious workup discontinue tomorrow day #4/5   · Monitor CBC, procalcitonin and VS  · Anticipate transitioning to oral antibiotics

## 2021-01-30 NOTE — PLAN OF CARE
Problem: Potential for Falls  Goal: Patient will remain free of falls  Description: INTERVENTIONS:  - Assess patient frequently for physical needs  -  Identify cognitive and physical deficits and behaviors that affect risk of falls  -  [place] fall precautions as indicated by assessment   - Educate patient/family on patient safety including physical limitations  - Instruct patient to call for assistance with activity based on assessment  - Modify environment to reduce risk of injury  - Consider OT/PT consult to assist with strengthening/mobility  Outcome: Progressing     Problem: Prexisting or High Potential for Compromised Skin Integrity  Goal: Skin integrity is maintained or improved  Description: INTERVENTIONS:  - Identify patients at risk for skin breakdown  - Assess and monitor skin integrity  - Assess and monitor nutrition and hydration status  - Monitor labs   - Assess for incontinence   - Turn and reposition patient  - Assist with mobility/ambulation  - Relieve pressure over bony prominences  - Avoid friction and shearing  - Provide appropriate hygiene as needed including keeping skin clean and dry  - Evaluate need for skin moisturizer/barrier cream  - Collaborate with interdisciplinary team   - Patient/family teaching  - Consider wound care consult   Outcome: Progressing     Problem: Nutrition/Hydration-ADULT  Goal: Nutrient/Hydration intake appropriate for improving, restoring or maintaining nutritional needs  Description: Monitor and assess patient's nutrition/hydration status for malnutrition  Collaborate with interdisciplinary team and initiate plan and interventions as ordered  Monitor patient's weight and dietary intake as ordered or per policy  Utilize nutrition screening tool and intervene as necessary  Determine patient's food preferences and provide high-protein, high-caloric foods as appropriate       INTERVENTIONS:  - Monitor oral intake, urinary output, labs, and treatment plans  - Assess nutrition and hydration status and recommend course of action  - Evaluate amount of meals eaten  - Assist patient with eating if necessary   - Allow adequate time for meals  - Recommend/ encourage appropriate diets, oral nutritional supplements, and vitamin/mineral supplements  - Order, calculate, and assess calorie counts as needed  - Recommend, monitor, and adjust tube feedings and TPN/PPN based on assessed needs  - Assess need for intravenous fluids  - Provide specific nutrition/hydration education as appropriate  - Include patient/family/caregiver in decisions related to nutrition  Outcome: Progressing     Problem: PAIN - ADULT  Goal: Verbalizes/displays adequate comfort level or baseline comfort level  Description: Interventions:  - Encourage patient to monitor pain and request assistance  - Assess pain using appropriate pain scale  - Administer analgesics based on type and severity of pain and evaluate response  - Implement non-pharmacological measures as appropriate and evaluate response  - Consider cultural and social influences on pain and pain management  - Notify physician/advanced practitioner if interventions unsuccessful or patient reports new pain  Outcome: Progressing     Problem: INFECTION - ADULT  Goal: Absence or prevention of progression during hospitalization  Description: INTERVENTIONS:  - Assess and monitor for signs and symptoms of infection  - Monitor lab/diagnostic results  - Monitor all insertion sites, i e  indwelling lines, tubes, and drains  - Monitor endotracheal if appropriate and nasal secretions for changes in amount and color  - [place] appropriate cooling/warming therapies per order  - Administer medications as ordered  - Instruct and encourage patient and family to use good hand hygiene technique  - Identify and instruct in appropriate isolation precautions for identified infection/condition  Outcome: Progressing  Goal: Absence of fever/infection during neutropenic period  Description: INTERVENTIONS:  - Monitor WBC    Outcome: Progressing     Problem: SAFETY ADULT  Goal: Maintain or return to baseline ADL function  Description: INTERVENTIONS:  -  Assess patient's ability to carry out ADLs; assess patient's baseline for ADL function and identify physical deficits which impact ability to perform ADLs (bathing, care of mouth/teeth, toileting, grooming, dressing, etc )  - Assess/evaluate cause of self-care deficits   - Assess range of motion  - Assess patient's mobility; develop plan if impaired  - Assess patient's need for assistive devices and provide as appropriate  - Encourage maximum independence but intervene and supervise when necessary  - Involve family in performance of ADLs  - Assess for home care needs following discharge   - Consider OT consult to assist with ADL evaluation and planning for discharge  - Provide patient education as appropriate  Outcome: Progressing  Goal: Maintain or return mobility status to optimal level  Description: INTERVENTIONS:  - Assess patient's baseline mobility status (ambulation, transfers, stairs, etc )    - Identify cognitive and physical deficits and behaviors that affect mobility  - Identify mobility aids required to assist with transfers and/or ambulation (gait belt, sit-to-stand, lift, walker, cane, etc )  - [place] fall precautions as indicated by assessment  - Record patient progress and toleration of activity level on Mobility SBAR; progress patient to next Phase/Stage  - Instruct patient to call for assistance with activity based on assessment  - Consider rehabilitation consult to assist with strengthening/weightbearing, etc   Outcome: Progressing     Problem: DISCHARGE PLANNING  Goal: Discharge to home or other facility with appropriate resources  Description: INTERVENTIONS:  - Identify barriers to discharge w/patient and caregiver  - Arrange for needed discharge resources and transportation as appropriate  - Identify discharge learning needs (meds, wound care, etc )  - Arrange for interpretive services to assist at discharge as needed  - Refer to Case Management Department for coordinating discharge planning if the patient needs post-hospital services based on physician/advanced practitioner order or complex needs related to functional status, cognitive ability, or social support system  Outcome: Progressing     Problem: Knowledge Deficit  Goal: Patient/family/caregiver demonstrates understanding of disease process, treatment plan, medications, and discharge instructions  Description: Complete learning assessment and assess knowledge base    Interventions:  - Provide teaching at level of understanding  - Provide teaching via preferred learning methods  Outcome: Progressing

## 2021-01-30 NOTE — PROGRESS NOTES
Subjective: Seen and examined this morning  Continues to have significant discomfort to the right hip  Objective:  A 10 point ROS was performed; negative except as noted above  Lab Results   Component Value Date/Time    WBC 5 11 01/28/2021 05:37 AM    WBC 8 12 09/15/2015 07:46 AM    HGB 10 5 (L) 01/28/2021 05:37 AM    HGB 14 6 09/15/2015 07:46 AM       Vitals:    01/29/21 2321   BP: 97/50   Pulse: 61   Resp: 16   Temp: 98 3 °F (36 8 °C)   SpO2: 94%     Right lower extremity  Skin intact  TTP over greater trochanter  Unable to tolerate passive ranging of hip 2/2 laterally based pain   Motor intact to EHL/FHL/TA/GS  Sensation intact to light touch to dp/sp/tib/vidal/saph distributions  Toes warm and well perfused with brisk capillary refill    Assessment: 80 y o  male with Right generalized hip and thigh pain, possibly 2/2 lumbar DDD with radiculopathy    Plan:  Previous imaging shows advanced lubar spine degeneration   Consider repeat imaging and outpatient follow-up  No plans for orthopaedic intervention at this time  WBAT right lower extremity   Pain control  DVT ppx: per primary team   PT/OT  Dispo: [id number] [place] for discharge from ortho perspective

## 2021-01-30 NOTE — CASE MANAGEMENT
Cm contacted daughter Davis Montalvo who conference in [facility]  A post acute care recommendation was made by your care team for [facility]  Discussed Freedom of Choice with both patient and caregiver  Pt was at [facility] in the past, spouse [facility] would prefer a referral back to them  Also provided alternate choice of [facility]

## 2021-01-31 LAB
BACTERIA BLD CULT: NORMAL
BACTERIA BLD CULT: NORMAL

## 2021-01-31 PROCEDURE — 97530 THERAPEUTIC ACTIVITIES: CPT

## 2021-01-31 PROCEDURE — 97110 THERAPEUTIC EXERCISES: CPT

## 2021-01-31 PROCEDURE — 99232 SBSQ HOSP IP/OBS MODERATE 35: CPT | Performed by: NURSE PRACTITIONER

## 2021-01-31 PROCEDURE — 97116 GAIT TRAINING THERAPY: CPT

## 2021-01-31 RX ORDER — POLYETHYLENE GLYCOL 3350 17 G/17G
17 POWDER, FOR SOLUTION ORAL DAILY
Status: DISCONTINUED | OUTPATIENT
Start: 2021-01-31 | End: 2021-02-06 | Stop reason: HOSPADM

## 2021-01-31 RX ORDER — DOCUSATE SODIUM 100 MG/1
100 CAPSULE, LIQUID FILLED ORAL 2 TIMES DAILY
Status: DISCONTINUED | OUTPATIENT
Start: 2021-01-31 | End: 2021-02-06 | Stop reason: HOSPADM

## 2021-01-31 RX ADMIN — PRAVASTATIN SODIUM 40 MG: 40 TABLET ORAL at 15:50

## 2021-01-31 RX ADMIN — POTASSIUM CHLORIDE 10 MEQ: 750 TABLET, EXTENDED RELEASE ORAL at 08:34

## 2021-01-31 RX ADMIN — ASPIRIN 81 MG: 81 TABLET, COATED ORAL at 08:31

## 2021-01-31 RX ADMIN — POLYETHYLENE GLYCOL 3350 17 G: 17 POWDER, FOR SOLUTION ORAL at 15:49

## 2021-01-31 RX ADMIN — GABAPENTIN 100 MG: 100 CAPSULE ORAL at 21:10

## 2021-01-31 RX ADMIN — FUROSEMIDE 20 MG: 20 TABLET ORAL at 08:33

## 2021-01-31 RX ADMIN — DOCUSATE SODIUM 100 MG: 100 CAPSULE, LIQUID FILLED ORAL at 17:11

## 2021-01-31 RX ADMIN — PANTOPRAZOLE SODIUM 40 MG: 40 TABLET, DELAYED RELEASE ORAL at 05:41

## 2021-01-31 RX ADMIN — TIOTROPIUM BROMIDE 18 MCG: 18 CAPSULE ORAL; RESPIRATORY (INHALATION) at 08:30

## 2021-01-31 RX ADMIN — ACETAMINOPHEN 975 MG: 325 TABLET, FILM COATED ORAL at 21:10

## 2021-01-31 RX ADMIN — OXYCODONE HYDROCHLORIDE 2.5 MG: 5 TABLET ORAL at 18:20

## 2021-01-31 RX ADMIN — LEVOTHYROXINE SODIUM 75 MCG: 75 TABLET ORAL at 05:41

## 2021-01-31 RX ADMIN — ENOXAPARIN SODIUM 40 MG: 40 INJECTION SUBCUTANEOUS at 12:14

## 2021-01-31 RX ADMIN — FINASTERIDE 5 MG: 5 TABLET, FILM COATED ORAL at 08:33

## 2021-01-31 RX ADMIN — ACETAMINOPHEN 975 MG: 325 TABLET, FILM COATED ORAL at 05:41

## 2021-01-31 RX ADMIN — TAMSULOSIN HYDROCHLORIDE 0.4 MG: 0.4 CAPSULE ORAL at 15:50

## 2021-01-31 RX ADMIN — Medication 2000 UNITS: at 08:31

## 2021-01-31 RX ADMIN — METOPROLOL SUCCINATE 12.5 MG: 25 TABLET, EXTENDED RELEASE ORAL at 08:34

## 2021-01-31 RX ADMIN — CEFTRIAXONE SODIUM 1000 MG: 10 INJECTION, POWDER, FOR SOLUTION INTRAVENOUS at 12:13

## 2021-01-31 RX ADMIN — CLOPIDOGREL BISULFATE 75 MG: 75 TABLET ORAL at 08:32

## 2021-01-31 NOTE — ASSESSMENT & PLAN NOTE
· POA e/b fever and tachycardia  · 2/2 UTI  · Blood cultures no growth at 72 hours, urine cultures mixed contaminants  · Anaphylaxis w/ Ancef but tolerates Rocephin currently day #5 today   · Continue Rocephin, discontinue vancomycin given thus far negative infectious workup discontinue 1/31 day #5/5   · Monitor CBC, procalcitonin and VS  · Anticipate transitioning to oral antibiotics

## 2021-01-31 NOTE — ASSESSMENT & PLAN NOTE
· Status post cystoscopy day prior to admission  · Rocephin and Vanco - patient has history of Enterococcus faecalis and E coli UTI as well as prior history of Klebsiella UTI  · Urine cultures showing 100K colonies mixed organisms, blood cultures no growth 5 days will discontinue Rocephin discontinued 1/31 and discontinued vancomycin  1/27-   · Will need rehab per pt recommendations

## 2021-01-31 NOTE — PROGRESS NOTES
Progress Note - Claudine Backer 1933, 80 y o  male MRN: [id number]    Unit/Bed#: [facility] 320-01 Encounter: [id number]    Primary Care Provider: Frederick Boxer, MD   Date and time admitted to hospital: 1/26/2021  9:29 AM        * Sepsis without acute organ dysfunction [facility])  Assessment & Plan  · POA e/b fever and tachycardia  · 2/2 UTI  · Blood cultures no growth at 72 hours, urine cultures mixed contaminants  · Anaphylaxis w/ Ancef but tolerates Rocephin currently day #5 today   · Continue Rocephin, discontinue vancomycin given thus far negative infectious workup discontinue 1/31 day #5/5   · Monitor CBC, procalcitonin and VS  · Anticipate transitioning to oral antibiotics      Urine retention  Assessment & Plan  · Uroxatral equivalent  · Porter replaced after failed void trial after cystoscopy  · Urology follow-up outpatient  · Patient may require suprapubic catheter per Urology    UTI (urinary tract infection)  Assessment & Plan  · Status post cystoscopy day prior to admission  · Rocephin and Vanco - patient has history of Enterococcus faecalis and E coli UTI as well as prior history of Klebsiella UTI  · Urine cultures showing 100K colonies mixed organisms, blood cultures no growth 5 days will discontinue Rocephin discontinued 1/31 and discontinued vancomycin  1/27-   · Will need rehab per pt recommendations     Bilateral hip pain  Assessment & Plan  · Significant pain in right lateral hip / thigh area     · - will consult orthopedics for input in regard to increased pain   · Will transition norco at this time as pt is now with pin prick pupils and mood change   · - still able to answer questions appropriately   · Start atc tylenol and reduce dose of oxy to 2 5mg q 8hrs (from norco 1 tab q 8hrs)   · Started on admission on Neurontin qhs  · Patient has chronic history bilateral hip pain sees [facility] orthopedic physician  · - however now complaining of increased right hip versus femur pain  · - very difficult for physical therapy to work with patient as he is barely able to stand, patient reports that at home he is fairly independent with roller walker  · - Pt reports prior fall but was seen by his orthopedist outpt for the left hip   · Obtained left hip and right femur / hip xray with no new acute fractures or concerns  · Left hip xray: No acute osseous abnormality   Mild left hip degenerative change  · Right femur xray: There is no acute fracture or dislocation  Gilda Gaston is an old mid femoral shaft fracture with marked deformity due to displacement and bridging callus formation  · Lumbar xray demonstrates : No acute osseous abnormality      Degenerative changes as described  · - discussed with Orthopedics recommend outpt follow up with pain service for possible need for steroid injections is spine    Hyponatremia  Assessment & Plan  · Sodium improved   ·     Pulmonary emphysema ([address] )  Assessment & Plan  · Does not appear in exacerbation  · Resp protocol    Coronary artery disease  Assessment & Plan  · BB, ASA,statin  · Resume Plavix, the patient is on dual anti-platelet therapy home regimen    Severe aortic stenosis  Assessment & Plan  S/p TAVR       VTE Pharmacologic Prophylaxis:   Pharmacologic: Enoxaparin (Lovenox)  Mechanical VTE Prophylaxis in Place: Yes    Patient Centered Rounds: I have performed bedside rounds with nursing staff today  Discussions with Specialists or Other Care Team Provider: nursing     Education and Discussions with Family / Patient: patient and patients wife and daughter update     Time Spent for Care: 45 minutes  More than 50% of total time spent on counseling and coordination of care as described above      Current Length of Stay: 5 day(s)    Current Patient Status: Inpatient   Certification Statement: The patient will continue to require additional inpatient hospital stay due to pending placement at this time     Discharge Plan: Pending discharge to rehab however pt wants to try one more time with PT -     Code Status: Level 1 - Full Code      Subjective:   Upon entry to room patient is a lot better  Current  Shaving with electric shaver  He looks more awake and alert he reports that he is not in a much pain were able turn him from side to side today without too much pain  Still no bowel movement  No nausea vomiting or diarrhea  Pain with significant improvement  Discussed plan of care and follow-up outpatient plan for rehab  Objective:     Vitals:   Temp (24hrs), Av 7 °F (36 5 °C), Min:97 5 °F (36 4 °C), Max:97 8 °F (36 6 °C)    Temp:  [97 5 °F (36 4 °C)-97 8 °F (36 6 °C)] 97 8 °F (36 6 °C)  HR:  [67] 67  Resp:  [16-20] 16  BP: (109-124)/(53-58) 122/58  SpO2:  [93 %-99 %] 93 %  Body mass index is 24 13 kg/m²  Input and Output Summary (last 24 hours): Intake/Output Summary (Last 24 hours) at 2021 1446  Last data filed at 2021 0851  Gross per 24 hour   Intake 300 ml   Output 500 ml   Net -200 ml       Physical Exam:     Physical Exam  Constitutional:       General: He is not in acute distress  Appearance: He is not ill-appearing, toxic-appearing or diaphoretic  HENT:      Head: Normocephalic and atraumatic  Mouth/Throat:      Pharynx: Oropharynx is clear  Eyes:      General:         Right eye: No discharge  Left eye: No discharge  Conjunctiva/sclera: Conjunctivae normal    Cardiovascular:      Rate and Rhythm: Normal rate  Heart sounds: No murmur  No friction rub  No gallop  Pulmonary:      Effort: No respiratory distress  Breath sounds: No stridor  No wheezing, rhonchi or rales  Chest:      Chest wall: No tenderness  Abdominal:      General: There is no distension  Palpations: There is no mass  Tenderness: There is no abdominal tenderness  There is no right CVA tenderness, left CVA tenderness, guarding or rebound  Hernia: No hernia is present     Musculoskeletal:         General: Deformity (Tender right lateral    Right leg shorter than left) present  Skin:     Coloration: Skin is not jaundiced or pale  Findings: No bruising, erythema, lesion or rash  Neurological:      Mental Status: He is alert  Comments: 2  To 3           Additional Data:     Labs:    Results from last 7 days   Lab Units 01/30/21  0610   WBC Thousand/uL 5 03   HEMOGLOBIN g/dL 11 2*   HEMATOCRIT % 34 1*   PLATELETS Thousands/uL 95*   NEUTROS PCT % 66   LYMPHS PCT % 12*   MONOS PCT % 15*   EOS PCT % 7*     Results from last 7 days   Lab Units 01/30/21  0610   SODIUM mmol/L 133*   POTASSIUM mmol/L 3 7   CHLORIDE mmol/L 102   CO2 mmol/L 28   BUN mg/dL 13   CREATININE mg/dL 0 92   ANION GAP mmol/L 3*   CALCIUM mg/dL 8 6   ALBUMIN g/dL 2 9*   TOTAL BILIRUBIN mg/dL 0 58   ALK PHOS U/L 94   ALT U/L 37   AST U/L 82*   GLUCOSE RANDOM mg/dL 81                 Results from last 7 days   Lab Units 01/27/21  0601 01/26/21  1023   LACTIC ACID mmol/L  --  1 1   PROCALCITONIN ng/ml 0 09 0 09           * I Have Reviewed All Lab Data Listed Above  * Additional Pertinent Lab Tests Reviewed: All Labs Within Last 24 Hours Reviewed    Imaging:    Imaging Reports Reviewed Today Include: reviewed     Recent Cultures (last 7 days):     Results from last 7 days   Lab Units 01/26/21  1032 01/26/21  1023   BLOOD CULTURE   --  No Growth After 5 Days  No Growth After 5 Days     URINE CULTURE  >100,000 cfu/ml   --        Last 24 Hours Medication List:   Current Facility-Administered Medications   Medication Dose Route Frequency Provider Last Rate    acetaminophen  975 mg Oral Q8H [address] MANI Singh      albuterol  2 puff Inhalation Q4H PRN Marysol Andrade, DO      aspirin  81 mg Oral Daily Marysol Andrade, DO      cholecalciferol  2,000 Units Oral Daily Marysol Andrade, DO      clopidogrel  75 mg Oral Daily Harshil Carranza MD      docusate sodium  100 mg Oral BID MANI Gibson      enoxaparin  40 mg Subcutaneous Daily Marysol Andrade, DO      finasteride  5 mg Oral Daily Emile Potter, DO      furosemide  20 mg Oral Daily Emile Potter, DO      gabapentin  100 mg Oral HS Emile Potter, DO      levothyroxine  75 mcg Oral Early Morning Emile Potter, DO      metoprolol succinate  12 5 mg Oral Daily Emile Potter, DO      ondansetron  4 mg Intravenous Q6H PRN Emile Potter, DO      oxyCODONE  2 5 mg Oral Q6H PRN MANI English      pantoprazole  40 mg Oral Early Morning Emile Potter, DO      polyethylene glycol  17 g Oral Daily MANI English      potassium chloride  10 mEq Oral Daily Emile Potter, DO      pravastatin  40 mg Oral Daily [facility], DO      tamsulosin  0 4 mg Oral Daily [facility], DO      tiotropium  18 mcg Inhalation Daily Emile Potter,           Today, Patient Was Seen By: MANI English    ** Please Note: Dictation voice to text software may have been used in the creation of this document   **

## 2021-01-31 NOTE — PHYSICAL THERAPY NOTE
Physical Therapy Treatment     Patient Name: Sindi Monsivais    FJHFC'O Date: 1/31/2021     Problem List  Principal Problem:    Sepsis without acute organ dysfunction [facility])  Active Problems:    Severe aortic stenosis    Coronary artery disease    Bilateral hip pain    Pulmonary emphysema (HCC)    Hyponatremia    UTI (urinary tract infection)    Urine retention       Past Medical History  Past Medical History:   Diagnosis Date    Anemia     Bladder cancer ([facility] [address] [facility] )     Cancer ([facility] )     bladder    Carotid artery occlusion     Cataract     Colon polyp     COPD (chronic obstructive pulmonary disease) ([facility] )     Coronary artery disease     Disease of thyroid gland     History of transfusion     Hyperlipidemia     Hypertension     Hypothyroidism 9/15/2017    Multiple pulmonary nodules     Peptic ulcer     Scoliosis     SIRS (systemic inflammatory response syndrome) ([facility] ) 12/19/2019    Transient cerebral ischemia     Tremors of nervous system         Past Surgical History  Past Surgical History:   Procedure Laterality Date   [facility] SURGERY      1973, 1987, 2005    BUNIONECTOMY Left     Simple exostectomy (silver procedure)    CAROTID ENDARTERECTOMY Right 09/10/2008    Common  Sheree LEDBETTER MD    CATARACT EXTRACTION      CATARACT EXTRACTION, BILATERAL      CERVICAL DISCECTOMY  1969    COLONOSCOPY      CORONARY ARTERY BYPASS GRAFT  10/20/2010    x3 (LIMA-LAD, SVG-OM, SVG-RCA)    CYSTOSCOPY      ELBOW SURGERY Right 07/2012    FEMORAL ARTERY - TIBIAL ARTERY BYPASS GRAFT  12/05/2011    using reversed saphenous vein from right leg  Ros Machado MD    CT ECHO TRANSESOPHAG R-T 2D W/PRB IMG ACQUISJ I&R N/A 10/6/2020    Procedure: TRANSESOPHAGEAL ECHOCARDIOGRAM (DAVID);   Surgeon: Faustino Woodall DO;  [facility];  Service: Cardiac Surgery    CT REPLACE AORTIC VALVE OPENFEMORAL ARTERY APPROACH N/A 10/6/2020    Procedure: REPLACEMENT AORTIC VALVE TRANSCATHETER (TAVR) TRANSFEMORAL W/ 26MM MONTALVO BREE S3 ULTRA VALVE(ACCESS ON LEFT); Surgeon: Aaron Wray DO;  [facility];  Service: Cardiac Surgery    REPLACEMENT TOTAL KNEE Left     SHOULDER SURGERY Right 1997 01/31/21 1439   PT Last Visit   PT Visit Date 01/31/21   Note Type   Note Type Treatment for insurance authorization   Pain Assessment   Pain Assessment Tool 0-10   Pain Score 4   Pain Location/Orientation Orientation: Bilateral;Location: Hip;Location: Leg  (w/ movement - re solving w/ rest )   Restrictions/Precautions   Weight Bearing Precautions Per Order No   Braces or Orthoses Other (Comment)  (specialized shoes w/ heel lift )   Other Precautions Hard of hearing;Pain; Fall Risk;Multiple lines; Bed Alarm; Chair Alarm;Cognitive; Impulsive   General   Chart Reviewed Yes   Additional Pertinent History see by ortho- cleared for mobilization    Response to Previous Treatment Patient reporting fatigue but able to participate  Family/Caregiver Present No   Cognition   Overall Cognitive Status Impaired   Arousal/Participation Alert; Uncooperative  (max cues for participation initially )   Attention Attends with cues to redirect   Orientation Level Oriented to person;Oriented to place;Oriented to situation   Memory Decreased recall of precautions;Decreased recall of recent events;Decreased short term memory   Following Commands Follows one step commands with increased time or repetition   Comments max cues for participation initially- can become agitated- mildly irritated at times- able to redirect- repeats self often ? STM cues for safety    Subjective   Subjective "I am not feeling like doing this today"- pt agreable to participate following max encouragement    Bed Mobility   Rolling R 2  Maximal assistance   Additional items Assist x 1; Increased time required;Verbal cues;LE management   Supine to Sit 2  Maximal assistance   Additional items Assist x 1;Leg ; Impulsive;LE management   Sit to Supine 2  Maximal assistance   Additional items Assist x 1; Increased time required;Verbal cues;LE management   Additional Comments EOB 15 mins- dep to don socks and specialized shoes    Transfers   Sit to Stand 3  Moderate assistance   Additional items Assist x 1; Increased time required;Verbal cues   Stand to Sit 3  Moderate assistance   Additional items Assist x 1; Increased time required;Bed elevated   Additional Comments able to achieve full stnad w/ RW and modA- forward flexed kyphotic posture observed- 0 dizziness reported- able to perform x3 attempts w/ seated therapeutic rest b/t  trials    Ambulation/Elevation   Gait pattern Excessively slow   Gait Assistance 3  Moderate assist   Additional items Assist x 1;Verbal cues; Tactile cues   Assistive Device Rolling walker   Distance 3 sidesteps to [facility]- then marching x5 reps- unable to ambualte further 2* pain and increasing LLE pain-   (NEEDS SHOES ON W/ LIFT FOR AMBULATION )   Balance   Static Sitting Fair   Dynamic Sitting Fair   Static Standing Poor +   Dynamic Standing Poor   Ambulatory Poor  (rw)   Endurance Deficit   Endurance Deficit Yes   Endurance Deficit Description fatigue and pain    Activity Tolerance   Activity Tolerance Patient limited by fatigue;Patient limited by pain   Medical Staff Made Aware RN and PA_ Chaz Hayden updated post session    Nurse Made Aware Rn updated pre/ post session    Exercises   Quad Sets Supine;10 reps   Heelslides Supine;10 reps;Right;Left;AAROM;AROM   Hip Abduction Supine;10 reps;AAROM;AROM; Right;Left   Knee AROM Long Arc Quad Sitting;10 reps;Right;Left  (x3 at EOB )   Ankle Pumps Sitting;10 reps;Right;Left   Marching Sitting;10 reps  (x3 at EOB )   Assessment   Prognosis Fair   Problem List Decreased strength;Decreased range of motion;Decreased endurance; Impaired balance;Decreased mobility; Decreased cognition; Impaired judgement;Decreased safety awareness; Impaired vision; Impaired hearing;Decreased skin integrity;Pain   Assessment pt progressing from IE w/ less pain and less assist required for bed skills transfers and pre gait / gait activity- pt continues to report pain in LLE and hip regions however is able to take some small steps at bedside and march in place/ perform sit<> stnad transitions w/ modA x1 and increased time to complete- pt does require specialized shoes w/ R heel lift in them to complete all mobility and PT had to apply shoes DEP at bedside prior to mobility- pt fatigues easily and was assisted BTB w/ mod/ maxA and positioned w/ HOB elevated for comfort post sessions w/ all needs in reach post session  pt continues to require significant physical assist for safety w/ all mobility therafore inpt rehab on d/c continues to be recommendation at this time  will follw    Barriers to Discharge Inaccessible home environment;Decreased caregiver support   Goals   Patient Goals less pain to go home    STG Expiration Date 02/10/21   PT Treatment Day 1   Plan   Treatment/Interventions Functional transfer training;LE strengthening/ROM;ADL retraining; Therapeutic exercise; Endurance training;Cognitive reorientation;Patient/family training;Equipment eval/education; Bed mobility;Gait training; Compensatory technique education;Spoke to nursing;Spoke to advanced practitioner   Progress Slow progress, decreased activity tolerance   PT Frequency   (3-5x/wk )   Recommendation   PT Discharge Recommendation Post-Acute Rehabilitation Services   Equipment Recommended Niko Castano Wheelchair   PT - OK to Discharge Yes   Zayra Sanches [phone]   Turning in Bed Without Bedrails 2   Lying on Back to Sitting on Edge of Flat Bed 2   Moving Bed to Chair 2   Standing Up From Chair 2   Walk in Room 1   Climb 3-5 Stairs 1   Basic Mobility Inpatient Raw Score 10   Turning Head Towards Sound 4   Follow Simple Instructions 4   Low Function Basic Mobility Raw Score 18   Low Function Basic Mobility Standardized Score 29 25

## 2021-01-31 NOTE — ASSESSMENT & PLAN NOTE
· Significant pain in right lateral hip / thigh area  · - will consult orthopedics for input in regard to increased pain   · Will transition norco at this time as pt is now with pin prick pupils and mood change   · - still able to answer questions appropriately   · Start atc tylenol and reduce dose of oxy to 2 5mg q 8hrs (from norco 1 tab q 8hrs)   · Started on admission on Neurontin qhs  · Patient has chronic history bilateral hip pain sees [facility] orthopedic physician  · - however now complaining of increased right hip versus femur pain  · - very difficult for physical therapy to work with patient as he is barely able to stand, patient reports that at home he is fairly independent with roller walker  · - Pt reports prior fall but was seen by his orthopedist outpt for the left hip   · Obtained left hip and right femur / hip xray with no new acute fractures or concerns  · Left hip xray: No acute osseous abnormality   Mild left hip degenerative change  · Right femur xray: There is no acute fracture or dislocation  Arthluis enrique Pratt is an old mid femoral shaft fracture with marked deformity due to displacement and bridging callus formation  · Lumbar xray demonstrates : No acute osseous abnormality      Degenerative changes as described     · - discussed with Orthopedics recommend outpt follow up with pain service for possible need for steroid injections is spine

## 2021-01-31 NOTE — PLAN OF CARE
Problem: PHYSICAL THERAPY ADULT  Goal: Performs mobility at highest level of function for planned discharge setting  See evaluation for individualized goals  Description: Treatment/Interventions: LE strengthening/ROM, Functional transfer training, Therapeutic exercise, Endurance training, Cognitive reorientation, Patient/family training, Equipment eval/education, Bed mobility, Spoke to nursing, Gait training          See flowsheet documentation for full assessment, interventions and recommendations  Outcome: Progressing  Note: Prognosis: Fair  Problem List: Decreased strength, Decreased range of motion, Decreased endurance, Impaired balance, Decreased mobility, Decreased cognition, Impaired judgement, Decreased safety awareness, Impaired vision, Impaired hearing, Decreased skin integrity, Pain  Assessment: pt progressing from IE w/ less pain and less assist required for bed skills transfers and pre gait / gait activity- pt continues to report pain in LLE and hip regions however is able to take some small steps at bedside and march in place/ perform sit<> stnad transitions w/ modA x1 and increased time to complete- pt does require specialized shoes w/ R heel lift in them to complete all mobility and PT had to apply shoes DEP at bedside prior to mobility- pt fatigues easily and was assisted BTB w/ mod/ maxA and positioned w/ HOB elevated for comfort post sessions w/ all needs in reach post session  pt continues to require significant physical assist for safety w/ all mobility therafore inpt rehab on d/c continues to be recommendation at this time  will follw   Barriers to Discharge: Inaccessible home environment, Decreased caregiver support     PT Discharge Recommendation: [id number]     PT - OK to Discharge: Yes    See flowsheet documentation for full assessment

## 2021-01-31 NOTE — PLAN OF CARE
Problem: Potential for Falls  Goal: Patient will remain free of falls  Description: INTERVENTIONS:  - Assess patient frequently for physical needs  -  Identify cognitive and physical deficits and behaviors that affect risk of falls  -  [place] fall precautions as indicated by assessment   - Educate patient/family on patient safety including physical limitations  - Instruct patient to call for assistance with activity based on assessment  - Modify environment to reduce risk of injury  - Consider OT/PT consult to assist with strengthening/mobility  Outcome: Progressing     Problem: Prexisting or High Potential for Compromised Skin Integrity  Goal: Skin integrity is maintained or improved  Description: INTERVENTIONS:  - Identify patients at risk for skin breakdown  - Assess and monitor skin integrity  - Assess and monitor nutrition and hydration status  - Monitor labs   - Assess for incontinence   - Turn and reposition patient  - Assist with mobility/ambulation  - Relieve pressure over bony prominences  - Avoid friction and shearing  - Provide appropriate hygiene as needed including keeping skin clean and dry  - Evaluate need for skin moisturizer/barrier cream  - Collaborate with interdisciplinary team   - Patient/family teaching  - Consider wound care consult   Outcome: Progressing     Problem: Nutrition/Hydration-ADULT  Goal: Nutrient/Hydration intake appropriate for improving, restoring or maintaining nutritional needs  Description: Monitor and assess patient's nutrition/hydration status for malnutrition  Collaborate with interdisciplinary team and initiate plan and interventions as ordered  Monitor patient's weight and dietary intake as ordered or per policy  Utilize nutrition screening tool and intervene as necessary  Determine patient's food preferences and provide high-protein, high-caloric foods as appropriate       INTERVENTIONS:  - Monitor oral intake, urinary output, labs, and treatment plans  - Assess nutrition and hydration status and recommend course of action  - Evaluate amount of meals eaten  - Assist patient with eating if necessary   - Allow adequate time for meals  - Recommend/ encourage appropriate diets, oral nutritional supplements, and vitamin/mineral supplements  - Order, calculate, and assess calorie counts as needed  - Recommend, monitor, and adjust tube feedings and TPN/PPN based on assessed needs  - Assess need for intravenous fluids  - Provide specific nutrition/hydration education as appropriate  - Include patient/family/caregiver in decisions related to nutrition  Outcome: Progressing     Problem: PAIN - ADULT  Goal: Verbalizes/displays adequate comfort level or baseline comfort level  Description: Interventions:  - Encourage patient to monitor pain and request assistance  - Assess pain using appropriate pain scale  - Administer analgesics based on type and severity of pain and evaluate response  - Implement non-pharmacological measures as appropriate and evaluate response  - Consider cultural and social influences on pain and pain management  - Notify physician/advanced practitioner if interventions unsuccessful or patient reports new pain  Outcome: Progressing     Problem: INFECTION - ADULT  Goal: Absence or prevention of progression during hospitalization  Description: INTERVENTIONS:  - Assess and monitor for signs and symptoms of infection  - Monitor lab/diagnostic results  - Monitor all insertion sites, i e  indwelling lines, tubes, and drains  - Monitor endotracheal if appropriate and nasal secretions for changes in amount and color  - [place] appropriate cooling/warming therapies per order  - Administer medications as ordered  - Instruct and encourage patient and family to use good hand hygiene technique  - Identify and instruct in appropriate isolation precautions for identified infection/condition  Outcome: Progressing  Goal: Absence of fever/infection during neutropenic period  Description: INTERVENTIONS:  - Monitor WBC    Outcome: Progressing     Problem: SAFETY ADULT  Goal: Maintain or return to baseline ADL function  Description: INTERVENTIONS:  -  Assess patient's ability to carry out ADLs; assess patient's baseline for ADL function and identify physical deficits which impact ability to perform ADLs (bathing, care of mouth/teeth, toileting, grooming, dressing, etc )  - Assess/evaluate cause of self-care deficits   - Assess range of motion  - Assess patient's mobility; develop plan if impaired  - Assess patient's need for assistive devices and provide as appropriate  - Encourage maximum independence but intervene and supervise when necessary  - Involve family in performance of ADLs  - Assess for home care needs following discharge   - Consider OT consult to assist with ADL evaluation and planning for discharge  - Provide patient education as appropriate  Outcome: Progressing  Goal: Maintain or return mobility status to optimal level  Description: INTERVENTIONS:  - Assess patient's baseline mobility status (ambulation, transfers, stairs, etc )    - Identify cognitive and physical deficits and behaviors that affect mobility  - Identify mobility aids required to assist with transfers and/or ambulation (gait belt, sit-to-stand, lift, walker, cane, etc )  - [place] fall precautions as indicated by assessment  - Record patient progress and toleration of activity level on Mobility SBAR; progress patient to next Phase/Stage  - Instruct patient to call for assistance with activity based on assessment  - Consider rehabilitation consult to assist with strengthening/weightbearing, etc   Outcome: Progressing     Problem: DISCHARGE PLANNING  Goal: Discharge to home or other facility with appropriate resources  Description: INTERVENTIONS:  - Identify barriers to discharge w/patient and caregiver  - Arrange for needed discharge resources and transportation as appropriate  - Identify discharge learning needs (meds, wound care, etc )  - Arrange for interpretive services to assist at discharge as needed  - Refer to Case Management Department for coordinating discharge planning if the patient needs post-hospital services based on physician/advanced practitioner order or complex needs related to functional status, cognitive ability, or social support system  Outcome: Progressing     Problem: Knowledge Deficit  Goal: Patient/family/caregiver demonstrates understanding of disease process, treatment plan, medications, and discharge instructions  Description: Complete learning assessment and assess knowledge base    Interventions:  - Provide teaching at level of understanding  - Provide teaching via preferred learning methods  Outcome: Progressing

## 2021-02-01 PROCEDURE — 94760 N-INVAS EAR/PLS OXIMETRY 1: CPT

## 2021-02-01 PROCEDURE — 97535 SELF CARE MNGMENT TRAINING: CPT

## 2021-02-01 PROCEDURE — 99232 SBSQ HOSP IP/OBS MODERATE 35: CPT | Performed by: NURSE PRACTITIONER

## 2021-02-01 RX ORDER — LACTULOSE 20 G/30ML
30 SOLUTION ORAL ONCE
Status: COMPLETED | OUTPATIENT
Start: 2021-02-01 | End: 2021-02-01

## 2021-02-01 RX ADMIN — PANTOPRAZOLE SODIUM 40 MG: 40 TABLET, DELAYED RELEASE ORAL at 05:41

## 2021-02-01 RX ADMIN — ASPIRIN 81 MG: 81 TABLET, COATED ORAL at 08:49

## 2021-02-01 RX ADMIN — LEVOTHYROXINE SODIUM 75 MCG: 75 TABLET ORAL at 05:41

## 2021-02-01 RX ADMIN — TAMSULOSIN HYDROCHLORIDE 0.4 MG: 0.4 CAPSULE ORAL at 16:23

## 2021-02-01 RX ADMIN — ENOXAPARIN SODIUM 40 MG: 40 INJECTION SUBCUTANEOUS at 08:50

## 2021-02-01 RX ADMIN — ACETAMINOPHEN 975 MG: 325 TABLET, FILM COATED ORAL at 21:20

## 2021-02-01 RX ADMIN — ACETAMINOPHEN 975 MG: 325 TABLET, FILM COATED ORAL at 05:41

## 2021-02-01 RX ADMIN — CLOPIDOGREL BISULFATE 75 MG: 75 TABLET ORAL at 08:49

## 2021-02-01 RX ADMIN — FINASTERIDE 5 MG: 5 TABLET, FILM COATED ORAL at 08:49

## 2021-02-01 RX ADMIN — DOCUSATE SODIUM 100 MG: 100 CAPSULE, LIQUID FILLED ORAL at 16:24

## 2021-02-01 RX ADMIN — ACETAMINOPHEN 975 MG: 325 TABLET, FILM COATED ORAL at 13:10

## 2021-02-01 RX ADMIN — POTASSIUM CHLORIDE 10 MEQ: 750 TABLET, EXTENDED RELEASE ORAL at 08:49

## 2021-02-01 RX ADMIN — Medication 2000 UNITS: at 08:49

## 2021-02-01 RX ADMIN — GABAPENTIN 100 MG: 100 CAPSULE ORAL at 21:20

## 2021-02-01 RX ADMIN — OXYCODONE HYDROCHLORIDE 2.5 MG: 5 TABLET ORAL at 16:25

## 2021-02-01 RX ADMIN — PRAVASTATIN SODIUM 40 MG: 40 TABLET ORAL at 16:25

## 2021-02-01 RX ADMIN — LACTULOSE 30 G: 10 SOLUTION ORAL at 21:20

## 2021-02-01 NOTE — PLAN OF CARE
Problem: OCCUPATIONAL THERAPY ADULT  Goal: Performs self-care activities at highest level of function for planned discharge setting  See evaluation for individualized goals  Description: Treatment Interventions: ADL retraining, Functional transfer training, UE strengthening/ROM, Endurance training, Cognitive reorientation, Patient/family training, Equipment evaluation/education, Compensatory technique education, Energy conservation, Activityengagement          See flowsheet documentation for full assessment, interventions and recommendations  Outcome: Progressing  Note: Limitation: Decreased ADL status, Decreased UE strength, Decreased Safe judgement during ADL, Decreased cognition, Decreased endurance, Decreased self-care trans, Decreased high-level ADLs  Prognosis: Good  Assessment: Pt participated in occupational therapy with focus on activity tolerance, bed mob, unsupported sitting balance and tolerance for pt engagement in functional self-care task/ UB self-care, functional transfers/standing tolerance and balance  Pt cleared by RN/Lana for pt participation in occupational therapy  Pt received HOB raised/supine pt sitting upright and agreeable to therapy following pt Identifiers confirmed  Pt reported his goal to get strong again to return to home with his wife  Pt required assist for bed mob and functional transfers/standing tolerance 2* pt deconditioning and decreased overall strength  Pt will require post acute rehab services to continue to address these above noted pt deficits which currently impair pt ADL and functional mob        OT Discharge Recommendation: Post-Acute Rehabilitation Services

## 2021-02-01 NOTE — PROGRESS NOTES
Progress Note - Rosa Contreras 1933, 80 y o  male MRN: [id number]    Unit/Bed#: [facility] 320-01 Encounter: [id number]    Primary Care Provider: Mohan Barksdale MD   Date and time admitted to hospital: 1/26/2021  9:29 AM        * Sepsis without acute organ dysfunction [facility])  Assessment & Plan  · POA e/b fever and tachycardia  · 2/2 UTI  · Blood cultures no growth at 72 hours, urine cultures mixed contaminants  · Anaphylaxis w/ Ancef but tolerates Rocephin currently day # 5 today   · Continue Rocephin, discontinue vancomycin given thus far negative infectious workup discontinue 1/31 day #5/5   · Monitor CBC, procalcitonin, negative     Urine retention  Assessment & Plan  · Uroxatral equivalent  · Porter replaced after failed void trial after cystoscopy  · Urology follow-up outpatient  · Patient may require suprapubic catheter per Urology    UTI (urinary tract infection)  Assessment & Plan  · Status post cystoscopy day prior to admission  · Rocephin and Vanco - patient has history of Enterococcus faecalis and E coli UTI as well as prior history of Klebsiella UTI  · Urine cultures showing 100K colonies mixed organisms, blood cultures no growth 5 days will discontinue Rocephin discontinued 1/31 and discontinued vancomycin  1/27-   · Will need rehab per pt recommendations     Bilateral hip pain  Assessment & Plan  · Significant pain in right lateral hip / thigh area     · - will consult orthopedics for input in regard to increased pain   · Will transition norco at this time as pt is now with pin prick pupils and mood change   · - still able to answer questions appropriately   · Start atc tylenol and reduce dose of oxy to 2 5mg q 8hrs (from norco 1 tab q 8hrs)   · Started on admission on Neurontin qhs  · [facility]  · - however now complaining of increased right hip versus femur pain  · - very difficult for physical therapy to work with patient as he is barely able to stand, patient reports that at home he is fairly independent with roller walker  · - Pt reports prior fall but was seen by his orthopedist outpt for the left hip   · Obtained left hip and right femur / hip xray with no new acute fractures or concerns  · Left hip xray: No acute osseous abnormality   Mild left hip degenerative change  · Right femur xray: There is no acute fracture or dislocation  Sosa Knight is an old mid femoral shaft fracture with marked deformity due to displacement and bridging callus formation  · Lumbar xray demonstrates : No acute osseous abnormality      Degenerative changes as described  · - discussed with Orthopedics recommend outpt follow up with pain service for possible need for steroid injections is spine    Hyponatremia  Assessment & Plan  · Sodium improved       Pulmonary emphysema (HCC)  Assessment & Plan  · Does not appear in exacerbation  · Resp protocol    Coronary artery disease  Assessment & Plan  · BB, ASA,statin  · Resume Plavix, the patient is on dual anti-platelet therapy home regimen    Severe aortic stenosis  Assessment & Plan  S/p TAVR          VTE Pharmacologic Prophylaxis:   Pharmacologic: Enoxaparin (Lovenox)  Mechanical VTE Prophylaxis in Place: Yes    Patient Centered Rounds: I have performed bedside rounds with nursing staff today  Discussions with Specialists or Other Care Team Provider: nursing     Education and Discussions with Family / Patient: patient     Time Spent for Care: 45 minutes  More than 50% of total time spent on counseling and coordination of care as described above  Current Length of Stay: 6 day(s)    Current Patient Status: Inpatient   Certification Statement: The patient will continue to require additional inpatient hospital stay due to pending rehab auth and bed     Discharge Plan: to rehab when bed available     Code Status: Level 1 - Full Code      Subjective:   Pt is doing very well today denies any pain   He is eating still no bm but otherwise this is the best he has looked   Pt states feeling good ready for dc to rehab     Objective:     Vitals:   Temp (24hrs), Av 8 °F (36 6 °C), Min:97 5 °F (36 4 °C), Max:98 1 °F (36 7 °C)    Temp:  [97 5 °F (36 4 °C)-98 1 °F (36 7 °C)] 98 1 °F (36 7 °C)  HR:  [72-79] 79  Resp:  [16-17] 16  BP: (100-132)/(57-58) 100/57  SpO2:  [95 %] 95 %  Body mass index is 24 13 kg/m²  Input and Output Summary (last 24 hours): Intake/Output Summary (Last 24 hours) at 2021 1448  Last data filed at 2021 2110  Gross per 24 hour   Intake 120 ml   Output 1400 ml   Net -1280 ml       Physical Exam:     Physical Exam  Constitutional:       General: He is not in acute distress  Appearance: He is not ill-appearing, toxic-appearing or diaphoretic  HENT:      Head: Normocephalic  Mouth/Throat:      Pharynx: Oropharynx is clear  No oropharyngeal exudate  Eyes:      Conjunctiva/sclera: Conjunctivae normal    Cardiovascular:      Rate and Rhythm: Normal rate  Heart sounds: No murmur  No friction rub  No gallop  Pulmonary:      Effort: Pulmonary effort is normal  No respiratory distress  Breath sounds: No stridor  No wheezing, rhonchi or rales  Chest:      Chest wall: No tenderness  Abdominal:      General: There is no distension  Palpations: There is no mass  Tenderness: There is no abdominal tenderness  There is no right CVA tenderness, left CVA tenderness, guarding or rebound  Hernia: No hernia is present  Musculoskeletal:         General: Signs of injury present  No swelling, tenderness or deformity  Right lower leg: No edema  Left lower leg: No edema  Skin:     Coloration: Skin is not jaundiced or pale  Findings: No bruising, erythema, lesion or rash  Neurological:      Mental Status: He is alert and oriented to person, place, and time     Psychiatric:         Behavior: Behavior normal            Additional Data: Labs:    Results from last 7 days   Lab Units 01/30/21  0610   WBC Thousand/uL 5 03   HEMOGLOBIN g/dL 11 2*   HEMATOCRIT % 34 1*   PLATELETS Thousands/uL 95*   NEUTROS PCT % 66   LYMPHS PCT % 12*   MONOS PCT % 15*   EOS PCT % 7*     Results from last 7 days   Lab Units 01/30/21  0610   SODIUM mmol/L 133*   POTASSIUM mmol/L 3 7   CHLORIDE mmol/L 102   CO2 mmol/L 28   BUN mg/dL 13   CREATININE mg/dL 0 92   ANION GAP mmol/L 3*   CALCIUM mg/dL 8 6   ALBUMIN g/dL 2 9*   TOTAL BILIRUBIN mg/dL 0 58   ALK PHOS U/L 94   ALT U/L 37   AST U/L 82*   GLUCOSE RANDOM mg/dL 81                 Results from last 7 days   Lab Units 01/27/21  0601 01/26/21  1023   LACTIC ACID mmol/L  --  1 1   PROCALCITONIN ng/ml 0 09 0 09           * I Have Reviewed All Lab Data Listed Above  * Additional Pertinent Lab Tests Reviewed: All Labs Within Last 24 Hours Reviewed    Imaging:    Imaging Reports Reviewed Today Include: reviewed    Recent Cultures (last 7 days):     Results from last 7 days   Lab Units 01/26/21  1032 01/26/21  1023   BLOOD CULTURE   --  No Growth After 5 Days  No Growth After 5 Days     URINE CULTURE  >100,000 cfu/ml   --        Last 24 Hours Medication List:   Current Facility-Administered Medications   Medication Dose Route Frequency Provider Last Rate    acetaminophen  975 mg Oral Q8H [address] MANI Singh      albuterol  2 puff Inhalation Q4H PRN Markel Longest, DO      aspirin  81 mg Oral Daily Markel Longest, DO      cholecalciferol  2,000 Units Oral Daily [place] Longest, DO      clopidogrel  75 mg Oral Daily Dixie Ford MD      docusate sodium  100 mg Oral BID MANI Cruz      enoxaparin  40 mg Subcutaneous Daily [place] Longest, DO      finasteride  5 mg Oral Daily [place] Longest, DO      furosemide  20 mg Oral Daily Markel Longest, DO      gabapentin  100 mg Oral HS [place] Longest, DO      levothyroxine  75 mcg Oral Early Morning [place] Longest, DO      metoprolol succinate  12 5 mg Oral Daily Johann Tatum, DO      ondansetron  4 mg Intravenous Q6H PRN Johann Tatum DO      oxyCODONE  2 5 mg Oral Q6H PRN MANI Larry      pantoprazole  40 mg Oral Early Morning Johann Tatum,       polyethylene glycol  17 g Oral Daily MANI Larry      potassium chloride  10 mEq Oral Daily Johann Tatum DO      pravastatin  40 mg Oral Daily With [facility], DO      tamsulosin  0 4 mg Oral Daily With [facility], DO      tiotropium  18 mcg Inhalation Daily Johann Tatum DO          Today, Patient Was Seen By: MANI Larry    ** Please Note: Dictation voice to text software may have been used in the creation of this document   **

## 2021-02-01 NOTE — ASSESSMENT & PLAN NOTE
· POA e/b fever and tachycardia  · 2/2 UTI  · Blood cultures no growth at 72 hours, urine cultures mixed contaminants  · Anaphylaxis w/ Ancef but tolerates Rocephin currently day # 5 today   · Continue Rocephin, discontinue vancomycin given thus far negative infectious workup discontinue 1/31 day #5/5   · Monitor CBC, procalcitonin, negative

## 2021-02-01 NOTE — ASSESSMENT & PLAN NOTE
· Significant pain in right lateral hip / thigh area  · - will consult orthopedics for input in regard to increased pain   · Will transition norco at this time as pt is now with pin prick pupils and mood change   · - still able to answer questions appropriately   · Start atc tylenol and reduce dose of oxy to 2 5mg q 8hrs (from norco 1 tab q 8hrs)   · Started on admission on Neurontin qhs  · Patient has chronic history bilateral hip pain sees [facility] orthopedic physician  · - however now complaining of increased right hip versus femur pain  · - very difficult for physical therapy to work with patient as he is barely able to stand, patient reports that at home he is fairly independent with roller walker  · - Pt reports prior fall but was seen by his orthopedist outpt for the left hip   · Obtained left hip and right femur / hip xray with no new acute fractures or concerns  · Left hip xray: No acute osseous abnormality   Mild left hip degenerative change  · Right femur xray: There is no acute fracture or dislocation  Verla Mix is an old mid femoral shaft fracture with marked deformity due to displacement and bridging callus formation  · Lumbar xray demonstrates : No acute osseous abnormality      Degenerative changes as described     · - discussed with Orthopedics recommend outpt follow up with pain service for possible need for steroid injections is spine

## 2021-02-01 NOTE — OCCUPATIONAL THERAPY NOTE
Occupational Therapy Treatment Note       02/01/21 1459   OT Last Visit   OT Visit Date 02/01/21   Note Type   Note Type Treatment for insurance authorization   Restrictions/Precautions   Weight Bearing Precautions Per Order No   Braces or Orthoses Other (Comment)  (specialized shoes w/ heel lift )   Lifestyle   Autonomy At baseline pt was completing UB ADLs IND, LB ADLs with assist, ambulating MOD IND with RW, (-) driving  Reciprocal Relationships support from spouse   Service to Others retired   Intrinsic Gratification pt enjoys watching TV   Pain Assessment   Pain Assessment Tool 0-10   ADL   Where Assessed Edge of bed   Grooming Assistance 5  Supervision/Setup   UB Dressing Assistance 3  Moderate Assistance   UB Dressing Deficit Thread RUE; Thread LUE   LB Dressing Assistance 2  Maximal Assistance   LB Dressing Deficit Don/doff R sock; Don/doff L sock   Cognition   Overall Cognitive Status Impaired   Arousal/Participation Alert; Uncooperative  (max cues for participation initially )   Attention Attends with cues to redirect   Orientation Level Oriented to person;Oriented to place;Oriented to time   Memory Decreased recall of precautions;Decreased recall of recent events;Decreased short term memory   Following Commands Follows one step commands with increased time or repetition   Activity Tolerance   Activity Tolerance Patient limited by fatigue   Assessment   Assessment Pt participated in occupational therapy with focus on activity tolerance, bed mob, unsupported sitting balance and tolerance for pt engagement in functional self-care task/ UB self-care, functional transfers/standing tolerance and balance  Pt cleared by SAUL/Lana for pt participation in occupational therapy  Pt received HOB raised/supine pt sitting upright and agreeable to therapy following pt Identifiers confirmed  Pt reported his goal to get strong again to return to home with his wife     Pt required assist for bed mob and functional transfers/standing tolerance 2* pt deconditioning and decreased overall strength  Pt will require post acute rehab services to continue to address these above noted pt deficits which currently impair pt ADL and functional mob      Plan   Treatment Interventions ADL retraining   Goal Expiration Date 02/10/21   OT Treatment Day 2   OT Frequency 3-5x/wk   Recommendation   OT Discharge Recommendation Post-Acute Rehabilitation Services   AM-PAC Daily Activity Inpatient   Lower Body Dressing 2   Bathing 2   Toileting 2   Upper Body Dressing 3   Grooming 3   Eating 4   Daily Activity Raw Score 16   Daily Activity Standardized Score (Calc for Raw Score >=11) 35 96   AM-PAC Applied Cognition Inpatient   Following a Speech/Presentation 4   Understanding Ordinary Conversation 4   Taking Medications 4   Remembering Where Things Are Placed or Put Away 4   Remembering List of 4-5 Errands 3   Taking Care of Complicated Tasks 4   Applied Cognition Raw Score 23   Applied Cognition Standardized Score 53 08   Barthel Index   Feeding 10   Bathing 0   Grooming Score 5   Dressing Score 5   Bladder Score 10   Bowels Score 10   Toilet Use Score 5   Transfers (Bed/Chair) Score 5   Mobility (Level Surface) Score 0   Stairs Score 0   Barthel Index Score 50     Juju Leung  LOPEZ/L

## 2021-02-02 PROBLEM — D61.818 PANCYTOPENIA (HCC): Status: ACTIVE | Noted: 2021-02-02

## 2021-02-02 PROBLEM — K59.00 CONSTIPATION: Status: ACTIVE | Noted: 2021-02-02

## 2021-02-02 PROCEDURE — 99232 SBSQ HOSP IP/OBS MODERATE 35: CPT | Performed by: NURSE PRACTITIONER

## 2021-02-02 PROCEDURE — 94760 N-INVAS EAR/PLS OXIMETRY 1: CPT

## 2021-02-02 RX ORDER — LACTULOSE 20 G/30ML
30 SOLUTION ORAL ONCE
Status: DISCONTINUED | OUTPATIENT
Start: 2021-02-02 | End: 2021-02-06 | Stop reason: HOSPADM

## 2021-02-02 RX ADMIN — FINASTERIDE 5 MG: 5 TABLET, FILM COATED ORAL at 09:42

## 2021-02-02 RX ADMIN — PRAVASTATIN SODIUM 40 MG: 40 TABLET ORAL at 17:21

## 2021-02-02 RX ADMIN — DOCUSATE SODIUM 100 MG: 100 CAPSULE, LIQUID FILLED ORAL at 09:42

## 2021-02-02 RX ADMIN — PANTOPRAZOLE SODIUM 40 MG: 40 TABLET, DELAYED RELEASE ORAL at 05:28

## 2021-02-02 RX ADMIN — ASPIRIN 81 MG: 81 TABLET, COATED ORAL at 09:42

## 2021-02-02 RX ADMIN — LEVOTHYROXINE SODIUM 75 MCG: 75 TABLET ORAL at 05:28

## 2021-02-02 RX ADMIN — GABAPENTIN 100 MG: 100 CAPSULE ORAL at 22:27

## 2021-02-02 RX ADMIN — TAMSULOSIN HYDROCHLORIDE 0.4 MG: 0.4 CAPSULE ORAL at 17:21

## 2021-02-02 RX ADMIN — ENOXAPARIN SODIUM 40 MG: 40 INJECTION SUBCUTANEOUS at 09:43

## 2021-02-02 RX ADMIN — DOCUSATE SODIUM 100 MG: 100 CAPSULE, LIQUID FILLED ORAL at 17:21

## 2021-02-02 RX ADMIN — POTASSIUM CHLORIDE 10 MEQ: 750 TABLET, EXTENDED RELEASE ORAL at 09:42

## 2021-02-02 RX ADMIN — METOPROLOL SUCCINATE 12.5 MG: 25 TABLET, EXTENDED RELEASE ORAL at 09:42

## 2021-02-02 RX ADMIN — CLOPIDOGREL BISULFATE 75 MG: 75 TABLET ORAL at 09:42

## 2021-02-02 RX ADMIN — ACETAMINOPHEN 975 MG: 325 TABLET, FILM COATED ORAL at 22:27

## 2021-02-02 RX ADMIN — TIOTROPIUM BROMIDE 18 MCG: 18 CAPSULE ORAL; RESPIRATORY (INHALATION) at 09:46

## 2021-02-02 RX ADMIN — FUROSEMIDE 20 MG: 20 TABLET ORAL at 09:42

## 2021-02-02 RX ADMIN — ACETAMINOPHEN 975 MG: 325 TABLET, FILM COATED ORAL at 05:28

## 2021-02-02 RX ADMIN — Medication 2000 UNITS: at 09:42

## 2021-02-02 RX ADMIN — POLYETHYLENE GLYCOL 3350 17 G: 17 POWDER, FOR SOLUTION ORAL at 09:43

## 2021-02-02 NOTE — CASE MANAGEMENT
CM TC to dtr Franchesca Hicks who dialed in her mother  Cm informed both dtr and spouse that [facility] is following, awaiting determination  [facility] is short on bed availability  CM will continue to follow  IMM doc reviewed   Family is in agreement and does not wish to appeal

## 2021-02-02 NOTE — TELEPHONE ENCOUNTER
Patient remains inpatient at this time  Will continue to monitor for discharge and follow up with patient at that time  Per Dr John Tello, if patient is agreeable, we can place order for IR consult spt placement without patient coming into office

## 2021-02-02 NOTE — CASE MANAGEMENT
CM TC to pt's wife, Manjit Specking for more choices on rehab facilities  Manjit Specking informed Cm that she is okay with referrals being placed to [facility] and [facility]  Cm sent referrals in [facility] and will continue to follow

## 2021-02-02 NOTE — ASSESSMENT & PLAN NOTE
Status post sepsis, history of thrombocytopenia status post TAVR on dapt  · As evidenced by WBC count 3 38, RBCs 3 31 hemoglobin 10 6 platelets 84  · Improving  · Repeat labs in am

## 2021-02-03 PROCEDURE — 97530 THERAPEUTIC ACTIVITIES: CPT

## 2021-02-03 PROCEDURE — 97116 GAIT TRAINING THERAPY: CPT

## 2021-02-03 PROCEDURE — 99232 SBSQ HOSP IP/OBS MODERATE 35: CPT | Performed by: PHYSICIAN ASSISTANT

## 2021-02-03 RX ORDER — FUROSEMIDE 10 MG/ML
20 INJECTION INTRAMUSCULAR; INTRAVENOUS ONCE
Status: COMPLETED | OUTPATIENT
Start: 2021-02-03 | End: 2021-02-03

## 2021-02-03 RX ADMIN — METOPROLOL SUCCINATE 12.5 MG: 25 TABLET, EXTENDED RELEASE ORAL at 08:07

## 2021-02-03 RX ADMIN — DOCUSATE SODIUM 100 MG: 100 CAPSULE, LIQUID FILLED ORAL at 08:07

## 2021-02-03 RX ADMIN — FINASTERIDE 5 MG: 5 TABLET, FILM COATED ORAL at 08:07

## 2021-02-03 RX ADMIN — POTASSIUM CHLORIDE 10 MEQ: 750 TABLET, EXTENDED RELEASE ORAL at 08:07

## 2021-02-03 RX ADMIN — FUROSEMIDE 20 MG: 20 TABLET ORAL at 08:07

## 2021-02-03 RX ADMIN — TIOTROPIUM BROMIDE 18 MCG: 18 CAPSULE ORAL; RESPIRATORY (INHALATION) at 13:09

## 2021-02-03 RX ADMIN — FUROSEMIDE 20 MG: 10 INJECTION, SOLUTION INTRAMUSCULAR; INTRAVENOUS at 15:11

## 2021-02-03 RX ADMIN — ACETAMINOPHEN 975 MG: 325 TABLET, FILM COATED ORAL at 13:11

## 2021-02-03 RX ADMIN — PANTOPRAZOLE SODIUM 40 MG: 40 TABLET, DELAYED RELEASE ORAL at 05:58

## 2021-02-03 RX ADMIN — DOCUSATE SODIUM 100 MG: 100 CAPSULE, LIQUID FILLED ORAL at 17:43

## 2021-02-03 RX ADMIN — CLOPIDOGREL BISULFATE 75 MG: 75 TABLET ORAL at 08:07

## 2021-02-03 RX ADMIN — ACETAMINOPHEN 975 MG: 325 TABLET, FILM COATED ORAL at 21:02

## 2021-02-03 RX ADMIN — LEVOTHYROXINE SODIUM 75 MCG: 75 TABLET ORAL at 05:58

## 2021-02-03 RX ADMIN — ASPIRIN 81 MG: 81 TABLET, COATED ORAL at 08:07

## 2021-02-03 RX ADMIN — PRAVASTATIN SODIUM 40 MG: 40 TABLET ORAL at 16:00

## 2021-02-03 RX ADMIN — TAMSULOSIN HYDROCHLORIDE 0.4 MG: 0.4 CAPSULE ORAL at 16:00

## 2021-02-03 RX ADMIN — GABAPENTIN 100 MG: 100 CAPSULE ORAL at 21:02

## 2021-02-03 RX ADMIN — ACETAMINOPHEN 975 MG: 325 TABLET, FILM COATED ORAL at 05:58

## 2021-02-03 RX ADMIN — Medication 2000 UNITS: at 08:07

## 2021-02-03 RX ADMIN — ENOXAPARIN SODIUM 40 MG: 40 INJECTION SUBCUTANEOUS at 08:06

## 2021-02-03 RX ADMIN — POLYETHYLENE GLYCOL 3350 17 G: 17 POWDER, FOR SOLUTION ORAL at 08:07

## 2021-02-03 NOTE — ASSESSMENT & PLAN NOTE
· POA e/b fever and tachycardia suspected to be secondary to suspected UTI  · Blood cultures with no growth to date  · Urine culture with mixed contaminant  · Patient received 5 day course of IV Rocephin for suspected UTI  · Trend temps, WBC, hemodynamics

## 2021-02-03 NOTE — ASSESSMENT & PLAN NOTE
· Significant pain in right lateral hip / thigh area along with chronic history of bilateral hip pain; patient follows with [facility] orthopedic physician  · Appreciate orthopedics input - recommending outpatient follow-up with Pain Service for possible steroid injection in the spine  · Left hip, right femur, lumbar x-ray all without acute osseous abnormalities  · Pain control with ATC Tylenol and low dose Oxy 2 5 mg as needed  · PT/OT recommending rehab; CM following

## 2021-02-03 NOTE — PLAN OF CARE
Problem: OCCUPATIONAL THERAPY ADULT  Goal: Performs self-care activities at highest level of function for planned discharge setting  See evaluation for individualized goals  Description: Treatment Interventions: ADL retraining, Functional transfer training, UE strengthening/ROM, Endurance training, Cognitive reorientation, Patient/family training, Equipment evaluation/education, Compensatory technique education, Energy conservation, Activityengagement          See flowsheet documentation for full assessment, interventions and recommendations  Outcome: Progressing  Note: Limitation: Decreased ADL status, Decreased UE strength, Decreased Safe judgement during ADL, Decreased cognition, Decreased endurance, Decreased self-care trans, Decreased high-level ADLs  Prognosis: Good  Assessment: Treatment session focused on bed mobility, endurance training, functional transfers and mobility, pt education, grooming and self feeding and activity tolerance  Pt with overall improvements this date with bed mobility, functional transfers and functional mobility  Continues to require VCs for hand placement with transfers and body mechanics  Pt completed [facility] functional mobility in room with Min A x1 with RW  Pt continues to require assistance for LB dressing with shoes and setup for oral care  Pt is limited by endurance, strength and fatigue  Pt was left seated in recliner at end of session with breakfast tray  The patient's raw score on the AM-PAC Daily Activity inpatient short form is 16, standardized score is 35 96, less than 39 4  Patients at this level are likely to benefit from D/C to post acute rehab  Please refer to the recommendation of the Occupational Therapist for safe DC planning  Pt would continue to benefit from acute OT services while in the hospital to maximize independence and safety with ADLs and functional mobility in order for patient to safely discharge to the next level of care  Proceed with POC        OT Discharge Recommendation: Post-Acute Rehabilitation Services  OT - OK to Discharge:  Yes

## 2021-02-03 NOTE — ASSESSMENT & PLAN NOTE
Pt has not yet had bm   · Was placed on bowel regimen with no results  · Given lactulose last night   · Will administer add dose now

## 2021-02-03 NOTE — PROGRESS NOTES
02/03/21 1500   Clinical Encounter Type   Visited With Patient   Uatsdin Encounters   Uatsdin Needs Prayer   Sacramental Encounters   Sacrament of Sick-Anointing Anointed

## 2021-02-03 NOTE — ASSESSMENT & PLAN NOTE
· Uroxatral equivalent  · Porter replaced after failed void trial after cystoscopy  · Urology follow-up outpatient for consideration for suprapubic catheter

## 2021-02-03 NOTE — ASSESSMENT & PLAN NOTE
· Significant pain in right lateral hip / thigh area  · - will consult orthopedics for input in regard to increased pain   · Will transition norco at this time as pt is now with pin prick pupils and mood change   · - still able to answer questions appropriately   · Start atc tylenol and reduce dose of oxy to 2 5mg q 8hrs (from norco 1 tab q 8hrs)   · Started on admission on Neurontin qhs  · Patient has chronic history bilateral hip pain sees [facility] orthopedic physician  · - however now complaining of increased right hip versus femur pain  · - very difficult for physical therapy to work with patient as he is barely able to stand, patient reports that at home he is fairly independent with roller walker  · - Pt reports prior fall but was seen by his orthopedist outpt for the left hip   · Obtained left hip and right femur / hip xray with no new acute fractures or concerns  · Left hip xray: No acute osseous abnormality   Mild left hip degenerative change  · Right femur xray: There is no acute fracture or dislocation  Renee Rachel is an old mid femoral shaft fracture with marked deformity due to displacement and bridging callus formation  · Lumbar xray demonstrates : No acute osseous abnormality      Degenerative changes as described     · - discussed with Orthopedics recommend outpt follow up with pain service for possible need for steroid injections in spine

## 2021-02-03 NOTE — PROGRESS NOTES
Progress Note - Renee Franklin 1933, 80 y o  male MRN: [id number]    Unit/Bed#: [facility] 320-01 Encounter: [id number]    Primary Care Provider: Leo Bella MD   Date and time admitted to hospital: 1/26/2021  9:29 AM        * Sepsis without acute organ dysfunction [facility])  Assessment & Plan  · POA e/b fever and tachycardia  · 2/2 UTI  · Blood cultures no growth at 72 hours, urine cultures mixed contaminants  · Anaphylaxis w/ Ancef but tolerates Rocephin currently day # 5 today   · Continue Rocephin, discontinue vancomycin given thus far negative infectious workup discontinue 1/31 day #5/5   · Monitor CBC, procalcitonin, negative     Urine retention  Assessment & Plan  · Uroxatral equivalent  · Porter replaced after failed void trial after cystoscopy  · Urology follow-up outpatient  · Patient may require suprapubic catheter per Urology -     UTI (urinary tract infection)  Assessment & Plan  · Status post cystoscopy day prior to admission  · Rocephin and Vanco - patient has history of Enterococcus faecalis and E coli UTI as well as prior history of Klebsiella UTI  · Urine cultures showing 100K colonies mixed organisms, blood cultures no growth 5 days Discontinued Rocephin 1/31 and discontinued vancomycin  1/27-   · Will need rehab per pt recommendations discussed with cm as pt pending bed and auth       Bilateral hip pain  Assessment & Plan  · Significant pain in right lateral hip / thigh area     · - will consult orthopedics for input in regard to increased pain   · Will transition norco at this time as pt is now with pin prick pupils and mood change   · - still able to answer questions appropriately   · Start atc tylenol and reduce dose of oxy to 2 5mg q 8hrs (from norco 1 tab q 8hrs)   · Started on admission on Neurontin qhs  · Patient has chronic history bilateral hip pain sees [facility] orthopedic physician  · - however now complaining of increased right hip versus femur pain  · - very difficult for physical therapy to work with patient as he is barely able to stand, patient reports that at home he is fairly independent with roller walker  · - Pt reports prior fall but was seen by his orthopedist outpt for the left hip   · Obtained left hip and right femur / hip xray with no new acute fractures or concerns  · Left hip xray: No acute osseous abnormality   Mild left hip degenerative change  · Right femur xray: There is no acute fracture or dislocation  Arthluis enrique Pratt is an old mid femoral shaft fracture with marked deformity due to displacement and bridging callus formation  · Lumbar xray demonstrates : No acute osseous abnormality      Degenerative changes as described  · - discussed with Orthopedics recommend outpt follow up with pain service for possible need for steroid injections in spine    Constipation  Assessment & Plan  Pt has not yet had bm   · Was placed on bowel regimen with no results  · Given lactulose last night   · Will administer add dose now     Pancytopenia [facility])  Assessment & Plan  Status post sepsis, history of thrombocytopenia status post TAVR on dapt  · As evidenced by WBC count 3 38, RBCs 3 31 hemoglobin 10 6 platelets 84  · Improving  · Repeat labs in am     Hyponatremia  Assessment & Plan  · Sodium improved       Pulmonary emphysema (HCC)  Assessment & Plan  · Does not appear in exacerbation  · Resp protocol    Coronary artery disease  Assessment & Plan  · BB, ASA,statin  · Resume Plavix, the patient is on dual anti-platelet therapy home regimen    Severe aortic stenosis  Assessment & Plan  S/p TAVR        VTE Pharmacologic Prophylaxis:   Pharmacologic: Enoxaparin (Lovenox)  Mechanical VTE Prophylaxis in Place: Yes    Patient Centered Rounds: I have performed bedside rounds with nursing staff today  Discussions with Specialists or Other Care Team Provider: nursing and cm     Education and Discussions with Family / Patient: patient     Time Spent for Care: 45 minutes    More than 50% of total time spent on counseling and coordination of care as described above  Current Length of Stay: 7 day(s)    Current Patient Status: Inpatient   Certification Statement: The patient will continue to require additional inpatient hospital stay due to pending bed and auth     Discharge Plan: discharge to rehab likely Thursday per cm have bed for sure on Thursday [address] but none sooner   Cm reaching out to family for other choices for now otherwise Thursday  Code Status: Level 1 - Full Code      Subjective:   Pt is doing really well he is oriented he denies any pain in fact shows me that he can [place] motion with both feet he is able to lift both legs off of the bed in laying position he has been practicing to get strong  He still has not had a bm   We spoke about this and plan to give another dose of lactulose  Pt also reports he electric shaver did not cut his lip when he used it other day  He did say he cant use it now as his wife forgot to pack the  or power cord when belongings brought to the hospital     Objective:     Vitals:   Temp (24hrs), Av 6 °F (36 4 °C), Min:97 5 °F (36 4 °C), Max:97 7 °F (36 5 °C)    Temp:  [97 5 °F (36 4 °C)-97 7 °F (36 5 °C)] 97 5 °F (36 4 °C)  HR:  [73-74] 74  Resp:  [16] 16  BP: (129-133)/(57-61) 129/61  SpO2:  [94 %-96 %] 94 %  Body mass index is 24 13 kg/m²  Input and Output Summary (last 24 hours): Intake/Output Summary (Last 24 hours) at 2021 2141  Last data filed at 2021 1251  Gross per 24 hour   Intake 240 ml   Output 575 ml   Net -335 ml       Physical Exam:     Physical Exam  HENT:      Head: Normocephalic and atraumatic  Comments: Small scabbed lesion below nose above lip and one small on lip   Eyes:      General:         Right eye: No discharge  Left eye: No discharge  Conjunctiva/sclera: Conjunctivae normal    Cardiovascular:      Rate and Rhythm: Normal rate  Heart sounds: No murmur  No friction rub   No gallop  Pulmonary:      Effort: No respiratory distress  Breath sounds: No stridor  No wheezing, rhonchi or rales  Chest:      Chest wall: No tenderness  Abdominal:      General: There is no distension  Palpations: There is no mass  Tenderness: There is no abdominal tenderness  There is no right CVA tenderness, left CVA tenderness, guarding or rebound  Hernia: No hernia is present  Musculoskeletal:         General: Deformity (right leg shorter than left ) present  No swelling, tenderness or signs of injury  Right lower leg: No edema  Left lower leg: No edema  Skin:     Coloration: Skin is not jaundiced or pale  Findings: No bruising, erythema, lesion or rash  Neurological:      Mental Status: He is alert and oriented to person, place, and time  Psychiatric:         Behavior: Behavior normal            Additional Data:     Labs:    Results from last 7 days   Lab Units 01/30/21  0610   WBC Thousand/uL 5 03   HEMOGLOBIN g/dL 11 2*   HEMATOCRIT % 34 1*   PLATELETS Thousands/uL 95*   NEUTROS PCT % 66   LYMPHS PCT % 12*   MONOS PCT % 15*   EOS PCT % 7*     Results from last 7 days   Lab Units 01/30/21  0610   SODIUM mmol/L 133*   POTASSIUM mmol/L 3 7   CHLORIDE mmol/L 102   CO2 mmol/L 28   BUN mg/dL 13   CREATININE mg/dL 0 92   ANION GAP mmol/L 3*   CALCIUM mg/dL 8 6   ALBUMIN g/dL 2 9*   TOTAL BILIRUBIN mg/dL 0 58   ALK PHOS U/L 94   ALT U/L 37   AST U/L 82*   GLUCOSE RANDOM mg/dL 81                 Results from last 7 days   Lab Units 01/27/21  0601   PROCALCITONIN ng/ml 0 09           * I Have Reviewed All Lab Data Listed Above  * Additional Pertinent Lab Tests Reviewed:  All Labs Within Last 24 Hours Reviewed    Imaging:    Imaging Reports Reviewed Today Include: reviewed     Recent Cultures (last 7 days):           Last 24 Hours Medication List:   Current Facility-Administered Medications   Medication Dose Route Frequency Provider Last Rate    acetaminophen  975 mg Oral Q8H [address] Burgess Rodriguez, CRNP      albuterol  2 puff Inhalation Q4H PRN Salma Zuniga, DO      aspirin  81 mg Oral Daily Salma Zuniga, DO      cholecalciferol  2,000 Units Oral Daily Salma Zuniga, DO      clopidogrel  75 mg Oral Daily Rosaura Alexander MD      docusate sodium  100 mg Oral BID Burgess Rodriguez, CRNP      enoxaparin  40 mg Subcutaneous Daily Salma Zuniga, DO      finasteride  5 mg Oral Daily Salma Zuniga, DO      furosemide  20 mg Oral Daily Salma Zuniga, DO      gabapentin  100 mg Oral HS Salma Zuniga, DO      lactulose  30 g Oral Once Burgess Rodriguez, CRNP      levothyroxine  75 mcg Oral Early Morning Salma Zuniga, DO      metoprolol succinate  12 5 mg Oral Daily Salma Zuniga, DO      ondansetron  4 mg Intravenous Q6H PRN Salma Zuniga, DO      oxyCODONE  2 5 mg Oral Q6H PRN Burgess Rodriguez, CRNP      pantoprazole  40 mg Oral Early Morning Salma Zuniga, DO      polyethylene glycol  17 g Oral Daily Burgess Rodriguez, CRNP      potassium chloride  10 mEq Oral Daily Salma Zuniga, DO      pravastatin  40 mg Oral Daily With [facility], DO      tamsulosin  0 4 mg Oral Daily With [facility], DO      tiotropium  18 mcg Inhalation Daily Salma Zuniga, DO          Today, Patient Was Seen By: MANI Mak    ** Please Note: Dictation voice to text software may have been used in the creation of this document   **

## 2021-02-03 NOTE — OCCUPATIONAL THERAPY NOTE
[address] Progress Note     Patient Name: Alexandra Cowart  IGPQO'A Date: 2/3/2021  Problem List  Principal Problem:    Sepsis without acute organ dysfunction [facility])  Active Problems:    Severe aortic stenosis    Coronary artery disease    Bilateral hip pain    Pulmonary emphysema (HCC)    Hyponatremia    UTI (urinary tract infection)    Urine retention    Pancytopenia ([address] )    Constipation        02/03/21 0847   OT Last Visit   OT Visit Date 02/03/21   Note Type   Note Type Treatment   Restrictions/Precautions   Weight Bearing Precautions Per Order No   Braces or Orthoses Other (Comment)  (shoes with heel lift insert)   Other Precautions Fall Risk;Pain;Hard of hearing   Lifestyle   Autonomy At baseline pt was completing UB ADLs IND, LB ADLs with assist, ambulating MOD IND with RW, (-) driving  Reciprocal Relationships support from spouse   Service to Others retired   Intrinsic Gratification pt enjoys watching TV   Pain Assessment   Pain Assessment Tool 0-10   Pain Score No Pain   ADL   Eating Assistance 5  Supervision/Setup   Eating Deficit Setup   Grooming Assistance 5  Supervision/Setup   Grooming Deficit Setup; Denture care   LB Dressing Assistance 2  Maximal Assistance   LB Dressing Deficit Don/doff R sock; Don/doff L sock; Don/doff R shoe;Don/doff L shoe   Functional Standing Tolerance   Time 3-4 min   Activity with functional mobility   Comments with RW   Bed Mobility   Supine to Sit 4  Minimal assistance   Additional items Assist x 1;HOB elevated; Bedrails; Increased time required;Verbal cues   Sit to Supine Unable to assess   Additional Comments Pt OOBTC at end of session to eat breakfast   Transfers   Sit to Stand 4  Minimal assistance   Additional items Increased time required;Verbal cues; Assist x 2   Stand to Sit 4  Minimal assistance   Additional items Increased time required;Verbal cues; Assist x 2   Additional Comments transfers with RW and VCs for hand placement and body mechanics Functional Mobility   Functional Mobility 4  Minimal assistance   Additional Comments assist x1 SHHD in room   Additional items Rolling walker   Cognition   Arousal/Participation Alert; Cooperative   Attention Attends with cues to redirect   Following Commands Follows one step commands with increased time or repetition   Comments Pt pleasant and cooperative to work with therapy this AM   Activity Tolerance   Activity Tolerance Patient tolerated treatment well   Medical Staff Made Aware PT Sarahy Sepulvedamercedez   Assessment   Assessment Treatment session focused on bed mobility, endurance training, functional transfers and mobility, pt education, grooming and self feeding and activity tolerance  Pt with overall improvements this date with bed mobility, functional transfers and functional mobility  Continues to require VCs for hand placement with transfers and body mechanics  Pt completed SHHD functional mobility in room with Min A x1 with RW  Pt continues to require assistance for LB dressing with shoes and setup for oral care  Pt is limited by endurance, strength and fatigue  Pt was left seated in recliner at end of session with breakfast tray  The patient's raw score on the AM-PAC Daily Activity inpatient short form is 16, standardized score is 35 96, less than 39 4  Patients at this level are likely to benefit from D/C to post acute rehab  Please refer to the recommendation of the Occupational Therapist for safe DC planning  Pt would continue to benefit from acute OT services while in the hospital to maximize independence and safety with ADLs and functional mobility in order for patient to safely discharge to the next level of care  Proceed with POC  Plan   Treatment Interventions ADL retraining;Functional transfer training; Endurance training;Patient/family training;Equipment evaluation/education; Energy conservation; Activityengagement   Goal Expiration Date 02/10/21   OT Treatment Day 3   OT Frequency 3-5x/wk Recommendation   OT Discharge Recommendation Post-Acute Rehabilitation Services   OT - OK to Discharge Yes   AM-PAC Daily Activity Inpatient   Lower Body Dressing 2   Bathing 2   Toileting 2   Upper Body Dressing 3   Grooming 3   Eating 4   Daily Activity Raw Score 16   Daily Activity Standardized Score (Calc for Raw Score >=11) 35 96      Rena Judd MOT,OTR/L

## 2021-02-03 NOTE — PLAN OF CARE
Problem: PHYSICAL THERAPY ADULT  Goal: Performs mobility at highest level of function for planned discharge setting  See evaluation for individualized goals  Description: Treatment/Interventions: LE strengthening/ROM, Functional transfer training, Therapeutic exercise, Endurance training, Cognitive reorientation, Patient/family training, Equipment eval/education, Bed mobility, Spoke to nursing, Gait training          See flowsheet documentation for full assessment, interventions and recommendations  Outcome: Progressing  Note: Prognosis: Fair  Problem List: Decreased strength, Decreased endurance, Impaired balance, Decreased mobility, Pain  Assessment: Pt presents with decreased mobility, strength, balance, and activity tolerance  Pt demonstrated ability to perform bed mobility at [address] [place] [address] w/ HOB elevated  Pt performed STS functional transfers at [address] [place] [address]  Pt ambulated 8ft x2 with RW at [address] [place] [address]- distance limited 2' pt wanting to eat breakfast  Pt demonstrates progression w/ therapy being able to perform all transfers/mobility w/ less assistance and ability to ambulate further distances  Pt continues to benefit from skilled therapy to maximize functional independence  Recommendation at this time is rehab  Pt would benefit from continued ambulation, functional transfers, strength, balance, and activity tolerance  Barriers to Discharge: Inaccessible home environment, Decreased caregiver support     PT Discharge Recommendation: [address]     PT - OK to Discharge: Yes(when medically cleared to rehab)    See flowsheet documentation for full assessment

## 2021-02-03 NOTE — PROGRESS NOTES
Progress Note - Derek Mata 1933, 80 y o  male MRN: [id number]  Unit/Bed#: [facility] 320-01 Encounter: [id number]  Primary Care Provider: Kalen Rosas MD   Date and time admitted to hospital: 1/26/2021  9:29 AM    * Sepsis without acute organ dysfunction [facility])  Assessment & Plan  · POA e/b fever and tachycardia suspected to be secondary to suspected UTI  · Blood cultures with no growth to date  · Urine culture with mixed contaminant  · Patient received 5 day course of IV Rocephin for suspected UTI  · Trend temps, WBC, hemodynamics    UTI (urinary tract infection)  Assessment & Plan  · Status post cystoscopy day prior to admission  · Received Rocephin and Vanco on admission given history of Enterococcus faecalis and E coli UTI as well as prior history of Klebsiella UTI  · Urine cultures showing 100K colonies mixed organisms  · Vancomycin discontinued 01/27  · Patient completed 5 day Rx of IV ceftriaxone on 01/31    Bilateral hip pain  Assessment & Plan  · Significant pain in right lateral hip / thigh area along with chronic history of bilateral hip pain; patient follows with [facility] orthopedic physician  · Appreciate orthopedics input - recommending outpatient follow-up with Pain Service for possible steroid injection in the spine  · Left hip, right femur, lumbar x-ray all without acute osseous abnormalities  · Pain control with ATC Tylenol and low dose Oxy 2 5 mg as needed  · PT/OT recommending rehab; CM following    Urine retention  Assessment & Plan  · Uroxatral equivalent  · Porter replaced after failed void trial after cystoscopy  · Urology follow-up outpatient for consideration for suprapubic catheter    Hyponatremia  Assessment & Plan  · Na 133 today  · Encourage oral intake   · Monitor    Severe aortic stenosis  Assessment & Plan  · S/p TAVR  · Continue home dose Lasix 20 mg daily   · Give additional OTD 20 mg IV Lasix today given crackles in lungs and worsening LE edema     Pancytopenia [facility])  Assessment & Plan  · In the setting of sepsis, also noted history of thrombocytopenia status post TAVR on DAPT  · Monitor for signs and symptoms of bleeding  · Improving    Pulmonary emphysema ([address] )  Assessment & Plan  · Does not appear in exacerbation  · Respiratory protocol    Coronary artery disease  Assessment & Plan  · Continue BB, ASA, statin, Plavix    VTE Pharmacologic Prophylaxis:   Pharmacologic: Enoxaparin (Lovenox)  Mechanical VTE Prophylaxis in Place: No    Patient Centered Rounds: I have performed bedside rounds with nursing staff today  Discussions with Specialists or Other Care Team Provider: primary RN, case management     Education and Discussions with Family / Patient: patient, will call wife with update     Time Spent for Care: 15 minutes  More than 50% of total time spent on counseling and coordination of care as described above  Current Length of Stay: 8 day(s)    Current Patient Status: Inpatient   Certification Statement: The patient will continue to require additional inpatient hospital stay due to pending placement    Discharge Plan: tentative plan for discharge to [facility] rehab  tomorrow     Code Status: Level 1 - Full Code    Subjective:   Patient reports some stiffness in his R knee, otherwise denies complaints  He notes that PT wanted to work with him this morning but he requested to eat his breakfast first  He does note the swelling in his legs seems a little worse than normal     Objective:     Vitals:   Temp (24hrs), Av °F (36 7 °C), Min:97 5 °F (36 4 °C), Max:98 3 °F (36 8 °C)    Temp:  [97 5 °F (36 4 °C)-98 3 °F (36 8 °C)] 98 3 °F (36 8 °C)  HR:  [62-74] 69  Resp:  [16-18] 18  BP: (115-129)/(56-61) 115/58  SpO2:  [91 %-98 %] 91 %  Body mass index is 24 13 kg/m²  Input and Output Summary (last 24 hours):        Intake/Output Summary (Last 24 hours) at 2/3/2021 1417  Last data filed at 2/3/2021 1308  Gross per 24 hour   Intake 540 ml   Output 1750 ml Net -1210 ml       Physical Exam:     Physical Exam  Vitals signs and nursing note reviewed  Constitutional:       General: He is not in acute distress  Cardiovascular:      Rate and Rhythm: Normal rate and regular rhythm  Pulmonary:      Effort: Pulmonary effort is normal  No respiratory distress  Breath sounds: Examination of the right-middle field reveals rales  Examination of the right-lower field reveals rales  Examination of the left-lower field reveals rales  Rales present  No wheezing or rhonchi  Abdominal:      General: Abdomen is flat  There is no distension  Palpations: Abdomen is soft  Tenderness: There is no abdominal tenderness  Genitourinary:     Comments: Porter catheter with clear yellow urine output  Musculoskeletal:      Right lower leg: Edema present  Left lower leg: Edema present  Comments: Pitting edema BLE L>R   Neurological:      Mental Status: He is alert and oriented to person, place, and time  Mental status is at baseline  Additional Data:     Labs:    Results from last 7 days   Lab Units 01/30/21  0610   WBC Thousand/uL 5 03   HEMOGLOBIN g/dL 11 2*   HEMATOCRIT % 34 1*   PLATELETS Thousands/uL 95*   NEUTROS PCT % 66   LYMPHS PCT % 12*   MONOS PCT % 15*   EOS PCT % 7*     Results from last 7 days   Lab Units 01/30/21  0610   SODIUM mmol/L 133*   POTASSIUM mmol/L 3 7   CHLORIDE mmol/L 102   CO2 mmol/L 28   BUN mg/dL 13   CREATININE mg/dL 0 92   ANION GAP mmol/L 3*   CALCIUM mg/dL 8 6   ALBUMIN g/dL 2 9*   TOTAL BILIRUBIN mg/dL 0 58   ALK PHOS U/L 94   ALT U/L 37   AST U/L 82*   GLUCOSE RANDOM mg/dL 81                           * I Have Reviewed All Lab Data Listed Above  * Additional Pertinent Lab Tests Reviewed:  All Labs Within Last 24 Hours Reviewed    Imaging:    Imaging Reports Reviewed Today Include: none  Imaging Personally Reviewed by Myself Includes:  none    Recent Cultures (last 7 days):           Last 24 Hours Medication List:   Current Facility-Administered Medications   Medication Dose Route Frequency Provider Last Rate    acetaminophen  975 mg Oral Q8H [facility] Cali Cea, CRNP      albuterol  2 puff Inhalation Q4H PRN [place], DO      aspirin  81 mg Oral Daily [place], DO      cholecalciferol  2,000 Units Oral Daily [place], DO      clopidogrel  75 mg Oral Daily Lj Ford MD      docusate sodium  100 mg Oral BID [facility] Cea, CRNP      enoxaparin  40 mg Subcutaneous Daily [place], DO      finasteride  5 mg Oral Daily [place], DO      furosemide  20 mg Oral Daily [place], DO      gabapentin  100 mg Oral HS [place], DO      lactulose  30 g Oral Once [facility] Cea, CRNP      levothyroxine  75 mcg Oral Early Morning [place], DO      metoprolol succinate  12 5 mg Oral Daily [place], DO      ondansetron  4 mg Intravenous Q6H PRN [place], DO      oxyCODONE  2 5 mg Oral Q6H PRN [facility], CRNP      pantoprazole  40 mg Oral Early Morning [place], DO      polyethylene glycol  17 g Oral Daily [facility] Cea, CRNP      potassium chloride  10 mEq Oral Daily [place], DO      pravastatin  40 mg Oral Daily With [facility], DO      tamsulosin  0 4 mg Oral Daily With [facility], DO      tiotropium  18 mcg Inhalation Daily [place], DO          Today, Patient Was Seen By: Mar Montano PA-C    ** Please Note: Dictation voice to text software may have been used in the creation of this document   **

## 2021-02-03 NOTE — ASSESSMENT & PLAN NOTE
· S/p TAVR  · Continue home dose Lasix 20 mg daily   · Give additional OTD 20 mg IV Lasix today given crackles in lungs and worsening LE edema

## 2021-02-03 NOTE — PLAN OF CARE
Problem: Potential for Falls  Goal: Patient will remain free of falls  Description: INTERVENTIONS:  - Assess patient frequently for physical needs  -  Identify cognitive and physical deficits and behaviors that affect risk of falls  -  [place] fall precautions as indicated by assessment   - Educate patient/family on patient safety including physical limitations  - Instruct patient to call for assistance with activity based on assessment  - Modify environment to reduce risk of injury  - Consider OT/PT consult to assist with strengthening/mobility  Outcome: Progressing     Problem: Prexisting or High Potential for Compromised Skin Integrity  Goal: Skin integrity is maintained or improved  Description: INTERVENTIONS:  - Identify patients at risk for skin breakdown  - Assess and monitor skin integrity  - Assess and monitor nutrition and hydration status  - Monitor labs   - Assess for incontinence   - Turn and reposition patient  - Assist with mobility/ambulation  - Relieve pressure over bony prominences  - Avoid friction and shearing  - Provide appropriate hygiene as needed including keeping skin clean and dry  - Evaluate need for skin moisturizer/barrier cream  - Collaborate with interdisciplinary team   - Patient/family teaching  - Consider wound care consult   Outcome: Progressing     Problem: Nutrition/Hydration-ADULT  Goal: Nutrient/Hydration intake appropriate for improving, restoring or maintaining nutritional needs  Description: Monitor and assess patient's nutrition/hydration status for malnutrition  Collaborate with interdisciplinary team and initiate plan and interventions as ordered  Monitor patient's weight and dietary intake as ordered or per policy  Utilize nutrition screening tool and intervene as necessary  Determine patient's food preferences and provide high-protein, high-caloric foods as appropriate       INTERVENTIONS:  - Monitor oral intake, urinary output, labs, and treatment plans  - Assess nutrition and hydration status and recommend course of action  - Evaluate amount of meals eaten  - Assist patient with eating if necessary   - Allow adequate time for meals  - Recommend/ encourage appropriate diets, oral nutritional supplements, and vitamin/mineral supplements  - Order, calculate, and assess calorie counts as needed  - Recommend, monitor, and adjust tube feedings and TPN/PPN based on assessed needs  - Assess need for intravenous fluids  - Provide specific nutrition/hydration education as appropriate  - Include patient/family/caregiver in decisions related to nutrition  Outcome: Progressing     Problem: PAIN - ADULT  Goal: Verbalizes/displays adequate comfort level or baseline comfort level  Description: Interventions:  - Encourage patient to monitor pain and request assistance  - Assess pain using appropriate pain scale  - Administer analgesics based on type and severity of pain and evaluate response  - Implement non-pharmacological measures as appropriate and evaluate response  - Consider cultural and social influences on pain and pain management  - Notify physician/advanced practitioner if interventions unsuccessful or patient reports new pain  Outcome: Progressing     Problem: INFECTION - ADULT  Goal: Absence or prevention of progression during hospitalization  Description: INTERVENTIONS:  - Assess and monitor for signs and symptoms of infection  - Monitor lab/diagnostic results  - Monitor all insertion sites, i e  indwelling lines, tubes, and drains  - Monitor endotracheal if appropriate and nasal secretions for changes in amount and color  - [place] appropriate cooling/warming therapies per order  - Administer medications as ordered  - Instruct and encourage patient and family to use good hand hygiene technique  - Identify and instruct in appropriate isolation precautions for identified infection/condition  Outcome: Progressing  Goal: Absence of fever/infection during neutropenic period  Description: INTERVENTIONS:  - Monitor WBC    Outcome: Completed     Problem: SAFETY ADULT  Goal: Maintain or return to baseline ADL function  Description: INTERVENTIONS:  -  Assess patient's ability to carry out ADLs; assess patient's baseline for ADL function and identify physical deficits which impact ability to perform ADLs (bathing, care of mouth/teeth, toileting, grooming, dressing, etc )  - Assess/evaluate cause of self-care deficits   - Assess range of motion  - Assess patient's mobility; develop plan if impaired  - Assess patient's need for assistive devices and provide as appropriate  - Encourage maximum independence but intervene and supervise when necessary  - Involve family in performance of ADLs  - Assess for home care needs following discharge   - Consider OT consult to assist with ADL evaluation and planning for discharge  - Provide patient education as appropriate  Outcome: Progressing  Goal: Maintain or return mobility status to optimal level  Description: INTERVENTIONS:  - Assess patient's baseline mobility status (ambulation, transfers, stairs, etc )    - Identify cognitive and physical deficits and behaviors that affect mobility  - Identify mobility aids required to assist with transfers and/or ambulation (gait belt, sit-to-stand, lift, walker, cane, etc )  - [place] fall precautions as indicated by assessment  - Record patient progress and toleration of activity level on Mobility SBAR; progress patient to next Phase/Stage  - Instruct patient to call for assistance with activity based on assessment  - Consider rehabilitation consult to assist with strengthening/weightbearing, etc   Outcome: Progressing     Problem: DISCHARGE PLANNING  Goal: Discharge to home or other facility with appropriate resources  Description: INTERVENTIONS:  - Identify barriers to discharge w/patient and caregiver  - Arrange for needed discharge resources and transportation as appropriate  - Identify discharge learning needs (meds, wound care, etc )  - Arrange for interpretive services to assist at discharge as needed  - Refer to Case Management Department for coordinating discharge planning if the patient needs post-hospital services based on physician/advanced practitioner order or complex needs related to functional status, cognitive ability, or social support system  Outcome: Progressing     Problem: Knowledge Deficit  Goal: Patient/family/caregiver demonstrates understanding of disease process, treatment plan, medications, and discharge instructions  Description: Complete learning assessment and assess knowledge base    Interventions:  - Provide teaching at level of understanding  - Provide teaching via preferred learning methods  Outcome: Progressing

## 2021-02-03 NOTE — ASSESSMENT & PLAN NOTE
· Uroxatral equivalent  · Porter replaced after failed void trial after cystoscopy  · Urology follow-up outpatient  · Patient may require suprapubic catheter per Urology -

## 2021-02-03 NOTE — ASSESSMENT & PLAN NOTE
· Status post cystoscopy day prior to admission  · Rocephin and Vanco - patient has history of Enterococcus faecalis and E coli UTI as well as prior history of Klebsiella UTI  · Urine cultures showing 100K colonies mixed organisms, blood cultures no growth 5 days Discontinued Rocephin 1/31 and discontinued vancomycin  1/27-   · Will need rehab per pt recommendations discussed with cm as pt pending bed and auth

## 2021-02-03 NOTE — PHYSICAL THERAPY NOTE
PHYSICAL THERAPY NOTE  Patient Name: Selam Cameron  QLELG'X Date: 2/3/2021     02/03/21 0857   PT Last Visit   PT Visit Date 02/03/21   Note Type   Note Type Treatment   Pain Assessment   Pain Assessment Tool 0-10   Pain Score No Pain   Restrictions/Precautions   Weight Bearing Precautions Per Order No   Braces or Orthoses   (shoes w/ heel lift insert)   Other Precautions Fall Risk;Pain;Hard of hearing   General   Chart Reviewed Yes   Response to Previous Treatment Patient reporting fatigue but able to participate  Family/Caregiver Present No   Cognition   Arousal/Participation Alert; Responsive; Cooperative   Attention Attends with cues to redirect   Orientation Level Oriented to person;Oriented to place   Memory Decreased recall of precautions   Following Commands Follows one step commands without difficulty   Comments Pt pleasant and cooperative to work w/ therapy   Subjective   Subjective "I would love to go for a walk"   Bed Mobility   Supine to Sit 4  Minimal assistance   Additional items Assist x 1;HOB elevated; Increased time required;Verbal cues   Sit to Supine Unable to assess   Additional Comments Pt lying supine upon PT arrival  Pt returned seated OOB at end of session w/ all needs within reach   Transfers   Sit to Stand 4  Minimal assistance   Additional items Assist x 1; Increased time required;Verbal cues   Stand to Sit 4  Minimal assistance   Additional items Assist x 1; Increased time required;Verbal cues   Additional Comments Transfers w/ RW   Ambulation/Elevation   Gait pattern Excessively slow; Short stride; Inconsistent daniel;Decreased foot clearance;Shuffling   Gait Assistance 4  Minimal assist   Additional items Assist x 1;Verbal cues   Assistive Device Rolling walker   Distance 8ft x2  (limited 2' pt wanting to eat breakfast)   Balance   Static Sitting Fair   Dynamic Sitting Fair -   Static Standing Fair - Dynamic Standing Poor +   Ambulatory Poor +   Endurance Deficit   Endurance Deficit Yes   Endurance Deficit Description fatigue, weakness, pain   Activity Tolerance   Activity Tolerance Patient limited by fatigue;Patient limited by pain   Medical Staff Made Aware OT Rena   Nurse Made Aware yes   Assessment   Prognosis Fair   Problem List Decreased strength;Decreased endurance; Impaired balance;Decreased mobility;Pain   Assessment Pt presents with decreased mobility, strength, balance, and activity tolerance  Pt demonstrated ability to perform bed mobility at [address] [place] [address] w/ HOB elevated  Pt performed STS functional transfers at [address] [place] [address]  Pt ambulated 8ft x2 with RW at [address] [place] [address]- distance limited 2' pt wanting to eat breakfast  Pt demonstrates progression w/ therapy being able to perform all transfers/mobility w/ less assistance and ability to ambulate further distances  Pt continues to benefit from skilled therapy to maximize functional independence  The patient's AM-PAC Basic Mobility Inpatient Short Form Raw Score is 16, Standardized Score is 38 32  A standardized score less than 42 9 suggests the patient may benefit from discharge to post-acute rehabilitation services  Please also refer to the recommendation of the Physical Therapist for safe discharge planning  Recommendation at this time is rehab  Pt would benefit from continued ambulation, functional transfers, strength, balance, and activity tolerance   Goals   Patient Goals to eat breakfast   STG Expiration Date 02/10/21   PT Treatment Day 2   Plan   Treatment/Interventions Functional transfer training;LE strengthening/ROM; Endurance training;Patient/family training;Equipment eval/education; Bed mobility;Gait training;Spoke to nursing   Progress Progressing toward goals   PT Frequency   (3-5x/week)   Recommendation   PT Discharge Recommendation Post-Acute Rehabilitation Services   Equipment Recommended Walker   PT - OK to Discharge Yes  (when medically cleared to rehab) AM-PAC Basic Mobility Inpatient   Turning in Bed Without Bedrails 3   Lying on Back to Sitting on Edge of Flat Bed 2   Moving Bed to Chair 3   Standing Up From Chair 3   Walk in Room 3   Climb 3-5 Stairs 2   Basic Mobility Inpatient Raw Score 16   Basic Mobility Standardized Score 38 32     Danya Durbin, PT, DPT

## 2021-02-03 NOTE — ASSESSMENT & PLAN NOTE
· Status post cystoscopy day prior to admission  · Received Rocephin and Vanco on admission given history of Enterococcus faecalis and E coli UTI as well as prior history of Klebsiella UTI  · Urine cultures showing 100K colonies mixed organisms  · Vancomycin discontinued 01/27  · Patient completed 5 day Rx of IV ceftriaxone on 01/31

## 2021-02-04 PROBLEM — E87.1 HYPONATREMIA: Status: RESOLVED | Noted: 2020-09-07 | Resolved: 2021-02-04

## 2021-02-04 LAB
ANION GAP SERPL CALCULATED.3IONS-SCNC: 4 MMOL/L (ref 4–13)
BUN SERPL-MCNC: 16 MG/DL (ref 5–25)
CALCIUM SERPL-MCNC: 9 MG/DL (ref 8.3–10.1)
CHLORIDE SERPL-SCNC: 106 MMOL/L (ref 100–108)
CO2 SERPL-SCNC: 27 MMOL/L (ref 21–32)
CREAT SERPL-MCNC: 0.82 MG/DL (ref 0.6–1.3)
GFR SERPL CREATININE-BSD FRML MDRD: 80 ML/MIN/1.73SQ M
GLUCOSE SERPL-MCNC: 86 MG/DL (ref 65–140)
POTASSIUM SERPL-SCNC: 4.3 MMOL/L (ref 3.5–5.3)
SODIUM SERPL-SCNC: 137 MMOL/L (ref 136–145)

## 2021-02-04 PROCEDURE — 80048 BASIC METABOLIC PNL TOTAL CA: CPT | Performed by: PHYSICIAN ASSISTANT

## 2021-02-04 PROCEDURE — 99232 SBSQ HOSP IP/OBS MODERATE 35: CPT | Performed by: PHYSICIAN ASSISTANT

## 2021-02-04 RX ADMIN — POTASSIUM CHLORIDE 10 MEQ: 750 TABLET, EXTENDED RELEASE ORAL at 08:33

## 2021-02-04 RX ADMIN — ASPIRIN 81 MG: 81 TABLET, COATED ORAL at 08:32

## 2021-02-04 RX ADMIN — CLOPIDOGREL BISULFATE 75 MG: 75 TABLET ORAL at 08:32

## 2021-02-04 RX ADMIN — FINASTERIDE 5 MG: 5 TABLET, FILM COATED ORAL at 08:32

## 2021-02-04 RX ADMIN — PANTOPRAZOLE SODIUM 40 MG: 40 TABLET, DELAYED RELEASE ORAL at 05:42

## 2021-02-04 RX ADMIN — TAMSULOSIN HYDROCHLORIDE 0.4 MG: 0.4 CAPSULE ORAL at 17:29

## 2021-02-04 RX ADMIN — PRAVASTATIN SODIUM 40 MG: 40 TABLET ORAL at 17:29

## 2021-02-04 RX ADMIN — ENOXAPARIN SODIUM 40 MG: 40 INJECTION SUBCUTANEOUS at 08:32

## 2021-02-04 RX ADMIN — ACETAMINOPHEN 975 MG: 325 TABLET, FILM COATED ORAL at 05:42

## 2021-02-04 RX ADMIN — ACETAMINOPHEN 975 MG: 325 TABLET, FILM COATED ORAL at 13:25

## 2021-02-04 RX ADMIN — TIOTROPIUM BROMIDE 18 MCG: 18 CAPSULE ORAL; RESPIRATORY (INHALATION) at 08:35

## 2021-02-04 RX ADMIN — ACETAMINOPHEN 975 MG: 325 TABLET, FILM COATED ORAL at 21:52

## 2021-02-04 RX ADMIN — Medication 2000 UNITS: at 08:32

## 2021-02-04 RX ADMIN — LEVOTHYROXINE SODIUM 75 MCG: 75 TABLET ORAL at 05:42

## 2021-02-04 RX ADMIN — GABAPENTIN 100 MG: 100 CAPSULE ORAL at 21:52

## 2021-02-04 RX ADMIN — DOCUSATE SODIUM 100 MG: 100 CAPSULE, LIQUID FILLED ORAL at 08:32

## 2021-02-04 NOTE — ASSESSMENT & PLAN NOTE
Subjective   Patient ID: Karan is a 50 year old male.    Chief Complaint   Patient presents with   • Annual Exam     HPIhere for cpe- occ anxiety associated with  covid -2 dtrs- GERD- only on famotidine-- no gerd sx-  occ bloating - worse with fast eating/overeating-  Exercise- bike 2-3x/week 30 min; walking 2x/week 20 min-  Intentionally lost 19# over the past 1.5 yrs - decrease in carbs-    Current Outpatient Medications   Medication Sig Dispense Refill   • famotidine (PEPCID) 40 MG tablet TAKE 1 TABLET DAILY 90 tablet 0     No current facility-administered medications for this visit.      Patient's allergies, past medical, surgical, social and family histories were reviewed and updated as appropriate.    Review of Systems   All other systems reviewed and are negative.      Objective   Vitals:    08/17/20 0756   BP: 112/74   Pulse: 62   Temp: 96.9 °F (36.1 °C)     Physical Exam  Constitutional:       Appearance: He is well-developed.   HENT:      Head: Normocephalic and atraumatic.      Right Ear: External ear normal.      Left Ear: External ear normal.      Nose: Nose normal.   Eyes:      Conjunctiva/sclera: Conjunctivae normal.      Pupils: Pupils are equal, round, and reactive to light.   Neck:      Musculoskeletal: Normal range of motion and neck supple.   Cardiovascular:      Rate and Rhythm: Normal rate and regular rhythm.      Heart sounds: Normal heart sounds.   Pulmonary:      Effort: Pulmonary effort is normal.      Breath sounds: Normal breath sounds.   Abdominal:      General: Bowel sounds are normal.      Palpations: Abdomen is soft.   Musculoskeletal: Normal range of motion.   Skin:     General: Skin is warm and dry.   Neurological:      Mental Status: He is alert and oriented to person, place, and time.   Psychiatric:         Behavior: Behavior normal.         Thought Content: Thought content normal.         Patient Active Problem List   Diagnosis   • Allergic rhinitis   • Encounter for general  · Uroxatral equivalent  · Porter replaced after failed void trial after cystoscopy  · Urology follow-up outpatient for consideration for suprapubic catheter health examination   • Esophageal reflux   • Essential familial hypercholesterolemia   • H/O colonoscopy   • Irritable bowel syndrome without diarrhea       Assessment   Esophageal reflux  Doing well with famotidine-avoid triggers- cpm    Irritable bowel syndrome without diarrhea  h xof IBS sx controlled- cpm- observe-    Essential familial hypercholesterolemia  Not on meds- low fat diet check flp    Encounter for general health examination  Check labs- - increase cv exercise and cont low carb intake; rec. Shingles vaccine      Schedule follow up:12 month

## 2021-02-04 NOTE — PROGRESS NOTES
Attempted to visit and established a relationship of care and support patient  He is looking forward to go home tomorrow  Patient expressed his appreciate for stopping his room       02/04/21 1100   Clinical Encounter Type   Visited With Patient   Routine Visit Introduction

## 2021-02-04 NOTE — PROGRESS NOTES
Progress Note - Josy Carrillo 1933, 80 y o  male MRN: [id number]  Unit/Bed#: [facility] 320-01 Encounter: [id number]  Primary Care Provider: Isabel Jordan MD   Date and time admitted to hospital: 1/26/2021  9:29 AM    * Sepsis without acute organ dysfunction [facility])  Assessment & Plan  · POA e/b fever and tachycardia suspected to be secondary to suspected UTI  · Blood cultures with no growth to date  · Urine culture with mixed contaminant  · Patient received 5 day course of IV Rocephin for suspected UTI  · Trend temps, WBC, hemodynamics    UTI (urinary tract infection)  Assessment & Plan  · Status post cystoscopy day prior to admission  · Received Rocephin and Vanco on admission given history of Enterococcus faecalis and E coli UTI as well as prior history of Klebsiella UTI  · Urine cultures showing 100K colonies mixed organisms  · Vancomycin discontinued 01/27  · Patient completed 5 day Rx of IV ceftriaxone on 01/31    Bilateral hip pain  Assessment & Plan  · Significant pain in right lateral hip / thigh area along with chronic history of bilateral hip pain; patient follows with [facility] orthopedic physician  · Appreciate orthopedics input - recommending outpatient follow-up with Pain Service for possible steroid injection in the spine  · Left hip, right femur, lumbar x-ray all without acute osseous abnormalities  · Pain control with ATC Tylenol and low dose Oxy 2 5 mg as needed  · PT/OT recommending rehab; CM following    Urine retention  Assessment & Plan  · Uroxatral equivalent  · Porter replaced after failed void trial after cystoscopy  · Urology follow-up outpatient for consideration for suprapubic catheter    Severe aortic stenosis  Assessment & Plan  · S/p TAVR  · Continue home dose Lasix 20 mg daily   · S/p OTD IV Lasix 20 mg yesterday due to LE edema and rales on exam   · Add knee high compression stockings as patient states he typically wears these at all times   · Monitor volume status Hyponatremia-resolved as of 2021  Assessment & Plan  · Consider due to mild hypervolemia as sodium improved s/p OTD IV Lasix yesterday   · Encourage oral intake   · Monitor    Pancytopenia (HCC)  Assessment & Plan  · In the setting of sepsis, also noted history of thrombocytopenia status post TAVR on DAPT  · Monitor for signs and symptoms of bleeding  · Improving    Pulmonary emphysema ([address] )  Assessment & Plan  · Does not appear in exacerbation  · Respiratory protocol    Coronary artery disease  Assessment & Plan  · Continue BB, ASA, statin, Plavix    VTE Pharmacologic Prophylaxis:   Pharmacologic: Enoxaparin (Lovenox)  Mechanical VTE Prophylaxis in Place: No    Patient Centered Rounds: I have performed bedside rounds with nursing staff today  Discussions with Specialists or Other Care Team Provider: primary RN, case management     Education and Discussions with Family / Patient: patient, will call wife with udpate     Time Spent for Care: 15 minutes  More than 50% of total time spent on counseling and coordination of care as described above  Current Length of Stay: 9 day(s)    Current Patient Status: Inpatient   Certification Statement: The patient will continue to require additional inpatient hospital stay due to pending rehab placement     Discharge Plan: pending rehab placement     Code Status: Level 1 - Full Code      Subjective:   Patient offers no complaints today  Reports no change in the swelling in his legs, also reports he typically wears compression stockings  Objective:     Vitals:   Temp (24hrs), Av 7 °F (36 5 °C), Min:97 5 °F (36 4 °C), Max:98 1 °F (36 7 °C)    Temp:  [97 5 °F (36 4 °C)-98 1 °F (36 7 °C)] 98 1 °F (36 7 °C)  HR:  [69-87] 87  Resp:  [18-19] 18  BP: (105-110)/(55-72) 107/55  SpO2:  [93 %-97 %] 97 %  Body mass index is 24 13 kg/m²  Input and Output Summary (last 24 hours):        Intake/Output Summary (Last 24 hours) at 2021 1215  Last data filed at 2021 5836  Gross per 24 hour   Intake --   Output 1850 ml   Net -1850 ml       Physical Exam:     Physical Exam  Vitals signs and nursing note reviewed  Constitutional:       General: He is not in acute distress  Cardiovascular:      Rate and Rhythm: Normal rate and regular rhythm  Heart sounds: No murmur  Pulmonary:      Effort: Pulmonary effort is normal  No respiratory distress  Breath sounds: Decreased breath sounds present  No wheezing, rhonchi or rales  Abdominal:      General: Abdomen is flat  There is no distension  Palpations: Abdomen is soft  Tenderness: There is no abdominal tenderness  Musculoskeletal:      Right lower leg: Edema present  Left lower leg: Edema present  Comments: Improved LE edema L > R   Neurological:      Mental Status: He is alert and oriented to person, place, and time  Mental status is at baseline  Additional Data:     Labs:    Results from last 7 days   Lab Units 01/30/21  0610   WBC Thousand/uL 5 03   HEMOGLOBIN g/dL 11 2*   HEMATOCRIT % 34 1*   PLATELETS Thousands/uL 95*   NEUTROS PCT % 66   LYMPHS PCT % 12*   MONOS PCT % 15*   EOS PCT % 7*     Results from last 7 days   Lab Units 02/04/21  0606 01/30/21  0610   SODIUM mmol/L 137 133*   POTASSIUM mmol/L 4 3 3 7   CHLORIDE mmol/L 106 102   CO2 mmol/L 27 28   BUN mg/dL 16 13   CREATININE mg/dL 0 82 0 92   ANION GAP mmol/L 4 3*   CALCIUM mg/dL 9 0 8 6   ALBUMIN g/dL  --  2 9*   TOTAL BILIRUBIN mg/dL  --  0 58   ALK PHOS U/L  --  94   ALT U/L  --  37   AST U/L  --  82*   GLUCOSE RANDOM mg/dL 86 81                           * I Have Reviewed All Lab Data Listed Above  * Additional Pertinent Lab Tests Reviewed:  All Labs Within Last 24 Hours Reviewed    Imaging:    Imaging Reports Reviewed Today Include: none  Imaging Personally Reviewed by Myself Includes:  none    Recent Cultures (last 7 days):           Last 24 Hours Medication List:   Current Facility-Administered Medications   Medication Dose Route Frequency Provider Last Rate    acetaminophen  975 mg Oral Q8H [address] Cecilio Ortiz, NIKKYNP      albuterol  2 puff Inhalation Q4H PRN Johann Tatum, DO      aspirin  81 mg Oral Daily Johann Deepti, DO      cholecalciferol  2,000 Units Oral Daily Johann Deepti, DO      clopidogrel  75 mg Oral Daily Mary Avila MD      docusate sodium  100 mg Oral BID Cecilio Ortiz, NIKKYNP      enoxaparin  40 mg Subcutaneous Daily Johann Deepti, DO      finasteride  5 mg Oral Daily Johann Deepti, DO      furosemide  20 mg Oral Daily Johann Deepti, DO      gabapentin  100 mg Oral HS Johann Deepti, DO      lactulose  30 g Oral Once Cecilio Ortiz, NIKKYNP      levothyroxine  75 mcg Oral Early Morning Johann Tatum, DO      metoprolol succinate  12 5 mg Oral Daily Johann Tatum, DO      ondansetron  4 mg Intravenous Q6H PRN Johann Tatum, DO      oxyCODONE  2 5 mg Oral Q6H PRN Cecilio Ortiz, NIKKYNP      pantoprazole  40 mg Oral Early Morning Johann Tatum, DO      polyethylene glycol  17 g Oral Daily Cecilio Ortiz, NIKKYNP      potassium chloride  10 mEq Oral Daily Johann Tatum, DO      pravastatin  40 mg Oral Daily With [facility], DO      tamsulosin  0 4 mg Oral Daily With [facility], DO      tiotropium  18 mcg Inhalation Daily Johann Tatum, DO          Today, Patient Was Seen By: Marilin Tatum PA-C    ** Please Note: Dictation voice to text software may have been used in the creation of this document   **

## 2021-02-04 NOTE — ASSESSMENT & PLAN NOTE
· In the setting of sepsis, also noted history of thrombocytopenia status post TAVR on DAPT  · Monitor for signs and symptoms of bleeding  · Improving

## 2021-02-04 NOTE — ASSESSMENT & PLAN NOTE
· Consider due to mild hypervolemia as sodium improved s/p OTD IV Lasix yesterday   · Encourage oral intake   · Monitor

## 2021-02-04 NOTE — ASSESSMENT & PLAN NOTE
· S/p TAVR  · Continue home dose Lasix 20 mg daily   · S/p OTD IV Lasix 20 mg yesterday due to LE edema and rales on exam   · Add knee high compression stockings as patient states he typically wears these at all times   · Monitor volume status

## 2021-02-05 LAB
FLUAV RNA RESP QL NAA+PROBE: NEGATIVE
FLUBV RNA RESP QL NAA+PROBE: NEGATIVE
RSV RNA RESP QL NAA+PROBE: NEGATIVE
SARS-COV-2 RNA RESP QL NAA+PROBE: NEGATIVE

## 2021-02-05 PROCEDURE — 0241U HB NFCT DS VIR RESP RNA 4 TRGT: CPT | Performed by: PHYSICIAN ASSISTANT

## 2021-02-05 PROCEDURE — 99232 SBSQ HOSP IP/OBS MODERATE 35: CPT | Performed by: PHYSICIAN ASSISTANT

## 2021-02-05 RX ORDER — GABAPENTIN 100 MG/1
100 CAPSULE ORAL
Qty: 30 CAPSULE | Refills: 0 | Status: SHIPPED | OUTPATIENT
Start: 2021-02-05 | End: 2021-02-24 | Stop reason: ALTCHOICE

## 2021-02-05 RX ORDER — HYDROCODONE BITARTRATE AND ACETAMINOPHEN 5; 325 MG/1; MG/1
1 TABLET ORAL 2 TIMES DAILY PRN
Qty: 60 TABLET | Refills: 0 | Status: SHIPPED | OUTPATIENT
Start: 2021-02-05 | End: 2021-02-08

## 2021-02-05 RX ADMIN — DOCUSATE SODIUM 100 MG: 100 CAPSULE, LIQUID FILLED ORAL at 09:07

## 2021-02-05 RX ADMIN — ACETAMINOPHEN 975 MG: 325 TABLET, FILM COATED ORAL at 05:29

## 2021-02-05 RX ADMIN — TAMSULOSIN HYDROCHLORIDE 0.4 MG: 0.4 CAPSULE ORAL at 17:11

## 2021-02-05 RX ADMIN — TIOTROPIUM BROMIDE 18 MCG: 18 CAPSULE ORAL; RESPIRATORY (INHALATION) at 09:08

## 2021-02-05 RX ADMIN — CLOPIDOGREL BISULFATE 75 MG: 75 TABLET ORAL at 09:08

## 2021-02-05 RX ADMIN — PANTOPRAZOLE SODIUM 40 MG: 40 TABLET, DELAYED RELEASE ORAL at 05:29

## 2021-02-05 RX ADMIN — LEVOTHYROXINE SODIUM 75 MCG: 75 TABLET ORAL at 05:29

## 2021-02-05 RX ADMIN — POTASSIUM CHLORIDE 10 MEQ: 750 TABLET, EXTENDED RELEASE ORAL at 09:07

## 2021-02-05 RX ADMIN — METOPROLOL SUCCINATE 12.5 MG: 25 TABLET, EXTENDED RELEASE ORAL at 09:08

## 2021-02-05 RX ADMIN — FINASTERIDE 5 MG: 5 TABLET, FILM COATED ORAL at 09:07

## 2021-02-05 RX ADMIN — PRAVASTATIN SODIUM 40 MG: 40 TABLET ORAL at 17:11

## 2021-02-05 RX ADMIN — ACETAMINOPHEN 975 MG: 325 TABLET, FILM COATED ORAL at 14:56

## 2021-02-05 RX ADMIN — Medication 2000 UNITS: at 09:07

## 2021-02-05 RX ADMIN — ASPIRIN 81 MG: 81 TABLET, COATED ORAL at 09:07

## 2021-02-05 RX ADMIN — ENOXAPARIN SODIUM 40 MG: 40 INJECTION SUBCUTANEOUS at 09:07

## 2021-02-05 RX ADMIN — FUROSEMIDE 20 MG: 20 TABLET ORAL at 09:07

## 2021-02-05 NOTE — TRANSPORTATION MEDICAL NECESSITY
Section I - General Information    Name of Patient: Pasha Armando                 : 1933    Medicare #: [id number]  Transport Date: 21 (PCS is valid for round trips on this date and for all repetitive trips in the 60-day range as noted below )  Origin: [address] [facility] [address]                                                         Destination: [facility]  Is the pt's stay covered under Medicare Part A (PPS/DRG)   [x]     Closest appropriate facility? If no, why is transport to more distant facility required? Yes  If hospice pt, is this transport related to pt's terminal illness? NA       Section II - Medical Necessity Questionnaire  Ambulance transportation is medically necessary only if other means of transport are contraindicated or would be potentially harmful to the patient  To meet this requirement, the patient must either be "bed confined" or suffer from a condition such that transport by means other than ambulance is contraindicated by the patient's condition  The following questions must be answered by the medical professional signing below for this form to be valid:    1)  Describe the MEDICAL CONDITION (physical and/or mental) of this patient AT [address] [place] [address] that requires the patient to be transported in an ambulance and why transport by other means is contraindicated by the patient's condition: Sepsis, fall risk, limited by pain, limited by fatigue, weakness, poor sitting balance, poor ambulation, bouts of confusion  2) Is the patient "bed confined" as defined below? No  To be "be confined" the patient must satisfy all three of the following conditions: (1) unable to get up from bed without Assistance; AND (2) unable to ambulate; AND (3) unable to sit in a chair or wheelchair  3) Can this patient safely be transported by car or wheelchair van (i e , seated during transport without a medical attendant or monitoring)?    No    4) In addition to completing questions 1-3 above, please check any of the following conditions that apply*:   *Note: supporting documentation for any boxes checked must be maintained in the patient's medical records  If hosp-hosp transfer, describe services needed at 2nd facility not available at 1st facility? Patient is confused  Moderate/severe pain on movement   Medical attendant required   Unable to tolerate seated position for time needed to transport   Unable to sit in a chair or wheelchair due to decubitus ulcers or other wounds   Other(specify) fall risk      Section III - Signature of Physician or Healthcare Professional  I certify that the above information is true and correct based on my evaluation of this patient, and represent that the patient requires transport by ambulance and that other forms of transport are contraindicated  I understand that this information will be used by the Centers for Medicare and Medicaid Services (CMS) to support the determination of medical necessity for ambulance services, and I represent that I have personal knowledge of the patient's condition at time of transport  [x]  If this box is checked, I also certify that the patient is physically or mentally incapable of signing the ambulance service's claim and that the institution with which I am affiliated has furnished care, services, or assistance to the patient  My signature below is made on behalf of the patient pursuant to 42 CFR §424 36(b)(4)   In accordance with 42 CFR §424 37, the specific reason(s) that the patient is physically or mentally incapable of signing the claim form is as follows:      Signature of Physician* or Healthcare Professional______________________________________________________________  Signature Date 02/05/21 (For scheduled repetitive transports, this form is not valid for transports performed more than 60 days after this date)    Printed Name & Credentials of Physician or Healthcare Professional (MD, DO, RN, etc )_LIO Moralez__  *Form must be signed by patient's attending physician for scheduled, repetitive transports   For non-repetitive, unscheduled ambulance transports, if unable to obtain the signature of the attending physician, any of the following may sign (choose appropriate option below)  [] Physician Assistant []  Clinical Nurse Specialist []  Registered Nurse  []  Nurse Practitioner  [x] Discharge Planner

## 2021-02-05 NOTE — PLAN OF CARE
Problem: Potential for Falls  Goal: Patient will remain free of falls  Description: INTERVENTIONS:  - Assess patient frequently for physical needs  -  Identify cognitive and physical deficits and behaviors that affect risk of falls  -  [place] fall precautions as indicated by assessment   - Educate patient/family on patient safety including physical limitations  - Instruct patient to call for assistance with activity based on assessment  - Modify environment to reduce risk of injury  - Consider OT/PT consult to assist with strengthening/mobility  Outcome: Progressing     Problem: Prexisting or High Potential for Compromised Skin Integrity  Goal: Skin integrity is maintained or improved  Description: INTERVENTIONS:  - Identify patients at risk for skin breakdown  - Assess and monitor skin integrity  - Assess and monitor nutrition and hydration status  - Monitor labs   - Assess for incontinence   - Turn and reposition patient  - Assist with mobility/ambulation  - Relieve pressure over bony prominences  - Avoid friction and shearing  - Provide appropriate hygiene as needed including keeping skin clean and dry  - Evaluate need for skin moisturizer/barrier cream  - Collaborate with interdisciplinary team   - Patient/family teaching  - Consider wound care consult   Outcome: Progressing     Problem: Nutrition/Hydration-ADULT  Goal: Nutrient/Hydration intake appropriate for improving, restoring or maintaining nutritional needs  Description: Monitor and assess patient's nutrition/hydration status for malnutrition  Collaborate with interdisciplinary team and initiate plan and interventions as ordered  Monitor patient's weight and dietary intake as ordered or per policy  Utilize nutrition screening tool and intervene as necessary  Determine patient's food preferences and provide high-protein, high-caloric foods as appropriate       INTERVENTIONS:  - Monitor oral intake, urinary output, labs, and treatment plans  - Assess nutrition and hydration status and recommend course of action  - Evaluate amount of meals eaten  - Assist patient with eating if necessary   - Allow adequate time for meals  - Recommend/ encourage appropriate diets, oral nutritional supplements, and vitamin/mineral supplements  - Order, calculate, and assess calorie counts as needed  - Recommend, monitor, and adjust tube feedings and TPN/PPN based on assessed needs  - Assess need for intravenous fluids  - Provide specific nutrition/hydration education as appropriate  - Include patient/family/caregiver in decisions related to nutrition  Outcome: Progressing     Problem: PAIN - ADULT  Goal: Verbalizes/displays adequate comfort level or baseline comfort level  Description: Interventions:  - Encourage patient to monitor pain and request assistance  - Assess pain using appropriate pain scale  - Administer analgesics based on type and severity of pain and evaluate response  - Implement non-pharmacological measures as appropriate and evaluate response  - Consider cultural and social influences on pain and pain management  - Notify physician/advanced practitioner if interventions unsuccessful or patient reports new pain  Outcome: Progressing     Problem: INFECTION - ADULT  Goal: Absence or prevention of progression during hospitalization  Description: INTERVENTIONS:  - Assess and monitor for signs and symptoms of infection  - Monitor lab/diagnostic results  - Monitor all insertion sites, i e  indwelling lines, tubes, and drains  - Monitor endotracheal if appropriate and nasal secretions for changes in amount and color  - [place] appropriate cooling/warming therapies per order  - Administer medications as ordered  - Instruct and encourage patient and family to use good hand hygiene technique  - Identify and instruct in appropriate isolation precautions for identified infection/condition  Outcome: Progressing     Problem: SAFETY ADULT  Goal: Maintain or return to baseline ADL function  Description: INTERVENTIONS:  -  Assess patient's ability to carry out ADLs; assess patient's baseline for ADL function and identify physical deficits which impact ability to perform ADLs (bathing, care of mouth/teeth, toileting, grooming, dressing, etc )  - Assess/evaluate cause of self-care deficits   - Assess range of motion  - Assess patient's mobility; develop plan if impaired  - Assess patient's need for assistive devices and provide as appropriate  - Encourage maximum independence but intervene and supervise when necessary  - Involve family in performance of ADLs  - Assess for home care needs following discharge   - Consider OT consult to assist with ADL evaluation and planning for discharge  - Provide patient education as appropriate  Outcome: Progressing  Goal: Maintain or return mobility status to optimal level  Description: INTERVENTIONS:  - Assess patient's baseline mobility status (ambulation, transfers, stairs, etc )    - Identify cognitive and physical deficits and behaviors that affect mobility  - Identify mobility aids required to assist with transfers and/or ambulation (gait belt, sit-to-stand, lift, walker, cane, etc )  - [place] fall precautions as indicated by assessment  - Record patient progress and toleration of activity level on Mobility SBAR; progress patient to next Phase/Stage  - Instruct patient to call for assistance with activity based on assessment  - Consider rehabilitation consult to assist with strengthening/weightbearing, etc   Outcome: Progressing     Problem: DISCHARGE PLANNING  Goal: Discharge to home or other facility with appropriate resources  Description: INTERVENTIONS:  - Identify barriers to discharge w/patient and caregiver  - Arrange for needed discharge resources and transportation as appropriate  - Identify discharge learning needs (meds, wound care, etc )  - Arrange for interpretive services to assist at discharge as needed  - Refer to Case Management Department for coordinating discharge planning if the patient needs post-hospital services based on physician/advanced practitioner order or complex needs related to functional status, cognitive ability, or social support system  Outcome: Progressing     Problem: Knowledge Deficit  Goal: Patient/family/caregiver demonstrates understanding of disease process, treatment plan, medications, and discharge instructions  Description: Complete learning assessment and assess knowledge base    Interventions:  - Provide teaching at level of understanding  - Provide teaching via preferred learning methods  Outcome: Progressing

## 2021-02-05 NOTE — WOUND OSTOMY CARE
Progress Note - Wound   Corrina Furth 80 y o  male MRN: [id number]  Unit/Bed#: [facility] 320-01 Encounter: [id number]        Assessment:   Patient seen this morning for continued skin and wound care  He is awake, alert and oriented with no complaints, he is resting in bed able to turn side to side with assist, he has chavis catheter in place and no stool incontinence noted  Patient is agreeable for wound car nurse to aasess skin, he is to be discharge soon to SNF for rehabilitation  Findings  1-Posterior penile wound at meatus now presenting as dried scab  Wound as proximal end remains as non blanchable purple discoloration  Non tender or indurated with periwound erythema  2-Bilateral sacro-perineal DTI resolving, area presenting as dried brown skin, likely will peel off  Heels remains intact, no other wounds seen  Continue with following wound/Skin care orders:  1-Cleanse sacro-buttocks with soap and water  Apply Allevyn foam  Abad with T for treatment  Change every three days and PRN  check skin q-shift  2-Turn/reposition q2h or when medically stable for pressure re-distribution on skin   3-Elevate heels to offload pressure   4-Moisturize skin daily with skin nourishing cream   5-Ehob cushion in chair when out of bed  6-Hydraguard to bilateral heels and penis BID and PRN  Vitals: Blood pressure 124/59, pulse 64, temperature 98 2 °F (36 8 °C), temperature source Oral, resp  rate 14, height 5' 5" (1 651 m), weight 65 8 kg (145 lb), SpO2 94 %  ,Body mass index is 24 13 kg/m²  Wound 01/29/21 Penis Posterior (Active)   Wound Image   02/05/21 0951   Wound Description Clean;Dry; Intact 02/05/21 0841   Anisa-wound Assessment Clean;Dry; Intact 02/05/21 0841   Wound Length (cm) 0 5 cm 01/29/21 1224   Wound Width (cm) 0 5 cm 01/29/21 1224   Wound Surface Area (cm^2) 0 25 cm^2 01/29/21 1224   Non-staged Wound Description Partial thickness 02/03/21 0807   Treatments Cleansed 02/05/21 0841 Dressing Open to air 02/05/21 0841   Patient Tolerance Tolerated well 02/04/21 1734   Dressing Status Clean;Dry; Intact 02/05/21 0841       Wound 01/29/21 Pressure Injury Perineum (Active)   Wound Image    02/05/21 0956   Wound Description Light purple 02/05/21 0956   Pressure Injury Stage DTPI 02/05/21 0956   Anisa-wound Assessment Intact 02/05/21 0956   Wound Length (cm) 7 cm 02/05/21 0956   Wound Width (cm) 5 cm 02/05/21 0956   Wound Depth (cm) 0 cm 02/05/21 0956   Wound Surface Area (cm^2) 35 cm^2 02/05/21 0956   Wound Volume (cm^3) 0 cm^3 02/05/21 0956   Calculated Wound Volume (cm^3) 0 cm^3 02/05/21 0956   Drainage Amount None 02/05/21 0956   Treatments Site care 02/05/21 0956   Dressing Moisture barrier 02/05/21 0841   Patient Tolerance Tolerated well 02/05/21 0956   Dressing Status Clean;Dry; Intact 02/02/21 2030         Our skin are recommendations remains as nursing orders, wound care to continue following, please tiger text team with question and concerns          Casandra Mariano, RN, BSN, [facility]

## 2021-02-05 NOTE — PROGRESS NOTES
Progress Note - Pasha Armando 1933, 80 y o  male MRN: [id number]  Unit/Bed#: [facility] 320-01 Encounter: [id number]  Primary Care Provider: Magi Wood MD   Date and time admitted to hospital: 1/26/2021  9:29 AM    * Sepsis without acute organ dysfunction [facility])  Assessment & Plan  · POA e/b fever and tachycardia suspected to be secondary to suspected UTI  · Blood cultures with no growth to date  · Urine culture with mixed contaminant  · Patient received 5 day course of IV Rocephin for suspected UTI  · Trend temps, WBC, hemodynamics    UTI (urinary tract infection)  Assessment & Plan  · Status post cystoscopy day prior to admission  · Received Rocephin and Vanco on admission given history of Enterococcus faecalis and E coli UTI as well as prior history of Klebsiella UTI  · Urine cultures showing 100K colonies mixed organisms  · Vancomycin discontinued 01/27  · Patient completed 5 day Rx of IV ceftriaxone on 01/31    Bilateral hip pain  Assessment & Plan  · Significant pain in right lateral hip / thigh area along with chronic history of bilateral hip pain; patient follows with [facility] orthopedic physician  · Appreciate orthopedics input - recommending outpatient follow-up with Pain Service for possible steroid injection in the spine  · Left hip, right femur, lumbar x-ray all without acute osseous abnormalities  · Pain control with ATC Tylenol and low dose Oxy 2 5 mg as needed  · PT/OT recommending rehab; CM following    Urine retention  Assessment & Plan  · Uroxatral equivalent  · Porter replaced after failed void trial after cystoscopy  · Urology follow-up outpatient for consideration for suprapubic catheter    Severe aortic stenosis  Assessment & Plan  · S/p TAVR  · Continue home dose Lasix 20 mg daily   · S/p OTD IV Lasix 20 mg yesterday due to LE edema and rales on exam   · Add knee high compression stockings as patient states he typically wears these at all times   · Monitor volume status Pancytopenia ([facility] [address] [address] )  Assessment & Plan  · In the setting of sepsis, also noted history of thrombocytopenia status post TAVR on DAPT  · Monitor for signs and symptoms of bleeding  · Improving    Pulmonary emphysema ([facility] )  Assessment & Plan  · Does not appear in exacerbation  · Respiratory protocol    Coronary artery disease  Assessment & Plan  · Continue BB, ASA, statin, Plavix    VTE Pharmacologic Prophylaxis:   Pharmacologic: Enoxaparin (Lovenox)  Mechanical VTE Prophylaxis in Place: No    Patient Centered Rounds: I have performed bedside rounds with nursing staff today  Discussions with Specialists or Other Care Team Provider: primary RN, case management     Education and Discussions with Family / Patient: patient, will call wife with update     Time Spent for Care: 15 minutes  More than 50% of total time spent on counseling and coordination of care as described above  Current Length of Stay: 10 day(s)    Current Patient Status: Inpatient   Certification Statement: The patient will continue to require additional inpatient hospital stay due to pending rehab placement     Discharge Plan: plan is for discharge to [facility] for rehab later today vs tomorrow     Code Status: Level 1 - Full Code    Subjective:   Patient offers no complaints today  Aware plan is for discharge to rehab tomorrow  Objective:     Vitals:   Temp (24hrs), Av 8 °F (36 6 °C), Min:97 1 °F (36 2 °C), Max:98 2 °F (36 8 °C)    Temp:  [97 1 °F (36 2 °C)-98 2 °F (36 8 °C)] 98 2 °F (36 8 °C)  HR:  [58-64] 64  Resp:  [14-20] 14  BP: (124-132)/(57-59) 124/59  SpO2:  [93 %-96 %] 93 %  Body mass index is 24 13 kg/m²  Input and Output Summary (last 24 hours): Intake/Output Summary (Last 24 hours) at 2021 1133  Last data filed at 2021 8891  Gross per 24 hour   Intake 180 ml   Output 2450 ml   Net -2270 ml       Physical Exam:     Physical Exam  Vitals signs and nursing note reviewed     Constitutional:       General: He is not in acute distress  Cardiovascular:      Rate and Rhythm: Normal rate and regular rhythm  Heart sounds: No murmur  Pulmonary:      Effort: Pulmonary effort is normal  No respiratory distress  Breath sounds: No wheezing or rales  Abdominal:      General: Abdomen is flat  There is no distension  Palpations: Abdomen is soft  Tenderness: There is no abdominal tenderness  Musculoskeletal:      Right lower leg: Edema present  Left lower leg: Edema present  Comments: Chronic, non-ptting LE edema L > R   Skin:     General: Skin is warm and dry  Coloration: Skin is not pale  Findings: No erythema  Neurological:      Mental Status: He is alert and oriented to person, place, and time  Mental status is at baseline  Additional Data:     Labs:    Results from last 7 days   Lab Units 01/30/21  0610   WBC Thousand/uL 5 03   HEMOGLOBIN g/dL 11 2*   HEMATOCRIT % 34 1*   PLATELETS Thousands/uL 95*   NEUTROS PCT % 66   LYMPHS PCT % 12*   MONOS PCT % 15*   EOS PCT % 7*     Results from last 7 days   Lab Units 02/04/21  0606 01/30/21  0610   SODIUM mmol/L 137 133*   POTASSIUM mmol/L 4 3 3 7   CHLORIDE mmol/L 106 102   CO2 mmol/L 27 28   BUN mg/dL 16 13   CREATININE mg/dL 0 82 0 92   ANION GAP mmol/L 4 3*   CALCIUM mg/dL 9 0 8 6   ALBUMIN g/dL  --  2 9*   TOTAL BILIRUBIN mg/dL  --  0 58   ALK PHOS U/L  --  94   ALT U/L  --  37   AST U/L  --  82*   GLUCOSE RANDOM mg/dL 86 81                           * I Have Reviewed All Lab Data Listed Above  * Additional Pertinent Lab Tests Reviewed:  All Labs Within Last 24 Hours Reviewed    Imaging:    Imaging Reports Reviewed Today Include: none  Imaging Personally Reviewed by Myself Includes:  none    Recent Cultures (last 7 days):           Last 24 Hours Medication List:   Current Facility-Administered Medications   Medication Dose Route Frequency Provider Last Rate    acetaminophen  975 mg Oral [facility] Doretha Kayser, CRNP      albuterol  2 puff Inhalation Q4H PRN Red Goon, DO      aspirin  81 mg Oral Daily Red Goon, DO      cholecalciferol  2,000 Units Oral Daily Red Goon, DO      clopidogrel  75 mg Oral Daily Esau Gutierrez MD      docusate sodium  100 mg Oral BID MANI Mendoza      enoxaparin  40 mg Subcutaneous Daily Red Goon, DO      finasteride  5 mg Oral Daily Red Goon, DO      furosemide  20 mg Oral Daily Red Goon, DO      gabapentin  100 mg Oral HS Red Goon, DO      lactulose  30 g Oral Once MANI Mendoza      levothyroxine  75 mcg Oral Early Morning Red Goon, DO      metoprolol succinate  12 5 mg Oral Daily Red Goon, DO      ondansetron  4 mg Intravenous Q6H PRN Red Goon, DO      oxyCODONE  2 5 mg Oral Q6H PRN MANI Mendoza      pantoprazole  40 mg Oral Early Morning Red Goon, DO      polyethylene glycol  17 g Oral Daily MANI Mendoza      potassium chloride  10 mEq Oral Daily Red Goon, DO      pravastatin  40 mg Oral Daily With [facility], DO      tamsulosin  0 4 mg Oral Daily With [facility], DO      tiotropium  18 mcg Inhalation Daily Red Goon, DO          Today, Patient Was Seen By: Jovani Farfan PA-C    ** Please Note: Dictation voice to text software may have been used in the creation of this document   **

## 2021-02-05 NOTE — PROGRESS NOTES
02/05/21 1500   Clinical Encounter Type   Visited With Patient   Gnosticism Encounters   Gnosticism Needs Prayer   Sacramental Encounters   Sacrament of Sick-Anointing Anointed

## 2021-02-05 NOTE — CASE MANAGEMENT
CM confirmed transport with Johanne from [facility]  Pt will go to [facility] on 2/6 at [facility]  Pt, RN, PACatalinoC and facility and family made aware  CMN form and Facility Contacts were completed

## 2021-02-06 ENCOUNTER — TELEPHONE (OUTPATIENT)
Dept: OTHER | Facility: OTHER | Age: 86
End: 2021-02-06

## 2021-02-06 VITALS
OXYGEN SATURATION: 95 % | SYSTOLIC BLOOD PRESSURE: 109 MMHG | HEART RATE: 64 BPM | TEMPERATURE: 98.2 F | BODY MASS INDEX: 24.16 KG/M2 | WEIGHT: 145 LBS | HEIGHT: 65 IN | RESPIRATION RATE: 18 BRPM | DIASTOLIC BLOOD PRESSURE: 55 MMHG

## 2021-02-06 PROBLEM — I50.32 CHRONIC DIASTOLIC CONGESTIVE HEART FAILURE (HCC): Status: ACTIVE | Noted: 2021-02-06

## 2021-02-06 PROCEDURE — 99239 HOSP IP/OBS DSCHRG MGMT >30: CPT | Performed by: PHYSICIAN ASSISTANT

## 2021-02-06 RX ADMIN — ACETAMINOPHEN 975 MG: 325 TABLET, FILM COATED ORAL at 05:59

## 2021-02-06 RX ADMIN — DOCUSATE SODIUM 100 MG: 100 CAPSULE, LIQUID FILLED ORAL at 09:07

## 2021-02-06 RX ADMIN — POTASSIUM CHLORIDE 10 MEQ: 750 TABLET, EXTENDED RELEASE ORAL at 09:07

## 2021-02-06 RX ADMIN — LEVOTHYROXINE SODIUM 75 MCG: 75 TABLET ORAL at 05:59

## 2021-02-06 RX ADMIN — FUROSEMIDE 20 MG: 20 TABLET ORAL at 09:07

## 2021-02-06 RX ADMIN — METOPROLOL SUCCINATE 12.5 MG: 25 TABLET, EXTENDED RELEASE ORAL at 09:07

## 2021-02-06 RX ADMIN — PANTOPRAZOLE SODIUM 40 MG: 40 TABLET, DELAYED RELEASE ORAL at 05:59

## 2021-02-06 RX ADMIN — Medication 2000 UNITS: at 09:07

## 2021-02-06 RX ADMIN — CLOPIDOGREL BISULFATE 75 MG: 75 TABLET ORAL at 09:07

## 2021-02-06 RX ADMIN — ASPIRIN 81 MG: 81 TABLET, COATED ORAL at 09:07

## 2021-02-06 RX ADMIN — TIOTROPIUM BROMIDE 18 MCG: 18 CAPSULE ORAL; RESPIRATORY (INHALATION) at 09:13

## 2021-02-06 RX ADMIN — ENOXAPARIN SODIUM 40 MG: 40 INJECTION SUBCUTANEOUS at 09:06

## 2021-02-06 RX ADMIN — FINASTERIDE 5 MG: 5 TABLET, FILM COATED ORAL at 09:07

## 2021-02-06 RX ADMIN — ACETAMINOPHEN 975 MG: 325 TABLET, FILM COATED ORAL at 13:02

## 2021-02-06 NOTE — PROGRESS NOTES
Nursing report called to Ponce Hart RN at [facility]  Discharge paperwork faxed  Patient to be transported via BLS at 1500

## 2021-02-06 NOTE — TELEPHONE ENCOUNTER
Pt  Chacorta Gamino : 1933 Jaycee/ Demetrice Gomez @ [phone] [id number]  New Admission; needs to verify orders

## 2021-02-06 NOTE — DISCHARGE SUMMARY
Discharge- Monik Kirkwood 1933, 80 y o  male MRN: [id number]  Unit/Bed#: [facility] 320-01 Encounter: [id number]  Primary Care Provider: Rochelle Arita MD   Date and time admitted to hospital: 1/26/2021  9:29 AM    * Sepsis without acute organ dysfunction [facility])  Assessment & Plan  · POA e/b fever and tachycardia suspected to be secondary to suspected UTI  · Blood cultures with no growth to date  · Urine culture with mixed contaminant  · Patient received 5 day course of IV Rocephin for suspected UTI  · Trend temps, WBC, hemodynamics    UTI (urinary tract infection)  Assessment & Plan  · Status post cystoscopy day prior to admission  · Received Rocephin and Vanco on admission given history of Enterococcus faecalis and E coli UTI as well as prior history of Klebsiella UTI  · Urine cultures showing 100K colonies mixed organisms  · Vancomycin discontinued 01/27  · Patient completed 5 day Rx of IV ceftriaxone on 01/31    Chronic diastolic congestive heart failure (HCC)  Assessment & Plan  Wt Readings from Last 3 Encounters:   01/26/21 65 8 kg (145 lb)   01/25/21 66 7 kg (147 lb)   12/11/20 70 3 kg (155 lb)       · Most recent echo November 8837:  Systolic function was normal  Ejection fraction was estimated to be 60 %  There were no regional wall motion abnormalities  Wall thickness was mildly increased  There was mild concentric hypertrophy  Grade 2 diastolic dysfunction    · Continue home dose Lasix 20 mg daily   · Noted chronic lower extremity edema, continue compression stockings  · Monitor fluid status  · Cardiac diet with 2 g sodium restriction and 2 L fluid restriction    Urine retention  Assessment & Plan  · Uroxatral equivalent  · Porter replaced after failed void trial after cystoscopy  · Urology follow-up outpatient for consideration for suprapubic catheter    Bilateral hip pain  Assessment & Plan  · Appreciate orthopedics input - recommending outpatient follow-up with Pain Service for possible steroid injection in the spine  · Left hip, right femur, lumbar x-ray all without acute osseous abnormalities  · Pain control with Tylenol and low dose Oxy 2 5 mg as needed  · PT/OT recommending rehab; CM following    Coronary artery disease  Assessment & Plan  · Status post CABG x3  · Continue BB, ASA, statin, Plavix    PAF (paroxysmal atrial fibrillation) ([facility])  Assessment & Plan  · Rate controlled with metoprolol  · Not on AC, continued on DAPT    Severe aortic stenosis  Assessment & Plan  · S/p TAVR    Pancytopenia ([facility] [address] )  Assessment & Plan  · In the setting of sepsis, also noted history of thrombocytopenia status post TAVR on DAPT  · Monitor for signs and symptoms of bleeding  · Improving    Pulmonary emphysema ([facility] [address] )  Assessment & Plan  · Does not appear in exacerbation  · Respiratory protocol    Peripheral arterial disease ([facility] )  Assessment & Plan  · Continue aspirin, statin, Plavix    Hypertension  Assessment & Plan  · BP acceptable  · Monitor routinely    Discharging Physician / Practitioner: Tab Peter PA-C  PCP: Adrienne Nevarez MD  Admission Date:   Admission Orders (From admission, onward)     Ordered        01/26/21 1215  Inpatient Admission  Once                   Discharge Date: 02/06/21    Resolved Problems  Date Reviewed: 2/6/2021          Resolved    Hyponatremia 2/4/2021     Resolved by  Tab Peter PA-C          Consultations During Hospital Stay:  · Urology   · Orthopedics     Procedures Performed:   · Chest x-ray 1/26 no acute cardiopulmonary disease   · X-ray hip/pelvis on 01/28 no acute osseous abnormality  Mild left hip degenerative change  · X-ray right femur on 01/28 no acute osseous abnormality  · X-ray lumbar spine on 01/31 no acute osseous abnormality  Degenerative disc disease      Significant Findings / Test Results:   · Urine culture with mixed contaminant  · COVID PCR x2 negative  · Blood cultures negative after 5 days    Incidental Findings:   · none     Test Results Pending at Discharge (will require follow up):   · none     Outpatient Tests Requested:  · none    Complications:  none    Reason for Admission: sepsis     Hospital Course:     Selam Cameron is a 80 y o  male patient who originally presented to the hospital on 1/26/2021 due to fever after a cystoscopy on day prior to presentation  Patient has chronic history of urinary retention with chronic Porter catheter, this was removed after cystoscopy however patient failed void trial so needed to be replaced  Patient met sepsis criteria on admission evidenced by fever and tachycardia  This was suspected to be secondary to UTI s/p cystoscopy  Patient was started on IV Rocephin  Urine culture resulted as mixed contaminant  Patient completed 5 day course of IV antibiotics  Patient had complained of chronic back pain as well as bilateral hip pain  X-rays negative for acute osseous abnormality  He was seen in consultation by Orthopedics who recommended pain management and PT/OT  Pain was well controlled with Tylenol and low-dose oxycodone  He was evaluated by PT/OT who recommended rehab at discharge  Patient is to be discharged to [facility] for rehabilitation  He expressed understanding and is in agreement with plan of care  Please see above list of diagnoses and related plan for additional information  Condition at Discharge: fair     Discharge Day Visit / Exam:     Subjective:  Patient offers no complaints today although he does mention that this is the poorest care he has received at [address] [place] [address] for discharge to rehab  Vitals: Blood Pressure: 109/55 (02/06/21 0730)  Pulse: 64 (02/06/21 0730)  Temperature: 98 2 °F (36 8 °C) (02/06/21 0730)  Temp Source: Oral (02/06/21 0730)  Respirations: 18 (02/06/21 0730)  Height: 5' 5" (165 1 cm) (01/26/21 2051)  Weight - Scale: 65 8 kg (145 lb) (01/26/21 2051)  SpO2: 95 % (02/06/21 0730)  Exam:   Physical Exam  Vitals signs and nursing note reviewed  Constitutional:       General: He is not in acute distress  Cardiovascular:      Rate and Rhythm: Normal rate and regular rhythm  Pulses: Normal pulses  Heart sounds: No murmur  Pulmonary:      Effort: Pulmonary effort is normal  No respiratory distress  Breath sounds: No wheezing or rales  Abdominal:      General: Abdomen is flat  There is no distension  Palpations: Abdomen is soft  Tenderness: There is no abdominal tenderness  Musculoskeletal:      Right lower leg: Edema present  Left lower leg: Edema present  Comments: Chronic lower extremity edema L > R  Noted patient is not wearing his compression stockings, discussed with nursing   Neurological:      Mental Status: He is alert and oriented to person, place, and time  Mental status is at baseline  Discharge instructions/Information to patient and family:   See after visit summary for information provided to patient and family  Provisions for Follow-Up Care:  See after visit summary for information related to follow-up care and any pertinent home health orders  Disposition:     Gabriellayvonne Nieves77 (see below)    For Discharges to [facility]:   · [facility] - Not Applicable to this Patient    Planned Readmission: no     Discharge Statement:  I spent 35 minutes discharging the patient  This time was spent on the day of discharge  I had direct contact with the patient on the day of discharge  Greater than 50% of the total time was spent examining patient, answering all patient questions, arranging and discussing plan of care with patient as well as directly providing post-discharge instructions  Additional time then spent on discharge activities  Discharge Medications:  See after visit summary for reconciled discharge medications provided to patient and family        ** Please Note: This note has been constructed using a voice recognition system **

## 2021-02-06 NOTE — ASSESSMENT & PLAN NOTE
· Appreciate orthopedics input - recommending outpatient follow-up with Pain Service for possible steroid injection in the spine  · Left hip, right femur, lumbar x-ray all without acute osseous abnormalities  · Pain control with Tylenol and low dose Oxy 2 5 mg as needed  · PT/OT recommending rehab; CM following

## 2021-02-06 NOTE — PLAN OF CARE
Problem: Potential for Falls  Goal: Patient will remain free of falls  Description: INTERVENTIONS:  - Assess patient frequently for physical needs  -  Identify cognitive and physical deficits and behaviors that affect risk of falls    -  [place] fall precautions as indicated by assessment   - Educate patient/family on patient safety including physical limitations  - Instruct patient to call for assistance with activity based on assessment  - Modify environment to reduce risk of injury  - Consider OT/PT consult to assist with strengthening/mobility  Outcome: Progressing

## 2021-02-08 ENCOUNTER — NURSING HOME VISIT (OUTPATIENT)
Dept: GERIATRICS | Facility: OTHER | Age: 86
End: 2021-02-08
Payer: MEDICARE

## 2021-02-08 ENCOUNTER — TRANSITIONAL CARE MANAGEMENT (OUTPATIENT)
Dept: FAMILY MEDICINE CLINIC | Facility: CLINIC | Age: 86
End: 2021-02-08

## 2021-02-08 DIAGNOSIS — M15.9 GENERALIZED OSTEOARTHRITIS OF MULTIPLE SITES: ICD-10-CM

## 2021-02-08 DIAGNOSIS — D64.9 ANEMIA, UNSPECIFIED TYPE: ICD-10-CM

## 2021-02-08 DIAGNOSIS — N40.1 BENIGN PROSTATIC HYPERPLASIA WITH URINARY RETENTION: ICD-10-CM

## 2021-02-08 DIAGNOSIS — R33.8 BENIGN PROSTATIC HYPERPLASIA WITH URINARY RETENTION: ICD-10-CM

## 2021-02-08 DIAGNOSIS — K21.9 GASTROESOPHAGEAL REFLUX DISEASE WITHOUT ESOPHAGITIS: ICD-10-CM

## 2021-02-08 DIAGNOSIS — N18.31 STAGE 3A CHRONIC KIDNEY DISEASE (HCC): ICD-10-CM

## 2021-02-08 DIAGNOSIS — J43.8 OTHER EMPHYSEMA (HCC): ICD-10-CM

## 2021-02-08 DIAGNOSIS — A41.9 SEPSIS WITHOUT ACUTE ORGAN DYSFUNCTION, DUE TO UNSPECIFIED ORGANISM (HCC): Primary | ICD-10-CM

## 2021-02-08 DIAGNOSIS — Z95.2 S/P TAVR (TRANSCATHETER AORTIC VALVE REPLACEMENT): ICD-10-CM

## 2021-02-08 DIAGNOSIS — I25.10 CORONARY ARTERY DISEASE INVOLVING NATIVE CORONARY ARTERY OF NATIVE HEART WITHOUT ANGINA PECTORIS: ICD-10-CM

## 2021-02-08 DIAGNOSIS — I48.0 PAF (PAROXYSMAL ATRIAL FIBRILLATION) (HCC): ICD-10-CM

## 2021-02-08 DIAGNOSIS — E03.9 ACQUIRED HYPOTHYROIDISM: ICD-10-CM

## 2021-02-08 DIAGNOSIS — R53.81 DEBILITY: ICD-10-CM

## 2021-02-08 DIAGNOSIS — I50.32 CHRONIC DIASTOLIC CONGESTIVE HEART FAILURE (HCC): ICD-10-CM

## 2021-02-08 PROCEDURE — 99306 1ST NF CARE HIGH MDM 50: CPT | Performed by: FAMILY MEDICINE

## 2021-02-08 RX ORDER — OXYCODONE HYDROCHLORIDE 5 MG/1
2.5 TABLET ORAL 2 TIMES DAILY PRN
COMMUNITY
End: 2021-04-11

## 2021-02-08 RX ORDER — PANTOPRAZOLE SODIUM 20 MG/1
20 TABLET, DELAYED RELEASE ORAL DAILY
COMMUNITY
End: 2021-04-11

## 2021-02-08 RX ORDER — ACETAMINOPHEN 325 MG/1
975 TABLET ORAL 3 TIMES DAILY
Start: 2021-02-08 | End: 2021-09-25 | Stop reason: HOSPADM

## 2021-02-08 NOTE — PROGRESS NOTES
[facility]   [address] [facility], [place], [address]  History and Physical  POS: SNF-31    Records Reviewed include: [facility] records  Unable to obtain from patient due to: History obtained from patient  Additional history obtained speaking with nursing along with medical record review    Chief Complaint/ Reason for Admission: Sepsis 2/2 UTI, urinary retention, chronic diastolic CHF, deconditioning, OA with chronic pain    History of Present Illness:            80year old male admitted for SNF rehab following hospitalization at [facility]  With chronic obstructive uropathy, s/p outpatient cystoscopy and chavis removal with recurrent urinary retention  Met sepsis criteria, found to have UTI (culture growing mixed contaminants; culture from December growing Ecoli and E faecalis)  Completed antibiotics inpatient; chavis catheter replaced; was maintained on tamsulosin and finasteride inpatient  Patient notes severe pain in his bilateral lower extremities- hips, knees; inpatient imaging negative for acute fracture; pain was well managed inpatient on scheduled acetaminophen and PRN OxyIR- patient discharged on PRN acetaminophen and PRN norco- used once on 2/7; no PRN acetaminophen use noted per [facility] review  Patient is s/p TAVR for severe aortic stenosis October 2020; with underlying CAD s/p CABG  Continues on metoprolol for Afib, HR trending 60s-70s since SNF admission; of note, patient was discharge from hospital on both metoprolol succinate and metoprolol tartrate  For chronic diastolic CHF, patient is maintained on lasix 20mg daily + KCl with 2L/day fluid restriction; SBP generally trending 110s-120s with isolated reading in high 90s; patient denies dizziness, lightheadedness, SOB  Allergies    Allergies   Allergen Reactions    Aleve [Naproxen]      Other reaction(s): FACIAL SWELLING  Category: Allergy;  Annotation - [id number]: lip/eye edema    Ancef [Cefazolin] Anaphylaxis Hypotension, rash, itching    Augmentin [Amoxicillin-Pot Clavulanate] Diarrhea and Vomiting       Past Medical History  Past Medical History:   Diagnosis Date    Anemia     Bladder cancer ([facility] [address] )     Cancer ([facility] )     bladder    Carotid artery occlusion     Cataract     Colon polyp     COPD (chronic obstructive pulmonary disease) (HCC)     Coronary artery disease     Disease of thyroid gland     History of transfusion     Hyperlipidemia     Hypertension     Hypothyroidism 9/15/2017    Multiple pulmonary nodules     Peptic ulcer     Scoliosis     SIRS (systemic inflammatory response syndrome) ([facility] ) 12/19/2019    Transient cerebral ischemia     Tremors of nervous system       CHF baseline OM:00%, grade 2 diastolic dysfunction (73/3489)  CKD: Stage 3a, baseline creatinine 0 8-1 0    Past Surgical History:   Procedure Laterality Date   [facility] SURGERY      1973, 1987, 2005    BUNIONECTOMY Left     Simple exostectomy (silver procedure)    CAROTID ENDARTERECTOMY Right 09/10/2008    Randy LEDBETTER MD    CATARACT EXTRACTION      CATARACT EXTRACTION, BILATERAL      CERVICAL DISCECTOMY  1969    COLONOSCOPY      CORONARY ARTERY BYPASS GRAFT  10/20/2010    x3 (LIMA-LAD, SVG-OM, SVG-RCA)    CYSTOSCOPY      ELBOW SURGERY Right 07/2012    FEMORAL ARTERY - TIBIAL ARTERY BYPASS GRAFT  12/05/2011    using reversed saphenous vein from right leg  Yeison Banks MD    CA ECHO TRANSESOPHAG R-T 2D W/PRB IMG ACQUISJ I&R N/A 10/6/2020    Procedure: TRANSESOPHAGEAL ECHOCARDIOGRAM (DAVID); Surgeon: Linda Arevalo DO;  Location: [facility];  Service: Cardiac Surgery    CA REPLACE AORTIC VALVE OPENFEMORAL ARTERY APPROACH N/A 10/6/2020    Procedure: REPLACEMENT AORTIC VALVE TRANSCATHETER (TAVR) TRANSFEMORAL W/ 26MM MONTALVO BREE S3 ULTRA VALVE(ACCESS ON LEFT);   Surgeon: Linda Arevalo DO;  Location: [facility];  Service: Cardiac Surgery    REPLACEMENT TOTAL KNEE Left     SHOULDER SURGERY Right        Family History  Family History   Problem Relation Age of Onset    Cervical cancer Mother     Cervical cancer Sister     Heart disease Sister     Coronary artery disease Sister     Lung cancer Brother        Social History  Social History     Tobacco Use   Smoking Status Former Smoker    Packs/day: 1 00    Years: 50 00    Pack years: 50 00    Types: Cigarettes    Start date: 56    Quit date: 200    Years since quittin 1   Smokeless Tobacco Never Used      Social History     Substance and Sexual Activity   Alcohol Use Not Currently    Alcohol/week: 0 0 standard drinks    Comment: Social       Social History     Substance and Sexual Activity   Drug Use Never        Lives: Home, with spouse,  Social Support: Wife  Education Level:  Occupation: Retired  Fall in the past 12 months: Yes  Use of assistance Device: Rolling Walker    Physical Exam    Weight: 137 2lb (138 4) Temp:98 3F BP:118/64 (99//70) Pulse:70 (60-) Resp:16 O2 Sat:93% RA  Constitutional: Well-nourished and Normocephalic  Orientation:Person and Place, situation     Physical Exam  Vitals signs and nursing note reviewed  Constitutional:       General: He is awake  He is not in acute distress  Appearance: Normal appearance  He is well-developed, well-groomed and normal weight  He is not ill-appearing, toxic-appearing or diaphoretic  HENT:      Head: Normocephalic and atraumatic  Right Ear: External ear normal       Left Ear: External ear normal       Nose: No rhinorrhea  Mouth/Throat:      Mouth: Mucous membranes are moist       Pharynx: Oropharynx is clear  Eyes:      General: No scleral icterus  Right eye: No discharge  Left eye: No discharge  Conjunctiva/sclera: Conjunctivae normal    Neck:      Musculoskeletal: Neck supple  No neck rigidity  Cardiovascular:      Rate and Rhythm: Normal rate and regular rhythm        Heart sounds: S1 normal and S2 normal    Pulmonary: Effort: Pulmonary effort is normal  No respiratory distress  Breath sounds: No wheezing  Abdominal:      General: Bowel sounds are normal  There is no distension  Palpations: Abdomen is soft  Tenderness: There is no abdominal tenderness  Genitourinary:     Comments: Chavis in place, chavis bag with tea colored urine  Musculoskeletal:         General: No tenderness  Right lower leg: No edema  Left lower leg: No edema  Skin:     General: Skin is warm and dry  Coloration: Skin is not jaundiced or pale  Neurological:      General: No focal deficit present  Mental Status: He is alert  Cranial Nerves: No cranial nerve deficit  Psychiatric:         Attention and Perception: Attention and perception normal          Mood and Affect: Mood and affect normal          Speech: Speech normal          Behavior: Behavior is cooperative  Cognition and Memory: He exhibits impaired recent memory  Review of Systems:  Review of Systems   Constitutional: Negative for chills and fever  Respiratory: Negative for cough and shortness of breath  Cardiovascular: Negative for leg swelling  Genitourinary: Negative for dysuria  Musculoskeletal: Positive for arthralgias, back pain and gait problem  Psychiatric/Behavioral: Positive for confusion  All other systems reviewed and are negative  List of Current Medications: Medication list reviewed and updated in Epic to reflect most current [facility] orders    Allergies    Allergies   Allergen Reactions    Aleve [Naproxen]      Other reaction(s): FACIAL SWELLING  Category: Allergy;  Annotation - [id number]: lip/eye edema    Ancef [Cefazolin] Anaphylaxis     Hypotension, rash, itching    Augmentin [Amoxicillin-Pot Clavulanate] Diarrhea and Vomiting       Labs/Diagnostics (reviewed by this provider): Hospital Paperwork and Old Records  Lab Results   Component Value Date    WBC 5 03 01/30/2021    HGB 11 2 (L) 01/30/2021    HCT 34 1 (L) 01/30/2021    MCV 96 01/30/2021    PLT 95 (L) 01/30/2021     Lab Results   Component Value Date     06/25/2015    SODIUM 137 02/04/2021    K 4 3 02/04/2021     02/04/2021    CO2 27 02/04/2021    ANIONGAP 7 06/25/2015    AGAP 4 02/04/2021    BUN 16 02/04/2021    CREATININE 0 82 02/04/2021    GLUC 86 02/04/2021    GLUF 98 11/02/2020    CALCIUM 9 0 02/04/2021    AST 82 (H) 01/30/2021    ALT 37 01/30/2021    ALKPHOS 94 01/30/2021    PROT 7 2 09/15/2015    TP 6 4 01/30/2021    BILITOT 0 47 09/15/2015    TBILI 0 58 01/30/2021    EGFR 80 02/04/2021     Microbiology:  Urine Cx: (1/26/21) >100,000 mixed contaminants; (12/11/20)- >100,000 Ecoli and >100,000 E faecalis    Imaging Reviewed:  EKG: (1/26/21)- sinus tachycardia, RBBB  CXR: (1/26/21)- imaging personally reviewed in [employer] PACS; sternotomy wire; no effusions or focal consolidations noted  Other: Xray lumbar spine (1/30/21)- advanced multilevel degenerative changes; s/p lumbar laminectomy; no fractures  Xray left hip (1/28/21)- mild left hip OA; no fracture  Xray right femur (1/28/21)- no acute fractures; chronic mid femoral shaft fracture with deformity due to displacement with callus formation    Assessment/Plan:  80year old male with:    Sepsis without acute organ dysfunction (HCC)  In setting of obstructive uropathy with urinary retention s/p urologic procedure  Completed antibiotic course inpatient  Improving; afebrile, hemodynamically stable  Continue to monitor closely  CBC w/diff, BMP ordered    Benign prostatic hyperplasia  With obstructive uropathy  Failed voiding trial; chavis in place  D/c alfuzosin  Continue finasteride  Outpatient urology follow up for SP catheter    Chronic diastolic congestive heart failure (HCC)  Wt Readings from Last 3 Encounters:   01/26/21 65 8 kg (145 lb)   01/25/21 66 7 kg (147 lb)   12/11/20 70 3 kg (155 lb)   Monitor daily weights/CHF pathway  Add hold parameter for lasix for SBP<100  BMP ordered  Fluid restriction of 2L/24h  Continue metoprolol succinate- d/c metoprolol tartrate (discharge from hospital on both medications)    Generalized osteoarthritis of multiple sites  With associated pain and debility  Order scheduled acetaminophen 975mg TID  Order OxyIR 2 5mg BID PRN severe pain; PDMP reviewed, paper script written #10 no RF    Stage 3a chronic kidney disease  Lab Results   Component Value Date    EGFR 80 02/04/2021    EGFR 75 01/30/2021    EGFR 79 01/28/2021    CREATININE 0 82 02/04/2021    CREATININE 0 92 01/30/2021    CREATININE 0 83 01/28/2021   Baseline creatinine 0 85-1 0  Calculated CrCl using Cockroft-Gault approx 59 ml/min  At risk for [place] in setting of relative hypotension  BMP ordered    Anemia  CBC ordered    PAF (paroxysmal atrial fibrillation) (HCC)  Continue metoprolol succinate; goal HR<100  Continue aspirin; not on anticoagulation outpatient- risks vs benefits with [id number] of 4; follow up with PCP, cardiology    Hypothyroidism  Continue levothyroxine    Pulmonary emphysema ([address] )  Continue Spiriva (likely contributing to urinary retention)    Esophageal reflux  Omeprazole switched to pantoprazole 20mg daily in setting of plavix use    Coronary artery disease  S/p CABG  Continue aspirin, statin, plavix, metoprolol succinate (d/c metoprolol tartate- duplicate therapy)    Debility  Multifactorial  Admit to SNF for rehab  PT/OT consult- evaluate and treat  Supportive care, nutritional support, ADL support  Fall precautions  Management of acute and chronic medical conditions as outlined     Pain: Present yes- knees, hips, back; see HPI  Rehab Potential:Fair  Patient Informed of Medical Condition: yes  Patient is Capable of Understanding Their Right: yes  Prognosis:Fair  Discharge Plan: STR-> Home  Surrogate Decision Maker: Wife  Advanced Directives: Yes  Code status:Full Code  PCP: Silvio Abraham MD    Immunization History   Administered Date(s) Administered    INFLUENZA 12/09/2009, 10/16/2012, 10/02/2013, 11/10/2014, 09/08/2016, 10/16/2018    Influenza Split High Dose Preservative Free IM 09/08/2016, 09/15/2017, 10/16/2018, 10/17/2019    Influenza, high dose seasonal 0 7 mL 10/14/2020    Influenza, seasonal, injectable 1933, 10/01/2013    Pneumococcal Conjugate 13-Valent 07/15/2015    Pneumococcal Polysaccharide PPV23 08/18/2011    SARS-CoV-2 / COVID-19 mRNA IM (Moderna) 01/23/2021       Annemarie Kim, DO  2/8/21

## 2021-02-09 NOTE — ASSESSMENT & PLAN NOTE
Wt Readings from Last 3 Encounters:   01/26/21 65 8 kg (145 lb)   01/25/21 66 7 kg (147 lb)   12/11/20 70 3 kg (155 lb)   Monitor daily weights/CHF pathway  Add hold parameter for lasix for SBP<100  BMP ordered  Fluid restriction of 2L/24h  Continue metoprolol succinate- d/c metoprolol tartrate (discharge from hospital on both medications)

## 2021-02-09 NOTE — ASSESSMENT & PLAN NOTE
S/p CABG  Continue aspirin, statin, plavix, metoprolol succinate (d/c metoprolol tartate- duplicate therapy)

## 2021-02-09 NOTE — TELEPHONE ENCOUNTER
Per [facility], patient was discharged to SNF for rehab  Will continue to monitor and follow up with patient

## 2021-02-09 NOTE — ASSESSMENT & PLAN NOTE
In setting of obstructive uropathy with urinary retention s/p urologic procedure  Completed antibiotic course inpatient  Improving; afebrile, hemodynamically stable  Continue to monitor closely  CBC w/diff, BMP ordered

## 2021-02-09 NOTE — ASSESSMENT & PLAN NOTE
With associated pain and debility  Order scheduled acetaminophen 975mg TID  Order OxyIR 2 5mg BID PRN severe pain; PDMP reviewed, paper script written #10 no RF

## 2021-02-09 NOTE — ASSESSMENT & PLAN NOTE
Multifactorial  Admit to SNF for rehab  PT/OT consult- evaluate and treat  Supportive care, nutritional support, ADL support  Fall precautions  Management of acute and chronic medical conditions as outlined

## 2021-02-09 NOTE — ASSESSMENT & PLAN NOTE
Continue metoprolol succinate; goal HR<100  Continue aspirin; not on anticoagulation outpatient- risks vs benefits with [id number] of 4; follow up with PCP, cardiology

## 2021-02-09 NOTE — ASSESSMENT & PLAN NOTE
Lab Results   Component Value Date    EGFR 80 02/04/2021    EGFR 75 01/30/2021    EGFR 79 01/28/2021    CREATININE 0 82 02/04/2021    CREATININE 0 92 01/30/2021    CREATININE 0 83 01/28/2021   Baseline creatinine 0 85-1 0  Calculated CrCl using Cockroft-Gault approx 59 ml/min  At risk for [place] in setting of relative hypotension  BMP ordered

## 2021-02-09 NOTE — ASSESSMENT & PLAN NOTE
With obstructive uropathy  Failed voiding trial; chavis in place  D/c alfuzosin  Continue finasteride  Outpatient urology follow up for SP catheter

## 2021-02-10 LAB — EXT SARS-COV-2: NOT DETECTED

## 2021-02-12 ENCOUNTER — NURSING HOME VISIT (OUTPATIENT)
Dept: GERIATRICS | Facility: OTHER | Age: 86
End: 2021-02-12
Payer: MEDICARE

## 2021-02-12 DIAGNOSIS — M15.9 GENERALIZED OSTEOARTHRITIS OF MULTIPLE SITES: ICD-10-CM

## 2021-02-12 DIAGNOSIS — N40.1 BENIGN PROSTATIC HYPERPLASIA WITH URINARY RETENTION: ICD-10-CM

## 2021-02-12 DIAGNOSIS — A41.9 SEPSIS WITHOUT ACUTE ORGAN DYSFUNCTION, DUE TO UNSPECIFIED ORGANISM (HCC): Primary | ICD-10-CM

## 2021-02-12 DIAGNOSIS — R26.2 AMBULATORY DYSFUNCTION: ICD-10-CM

## 2021-02-12 DIAGNOSIS — I48.0 PAF (PAROXYSMAL ATRIAL FIBRILLATION) (HCC): ICD-10-CM

## 2021-02-12 DIAGNOSIS — I50.32 CHRONIC DIASTOLIC CONGESTIVE HEART FAILURE (HCC): ICD-10-CM

## 2021-02-12 DIAGNOSIS — D64.9 ANEMIA, UNSPECIFIED TYPE: ICD-10-CM

## 2021-02-12 DIAGNOSIS — N18.31 STAGE 3A CHRONIC KIDNEY DISEASE (HCC): ICD-10-CM

## 2021-02-12 DIAGNOSIS — J43.8 OTHER EMPHYSEMA (HCC): ICD-10-CM

## 2021-02-12 DIAGNOSIS — R33.8 BENIGN PROSTATIC HYPERPLASIA WITH URINARY RETENTION: ICD-10-CM

## 2021-02-12 PROCEDURE — 99316 NF DSCHRG MGMT 30 MIN+: CPT | Performed by: FAMILY MEDICINE

## 2021-02-12 NOTE — PROGRESS NOTES
[facility]  [address]  [phone]  [address] [place] [address]: [address]: [address]: SNF/Short Term Rehab    NAME: Ashley Townsend  AGE: 80 y o  SEX: male  DATE OF ADMISSION: 2/6/21 DATE OF DISCHARGE:2/13/21 DISCHARGE DISPOSITION: Home    Reason for admission: Patient was admitted from [facility] for rehabilitation after hospitalization for sepsis 2/2 UTI with urinary retention and chavis, chronic diastolic CHF, OA/chronic pain with ambulatory dysfunction      Admission Diagnoses:   Sepsis without acute organ dysfunction (HCC)  In setting of obstructive uropathy with urinary retention s/p urologic procedure  Completed antibiotic course inpatient  Resolved; afebrile, hemodynamically stable    Benign prostatic hyperplasia  With obstructive uropathy  Failed voiding trial; continues with chavis in place  Continue finasteride; alfuzosin discontinued  Outpatient urology follow up for SP catheter    Chronic diastolic congestive heart failure ([facility] )  Continue to monitor weights daily at home; notify provider for weight gain of 3lb/24h, 5lb/week  Continue lasix 20mg daily, hold for SBP<100  Fluid restriction of 2L/24h  Continue metoprolol succinate 12 5mg daily    Anemia  Hgb 11 2->9 8  Recommend follow up CBC outpatient    Stage 3a chronic kidney disease  Baseline creatinine 0 85-1 0  Calculated CrCl using Cockroft-Gault approx 59 ml/min  BMP reviewed/stable    Generalized osteoarthritis of multiple sites  With associated pain and debility  Started scheduled acetaminophen 975mg TID  Started OxyIR 2 5mg BID PRN severe pain; discharged home with remaining supply from SNF rehab (7 tablets)  Continue gabapentin    PAF (paroxysmal atrial fibrillation) (HCC)  Continue metoprolol succinate; goal HR<100  Continue aspirin; not on anticoagulation outpatient- risks vs benefits with [id number] of 4; follow up with PCP, cardiology    Pulmonary emphysema ([facility] )  Continue Spiriva- consider as contributing factor to urinary retention    Ambulatory dysfunction  Continue PT, OT through home health at discharge  Fall precautions    Additional Problems: None  Discharge Diagnoses: Same as above    Course of stay: Patient was admitted to [facility] for rehabilitation due to above  Significant events during the stay include:  -Severe pain in back, knees, hips 2/2 OA: scheduled acetaminophen added; BID PRN OxyIR 2 5mg for severe pain with good relief with use  -Anemia: Hgb 11 2 (1/30)-> 9 8 (2/10)  The patient participated in PT/OT  Ambulating max of 90 feet (improved for 40 feet on 2/8) with walker and assistance prior to discharge  Labs and testing performed during stay:  BMP (2/10/21): Na 137 K 4 3 Cl 107 CO2 25 BUN 21 Creat 0 79 Glc 75 Ca 8 3  CBC (2/10/21): Hgb 9 8 Hct 27 9 WBC 5 2 Plt 245    New Medications:   -Acetaminophen 975mg TID  -OxyIR 2 5mg BID PRN severe pain  Medication Changes: N/A  Discontinued Medications:  -Metoprolol tartrate (was discharged from hospital on both metoprolol tartrate and succinate- succinate continued)  -Alfuzosin ER  -Hydrocodone-APAP  Discharge Medications: See discharge medication list which was reviewed and updated in Epic to reflect most current SNF orders    Status at time of discharge: Stable    Today's Visit: 2/12/21    Subjective:80year old male evaluated for STR follow up and discharge visit; will return home with wife on Saturday, 2/13/21  Notes severe back pain overnight last night; took PRN OxyIR 2 5mg with excellent pain relief and was able to sleep the rest of the night; no suprapubic pain, bladder spasms, hematuria; used additional dose of PRN OxyIR on 2/10; continues on scheduled acetaminophen  SBP trending 100s-130s; lasix held x1 with SBP of 98; patient denies dizziness or lightheadness; weight net +2 2lb/96 hours  No lower extremity edema or shortness of breath reported      Vitals:100//72 65 (57-72) 97 5F 18 95%  2lb (139<137 2<138)    Exam: Physical Exam  Vitals signs and nursing note reviewed  Constitutional:       General: He is awake  He is not in acute distress  Appearance: Normal appearance  He is well-developed, well-groomed and normal weight  He is not ill-appearing, toxic-appearing or diaphoretic  HENT:      Head: Normocephalic and atraumatic  Right Ear: External ear normal       Left Ear: External ear normal       Nose: No rhinorrhea  Mouth/Throat:      Mouth: Mucous membranes are moist       Pharynx: Oropharynx is clear  Eyes:      General: No scleral icterus  Right eye: No discharge  Left eye: No discharge  Conjunctiva/sclera: Conjunctivae normal    Neck:      Musculoskeletal: Neck supple  No neck rigidity  Cardiovascular:      Rate and Rhythm: Normal rate and regular rhythm  Heart sounds: S1 normal and S2 normal    Pulmonary:      Effort: Pulmonary effort is normal  No respiratory distress  Breath sounds: No wheezing, rhonchi or rales  Abdominal:      General: There is no distension  Palpations: Abdomen is soft  Musculoskeletal:         General: No tenderness or deformity  Right lower leg: No edema  Left lower leg: No edema  Skin:     General: Skin is warm and dry  Capillary Refill: Capillary refill takes less than 2 seconds  Coloration: Skin is not jaundiced or pale  Neurological:      General: No focal deficit present  Mental Status: He is alert  Mental status is at baseline  Cranial Nerves: No cranial nerve deficit  Psychiatric:         Attention and Perception: Attention and perception normal          Mood and Affect: Mood and affect normal          Speech: Speech normal          Behavior: Behavior normal  Behavior is cooperative           Discussion with patient/family and further instructions:  -Fall precautions  -Medication list was reviewed and updated in Epic to reflect most current SNF orders  -Face to Face form for home health (PT, OT, nursing) was completed    Follow-up Recommendations: Please follow-up with your primary care physician within 7-10 days of discharge to review medication changes and current status  Discharge summary forwarded to PCP through Epic  Problem List Follow-up Recommendations:  -See above    I have spent 40 minutes with Patient  today in which greater than 50% of this time was spent in counseling/coordination of care regarding Diagnostic results, Intructions for management, Patient and family education, Importance of tx compliance, Impressions and review of medication changes with patient; coordination of discharge care with nursing and social work      Juan Jose Trujillo DO  2/12/21

## 2021-02-14 NOTE — ASSESSMENT & PLAN NOTE
Continue metoprolol succinate; goal HR<100  Continue aspirin; not on anticoagulation outpatient- risks vs benefits with [id number] of 4; follow up with PCP, cardiology

## 2021-02-14 NOTE — ASSESSMENT & PLAN NOTE
Baseline creatinine 0 85-1 0  Calculated CrCl using Cockroft-Gault approx 59 ml/min  BMP reviewed/stable

## 2021-02-14 NOTE — ASSESSMENT & PLAN NOTE
Continue to monitor weights daily at home; notify provider for weight gain of 3lb/24h, 5lb/week  Continue lasix 20mg daily, hold for SBP<100  Fluid restriction of 2L/24h  Continue metoprolol succinate 12 5mg daily

## 2021-02-14 NOTE — ASSESSMENT & PLAN NOTE
With obstructive uropathy  Failed voiding trial; continues with chavis in place  Continue finasteride; alfuzosin discontinued  Outpatient urology follow up for SP catheter

## 2021-02-14 NOTE — ASSESSMENT & PLAN NOTE
With associated pain and debility  Started scheduled acetaminophen 975mg TID  Started OxyIR 2 5mg BID PRN severe pain; discharged home with remaining supply from SNF rehab (7 tablets)  Continue gabapentin

## 2021-02-16 NOTE — TELEPHONE ENCOUNTER
Spoke with Manjit Randall and Mitchel Washington in the background  Mitchel Washington has VNA  Asked if they were interested in having SPT placed  She said that at this time he didn't want to go back into the hospital to have SPT   They will continue with chavis and having VNA exchange

## 2021-02-17 ENCOUNTER — TELEPHONE (OUTPATIENT)
Dept: CARDIOLOGY CLINIC | Facility: CLINIC | Age: 86
End: 2021-02-17

## 2021-02-17 NOTE — TELEPHONE ENCOUNTER
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 Does take ASA 81 mg  Has appt with you on 3/17/21        Please advise

## 2021-02-20 ENCOUNTER — IMMUNIZATIONS (OUTPATIENT)
Dept: FAMILY MEDICINE CLINIC | Facility: HOSPITAL | Age: 86
End: 2021-02-20

## 2021-02-20 DIAGNOSIS — Z23 ENCOUNTER FOR IMMUNIZATION: Primary | ICD-10-CM

## 2021-02-20 PROCEDURE — 0012A SARS-COV-2 / COVID-19 MRNA VACCINE (MODERNA) 100 MCG: CPT

## 2021-02-20 PROCEDURE — 91301 SARS-COV-2 / COVID-19 MRNA VACCINE (MODERNA) 100 MCG: CPT

## 2021-02-24 ENCOUNTER — OFFICE VISIT (OUTPATIENT)
Dept: FAMILY MEDICINE CLINIC | Facility: CLINIC | Age: 86
End: 2021-02-24
Payer: MEDICARE

## 2021-02-24 VITALS
TEMPERATURE: 97 F | SYSTOLIC BLOOD PRESSURE: 118 MMHG | WEIGHT: 148 LBS | HEIGHT: 65 IN | DIASTOLIC BLOOD PRESSURE: 62 MMHG | RESPIRATION RATE: 22 BRPM | BODY MASS INDEX: 24.66 KG/M2 | HEART RATE: 88 BPM

## 2021-02-24 DIAGNOSIS — E03.9 ACQUIRED HYPOTHYROIDISM: ICD-10-CM

## 2021-02-24 DIAGNOSIS — Z95.2 S/P TAVR (TRANSCATHETER AORTIC VALVE REPLACEMENT): ICD-10-CM

## 2021-02-24 DIAGNOSIS — L81.9 ATYPICAL PIGMENTED LESION: ICD-10-CM

## 2021-02-24 DIAGNOSIS — I48.0 PAF (PAROXYSMAL ATRIAL FIBRILLATION) (HCC): ICD-10-CM

## 2021-02-24 DIAGNOSIS — I50.32 CHRONIC DIASTOLIC CONGESTIVE HEART FAILURE (HCC): ICD-10-CM

## 2021-02-24 DIAGNOSIS — K21.9 GASTROESOPHAGEAL REFLUX DISEASE WITHOUT ESOPHAGITIS: ICD-10-CM

## 2021-02-24 DIAGNOSIS — I71.4 ABDOMINAL AORTIC ANEURYSM (AAA) WITHOUT RUPTURE (HCC): ICD-10-CM

## 2021-02-24 DIAGNOSIS — D69.6 THROMBOCYTOPENIA (HCC): ICD-10-CM

## 2021-02-24 DIAGNOSIS — D64.9 NORMOCYTIC ANEMIA: ICD-10-CM

## 2021-02-24 DIAGNOSIS — Z00.00 MEDICARE ANNUAL WELLNESS VISIT, SUBSEQUENT: ICD-10-CM

## 2021-02-24 DIAGNOSIS — I10 ESSENTIAL HYPERTENSION: ICD-10-CM

## 2021-02-24 DIAGNOSIS — I72.3 ILIAC ARTERY ANEURYSM, BILATERAL (HCC): ICD-10-CM

## 2021-02-24 DIAGNOSIS — E87.6 HYPOKALEMIA: ICD-10-CM

## 2021-02-24 DIAGNOSIS — T83.89XD: ICD-10-CM

## 2021-02-24 DIAGNOSIS — I73.9 PERIPHERAL ARTERIAL DISEASE (HCC): ICD-10-CM

## 2021-02-24 DIAGNOSIS — J43.9 PULMONARY EMPHYSEMA, UNSPECIFIED EMPHYSEMA TYPE (HCC): ICD-10-CM

## 2021-02-24 DIAGNOSIS — I25.118 ATHEROSCLEROTIC HEART DISEASE OF NATIVE CORONARY ARTERY WITH OTHER FORMS OF ANGINA PECTORIS (HCC): ICD-10-CM

## 2021-02-24 DIAGNOSIS — R26.2 AMBULATORY DYSFUNCTION: ICD-10-CM

## 2021-02-24 DIAGNOSIS — A41.9 SEPSIS WITHOUT ACUTE ORGAN DYSFUNCTION, DUE TO UNSPECIFIED ORGANISM (HCC): Primary | ICD-10-CM

## 2021-02-24 DIAGNOSIS — M15.9 GENERALIZED OSTEOARTHRITIS OF MULTIPLE SITES: ICD-10-CM

## 2021-02-24 DIAGNOSIS — R73.01 IMPAIRED FASTING GLUCOSE: ICD-10-CM

## 2021-02-24 PROCEDURE — 99214 OFFICE O/P EST MOD 30 MIN: CPT | Performed by: FAMILY MEDICINE

## 2021-02-24 PROCEDURE — 88305 TISSUE EXAM BY PATHOLOGIST: CPT | Performed by: PATHOLOGY

## 2021-02-24 PROCEDURE — G0439 PPPS, SUBSEQ VISIT: HCPCS | Performed by: FAMILY MEDICINE

## 2021-02-24 PROCEDURE — 11104 PUNCH BX SKIN SINGLE LESION: CPT | Performed by: FAMILY MEDICINE

## 2021-02-24 RX ORDER — POTASSIUM CHLORIDE 750 MG/1
10 TABLET, EXTENDED RELEASE ORAL DAILY
Qty: 90 TABLET | Refills: 1 | Status: SHIPPED | OUTPATIENT
Start: 2021-02-24 | End: 2021-02-27 | Stop reason: SDUPTHER

## 2021-02-24 NOTE — PATIENT INSTRUCTIONS
Medicare Preventive Visit Patient Instructions  Thank you for completing your Welcome to Medicare Visit or Medicare Annual Wellness Visit today  Your next wellness visit will be due in one year (2/24/2022)  The screening/preventive services that you may require over the next 5-10 years are detailed below  Some tests may not apply to you based off risk factors and/or age  Screening tests ordered at today's visit but not completed yet may show as past due  Also, please note that scanned in results may not display below  Preventive Screenings:  Service Recommendations Previous Testing/Comments   Colorectal Cancer Screening  · Colonoscopy    · Fecal Occult Blood Test (FOBT)/Fecal Immunochemical Test (FIT)  · Fecal DNA/Cologuard Test  · Flexible Sigmoidoscopy Age: 54-65 years old   Colonoscopy: every 10 years (May be performed more frequently if at higher risk)  OR  FOBT/FIT: every 1 year  OR  Cologuard: every 3 years  OR  Sigmoidoscopy: every 5 years  Screening may be recommended earlier than age 48 if at higher risk for colorectal cancer  Also, an individualized decision between you and your healthcare provider will decide whether screening between the ages of 74-80 would be appropriate   Colonoscopy: 05/24/2018  FOBT/FIT: Not on file  Cologuard: Not on file  Sigmoidoscopy: Not on file    Screening Not Indicated     Prostate Cancer Screening Individualized decision between patient and health care provider in men between ages of 53-78   Medicare will cover every 12 months beginning on the day after your 50th birthday PSA: No results in last 5 years     Screening Not Indicated     Hepatitis C Screening Once for adults born between [birth year] and 1965  More frequently in patients at high risk for Hepatitis C Hep C Antibody: Not on file       Diabetes Screening 1-2 times per year if you're at risk for diabetes or have pre-diabetes Fasting glucose: 98 mg/dL   A1C: 5 6 %    Screening Current   Cholesterol Screening Once every 5 years if you don't have a lipid disorder  May order more often based on risk factors  Lipid panel: 10/21/2020    Screening Not Indicated  History Lipid Disorder      Other Preventive Screenings Covered by Medicare:  1  Abdominal Aortic Aneurysm (AAA) Screening: covered once if your at risk  You're considered to be at risk if you have a family history of AAA or a male between the age of 73-68 who smoking at least 100 cigarettes in your lifetime  2  Lung Cancer Screening: covers low dose CT scan once per year if you meet all of the following conditions: (1) Age 50-69; (2) No signs or symptoms of lung cancer; (3) Current smoker or have quit smoking within the last 15 years; (4) You have a tobacco smoking history of at least 30 pack years (packs per day x number of years you smoked); (5) You get a written order from a healthcare provider  3  Glaucoma Screening: covered annually if you're considered high risk: (1) You have diabetes OR (2) Family history of glaucoma OR (3)  aged 48 and older OR (3)  American aged 72 and older  3  Osteoporosis Screening: covered every 2 years if you meet one of the following conditions: (1) Have a vertebral abnormality; (2) On glucocorticoid therapy for more than 3 months; (3) Have primary hyperparathyroidism; (4) On osteoporosis medications and need to assess response to drug therapy  5  HIV Screening: covered annually if you're between the age of 12-76  Also covered annually if you are younger than 13 and older than 72 with risk factors for HIV infection  For pregnant patients, it is covered up to 3 times per pregnancy      Immunizations:  Immunization Recommendations   Influenza Vaccine Annual influenza vaccination during flu season is recommended for all persons aged >= 6 months who do not have contraindications   Pneumococcal Vaccine (Prevnar and Pneumovax)  * Prevnar = PCV13  * Pneumovax = PPSV23 Adults 25-60 years old: 1-3 doses may be recommended based on certain risk factors  Adults 72 years old: Prevnar (PCV13) vaccine recommended followed by Pneumovax (PPSV23) vaccine  If already received PPSV23 since turning 65, then PCV13 recommended at least one year after PPSV23 dose  Hepatitis B Vaccine 3 dose series if at intermediate or high risk (ex: diabetes, end stage renal disease, liver disease)   Tetanus (Td) Vaccine - COST NOT COVERED BY MEDICARE PART B Following completion of primary series, a booster dose should be given every 10 years to maintain immunity against tetanus  Td may also be given as tetanus wound prophylaxis  Tdap Vaccine - COST NOT COVERED BY MEDICARE PART B Recommended at least once for all adults  For pregnant patients, recommended with each pregnancy  Shingles Vaccine (Shingrix) - COST NOT COVERED BY MEDICARE PART B  2 shot series recommended in those aged 48 and above     Health Maintenance Due:  There are no preventive care reminders to display for this patient  Immunizations Due:  There are no preventive care reminders to display for this patient  Advance Directives   What are advance directives? Advance directives are legal documents that state your wishes and plans for medical care  These plans are made ahead of time in case you lose your ability to make decisions for yourself  Advance directives can apply to any medical decision, such as the treatments you want, and if you want to donate organs  What are the types of advance directives? There are many types of advance directives, and each state has rules about how to use them  You may choose a combination of any of the following:  · Living will: This is a written record of the treatment you want  You can also choose which treatments you do not want, which to limit, and which to stop at a certain time  This includes surgery, medicine, IV fluid, and tube feedings  · Durable power of  for healthcare [facility]):   This is a written record that states who you want to make healthcare choices for you when you are unable to make them for yourself  This person, called a proxy, is usually a family member or a friend  You may choose more than 1 proxy  · Do not resuscitate (DNR) order:  A DNR order is used in case your heart stops beating or you stop breathing  It is a request not to have certain forms of treatment, such as CPR  A DNR order may be included in other types of advance directives  · Medical directive: This covers the care that you want if you are in a coma, near death, or unable to make decisions for yourself  You can list the treatments you want for each condition  Treatment may include pain medicine, surgery, blood transfusions, dialysis, IV or tube feedings, and a ventilator (breathing machine)  · Values history: This document has questions about your views, beliefs, and how you feel and think about life  This information can help others choose the care that you would choose  Why are advance directives important? An advance directive helps you control your care  Although spoken wishes may be used, it is better to have your wishes written down  Spoken wishes can be misunderstood, or not followed  Treatments may be given even if you do not want them  An advance directive may make it easier for your family to make difficult choices about your care  Fall Prevention    Fall prevention  includes ways to make your home and other areas safer  It also includes ways you can move more carefully to prevent a fall  Health conditions that cause changes in your blood pressure, vision, or muscle strength and coordination may increase your risk for falls  Medicines may also increase your risk for falls if they make you dizzy, weak, or sleepy  Fall prevention tips:   · Stand or sit up slowly  · Use assistive devices as directed  · Wear shoes that fit well and have soles that   · Wear a personal alarm  · Stay active  · Manage your medical conditions      Home Safety Tips:  · Add items to prevent falls in the bathroom  · Keep paths clear  · Install bright lights in your home  · Keep items you use often on shelves within reach  · Paint or place reflective tape on the edges of your stairs  © Copyright [employer] 2018 Information is for End User's use only and may not be sold, redistributed or otherwise used for commercial purposes   All illustrations and images included in CareNotes® are the copyrighted property of [employer]  or [address] [place] [address]
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
## 2021-02-24 NOTE — PROGRESS NOTES
Assessment and Plan:     Problem List Items Addressed This Visit        Digestive    Esophageal reflux       Endocrine    Hypothyroidism    Relevant Orders    TSH, 3rd generation with Free T4 reflex    Impaired fasting glucose       Respiratory    Pulmonary emphysema (HCC)       Cardiovascular and Mediastinum    PAF (paroxysmal atrial fibrillation) (HCC)    Atherosclerotic heart disease of native coronary artery with other forms of angina pectoris ([facility] )    Hypertension    Peripheral arterial disease ([facility] )    Abdominal aortic aneurysm (AAA) without rupture (HCC)    Iliac artery aneurysm, bilateral (HCC)    Chronic diastolic congestive heart failure (HCC)       Musculoskeletal and Integument    Generalized osteoarthritis of multiple sites       Other    Indwelling Porter catheter calcification (HCC)    Ambulatory dysfunction    S/P TAVR (transcatheter aortic valve replacement)    Normocytic anemia    Relevant Orders    Iron Panel (Includes Ferritin, Iron Sat%, Iron, and TIBC)    CBC and differential    Protein electrophoresis, serum    Thrombocytopenia (HCC)    Sepsis without acute organ dysfunction (HCC) - Primary    RESOLVED: Hypokalemia    Relevant Medications    potassium chloride (K-DUR,KLOR-CON) 10 mEq tablet    Other Relevant Orders    Basic metabolic panel      Other Visit Diagnoses     Atypical pigmented lesion        Relevant Orders    Tissue Exam    Medicare annual wellness visit, subsequent              Falls Plan of Care: balance, strength, and gait training instructions were provided and referral to physical therapy  Recommended assistive device to help with gait and balance  Preventive health issues were discussed with patient, and age appropriate screening tests were ordered as noted in patient's After Visit Summary  Personalized health advice and appropriate referrals for health education or preventive services given if needed, as noted in patient's After Visit Summary       History of Present Illness:     Patient presents for Medicare Annual Wellness visit    Patient Care Team:  Tasneem Dwyer MD as PCP - General (Family Medicine)  THAD Salas MD Marvelyn Phlegm, MD Duwaine Rota, MD     Problem List:     Patient Active Problem List   Diagnosis    Severe aortic stenosis    PA[facility]ox[facility]mal [facility] fibrillation) ([facility] )    Benign prostatic hyperplasia    Carotid stenosis, bilateral    Atherosclerotic heart disease of native coronary artery with other forms of angina pectoris (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    Stenosis of carotid artery    Iliac artery aneurysm, bilateral ([facility] )    Urine retention    Indwelling Porter catheter calcification ([place] [address] )    Ambulatory dysfunction    S/P TAVR (transcatheter aortic valve replacement)    Normocytic anemia    Hypochloremia    Thrombocytopenia (HCC)    Sepsis without acute organ dysfunction (HCC)    Pancytopenia (HCC)    Chronic diastolic congestive heart failure (HCC)    Stage 3a chronic kidney disease    Debility      Past Medical and Surgical History:     Past Medical History:   Diagnosis Date    Anemia     Bladder cancer ([facility] )     Cancer ([place] [address] )     bladder    Carotid artery occlusion     Cataract     Colon polyp     COPD (chronic obstructive pulmonary disease) ([facility] )     Coronary artery disease     Disease of thyroid gland     History of transfusion     Hyperlipidemia     Hypertension     Hypothyroidism 9/15/2017    Multiple pulmonary nodules     Peptic ulcer     Scoliosis     SIRS (systemic inflammatory response syndrome) ([facility] ) 12/19/2019    Transient cerebral ischemia     Tremors of nervous system      Past Surgical History:   Procedure Laterality Date   [facility] SURGERY      1973, 1987, 2005    BUNIONECTOMY Left     Simple exostectomy (silver procedure)    CAROTID ENDARTERECTOMY Right 09/10/2008    Randy LEDBETTER MD    CATARACT EXTRACTION      CATARACT EXTRACTION, BILATERAL      CERVICAL DISCECTOMY  1969    COLONOSCOPY      CORONARY ARTERY BYPASS GRAFT  10/20/2010    x3 (LIMA-LAD, SVG-OM, SVG-RCA)    CYSTOSCOPY      ELBOW SURGERY Right 07/2012    FEMORAL ARTERY - TIBIAL ARTERY BYPASS GRAFT  12/05/2011    using reversed saphenous vein from right leg  Teodoro Jordan MD    NE ECHO TRANSESOPHAG R-T 2D W/PRB IMG ACQUISJ I&R N/A 10/6/2020    Procedure: TRANSESOPHAGEAL ECHOCARDIOGRAM (DAVID); Surgeon: Aaron Wray DO;  [facility]: [facility];  Service: Cardiac Surgery    NE REPLACE AORTIC VALVE OPENFEMORAL ARTERY APPROACH N/A 10/6/2020    Procedure: REPLACEMENT AORTIC VALVE TRANSCATHETER (TAVR) TRANSFEMORAL W/ 26MM MONTALVO BREE S3 ULTRA VALVE(ACCESS ON LEFT);   Surgeon: Aaron Wray DO;  [facility];  Service: Cardiac Surgery    REPLACEMENT TOTAL KNEE Left     SHOULDER SURGERY Right 1997      Family History:     Family History   Problem Relation Age of Onset    Cervical cancer Mother     Cervical cancer Sister     Heart disease Sister     Coronary artery disease Sister     Lung cancer Brother       Social History:        Social History     Socioeconomic History    Marital status: /Civil Union     Spouse name: None    Number of children: None    Years of education: None    Highest education level: None   Occupational History    Occupation: Retired   Social Needs    Financial resource strain: None    Food insecurity     Worry: None     Inability: None    Transportation needs     Medical: None     Non-medical: None   Tobacco Use    Smoking status: Former Smoker     Packs/day: 1 00     Years: 50 00     Pack years: 50 00     Types: Cigarettes     Start date: 56     Quit date:      Years since quittin 1    Smokeless tobacco: Never Used   Substance and Sexual Activity    Alcohol use: Not Currently     Alcohol/week: 0 0 standard drinks     Comment: Social     Drug use: Never    Sexual activity: Not Currently   Lifestyle    Physical activity     Days per week: None     Minutes per session: None    Stress: None   Relationships    Social connections     Talks on phone: None     Gets together: None     Attends Yazidi service: None     Active member of club or organization: None     Attends meetings of clubs or organizations: None     Relationship status: None    Intimate partner violence     Fear of current or ex partner: None     Emotionally abused: None     Physically abused: None     Forced sexual activity: None   Other Topics Concern    None   Social History Narrative    None      Medications and Allergies:     Current Outpatient Medications   Medication Sig Dispense Refill    acetaminophen (TYLENOL) 325 mg tablet Take 3 tablets (975 mg total) by mouth 3 (three) times a day      aspirin 81 MG tablet Take 81 mg by mouth daily       Cholecalciferol (HM VITAMIN D3) 2000 units CAPS Take 1 tablet by mouth daily      furosemide (LASIX) 20 mg tablet TAKE 1 TABLET(20 MG) BY MOUTH DAILY 90 tablet 1    hydrocortisone 2 5 % cream RENA TO POSTERIOR EARS AND ARMS ONCE A DAY PRN FOR 1-2 WEEKS  1    levothyroxine 75 mcg tablet TAKE 1 TABLET(75 MCG) BY MOUTH DAILY 90 tablet 3    metoprolol succinate (TOPROL-XL) 25 mg 24 hr tablet TAKE 1/2 TABLET BY MOUTH DAILY 45 tablet 3    oxyCODONE (ROXICODONE) 5 mg immediate release tablet Take 2 5 mg by mouth 2 (two) times a day as needed for severe pain      pantoprazole (PROTONIX) 20 mg tablet Take 20 mg by mouth daily      potassium chloride (K-DUR,KLOR-CON) 10 mEq tablet Take 1 tablet (10 mEq total) by mouth daily 90 tablet 1    simvastatin (ZOCOR) 20 mg tablet TAKE 1 TABLET(20 MG) BY MOUTH DAILY AT BEDTIME 90 tablet 1    SPIRIVA RESPIMAT 2 5 MCG/ACT AERS inhaler INHALE 2 PUFFS BY MOUTH DAILY 12 g 6     No current facility-administered medications for this visit  Allergies   Allergen Reactions    Aleve [Naproxen]      Other reaction(s): FACIAL SWELLING  Category: Allergy; Annotation - [id number]: lip/eye edema    Ancef [Cefazolin] Anaphylaxis     Hypotension, rash, itching    Augmentin [Amoxicillin-Pot Clavulanate] Diarrhea and Vomiting      Immunizations:     Immunization History   Administered Date(s) Administered    INFLUENZA 12/09/2009, 10/16/2012, 10/02/2013, 11/10/2014, 09/08/2016, 10/16/2018    Influenza Split High Dose Preservative Free IM 09/08/2016, 09/15/2017, 10/16/2018, 10/17/2019    Influenza, high dose seasonal 0 7 mL 10/14/2020    Influenza, seasonal, injectable 1933, 10/01/2013    Pneumococcal Conjugate 13-Valent 07/15/2015    Pneumococcal Polysaccharide PPV23 08/18/2011    SARS-CoV-2 / COVID-19 mRNA IM ([facility]) 01/23/2021, 02/20/2021      Health Maintenance: There are no preventive care reminders to display for this patient  There are no preventive care reminders to display for this patient  Medicare Health Risk Assessment:     /62   Pulse 88   Temp (!) 97 °F (36 1 °C)   Resp 22   Ht 5' 5" (1 651 m)   Wt 67 1 kg (148 lb)   BMI 24 63 kg/m²      Gabriella Redmond is here for his Subsequent Wellness visit  Last Medicare Wellness visit information reviewed, patient interviewed and updates made to the record today  Health Risk Assessment:   Patient rates overall health as fair  Patient feels that their physical health rating is slightly worse  Eyesight was rated as slightly worse  Hearing was rated as slightly worse  Patient feels that their emotional and mental health rating is same  Pain experienced in the last 7 days has been a lot   Patient's pain rating has been 7/10  Patient states that he has experienced no weight loss or gain in last 6 months  Depression Screening:   PHQ-2 Score: 0      Fall Risk Screening: In the past year, patient has experienced: history of falling in past year    Number of falls: 1  Injured during fall?: Yes    Feels unsteady when standing or walking?: Yes    Worried about falling?: Yes      Home Safety:  Patient has trouble with stairs inside or outside of their home  Patient has working smoke alarms and has no working carbon monoxide detector  Home safety hazards include: none  Nutrition:   Current diet is Low Saturated Fat  Medications:   Patient is currently taking over-the-counter supplements  OTC medications include: see medication list  Patient is able to manage medications  Activities of Daily Living (ADLs)/Instrumental Activities of Daily Living (IADLs):   Walk and transfer into and out of bed and chair?: Yes  Dress and groom yourself?: Yes    Bathe or shower yourself?: Yes    Feed yourself?  Yes  Do your laundry/housekeeping?: No  Manage your money, pay your bills and track your expenses?: No  Make your own meals?: No    Do your own shopping?: No    Previous Hospitalizations:   Any hospitalizations or ED visits within the last 12 months?: Yes    How many hospitalizations have you had in the last year?: more than 4    Advance Care Planning:   Living will: Yes    Advanced directive: Yes      Cognitive Screening:   Provider or family/friend/caregiver concerned regarding cognition?: No    PREVENTIVE SCREENINGS      Cardiovascular Screening:    General: Screening Not Indicated and History Lipid Disorder      Diabetes Screening:     General: Screening Current      Colorectal Cancer Screening:     General: Screening Not Indicated      Prostate Cancer Screening:    General: Screening Not Indicated      Osteoporosis Screening:    General: Screening Not Indicated      Abdominal Aortic Aneurysm (AAA) Screening:    Risk factors include: tobacco use        General: Screening Not Indicated and History AAA      Lung Cancer Screening:     General: Screening Not Indicated      Hepatitis C Screening:    General: Screening Not Indicated    Other Counseling Topics:   Regular weightbearing exercise and calcium and vitamin D intake         Magi Wood MD

## 2021-02-27 PROBLEM — E83.51 HYPOCALCEMIA: Status: RESOLVED | Noted: 2020-10-06 | Resolved: 2021-02-27

## 2021-02-27 PROBLEM — E87.6 HYPOKALEMIA: Status: RESOLVED | Noted: 2020-10-20 | Resolved: 2021-02-27

## 2021-02-27 PROBLEM — N39.0 UTI (URINARY TRACT INFECTION): Status: RESOLVED | Noted: 2020-09-07 | Resolved: 2021-02-27

## 2021-02-27 RX ORDER — POTASSIUM CHLORIDE 750 MG/1
10 TABLET, EXTENDED RELEASE ORAL DAILY
Qty: 90 TABLET | Refills: 1 | Status: SHIPPED | OUTPATIENT
Start: 2021-02-27 | End: 2021-06-22 | Stop reason: SDUPTHER

## 2021-03-01 ENCOUNTER — TELEPHONE (OUTPATIENT)
Dept: FAMILY MEDICINE CLINIC | Facility: CLINIC | Age: 86
End: 2021-03-01

## 2021-03-01 NOTE — TELEPHONE ENCOUNTER
----- Message from Dale Flores MD sent at 2/27/2021  8:48 AM EST -----  Notify [facility] patient will need labs drawn next week   Orders in [address] [place] [address] Rd

## 2021-03-10 ENCOUNTER — APPOINTMENT (OUTPATIENT)
Dept: LAB | Facility: CLINIC | Age: 86
End: 2021-03-10
Payer: MEDICARE

## 2021-03-10 DIAGNOSIS — E87.6 HYPOKALEMIA: ICD-10-CM

## 2021-03-10 LAB
ANION GAP SERPL CALCULATED.3IONS-SCNC: 3 MMOL/L (ref 4–13)
BUN SERPL-MCNC: 14 MG/DL (ref 5–25)
CALCIUM SERPL-MCNC: 8.8 MG/DL (ref 8.3–10.1)
CHLORIDE SERPL-SCNC: 100 MMOL/L (ref 100–108)
CO2 SERPL-SCNC: 29 MMOL/L (ref 21–32)
CREAT SERPL-MCNC: 0.77 MG/DL (ref 0.6–1.3)
GFR SERPL CREATININE-BSD FRML MDRD: 82 ML/MIN/1.73SQ M
GLUCOSE SERPL-MCNC: 76 MG/DL (ref 65–140)
POTASSIUM SERPL-SCNC: 4.5 MMOL/L (ref 3.5–5.3)
SODIUM SERPL-SCNC: 132 MMOL/L (ref 136–145)
T4 FREE SERPL-MCNC: 1.22 NG/DL (ref 0.76–1.46)
TSH SERPL DL<=0.05 MIU/L-ACNC: 4.62 UIU/ML (ref 0.36–3.74)

## 2021-03-10 PROCEDURE — 80048 BASIC METABOLIC PNL TOTAL CA: CPT

## 2021-03-10 PROCEDURE — 84443 ASSAY THYROID STIM HORMONE: CPT | Performed by: FAMILY MEDICINE

## 2021-03-10 PROCEDURE — 36415 COLL VENOUS BLD VENIPUNCTURE: CPT | Performed by: FAMILY MEDICINE

## 2021-03-10 PROCEDURE — 84439 ASSAY OF FREE THYROXINE: CPT | Performed by: FAMILY MEDICINE

## 2021-03-10 PROCEDURE — 84165 PROTEIN E-PHORESIS SERUM: CPT | Performed by: PATHOLOGY

## 2021-03-10 PROCEDURE — 84165 PROTEIN E-PHORESIS SERUM: CPT | Performed by: FAMILY MEDICINE

## 2021-03-11 ENCOUNTER — TELEPHONE (OUTPATIENT)
Dept: FAMILY MEDICINE CLINIC | Facility: CLINIC | Age: 86
End: 2021-03-11

## 2021-03-11 DIAGNOSIS — E03.9 ACQUIRED HYPOTHYROIDISM: ICD-10-CM

## 2021-03-11 DIAGNOSIS — E87.6 HYPOKALEMIA: Primary | ICD-10-CM

## 2021-03-11 LAB
ALBUMIN SERPL ELPH-MCNC: 3.87 G/DL (ref 3.5–5)
ALBUMIN SERPL ELPH-MCNC: 56.9 % (ref 52–65)
ALPHA1 GLOB SERPL ELPH-MCNC: 0.39 G/DL (ref 0.1–0.4)
ALPHA1 GLOB SERPL ELPH-MCNC: 5.7 % (ref 2.5–5)
ALPHA2 GLOB SERPL ELPH-MCNC: 0.63 G/DL (ref 0.4–1.2)
ALPHA2 GLOB SERPL ELPH-MCNC: 9.2 % (ref 7–13)
BETA GLOB ABNORMAL SERPL ELPH-MCNC: 0.38 G/DL (ref 0.4–0.8)
BETA1 GLOB SERPL ELPH-MCNC: 5.6 % (ref 5–13)
BETA2 GLOB SERPL ELPH-MCNC: 5.5 % (ref 2–8)
BETA2+GAMMA GLOB SERPL ELPH-MCNC: 0.37 G/DL (ref 0.2–0.5)
GAMMA GLOB ABNORMAL SERPL ELPH-MCNC: 1.16 G/DL (ref 0.5–1.6)
GAMMA GLOB SERPL ELPH-MCNC: 17.1 % (ref 12–22)
IGG/ALB SER: 1.32 {RATIO} (ref 1.1–1.8)
PROT PATTERN SERPL ELPH-IMP: ABNORMAL
PROT SERPL-MCNC: 6.8 G/DL (ref 6.4–8.2)

## 2021-03-11 NOTE — TELEPHONE ENCOUNTER
Call re labs- repeat potassium normal  Mildly low sodium likely secondary to diuretic and mildly underactive thyroid  I would change his dose of Levothyroxine to 75 mcg daily M-Fridays and 1 and 1/2 tablets on Saturdays and Sundays  Continue with the same dose of diuretic for now   Repeat BMP and TSH in one month     Recent Results (from the past 168 hour(s))   TSH, 3rd generation with Free T4 reflex    Collection Time: 03/10/21 12:45 PM   Result Value Ref Range    TSH 3RD GENERATON 4 620 (H) 0 358 - 3 740 uIU/mL   Basic metabolic panel    Collection Time: 03/10/21 12:45 PM   Result Value Ref Range    Sodium 132 (L) 136 - 145 mmol/L    Potassium 4 5 3 5 - 5 3 mmol/L    Chloride 100 100 - 108 mmol/L    CO2 29 21 - 32 mmol/L    ANION GAP 3 (L) 4 - 13 mmol/L    BUN 14 5 - 25 mg/dL    Creatinine 0 77 0 60 - 1 30 mg/dL    Glucose 76 65 - 140 mg/dL    Calcium 8 8 8 3 - 10 1 mg/dL    eGFR 82 ml/min/1 73sq m   T4, free    Collection Time: 03/10/21 12:45 PM   Result Value Ref Range    Free T4 1 22 0 76 - 1 46 ng/dL         Current Outpatient Medications:     acetaminophen (TYLENOL) 325 mg tablet, Take 3 tablets (975 mg total) by mouth 3 (three) times a day, Disp: , Rfl:     aspirin 81 MG tablet, Take 81 mg by mouth daily , Disp: , Rfl:     Cholecalciferol (HM VITAMIN D3) 2000 units CAPS, Take 1 tablet by mouth daily, Disp: , Rfl:     furosemide (LASIX) 20 mg tablet, TAKE 1 TABLET(20 MG) BY MOUTH DAILY, Disp: 90 tablet, Rfl: 1    hydrocortisone 2 5 % cream, RENA TO POSTERIOR EARS AND ARMS ONCE A DAY PRN FOR 1-2 WEEKS, Disp: , Rfl: 1    levothyroxine 75 mcg tablet, TAKE 1 TABLET(75 MCG) BY MOUTH DAILY, Disp: 90 tablet, Rfl: 3    metoprolol succinate (TOPROL-XL) 25 mg 24 hr tablet, TAKE 1/2 TABLET BY MOUTH DAILY, Disp: 45 tablet, Rfl: 3    oxyCODONE (ROXICODONE) 5 mg immediate release tablet, Take 2 5 mg by mouth 2 (two) times a day as needed for severe pain, Disp: , Rfl:     pantoprazole (PROTONIX) 20 mg tablet, Take 20 mg by mouth daily, Disp: , Rfl:     potassium chloride (K-DUR,KLOR-CON) 10 mEq tablet, Take 1 tablet (10 mEq total) by mouth daily, Disp: 90 tablet, Rfl: 1    simvastatin (ZOCOR) 20 mg tablet, TAKE 1 TABLET(20 MG) BY MOUTH DAILY AT BEDTIME, Disp: 90 tablet, Rfl: 1    SPIRIVA RESPIMAT 2 5 MCG/ACT AERS inhaler, INHALE 2 PUFFS BY MOUTH DAILY, Disp: 12 g, Rfl: 6

## 2021-03-11 NOTE — TELEPHONE ENCOUNTER
Spoke with patients wife  Relayed message as written  She understands the dosing change  Please make sure repeat labs are in chart

## 2021-03-17 ENCOUNTER — OFFICE VISIT (OUTPATIENT)
Dept: CARDIOLOGY CLINIC | Facility: CLINIC | Age: 86
End: 2021-03-17
Payer: MEDICARE

## 2021-03-17 VITALS
BODY MASS INDEX: 26.45 KG/M2 | WEIGHT: 149.3 LBS | HEART RATE: 72 BPM | DIASTOLIC BLOOD PRESSURE: 42 MMHG | HEIGHT: 63 IN | SYSTOLIC BLOOD PRESSURE: 100 MMHG

## 2021-03-17 DIAGNOSIS — I35.0 SEVERE AORTIC STENOSIS: ICD-10-CM

## 2021-03-17 DIAGNOSIS — J43.9 PULMONARY EMPHYSEMA, UNSPECIFIED EMPHYSEMA TYPE (HCC): ICD-10-CM

## 2021-03-17 DIAGNOSIS — E78.2 MIXED HYPERLIPIDEMIA: ICD-10-CM

## 2021-03-17 DIAGNOSIS — I10 ESSENTIAL HYPERTENSION: ICD-10-CM

## 2021-03-17 DIAGNOSIS — I50.32 CHRONIC DIASTOLIC CONGESTIVE HEART FAILURE (HCC): ICD-10-CM

## 2021-03-17 DIAGNOSIS — Z95.2 S/P TAVR (TRANSCATHETER AORTIC VALVE REPLACEMENT): ICD-10-CM

## 2021-03-17 DIAGNOSIS — I48.0 PAF (PAROXYSMAL ATRIAL FIBRILLATION) (HCC): Primary | ICD-10-CM

## 2021-03-17 DIAGNOSIS — I25.118 ATHEROSCLEROTIC HEART DISEASE OF NATIVE CORONARY ARTERY WITH OTHER FORMS OF ANGINA PECTORIS (HCC): ICD-10-CM

## 2021-03-17 PROCEDURE — 99214 OFFICE O/P EST MOD 30 MIN: CPT | Performed by: INTERNAL MEDICINE

## 2021-03-17 PROCEDURE — 93000 ELECTROCARDIOGRAM COMPLETE: CPT | Performed by: INTERNAL MEDICINE

## 2021-03-17 NOTE — PROGRESS NOTES
Cardiology   Derek Mata 80 y o  male MRN: [id number]         Impression:  1  Coronary artery disease - stable    2  Paroxysmal atrial fibrillation - in normal sinus rhythm    3  Vasovagal syndrome - stable    4  Dyslipidemia - on statin    5  Aortic stenosis s/p TAVR 10/20 - doing well  No further chest pain or lightheadedness  6  Peripheral arterial disease - stable    7  Dyspnea - stable  From pulmonary etiology       Recommendations:  1  Continue current medications    2  Follow up in 4 months  HPI: Derek Mata is a 80y o  year old male with coronary artery disease, severe aortic stenosis s/p TAVR 10/20, and paroxysmal atrial fibrillation returns for follow up  Admitted to hospital 1/21 with urosepsis  No chest pain, but does have significant dyspnea  No palpitations  Review of Systems   Constitutional: Negative  HENT: Negative  Eyes: Negative  Respiratory: Positive for shortness of breath  Negative for chest tightness  Cardiovascular: Negative for chest pain, palpitations and leg swelling  Gastrointestinal: Negative  Endocrine: Negative  Genitourinary: Negative  Musculoskeletal: Positive for gait problem  Skin: Negative  Allergic/Immunologic: Negative  Hematological: Negative  Psychiatric/Behavioral: Negative  All other systems reviewed and are negative          Past Medical History:   Diagnosis Date    Anemia     [facility]     Cancer [facility]     bladder    Carotid artery occlusion     Cataract     Colon polyp     COPD (chronic obstructive pulmonary disease) (HCC)     Coronary artery disease     Disease of thyroid gland     History of transfusion     Hyperlipidemia     Hypertension     Hypothyroidism 9/15/2017    Multiple pulmonary nodules     Peptic ulcer     Scoliosis     SIRS (systemic inflammatory response syndrome) ([facility] 12/19/2019    Transient cerebral ischemia     Tremors of nervous system      Past Surgical History:   Procedure Laterality Date   [facility] SURGERY      , ,     BUNIONECTOMY Left     Simple exostectomy (silver procedure)    CAROTID ENDARTERECTOMY Right 09/10/2008    Randy LEDBETTER MD    CATARACT EXTRACTION      CATARACT EXTRACTION, BILATERAL      CERVICAL DISCECTOMY  1969    COLONOSCOPY      CORONARY ARTERY BYPASS GRAFT  10/20/2010    x3 (LIMA-LAD, SVG-OM, SVG-RCA)    CYSTOSCOPY      ELBOW SURGERY Right 2012    FEMORAL ARTERY - TIBIAL ARTERY BYPASS GRAFT  2011    using reversed saphenous vein from right leg  Shannan Hein MD    AZ ECHO TRANSESOPHAG R-T 2D W/PRB IMG ACQUISJ I&R N/A 10/6/2020    Procedure: TRANSESOPHAGEAL ECHOCARDIOGRAM (DAVID); Surgeon: Yumiko Sutton DO;  [facility];  Service: Cardiac Surgery    AZ REPLACE AORTIC VALVE OPENFEMORAL ARTERY APPROACH N/A 10/6/2020    Procedure: REPLACEMENT AORTIC VALVE TRANSCATHETER (TAVR) TRANSFEMORAL W/ 26MM MONTALVO BREE S3 ULTRA VALVE(ACCESS ON LEFT); Surgeon: Yumiko Sutton DO;  [facility];  Service: Cardiac Surgery    REPLACEMENT TOTAL KNEE Left     SHOULDER SURGERY Right      Social History     Substance and Sexual Activity   Alcohol Use Not Currently    Alcohol/week: 0 0 standard drinks    Comment: Social      Social History     Substance and Sexual Activity   Drug Use Never     Social History     Tobacco Use   Smoking Status Former Smoker    Packs/day: 1 00    Years: 50 00    Pack years: 50 00    Types: Cigarettes    Start date:     Quit date: Bisi Yu Years since quittin 2   Smokeless Tobacco Never Used     Family History   Problem Relation Age of Onset    Cervical cancer Mother     Cervical cancer Sister     Heart disease Sister     Coronary artery disease Sister     Lung cancer Brother        Allergies: Allergies   Allergen Reactions    Aleve [Naproxen]      Other reaction(s): FACIAL SWELLING  Category: Allergy;  Annotation - [id number]: lip/eye edema    Ancef [Cefazolin] Anaphylaxis     Hypotension, rash, itching    Augmentin [Amoxicillin-Pot Clavulanate] Diarrhea and Vomiting       Medications:     Current Outpatient Medications:     acetaminophen (TYLENOL) 325 mg tablet, Take 3 tablets (975 mg total) by mouth 3 (three) times a day, Disp: , Rfl:     aspirin 81 MG tablet, Take 81 mg by mouth daily , Disp: , Rfl:     Cholecalciferol (HM VITAMIN D3) 2000 units CAPS, Take 1 tablet by mouth daily, Disp: , Rfl:     furosemide (LASIX) 20 mg tablet, TAKE 1 TABLET(20 MG) BY MOUTH DAILY, Disp: 90 tablet, Rfl: 1    hydrocortisone 2 5 % cream, RENA TO POSTERIOR EARS AND ARMS ONCE A DAY PRN FOR 1-2 WEEKS, Disp: , Rfl: 1    levothyroxine 75 mcg tablet, TAKE 1 TABLET(75 MCG) BY MOUTH DAILY (Patient taking differently: Saturday and sunday take and extra 1/2 tab rest of week no changes ), Disp: 90 tablet, Rfl: 3    metoprolol succinate (TOPROL-XL) 25 mg 24 hr tablet, TAKE 1/2 TABLET BY MOUTH DAILY (Patient taking differently: TAKE 1/2 TABLET every 12 hous), Disp: 45 tablet, Rfl: 3    oxyCODONE (ROXICODONE) 5 mg immediate release tablet, Take 2 5 mg by mouth 2 (two) times a day as needed for severe pain, Disp: , Rfl:     pantoprazole (PROTONIX) 20 mg tablet, Take 20 mg by mouth daily, Disp: , Rfl:     potassium chloride (K-DUR,KLOR-CON) 10 mEq tablet, Take 1 tablet (10 mEq total) by mouth daily, Disp: 90 tablet, Rfl: 1    simvastatin (ZOCOR) 20 mg tablet, TAKE 1 TABLET(20 MG) BY MOUTH DAILY AT BEDTIME, Disp: 90 tablet, Rfl: 1    SPIRIVA RESPIMAT 2 5 MCG/ACT AERS inhaler, INHALE 2 PUFFS BY MOUTH DAILY, Disp: 12 g, Rfl: 6      Wt Readings from Last 3 Encounters:   03/17/21 67 7 kg (149 lb 4 8 oz)   02/24/21 67 1 kg (148 lb)   01/26/21 65 8 kg (145 lb)     Temp Readings from Last 3 Encounters:   02/24/21 (!) 97 °F (36 1 °C)   02/06/21 98 2 °F (36 8 °C) (Oral)   12/10/20 (!) 96 7 °F (35 9 °C)     BP Readings from Last 3 Encounters:   03/17/21 (!) 100/42 02/24/21 118/62   02/06/21 109/55     Pulse Readings from Last 3 Encounters:   03/17/21 72   02/24/21 88   02/06/21 64         Physical Exam  HENT:      Head: Atraumatic  Mouth/Throat:      Mouth: Mucous membranes are moist    Eyes:      Extraocular Movements: Extraocular movements intact  Neck:      Musculoskeletal: Normal range of motion  Cardiovascular:      Rate and Rhythm: Normal rate and regular rhythm  Heart sounds: Normal heart sounds  Pulmonary:      Breath sounds: Normal breath sounds  Abdominal:      General: Abdomen is flat  Musculoskeletal: Normal range of motion  Skin:     General: Skin is warm  Neurological:      General: No focal deficit present  Mental Status: He is alert and oriented to person, place, and time     Psychiatric:         Mood and Affect: Mood normal          Behavior: Behavior normal            Laboratory Studies:  CMP:  Lab Results   Component Value Date     06/25/2015    K 4 5 03/10/2021     03/10/2021    CO2 29 03/10/2021    ANIONGAP 7 06/25/2015    BUN 14 03/10/2021    CREATININE 0 77 03/10/2021    GLUCOSE 95 10/06/2020    AST 82 (H) 01/30/2021    ALT 37 01/30/2021    BILITOT 0 47 09/15/2015    EGFR 82 03/10/2021       Lipid Profile:   Lab Results   Component Value Date    CHOL 121 06/25/2015     Lab Results   Component Value Date    HDL 41 10/21/2020     Lab Results   Component Value Date    LDLCALC 43 10/21/2020     Lab Results   Component Value Date    TRIG 60 10/21/2020       Cardiac testing:   EKG reviewed personally: NSR 72 1st deg AV block RBBB  Results for orders placed during the hospital encounter of 11/04/20   Echo complete with contrast if indicated    Narrative [address]  [phone]    Transthoracic Echocardiogram  2D, M-mode, Doppler, and Color Doppler    Study date:  04-Nov-2020    Patient: Corky Millan  MR number: [id number]  Account number: [id number]  : 1933  Age: 80 years  Gender: Male  Status: Outpatient  Location: Echo lab  Height: 67 in  Weight: 147 lb  BP: 110/ 50 mmHg    Indications: S/P 30 day TAVR- 26mm ES3    Diagnoses: I35 9 - Nonrheumatic aortic valve disorder, unspecified    Sonographer:  Eleonora Caceres BS, RDCS  Primary Physician:  Dominga Newell MD  Referring Physician:  MANI Mims  Group:  [facility]  Interpreting Physician:  Elijah Viveros MD    SUMMARY    LEFT VENTRICLE:  Systolic function was normal  Ejection fraction was estimated to be 60 %  There were no regional wall motion abnormalities  Wall thickness was mildly increased  There was mild concentric hypertrophy  Features were consistent with a pseudonormal left ventricular filling pattern, with concomitant abnormal relaxation and increased filling pressure (grade 2 diastolic dysfunction)  RIGHT VENTRICLE:  The size was normal   Systolic function was normal     LEFT ATRIUM:  The atrium was mildly dilated  MITRAL VALVE:  There was trace regurgitation  AORTIC VALVE:  A #26 Braxton Jeffrey TAVR bioprosthesis was present  It exhibited normal function  There was mild paravalular regurgitation  The peak valve velocity was 168 cm/s  The mean valve velocity was 115 cm/s  Valve peak gradient was 11 mmHg  Valve mean gradient was 6 mmHg  Estimated aortic valve area (by VTI) was 1 87 cmï¾²  TRICUSPID VALVE:  There was mild regurgitation  Estimated peak PA pressure was 48 mmHg  PULMONIC VALVE:  There was mild regurgitation  AORTA:  There was mild dilatation of the ascending aorta  HISTORY: PRIOR HISTORY: Aortic stenosis S/P 30 day TAVR-26mm ES3; Pulmonary emphysema; PAF; CAD; Hyperlipidemia; Hypertension; AAA; Chest pain; Hypothyroid; Parkinsons Disease    PROCEDURE: The procedure was performed in the echo lab  This was a routine study  The transthoracic approach was used   The study included complete 2D imaging, M-mode, complete spectral Doppler, and color Doppler  The heart rate was 70 bpm,  at the start of the study  Images were obtained from the parasternal, apical, subcostal, and suprasternal notch acoustic windows  Image quality was adequate  LEFT VENTRICLE: Size was normal  Systolic function was normal  Ejection fraction was estimated to be 60 %  There were no regional wall motion abnormalities  Wall thickness was mildly increased  There was mild concentric hypertrophy  DOPPLER: Features were consistent with a pseudonormal left ventricular filling pattern, with concomitant abnormal relaxation and increased filling pressure (grade 2 diastolic dysfunction)  RIGHT VENTRICLE: The size was normal  Systolic function was normal  Wall thickness was normal     LEFT ATRIUM: The atrium was mildly dilated  RIGHT ATRIUM: Size was normal     MITRAL VALVE: Valve structure was normal  There was normal leaflet separation  DOPPLER: The transmitral velocity was within the normal range  There was no evidence for stenosis  There was trace regurgitation  AORTIC VALVE: A #26 Braxton Jeffrey TAVR bioprosthesis was present  It exhibited normal function  There was mild paravalular regurgitation  TRICUSPID VALVE: The valve structure was normal  There was normal leaflet separation  DOPPLER: The transtricuspid velocity was within the normal range  There was no evidence for stenosis  There was mild regurgitation  Estimated peak PA  pressure was 48 mmHg  PULMONIC VALVE: Leaflets exhibited normal thickness, no calcification, and normal cuspal separation  DOPPLER: The transpulmonic velocity was within the normal range  There was mild regurgitation  PERICARDIUM: There was no pericardial effusion  AORTA: There was mild dilatation of the ascending aorta  SYSTEMIC VEINS: IVC: The inferior vena cava was normal in size      MEASUREMENT TABLES    2D MEASUREMENTS  LVOT   (Reference normals)  Diam   21 mm   (--)    DOPPLER MEASUREMENTS  LVOT   (Reference normals)  Peak thai   83 cm/s   (--)  Mean thai   51 cm/s   (--)  VTI   19 cm   (--)  Stroke vol   65 81 ml   (--)  Stroke index   0 45 ml/mï¾²   (--)  Aortic valve   (Reference normals)  Peak thai   168 cm/s   (--)  Mean thai   115 cm/s   (--)  VTI   35 cm   (--)  Peak gradient   11 mmHg   (--)  Mean gradient   6 mmHg   (--)  Obstr index, VTI   0 54    (--)  Valve area, VTI   1 87 cmï¾²   (--)  Area index, VTI   1 05 cmï¾²/mï¾²   (--)  Obstr index, Vmax   0 49    (--)  Valve area, Vmax   1 7 cmï¾²   (--)  Area index, Vmax   0 96 cmï¾²/mï¾²   (--)  Obstr index, Vmean   0 44    (--)  Valve area, Vmean   1 52 cmï¾²   (--)  Area index, Vmean   0 85 cmï¾²/mï¾²   (--)    SYSTEM MEASUREMENT TABLES    2D  %FS: 34 42 %  %FS: 34 42 %  Ao Diam: 3 13 cm  Ao Diam: 3 13 cm  Ao asc: 3 85 cm  Ao asc: 3 85 cm  EDV(Teich): 89 43 ml  EDV(Teich): 89 43 ml  EF(Teich): 63 69 %  EF(Teich): 63 69 %  ESV(Teich): 32 48 ml  ESV(Teich): 32 48 ml  IVSd: 1 19 cm  IVSd: 1 19 cm  LA Area: 13 35 cm2  LA Area: 13 35 cm2  LA Diam: 4 56 cm  LA Diam: 4 56 cm  LVEDV MOD A4C: 86 4 ml  LVEDV MOD A4C: 86 4 ml  LVEF MOD A4C: 67 03 %  LVEF MOD A4C: 67 03 %  LVESV MOD A4C: 28 49 ml  LVESV MOD A4C: 28 49 ml  LVIDd: 4 44 cm  LVIDd: 4 44 cm  LVIDs: 2 91 cm  LVIDs: 2 91 cm  LVLd A4C: 8 56 cm  LVLd A4C: 8 56 cm  LVLs A4C: 6 8 cm  LVLs A4C: 6 8 cm  LVOT Diam: 2 12 cm  LVPWd: 1 23 cm  LVPWd: 1 23 cm  RA Area: 21 85 cm2  RA Area: 21 85 cm2  RVIDd: 3 97 cm  RVIDd: 3 97 cm  SV MOD A4C: 57 91 ml  SV MOD A4C: 57 91 ml  SV(Teich): 56 96 ml  SV(Teich): 56 96 ml    CW  AV Env  Ti: 319 7 ms  AV Env  Ti: 319 7 ms  AV VTI: 38 98 cm  AV VTI: 38 98 cm  AV Vmax: 1 74 m/s  AV Vmax: 1 74 m/s  AV Vmean: 1 22 m/s  AV Vmean: 1 22 m/s  AV maxP 07 mmHg  AV maxP 07 mmHg  AV meanP 54 mmHg  AV meanP 54 mmHg  TR Vmax: 3 24 m/s  TR Vmax: 3 24 m/s  TR maxP 97 mmHg  TR maxP 97 mmHg    MM  TAPSE: 1 37 cm  TAPSE: 1 37 cm    PW  OSMAR (VTI): 1 75 cm2  OSMAR Vmax: 1 64 cm2  DVI: 0 5  DVI: 0 5  E' Sept: 0 06 m/s  E' Sept: 0 06 m/s  E/E' Sept: 14 94  E/E' Sept:  94  LVOT Env  Ti: 369 17 ms  LVOT Env  Ti: 369 17 ms  LVOT VTI: 19 34 cm  LVOT VTI: 19 34 cm  LVOT Vmax: 0 81 m/s  LVOT Vmax: 0 81 m/s  LVOT Vmean: 0 52 m/s  LVOT Vmean: 0 52 m/s  LVOT maxP 6 mmHg  LVOT maxP 6 mmHg  LVOT meanP 28 mmHg  LVOT meanP 28 mmHg  LVSV Dopp: 68 22 ml  MV A [place]: 0 89 m/s  MV A [place]: 0 89 m/s  MV Dec [place]: 4 06 m/s2  MV Dec [place]: 4 06 m/s2  MV DecT: 206 95 ms  MV DecT: 206 95 ms  MV E [place]: 0 84 m/s  MV E [place]: 0 84 m/s  MV E/A Ratio: 0 95  MV E/A Ratio: 0 95  MV PHT: 60 01 ms  MV PHT: 60 01 ms  MVA By PHT: 3 67 cm2  MVA By PHT: 3 67 cm2    [employer] Accredited Echocardiography [facility]    Prepared and electronically signed by    Nik Walsh MD  Signed [id number] 13:13:29       No results found for this or any previous visit  No results found for this or any previous visit  No results found for this or any previous visit

## 2021-04-05 ENCOUNTER — TELEPHONE (OUTPATIENT)
Dept: UROLOGY | Facility: MEDICAL CENTER | Age: 86
End: 2021-04-05

## 2021-04-05 DIAGNOSIS — N32.89 BLADDER SPASMS: Primary | ICD-10-CM

## 2021-04-05 NOTE — TELEPHONE ENCOUNTER
Patient of Dr Jacey Silva seen in the [facility] office  Patient wife called and advised that the patient is having bladder spasms for the last week and they would like to know what they can do  She advised that the patient has advised that they have been increasing day by day  Please advise

## 2021-04-06 DIAGNOSIS — N32.89 BLADDER SPASMS: ICD-10-CM

## 2021-04-06 RX ORDER — OXYBUTYNIN CHLORIDE 5 MG/1
TABLET, EXTENDED RELEASE ORAL
Qty: 90 TABLET | OUTPATIENT
Start: 2021-04-06

## 2021-04-06 RX ORDER — OXYBUTYNIN CHLORIDE 5 MG/1
5 TABLET, EXTENDED RELEASE ORAL DAILY
Qty: 30 TABLET | Refills: 2 | Status: SHIPPED | OUTPATIENT
Start: 2021-04-06 | End: 2021-04-11

## 2021-04-06 NOTE — TELEPHONE ENCOUNTER
Prescription for oxybutynin 5 mg provided  If there is noted side effects of dry eye, dry mouth, constipation, dizziness or confusion patient is to notify the office    Please enforce the need to maintain adequate hydration upwards to 40 oz of water intake per day

## 2021-04-06 NOTE — TELEPHONE ENCOUNTER
Notified patient of oxybutynin  They will try it   They are interested in a Spt vs a chavis catheter   Scheduled they for an appointment to discuss SPT vs chavis indwelling

## 2021-04-06 NOTE — TELEPHONE ENCOUNTER
I need to make sure that the patient is able to tolerate this medication before I give him 90 with 3 refills    This is a trial dose of medication

## 2021-04-09 ENCOUNTER — TELEPHONE (OUTPATIENT)
Dept: PULMONOLOGY | Facility: CLINIC | Age: 86
End: 2021-04-09

## 2021-04-09 NOTE — TELEPHONE ENCOUNTER
Spoke with wife  She said Amado Lieberman has had increased SOB on exertion the past couple days  He is getting out of breath sooner and has to sit and rest   Song cough, wheeze, chest tightness, fevers and chills  He does not have oxygen  He only has Spiriva  Scheduled him for a sick visit on Wednesday

## 2021-04-11 ENCOUNTER — HOSPITAL ENCOUNTER (INPATIENT)
Facility: HOSPITAL | Age: 86
LOS: 7 days | Discharge: NURSING, REHAB OR LONG-TERM CARE | DRG: 196 | End: 2021-04-18
Attending: EMERGENCY MEDICINE | Admitting: INTERNAL MEDICINE
Payer: MEDICARE

## 2021-04-11 ENCOUNTER — APPOINTMENT (INPATIENT)
Dept: RADIOLOGY | Facility: HOSPITAL | Age: 86
DRG: 196 | End: 2021-04-11
Payer: MEDICARE

## 2021-04-11 ENCOUNTER — APPOINTMENT (EMERGENCY)
Dept: RADIOLOGY | Facility: HOSPITAL | Age: 86
DRG: 196 | End: 2021-04-11
Payer: MEDICARE

## 2021-04-11 DIAGNOSIS — J67.9 HYPERSENSITIVITY PNEUMONITIS (HCC): ICD-10-CM

## 2021-04-11 DIAGNOSIS — Z99.81 REQUIRES OXYGEN THERAPY: ICD-10-CM

## 2021-04-11 DIAGNOSIS — I10 HTN (HYPERTENSION): ICD-10-CM

## 2021-04-11 DIAGNOSIS — J43.9 PULMONARY EMPHYSEMA, UNSPECIFIED EMPHYSEMA TYPE (HCC): ICD-10-CM

## 2021-04-11 DIAGNOSIS — I50.9 HEART FAILURE (HCC): ICD-10-CM

## 2021-04-11 DIAGNOSIS — Z95.2 S/P TAVR (TRANSCATHETER AORTIC VALVE REPLACEMENT): ICD-10-CM

## 2021-04-11 DIAGNOSIS — R93.89 ABNORMAL CT OF THE CHEST: ICD-10-CM

## 2021-04-11 DIAGNOSIS — J96.01 ACUTE RESPIRATORY FAILURE WITH HYPOXIA (HCC): ICD-10-CM

## 2021-04-11 DIAGNOSIS — R06.00 DYSPNEA ON EXERTION: Primary | ICD-10-CM

## 2021-04-11 DIAGNOSIS — H61.20 IMPACTED CERUMEN, UNSPECIFIED LATERALITY: ICD-10-CM

## 2021-04-11 DIAGNOSIS — R06.02 SHORTNESS OF BREATH: ICD-10-CM

## 2021-04-11 PROBLEM — E87.8 HYPOCHLOREMIA: Status: RESOLVED | Noted: 2020-10-06 | Resolved: 2021-04-11

## 2021-04-11 LAB
ANION GAP SERPL CALCULATED.3IONS-SCNC: 5 MMOL/L (ref 4–13)
ATRIAL RATE: 90 BPM
BASOPHILS # BLD AUTO: 0.02 THOUSANDS/ΜL (ref 0–0.1)
BASOPHILS NFR BLD AUTO: 0 % (ref 0–1)
BUN SERPL-MCNC: 18 MG/DL (ref 5–25)
CALCIUM SERPL-MCNC: 8.4 MG/DL (ref 8.3–10.1)
CHLORIDE SERPL-SCNC: 105 MMOL/L (ref 100–108)
CO2 SERPL-SCNC: 26 MMOL/L (ref 21–32)
CREAT SERPL-MCNC: 0.88 MG/DL (ref 0.6–1.3)
EOSINOPHIL # BLD AUTO: 0.1 THOUSAND/ΜL (ref 0–0.61)
EOSINOPHIL NFR BLD AUTO: 2 % (ref 0–6)
ERYTHROCYTE [DISTWIDTH] IN BLOOD BY AUTOMATED COUNT: 14.4 % (ref 11.6–15.1)
FLUAV RNA RESP QL NAA+PROBE: NEGATIVE
FLUBV RNA RESP QL NAA+PROBE: NEGATIVE
GFR SERPL CREATININE-BSD FRML MDRD: 77 ML/MIN/1.73SQ M
GLUCOSE SERPL-MCNC: 105 MG/DL (ref 65–140)
HCT VFR BLD AUTO: 32.8 % (ref 36.5–49.3)
HGB BLD-MCNC: 10.9 G/DL (ref 12–17)
IMM GRANULOCYTES # BLD AUTO: 0.04 THOUSAND/UL (ref 0–0.2)
IMM GRANULOCYTES NFR BLD AUTO: 1 % (ref 0–2)
LYMPHOCYTES # BLD AUTO: 0.55 THOUSANDS/ΜL (ref 0.6–4.47)
LYMPHOCYTES NFR BLD AUTO: 8 % (ref 14–44)
MCH RBC QN AUTO: 31.8 PG (ref 26.8–34.3)
MCHC RBC AUTO-ENTMCNC: 33.2 G/DL (ref 31.4–37.4)
MCV RBC AUTO: 96 FL (ref 82–98)
MONOCYTES # BLD AUTO: 1.26 THOUSAND/ΜL (ref 0.17–1.22)
MONOCYTES NFR BLD AUTO: 18 % (ref 4–12)
NEUTROPHILS # BLD AUTO: 4.92 THOUSANDS/ΜL (ref 1.85–7.62)
NEUTS SEG NFR BLD AUTO: 71 % (ref 43–75)
NRBC BLD AUTO-RTO: 0 /100 WBCS
NT-PROBNP SERPL-MCNC: 1603 PG/ML
P AXIS: 71 DEGREES
PLATELET # BLD AUTO: 130 THOUSANDS/UL (ref 149–390)
PLATELET # BLD AUTO: 143 THOUSANDS/UL (ref 149–390)
PMV BLD AUTO: 10.4 FL (ref 8.9–12.7)
PMV BLD AUTO: 11.1 FL (ref 8.9–12.7)
POTASSIUM SERPL-SCNC: 3.9 MMOL/L (ref 3.5–5.3)
PR INTERVAL: 228 MS
PROCALCITONIN SERPL-MCNC: <0.05 NG/ML
QRS AXIS: 60 DEGREES
QRSD INTERVAL: 142 MS
QT INTERVAL: 408 MS
QTC INTERVAL: 499 MS
RBC # BLD AUTO: 3.43 MILLION/UL (ref 3.88–5.62)
RSV RNA RESP QL NAA+PROBE: NEGATIVE
SARS-COV-2 RNA RESP QL NAA+PROBE: NEGATIVE
SODIUM SERPL-SCNC: 136 MMOL/L (ref 136–145)
T WAVE AXIS: 37 DEGREES
TROPONIN I SERPL-MCNC: <0.02 NG/ML
VENTRICULAR RATE: 90 BPM
WBC # BLD AUTO: 6.89 THOUSAND/UL (ref 4.31–10.16)

## 2021-04-11 PROCEDURE — 83880 ASSAY OF NATRIURETIC PEPTIDE: CPT | Performed by: EMERGENCY MEDICINE

## 2021-04-11 PROCEDURE — G1004 CDSM NDSC: HCPCS

## 2021-04-11 PROCEDURE — 87205 SMEAR GRAM STAIN: CPT | Performed by: FAMILY MEDICINE

## 2021-04-11 PROCEDURE — 99223 1ST HOSP IP/OBS HIGH 75: CPT | Performed by: FAMILY MEDICINE

## 2021-04-11 PROCEDURE — 71045 X-RAY EXAM CHEST 1 VIEW: CPT

## 2021-04-11 PROCEDURE — 93005 ELECTROCARDIOGRAM TRACING: CPT

## 2021-04-11 PROCEDURE — 93010 ELECTROCARDIOGRAM REPORT: CPT | Performed by: INTERNAL MEDICINE

## 2021-04-11 PROCEDURE — 99285 EMERGENCY DEPT VISIT HI MDM: CPT

## 2021-04-11 PROCEDURE — 71250 CT THORAX DX C-: CPT

## 2021-04-11 PROCEDURE — 87081 CULTURE SCREEN ONLY: CPT | Performed by: FAMILY MEDICINE

## 2021-04-11 PROCEDURE — 0241U HB NFCT DS VIR RESP RNA 4 TRGT: CPT | Performed by: EMERGENCY MEDICINE

## 2021-04-11 PROCEDURE — 99285 EMERGENCY DEPT VISIT HI MDM: CPT | Performed by: EMERGENCY MEDICINE

## 2021-04-11 PROCEDURE — 84484 ASSAY OF TROPONIN QUANT: CPT | Performed by: EMERGENCY MEDICINE

## 2021-04-11 PROCEDURE — 85025 COMPLETE CBC W/AUTO DIFF WBC: CPT | Performed by: EMERGENCY MEDICINE

## 2021-04-11 PROCEDURE — 80048 BASIC METABOLIC PNL TOTAL CA: CPT | Performed by: EMERGENCY MEDICINE

## 2021-04-11 PROCEDURE — 84145 PROCALCITONIN (PCT): CPT | Performed by: FAMILY MEDICINE

## 2021-04-11 PROCEDURE — 84484 ASSAY OF TROPONIN QUANT: CPT | Performed by: FAMILY MEDICINE

## 2021-04-11 PROCEDURE — 85049 AUTOMATED PLATELET COUNT: CPT | Performed by: FAMILY MEDICINE

## 2021-04-11 PROCEDURE — 36415 COLL VENOUS BLD VENIPUNCTURE: CPT | Performed by: EMERGENCY MEDICINE

## 2021-04-11 PROCEDURE — 87449 NOS EACH ORGANISM AG IA: CPT | Performed by: FAMILY MEDICINE

## 2021-04-11 PROCEDURE — 87070 CULTURE OTHR SPECIMN AEROBIC: CPT | Performed by: FAMILY MEDICINE

## 2021-04-11 RX ORDER — ALFUZOSIN HYDROCHLORIDE 10 MG/1
10 TABLET, EXTENDED RELEASE ORAL DAILY
COMMUNITY
End: 2021-04-19

## 2021-04-11 RX ORDER — PANTOPRAZOLE SODIUM 40 MG/1
40 TABLET, DELAYED RELEASE ORAL
Status: DISCONTINUED | OUTPATIENT
Start: 2021-04-12 | End: 2021-04-18 | Stop reason: HOSPADM

## 2021-04-11 RX ORDER — ACETAMINOPHEN 325 MG/1
650 TABLET ORAL EVERY 6 HOURS PRN
Status: DISCONTINUED | OUTPATIENT
Start: 2021-04-11 | End: 2021-04-18 | Stop reason: HOSPADM

## 2021-04-11 RX ORDER — FUROSEMIDE 10 MG/ML
40 INJECTION INTRAMUSCULAR; INTRAVENOUS
Status: DISCONTINUED | OUTPATIENT
Start: 2021-04-11 | End: 2021-04-12

## 2021-04-11 RX ORDER — GUAIFENESIN 600 MG
600 TABLET, EXTENDED RELEASE 12 HR ORAL EVERY 12 HOURS SCHEDULED
Status: DISCONTINUED | OUTPATIENT
Start: 2021-04-11 | End: 2021-04-18 | Stop reason: HOSPADM

## 2021-04-11 RX ORDER — ONDANSETRON 2 MG/ML
4 INJECTION INTRAMUSCULAR; INTRAVENOUS EVERY 6 HOURS PRN
Status: DISCONTINUED | OUTPATIENT
Start: 2021-04-11 | End: 2021-04-18 | Stop reason: HOSPADM

## 2021-04-11 RX ORDER — POTASSIUM CHLORIDE 750 MG/1
10 TABLET, EXTENDED RELEASE ORAL DAILY
Status: DISCONTINUED | OUTPATIENT
Start: 2021-04-11 | End: 2021-04-18 | Stop reason: HOSPADM

## 2021-04-11 RX ORDER — MULTIVITAMIN
1 CAPSULE ORAL DAILY
COMMUNITY
End: 2021-09-25 | Stop reason: HOSPADM

## 2021-04-11 RX ORDER — PRAVASTATIN SODIUM 20 MG
20 TABLET ORAL
Status: DISCONTINUED | OUTPATIENT
Start: 2021-04-11 | End: 2021-04-18 | Stop reason: HOSPADM

## 2021-04-11 RX ORDER — METOPROLOL SUCCINATE 25 MG/1
12.5 TABLET, EXTENDED RELEASE ORAL 2 TIMES DAILY
Status: DISCONTINUED | OUTPATIENT
Start: 2021-04-11 | End: 2021-04-14

## 2021-04-11 RX ORDER — SENNOSIDES 8.8 MG/5ML
8.8 LIQUID ORAL DAILY
Status: DISCONTINUED | OUTPATIENT
Start: 2021-04-11 | End: 2021-04-18 | Stop reason: HOSPADM

## 2021-04-11 RX ORDER — LEVOTHYROXINE SODIUM 0.07 MG/1
75 TABLET ORAL
Status: DISCONTINUED | OUTPATIENT
Start: 2021-04-12 | End: 2021-04-18 | Stop reason: HOSPADM

## 2021-04-11 RX ORDER — DOCUSATE SODIUM 100 MG/1
100 CAPSULE, LIQUID FILLED ORAL 2 TIMES DAILY
Status: DISCONTINUED | OUTPATIENT
Start: 2021-04-11 | End: 2021-04-18 | Stop reason: HOSPADM

## 2021-04-11 RX ORDER — BENZONATATE 100 MG/1
100 CAPSULE ORAL 3 TIMES DAILY PRN
Status: DISCONTINUED | OUTPATIENT
Start: 2021-04-11 | End: 2021-04-18 | Stop reason: HOSPADM

## 2021-04-11 RX ORDER — OMEPRAZOLE 20 MG/1
20 CAPSULE, DELAYED RELEASE ORAL DAILY
COMMUNITY
End: 2021-09-25 | Stop reason: HOSPADM

## 2021-04-11 RX ORDER — ASPIRIN 81 MG/1
81 TABLET, CHEWABLE ORAL DAILY
Status: DISCONTINUED | OUTPATIENT
Start: 2021-04-12 | End: 2021-04-13

## 2021-04-11 RX ORDER — POLYETHYLENE GLYCOL 3350 17 G/17G
17 POWDER, FOR SOLUTION ORAL DAILY
Status: DISCONTINUED | OUTPATIENT
Start: 2021-04-11 | End: 2021-04-18 | Stop reason: HOSPADM

## 2021-04-11 RX ORDER — LEVALBUTEROL 1.25 MG/.5ML
1.25 SOLUTION, CONCENTRATE RESPIRATORY (INHALATION) EVERY 8 HOURS PRN
Status: DISCONTINUED | OUTPATIENT
Start: 2021-04-11 | End: 2021-04-13

## 2021-04-11 RX ORDER — TAMSULOSIN HYDROCHLORIDE 0.4 MG/1
0.4 CAPSULE ORAL
Status: DISCONTINUED | OUTPATIENT
Start: 2021-04-11 | End: 2021-04-18 | Stop reason: HOSPADM

## 2021-04-11 RX ORDER — MELATONIN
1000 DAILY
Status: DISCONTINUED | OUTPATIENT
Start: 2021-04-11 | End: 2021-04-18 | Stop reason: HOSPADM

## 2021-04-11 RX ORDER — FUROSEMIDE 10 MG/ML
20 INJECTION INTRAMUSCULAR; INTRAVENOUS ONCE
Status: COMPLETED | OUTPATIENT
Start: 2021-04-11 | End: 2021-04-11

## 2021-04-11 RX ADMIN — TIOTROPIUM BROMIDE 18 MCG: 18 CAPSULE ORAL; RESPIRATORY (INHALATION) at 20:31

## 2021-04-11 RX ADMIN — Medication 1000 UNITS: at 16:57

## 2021-04-11 RX ADMIN — TAMSULOSIN HYDROCHLORIDE 0.4 MG: 0.4 CAPSULE ORAL at 16:53

## 2021-04-11 RX ADMIN — ENOXAPARIN SODIUM 40 MG: 40 INJECTION SUBCUTANEOUS at 16:53

## 2021-04-11 RX ADMIN — METOPROLOL SUCCINATE 12.5 MG: 25 TABLET, EXTENDED RELEASE ORAL at 20:26

## 2021-04-11 RX ADMIN — PRAVASTATIN SODIUM 20 MG: 20 TABLET ORAL at 16:53

## 2021-04-11 RX ADMIN — FUROSEMIDE 40 MG: 10 INJECTION, SOLUTION INTRAVENOUS at 16:54

## 2021-04-11 RX ADMIN — POTASSIUM CHLORIDE 10 MEQ: 750 TABLET, EXTENDED RELEASE ORAL at 16:53

## 2021-04-11 RX ADMIN — GUAIFENESIN 600 MG: 600 TABLET, EXTENDED RELEASE ORAL at 20:26

## 2021-04-11 RX ADMIN — CEFTRIAXONE SODIUM 1000 MG: 10 INJECTION, POWDER, FOR SOLUTION INTRAVENOUS at 22:29

## 2021-04-11 RX ADMIN — FUROSEMIDE 20 MG: 10 INJECTION, SOLUTION INTRAVENOUS at 14:15

## 2021-04-11 NOTE — ASSESSMENT & PLAN NOTE
· Patient came to the hospital with worsening shortness of breath    Patient required oxygen 2 L nasal cannula which is new   · Chest x-ray reviewed  · Likely secondary to CHF exacerbation  · Family reported that the patient has some cough and was spitting out blood tinged phlegm  · Will obtain a CT scan  · Monitor with diuresis  · No fever or significant leukocytosis  · Procalcitonin noted  · Currently monitor off of antibiotics under the CT scan is back

## 2021-04-11 NOTE — ASSESSMENT & PLAN NOTE
Wt Readings from Last 3 Encounters:   04/11/21 62 2 kg (137 lb 2 oz)   03/17/21 67 7 kg (149 lb 4 8 oz)   02/24/21 67 1 kg (148 lb)   Patient with acute on chronic diastolic congestive heart failure evidenced by lower extremity edema/worsening shortness of breath requiring oxygen  Chest x-ray reviewed  Patient denied any fever or chills any chills    IV Lasix  Will obtain a CT scan of the chest  Continue with the supplemental oxygen  Follow-up on the procalcitonin  Since the clinical suspicion of pneumonia is low we will monitor off of antibiotics  Consult Cardiology

## 2021-04-11 NOTE — ED PROVIDER NOTES
History  Chief Complaint   Patient presents with    Shortness of Breath     Patient reports sudden increasing in SOB for last 3-4 days  81 yo male presenting for approximately 3-4 days of increasing SOB  Denies recent illness, fever, sweats, chills, sore throat, chest pain, abdominal pain, nausea vomiting diarrhea constipation dysuria, hematuria  Patient states that he does not wear oxygen at home  Patient was reportedly found by EMS to be saturating approximately 88% on room air  Patient states he was vaccinated for coronavirus with both shots a while ago  Prior to Admission Medications   Prescriptions Last Dose Informant Patient Reported? Taking?    Cholecalciferol (HM VITAMIN D3) 2000 units CAPS 4/10/2021 at Unknown time Spouse/Significant Other Yes Yes   Sig: Take 1 tablet by mouth daily   Multiple Vitamin (multivitamin) capsule 4/10/2021 at Unknown time  Yes Yes   Sig: Take 1 capsule by mouth daily   SPIRIVA RESPIMAT 2 5 MCG/ACT AERS inhaler 4/10/2021 at Unknown time Spouse/Significant Other No Yes   Sig: INHALE 2 PUFFS BY MOUTH DAILY   acetaminophen (TYLENOL) 325 mg tablet 4/10/2021 at Unknown time Spouse/Significant Other No Yes   Sig: Take 3 tablets (975 mg total) by mouth 3 (three) times a day   alfuzosin (UROXATRAL) 10 mg 24 hr tablet 4/10/2021 at Unknown time  Yes Yes   Sig: Take 10 mg by mouth daily   aspirin 81 MG tablet 4/11/2021 at Unknown time Spouse/Significant Other Yes Yes   Sig: Take 81 mg by mouth daily    furosemide (LASIX) 20 mg tablet 4/10/2021 at Unknown time Spouse/Significant Other No Yes   Sig: TAKE 1 TABLET(20 MG) BY MOUTH DAILY   hydrocortisone 2 5 % cream Not Taking at Unknown time Spouse/Significant Other Yes No   Sig: RENA TO POSTERIOR EARS AND ARMS ONCE A DAY PRN FOR 1-2 WEEKS   levothyroxine 75 mcg tablet 4/11/2021 at Unknown time Spouse/Significant Other No Yes   Sig: TAKE 1 TABLET(75 MCG) BY MOUTH DAILY   metoprolol succinate (TOPROL-XL) 25 mg 24 hr tablet 4/10/2021 at Unknown time Spouse/Significant Other No Yes   Sig: TAKE 1/2 TABLET BY MOUTH DAILY   Patient taking differently: TAKE 1/2 TABLET every 12 hous   omeprazole (PriLOSEC) 20 mg delayed release capsule 4/11/2021 at Unknown time  Yes Yes   Sig: Take 20 mg by mouth daily   potassium chloride (K-DUR,KLOR-CON) 10 mEq tablet 4/10/2021 at Unknown time Spouse/Significant Other No Yes   Sig: Take 1 tablet (10 mEq total) by mouth daily   simvastatin (ZOCOR) 20 mg tablet 4/10/2021 at Unknown time Spouse/Significant Other No Yes   Sig: TAKE 1 TABLET(20 MG) BY MOUTH DAILY AT BEDTIME      Facility-Administered Medications: None       Past Medical History:   Diagnosis Date    Anemia     Bladder [place] ([place] [address] )     Cancer ([place] [address] )     bladder    Carotid artery occlusion     Cataract     Colon polyp     COPD (chronic obstructive pulmonary disease) ([place] [address] )     Coronary artery disease     Disease of thyroid gland     History of transfusion     Hyperlipidemia     Hypertension     Hypothyroidism 9/15/2017    Multiple pulmonary nodules     Peptic ulcer     Scoliosis     SIRS [facility]systemic inflammatory response syndrome) ([facility] ) 12/19/2019    Transient cerebral ischemia     Tremors of nervous system        Past Surgical History:   Procedure Laterality Date   [facility] SURGERY      1973, 1987, 2005    BUNIONECTOMY Left     Simple exostectomy (silver procedure)    CAROTID ENDARTERECTOMY Right 09/10/2008    Randy LEDBETTER MD    CATARACT EXTRACTION      CATARACT EXTRACTION, BILATERAL      CERVICAL DISCECTOMY  1969    COLONOSCOPY      CORONARY ARTERY BYPASS GRAFT  10/20/2010    x3 (LIMA-LAD, SVG-OM, SVG-RCA)    CYSTOSCOPY      ELBOW SURGERY Right 07/2012    FEMORAL ARTERY - TIBIAL ARTERY BYPASS GRAFT  12/05/2011    using reversed saphenous vein from right leg     Carley Koenig MD    KS ECHO TRANSESOPHAG R-T 2D W/PRB IMG ACQUISJ I&R N/A 10/6/2020    Procedure: TRANSESOPHAGEAL ECHOCARDIOGRAM (DAVID); Surgeon: Roverto Cazares DO;  [facility]: [facility];  Service: Cardiac Surgery    RI REPLACE AORTIC VALVE OPENFEMORAL ARTERY APPROACH N/A 10/6/2020    Procedure: REPLACEMENT AORTIC VALVE TRANSCATHETER (TAVR) TRANSFEMORAL W/ 26MM MONTALVO BREE S3 ULTRA VALVE(ACCESS ON LEFT); Surgeon: Roverto Cazares DO;  [facility];  Service: Cardiac Surgery    REPLACEMENT TOTAL KNEE Left     SHOULDER SURGERY Right 1997       Family History   Problem Relation Age of Onset    Cervical cancer Mother     Cervical cancer Sister     Heart disease Sister     Coronary artery disease Sister     Lung cancer Brother      I have reviewed and agree with the history as documented  E-Cigarette/Vaping    E-Cigarette Use Never User      E-Cigarette/Vaping Substances     Social History     Tobacco Use    Smoking status: Former Smoker     Packs/day: 1 00     Years: 50 00     Pack years: 50 00     Types: Cigarettes     Start date:      Quit date:      Years since quittin 2    Smokeless tobacco: Never Used   Substance Use Topics    Alcohol use: Not Currently     Alcohol/week: 0 0 standard drinks     Comment: Social     Drug use: Never        Review of Systems   Respiratory: Positive for cough and shortness of breath  Clear mucus with cough   All other systems reviewed and are negative        Physical Exam  ED Triage Vitals   Temperature Pulse Respirations Blood Pressure SpO2   21 1133 21 1133 21 1131 21 1133 21 1129   99 1 °F (37 3 °C) 92 18 135/62 (!) 88 %      Temp Source Heart Rate Source Patient Position - Orthostatic VS BP Location FiO2 (%)   21 1133 21 1131 21 1131 21 1131 --   Oral Monitor Lying Right arm       Pain Score       21 1445       No Pain             Orthostatic Vital Signs  Vitals:    21 1133 21 1200 21 1400 21 1452   BP: 135/62 130/63 122/59 120/67   Pulse: 92 84 82 81   Patient Position - Orthostatic VS: Lying          Physical Exam  Vitals signs and nursing note reviewed  Constitutional:       General: He is not in acute distress  Appearance: He is well-developed  He is not diaphoretic  HENT:      Head: Normocephalic and atraumatic  Right Ear: External ear normal       Left Ear: External ear normal       Nose: Nose normal    Eyes:      General: No scleral icterus  Right eye: No discharge  Left eye: No discharge  Conjunctiva/sclera: Conjunctivae normal    Neck:      Musculoskeletal: Normal range of motion and neck supple  Cardiovascular:      Rate and Rhythm: Normal rate and regular rhythm  Heart sounds: Normal heart sounds  No murmur  No friction rub  No gallop  Pulmonary:      Effort: Pulmonary effort is normal  No respiratory distress  Breath sounds: Examination of the right-lower field reveals rhonchi  Examination of the left-lower field reveals rhonchi  Rhonchi present  No wheezing or rales  Abdominal:      General: Bowel sounds are normal  There is no distension  Palpations: Abdomen is soft  There is no mass  Tenderness: There is no abdominal tenderness  There is no guarding  Musculoskeletal: Normal range of motion  General: No tenderness or deformity  Skin:     General: Skin is warm and dry  Coloration: Skin is not pale  Findings: No erythema or rash  Neurological:      Mental Status: He is alert and oriented to person, place, and time  Psychiatric:         Behavior: Behavior normal          Thought Content:  Thought content normal          Judgment: Judgment normal          ED Medications  Medications   tamsulosin (FLOMAX) capsule 0 4 mg (has no administration in time range)   aspirin chewable tablet 81 mg (has no administration in time range)   cholecalciferol (VITAMIN D3) tablet 1,000 Units (has no administration in time range)   pantoprazole (PROTONIX) EC tablet 40 mg (has no administration in time range) potassium chloride (K-DUR,KLOR-CON) CR tablet 10 mEq (has no administration in time range)   levothyroxine tablet 75 mcg (has no administration in time range)   metoprolol succinate (TOPROL-XL) 24 hr tablet 12 5 mg (has no administration in time range)   pravastatin (PRAVACHOL) tablet 20 mg (has no administration in time range)   tiotropium (SPIRIVA) capsule for inhaler 18 mcg (has no administration in time range)   acetaminophen (TYLENOL) tablet 650 mg (has no administration in time range)   docusate sodium (COLACE) capsule 100 mg (has no administration in time range)   ondansetron (ZOFRAN) injection 4 mg (has no administration in time range)   enoxaparin (LOVENOX) subcutaneous injection 40 mg (has no administration in time range)   senna oral syrup 8 8 mg (has no administration in time range)   polyethylene glycol (MIRALAX) packet 17 g (has no administration in time range)   furosemide (LASIX) injection 40 mg (has no administration in time range)   guaiFENesin (MUCINEX) 12 hr tablet 600 mg (has no administration in time range)   benzonatate (TESSALON PERLES) capsule 100 mg (has no administration in time range)   furosemide (LASIX) injection 20 mg (20 mg Intravenous Given 4/11/21 1415)       Diagnostic Studies  Results Reviewed     Procedure Component Value Units Date/Time    COVID19, Influenza A/B, RSV PCR, [facility] [[id number]]  (Normal) Collected: 04/11/21 1152    Lab Status: Final result Specimen: Nares from Nasopharyngeal Swab Updated: 04/11/21 1303     SARS-CoV-2 Negative     INFLUENZA A PCR Negative     INFLUENZA B PCR Negative     RSV PCR Negative    Narrative: This test has been authorized by FDA under an EUA (Emergency Use Assay) for use by authorized laboratories  Clinical caution and judgement should be used with the interpretation of these results with consideration of the clinical impression and other laboratory testing    Testing reported as "Positive" or "Negative" has been proven to be accurate according to standard laboratory validation requirements  All testing is performed with control materials showing appropriate reactivity at standard intervals      Basic metabolic panel [[id number]] Collected: 04/11/21 1152    Lab Status: Final result Specimen: Blood from Arm, Left Updated: 04/11/21 1227     Sodium 136 mmol/L      Potassium 3 9 mmol/L      Chloride 105 mmol/L      CO2 26 mmol/L      ANION GAP 5 mmol/L      BUN 18 mg/dL      Creatinine 0 88 mg/dL      Glucose 105 mg/dL      Calcium 8 4 mg/dL      eGFR 77 ml/min/1 73sq m     Narrative:      [place] guidelines for Chronic Kidney Disease (CKD):     Stage 1 with normal or high GFR (GFR > 90 mL/min/1 73 square meters)    Stage 2 Mild CKD (GFR = 60-89 mL/min/1 73 square meters)    Stage 3A Moderate CKD (GFR = 45-59 mL/min/1 73 square meters)    Stage 3B Moderate CKD (GFR = 30-44 mL/min/1 73 square meters)    Stage 4 Severe CKD (GFR = 15-29 mL/min/1 73 square meters)    Stage 5 End Stage CKD (GFR <15 mL/min/1 73 square meters)  Note: GFR calculation is accurate only with a steady state creatinine    NT-BNP PRO [[id number]]  (Abnormal) Collected: 04/11/21 1152    Lab Status: Final result Specimen: Blood from Arm, Left Updated: 04/11/21 1227     NT-proBNP 1,603 pg/mL     Troponin I [[id number]]  (Normal) Collected: 04/11/21 1152    Lab Status: Final result Specimen: Blood from Arm, Left Updated: 04/11/21 1226     Troponin I <0 02 ng/mL     CBC and differential [[id number]]  (Abnormal) Collected: 04/11/21 1152    Lab Status: Final result Specimen: Blood from Arm, Left Updated: 04/11/21 1205     WBC 6 89 Thousand/uL      RBC 3 43 Million/uL      Hemoglobin 10 9 g/dL      Hematocrit 32 8 %      MCV 96 fL      MCH 31 8 pg      MCHC 33 2 g/dL      RDW 14 4 %      MPV 11 1 fL      Platelets 153 Thousands/uL      nRBC 0 /100 WBCs      Neutrophils Relative 71 %      Immat GRANS % 1 %      Lymphocytes Relative 8 %      Monocytes Relative 18 %      Eosinophils Relative 2 %      Basophils Relative 0 %      Neutrophils Absolute 4 92 Thousands/µL      Immature Grans Absolute 0 04 Thousand/uL      Lymphocytes Absolute 0 55 Thousands/µL      Monocytes Absolute 1 26 Thousand/µL      Eosinophils Absolute 0 10 Thousand/µL      Basophils Absolute 0 02 Thousands/µL                  XR chest 1 view portable   Final Result by Josefina Pendleton MD (04/11 6584)      Diffuse groundglass opacities in the upper lobes bilaterally, new from January 2021, suspicious for pneumonia  Workstation performed: [id number]               Procedures  ECG 12 Lead Documentation Only    Date/Time: 4/11/2021 11:44 AM  Performed by: Darwin Conti DO  Authorized by: Darwin Conti DO     Indications / Diagnosis:  Increasing sob  ECG reviewed by me, the ED Provider: yes    Interpretation:     Interpretation: abnormal    Rate:     ECG rate:  90    ECG rate assessment: normal    Rhythm:     Rhythm: A-V block    Ectopy:     Ectopy: none    QRS:     QRS axis:  Normal    QRS intervals:   Wide  Conduction:     Conduction: abnormal      Abnormal conduction: complete RBBB    ST segments:     ST segments:  Abnormal    Depression:  V2 and V3  T waves:     T waves: inverted      Inverted:  V1, V2 and V3          ED Course  ED Course as of Apr 11 1551   Sun Apr 11, 2021   1226 Troponin I: <0 02   1230 NT-proBNP(!): 1,603   1315 SARS-COV-2: Negative             HEART Risk Score      Most Recent Value   Heart Score Risk Calculator   History  0 Filed at: 04/11/2021 1551   ECG  0 Filed at: 04/11/2021 1551   Age  2 Filed at: 04/11/2021 1551   Risk Factors  2 Filed at: 04/11/2021 1551   Troponin  0 Filed at: 04/11/2021 1551   HEART Score  4 Filed at: 04/11/2021 1551        Identification of Seniors at Risk      Most Recent Value   (ISAR) Identification of Seniors at Risk   Before the illness or injury that brought you to the Emergency, did you need someone to help you on a regular basis?  0 Filed at: 04/11/2021 1133   In the last 24 hours, have you needed more help than usual?  1 Filed at: 04/11/2021 1133   Have you been hospitalized for one or more nights during the past 6 months? 1 Filed at: 04/11/2021 1133   In general, do you see well?  0 Filed at: 04/11/2021 1133   In general, do you have serious problems with your memory? 0 Filed at: 04/11/2021 1133   Do you take more than three different medications every day? 1 Filed at: 04/11/2021 1133   ISAR Score  3 Filed at: 04/11/2021 1133                    SBIRT 20yo+      Most Recent Value   SBIRT (25 yo +)   In order to provide better care to our patients, we are screening all of our patients for alcohol and drug use  Would it be okay to ask you these screening questions? Yes Filed at: 04/11/2021 1137   Initial Alcohol Screen: US AUDIT-C    1  How often do you have a drink containing alcohol?  0 Filed at: 04/11/2021 1137   2  How many drinks containing alcohol do you have on a typical day you are drinking? 0 Filed at: 04/11/2021 1137   3a  Male UNDER 65: How often do you have five or more drinks on one occasion? 0 Filed at: 04/11/2021 1137   3b  FEMALE Any Age, or MALE 65+: How often do you have 4 or more drinks on one occassion? 0 Filed at: 04/11/2021 1137   Audit-C Score  0 Filed at: 04/11/2021 1137   DAVID: How many times in the past year have you    Used an illegal drug or used a prescription medication for non-medical reasons? Never Filed at: 04/11/2021 1137                MDM  Number of Diagnoses or Management Options  Dyspnea on exertion:   Heart failure [facility]):   Requires oxygen therapy:   Diagnosis management comments: Patient requiring nasal cannula oxygen to maintain saturations in the 90s  Will check CBC, BMP, EKG, troponin, chest x-ray, BNP        Disposition  Final diagnoses:   Dyspnea on exertion   Requires oxygen therapy   Heart failure ([facility] [address] )     Time reflects when diagnosis was documented in both MDM as applicable and the Disposition within this note     Time User Action Codes Description Comment    4/11/2021  1:34 PM Ivin Chew Add [R06 00] Dyspnea on exertion     4/11/2021  1:34 PM Ivin Chew Add [Z99 81] Requires oxygen therapy     4/11/2021  1:34 PM Ivin Chew Add [I50 9] Heart failure [facility])       ED Disposition     ED Disposition Condition Date/Time Comment    Admit Stable Sun Apr 11, 2021  1:34 PM Case was discussed with RANGEL and the patient's admission status was agreed to be Admission Status: inpatient status to the service of Dr Fanny Tam          Follow-up Information    None         Current Discharge Medication List      CONTINUE these medications which have NOT CHANGED    Details   acetaminophen (TYLENOL) 325 mg tablet Take 3 tablets (975 mg total) by mouth 3 (three) times a day    Associated Diagnoses: S/P TAVR (transcatheter aortic valve replacement)      alfuzosin (UROXATRAL) 10 mg 24 hr tablet Take 10 mg by mouth daily      aspirin 81 MG tablet Take 81 mg by mouth daily       Cholecalciferol (HM VITAMIN D3) 2000 units CAPS Take 1 tablet by mouth daily      furosemide (LASIX) 20 mg tablet TAKE 1 TABLET(20 MG) BY MOUTH DAILY  Qty: 90 tablet, Refills: 1    Associated Diagnoses: HTN (hypertension)      levothyroxine 75 mcg tablet TAKE 1 TABLET(75 MCG) BY MOUTH DAILY  Qty: 90 tablet, Refills: 3    Associated Diagnoses: Acquired hypothyroidism      metoprolol succinate (TOPROL-XL) 25 mg 24 hr tablet TAKE 1/2 TABLET BY MOUTH DAILY  Qty: 45 tablet, Refills: 3    Comments: **Patient requests 90 days supply**  Associated Diagnoses: S/P TAVR (transcatheter aortic valve replacement)      Multiple Vitamin (multivitamin) capsule Take 1 capsule by mouth daily      omeprazole (PriLOSEC) 20 mg delayed release capsule Take 20 mg by mouth daily      potassium chloride (K-DUR,KLOR-CON) 10 mEq tablet Take 1 tablet (10 mEq total) by mouth daily  Qty: 90 tablet, Refills: 1    Associated Diagnoses: Hypokalemia simvastatin (ZOCOR) 20 mg tablet TAKE 1 TABLET(20 MG) BY MOUTH DAILY AT BEDTIME  Qty: 90 tablet, Refills: 1    Associated Diagnoses: Dyslipidemia      SPIRIVA RESPIMAT 2 5 MCG/ACT AERS inhaler INHALE 2 PUFFS BY MOUTH DAILY  Qty: 12 g, Refills: 6    Associated Diagnoses: Chronic obstructive pulmonary disease, unspecified COPD type (HCC)      hydrocortisone 2 5 % cream RENA TO POSTERIOR EARS AND ARMS ONCE A DAY PRN FOR 1-2 WEEKS  Refills: 1           No discharge procedures on file  PDMP Review       Value Time User    PDMP Reviewed  Yes 2/8/2021 10:31 PM Keily Multani DO           ED Provider  Attending physically available and evaluated Sergio Jackson  PHIL managed the patient along with the ED Attending      Electronically Signed by         Gloria Cuevas DO  04/11/21 2916

## 2021-04-11 NOTE — H&P
Mamadou 1933, 80 y o  male MRN: [id number]  Unit/Bed#: [facility] 431-01 Encounter: [id number]  Primary Care Provider: Estefania Ambriz MD   Date and time admitted to hospital: 4/11/2021 11:25 AM    * Acute respiratory failure with hypoxia [facility])  Assessment & Plan  · Patient came to the hospital with worsening shortness of breath  Patient required oxygen 2 L nasal cannula which is new   · Chest x-ray reviewed  · Likely secondary to CHF exacerbation  · Family reported that the patient has some cough and was spitting out blood tinged phlegm  · Will obtain a CT scan  · Monitor with diuresis  · No fever or significant leukocytosis  · Procalcitonin noted  · Currently monitor off of antibiotics under the CT scan is back    Shortness of breath  Assessment & Plan  · Patient came to the hospital with shortness of breath that has been going on for the past 3 4 days  Patient with chronic bilateral lower extremity swelling which is slightly worse  · Currently patient requiring oxygen which is unusual for him  · Chest x-ray reviewed  Patient do not have any fever  Cough with expectoration present  Unlikely this is pneumonia given the duration of symptoms   No significant leukocytosis noted  · COVID negative  · Will obtain a CT scan to evaluate    Chronic diastolic congestive heart failure (HCC)  Assessment & Plan  Wt Readings from Last 3 Encounters:   04/11/21 62 2 kg (137 lb 2 oz)   03/17/21 67 7 kg (149 lb 4 8 oz)   02/24/21 67 1 kg (148 lb)   Patient with acute on chronic diastolic congestive heart failure evidenced by lower extremity edema/worsening shortness of breath requiring oxygen  Chest x-ray reviewed  Patient denied any fever or chills any chills    IV Lasix  Will obtain a CT scan of the chest  Continue with the supplemental oxygen  Follow-up on the procalcitonin  Since the clinical suspicion of pneumonia is low we will monitor off of antibiotics  Consult Cardiology            S/P TAVR (transcatheter aortic valve replacement)  Assessment & Plan  Cardiology follow up    Ambulatory dysfunction  Assessment & Plan  · PT OT evaluation    Abdominal aortic aneurysm (AAA) without rupture (HCC)  Assessment & Plan  · No abdominal pain  · Blood pressure control    Hypothyroidism  Assessment & Plan  · Continue with Synthroid    Hypertension  Assessment & Plan  · Monitor blood pressure    Hyperlipidemia  Assessment & Plan  · Continue with statin    PAF (paroxysmal atrial fibrillation) (HCC)  Assessment & Plan  · Rate controlled  · Noted on any anticoagulation likely secondary to fall risk    Severe aortic stenosis  Assessment & Plan  · Status post TAVR      VTE Prophylaxis: Enoxaparin (Lovenox)  / sequential compression device   Code Status: full code  POLST: POLST form is not discussed and not completed at this time  Discussion with family:  Daughter in the room    Anticipated Length of Stay:  Patient will be admitted on an Inpatient basis with an anticipated length of stay of  > 2 midnights  Justification for Hospital Stay:  Shortness of breath    Total Time for Visit, including Counseling / Coordination of Care: 70 minutes  Greater than 50% of this total time spent on direct patient counseling and coordination of care  Chief Complaint:   Shortness of breath    History of Present Illness:    Katey Ron is a 80 y o  male who presents with worsening shortness of breath  Patient with history of COPD and also diastolic heart failure came to the hospital with gradually worsening shortness of breath going on for the past 4-5 days  Patient denies any fever or change chills  Patient family reported that he has been having some cough and they noticed that he has blood-tinged sputum since yesterday and today     Patient with chronic lower extremity swelling and the family thinks that it is basically the same when compared to before    Patient was recently admitted to the hospital with the sepsis secondary to urinary tract infection in January of this year     When the EMS met the patient at home patient was saturating 88% on room  Noted to have elevated BNP  Patient improved with IV diuresis  Patient denies any chest pain  No abdominal symptoms  Patient reported that she gets occasional dizziness and lightheadedness  Patient reported that he feels like the room is spinning per history is most suggestive of vertigo  Patient reported that his lower extremity is always more swollen than the right since she he had injury to the left lower extremity    Review of Systems:    Review of Systems   Constitutional: Positive for activity change and fatigue  HENT: Negative  Respiratory: Positive for cough and shortness of breath  Hemoptysis   Cardiovascular: Positive for leg swelling  Gastrointestinal: Negative  Endocrine: Negative  Genitourinary: Negative  Musculoskeletal: Negative  Skin: Negative  Neurological: Positive for dizziness, weakness and light-headedness  Hematological: Negative  Psychiatric/Behavioral: Negative          Past Medical and Surgical History:     Past Medical History:   Diagnosis Date    Anemia     [facility]     [facility]     bladder    Carotid artery occlusion     Cataract     Colon polyp     COPD (chronic obstructive pulmonary disease) (HCC)     Coronary artery disease     Disease of thyroid gland     History of transfusion     Hyperlipidemia     Hypertension     Hypothyroidism 9/15/2017    Multiple pulmonary nodules     Peptic ulcer     Scoliosis     SIRS (systemic inflammatory response syndrome) ([facility] ) 12/19/2019    Transient cerebral ischemia     Tremors of nervous system        Past Surgical History:   Procedure Laterality Date   [facility] SURGERY      1973, 1987, 2005    BUNIONECTOMY Left     Simple exostectomy (silver procedure)    CAROTID ENDARTERECTOMY Right 09/10/2008 Randy LEDBETTER MD    CATARACT EXTRACTION      CATARACT EXTRACTION, BILATERAL      CERVICAL DISCECTOMY  1969    COLONOSCOPY      CORONARY ARTERY BYPASS GRAFT  10/20/2010    x3 (LIMA-LAD, SVG-OM, SVG-RCA)    CYSTOSCOPY      ELBOW SURGERY Right 07/2012    FEMORAL ARTERY - TIBIAL ARTERY BYPASS GRAFT  12/05/2011    using reversed saphenous vein from right leg  Milagro Greenwood MD    OH ECHO TRANSESOPHAG R-T 2D W/PRB IMG ACQUISJ I&R N/A 10/6/2020    Procedure: TRANSESOPHAGEAL ECHOCARDIOGRAM (DAVID); Surgeon: Kay Connor DO;  Location: [facility];  Service: Cardiac Surgery    OH REPLACE AORTIC VALVE OPENFEMORAL ARTERY APPROACH N/A 10/6/2020    Procedure: REPLACEMENT AORTIC VALVE TRANSCATHETER (TAVR) TRANSFEMORAL W/ 26MM MONTALVO BREE S3 ULTRA VALVE(ACCESS ON LEFT); Surgeon: Kay Connor DO;  Location: [facility];  Service: Cardiac Surgery    REPLACEMENT TOTAL KNEE Left     SHOULDER SURGERY Right 1997       Meds/Allergies:    Prior to Admission medications    Medication Sig Start Date End Date Taking?  Authorizing Provider   acetaminophen (TYLENOL) 325 mg tablet Take 3 tablets (975 mg total) by mouth 3 (three) times a day 2/8/21  Yes Annemarie Kim DO   alfuzosin (UROXATRAL) 10 mg 24 hr tablet Take 10 mg by mouth daily   Yes Historical Provider, MD   aspirin 81 MG tablet Take 81 mg by mouth daily  4/27/12  Yes Historical Provider, MD   Cholecalciferol (HM VITAMIN D3) 2000 units CAPS Take 1 tablet by mouth daily   Yes Historical Provider, MD   furosemide (LASIX) 20 mg tablet TAKE 1 TABLET(20 MG) BY MOUTH DAILY 8/10/20  Yes Farzad Rajan MD   levothyroxine 75 mcg tablet TAKE 1 TABLET(75 MCG) BY MOUTH DAILY 1/21/21  Yes Mariajose Castelan MD   metoprolol succinate (TOPROL-XL) 25 mg 24 hr tablet TAKE 1/2 TABLET BY MOUTH DAILY  Patient taking differently: TAKE 1/2 TABLET every 12 hous 10/28/20  Yes MANI Eric   Multiple Vitamin (multivitamin) capsule Take 1 capsule by mouth daily   Yes Historical Provider, MD   omeprazole (PriLOSEC) 20 mg delayed release capsule Take 20 mg by mouth daily   Yes Historical Provider, MD   potassium chloride (K-DUR,KLOR-CON) 10 mEq tablet Take 1 tablet (10 mEq total) by mouth daily 2/27/21 5/28/21 Yes Isabelle Hendricks MD   simvastatin (ZOCOR) 20 mg tablet TAKE 1 TABLET(20 MG) BY MOUTH DAILY AT BEDTIME 1/21/21  Yes Brianna Tang MD   SPIRIVA RESPIMAT 2 5 MCG/ACT AERS inhaler INHALE 2 PUFFS BY MOUTH DAILY 6/15/20  Yes Candace Brown PA-C   hydrocortisone 2 5 % cream RENA TO POSTERIOR EARS AND ARMS ONCE A DAY PRN FOR 1-2 WEEKS 11/5/19   Historical Provider, MD   oxybutynin (DITROPAN-XL) 5 mg 24 hr tablet Take 1 tablet (5 mg total) by mouth daily 4/6/21 4/11/21  MANI Alvarado   oxyCODONE (ROXICODONE) 5 mg immediate release tablet Take 2 5 mg by mouth 2 (two) times a day as needed for severe pain  4/11/21  Historical Provider, MD   pantoprazole (PROTONIX) 20 mg tablet Take 20 mg by mouth daily  4/11/21  Historical Provider, MD     I have reviewed home medications with a medical source (PCP, Pharmacy, other)  Allergies: Allergies   Allergen Reactions    Aleve [Naproxen]      Other reaction(s): FACIAL SWELLING  Category: Allergy;  Annotation - [id number]: lip/eye edema    Ancef [Cefazolin] Anaphylaxis     Hypotension, rash, itching    Augmentin [Amoxicillin-Pot Clavulanate] Diarrhea and Vomiting       Social History:     Marital Status: /Civil Union   Occupation: retired  Patient Pre-hospital Living Situation:  Lives at home with family  Patient Pre-hospital Level of Mobility:  Patient Pre-hospital Diet Restrictions:   Substance Use History:   Social History     Substance and Sexual Activity   Alcohol Use Not Currently    Alcohol/week: 0 0 standard drinks    Comment: Social      Social History     Tobacco Use   Smoking Status Former Smoker    Packs/day: 1 00    Years: 50 00    Pack years: 50 00    Types: Cigarettes  Start date: 56    Quit date: Ghassan Leslie Years since quittin 2   Smokeless Tobacco Never Used     Social History     Substance and Sexual Activity   Drug Use Never       Family History:    Family History   Problem Relation Age of Onset    Cervical cancer Mother     Cervical cancer Sister     Heart disease Sister     Coronary artery disease Sister     Lung cancer Brother        Physical Exam:     Vitals:   Blood Pressure: 117/66 (21)  Pulse: 90 (21)  Temperature: (!) 97 3 °F (36 3 °C) (21)  Temp Source: Oral (21 1133)  Respirations: 18 (21 1400)  Height: 5' 6" (167 6 cm) (21 143)  Weight - Scale: 62 2 kg (137 lb 2 oz) (21 143)  SpO2: 95 % (21)    Physical Exam  Constitutional:       General: He is not in acute distress  Appearance: He is not ill-appearing  HENT:      Head: Normocephalic and atraumatic  Nose: Nose normal    Eyes:      General: No scleral icterus  Cardiovascular:      Rate and Rhythm: Normal rate and regular rhythm  Pulmonary:      Comments: Decreased breath sounds bilateral  Abdominal:      General: Abdomen is flat  Musculoskeletal:      Right lower leg: Edema present  Left lower leg: Edema present  Skin:     General: Skin is warm  Neurological:      General: No focal deficit present  Mental Status: He is alert  Additional Data:     Lab Results: I have personally reviewed pertinent reports        Results from last 7 days   Lab Units 21  1644 21  1152   WBC Thousand/uL  --  6 89   HEMOGLOBIN g/dL  --  10 9*   HEMATOCRIT %  --  32 8*   PLATELETS Thousands/uL 130* 143*   NEUTROS PCT %  --  71   LYMPHS PCT %  --  8*   MONOS PCT %  --  18*   EOS PCT %  --  2     Results from last 7 days   Lab Units 21  1152   SODIUM mmol/L 136   POTASSIUM mmol/L 3 9   CHLORIDE mmol/L 105   CO2 mmol/L 26   BUN mg/dL 18   CREATININE mg/dL 0 88   ANION GAP mmol/L 5   CALCIUM mg/dL 8 4 GLUCOSE RANDOM mg/dL 105                       Imaging: I have personally reviewed pertinent reports  XR chest 1 view portable   Final Result by Karen Lanier MD (04/11 1444)      Diffuse groundglass opacities in the upper lobes bilaterally, new from January 2021, suspicious for pneumonia  Workstation performed: [id number]         CT chest wo contrast    (Results Pending)       EKG, Pathology, and Other Studies Reviewed on Admission:   · EKG:     Allscripts / Epic Records Reviewed: Yes     ** Please Note: This note has been constructed using a voice recognition system   **

## 2021-04-11 NOTE — RESPIRATORY THERAPY NOTE
RT Protocol Note  Ramona Sr 80 y o  male MRN: [id number]  Unit/Bed#: [facility] 431-01 Encounter: [id number]    Assessment    Active Problems:    * No active hospital problems   *      Home Pulmonary Medications:    Home Devices/Therapy: (Spiriva respimat)    Past Medical History:   Diagnosis Date    Anemia     Bladder cancer ([facility] )     Cancer ([facility] )     bladder    Carotid artery occlusion     Cataract     Colon polyp     COPD (chronic obstructive pulmonary disease) (HCC)     Coronary artery disease     Disease of thyroid gland     History of transfusion     Hyperlipidemia     Hypertension     Hypothyroidism 9/15/2017    Multiple pulmonary nodules     Peptic ulcer     Scoliosis     SIRS (systemic inflammatory response syndrome) ([facility] ) 2019    Transient cerebral ischemia     Tremors of nervous system      Social History     Socioeconomic History    Marital status: /Civil Union     Spouse name: None    Number of children: None    Years of education: None    Highest education level: None   Occupational History    Occupation: Retired   Social Needs    Financial resource strain: None    Food insecurity     Worry: None     Inability: None    Transportation needs     Medical: None     Non-medical: None   Tobacco Use    Smoking status: Former Smoker     Packs/day: 1 00     Years: 50 00     Pack years: 50 00     Types: Cigarettes     Start date:      Quit date:      Years since quittin 2    Smokeless tobacco: Never Used   Substance and Sexual Activity    Alcohol use: Not Currently     Alcohol/week: 0 0 standard drinks     Comment: Social     Drug use: Never    Sexual activity: Not Currently   Lifestyle    Physical activity     Days per week: None     Minutes per session: None    Stress: None   Relationships    Social connections     Talks on phone: None     Gets together: None     Attends Restorationism service: None     Active member of club or organization: None Attends meetings of clubs or organizations: None     Relationship status: None    Intimate partner violence     Fear of current or ex partner: None     Emotionally abused: None     Physically abused: None     Forced sexual activity: None   Other Topics Concern    None   Social History Narrative    None       Subjective         Objective    Physical Exam:   Assessment Type: Assess only  General Appearance: Alert, Awake  Respiratory Pattern: Normal  Chest Assessment: Chest expansion symmetrical  Bilateral Breath Sounds: Clear, Diminished  R Breath Sounds: Clear, Diminished  L Breath Sounds: Clear, Diminished  Cough: None  O2 Device: 2lpm nc    Vitals:  Blood pressure 120/67, pulse 81, temperature (!) 97 2 °F (36 2 °C), resp  rate 18, height 5' 6" (1 676 m), weight 62 2 kg (137 lb 2 oz), SpO2 96 %  Imaging and other studies: I have personally reviewed pertinent reports        O2 Device: 2lpm nc     Plan    Respiratory Plan: Home Bronchodilator Patient pathway        Resp Comments: (P) Patient assessed for respiratory protocol, DX: SOB, Patient awake and denies sob at this time, HX[de-identified] COPD, Pulmonary Nodules, satting 95% on current 2 lpm, BS Clear diminished bilaterally, takes Spiriva Respimat at home, 50 pack year smoking Hx, Quit -1990, CXR- 04/11/21- ground Glass opacities in upper lobes bilateraly, patient is ordered on  Spiriva daily at this time no other bronchodilator therapy indicated at this time, will continue to monitor

## 2021-04-12 ENCOUNTER — APPOINTMENT (INPATIENT)
Dept: NON INVASIVE DIAGNOSTICS | Facility: HOSPITAL | Age: 86
DRG: 196 | End: 2021-04-12
Payer: MEDICARE

## 2021-04-12 ENCOUNTER — APPOINTMENT (INPATIENT)
Dept: RADIOLOGY | Facility: HOSPITAL | Age: 86
DRG: 196 | End: 2021-04-12
Payer: MEDICARE

## 2021-04-12 ENCOUNTER — ANESTHESIA EVENT (INPATIENT)
Dept: GASTROENTEROLOGY | Facility: HOSPITAL | Age: 86
DRG: 196 | End: 2021-04-12
Payer: MEDICARE

## 2021-04-12 PROBLEM — I50.33 ACUTE ON CHRONIC DIASTOLIC CONGESTIVE HEART FAILURE (HCC): Chronic | Status: ACTIVE | Noted: 2021-02-06

## 2021-04-12 LAB
ALBUMIN SERPL BCP-MCNC: 3 G/DL (ref 3.5–5)
ALP SERPL-CCNC: 98 U/L (ref 46–116)
ALT SERPL W P-5'-P-CCNC: 17 U/L (ref 12–78)
ANION GAP SERPL CALCULATED.3IONS-SCNC: 5 MMOL/L (ref 4–13)
AST SERPL W P-5'-P-CCNC: 18 U/L (ref 5–45)
BILIRUB SERPL-MCNC: 0.64 MG/DL (ref 0.2–1)
BUN SERPL-MCNC: 21 MG/DL (ref 5–25)
CALCIUM ALBUM COR SERPL-MCNC: 9.1 MG/DL (ref 8.3–10.1)
CALCIUM SERPL-MCNC: 8.3 MG/DL (ref 8.3–10.1)
CHLORIDE SERPL-SCNC: 104 MMOL/L (ref 100–108)
CO2 SERPL-SCNC: 26 MMOL/L (ref 21–32)
CREAT SERPL-MCNC: 0.81 MG/DL (ref 0.6–1.3)
D DIMER PPP FEU-MCNC: 2.62 UG/ML FEU
ERYTHROCYTE [DISTWIDTH] IN BLOOD BY AUTOMATED COUNT: 14.3 % (ref 11.6–15.1)
GFR SERPL CREATININE-BSD FRML MDRD: 79 ML/MIN/1.73SQ M
GLUCOSE SERPL-MCNC: 91 MG/DL (ref 65–140)
HCT VFR BLD AUTO: 31.1 % (ref 36.5–49.3)
HGB BLD-MCNC: 10 G/DL (ref 12–17)
L PNEUMO1 AG UR QL IA.RAPID: NEGATIVE
MAGNESIUM SERPL-MCNC: 2.2 MG/DL (ref 1.6–2.6)
MCH RBC QN AUTO: 31.1 PG (ref 26.8–34.3)
MCHC RBC AUTO-ENTMCNC: 32.2 G/DL (ref 31.4–37.4)
MCV RBC AUTO: 97 FL (ref 82–98)
PLATELET # BLD AUTO: 143 THOUSANDS/UL (ref 149–390)
PMV BLD AUTO: 11.4 FL (ref 8.9–12.7)
POTASSIUM SERPL-SCNC: 3.9 MMOL/L (ref 3.5–5.3)
PROCALCITONIN SERPL-MCNC: <0.05 NG/ML
PROT SERPL-MCNC: 6.4 G/DL (ref 6.4–8.2)
RBC # BLD AUTO: 3.22 MILLION/UL (ref 3.88–5.62)
S PNEUM AG UR QL: NEGATIVE
SODIUM SERPL-SCNC: 135 MMOL/L (ref 136–145)
WBC # BLD AUTO: 6.11 THOUSAND/UL (ref 4.31–10.16)

## 2021-04-12 PROCEDURE — 93970 EXTREMITY STUDY: CPT

## 2021-04-12 PROCEDURE — 97167 OT EVAL HIGH COMPLEX 60 MIN: CPT

## 2021-04-12 PROCEDURE — 83735 ASSAY OF MAGNESIUM: CPT | Performed by: FAMILY MEDICINE

## 2021-04-12 PROCEDURE — 97163 PT EVAL HIGH COMPLEX 45 MIN: CPT

## 2021-04-12 PROCEDURE — 99232 SBSQ HOSP IP/OBS MODERATE 35: CPT | Performed by: PHYSICIAN ASSISTANT

## 2021-04-12 PROCEDURE — 71275 CT ANGIOGRAPHY CHEST: CPT

## 2021-04-12 PROCEDURE — 80053 COMPREHEN METABOLIC PANEL: CPT | Performed by: FAMILY MEDICINE

## 2021-04-12 PROCEDURE — 85379 FIBRIN DEGRADATION QUANT: CPT | Performed by: FAMILY MEDICINE

## 2021-04-12 PROCEDURE — G1004 CDSM NDSC: HCPCS

## 2021-04-12 PROCEDURE — 99222 1ST HOSP IP/OBS MODERATE 55: CPT | Performed by: INTERNAL MEDICINE

## 2021-04-12 PROCEDURE — 99222 1ST HOSP IP/OBS MODERATE 55: CPT | Performed by: NURSE PRACTITIONER

## 2021-04-12 PROCEDURE — 85027 COMPLETE CBC AUTOMATED: CPT | Performed by: FAMILY MEDICINE

## 2021-04-12 RX ORDER — FUROSEMIDE 20 MG/1
20 TABLET ORAL DAILY
Status: DISCONTINUED | OUTPATIENT
Start: 2021-04-13 | End: 2021-04-13

## 2021-04-12 RX ADMIN — METOPROLOL SUCCINATE 12.5 MG: 25 TABLET, EXTENDED RELEASE ORAL at 20:05

## 2021-04-12 RX ADMIN — IOHEXOL 85 ML: 350 INJECTION, SOLUTION INTRAVENOUS at 11:52

## 2021-04-12 RX ADMIN — LEVOTHYROXINE SODIUM 75 MCG: 75 TABLET ORAL at 05:21

## 2021-04-12 RX ADMIN — ASPIRIN 81 MG: 81 TABLET, CHEWABLE ORAL at 09:22

## 2021-04-12 RX ADMIN — PRAVASTATIN SODIUM 20 MG: 20 TABLET ORAL at 16:47

## 2021-04-12 RX ADMIN — AZITHROMYCIN 500 MG: 500 INJECTION, POWDER, LYOPHILIZED, FOR SOLUTION INTRAVENOUS at 00:08

## 2021-04-12 RX ADMIN — FUROSEMIDE 40 MG: 10 INJECTION, SOLUTION INTRAVENOUS at 09:22

## 2021-04-12 RX ADMIN — DOCUSATE SODIUM 100 MG: 100 CAPSULE, LIQUID FILLED ORAL at 09:21

## 2021-04-12 RX ADMIN — METOPROLOL SUCCINATE 12.5 MG: 25 TABLET, EXTENDED RELEASE ORAL at 09:21

## 2021-04-12 RX ADMIN — GUAIFENESIN 600 MG: 600 TABLET, EXTENDED RELEASE ORAL at 20:05

## 2021-04-12 RX ADMIN — GUAIFENESIN 600 MG: 600 TABLET, EXTENDED RELEASE ORAL at 09:22

## 2021-04-12 RX ADMIN — SENNOSIDES 8.8 MG: 8.8 SYRUP ORAL at 09:22

## 2021-04-12 RX ADMIN — POTASSIUM CHLORIDE 10 MEQ: 750 TABLET, EXTENDED RELEASE ORAL at 09:21

## 2021-04-12 RX ADMIN — Medication 1000 UNITS: at 09:22

## 2021-04-12 RX ADMIN — PANTOPRAZOLE SODIUM 40 MG: 40 TABLET, DELAYED RELEASE ORAL at 05:21

## 2021-04-12 RX ADMIN — TAMSULOSIN HYDROCHLORIDE 0.4 MG: 0.4 CAPSULE ORAL at 16:47

## 2021-04-12 RX ADMIN — TIOTROPIUM BROMIDE 18 MCG: 18 CAPSULE ORAL; RESPIRATORY (INHALATION) at 09:23

## 2021-04-12 RX ADMIN — ENOXAPARIN SODIUM 40 MG: 40 INJECTION SUBCUTANEOUS at 09:22

## 2021-04-12 NOTE — ASSESSMENT & PLAN NOTE
Wt Readings from Last 3 Encounters:   04/11/21 62 2 kg (137 lb 2 oz)   03/17/21 67 7 kg (149 lb 4 8 oz)   02/24/21 67 1 kg (148 lb)   · Patient with acute on chronic diastolic congestive heart failure evidenced by lower extremity edema/worsening shortness of breath requiring oxygen  Chest x-ray reviewed  Patient denied any fever or chills any chills  BNP elevated  Procalcitonin negative   CT chest suspicious for bilateral PNA  · Continue IV Lasix  · Continue with the supplemental oxygen  · Follow up cardiology consultation   · Appreciate input   · I/O, daily weights   · Cardiac diet   · Rest of plan as above

## 2021-04-12 NOTE — ASSESSMENT & PLAN NOTE
· Patient came to the hospital with shortness of breath that has been going on for the past 3 4 days  Patient with chronic bilateral lower extremity swelling which is slightly worse  Currently patient requiring oxygen which is unusual for him  Patient has been febrile overnight  Started on antibiotics for pneumonia, but procalcitonin negative x2     · Stop antibiotics   · Continue diuresis   · Lasix 40 mg IV BID   · Rule out PE  · Check CTA, check duplex   · Continue supplementary O2

## 2021-04-12 NOTE — ASSESSMENT & PLAN NOTE
· Patient came to the hospital with worsening shortness of breath  Patient required oxygen 2 L nasal cannula which is new  Patient definitely has element of fluid overload causing symptoms and has put out 1430 mL overnight, however Dimer elevated at 2 62  Was started on antibiotics for suspected pneumonia, however Procalcitonin negative x2  Family did note patient spitting out blood tinged sputum   Was not on anticoagulation prehospital   · Continue supplementary O2  · Continue Lasix 40 mg IV BID   · Check CTA PE Study - rule out PE  · Check Venous Duplex   · Stop antibiotics given negative procalcitonin

## 2021-04-12 NOTE — QUICK NOTE
CT of the chest reviewed-concerning for pneumonia  Will start on antibiotics  Patient tolerated ceftriaxone before  Even that the patient had admission within the last 1 month we will treat as low risk healthcare associated pneumonia with azithromycin and ceftriaxone    MRSA swab

## 2021-04-12 NOTE — PLAN OF CARE
Problem: PHYSICAL THERAPY ADULT  Goal: Performs mobility at highest level of function for planned discharge setting  See evaluation for individualized goals  Description: Treatment/Interventions: Functional transfer training, LE strengthening/ROM, Elevations, Therapeutic exercise, Endurance training, Bed mobility, Gait training, Patient/family training, Equipment eval/education  Equipment Recommended: Walker(already owns)       See flowsheet documentation for full assessment, interventions and recommendations  Note: Prognosis: Fair  Problem List: Decreased strength, Decreased mobility, Impaired balance, Decreased endurance, Pain, Decreased coordination  Assessment: Pt seen for high complexity physical therapy evaluation  Pt is an 79 y/o male w/ history/comorbidities of COPD, CHF, recent sepsis, anemia, bladder CA, CAD, HTN, HLD who is now admitted w/ SOB, cough, dizziness  Dx is acute hypoxia respiratory failure  Due to acute medical issues, pain, fall risk, note unstable clinical picture  PT consulted to assess mobility, d/c needs  Pt presents w/ decreased functional mob, standing balance, endurance, B LE strength, barriers at home  will benefit from skilled PT to correct for the above problems  Recommend rehab at d/c        PT Discharge Recommendation: [id number]     PT - OK to Discharge: Yes(when stable to rehab)    See flowsheet documentation for full assessment

## 2021-04-12 NOTE — PROGRESS NOTES
[address] [facility]  Progress Note - Parth Trivedi 1933, 80 y o  male MRN: [id number]  Unit/Bed#: [facility] 431-01 Encounter: [id number]  Primary Care Provider: Lauro Daley MD   Date and time admitted to hospital: 4/11/2021 11:25 AM    * Acute respiratory failure with hypoxia [facility])  Assessment & Plan  · Patient came to the hospital with worsening shortness of breath  Patient required oxygen 2 L nasal cannula which is new  Patient definitely has element of fluid overload causing symptoms and has put out 1430 mL overnight, however Dimer elevated at 2 62  Was started on antibiotics for suspected pneumonia, however Procalcitonin negative x2  Family did note patient spitting out blood tinged sputum  Was not on anticoagulation prehospital   · Continue supplementary O2  · Continue Lasix 40 mg IV BID   · Check CTA PE Study - rule out PE  · Check Venous Duplex   · Stop antibiotics given negative procalcitonin        Shortness of breath  Assessment & Plan  · Patient came to the hospital with shortness of breath that has been going on for the past 3 4 days  Patient with chronic bilateral lower extremity swelling which is slightly worse  Currently patient requiring oxygen which is unusual for him  Patient has been febrile overnight  Started on antibiotics for pneumonia, but procalcitonin negative x2  · Stop antibiotics   · Continue diuresis   · Lasix 40 mg IV BID   · Rule out PE  · Check CTA, check duplex   · Continue supplementary O2     Acute on chronic diastolic congestive heart failure (HCC)  Assessment & Plan  Wt Readings from Last 3 Encounters:   04/11/21 62 2 kg (137 lb 2 oz)   03/17/21 67 7 kg (149 lb 4 8 oz)   02/24/21 67 1 kg (148 lb)   · Patient with acute on chronic diastolic congestive heart failure evidenced by lower extremity edema/worsening shortness of breath requiring oxygen  Chest x-ray reviewed  Patient denied any fever or chills any chills  BNP elevated  Procalcitonin negative  CT chest suspicious for bilateral PNA  · Continue IV Lasix  · Continue with the supplemental oxygen  · Follow up cardiology consultation   · Appreciate input   · I/O, daily weights   · Cardiac diet   · Rest of plan as above           PAF (paroxysmal atrial fibrillation) (HCC)  Assessment & Plan  · Rate controlled  · Not on any anticoagulation likely secondary to fall risk  · Continue Metoprolol-XL 12 5 mg BID     S/P TAVR (transcatheter aortic valve replacement)  Assessment & Plan  · Murmur noted   · Cardiology follow up    Abdominal aortic aneurysm (AAA) without rupture (HCC)  Assessment & Plan  · No abdominal pain  · BP controlled at this time     Severe aortic stenosis  Assessment & Plan  · Murmur noted  Status post TAVR  · Continue Cardiology follow up    Hypertension  Assessment & Plan  · BP controlled   · Monitor blood pressure with diuresis     Hyperlipidemia  Assessment & Plan  · Continue with statin    Hypothyroidism  Assessment & Plan  · Continue with Synthroid    Ambulatory dysfunction  Assessment & Plan  · PT OT evaluation      VTE Pharmacologic Prophylaxis:   Pharmacologic: Enoxaparin (Lovenox)  Mechanical VTE Prophylaxis in Place: Yes    Patient Centered Rounds: I have performed bedside rounds with nursing staff today  Discussions with Specialists or Other Care Team Provider: Discussed with RN, CM    Education and Discussions with Family / Patient: Discussed with patient, called wife and daughter     Time Spent for Care: 30 minutes  More than 50% of total time spent on counseling and coordination of care as described above      Current Length of Stay: 1 day(s)    Current Patient Status: Inpatient   Certification Statement: The patient will continue to require additional inpatient hospital stay due to continuing diuresis, ruling out PE    Discharge Plan: Pending at this time     Code Status: Level 1 - Full Code      Subjective:   Patient reports that he feels no better than yesterday  Reports on going shortness of breath  Denies chest pain, tightness  Reports right leg pain with palpation  Objective:     Vitals:   Temp (24hrs), Av 3 °F (37 4 °C), Min:97 2 °F (36 2 °C), Max:100 9 °F (38 3 °C)    Temp:  [97 2 °F (36 2 °C)-100 9 °F (38 3 °C)] 99 5 °F (37 5 °C)  HR:  [81-92] 86  Resp:  [16-18] 16  BP: ()/(56-67) 118/61  SpO2:  [88 %-97 %] 94 %  Body mass index is 22 13 kg/m²  Input and Output Summary (last 24 hours): Intake/Output Summary (Last 24 hours) at 2021 0835  Last data filed at 2021 0501  Gross per 24 hour   Intake 520 ml   Output 1950 ml   Net -1430 ml       Physical Exam:     Physical Exam  Constitutional:       General: He is in acute distress  Appearance: He is well-developed  He is ill-appearing  He is not diaphoretic  Interventions: Nasal cannula in place  HENT:      Head: Normocephalic and atraumatic  Mouth/Throat:      Mouth: Mucous membranes are moist    Eyes:      General: No scleral icterus  Conjunctiva/sclera: Conjunctivae normal       Pupils: Pupils are equal, round, and reactive to light  Neck:      Musculoskeletal: Neck supple  Cardiovascular:      Rate and Rhythm: Normal rate and regular rhythm  Heart sounds: S2 normal  Murmur present  Systolic murmur present  No S3 or S4 sounds  Pulmonary:      Effort: Accessory muscle usage present  No respiratory distress  Breath sounds: No stridor  Examination of the right-upper field reveals rales  Examination of the left-upper field reveals rales  Examination of the right-lower field reveals decreased breath sounds  Examination of the left-lower field reveals decreased breath sounds  Decreased breath sounds and rales present  No wheezing or rhonchi  Chest:      Chest wall: No tenderness  Abdominal:      General: Bowel sounds are normal  There is no distension  Palpations: Abdomen is soft  There is no mass  Tenderness:  There is no abdominal tenderness  There is no guarding or rebound  Musculoskeletal:      Right lower le+ Pitting Edema present  Left lower le+ Pitting Edema present  Skin:     General: Skin is warm and dry  Neurological:      General: No focal deficit present  Mental Status: He is alert and oriented to person, place, and time  Mental status is at baseline  Motor: No tremor or seizure activity  Additional Data:     Labs:    Results from last 7 days   Lab Units 21  0443  21  1152   WBC Thousand/uL 6 11  --  6 89   HEMOGLOBIN g/dL 10 0*  --  10 9*   HEMATOCRIT % 31 1*  --  32 8*   PLATELETS Thousands/uL 143*   < > 143*   NEUTROS PCT %  --   --  71   LYMPHS PCT %  --   --  8*   MONOS PCT %  --   --  18*   EOS PCT %  --   --  2    < > = values in this interval not displayed  Results from last 7 days   Lab Units 21  0443   POTASSIUM mmol/L 3 9   CHLORIDE mmol/L 104   CO2 mmol/L 26   BUN mg/dL 21   CREATININE mg/dL 0 81   CALCIUM mg/dL 8 3   ALK PHOS U/L 98   ALT U/L 17   AST U/L 18           * I Have Reviewed All Lab Data Listed Above  * Additional Pertinent Lab Tests Reviewed:  [address] [facility] Admission Reviewed    Imaging:    Imaging Reports Reviewed Today Include: CTA Pending   Imaging Personally Reviewed by Myself Includes:  None    Recent Cultures (last 7 days):     Results from last 7 days   Lab Units 21  2216   LEGIONELLA URINARY ANTIGEN  Negative       Last 24 Hours Medication List:   Current Facility-Administered Medications   Medication Dose Route Frequency Provider Last Rate    acetaminophen  650 mg Oral Q6H PRN Andi Vail MD      aspirin  81 mg Oral Daily Andi Vail MD      benzonatate  100 mg Oral TID PRN Andi Vail MD      cholecalciferol  1,000 Units Oral Daily Andi Vail MD      docusate sodium  100 mg Oral BID Andi Vail MD      enoxaparin  40 mg Subcutaneous Daily Andi Vail MD      furosemide  40 mg Intravenous BID (diuretic) Jimmy Stockton MD      guaiFENesin  600 mg Oral Q12H [address] Jimmy Stockton MD      levalbuterol  1 25 mg Nebulization Q8H PRN Jimmy Stockton MD      levothyroxine  75 mcg Oral Early Morning Jimmy Stockton MD      metoprolol succinate  12 5 mg Oral BID Jimmy Stockton MD      ondansetron  4 mg Intravenous Q6H PRN Jimmy Stockton MD      pantoprazole  40 mg Oral Early Morning Jimmy Stockton MD      polyethylene glycol  17 g Oral Daily Jimmy Stockton MD      potassium chloride  10 mEq Oral Daily Jimmy Stockton MD      pravastatin  20 mg Oral Daily With Jeromy Charlton MD      senna  8 8 mg Oral Daily Jimmy Stockton MD      tamsulosin  0 4 mg Oral Daily With Jeromy Charlton MD      tiotropium  18 mcg Inhalation Daily Jimmy Stockton MD          Today, Patient Was Seen By: Jagdeep Delgadillo PA-C    ** Please Note: Dictation voice to text software may have been used in the creation of this document   **

## 2021-04-12 NOTE — CONSULTS
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, dust, steel ore, furnace fumes  7  Remote history treated, non-invasive bladder cancer    Plan:  1  Acute respiratory failure with hypoxia  -unclear etiology, suspect acute pneumonitis  Patient does have underlying lung disease as seen in imaging and per historical clinical assessment/treatment, although no formal PFTs have been performed  Patient is not examining volume overloaded, so doubt this is predominantly HF exacerbation, although patient did have elevated BNP (1600) in setting of known HFpEF, and did receive lasix  Acuity of exacerbation was concerning for PE, in addition to remote malignancy history and elevated d-dimer (2 62), however CTA was negative  Newest CT chest with significant changes from prior, with upper lobe ground glass opacities  This is suggestive of a pneumonitis, but etiology unclear  Patient is not presenting infectious, although did have low-grade fever overnight  Patient has extensive distant occupational and social exposure history, but no exposure recently  Patient was feeling well up until a few days ago when sputum production and color increased and changed from white to rust-brown   Patient has had a 10 lb weight loss 2/2 anorexia in the last two months (approxiametly), but otherwise has had no changes to his health and denies systemic S&S   -Patient did have fever of 100 9F overnight, however patient currently afebrile without leukocytosis or tachycardia  Patient does not appear infectious  Procal <0 05 x2  Urine antigens for strep pneumo and legionalla negative  -Sputum cultures still pending- will follow  Gram stain 1+ poly and 2+ gram positive cocci in pairs    -patient received 1 dose Azithromycin 500 mg and 1 dose rocephin 1000 mg   -currently off antibiotics  -on 2L NC saturating at 97%  Titrate to saturation 88-92%  Patient does not need to saturate higher, given his chronic lung disease and CO2 retention  -continue spiriva qd  -mucinex  -flutter valve spirometry  -PRN Xopenex  -Bronchoscopy tomorrow (4/13) at 12:15    2  Shortness of breathe  -2/2 to above  -resolved, patient saturating 97% on 2L, appropriate resp rate, without complaints  -received IV 60 lasix yesterday; 40 IV today  -cont to tx as per #1    3  COPD  -management of emphysema as per #1    3  Acute on chronic HFpEF  -patient with elevated pro-BNP (1600), and given IV lasix x2 days with resolution of SOB  He is no longer symptomatic or examining clinically volume overloaded    -cardiology following      History of Present Illness   HPI:  Slime Marino is a 80 y o  male with COPD, HFpEF, HTN who presented to [facility] with acute SOB and worsening dyspnea on exertion, changed from his baseline FLORES in the past 4-5 days  The patient endorses compliance with his medications (most pertinently lasix and spiriva), has frequent outpatient follow-up, and was not having any systemic S&S prior to this acute onset of SOB  He denied fever/chills, abd pain/fullness, n/v/d, wheezing, worsening LE edema prior to admission  Patient does have a chronic indwelling chavis catheter, but denies issues with catheter, urinary complaints, or change to urine appearance/smell  The only change for the patient has been an increase in sputum quantity and color change from white to rust-brown in the last 4 days  The patient's daughter said she saw some streaks of red as well   The patient has had no sick contacts and lives in a tidy one-bedroom house with his wife  They have not been leaving their home much, and the patient has had no recent exposures to birds, farm crops or animals, smoke, or unusual substances  The patient at baseline sleeps on a slightly inclined bed with one pillow, but denies orthopnea or other positional SOB  The day prior to admission the patient suddenly experienced SOB with ADLs  He and his wife are independent in their ADLs otherwise  The patient denies concurrent LE edema, abdominal fullness, or weight gain recently  In fact the patient says he has experienced a 10 lb unintentional weight loss in the last two months (approximately), as well as decreased appetite of the same duration  Patient has extensive 40+ year former smoking history, but quit all tobacco products about 30 years ago  He has never had outpatient PFTs  Past occupation of working for the [employer] as a , with extensive exposure to [employer], steel ore, furnace fumes, etc  He was also a  in [employer], without overseas deployment  Remote history of non-invasive bladder carcinoma treated completely with topical chemo  No other cancer history or known coagulopathy  On exam today patient is comfortable, conversational, oriented and engaged in his care  He is a good historian  He is denying: SOB, wheezing (past or present), abdominal fullness, n/v, fever/chills, CP  Review of systems:  12 point review of systems was completed and was otherwise negative except as listed in HPI        Historical Information   Past Medical History:   Diagnosis Date    Anemia     Bladder cancer ([facility] [address] )     Cancer ([facility] )     bladder    Carotid artery occlusion     Cataract     Colon polyp     COPD (chronic obstructive pulmonary disease) (HCC)     Coronary artery disease     Disease of thyroid gland     History of transfusion     Hyperlipidemia     Hypertension     Hypothyroidism 9/15/2017    Multiple pulmonary nodules     Peptic ulcer     Scoliosis     SIRS (systemic inflammatory response syndrome) ([facility] [address] ) 12/19/2019    Transient cerebral ischemia     Tremors of nervous system      Past Surgical History:   Procedure Laterality Date   [facility] SURGERY      1973, 1987, 2005    BUNIONECTOMY Left     Simple exostectomy (silver procedure)    CAROTID ENDARTERECTOMY Right 09/10/2008    Randy LEDBETTER MD    CATARACT EXTRACTION      CATARACT EXTRACTION, BILATERAL      CERVICAL DISCECTOMY  1969    COLONOSCOPY      CORONARY ARTERY BYPASS GRAFT  10/20/2010    x3 (LIMA-LAD, SVG-OM, SVG-RCA)    CYSTOSCOPY      ELBOW SURGERY Right 07/2012    FEMORAL ARTERY - TIBIAL ARTERY BYPASS GRAFT  12/05/2011    using reversed saphenous vein from right leg  Enrique Jack MD    HI ECHO TRANSESOPHAG R-T 2D W/PRB IMG ACQUISJ I&R N/A 10/6/2020    Procedure: TRANSESOPHAGEAL ECHOCARDIOGRAM (DAVID); Surgeon: Red Hicks DO;  Location: [facility];  Service: Cardiac Surgery    HI REPLACE AORTIC VALVE OPENFEMORAL ARTERY APPROACH N/A 10/6/2020    Procedure: REPLACEMENT AORTIC VALVE TRANSCATHETER (TAVR) TRANSFEMORAL W/ 26MM MONTALVO BREE S3 ULTRA VALVE(ACCESS ON LEFT);   Surgeon: Red Hicks DO;  Location: [facility];  Service: Cardiac Surgery    REPLACEMENT TOTAL KNEE Left     SHOULDER SURGERY Right 1997     Family History   Problem Relation Age of Onset    Cervical cancer Mother     Cervical cancer Sister     Heart disease Sister     Coronary artery disease Sister     Lung cancer Brother        Occupational History:  for the 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, furnace fumes    Social History: 40+ year smoking history - quit 30 years ago    Meds/Allergies   Current Facility-Administered Medications   Medication Dose Route Frequency    acetaminophen (TYLENOL) tablet 650 mg  650 mg Oral Q6H PRN    aspirin chewable tablet 81 mg  81 mg Oral Daily    benzonatate (TESSALON PERLES) capsule 100 mg  100 mg Oral TID PRN    cholecalciferol (VITAMIN D3) tablet 1,000 Units  1,000 Units Oral Daily    docusate sodium (COLACE) capsule 100 mg  100 mg Oral BID    [START ON 4/14/2021] enoxaparin (LOVENOX) subcutaneous injection 40 mg  40 mg Subcutaneous Daily    [START ON 4/13/2021] furosemide (LASIX) tablet 20 mg  20 mg Oral Daily    guaiFENesin (MUCINEX) 12 hr tablet 600 mg  600 mg Oral Q12H FAVIAN    levalbuterol (XOPENEX) inhalation solution 1 25 mg  1 25 mg Nebulization Q8H PRN    levothyroxine tablet 75 mcg  75 mcg Oral Early Morning    metoprolol succinate (TOPROL-XL) 24 hr tablet 12 5 mg  12 5 mg Oral BID    ondansetron (ZOFRAN) injection 4 mg  4 mg Intravenous Q6H PRN    pantoprazole (PROTONIX) EC tablet 40 mg  40 mg Oral Early Morning    polyethylene glycol (MIRALAX) packet 17 g  17 g Oral Daily    potassium chloride (K-DUR,KLOR-CON) CR tablet 10 mEq  10 mEq Oral Daily    pravastatin (PRAVACHOL) tablet 20 mg  20 mg Oral Daily With Dinner    senna oral syrup 8 8 mg  8 8 mg Oral Daily    tamsulosin (FLOMAX) capsule 0 4 mg  0 4 mg Oral Daily With Dinner    tiotropium (SPIRIVA) capsule for inhaler 18 mcg  18 mcg Inhalation Daily     Medications Prior to Admission   Medication    acetaminophen (TYLENOL) 325 mg tablet    alfuzosin (UROXATRAL) 10 mg 24 hr tablet    aspirin 81 MG tablet    Cholecalciferol (HM VITAMIN D3) 2000 units CAPS    furosemide (LASIX) 20 mg tablet    levothyroxine 75 mcg tablet    metoprolol succinate (TOPROL-XL) 25 mg 24 hr tablet    Multiple Vitamin (multivitamin) capsule    omeprazole (PriLOSEC) 20 mg delayed release capsule    potassium chloride (K-DUR,KLOR-CON) 10 mEq tablet    simvastatin (ZOCOR) 20 mg tablet    SPIRIVA RESPIMAT 2 5 MCG/ACT AERS inhaler    hydrocortisone 2 5 % cream     Allergies   Allergen Reactions    Aleve [Naproxen]      Other reaction(s): FACIAL SWELLING  Category: Allergy;  Annotation - [id number]: lip/eye edema    Ancef [Cefazolin] Anaphylaxis     Hypotension, rash, itching    Augmentin [Amoxicillin-Pot Clavulanate] Diarrhea and Vomiting       Vitals: Blood pressure 94/52, pulse 79, temperature 97 5 °F (36 4 °C), resp  rate 16, height 5' 6" (1 676 m), weight 62 2 kg (137 lb 2 oz), SpO2 98 % , 2L NC, Body mass index is 22 13 kg/m²  Intake/Output Summary (Last 24 hours) at 4/12/2021 1605  Last data filed at 4/12/2021 1422  Gross per 24 hour   Intake 1000 ml   Output 2450 ml   Net -1450 ml       Physical Exam  Constitutional:       General: He is not in acute distress  Appearance: He is normal weight  He is not ill-appearing, toxic-appearing or diaphoretic  HENT:      Mouth/Throat:      Mouth: Mucous membranes are moist    Cardiovascular:      Rate and Rhythm: Normal rate and regular rhythm  Pulses: Normal pulses  Heart sounds: No murmur  Pulmonary:      Effort: Pulmonary effort is normal  No respiratory distress  Breath sounds: Normal breath sounds  No stridor  No wheezing or rales  Abdominal:      General: Abdomen is flat  There is no distension  Palpations: Abdomen is soft  Tenderness: There is no abdominal tenderness  There is no guarding  Musculoskeletal:      Right lower leg: Edema (trace, ankles) present  Left lower leg: Edema (trace, ankles) present  Skin:     General: Skin is warm and dry  Coloration: Skin is not jaundiced  Neurological:      Mental Status: He is alert and oriented to person, place, and time  Psychiatric:         Mood and Affect: Mood normal          Behavior: Behavior normal          Thought Content:  Thought content normal          Judgment: Judgment normal      Labs:   BNP: No results found for: BNP, CBC:   Lab Results   Component Value Date    WBC 6 11 04/12/2021    HGB 10 0 (L) 04/12/2021    HCT 31 1 (L) 04/12/2021    MCV 97 04/12/2021     (L) 04/12/2021    MCH 31 1 04/12/2021    MCHC 32 2 04/12/2021    RDW 14 3 04/12/2021    MPV 11 4 04/12/2021   , CMP:   Lab Results   Component Value Date    SODIUM 135 (L) 04/12/2021    K 3 9 04/12/2021     04/12/2021    CO2 26 04/12/2021    BUN 21 04/12/2021    CREATININE 0 81 04/12/2021    CALCIUM 8 3 04/12/2021    AST 18 04/12/2021    ALT 17 04/12/2021    ALKPHOS 98 04/12/2021    EGFR 79 04/12/2021     Laboratory and Diagnostics  Results from last 7 days   Lab Units 04/12/21  0443 04/11/21  1644 04/11/21  1152   WBC Thousand/uL 6 11  --  6 89   HEMOGLOBIN g/dL 10 0*  --  10 9*   HEMATOCRIT % 31 1*  --  32 8*   PLATELETS Thousands/uL 143* 130* 143*   NEUTROS PCT %  --   --  71   MONOS PCT %  --   --  18*     Results from last 7 days   Lab Units 04/12/21  0443 04/11/21  1152   SODIUM mmol/L 135* 136   POTASSIUM mmol/L 3 9 3 9   CHLORIDE mmol/L 104 105   CO2 mmol/L 26 26   ANION GAP mmol/L 5 5   BUN mg/dL 21 18   CREATININE mg/dL 0 81 0 88   CALCIUM mg/dL 8 3 8 4   GLUCOSE RANDOM mg/dL 91 105   ALT U/L 17  --    AST U/L 18  --    ALK PHOS U/L 98  --    ALBUMIN g/dL 3 0*  --    TOTAL BILIRUBIN mg/dL 0 64  --      Results from last 7 days   Lab Units 04/12/21  0443   MAGNESIUM mg/dL 2 2           Results from last 7 days   Lab Units 04/11/21  2227 04/11/21  1644 04/11/21  1152   TROPONIN I ng/mL <0 02 <0 02 <0 02                     Results from last 7 days   Lab Units 04/12/21  0443   D-DIMER QUANTITATIVE ug/ml FEU 2 62*     Results from last 7 days   Lab Units 04/12/21  0518 04/11/21  1644   PROCALCITONIN ng/ml <0 05 <0 05       ABG:       Imaging and other studies: I have personally reviewed pertinent films in PACS  Pulmonary function testing: None  EKG, Pathology, and Other Studies: I have personally reviewed pertinent reports  Code Status: Level 1 - Full Code    VTE Pharmacologic Prophylaxis: Enoxaparin (Lovenox)  VTE Mechanical Prophylaxis: sequential compression device    Portions of the record may have been created with voice recognition software    Occasional wrong word or "sound a like" substitutions may have occurred due to the inherent limitations of voice recognition software  Read the chart carefully and recognize, using context, where substitutions have occurred      Yumiko Perkins6 [facility]  Internal Medicine Residency, PGY1

## 2021-04-12 NOTE — OCCUPATIONAL THERAPY NOTE
Occupational Therapy Evaluation      Remberto Cruise    4/12/2021    Principal Problem:    Acute respiratory failure with hypoxia (HCC)  Active Problems:    Severe aortic stenosis    PAF (paroxysmal atrial fibrillation) (HCC)    Hyperlipidemia    Hypertension    Hypothyroidism    Abdominal aortic aneurysm (AAA) without rupture (HCC)    Ambulatory dysfunction    S/P TAVR (transcatheter aortic valve replacement)    Acute on chronic diastolic congestive heart failure (HCC)    Shortness of breath      Past Medical History:   Diagnosis Date    Anemia     [facility]     Cancer ([facility] )     bladder    Carotid artery occlusion     Cataract     Colon polyp     [facility]     Coronary artery disease     Disease of thyroid gland     History of transfusion     Hyperlipidemia     Hypertension     Hypothyroidism 9/15/2017    Multiple pulmonary nodules     Peptic ulcer     Scoliosis     SIRS (systemic inflammatory response syndrome) ([facility] ) 12/19/2019    Transient cerebral ischemia     Tremors of nervous system        Past Surgical History:   Procedure Laterality Date   [facility] SURGERY      1973, 1987, 2005    BUNIONECTOMY Left     Simple exostectomy (silver procedure)    CAROTID ENDARTERECTOMY Right 09/10/2008    Common  Tae LEDBETTER MD    CATARACT EXTRACTION      CATARACT EXTRACTION, BILATERAL      CERVICAL DISCECTOMY  1969    COLONOSCOPY      CORONARY ARTERY BYPASS GRAFT  10/20/2010    x3 (LIMA-LAD, SVG-OM, SVG-RCA)    CYSTOSCOPY      ELBOW SURGERY Right 07/2012    FEMORAL ARTERY - TIBIAL ARTERY BYPASS GRAFT  12/05/2011    using reversed saphenous vein from right leg  Enrique Jack MD    ME ECHO TRANSESOPHAG R-T 2D W/PRB IMG ACQUVENESSAJ I&R N/A 10/6/2020    Procedure: TRANSESOPHAGEAL ECHOCARDIOGRAM (DAVID);   Surgeon: Red Hicks DO;  [facility];  Service: Cardiac Surgery    ME REPLACE AORTIC VALVE OPENFEMORAL ARTERY APPROACH N/A 10/6/2020    Procedure: REPLACEMENT AORTIC VALVE TRANSCATHETER (TAVR) TRANSFEMORAL W/ 26MM MONTALVO BREE S3 ULTRA VALVE(ACCESS ON LEFT); Surgeon: Pablito Luz DO;  [facility];  Service: Cardiac Surgery    REPLACEMENT TOTAL KNEE Left     SHOULDER SURGERY Right 1997 04/12/21 1102   OT Last Visit   OT Visit Date 04/12/21   Note Type   Note type Evaluation   Restrictions/Precautions   Weight Bearing Precautions Per Order No   Other Precautions Multiple lines;Telemetry;O2;Fall Risk;Hard of hearing   Pain Assessment   Pain Assessment Tool 0-10   Pain Score No Pain   Home Living   Type of [address] One level  (1 CHERRY)   Bathroom Shower/Tub Tub/shower unit   Bathroom Toilet Standard   Bathroom Equipment Shower chair;Commode   Home Equipment Walker; Other (Comment)  (rollator for use when outside)   Additional Comments (- )   Prior Function   Level of [place] Independent with ADLs and functional mobility   Lives With Spouse   Receives Help From Family   ADL Assistance Independent   IADLs Needs assistance   Falls in the last 6 months 1 to 4   Comments pt has a R shoe lift   Lifestyle   Autonomy pt reports mostly being independent w self care, mobility  his wife will pitch in and help if needed  Reciprocal Relationships supportive wife   Psychosocial   Psychosocial (WDL) WDL   ADL   Eating Assistance 7  Independent   Eating Deficit Setup   Grooming Assistance 5  Supervision/Setup   Grooming Deficit Increased time to complete;Wash/dry hands; Wash/dry face   UB Bathing Assistance 5  Supervision/Setup   UB Bathing Deficit Increased time to complete;Right arm;Left arm; Abdomen;Perineal area; Chest   LB Bathing Assistance 4  Minimal Assistance   LB Bathing Deficit Increased time to complete;Right upper leg;Left upper leg;Right lower leg including foot; Left lower leg including foot   UB Dressing Assistance 4  Minimal Assistance   UB Dressing Deficit Increased time to complete LB Dressing Assistance 3  Moderate Assistance   LB Dressing Deficit Increased time to complete; Don/doff R sock; Don/doff L sock; Don/doff R shoe;Don/doff L shoe   Transfers   Sit to Stand 4  Minimal assistance   Additional items Assist x 1; Increased time required   Stand to Sit 4  Minimal assistance   Additional items Assist x 1; Increased time required   Stand pivot 4  Minimal assistance   Additional items Assist x 1; Increased time required   Functional Mobility   Functional Mobility 4  Minimal assistance   Additional Comments pt kyphotic   Additional items Rolling walker   Balance   Static Sitting Fair +   Dynamic Sitting Fair   Static Standing Poor +   Dynamic Standing Poor +   Activity Tolerance   Activity Tolerance Patient limited by fatigue   Nurse Made Aware OK to see   RUE Assessment   RUE Assessment WFL   LUE Assessment   LUE Assessment WFL   Hand Function   Gross Motor Coordination Functional   Fine Motor Coordination Functional   Cognition   Overall Cognitive Status WFL   Arousal/Participation Alert; Cooperative   Attention Within functional limits   Orientation Level Oriented X4   Memory Within functional limits   Following Commands Follows one step commands with increased time or repetition   Comments increased time to give history   Assessment   Limitation Decreased ADL status; Decreased UE strength;Decreased Safe judgement during ADL;Decreased endurance;Decreased high-level ADLs; Decreased self-care trans   Assessment Pt is a 80 y o  male seen for OT evaluation s/p admit to [facility] on 4/11/2021 w/ Acute respiratory failure with hypoxia ([facility] [address] )  Pt in with c/o SOB x 3-4 days  Pt suspected for CHF exacerbation, pneumonia  He is currently on 4L O2 NC  Comorbidities affecting pt's functional performance at time of assessment include: HTN and severe aortic stenosis, PAF, HTN, TAVR, CHF, carotid stenosis, essential tremors, multiple pulm nodules, bladder CA, COPD, scoliosis   Personal factors affecting pt at time of IE include:steps to enter environment, difficulty performing ADLS and difficulty performing IADLS   Prior to admission, pt was living w his wife in a one story home with 1 CHERRY  He reports being independent w self care, mobiltiy and that his wife assists him when needed  Pt admits to a "couple of falls", uses a rollator for out of home mobility, and a RW at home  Upon evaluation: Pt requires mod assist to don shoes and socks, mod assist for toileting hygiene, min assist other self care and mobility 2* the following deficits impacting occupational performance: weakness, decreased strength, decreased balance and decreased tolerance  Pt to benefit from continued skilled OT tx while in the hospital to address deficits as defined above and maximize level of functional independence w ADL's and functional mobility  Occupational Performance areas to address include: grooming, bathing/shower, toilet hygiene, dressing, functional mobility and clothing management  Pt scored  50 /100 on the barthel index  The patient's raw score on the AM-PAC Daily Activity inpatient short form is 17, standardized score is 37 26, less than 39 4  Patients at this level are likely to benefit from discharge to post-acute rehabilitation services  Please refer to the recommendation of the Occupational Therapist for safe discharge planning  Based on findings from OT evaluation and functional performance deficits, pt has been identified as a  High complexity evaluation  From OT standpoint, recommendation at time of d/c would be STR as patient is currently functioning below baseline      Goals   Patient Goals to go home   STG Time Frame 3-5   Short Term Goal #1 pt will complete bedmobility mod I    Short Term Goal #2 Good balance sitting at EOB x 10 min w good balance for completion of hygien   Short Term Goal  grooming and UB self care mod I    LTG Time Frame 10-14   Long Term Goal #1 tolerate standing x 10 min w fair + balance /support of RW for completion of hygiene   Long Term Goal #2 functional mobility mod I    Long Term Goal demonstrate good ECT/self pacing skills for all self care/mobiltiy   Functional Transfer Goals   Pt Will Perform All Functional Transfers With mod indep; With good judgment/safety; With assistive devices   ADL Goals   Pt Will Perform All ADL's With mod indep; With good judgment/safety   Plan   Treatment Interventions ADL retraining;Functional transfer training;UE strengthening/ROM; Endurance training;Cognitive reorientation;Patient/family training;Equipment evaluation/education; Compensatory technique education; Activityengagement; Energy conservation   Goal Expiration Date 04/26/21   OT Frequency 3-5x/wk   Recommendation   OT Discharge Recommendation Post-Acute Rehabilitation Services   OT - OK to Discharge Yes   AM-PAC Daily Activity Inpatient   Lower Body Dressing 2   Bathing 2   Toileting 3   Upper Body Dressing 3   Grooming 3   Eating 4   Daily Activity Raw Score 17   Daily Activity Standardized Score (Calc for Raw Score >=11) 37 26   Barthel Index   Feeding 10   Bathing 0   Grooming Score 0   Dressing Score 5   Bladder Score 10   Bowels Score 10   Toilet Use Score 5   Transfers (Bed/Chair) Score 10   Mobility (Level Surface) Score 0   Stairs Score 0   Barthel Index Score 50

## 2021-04-12 NOTE — PROGRESS NOTES
Patient with low BP 81/54, SBP manually in the 94/52  Patient is asymptomatic, SLIM PA made aware  No new orders at this time, will continue to monitor

## 2021-04-12 NOTE — TREATMENT PLAN
CTA reviewed and absent for PE  Continues to be with groundglass density in the posterior aspect of both upper lobes  Doubt bacterial  Could be multifactorial  Will ask Pulmonary disease to give input  Cardiology stopped diuresis  Dc Jansen PA-C

## 2021-04-12 NOTE — PLAN OF CARE
Problem: OCCUPATIONAL THERAPY ADULT  Goal: Performs self-care activities at highest level of function for planned discharge setting  See evaluation for individualized goals  Note: Limitation: Decreased ADL status, Decreased UE strength, Decreased Safe judgement during ADL, Decreased endurance, Decreased high-level ADLs, Decreased self-care trans     Assessment: Pt is a 80 y o  male seen for OT evaluation s/p admit to [facility] on 4/11/2021 w/ Acute respiratory failure with hypoxia ([address] )  Pt in with c/o SOB x 3-4 days  Pt suspected for CHF exacerbation, pneumonia  He is currently on 4L O2 NC  Comorbidities affecting pt's functional performance at time of assessment include: HTN and severe aortic stenosis, PAF, HTN, TAVR, CHF, carotid stenosis, essential tremors, multiple pulm nodules, bladder CA, COPD, scoliosis  Personal factors affecting pt at time of IE include:steps to enter environment, difficulty performing ADLS and difficulty performing IADLS   Prior to admission, pt was living w his wife in a one story home with 1 CHERRY  He reports being independent w self care, mobiltiy and that his wife assists him when needed  Pt admits to a "couple of falls", uses a rollator for out of home mobility, and a RW at home  Upon evaluation: Pt requires mod assist to don shoes and socks, mod assist for toileting hygiene, min assist other self care and mobility 2* the following deficits impacting occupational performance: weakness, decreased strength, decreased balance and decreased tolerance  Pt to benefit from continued skilled OT tx while in the hospital to address deficits as defined above and maximize level of functional independence w ADL's and functional mobility  Occupational Performance areas to address include: grooming, bathing/shower, toilet hygiene, dressing, functional mobility and clothing management  Pt scored  50 /100 on the barthel index   The patient's raw score on the AM-PAC Daily Activity inpatient short form is 17, standardized score is 37 26, less than 39 4  Patients at this level are likely to benefit from discharge to post-acute rehabilitation services  Please refer to the recommendation of the Occupational Therapist for safe discharge planning  Based on findings from OT evaluation and functional performance deficits, pt has been identified as a  High complexity evaluation  From OT standpoint, recommendation at time of d/c would be STR as patient is currently functioning below baseline  OT Discharge Recommendation: Post-Acute Rehabilitation Services  OT - OK to Discharge:  Yes

## 2021-04-12 NOTE — ASSESSMENT & PLAN NOTE
· Rate controlled  · Not on any anticoagulation likely secondary to fall risk  · Continue Metoprolol-XL 12 5 mg BID

## 2021-04-12 NOTE — CONSULTS
Consultation - Cardiology   Mercy Garcia 80 y o  male MRN: [id number]  Unit/Bed#: [facility] 431-01 Encounter: [id number]    Assessment/Plan     Principal Problem:    Acute respiratory failure with hypoxia ([address] )  Active Problems:    Severe aortic stenosis    PAF (paroxysmal atrial fibrillation) (HCC)    Hyperlipidemia    Hypertension    Hypothyroidism    Abdominal aortic aneurysm (AAA) without rupture ([facility])    Ambulatory dysfunction    S/P TAVR (transcatheter aortic valve replacement)    Acute on chronic diastolic congestive heart failure (HCC)    Shortness of breath      Assessment/Plan    1  Acute hypoxic respiratory failure  IV diuretics initiated ( has had 20, 40 40mg IV lasix), some diuresis  Is more hypoxic and feels more short of breath  Spoke to primary team who is going to work up  for PE  They are  Less impressed for pneumonia with low procalcitonin  Initially required 2 L now on  5 L this morning  Patient feels more short of breath this morning     IV Lasix started  Net negative 1430  Concern initially for pneumonia however procalcitonin negative  SLIM ordering CTA PE study to rule out PE  There also stopping antibiotics given negative procalcitonin  He does have low grade fevers    2  Acute on chronic diastolic heart failure  TTE November/2020-LVEF 60%  Grade 2 diastolic dysfunction  RV size and function normal   IV diuretics initiated for concern for volume overload  Despite diuresis hypoxia worsened shortness of breath worsened  See 1  By  exam his  evidence of volume overload does not seem to  correspond with extent of hypoxia and shortness of breath  Check weight, strict I O    3  AS status post TAVR 10/2020- 2D echo #26 Braxton Jeffrey bioprosthesis present with mild para valvular regurgitation  4  CAD s/p CABG 2010  Left heart catheterization 09/2020-bypass grafts patent      On aspirin 81/BB-Lopressor 12 5 b i d /statin  Troponin negative x3  EKG- NSR RBBB/ 1st degree AV block- similar to prior    5  PAF-maintaining sinus rhythm, on low-dose beta-blocker  Not on [facility] as outpatient    6  HTN-well controlled    History of Present Illness   Physician Requesting Consult: Rufino Hermosillo DO  Reason for Consult / Principal Problem:  Heart failure    HPI: Mary Castaneda is a 80y o  year old male with AS status post TAVR  10/2020, CAD status post CABGX3  2010, PAF, HTN, HLD, chronic diastolic heart failure, infrarenal AAA, CVA/TIA, PAD , COPD, Parkinson's who presents with few weeks of shortness of breath  He is a poor historian  No chest pain or pressure  Denies lower extremity edema  Family also reported cough with blood-tinged mucus  Reported chronic lower extremity edema family felt same as before  Mildly hypoxic with a pulse ox of 88%  Was started on IV diuresis for suspicion of heart failure  Cardiology consult for heart failure  Troponin negative x3  ProBNP 1 603 ( 300 -10/2020)   D-dimer 2 62  COVID 19 and influenza negative  Chest CT scan without contrast-interval development of bilateral upper lobe ground-glass opacities consistent with pneumonia  Trace bilateral pleural effusions  Emphysema is present  Chest x-ray-diffuse ground-glass opacities upper lobes bilaterally new from January 2021 suspicious for pneumonia  Left heart catheterization prior to TAVR 09/2020- bypass grafts patent  Prox % chronic occlusion, OM1 100% chronic occlusion and mid % chronic occlusion  Inpatient consult to Cardiology  Consult performed by: MANI Roche  Consult ordered by: Ria Lee MD          Review of Systems   Constitutional: Positive for activity change, fatigue and fever  HENT: Negative  Eyes: Negative  Respiratory: Positive for cough and shortness of breath  Cardiovascular: Positive for leg swelling  Negative for chest pain and palpitations  Gastrointestinal: Negative  Endocrine: Negative  Genitourinary:         Porter intact Musculoskeletal: Positive for gait problem  Skin: Negative  Allergic/Immunologic: Negative  Neurological: Positive for weakness and light-headedness  Hematological: Bruises/bleeds easily  Psychiatric/Behavioral: Positive for confusion  All other systems reviewed and are negative  Historical Information   Past Medical History:   Diagnosis Date    Anemia     [facility] )     Cancer ([facility] )     bladder    Carotid artery occlusion     Cataract     Colon polyp     COPD (chronic obstructive pulmonary disease) (HCC)     Coronary artery disease     Disease of thyroid gland     History of transfusion     Hyperlipidemia     Hypertension     Hypothyroidism 9/15/2017    Multiple pulmonary nodules     Peptic ulcer     Scoliosis     SIRS (systemic inflammatory response syndrome) [facility] ) 12/19/2019    Transient cerebral ischemia     Tremors of nervous system      Past Surgical History:   Procedure Laterality Date   [facility] SURGERY      1973, 1987, 2005    BUNIONECTOMY Left     Simple exostectomy (silver procedure)    CAROTID ENDARTERECTOMY Right 09/10/2008    Randy LEDBETTER MD    CATARACT EXTRACTION      CATARACT EXTRACTION, BILATERAL      CERVICAL DISCECTOMY  1969    COLONOSCOPY      CORONARY ARTERY BYPASS GRAFT  10/20/2010    x3 (LIMA-LAD, SVG-OM, SVG-RCA)    CYSTOSCOPY      ELBOW SURGERY Right 07/2012    FEMORAL ARTERY - TIBIAL ARTERY BYPASS GRAFT  12/05/2011    using reversed saphenous vein from right leg  Jean Mcdaniel MD    MA ECHO TRANSESOPHAG R-T 2D W/PRB IMG ACQUISJ I&R N/A 10/6/2020    Procedure: TRANSESOPHAGEAL ECHOCARDIOGRAM (DAVID); Surgeon: Sergio Alexandra DO;  Location: [facility];  Service: Cardiac Surgery    MA REPLACE AORTIC VALVE OPENFEMORAL ARTERY APPROACH N/A 10/6/2020    Procedure: REPLACEMENT AORTIC VALVE TRANSCATHETER (TAVR) TRANSFEMORAL W/ 26MM MONTALVO BREE S3 ULTRA VALVE(ACCESS ON LEFT);   Surgeon: Sergio Alexandra DO; Location: [facility];  Service: Cardiac Surgery    REPLACEMENT TOTAL KNEE Left     SHOULDER SURGERY Right 1997     Social History     Substance and Sexual Activity   Alcohol Use Not Currently    Alcohol/week: 0 0 standard drinks    Comment: Social      Social History     Substance and Sexual Activity   Drug Use Never     Social History     Tobacco Use   Smoking Status Former Smoker    Packs/day: 1 00    Years: 50 00    Pack years: 50 00    Types: Cigarettes    Start date: 56    Quit date: 200    Years since quittin 2   Smokeless Tobacco Never Used     Family History:   Family History   Problem Relation Age of Onset    Cervical cancer Mother     Cervical cancer Sister     Heart disease Sister     Coronary artery disease Sister     Lung cancer Brother        Meds/Allergies   current meds:   Current Facility-Administered Medications   Medication Dose Route Frequency    acetaminophen (TYLENOL) tablet 650 mg  650 mg Oral Q6H PRN    aspirin chewable tablet 81 mg  81 mg Oral Daily    benzonatate (TESSALON PERLES) capsule 100 mg  100 mg Oral TID PRN    cholecalciferol (VITAMIN D3) tablet 1,000 Units  1,000 Units Oral Daily    docusate sodium (COLACE) capsule 100 mg  100 mg Oral BID    enoxaparin (LOVENOX) subcutaneous injection 40 mg  40 mg Subcutaneous Daily    furosemide (LASIX) injection 40 mg  40 mg Intravenous BID (diuretic)    guaiFENesin (MUCINEX) 12 hr tablet 600 mg  600 mg Oral Q12H [facility]    levalbuterol (XOPENEX) inhalation solution 1 25 mg  1 25 mg Nebulization Q8H PRN    levothyroxine tablet 75 mcg  75 mcg Oral Early Morning    metoprolol succinate (TOPROL-XL) 24 hr tablet 12 5 mg  12 5 mg Oral BID    ondansetron (ZOFRAN) injection 4 mg  4 mg Intravenous Q6H PRN    pantoprazole (PROTONIX) EC tablet 40 mg  40 mg Oral Early Morning    polyethylene glycol (MIRALAX) packet 17 g  17 g Oral Daily    potassium chloride (K-DUR,KLOR-CON) CR tablet 10 mEq  10 mEq Oral Daily    pravastatin (PRAVACHOL) tablet 20 mg  20 mg Oral Daily With Dinner    senna oral syrup 8 8 mg  8 8 mg Oral Daily    tamsulosin (FLOMAX) capsule 0 4 mg  0 4 mg Oral Daily With Dinner    tiotropium (SPIRIVA) capsule for inhaler 18 mcg  18 mcg Inhalation Daily    and PTA meds:    Medications Prior to Admission   Medication    acetaminophen (TYLENOL) 325 mg tablet    alfuzosin (UROXATRAL) 10 mg 24 hr tablet    aspirin 81 MG tablet    Cholecalciferol (HM VITAMIN D3) 2000 units CAPS    furosemide (LASIX) 20 mg tablet    levothyroxine 75 mcg tablet    metoprolol succinate (TOPROL-XL) 25 mg 24 hr tablet    Multiple Vitamin (multivitamin) capsule    omeprazole (PriLOSEC) 20 mg delayed release capsule    potassium chloride (K-DUR,KLOR-CON) 10 mEq tablet    simvastatin (ZOCOR) 20 mg tablet    SPIRIVA RESPIMAT 2 5 MCG/ACT AERS inhaler    hydrocortisone 2 5 % cream     Allergies   Allergen Reactions    Aleve [Naproxen]      Other reaction(s): FACIAL SWELLING  Category: Allergy; Annotation - [id number]: lip/eye edema    Ancef [Cefazolin] Anaphylaxis     Hypotension, rash, itching    Augmentin [Amoxicillin-Pot Clavulanate] Diarrhea and Vomiting       Objective   Vitals: Blood pressure 118/61, pulse 86, temperature 99 5 °F (37 5 °C), resp  rate 16, height 5' 6" (1 676 m), weight 62 2 kg (137 lb 2 oz), SpO2 94 %    Orthostatic Blood Pressures      Most Recent Value   Blood Pressure  118/61 filed at 04/12/2021 1023   Patient Position - Orthostatic VS  Lying filed at 04/11/2021 1133            Intake/Output Summary (Last 24 hours) at 4/12/2021 0953  Last data filed at 4/12/2021 0501  Gross per 24 hour   Intake 520 ml   Output 1950 ml   Net -1430 ml       Invasive Devices     Peripheral Intravenous Line            Peripheral IV 04/11/21 Left;Ventral (anterior) Forearm less than 1 day    Peripheral IV 04/11/21 Right;Ventral (anterior) Forearm less than 1 day          Drain            Urethral Catheter Coude 16 Fr  76 days                Physical Exam: /61   Pulse 86   Temp 99 5 °F (37 5 °C)   Resp 16   Ht 5' 6" (1 676 m)   Wt 62 2 kg (137 lb 2 oz)   SpO2 94%   BMI 22 13 kg/m²      General Appearance:    Alert, cooperative, no distress, appears stated age   Head:    Normocephalic, no scleral icterus   Eyes:    PERRL   Nose:   Nares normal, septum midline, mucosa normal, no drainage    Throat:   Lips, mucosa, and tongue normal   Neck:   Supple, symmetrical, trachea midline     no  JVD       Lungs:     Clear/decreased to auscultation bilaterally, respirations  labored        Heart:    Regular rate and rhythm, S1 and S2 normal, no murmur, rub   or gallop   Abdomen:     Soft, non-tender, bowel sounds active all four quadrants,     no masses, no organomegaly   Extremities:   Extremities normal, atraumatic, no cyanosis or edema   Pulses:   2+ and symmetric all extremities   Skin:   Skin color, texture, turgor normal, no rashes or lesions   Neurologic:   Alert and oriented to person place and time   No focal deficits       Lab Results:   Recent Results (from the past 72 hour(s))   ECG 12 lead    Collection Time: 04/11/21 11:36 AM   Result Value Ref Range    Ventricular Rate 90 BPM    Atrial Rate 90 BPM    CT Interval 228 ms    QRSD Interval 142 ms    QT Interval 408 ms    QTC Interval 499 ms    P Axis 71 degrees    QRS Axis 60 degrees    T Wave Axis 37 degrees   CBC and differential    Collection Time: 04/11/21 11:52 AM   Result Value Ref Range    WBC 6 89 4 31 - 10 16 Thousand/uL    RBC 3 43 (L) 3 88 - 5 62 Million/uL    Hemoglobin 10 9 (L) 12 0 - 17 0 g/dL    Hematocrit 32 8 (L) 36 5 - 49 3 %    MCV 96 82 - 98 fL    MCH 31 8 26 8 - 34 3 pg    MCHC 33 2 31 4 - 37 4 g/dL    RDW 14 4 11 6 - 15 1 %    MPV 11 1 8 9 - 12 7 fL    Platelets 563 (L) 207 - 390 Thousands/uL    nRBC 0 /100 WBCs    Neutrophils Relative 71 43 - 75 %    Immat GRANS % 1 0 - 2 %    Lymphocytes Relative 8 (L) 14 - 44 %    Monocytes Relative 18 (H) 4 - 12 % Eosinophils Relative 2 0 - 6 %    Basophils Relative 0 0 - 1 %    Neutrophils Absolute 4 92 1 85 - 7 62 Thousands/µL    Immature Grans Absolute 0 04 0 00 - 0 20 Thousand/uL    Lymphocytes Absolute 0 55 (L) 0 60 - 4 47 Thousands/µL    Monocytes Absolute 1 26 (H) 0 17 - 1 22 Thousand/µL    Eosinophils Absolute 0 10 0 00 - 0 61 Thousand/µL    Basophils Absolute 0 02 0 00 - 0 10 Thousands/µL   Basic metabolic panel    Collection Time: 04/11/21 11:52 AM   Result Value Ref Range    Sodium 136 136 - 145 mmol/L    Potassium 3 9 3 5 - 5 3 mmol/L    Chloride 105 100 - 108 mmol/L    CO2 26 21 - 32 mmol/L    ANION GAP 5 4 - 13 mmol/L    BUN 18 5 - 25 mg/dL    Creatinine 0 88 0 60 - 1 30 mg/dL    Glucose 105 65 - 140 mg/dL    Calcium 8 4 8 3 - 10 1 mg/dL    eGFR 77 ml/min/1 73sq m   Troponin I    Collection Time: 04/11/21 11:52 AM   Result Value Ref Range    Troponin I <0 02 <=0 04 ng/mL   NT-BNP PRO    Collection Time: 04/11/21 11:52 AM   Result Value Ref Range    NT-proBNP 1,603 (H) <450 pg/mL   COVID19, Influenza A/B, RSV PCR, [facility]    Collection Time: 04/11/21 11:52 AM    Specimen: Nasopharyngeal Swab; Nares   Result Value Ref Range    SARS-CoV-2 Negative Negative    INFLUENZA A PCR Negative Negative    INFLUENZA B PCR Negative Negative    RSV PCR Negative Negative   Platelet count    Collection Time: 04/11/21  4:44 PM   Result Value Ref Range    Platelets 990 (L) 477 - 390 Thousands/uL    MPV 10 4 8 9 - 12 7 fL   Troponin I    Collection Time: 04/11/21  4:44 PM   Result Value Ref Range    Troponin I <0 02 <=0 04 ng/mL   Procalcitonin with AM Reflex    Collection Time: 04/11/21  4:44 PM   Result Value Ref Range    Procalcitonin <0 05 <=0 25 ng/ml   Legionella antigen, urine    Collection Time: 04/11/21 10:16 PM    Specimen: Urine, Catheter   Result Value Ref Range    Legionella Urinary Antigen Negative Negative   Strep Pneumoniae, Urine    Collection Time: 04/11/21 10:16 PM    Specimen: Urine, Catheter   Result Value Ref Range    Strep pneumoniae antigen, urine Negative Negative   Troponin I    Collection Time: 04/11/21 10:27 PM   Result Value Ref Range    Troponin I <0 02 <=0 04 ng/mL   Comprehensive metabolic panel    Collection Time: 04/12/21  4:43 AM   Result Value Ref Range    Sodium 135 (L) 136 - 145 mmol/L    Potassium 3 9 3 5 - 5 3 mmol/L    Chloride 104 100 - 108 mmol/L    CO2 26 21 - 32 mmol/L    ANION GAP 5 4 - 13 mmol/L    BUN 21 5 - 25 mg/dL    Creatinine 0 81 0 60 - 1 30 mg/dL    Glucose 91 65 - 140 mg/dL    Calcium 8 3 8 3 - 10 1 mg/dL    Corrected Calcium 9 1 8 3 - 10 1 mg/dL    AST 18 5 - 45 U/L    ALT 17 12 - 78 U/L    Alkaline Phosphatase 98 46 - 116 U/L    Total Protein 6 4 6 4 - 8 2 g/dL    Albumin 3 0 (L) 3 5 - 5 0 g/dL    Total Bilirubin 0 64 0 20 - 1 00 mg/dL    eGFR 79 ml/min/1 73sq m   Magnesium    Collection Time: 04/12/21  4:43 AM   Result Value Ref Range    Magnesium 2 2 1 6 - 2 6 mg/dL   CBC (With Platelets)    Collection Time: 04/12/21  4:43 AM   Result Value Ref Range    WBC 6 11 4 31 - 10 16 Thousand/uL    RBC 3 22 (L) 3 88 - 5 62 Million/uL    Hemoglobin 10 0 (L) 12 0 - 17 0 g/dL    Hematocrit 31 1 (L) 36 5 - 49 3 %    MCV 97 82 - 98 fL    MCH 31 1 26 8 - 34 3 pg    MCHC 32 2 31 4 - 37 4 g/dL    RDW 14 3 11 6 - 15 1 %    Platelets 827 (L) 608 - 390 Thousands/uL    MPV 11 4 8 9 - 12 7 fL   D-dimer, quantitative    Collection Time: 04/12/21  4:43 AM   Result Value Ref Range    D-Dimer, Quant 2 62 (H) <0 50 ug/ml FEU   Procalcitonin Reflex    Collection Time: 04/12/21  5:18 AM   Result Value Ref Range    Procalcitonin <0 05 <=0 25 ng/ml     Imaging: I have personally reviewed pertinent reports  EKG:  Sinus rhythm/RBBB/first-degree AV block  VTE Prophylaxis: Enoxaparin (Lovenox)    Code Status: Level 1 - Full Code  Advance Directive and Living Will: Yes    Power of :    POLST:      Counseling / Coordination of Care  Total floor / unit time spent today 50 minutes    Greater than 50% of total time was spent with the patient and / or family counseling and / or coordination of care

## 2021-04-12 NOTE — CASE MANAGEMENT
CM obtained all the following info about the pt  HOME: Pt resides in a 1-story house w/ 12 steps down to basement and 1 step to enter at front door  LIVES W/: Wife  : Pt's wife Myra Kunz (c: [phone]) is primary contact, and pt's daughter Adilene Greenwood (cL [phone]) is secondary contact  INDEPENDENCE: Pt uses a rolling walker at baseline to ambulate, but is independent at baseline w/ performing his ADLS  DME: Rolling walker, bedside commode, and shower seat  HHC: Pt is currently open to [facility]  I/P REHAB: No hx of placements reported  MENTAL HEALTH: No hx of issues reported  D&A: No hx of issues reported  PCP: Dr Solo Hoffmann through [facility]  PHARMACY: [facility] pharmacy located on [address]  in [facility]  INCOME SOURCE: Pension and Social Security benefits  INSURANCE: Medicare for primary healthcare coverage, AARP/[employer] supplemental plan for secondary healthcare coverage, and [employer] plan for Rx coverage  MEDICAL POA: Pt has a Living Will which legally pre-designates his wife as his medical POA appointed for him in emergencies  TRANSPORTATION AT D/C: Pt's family will provide transport  CM made Ecin referral to [facility] for them to follow pt's case while hospitalized  The pt and his family are aware and agreeable to this referral     CM reviewed d/c planning process including the following: identifying help at home, patient preference for d/c planning needs, Discharge Lounge, [facility] Meds to Bed program, availability of treatment team to discuss questions or concerns patient and/or family may have regarding understanding medications and recognizing signs and symptoms once discharged  CM also encouraged patient to follow up with all recommended appointments after discharge  Patient advised of importance for patient and family to participate in managing patients medical well being   Patient/caregiver received discharge checklist  Content reviewed  Patient/caregiver encouraged to participate in discharge plan of care prior to discharge home

## 2021-04-12 NOTE — PHYSICAL THERAPY NOTE
Physical Therapy Evaluation    Patient's Name: Erin Freitas    Admitting Diagnosis  SOB (shortness of breath) [R06 02]  Dyspnea on exertion [R06 00]  Heart failure ([facility] ) [I50 9]  Requires oxygen therapy [Z99 81]    Problem List  Patient Active Problem List   Diagnosis    Severe aortic stenosis    PAF (paroxysmal atrial fibrillation) (HCC)    Benign prostatic hyperplasia    Carotid stenosis, bilateral    Atherosclerotic heart disease of native coronary artery with other forms of angina pectoris ([facility] [address] )    Esophageal reflux    Essential and other specified forms of tremor    Generalized osteoarthritis of multiple sites    Hyperlipidemia    Hypertension    Hypothyroidism    Impaired fasting glucose    Left foot drop    Mononeuritis    Peripheral arterial disease ([facility] [address] )    Pulmonary emphysema ([facility] [address] )    RBBB    Ab[facility] aneurysm (AAA) without rupture [facility] )    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    Stenosis of carotid artery    Iliac artery aneurysm, bilateral (HCC)    Urine retention    Indwelling Porter catheter calcification (HCC)    Ambulatory dysfunction    S/P TAVR (transcatheter aortic valve replacement)    Normocytic anemia    Thrombocytopenia (HCC)    Sepsis without acute organ dysfunction (HCC)    Pancytopenia (HCC)    Acute on chronic diastolic congestive heart failure (HCC)    Stage 3a chronic kidney disease    Debility    Acute respiratory failure with hypoxia (HCC)    Shortness of breath       Past Medical History  Past Medical History:   Diagnosis Date    Anemia     Bladder cancer ([facility] [address] )     Cancer ([facility] )     bladder    Carotid artery occlusion     Cataract     Colon polyp     COPD (chronic obstructive pulmonary disease) (HCC)     Coronary artery disease     Disease of thyroid gland     History of transfusion     Hyperlipidemia     Hypertension     Hypothyroidism 9/15/2017    Multiple pulmonary nodules     Peptic ulcer     Scoliosis  SIRS (systemic inflammatory response syndrome) ([facility] [address] ) 12/19/2019    Transient cerebral ischemia     Tremors of nervous system        Past Surgical History  Past Surgical History:   Procedure Laterality Date   [facility] SURGERY      1973, 1987, 2005    BUNIONECTOMY Left     Simple exostectomy (silver procedure)    CAROTID ENDARTERECTOMY Right 09/10/2008    Randy LEDBETTER MD    CATARACT EXTRACTION      CATARACT EXTRACTION, BILATERAL      CERVICAL DISCECTOMY  1969    COLONOSCOPY      CORONARY ARTERY BYPASS GRAFT  10/20/2010    x3 (LIMA-LAD, SVG-OM, SVG-RCA)    CYSTOSCOPY      ELBOW SURGERY Right 07/2012    FEMORAL ARTERY - TIBIAL ARTERY BYPASS GRAFT  12/05/2011    using reversed saphenous vein from right leg  Jean Mcdaniel MD    SD ECHO TRANSESOPHAG R-T 2D W/PRB IMG ACQUISJ I&R N/A 10/6/2020    Procedure: TRANSESOPHAGEAL ECHOCARDIOGRAM (DAVID); Surgeon: Sergio Alexandra DO;  Location: [facility];  Service: Cardiac Surgery    SD REPLACE AORTIC VALVE OPENFEMORAL ARTERY APPROACH N/A 10/6/2020    Procedure: REPLACEMENT AORTIC VALVE TRANSCATHETER (TAVR) TRANSFEMORAL W/ 26MM MONTALVO BREE S3 ULTRA VALVE(ACCESS ON LEFT); Surgeon: Sergio Alexandra DO;  Location: [facility];  Service: Cardiac Surgery    REPLACEMENT TOTAL KNEE Left     SHOULDER SURGERY Right 1997 04/12/21 1105   PT Last Visit   PT Visit Date 04/12/21   Note Type   Note type Evaluation   Pain Assessment   Pain Assessment Tool 0-10   Pain Score 2   Pain Location/Orientation Location: Generalized   Patient's Stated Pain Goal No pain   Hospital Pain Intervention(s) Ambulation/increased activity   Home Living   Type of Home House   Additional Comments Resides w/ wife in 1 story home, 1 CHERRY and 12 stairs to basement  Indep self care    ambulates w/ RW vs rollator   Prior Function   Level of [place] Independent with ADLs and functional mobility   Falls in the last 6 months 1 to 4   Restrictions/Precautions   Weight Bearing Precautions Per Order No   Other Precautions Pain; Fall Risk;Multiple lines   General   Family/Caregiver Present No   Cognition   Overall Cognitive Status WFL   Arousal/Participation Responsive   Attention Within functional limits   Orientation Level Oriented X4   Memory Unable to assess   Following Commands Follows one step commands without difficulty   RLE Assessment   RLE Assessment   (strength grossly 3+/5)   LLE Assessment   LLE Assessment   (strength grossly 3+/5)   Coordination   Movements are Fluid and Coordinated 0   Coordination and Movement Description B LE ataxia   Transfers   Sit to Stand 3  Moderate assistance   Additional items Assist x 1   Stand to Sit 4  Minimal assistance   Additional items Assist x 1   Additional Comments standing trial x 1 at chair x 2-3 min to help clean pt after using bedpan in chair  after same, sat x 5 min, then did gait as below   Ambulation/Elevation   Gait pattern   (slow, ataxia, short step length)   Gait Assistance 3  Moderate assist   Additional items Assist x 1  (w/ 2nd for chair follow)   Assistive Device Rolling walker   Distance 40'x1   Balance   Static Sitting Good   Dynamic Sitting Fair -   Static Standing Poor +   Dynamic Standing Poor +   Ambulatory Poor +   Endurance Deficit   Endurance Deficit Yes   Endurance Deficit Description fatigue, weakness, pain   Activity Tolerance   Activity Tolerance Patient limited by fatigue;Patient limited by pain;Treatment limited secondary to medical complications (Comment)   Nurse Made Aware yes   Assessment   Prognosis Fair   Problem List Decreased strength;Decreased mobility; Impaired balance;Decreased endurance;Pain;Decreased coordination   Assessment Pt seen for high complexity physical therapy evaluation  Pt is an 81 y/o male w/ history/comorbidities of COPD, CHF, recent sepsis, anemia, bladder CA, CAD, HTN, HLD who is now admitted w/ SOB, cough, dizziness  Dx is acute hypoxia respiratory failure    Due to acute medical issues, pain, fall risk, note unstable clinical picture  PT consulted to assess mobility, d/c needs  Pt presents w/ decreased functional mob, standing balance, endurance, B LE strength, barriers at home  will benefit from skilled PT to correct for the above problems  Recommend rehab at d/c   Goals   Patient Goals to go home   STG Expiration Date 04/26/21   Short Term Goal #1 1-2 wks: bed mob and transfers w/ indep, standing balance to good/normal w/ device, ambulate 100-150 ft w/ RW and S, increase B LE strength by 1/2 -1 grade, ambulate 1 CHERRY w/ S   PT Treatment Day 0   Plan   Treatment/Interventions Functional transfer training;LE strengthening/ROM; Elevations; Therapeutic exercise; Endurance training;Bed mobility;Gait training;Patient/family training;Equipment eval/education   PT Frequency Other (Comment)  (3-5x/wk)   Recommendation   PT Discharge Recommendation Post-Acute Rehabilitation Services   Equipment Recommended Obie Castleman  (already owns)   PT - OK to Discharge Yes  (when stable to rehab)   Monica 8 in Bed Without Bedrails 2   Lying on Back to Sitting on Edge of Flat Bed 2   Moving Bed to Chair 2   Standing Up From Chair 2   Walk in Room 3   Climb 3-5 Stairs 2   Basic Mobility Inpatient Raw Score 13   Basic Mobility Standardized Score 33 99           Esther Ramos PT, DPT, CSRS

## 2021-04-13 ENCOUNTER — APPOINTMENT (INPATIENT)
Dept: RADIOLOGY | Facility: HOSPITAL | Age: 86
DRG: 196 | End: 2021-04-13
Payer: MEDICARE

## 2021-04-13 ENCOUNTER — ANESTHESIA (INPATIENT)
Dept: GASTROENTEROLOGY | Facility: HOSPITAL | Age: 86
DRG: 196 | End: 2021-04-13
Payer: MEDICARE

## 2021-04-13 ENCOUNTER — APPOINTMENT (INPATIENT)
Dept: GASTROENTEROLOGY | Facility: HOSPITAL | Age: 86
DRG: 196 | End: 2021-04-13
Attending: INTERNAL MEDICINE
Payer: MEDICARE

## 2021-04-13 LAB
ANION GAP SERPL CALCULATED.3IONS-SCNC: 6 MMOL/L (ref 4–13)
BACTERIA SPT RESP CULT: ABNORMAL
BASOPHILS # BLD AUTO: 0.01 THOUSANDS/ΜL (ref 0–0.1)
BASOPHILS NFR BLD AUTO: 0 % (ref 0–1)
BUN SERPL-MCNC: 22 MG/DL (ref 5–25)
CALCIUM SERPL-MCNC: 8.4 MG/DL (ref 8.3–10.1)
CHLORIDE SERPL-SCNC: 104 MMOL/L (ref 100–108)
CO2 SERPL-SCNC: 26 MMOL/L (ref 21–32)
CREAT SERPL-MCNC: 0.84 MG/DL (ref 0.6–1.3)
EOSINOPHIL # BLD AUTO: 0.2 THOUSAND/ΜL (ref 0–0.61)
EOSINOPHIL NFR BLD AUTO: 3 % (ref 0–6)
ERYTHROCYTE [DISTWIDTH] IN BLOOD BY AUTOMATED COUNT: 14.4 % (ref 11.6–15.1)
GFR SERPL CREATININE-BSD FRML MDRD: 78 ML/MIN/1.73SQ M
GLUCOSE SERPL-MCNC: 98 MG/DL (ref 65–140)
GRAM STN SPEC: ABNORMAL
HCT VFR BLD AUTO: 30.7 % (ref 36.5–49.3)
HGB BLD-MCNC: 10.1 G/DL (ref 12–17)
IMM GRANULOCYTES # BLD AUTO: 0.04 THOUSAND/UL (ref 0–0.2)
IMM GRANULOCYTES NFR BLD AUTO: 1 % (ref 0–2)
LYMPHOCYTES # BLD AUTO: 0.59 THOUSANDS/ΜL (ref 0.6–4.47)
LYMPHOCYTES NFR BLD AUTO: 9 % (ref 14–44)
MCH RBC QN AUTO: 31.2 PG (ref 26.8–34.3)
MCHC RBC AUTO-ENTMCNC: 32.9 G/DL (ref 31.4–37.4)
MCV RBC AUTO: 95 FL (ref 82–98)
MONOCYTES # BLD AUTO: 1.16 THOUSAND/ΜL (ref 0.17–1.22)
MONOCYTES NFR BLD AUTO: 17 % (ref 4–12)
MRSA NOSE QL CULT: NORMAL
NEUTROPHILS # BLD AUTO: 4.67 THOUSANDS/ΜL (ref 1.85–7.62)
NEUTS SEG NFR BLD AUTO: 70 % (ref 43–75)
NRBC BLD AUTO-RTO: 0 /100 WBCS
PLATELET # BLD AUTO: 148 THOUSANDS/UL (ref 149–390)
PMV BLD AUTO: 10.5 FL (ref 8.9–12.7)
POTASSIUM SERPL-SCNC: 3.7 MMOL/L (ref 3.5–5.3)
RBC # BLD AUTO: 3.24 MILLION/UL (ref 3.88–5.62)
SODIUM SERPL-SCNC: 136 MMOL/L (ref 136–145)
WBC # BLD AUTO: 6.67 THOUSAND/UL (ref 4.31–10.16)

## 2021-04-13 PROCEDURE — 94640 AIRWAY INHALATION TREATMENT: CPT

## 2021-04-13 PROCEDURE — 93970 EXTREMITY STUDY: CPT | Performed by: SURGERY

## 2021-04-13 PROCEDURE — 87070 CULTURE OTHR SPECIMN AEROBIC: CPT | Performed by: INTERNAL MEDICINE

## 2021-04-13 PROCEDURE — 88185 FLOWCYTOMETRY/TC ADD-ON: CPT | Performed by: INTERNAL MEDICINE

## 2021-04-13 PROCEDURE — 88112 CYTOPATH CELL ENHANCE TECH: CPT | Performed by: PATHOLOGY

## 2021-04-13 PROCEDURE — 31624 DX BRONCHOSCOPE/LAVAGE: CPT | Performed by: INTERNAL MEDICINE

## 2021-04-13 PROCEDURE — 31623 DX BRONCHOSCOPE/BRUSH: CPT | Performed by: INTERNAL MEDICINE

## 2021-04-13 PROCEDURE — 0B948ZZ DRAINAGE OF RIGHT UPPER LOBE BRONCHUS, VIA NATURAL OR ARTIFICIAL OPENING ENDOSCOPIC: ICD-10-PCS | Performed by: INTERNAL MEDICINE

## 2021-04-13 PROCEDURE — 99232 SBSQ HOSP IP/OBS MODERATE 35: CPT | Performed by: NURSE PRACTITIONER

## 2021-04-13 PROCEDURE — 99232 SBSQ HOSP IP/OBS MODERATE 35: CPT | Performed by: INTERNAL MEDICINE

## 2021-04-13 PROCEDURE — 87102 FUNGUS ISOLATION CULTURE: CPT | Performed by: INTERNAL MEDICINE

## 2021-04-13 PROCEDURE — 71045 X-RAY EXAM CHEST 1 VIEW: CPT

## 2021-04-13 PROCEDURE — 0B998ZZ DRAINAGE OF LINGULA BRONCHUS, VIA NATURAL OR ARTIFICIAL OPENING ENDOSCOPIC: ICD-10-PCS | Performed by: INTERNAL MEDICINE

## 2021-04-13 PROCEDURE — 85025 COMPLETE CBC W/AUTO DIFF WBC: CPT | Performed by: PHYSICIAN ASSISTANT

## 2021-04-13 PROCEDURE — 99233 SBSQ HOSP IP/OBS HIGH 50: CPT | Performed by: INTERNAL MEDICINE

## 2021-04-13 PROCEDURE — 89051 BODY FLUID CELL COUNT: CPT | Performed by: PATHOLOGY

## 2021-04-13 PROCEDURE — 87252 VIRUS INOCULATION TISSUE: CPT | Performed by: INTERNAL MEDICINE

## 2021-04-13 PROCEDURE — 88184 FLOWCYTOMETRY/ TC 1 MARKER: CPT | Performed by: INTERNAL MEDICINE

## 2021-04-13 PROCEDURE — 94760 N-INVAS EAR/PLS OXIMETRY 1: CPT

## 2021-04-13 PROCEDURE — 80048 BASIC METABOLIC PNL TOTAL CA: CPT | Performed by: PHYSICIAN ASSISTANT

## 2021-04-13 PROCEDURE — 0BD48ZX EXTRACTION OF RIGHT UPPER LOBE BRONCHUS, VIA NATURAL OR ARTIFICIAL OPENING ENDOSCOPIC, DIAGNOSTIC: ICD-10-PCS | Performed by: INTERNAL MEDICINE

## 2021-04-13 RX ORDER — PROPOFOL 10 MG/ML
INJECTION, EMULSION INTRAVENOUS CONTINUOUS PRN
Status: DISCONTINUED | OUTPATIENT
Start: 2021-04-13 | End: 2021-04-13

## 2021-04-13 RX ORDER — SODIUM CHLORIDE 9 MG/ML
INJECTION, SOLUTION INTRAVENOUS CONTINUOUS PRN
Status: DISCONTINUED | OUTPATIENT
Start: 2021-04-13 | End: 2021-04-13

## 2021-04-13 RX ORDER — GLYCOPYRROLATE 0.2 MG/ML
INJECTION INTRAMUSCULAR; INTRAVENOUS AS NEEDED
Status: DISCONTINUED | OUTPATIENT
Start: 2021-04-13 | End: 2021-04-13

## 2021-04-13 RX ORDER — LIDOCAINE HYDROCHLORIDE 10 MG/ML
INJECTION, SOLUTION EPIDURAL; INFILTRATION; INTRACAUDAL; PERINEURAL AS NEEDED
Status: DISCONTINUED | OUTPATIENT
Start: 2021-04-13 | End: 2021-04-13

## 2021-04-13 RX ORDER — ALBUTEROL SULFATE 2.5 MG/3ML
2.5 SOLUTION RESPIRATORY (INHALATION) EVERY 6 HOURS PRN
Status: DISCONTINUED | OUTPATIENT
Start: 2021-04-13 | End: 2021-04-17

## 2021-04-13 RX ORDER — FUROSEMIDE 10 MG/ML
40 INJECTION INTRAMUSCULAR; INTRAVENOUS ONCE
Status: COMPLETED | OUTPATIENT
Start: 2021-04-13 | End: 2021-04-13

## 2021-04-13 RX ORDER — KETAMINE HYDROCHLORIDE 50 MG/ML
INJECTION, SOLUTION, CONCENTRATE INTRAMUSCULAR; INTRAVENOUS AS NEEDED
Status: DISCONTINUED | OUTPATIENT
Start: 2021-04-13 | End: 2021-04-13

## 2021-04-13 RX ORDER — DEXMEDETOMIDINE HYDROCHLORIDE 100 UG/ML
INJECTION, SOLUTION INTRAVENOUS AS NEEDED
Status: DISCONTINUED | OUTPATIENT
Start: 2021-04-13 | End: 2021-04-13

## 2021-04-13 RX ORDER — PROPOFOL 10 MG/ML
INJECTION, EMULSION INTRAVENOUS AS NEEDED
Status: DISCONTINUED | OUTPATIENT
Start: 2021-04-13 | End: 2021-04-13

## 2021-04-13 RX ORDER — AZITHROMYCIN 500 MG/1
500 TABLET, FILM COATED ORAL EVERY 24 HOURS
Status: COMPLETED | OUTPATIENT
Start: 2021-04-13 | End: 2021-04-14

## 2021-04-13 RX ADMIN — TAMSULOSIN HYDROCHLORIDE 0.4 MG: 0.4 CAPSULE ORAL at 18:17

## 2021-04-13 RX ADMIN — DEXMEDETOMIDINE HCL 4 MCG: 100 INJECTION INTRAVENOUS at 13:10

## 2021-04-13 RX ADMIN — DEXMEDETOMIDINE HCL 4 MCG: 100 INJECTION INTRAVENOUS at 13:05

## 2021-04-13 RX ADMIN — GUAIFENESIN 600 MG: 600 TABLET, EXTENDED RELEASE ORAL at 20:12

## 2021-04-13 RX ADMIN — POLYETHYLENE GLYCOL 3350 17 G: 17 POWDER, FOR SOLUTION ORAL at 15:14

## 2021-04-13 RX ADMIN — GLYCOPYRROLATE 0.1 MG: 0.2 INJECTION, SOLUTION INTRAMUSCULAR; INTRAVENOUS at 13:03

## 2021-04-13 RX ADMIN — ALBUTEROL SULFATE 2.5 MG: 2.5 SOLUTION RESPIRATORY (INHALATION) at 18:31

## 2021-04-13 RX ADMIN — TIOTROPIUM BROMIDE 18 MCG: 18 CAPSULE ORAL; RESPIRATORY (INHALATION) at 08:48

## 2021-04-13 RX ADMIN — DEXMEDETOMIDINE HCL 4 MCG: 100 INJECTION INTRAVENOUS at 12:55

## 2021-04-13 RX ADMIN — LEVOTHYROXINE SODIUM 75 MCG: 75 TABLET ORAL at 05:24

## 2021-04-13 RX ADMIN — DEXMEDETOMIDINE HCL 4 MCG: 100 INJECTION INTRAVENOUS at 12:54

## 2021-04-13 RX ADMIN — PROPOFOL 20 MG: 10 INJECTION, EMULSION INTRAVENOUS at 12:58

## 2021-04-13 RX ADMIN — POTASSIUM CHLORIDE 10 MEQ: 750 TABLET, EXTENDED RELEASE ORAL at 15:13

## 2021-04-13 RX ADMIN — PHENYLEPHRINE HYDROCHLORIDE 200 MCG: 10 INJECTION INTRAVENOUS at 13:10

## 2021-04-13 RX ADMIN — DEXMEDETOMIDINE HCL 4 MCG: 100 INJECTION INTRAVENOUS at 12:51

## 2021-04-13 RX ADMIN — KETAMINE HYDROCHLORIDE 10 MG: 50 INJECTION, SOLUTION INTRAMUSCULAR; INTRAVENOUS at 12:54

## 2021-04-13 RX ADMIN — PROPOFOL 50 MCG/KG/MIN: 10 INJECTION, EMULSION INTRAVENOUS at 12:51

## 2021-04-13 RX ADMIN — METOPROLOL SUCCINATE 12.5 MG: 25 TABLET, EXTENDED RELEASE ORAL at 20:12

## 2021-04-13 RX ADMIN — KETAMINE HYDROCHLORIDE 10 MG: 50 INJECTION, SOLUTION INTRAMUSCULAR; INTRAVENOUS at 12:51

## 2021-04-13 RX ADMIN — ACETAMINOPHEN 650 MG: 325 TABLET ORAL at 20:13

## 2021-04-13 RX ADMIN — PHENYLEPHRINE HYDROCHLORIDE 100 MCG: 10 INJECTION INTRAVENOUS at 13:20

## 2021-04-13 RX ADMIN — PRAVASTATIN SODIUM 20 MG: 20 TABLET ORAL at 18:17

## 2021-04-13 RX ADMIN — DEXMEDETOMIDINE HCL 4 MCG: 100 INJECTION INTRAVENOUS at 12:49

## 2021-04-13 RX ADMIN — FUROSEMIDE 40 MG: 10 INJECTION, SOLUTION INTRAVENOUS at 19:32

## 2021-04-13 RX ADMIN — PHENYLEPHRINE HYDROCHLORIDE 30 MCG/MIN: 10 INJECTION INTRAVENOUS at 13:03

## 2021-04-13 RX ADMIN — SENNOSIDES 8.8 MG: 8.8 SYRUP ORAL at 18:16

## 2021-04-13 RX ADMIN — KETAMINE HYDROCHLORIDE 10 MG: 50 INJECTION, SOLUTION INTRAMUSCULAR; INTRAVENOUS at 13:02

## 2021-04-13 RX ADMIN — DOCUSATE SODIUM 100 MG: 100 CAPSULE, LIQUID FILLED ORAL at 18:17

## 2021-04-13 RX ADMIN — PHENYLEPHRINE HYDROCHLORIDE 100 MCG: 10 INJECTION INTRAVENOUS at 13:03

## 2021-04-13 RX ADMIN — AZITHROMYCIN 500 MG: 500 TABLET, FILM COATED ORAL at 15:14

## 2021-04-13 RX ADMIN — ONDANSETRON 4 MG: 2 INJECTION INTRAMUSCULAR; INTRAVENOUS at 18:48

## 2021-04-13 RX ADMIN — PHENYLEPHRINE HYDROCHLORIDE 100 MCG: 10 INJECTION INTRAVENOUS at 13:06

## 2021-04-13 RX ADMIN — PANTOPRAZOLE SODIUM 40 MG: 40 TABLET, DELAYED RELEASE ORAL at 05:24

## 2021-04-13 RX ADMIN — LIDOCAINE HYDROCHLORIDE 50 MG: 10 INJECTION, SOLUTION EPIDURAL; INFILTRATION; INTRACAUDAL; PERINEURAL at 12:54

## 2021-04-13 RX ADMIN — SODIUM CHLORIDE: 9 INJECTION, SOLUTION INTRAVENOUS at 12:49

## 2021-04-13 NOTE — ASSESSMENT & PLAN NOTE
· Patient came to the hospital with worsening shortness of breath  Patient required oxygen 2 L nasal cannula which is new  Patient definitely has element of fluid overload causing symptoms and has put out 1430 mL overnight, however Dimer elevated at 2 62  Was started on antibiotics for suspected pneumonia, however Procalcitonin negative x2  Family did note patient spitting out blood tinged sputum   Was not on anticoagulation prehospital   · Continue supplementary O2 to keep sats at 92  · CTA without PE  · S/p bronchoscopy today with BAL, await studies and cultures  · Continue azithro for suspected atypical pneumonia

## 2021-04-13 NOTE — ED ATTENDING ATTESTATION
4/11/2021  Isaac VILLARREAL MD, saw and evaluated the patient  I have discussed the patient with the resident/non-physician practitioner and agree with the resident's/non-physician practitioner's findings, Plan of Care, and MDM as documented in the resident's/non-physician practitioner's note, except where noted  All available labs and Radiology studies were reviewed  I was present for key portions of any procedure(s) performed by the resident/non-physician practitioner and I was immediately available to provide assistance  At this point I agree with the current assessment done in the Emergency Department  I have conducted an independent evaluation of this patient a history and physical is as follows:    ED Course    patient is an 59-year-old male presenting with several days of progressive shortness of breath  Patient noted to be hypoxic to 88% on room air  Patient has received both doses of coronavirus vaccine at while back  Patient does admit to a nonproductive cough and dyspnea    Vital signs reviewed  Heart is regular rate rhythm  Lungs remarkable for rhonchi lower lung fields  Abdomen soft nontender nondistended normal bowel sounds  Extremities unremarkable  Impression:  New onset dyspnea will check screening labs BNP chest x-ray ECG cardiac enzymes anticipate admission to hospital given patient's hypoxia    Will check COVID test         Critical Care Time  Procedures

## 2021-04-13 NOTE — PROGRESS NOTES
Progress Note - Cardiology   Jovanny Hernandez 80 y o  male MRN: [id number]  Unit/Bed#: [facility] 431-01 Encounter: [id number]        Principal Problem:    Acute respiratory failure with hypoxia ([address] )  Active Problems:    Severe aortic stenosis    PAF (paroxysmal atrial fibrillation) (HCC)    Hyperlipidemia    Hypertension    Hypothyroidism    Abdominal aortic aneurysm (AAA) without rupture (HCC)    Ambulatory dysfunction    S/P TAVR (transcatheter aortic valve replacement)    Acute on chronic diastolic congestive heart failure (HCC)    Shortness of breath        Assessment/Plan     1  Acute hypoxic respiratory failure  Despite diuretics ongoing hypoxia, dyspnea  CT chest  x2 noted  No PE despite elevated D-dimer  Ground-glass densities upper lobes  No pleural effusions  Not examining volume overloaded thus stopped further IV diuretics  Today on 3 L ( down from 5L)   Feels shortness of breath is a little bit better today  Pulmonology consult noted  For bronchoscopy  Possible acute pneumonitis  Low-grade fevers, bloody mucus, recent weight loss  Procalcitonin negative x2  IV antibiotics stopped  COVID 19 negative       2  Chronic diastolic heart failure  TTE November/2020-LVEF 60%  Grade 2 diastolic dysfunction  RV size and function normal   IV diuretics initiated for concern for volume overload  Despite diuresis hypoxia worsened shortness of breath worsened  See 1  By  exam no significant volume overload to  correspond with extent of hypoxia and shortness of breath  Stopped further IV diuretics  Resume Oral lasix 20mg  if BP acceptable  Check weight, strict I O ( 350/1250 last 24 hours)     3  AS status post TAVR 10/2020  10/2020 2D echo #26 Gweneuziel Blank bioprosthesis present with mild para valvular regurgitation      4  CAD s/p CABG 2010  Left heart catheterization 09/2020-bypass grafts patent      On aspirin 81/BB-Lopressor 12 5 b i d /statin  Troponin negative x3  EKG- NSR RBBB/ 1st degree AV block- similar to prior     5  PAF-maintaining sinus rhythm, on low-dose beta-blocker (Toprol 12 5 b i d )  Not on [facility] as outpatient     6  HTN-mildly hypotensive yesterday into the 80s  IV diuretics stopped  Beta-blocker held this morning       Subjective/Objective   Chief Complaint/Subjective  Patient without chest pain  He thinks his breathing is better today  Aware bronchoscopy today  No palpitations  Coughing ongoing          Vitals: BP 98/54   Pulse 86   Temp 99 1 °F (37 3 °C)   Resp 18   Ht 5' 6" (1 676 m)   Wt 62 2 kg (137 lb 2 oz)   SpO2 90%   BMI 22 13 kg/m²     Vitals:    04/11/21 1131 04/11/21 1431   Weight: 68 kg (149 lb 14 6 oz) 62 2 kg (137 lb 2 oz)     Orthostatic Blood Pressures      Most Recent Value   Blood Pressure  98/54 filed at 04/13/2021 0743   Patient Position - Orthostatic VS  Lying filed at 04/11/2021 1133            Intake/Output Summary (Last 24 hours) at 4/13/2021 0905  Last data filed at 4/13/2021 0401  Gross per 24 hour   Intake 350 ml   Output 1250 ml   Net -900 ml       Invasive Devices     Peripheral Intravenous Line            Peripheral IV 04/11/21 Left;Ventral (anterior) Forearm 1 day    Peripheral IV 04/11/21 Right;Ventral (anterior) Forearm 1 day    Peripheral IV 04/12/21 Right Antecubital less than 1 day          Drain            Urethral Catheter Coude 16 Fr  77 days                Current Facility-Administered Medications   Medication Dose Route Frequency    acetaminophen (TYLENOL) tablet 650 mg  650 mg Oral Q6H PRN    albuterol inhalation solution 2 5 mg  2 5 mg Nebulization Q6H PRN    aspirin chewable tablet 81 mg  81 mg Oral Daily    benzonatate (TESSALON PERLES) capsule 100 mg  100 mg Oral TID PRN    cholecalciferol (VITAMIN D3) tablet 1,000 Units  1,000 Units Oral Daily    docusate sodium (COLACE) capsule 100 mg  100 mg Oral BID    [START ON 4/14/2021] enoxaparin (LOVENOX) subcutaneous injection 40 mg  40 mg Subcutaneous Daily    furosemide (LASIX) tablet 20 mg  20 mg Oral Daily    guaiFENesin (MUCINEX) 12 hr tablet 600 mg  600 mg Oral Q12H FAVIAN    levothyroxine tablet 75 mcg  75 mcg Oral Early Morning    metoprolol succinate (TOPROL-XL) 24 hr tablet 12 5 mg  12 5 mg Oral BID    ondansetron (ZOFRAN) injection 4 mg  4 mg Intravenous Q6H PRN    pantoprazole (PROTONIX) EC tablet 40 mg  40 mg Oral Early Morning    polyethylene glycol (MIRALAX) packet 17 g  17 g Oral Daily    potassium chloride (K-DUR,KLOR-CON) CR tablet 10 mEq  10 mEq Oral Daily    pravastatin (PRAVACHOL) tablet 20 mg  20 mg Oral Daily With Dinner    senna oral syrup 8 8 mg  8 8 mg Oral Daily    tamsulosin (FLOMAX) capsule 0 4 mg  0 4 mg Oral Daily With Dinner    tiotropium (SPIRIVA) capsule for inhaler 18 mcg  18 mcg Inhalation Daily         Physical Exam: BP 98/54   Pulse 86   Temp 99 1 °F (37 3 °C)   Resp 18   Ht 5' 6" (1 676 m)   Wt 62 2 kg (137 lb 2 oz)   SpO2 90%   BMI 22 13 kg/m²     General Appearance:    Alert, cooperative, no distress, appears stated age   Head:    Normocephalic, no scleral icterus   Eyes:    PERRL   Nose:   Nares normal, septum midline, no drainage    Throat:   Lips, mucosa, and tongue normal   Neck:   Supple, symmetrical, trachea midline,       no JVD       Lungs:     Clear to auscultation bilaterally, respirations unlabored   Chest Wall:    No tenderness or deformity    Heart:    Regular rate and rhythm, S1 and S2 normal, no murmur, rub   or gallop       Extremities:   Extremities normal, atraumatic, no cyanosis or edema   Pulses:   2+ and symmetric all extremities   Skin:   Skin color, texture, turgor normal, no rashes or lesions   Neurologic:   Alert and oriented to person place and time, no focal deficits                 Lab Results:   Recent Results (from the past 72 hour(s))   ECG 12 lead    Collection Time: 04/11/21 11:36 AM   Result Value Ref Range    Ventricular Rate 90 BPM    Atrial Rate 90 BPM    HI Interval 228 ms    QRSD Interval 142 ms    QT Interval 408 ms    QTC Interval 499 ms    P Axis 71 degrees    QRS Axis 60 degrees    T Wave Axis 37 degrees   CBC and differential    Collection Time: 04/11/21 11:52 AM   Result Value Ref Range    WBC 6 89 4 31 - 10 16 Thousand/uL    RBC 3 43 (L) 3 88 - 5 62 Million/uL    Hemoglobin 10 9 (L) 12 0 - 17 0 g/dL    Hematocrit 32 8 (L) 36 5 - 49 3 %    MCV 96 82 - 98 fL    MCH 31 8 26 8 - 34 3 pg    MCHC 33 2 31 4 - 37 4 g/dL    RDW 14 4 11 6 - 15 1 %    MPV 11 1 8 9 - 12 7 fL    Platelets 758 (L) 690 - 390 Thousands/uL    nRBC 0 /100 WBCs    Neutrophils Relative 71 43 - 75 %    Immat GRANS % 1 0 - 2 %    Lymphocytes Relative 8 (L) 14 - 44 %    Monocytes Relative 18 (H) 4 - 12 %    Eosinophils Relative 2 0 - 6 %    Basophils Relative 0 0 - 1 %    Neutrophils Absolute 4 92 1 85 - 7 62 Thousands/µL    Immature Grans Absolute 0 04 0 00 - 0 20 Thousand/uL    Lymphocytes Absolute 0 55 (L) 0 60 - 4 47 Thousands/µL    Monocytes Absolute 1 26 (H) 0 17 - 1 22 Thousand/µL    Eosinophils Absolute 0 10 0 00 - 0 61 Thousand/µL    Basophils Absolute 0 02 0 00 - 0 10 Thousands/µL   Basic metabolic panel    Collection Time: 04/11/21 11:52 AM   Result Value Ref Range    Sodium 136 136 - 145 mmol/L    Potassium 3 9 3 5 - 5 3 mmol/L    Chloride 105 100 - 108 mmol/L    CO2 26 21 - 32 mmol/L    ANION GAP 5 4 - 13 mmol/L    BUN 18 5 - 25 mg/dL    Creatinine 0 88 0 60 - 1 30 mg/dL    Glucose 105 65 - 140 mg/dL    Calcium 8 4 8 3 - 10 1 mg/dL    eGFR 77 ml/min/1 73sq m   Troponin I    Collection Time: 04/11/21 11:52 AM   Result Value Ref Range    Troponin I <0 02 <=0 04 ng/mL   NT-BNP PRO    Collection Time: 04/11/21 11:52 AM   Result Value Ref Range    NT-proBNP 1,603 (H) <450 pg/mL   [facility]    Collection Time: 04/11/21 11:52 AM    Specimen: Nasopharyngeal Swab; Nares   Result Value Ref Range    SARS-CoV-2 Negative Negative    INFLUENZA A PCR Negative Negative    INFLUENZA B PCR Negative Negative    RSV PCR Negative Negative   Platelet count    Collection Time: 04/11/21  4:44 PM   Result Value Ref Range    Platelets 398 (L) 133 - 390 Thousands/uL    MPV 10 4 8 9 - 12 7 fL   Troponin I    Collection Time: 04/11/21  4:44 PM   Result Value Ref Range    Troponin I <0 02 <=0 04 ng/mL   Procalcitonin with AM Reflex    Collection Time: 04/11/21  4:44 PM   Result Value Ref Range    Procalcitonin <0 05 <=0 25 ng/ml   Legionella antigen, urine    Collection Time: 04/11/21 10:16 PM    Specimen: Urine, Catheter   Result Value Ref Range    Legionella Urinary Antigen Negative Negative   Strep Pneumoniae, Urine    Collection Time: 04/11/21 10:16 PM    Specimen: Urine, Catheter   Result Value Ref Range    Strep pneumoniae antigen, urine Negative Negative   Sputum culture and Gram stain    Collection Time: 04/11/21 10:22 PM    Specimen: Expectorated Sputum   Result Value Ref Range    Sputum Culture 2+ Growth of      Gram Stain Result 1+ Epithelial cells per low power field (A)     Gram Stain Result 1+ Polys (A)     Gram Stain Result 2+ Gram positive cocci in pairs (A)    Troponin I    Collection Time: 04/11/21 10:27 PM   Result Value Ref Range    Troponin I <0 02 <=0 04 ng/mL   Comprehensive metabolic panel    Collection Time: 04/12/21  4:43 AM   Result Value Ref Range    Sodium 135 (L) 136 - 145 mmol/L    Potassium 3 9 3 5 - 5 3 mmol/L    Chloride 104 100 - 108 mmol/L    CO2 26 21 - 32 mmol/L    ANION GAP 5 4 - 13 mmol/L    BUN 21 5 - 25 mg/dL    Creatinine 0 81 0 60 - 1 30 mg/dL    Glucose 91 65 - 140 mg/dL    Calcium 8 3 8 3 - 10 1 mg/dL    Corrected Calcium 9 1 8 3 - 10 1 mg/dL    AST 18 5 - 45 U/L    ALT 17 12 - 78 U/L    Alkaline Phosphatase 98 46 - 116 U/L    Total Protein 6 4 6 4 - 8 2 g/dL    Albumin 3 0 (L) 3 5 - 5 0 g/dL    Total Bilirubin 0 64 0 20 - 1 00 mg/dL    eGFR 79 ml/min/1 73sq m   Magnesium    Collection Time: 04/12/21  4:43 AM   Result Value Ref Range    Magnesium 2 2 1 6 - 2 6 mg/dL   CBC (With Platelets) Collection Time: 04/12/21  4:43 AM   Result Value Ref Range    WBC 6 11 4 31 - 10 16 Thousand/uL    RBC 3 22 (L) 3 88 - 5 62 Million/uL    Hemoglobin 10 0 (L) 12 0 - 17 0 g/dL    Hematocrit 31 1 (L) 36 5 - 49 3 %    MCV 97 82 - 98 fL    MCH 31 1 26 8 - 34 3 pg    MCHC 32 2 31 4 - 37 4 g/dL    RDW 14 3 11 6 - 15 1 %    Platelets 687 (L) 527 - 390 Thousands/uL    MPV 11 4 8 9 - 12 7 fL   D-dimer, quantitative    Collection Time: 04/12/21  4:43 AM   Result Value Ref Range    D-Dimer, Quant 2 62 (H) <0 50 ug/ml FEU   Procalcitonin Reflex    Collection Time: 04/12/21  5:18 AM   Result Value Ref Range    Procalcitonin <0 05 <=0 25 ng/ml   CBC and differential    Collection Time: 04/13/21  5:53 AM   Result Value Ref Range    WBC 6 67 4 31 - 10 16 Thousand/uL    RBC 3 24 (L) 3 88 - 5 62 Million/uL    Hemoglobin 10 1 (L) 12 0 - 17 0 g/dL    Hematocrit 30 7 (L) 36 5 - 49 3 %    MCV 95 82 - 98 fL    MCH 31 2 26 8 - 34 3 pg    MCHC 32 9 31 4 - 37 4 g/dL    RDW 14 4 11 6 - 15 1 %    MPV 10 5 8 9 - 12 7 fL    Platelets 344 (L) 680 - 390 Thousands/uL    nRBC 0 /100 WBCs    Neutrophils Relative 70 43 - 75 %    Immat GRANS % 1 0 - 2 %    Lymphocytes Relative 9 (L) 14 - 44 %    Monocytes Relative 17 (H) 4 - 12 %    Eosinophils Relative 3 0 - 6 %    Basophils Relative 0 0 - 1 %    Neutrophils Absolute 4 67 1 85 - 7 62 Thousands/µL    Immature Grans Absolute 0 04 0 00 - 0 20 Thousand/uL    Lymphocytes Absolute 0 59 (L) 0 60 - 4 47 Thousands/µL    Monocytes Absolute 1 16 0 17 - 1 22 Thousand/µL    Eosinophils Absolute 0 20 0 00 - 0 61 Thousand/µL    Basophils Absolute 0 01 0 00 - 0 10 Thousands/µL   Basic metabolic panel    Collection Time: 04/13/21  5:53 AM   Result Value Ref Range    Sodium 136 136 - 145 mmol/L    Potassium 3 7 3 5 - 5 3 mmol/L    Chloride 104 100 - 108 mmol/L    CO2 26 21 - 32 mmol/L    ANION GAP 6 4 - 13 mmol/L    BUN 22 5 - 25 mg/dL    Creatinine 0 84 0 60 - 1 30 mg/dL    Glucose 98 65 - 140 mg/dL    Calcium 8 4 8 3 - 10 1 mg/dL    eGFR 78 ml/min/1 73sq m     Imaging: I have personally reviewed pertinent reports  Tele- NSR  VTE Pharmacologic Prophylaxis: Enoxaparin (Lovenox)  VTE Mechanical Prophylaxis: sequential compression device    Counseling / Coordination of Care  Total time spent today 30 minutes  Greater than 50% of total time was spent with the patient and / or family counseling and / or coordination of care

## 2021-04-13 NOTE — ASSESSMENT & PLAN NOTE
· Patient came to the hospital with shortness of breath that has been going on for the past 3 4 days  Patient with chronic bilateral lower extremity swelling which is slightly worse  Currently patient requiring oxygen which is unusual for him  Patient has been febrile overnight  Started on antibiotics for pneumonia, but procalcitonin negative x2     · Continue supplementary O2   · As above

## 2021-04-13 NOTE — PHYSICAL THERAPY NOTE
Session deferred, as pt refused dur to pending tests, fatigue  Will follow    Amos Clause PT, DPT CSRS

## 2021-04-13 NOTE — ASSESSMENT & PLAN NOTE
Wt Readings from Last 3 Encounters:   04/11/21 62 2 kg (137 lb 2 oz)   03/17/21 67 7 kg (149 lb 4 8 oz)   02/24/21 67 1 kg (148 lb)   · volume status looks euvolemic  · Monitor off diuretics today  · Monitor daily weights, intake and output

## 2021-04-13 NOTE — ANESTHESIA PREPROCEDURE EVALUATION
Procedure:  BRONCHOSCOPY    Pt currently on 2L NC with sats 90%  Low grade fevers, scant hemoptysis last night, mild hypotension after significant diuresis for several days, now stopped  COVID neg  Relevant Problems   CARDIO   (+) Abdominal aortic aneurysm (AAA) without rupture (HCC)   (+) Acute on chronic diastolic congestive heart failure (HCC)   (+) Atherosclerotic heart disease of native coronary artery with other forms of angina pectoris (HCC)   (+) Carotid stenosis, bilateral   (+) Hyperlipidemia   (+) Hypertension   (+) PAF (paroxysmal atrial fibrillation) (HCC)   (+) Peripheral arterial disease (HCC)   (+) RBBB   (+) S/P TAVR (transcatheter aortic valve replacement)   (+) Severe aortic stenosis (sp TAVR)      ENDO   (+) Hypothyroidism      GI/HEPATIC   (+) Esophageal reflux      /RENAL   (+) Benign prostatic hyperplasia   (+) Stage 3a chronic kidney disease      HEMATOLOGY   (+) Normocytic anemia   (+) Thrombocytopenia (HCC)      MUSCULOSKELETAL   (+) Generalized osteoarthritis of multiple sites      PULMONARY   (+) Acute respiratory failure with hypoxia (HCC)   (+) Pulmonary emphysema (HCC)   (+) Shortness of breath      Other   (+) Bladder cancer (HCC)   (+) Left foot drop     Physical Exam    Airway    Mallampati score: II  TM Distance: >3 FB  Neck ROM: limited     Dental   upper dentures and lower dentures,     Cardiovascular      Pulmonary  Decreased breath sounds (bases),     Other Findings  tachypneic       Lab Results   Component Value Date    WBC 6 67 04/13/2021    HGB 10 1 (L) 04/13/2021     (L) 04/13/2021     Lab Results   Component Value Date    SODIUM 136 04/13/2021    K 3 7 04/13/2021    BUN 22 04/13/2021    CREATININE 0 84 04/13/2021    EGFR 78 04/13/2021    GLUCOSE 95 10/06/2020     Anesthesia Plan  ASA Score- 4     Anesthesia Type- IV sedation with anesthesia with ASA Monitors  Additional Monitors:   Airway Plan:           Plan Factors-    Chart reviewed   Existing labs reviewed  Patient summary reviewed  Patient is not a current smoker  Induction- intravenous  Postoperative Plan-     Informed Consent- Anesthetic plan and risks discussed with patient  I personally reviewed this patient with the CRNA  Discussed and agreed on the Anesthesia Plan with the CRNA  Karrie Nissen

## 2021-04-13 NOTE — PROGRESS NOTES
[facility]  Progress Note - Dana Bolus 1933, 80 y o  male MRN: [id number]  Unit/Bed#: [facility] 431-01 Encounter: [id number]  Primary Care Provider: Anson Severance, MD   Date and time admitted to hospital: 4/11/2021 11:25 AM    Shortness of breath  Assessment & Plan  · Patient came to the hospital with shortness of breath that has been going on for the past 3 4 days  Patient with chronic bilateral lower extremity swelling which is slightly worse  Currently patient requiring oxygen which is unusual for him  Patient has been febrile overnight  Started on antibiotics for pneumonia, but procalcitonin negative x2  · Continue supplementary O2   · As above    * Acute respiratory failure with hypoxia [facility])  Assessment & Plan  · Patient came to the hospital with worsening shortness of breath  Patient required oxygen 2 L nasal cannula which is new  Patient definitely has element of fluid overload causing symptoms and has put out 1430 mL overnight, however Dimer elevated at 2 62  Was started on antibiotics for suspected pneumonia, however Procalcitonin negative x2  Family did note patient spitting out blood tinged sputum   Was not on anticoagulation prehospital   · Continue supplementary O2 to keep sats at 92  · CTA without PE  · S/p bronchoscopy today with BAL, await studies and cultures  · Continue azithro for suspected atypical pneumonia      Chronic diastolic congestive heart failure (HCC)  Assessment & Plan  Wt Readings from Last 3 Encounters:   04/11/21 62 2 kg (137 lb 2 oz)   03/17/21 67 7 kg (149 lb 4 8 oz)   02/24/21 67 1 kg (148 lb)   · volume status looks euvolemic  · Monitor off diuretics today  · Monitor daily weights, intake and output          PAF (paroxysmal atrial fibrillation) (HCC)  Assessment & Plan  · Rate controlled  · Not on any anticoagulation likely secondary to fall risk  · Continue Metoprolol-XL 12 5 mg BID     S/P TAVR (transcatheter aortic valve replacement)  Assessment & Plan  · Murmur noted   · Outpatient cardiology follow up    Abdominal aortic aneurysm (AAA) without rupture (HCC)  Assessment & Plan  · No abdominal pain  · BP controlled at this time         VTE Pharmacologic Prophylaxis:   Pharmacologic: Heparin  Mechanical VTE Prophylaxis in Place: Yes    Patient Centered Rounds: I have performed bedside rounds with nursing staff today  Education and Discussions with Family / Patient: patient and wife, plan of care    Time Spent for Care: 20 minutes  More than 50% of total time spent on counseling and coordination of care as described above  Current Length of Stay: 2 day(s)    Current Patient Status: Inpatient   Certification Statement: The patient will continue to require additional inpatient hospital stay due to awaiting BAL studies, hypoxia    Discharge Plan: TBD    Code Status: Level 1 - Full Code      Subjective:   Reports that his breathing is the same, mild cough, minimal sputum  Objective:     Vitals:   Temp (24hrs), Av 5 °F (36 9 °C), Min:97 6 °F (36 4 °C), Max:100 1 °F (37 8 °C)    Temp:  [97 6 °F (36 4 °C)-100 1 °F (37 8 °C)] 97 7 °F (36 5 °C)  HR:  [70-91] 85  Resp:  [16-20] 16  BP: ()/(48-63) 105/56  SpO2:  [87 %-99 %] 93 %  Body mass index is 22 13 kg/m²  Input and Output Summary (last 24 hours): Intake/Output Summary (Last 24 hours) at 2021 1636  Last data filed at 2021 1334  Gross per 24 hour   Intake 400 ml   Output 700 ml   Net -300 ml       Physical Exam:     Physical Exam  Constitutional:       Appearance: Normal appearance  HENT:      Head: Normocephalic and atraumatic  Nose: Nose normal       Mouth/Throat:      Mouth: Mucous membranes are moist       Pharynx: Oropharynx is clear  Eyes:      Extraocular Movements: Extraocular movements intact  Cardiovascular:      Rate and Rhythm: Normal rate and regular rhythm     Pulmonary:      Effort: Pulmonary effort is normal       Breath sounds: Wheezing present  No rales  Neurological:      General: No focal deficit present  Mental Status: He is alert and oriented to person, place, and time  Psychiatric:         Mood and Affect: Mood normal          Behavior: Behavior normal            Additional Data:     Labs:    Results from last 7 days   Lab Units 04/13/21  0553   WBC Thousand/uL 6 67   HEMOGLOBIN g/dL 10 1*   HEMATOCRIT % 30 7*   PLATELETS Thousands/uL 148*   NEUTROS PCT % 70   LYMPHS PCT % 9*   MONOS PCT % 17*   EOS PCT % 3     Results from last 7 days   Lab Units 04/13/21  0553 04/12/21  0443   SODIUM mmol/L 136 135*   POTASSIUM mmol/L 3 7 3 9   CHLORIDE mmol/L 104 104   CO2 mmol/L 26 26   BUN mg/dL 22 21   CREATININE mg/dL 0 84 0 81   ANION GAP mmol/L 6 5   CALCIUM mg/dL 8 4 8 3   ALBUMIN g/dL  --  3 0*   TOTAL BILIRUBIN mg/dL  --  0 64   ALK PHOS U/L  --  98   ALT U/L  --  17   AST U/L  --  18   GLUCOSE RANDOM mg/dL 98 91                 Results from last 7 days   Lab Units 04/12/21  0518 04/11/21  1644   PROCALCITONIN ng/ml <0 05 <0 05           * I Have Reviewed All Lab Data Listed Above  * Additional Pertinent Lab Tests Reviewed:  All Labs Within Last 24 Hours Reviewed      Recent Cultures (last 7 days):     Results from last 7 days   Lab Units 04/11/21  2222 04/11/21  2216   SPUTUM CULTURE  2+ Growth of   --    GRAM STAIN RESULT  1+ Epithelial cells per low power field*  1+ Polys*  2+ Gram positive cocci in pairs*  --    LEGIONELLA URINARY ANTIGEN   --  Negative       Last 24 Hours Medication List:   Current Facility-Administered Medications   Medication Dose Route Frequency Provider Last Rate    acetaminophen  650 mg Oral Q6H PRN Andi Vail MD      albuterol  2 5 mg Nebulization Q6H PRN Rich Lyon DO      azithromycin  500 mg Oral Q24H Ernst Dodge MD      benzonatate  100 mg Oral TID PRN Andi Vail MD      cholecalciferol  1,000 Units Oral Daily Andi Vail MD      docusate sodium  100 mg Oral BID Lashawn La Louise Mccormick MD      guaiFENesin  600 mg Oral Q12H Sonny Mcfarland MD      levothyroxine  75 mcg Oral Early Morning Niecy Boyd MD      metoprolol succinate  12 5 mg Oral BID Niecy Boyd MD      ondansetron  4 mg Intravenous Q6H PRN Niecy Boyd MD      pantoprazole  40 mg Oral Early Morning Niecy Boyd MD      polyethylene glycol  17 g Oral Daily Niecy Boyd MD      potassium chloride  10 mEq Oral Daily Niecy Boyd MD      pravastatin  20 mg Oral Daily With Eligio Freeman MD      senna  8 8 mg Oral Daily Niecy Boyd MD      tamsulosin  0 4 mg Oral Daily With Eligio Freeman MD      tiotropium  18 mcg Inhalation Daily Niecy Boyd MD          Today, Patient Was Seen By: Rachelle Edward MD    ** Please Note: Dictation voice to text software may have been used in the creation of this document   **

## 2021-04-13 NOTE — PLAN OF CARE
Patient also set up follow up left shoulder post surgery fall of 2018 having pain and check trigger finger wondering if he can be seen sooner he is on wait list.    Problem: Potential for Falls  Goal: Patient will remain free of falls  Description: INTERVENTIONS:  - Assess patient frequently for physical needs  -  Identify cognitive and physical deficits and behaviors that affect risk of falls  -  [place] fall precautions as indicated by assessment   - Educate patient/family on patient safety including physical limitations  - Instruct patient to call for assistance with activity based on assessment  - Modify environment to reduce risk of injury  - Consider OT/PT consult to assist with strengthening/mobility  Outcome: Progressing     Problem: Prexisting or High Potential for Compromised Skin Integrity  Goal: Skin integrity is maintained or improved  Description: INTERVENTIONS:  - Identify patients at risk for skin breakdown  - Assess and monitor skin integrity  - Assess and monitor nutrition and hydration status  - Monitor labs   - Assess for incontinence   - Turn and reposition patient  - Assist with mobility/ambulation  - Relieve pressure over bony prominences  - Avoid friction and shearing  - Provide appropriate hygiene as needed including keeping skin clean and dry  - Evaluate need for skin moisturizer/barrier cream  - Collaborate with interdisciplinary team   - Patient/family teaching  - Consider wound care consult   Outcome: Progressing     Problem: Nutrition/Hydration-ADULT  Goal: Nutrient/Hydration intake appropriate for improving, restoring or maintaining nutritional needs  Description: Monitor and assess patient's nutrition/hydration status for malnutrition  Collaborate with interdisciplinary team and initiate plan and interventions as ordered  Monitor patient's weight and dietary intake as ordered or per policy  Utilize nutrition screening tool and intervene as necessary  Determine patient's food preferences and provide high-protein, high-caloric foods as appropriate       INTERVENTIONS:  - Monitor oral intake, urinary output, labs, and treatment plans  - Assess nutrition and hydration status and recommend course of action  - Evaluate amount of meals eaten  - Assist patient with eating if necessary   - Allow adequate time for meals  - Recommend/ encourage appropriate diets, oral nutritional supplements, and vitamin/mineral supplements  - Order, calculate, and assess calorie counts as needed  - Recommend, monitor, and adjust tube feedings and TPN/PPN based on assessed needs  - Assess need for intravenous fluids  - Provide specific nutrition/hydration education as appropriate  - Include patient/family/caregiver in decisions related to nutrition  Outcome: Progressing     Problem: PAIN - ADULT  Goal: Verbalizes/displays adequate comfort level or baseline comfort level  Description: Interventions:  - Encourage patient to monitor pain and request assistance  - Assess pain using appropriate pain scale  - Administer analgesics based on type and severity of pain and evaluate response  - Implement non-pharmacological measures as appropriate and evaluate response  - Consider cultural and social influences on pain and pain management  - Notify physician/advanced practitioner if interventions unsuccessful or patient reports new pain  Outcome: Progressing     Problem: INFECTION - ADULT  Goal: Absence or prevention of progression during hospitalization  Description: INTERVENTIONS:  - Assess and monitor for signs and symptoms of infection  - Monitor lab/diagnostic results  - Monitor all insertion sites, i e  indwelling lines, tubes, and drains  - Monitor endotracheal if appropriate and nasal secretions for changes in amount and color  - [place] appropriate cooling/warming therapies per order  - Administer medications as ordered  - Instruct and encourage patient and family to use good hand hygiene technique  - Identify and instruct in appropriate isolation precautions for identified infection/condition  Outcome: Progressing  Goal: Absence of fever/infection during neutropenic period  Description: INTERVENTIONS:  - Monitor WBC    Outcome: Progressing     Problem: SAFETY ADULT  Goal: Patient will remain free of falls  Description: INTERVENTIONS:  - Assess patient frequently for physical needs  -  Identify cognitive and physical deficits and behaviors that affect risk of falls    -  [place] fall precautions as indicated by assessment   - Educate patient/family on patient safety including physical limitations  - Instruct patient to call for assistance with activity based on assessment  - Modify environment to reduce risk of injury  - Consider OT/PT consult to assist with strengthening/mobility  Outcome: Progressing  Goal: Maintain or return to baseline ADL function  Description: INTERVENTIONS:  -  Assess patient's ability to carry out ADLs; assess patient's baseline for ADL function and identify physical deficits which impact ability to perform ADLs (bathing, care of mouth/teeth, toileting, grooming, dressing, etc )  - Assess/evaluate cause of self-care deficits   - Assess range of motion  - Assess patient's mobility; develop plan if impaired  - Assess patient's need for assistive devices and provide as appropriate  - Encourage maximum independence but intervene and supervise when necessary  - Involve family in performance of ADLs  - Assess for home care needs following discharge   - Consider OT consult to assist with ADL evaluation and planning for discharge  - Provide patient education as appropriate  Outcome: Progressing  Goal: Maintain or return mobility status to optimal level  Description: INTERVENTIONS:  - Assess patient's baseline mobility status (ambulation, transfers, stairs, etc )    - Identify cognitive and physical deficits and behaviors that affect mobility  - Identify mobility aids required to assist with transfers and/or ambulation (gait belt, sit-to-stand, lift, walker, cane, etc )  - [place] fall precautions as indicated by assessment  - Record patient progress and toleration of activity level on Mobility SBAR; progress patient to next Phase/Stage  - Instruct patient to call for assistance with activity based on assessment  - Consider rehabilitation consult to assist with strengthening/weightbearing, etc   Outcome: Progressing     Problem: DISCHARGE PLANNING  Goal: Discharge to home or other facility with appropriate resources  Description: INTERVENTIONS:  - Identify barriers to discharge w/patient and caregiver  - Arrange for needed discharge resources and transportation as appropriate  - Identify discharge learning needs (meds, wound care, etc )  - Arrange for interpretive services to assist at discharge as needed  - Refer to Case Management Department for coordinating discharge planning if the patient needs post-hospital services based on physician/advanced practitioner order or complex needs related to functional status, cognitive ability, or social support system  Outcome: Progressing     Problem: Knowledge Deficit  Goal: Patient/family/caregiver demonstrates understanding of disease process, treatment plan, medications, and discharge instructions  Description: Complete learning assessment and assess knowledge base    Interventions:  - Provide teaching at level of understanding  - Provide teaching via preferred learning methods  Outcome: Progressing

## 2021-04-13 NOTE — ANESTHESIA POSTPROCEDURE EVALUATION
Post-Op Assessment Note    CV Status:  Stable  Pain Score: 0    Pain management: adequate     Mental Status:  Awake and somnolent   Hydration Status:  Stable   PONV Controlled:  None   Airway Patency:  Patent      Post Op Vitals Reviewed: Yes      Staff: CRNA, Anesthesiologist         No complications documented      BP   99/50   Temp      Pulse   75   Resp   18   SpO2   98% on FM

## 2021-04-13 NOTE — PROGRESS NOTES
Progress Note - Pulmonary   Katey Dueñas 80 y o  male MRN: [id number]  Unit/Bed#: [facility] 431-01 Encounter: [id number]      Assessment:  1  Acute hypoxic respiratory failure, unclear etiology, possibly due to hypersensitivity pneumonitis, atypical infection, alveolar hemorrhage although less likely  2  Abnormal CT chest with upper lobe predominant ground-glass opacities, possibly due to hypersensitivity pneumonitis, atypical infection, less likely alveolar hemorrhage  3  Non massive, scant hemoptysis  4  Shortness of breath secondary to 1 , 2  5  History of diastolic CHF, Cardiology following  6  Aortic stenosis status post TAVR in October 2020  7  CAD status post CABG 2010  8  Paroxysmal atrial fibrillation, not on outpatient anticoagulation  9  Hypertension  10  HLD    Plan:  -  Patient currently saturating low 90s on 2 L nasal cannula, continue to wean as tolerated, out of bed to chair, pulmonary toilet, incentive spirometry, goal SpO2 greater than 88%  -  Etiology of hypoxia is not clear, to consider infectious etiology versus hypersensitivity pneumonitis, versus inhalational lung disease, less likely hemorrhage  CTA neg for PE, did show  Bilateral upper lobe predominant ground-glass opacities  -  procalcitonin negative X 2, continue to monitor off antibiotics for now, follow-up sputum culture, strep/Legionella negative  -  Will perform bronchoscopy this afternoon with lavage to further evaluate etiology  Will check cultures, cytology, cell count, as well as serial aliquots to rule out hemorrhage, keep NPO for now  - rest if care per cardiology, primary team     Subjective:    patient seen and examined at bedside  Has intermittent cough, 1 episode of hemoptysis yesterday evening  Low-grade temperature, but denies fever, chills, nausea, vomiting, chest pain  Mild shortness of breath  Review of Systems   Constitutional: Positive for fatigue  Negative for chills, fever and unexpected weight change  HENT: Negative for congestion, rhinorrhea, sneezing and sore throat  Respiratory: Positive for cough and shortness of breath  Negative for wheezing  Scant hemoptysis    Cardiovascular: Negative for chest pain, palpitations and leg swelling  Gastrointestinal: Negative for abdominal pain, constipation, diarrhea, nausea and vomiting  Genitourinary: Negative for dysuria  Musculoskeletal: Negative for arthralgias  Neurological: Negative for dizziness and numbness  Objective:     Vitals:    04/12/21 2348 04/13/21 0100 04/13/21 0334 04/13/21 0743   BP:   95/51 98/54   Pulse:  75 80 86   Resp:   18    Temp:   100 1 °F (37 8 °C) 99 1 °F (37 3 °C)   TempSrc:       SpO2: 96% 95% 90% 90%   Weight:       Height:               Intake/Output Summary (Last 24 hours) at 4/13/2021 0916  Last data filed at 4/13/2021 0401  Gross per 24 hour   Intake 350 ml   Output 1250 ml   Net -900 ml         Physical Exam  Constitutional:       General: He is not in acute distress  Appearance: He is well-developed  He is not diaphoretic  Comments: thin   HENT:      Head: Normocephalic and atraumatic  Mouth/Throat:      Mouth: Mucous membranes are moist       Pharynx: Oropharynx is clear  No oropharyngeal exudate  Eyes:      General: No scleral icterus  Cardiovascular:      Rate and Rhythm: Normal rate and regular rhythm  Heart sounds: Normal heart sounds  No murmur  Pulmonary:      Effort: Pulmonary effort is normal  No respiratory distress  Breath sounds: No wheezing  Comments: Dec breath sounds at the bases   Abdominal:      General: Bowel sounds are normal  There is no distension  Palpations: Abdomen is soft  Tenderness: There is no abdominal tenderness  Musculoskeletal:      Right lower leg: No edema  Left lower leg: No edema  Neurological:      Mental Status: He is alert and oriented to person, place, and time  Labs:  I have personally reviewed pertinent lab results  Results from last 7 days   Lab Units 04/13/21  0553 04/12/21  0443 04/11/21  1644 04/11/21  1152   WBC Thousand/uL 6 67 6 11  --  6 89   HEMOGLOBIN g/dL 10 1* 10 0*  --  10 9*   HEMATOCRIT % 30 7* 31 1*  --  32 8*   PLATELETS Thousands/uL 148* 143* 130* 143*   NEUTROS PCT % 70  --   --  71   MONOS PCT % 17*  --   --  18*      Results from last 7 days   Lab Units 04/13/21  0553 04/12/21  0443 04/11/21  1152   POTASSIUM mmol/L 3 7 3 9 3 9   CHLORIDE mmol/L 104 104 105   CO2 mmol/L 26 26 26   BUN mg/dL 22 21 18   CREATININE mg/dL 0 84 0 81 0 88   CALCIUM mg/dL 8 4 8 3 8 4   ALK PHOS U/L  --  98  --    ALT U/L  --  17  --    AST U/L  --  18  --      Results from last 7 days   Lab Units 04/12/21  0443   MAGNESIUM mg/dL 2 2                  0   Lab Value Date/Time    TROPONINI <0 02 04/11/2021 2227    TROPONINI <0 02 04/11/2021 1644    TROPONINI <0 02 04/11/2021 1152    TROPONINI <0 02 10/21/2020 0457    TROPONINI <0 02 10/21/2020 0028    TROPONINI <0 02 10/20/2020 2108    TROPONINI <0 02 10/20/2020 1114    TROPONINI <0 02 12/19/2019 1033       Microbiology:  Sputum pending  Strep/legionella negative    COVID negative    Imaging and other studies: I have personally reviewed pertinent reports     and I have personally reviewed pertinent films in PACS   CTA chest 04/12/2021   negative for PE   emphysema   upper lobe predominant bilateral ground-glass opacities      Simran Gallegos MD  Pulmonary & Critical Care Fellow, [facility]

## 2021-04-14 LAB
ANION GAP SERPL CALCULATED.3IONS-SCNC: 6 MMOL/L (ref 4–13)
B PARAP IS1001 DNA NPH QL NAA+NON-PROBE: NOT DETECTED
B PERT.PT PRMT NPH QL NAA+NON-PROBE: NOT DETECTED
BUN SERPL-MCNC: 22 MG/DL (ref 5–25)
C PNEUM DNA NPH QL NAA+NON-PROBE: NOT DETECTED
CALCIUM SERPL-MCNC: 8.5 MG/DL (ref 8.3–10.1)
CHLORIDE SERPL-SCNC: 104 MMOL/L (ref 100–108)
CO2 SERPL-SCNC: 26 MMOL/L (ref 21–32)
CREAT SERPL-MCNC: 0.99 MG/DL (ref 0.6–1.3)
ERYTHROCYTE [DISTWIDTH] IN BLOOD BY AUTOMATED COUNT: 14.3 % (ref 11.6–15.1)
FLUAV RNA NPH QL NAA+NON-PROBE: NOT DETECTED
FLUBV RNA NPH QL NAA+NON-PROBE: NOT DETECTED
GFR SERPL CREATININE-BSD FRML MDRD: 68 ML/MIN/1.73SQ M
GLUCOSE SERPL-MCNC: 106 MG/DL (ref 65–140)
HADV DNA NPH QL NAA+NON-PROBE: NOT DETECTED
HCT VFR BLD AUTO: 32.4 % (ref 36.5–49.3)
HGB BLD-MCNC: 10.4 G/DL (ref 12–17)
HMPV RNA NPH QL NAA+NON-PROBE: NOT DETECTED
HPIV 1+2+3+4 RNA NPH QL NAA+NON-PROBE: NOT DETECTED
HPIV 1+2+3+4 RNA NPH QL NAA+NON-PROBE: NOT DETECTED
M PNEUMO DNA NPH QL NAA+NON-PROBE: NOT DETECTED
MCH RBC QN AUTO: 31 PG (ref 26.8–34.3)
MCHC RBC AUTO-ENTMCNC: 32.1 G/DL (ref 31.4–37.4)
MCV RBC AUTO: 96 FL (ref 82–98)
PLATELET # BLD AUTO: 158 THOUSANDS/UL (ref 149–390)
PMV BLD AUTO: 11.1 FL (ref 8.9–12.7)
POTASSIUM SERPL-SCNC: 4.3 MMOL/L (ref 3.5–5.3)
PROCALCITONIN SERPL-MCNC: 0.25 NG/ML
RBC # BLD AUTO: 3.36 MILLION/UL (ref 3.88–5.62)
RSV RNA NPH QL NAA+NON-PROBE: NOT DETECTED
RV+EV RNA NPH QL NAA+NON-PROBE: NOT DETECTED
SODIUM SERPL-SCNC: 136 MMOL/L (ref 136–145)
WBC # BLD AUTO: 11.47 THOUSAND/UL (ref 4.31–10.16)

## 2021-04-14 PROCEDURE — 85027 COMPLETE CBC AUTOMATED: CPT | Performed by: INTERNAL MEDICINE

## 2021-04-14 PROCEDURE — 87798 DETECT AGENT NOS DNA AMP: CPT | Performed by: INTERNAL MEDICINE

## 2021-04-14 PROCEDURE — 87486 CHLMYD PNEUM DNA AMP PROBE: CPT | Performed by: INTERNAL MEDICINE

## 2021-04-14 PROCEDURE — 99232 SBSQ HOSP IP/OBS MODERATE 35: CPT | Performed by: INTERNAL MEDICINE

## 2021-04-14 PROCEDURE — 87581 M.PNEUMON DNA AMP PROBE: CPT | Performed by: INTERNAL MEDICINE

## 2021-04-14 PROCEDURE — 80048 BASIC METABOLIC PNL TOTAL CA: CPT | Performed by: INTERNAL MEDICINE

## 2021-04-14 PROCEDURE — 84145 PROCALCITONIN (PCT): CPT | Performed by: INTERNAL MEDICINE

## 2021-04-14 PROCEDURE — 94760 N-INVAS EAR/PLS OXIMETRY 1: CPT

## 2021-04-14 PROCEDURE — 97535 SELF CARE MNGMENT TRAINING: CPT

## 2021-04-14 PROCEDURE — 99232 SBSQ HOSP IP/OBS MODERATE 35: CPT | Performed by: NURSE PRACTITIONER

## 2021-04-14 PROCEDURE — 87633 RESP VIRUS 12-25 TARGETS: CPT | Performed by: INTERNAL MEDICINE

## 2021-04-14 RX ORDER — FUROSEMIDE 40 MG/1
40 TABLET ORAL DAILY
Status: DISCONTINUED | OUTPATIENT
Start: 2021-04-14 | End: 2021-04-18 | Stop reason: HOSPADM

## 2021-04-14 RX ORDER — METOPROLOL SUCCINATE 25 MG/1
12.5 TABLET, EXTENDED RELEASE ORAL 2 TIMES DAILY
Status: DISCONTINUED | OUTPATIENT
Start: 2021-04-14 | End: 2021-04-18 | Stop reason: HOSPADM

## 2021-04-14 RX ADMIN — POLYETHYLENE GLYCOL 3350 17 G: 17 POWDER, FOR SOLUTION ORAL at 08:26

## 2021-04-14 RX ADMIN — LEVOTHYROXINE SODIUM 75 MCG: 75 TABLET ORAL at 05:33

## 2021-04-14 RX ADMIN — DOCUSATE SODIUM 100 MG: 100 CAPSULE, LIQUID FILLED ORAL at 08:30

## 2021-04-14 RX ADMIN — PANTOPRAZOLE SODIUM 40 MG: 40 TABLET, DELAYED RELEASE ORAL at 05:33

## 2021-04-14 RX ADMIN — TIOTROPIUM BROMIDE 18 MCG: 18 CAPSULE ORAL; RESPIRATORY (INHALATION) at 08:30

## 2021-04-14 RX ADMIN — AZITHROMYCIN 500 MG: 500 TABLET, FILM COATED ORAL at 17:05

## 2021-04-14 RX ADMIN — POTASSIUM CHLORIDE 10 MEQ: 750 TABLET, EXTENDED RELEASE ORAL at 08:30

## 2021-04-14 RX ADMIN — GUAIFENESIN 600 MG: 600 TABLET, EXTENDED RELEASE ORAL at 08:29

## 2021-04-14 RX ADMIN — Medication 1000 UNITS: at 08:30

## 2021-04-14 RX ADMIN — GUAIFENESIN 600 MG: 600 TABLET, EXTENDED RELEASE ORAL at 21:30

## 2021-04-14 RX ADMIN — PRAVASTATIN SODIUM 20 MG: 20 TABLET ORAL at 17:05

## 2021-04-14 RX ADMIN — TAMSULOSIN HYDROCHLORIDE 0.4 MG: 0.4 CAPSULE ORAL at 17:05

## 2021-04-14 RX ADMIN — FUROSEMIDE 40 MG: 40 TABLET ORAL at 10:29

## 2021-04-14 RX ADMIN — SENNOSIDES 8.8 MG: 8.8 SYRUP ORAL at 08:30

## 2021-04-14 RX ADMIN — ACETAMINOPHEN 650 MG: 325 TABLET ORAL at 14:08

## 2021-04-14 RX ADMIN — CEFTRIAXONE SODIUM 1000 MG: 10 INJECTION, POWDER, FOR SOLUTION INTRAVENOUS at 09:05

## 2021-04-14 RX ADMIN — DOCUSATE SODIUM 100 MG: 100 CAPSULE, LIQUID FILLED ORAL at 17:05

## 2021-04-14 NOTE — PROGRESS NOTES
Progress Note - Pulmonary   [place] 80 y o  male MRN: [id number]  Unit/Bed#: [facility] 431-01 Encounter: [id number]      Assessment:  1  Acute hypoxic respiratory failure, unclear etiology, possibly due to hypersensitivity pneumonitis, atypical infection, alveolar hemorrhage although less likely  2  Abnormal CT chest with upper lobe predominant ground-glass opacities, possibly due to hypersensitivity pneumonitis, atypical infection, less likely alveolar hemorrhage  3  Non massive, scant hemoptysis  4  Shortness of breath secondary to 1 , 2  5  History of diastolic CHF, Cardiology following  6  Aortic stenosis status post TAVR in October 2020  7  CAD status post CABG 2010  8  Paroxysmal atrial fibrillation, not on outpatient anticoagulation  9  Hypertension  10  HLD     Plan:  -  Patient currently saturating  Mid 90s on 4L nasal cannula, continue to wean as tolerated, out of bed to chair, pulmonary toilet, incentive spirometry, goal SpO2 greater than 88%  -  Etiology of hypoxia is not clear, to consider infectious etiology versus hypersensitivity pneumonitis, versus inhalational lung disease  CTA neg for PE, did show  Bilateral upper lobe predominant ground-glass opacities  - status post bronchoscopy yesterday with moderate thick secretions and areas of hyperpigmentation  Underwent bronchoalveolar lavage in the right upper lobe and lingula with cyto brushing of the right upper lobe  No evidence of hemorrhage  Initial Gram stain negative, but pending further studies   -   Patient febrile 102 7 yesterday evening-  Despite negative procalcitonin, continue Rocephin/azithromycin for now and plan for 3 days total azithromycin 5 days Rocephin  - will check rp2 panel   -  Follow-up cell count, cytology, cultures from bronchoscopy  - rest if care per cardiology, primary team       Subjective:    patient seen and examined at bedside     Underwent bronchoscopy yesterday and found to have moderate thick secretions with areas of hyperpigmentation  Bronchoalveolar lavage done in the right upper lobe and lingula, as well as cyto brushing in the right upper lobe  Patient febrile up to 102 7 yesterday evening  Currently complains of mild shortness of breath and intermittent nonproductive cough, denies chest pain, dysuria, diarrhea  Saturating mid 90s on 4 L nasal cannula  Review of Systems   Constitutional: Positive for activity change, appetite change, fatigue and fever  Negative for chills and unexpected weight change  HENT: Negative for congestion, rhinorrhea, sneezing and sore throat  Respiratory: Positive for cough and shortness of breath  Negative for wheezing  Scant hemoptysis    Cardiovascular: Negative for chest pain, palpitations and leg swelling  Gastrointestinal: Negative for abdominal pain, constipation, diarrhea, nausea and vomiting  Genitourinary: Negative for dysuria  Musculoskeletal: Negative for arthralgias  Neurological: Negative for dizziness and numbness  Objective:     Vitals:    04/14/21 0216 04/14/21 0300 04/14/21 0657 04/14/21 0759   BP: 102/54  98/54    BP Location:       Pulse: 84  79    Resp: 18      Temp: (!) 96 8 °F (36 °C)  98 7 °F (37 1 °C)    TempSrc:       SpO2: 100% 99% 96% 93%   Weight:       Height:               Intake/Output Summary (Last 24 hours) at 4/14/2021 0825  Last data filed at 4/14/2021 0417  Gross per 24 hour   Intake 750 ml   Output 1375 ml   Net -625 ml         Physical Exam  Constitutional:       General: He is not in acute distress  Appearance: He is well-developed  He is not diaphoretic  Comments: Thin   HENT:      Head: Normocephalic and atraumatic  Mouth/Throat:      Mouth: Mucous membranes are moist       Pharynx: Oropharynx is clear  No oropharyngeal exudate  Eyes:      General: No scleral icterus  Cardiovascular:      Rate and Rhythm: Normal rate and regular rhythm  Heart sounds: Normal heart sounds  No murmur  Pulmonary:      Effort: Pulmonary effort is normal  No respiratory distress  Breath sounds: No wheezing  Comments: Scattered rhonchi  Abdominal:      General: Bowel sounds are normal  There is no distension  Palpations: Abdomen is soft  Tenderness: There is no abdominal tenderness  Musculoskeletal:      Right lower leg: No edema  Left lower leg: No edema  Neurological:      Mental Status: He is alert and oriented to person, place, and time  Labs: I have personally reviewed pertinent lab results  Results from last 7 days   Lab Units 04/14/21  0506 04/13/21  0553 04/12/21  0443  04/11/21  1152   WBC Thousand/uL 11 47* 6 67 6 11  --  6 89   HEMOGLOBIN g/dL 10 4* 10 1* 10 0*  --  10 9*   HEMATOCRIT % 32 4* 30 7* 31 1*  --  32 8*   PLATELETS Thousands/uL 158 148* 143*   < > 143*   NEUTROS PCT %  --  70  --   --  71   MONOS PCT %  --  17*  --   --  18*    < > = values in this interval not displayed  Results from last 7 days   Lab Units 04/14/21  0506 04/13/21  0553 04/12/21  0443   POTASSIUM mmol/L 4 3 3 7 3 9   CHLORIDE mmol/L 104 104 104   CO2 mmol/L 26 26 26   BUN mg/dL 22 22 21   CREATININE mg/dL 0 99 0 84 0 81   CALCIUM mg/dL 8 5 8 4 8 3   ALK PHOS U/L  --   --  98   ALT U/L  --   --  17   AST U/L  --   --  18     Results from last 7 days   Lab Units 04/12/21  0443   MAGNESIUM mg/dL 2 2                  0   Lab Value Date/Time    TROPONINI <0 02 04/11/2021 2227    TROPONINI <0 02 04/11/2021 1644    TROPONINI <0 02 04/11/2021 1152    TROPONINI <0 02 10/21/2020 0457    TROPONINI <0 02 10/21/2020 0028    TROPONINI <0 02 10/20/2020 2108    TROPONINI <0 02 10/20/2020 1114    TROPONINI <0 02 12/19/2019 1033     Microbiology:  Sputum pending-  Mixed branden  Strep/legionella negative    COVID negative   bronchial cultures pending, no bacteria yet     Imaging and other studies: I have personally reviewed pertinent reports     and I have personally reviewed pertinent films in PACS CTA chest 04/12/2021   negative for PE   emphysema   upper lobe predominant bilateral ground-glass opacities      Vandana Anderson MD  Pulmonary & Critical Care Fellow, [facility]

## 2021-04-14 NOTE — PROGRESS NOTES
[address]  Progress Note - Janina Brown 1933, 80 y o  male MRN: [id number]  Unit/Bed#: -01 Encounter: [id number]  Primary Care Provider: Heather Tian MD   Date and time admitted to hospital: 4/11/2021 11:25 AM    Shortness of breath  Assessment & Plan  · Patient came to the hospital with shortness of breath that has been going on for the past 3 4 days  Patient with chronic bilateral lower extremity swelling which is slightly worse  Currently patient requiring oxygen which is unusual for him  Patient has been febrile overnight  Started on antibiotics for pneumonia, but procalcitonin negative x2  · Continue supplementary O2   · As above    * Acute respiratory failure with hypoxia [facility])  Assessment & Plan  · Patient came to the hospital with worsening shortness of breath  Patient required oxygen 2 L nasal cannula which is new  Patient definitely has element of fluid overload causing symptoms and has put out 1430 mL overnight, however Dimer elevated at 2 62  Was started on antibiotics for suspected pneumonia, however Procalcitonin negative x2  Family did note patient spitting out blood tinged sputum   Was not on anticoagulation prehospital   · Continue supplementary O2 to keep sats at 92  · CTA without PE  · S/p bronchoscopy with BAL, await studies and cultures  · Continue azithro for suspected atypical pneumonia, ceftriaxone added  · Pt had a fever of 102 yesterday, check respiratory viral panel in the setting of fever and persistently normal procal      Chronic diastolic congestive heart failure (HCC)  Assessment & Plan  Wt Readings from Last 3 Encounters:   04/11/21 62 2 kg (137 lb 2 oz)   03/17/21 67 7 kg (149 lb 4 8 oz)   02/24/21 67 1 kg (148 lb)   · volume status looks euvolemic  · Monitor off diuretics today  · Monitor daily weights, intake and output          PAF (paroxysmal atrial fibrillation) (HCC)  Assessment & Plan  · Rate controlled  · Not on any anticoagulation likely secondary to fall risk  · Continue Metoprolol-XL 12 5 mg BID     S/P TAVR (transcatheter aortic valve replacement)  Assessment & Plan  · Murmur noted   · Outpatient cardiology follow up    Abdominal aortic aneurysm (AAA) without rupture (HCC)  Assessment & Plan  · No abdominal pain  · BP controlled at this time         VTE Pharmacologic Prophylaxis:   Pharmacologic: Heparin  Mechanical VTE Prophylaxis in Place: Yes    Patient Centered Rounds: I have performed bedside rounds with nursing staff today  Discussions with Specialists or Other Care Team Provider: pulmonology    Education and Discussions with Family / Patient: patient, plan of care    Time Spent for Care: 20 minutes  More than 50% of total time spent on counseling and coordination of care as described above  Current Length of Stay: 3 day(s)    Current Patient Status: Inpatient   Certification Statement: The patient will continue to require additional inpatient hospital stay due to fever last night, awaiting BAL, IV ABX    Discharge Plan: TBD    Code Status: Level 1 - Full Code      Subjective:   Continues to have cough and SOB  Better then yesterday  Tolerating diet  Denies constipation  Objective:     Vitals:   Temp (24hrs), Av 4 °F (37 4 °C), Min:96 8 °F (36 °C), Max:102 7 °F (39 3 °C)    Temp:  [96 8 °F (36 °C)-102 7 °F (39 3 °C)] 98 7 °F (37 1 °C)  HR:  [] 80  Resp:  [16-20] 18  BP: ()/(52-64) 100/56  SpO2:  [89 %-100 %] 95 %  Body mass index is 22 13 kg/m²  Input and Output Summary (last 24 hours): Intake/Output Summary (Last 24 hours) at 2021 1250  Last data filed at 2021 0417  Gross per 24 hour   Intake 750 ml   Output 1125 ml   Net -375 ml       Physical Exam:     Physical Exam  Constitutional:       Appearance: Normal appearance  HENT:      Head: Normocephalic and atraumatic        Nose: Nose normal       Mouth/Throat:      Mouth: Mucous membranes are moist       Pharynx: Oropharynx is clear  Eyes:      Extraocular Movements: Extraocular movements intact  Cardiovascular:      Rate and Rhythm: Normal rate and regular rhythm  Pulmonary:      Effort: Pulmonary effort is normal       Breath sounds: No wheezing or rales  Neurological:      General: No focal deficit present  Mental Status: He is alert and oriented to person, place, and time  Psychiatric:         Mood and Affect: Mood normal          Behavior: Behavior normal            Additional Data:     Labs:    Results from last 7 days   Lab Units 04/14/21  0506 04/13/21  0553   WBC Thousand/uL 11 47* 6 67   HEMOGLOBIN g/dL 10 4* 10 1*   HEMATOCRIT % 32 4* 30 7*   PLATELETS Thousands/uL 158 148*   NEUTROS PCT %  --  70   LYMPHS PCT %  --  9*   MONOS PCT %  --  17*   EOS PCT %  --  3     Results from last 7 days   Lab Units 04/14/21  0506  04/12/21  0443   SODIUM mmol/L 136   < > 135*   POTASSIUM mmol/L 4 3   < > 3 9   CHLORIDE mmol/L 104   < > 104   CO2 mmol/L 26   < > 26   BUN mg/dL 22   < > 21   CREATININE mg/dL 0 99   < > 0 81   ANION GAP mmol/L 6   < > 5   CALCIUM mg/dL 8 5   < > 8 3   ALBUMIN g/dL  --   --  3 0*   TOTAL BILIRUBIN mg/dL  --   --  0 64   ALK PHOS U/L  --   --  98   ALT U/L  --   --  17   AST U/L  --   --  18   GLUCOSE RANDOM mg/dL 106   < > 91    < > = values in this interval not displayed  Results from last 7 days   Lab Units 04/14/21  0506 04/12/21  0518 04/11/21  1644   PROCALCITONIN ng/ml 0 25 <0 05 <0 05           * I Have Reviewed All Lab Data Listed Above  * Additional Pertinent Lab Tests Reviewed:  All Labs Within Last 24 Hours Reviewed      Recent Cultures (last 7 days):     Results from last 7 days   Lab Units 04/13/21  1342 04/13/21  1338 04/13/21  1336 04/11/21  2222 04/11/21  2216   SPUTUM CULTURE   --   --   --  2+ Growth of   --    GRAM STAIN RESULT  Rare Polys  No bacteria seen No Polys or Bacteria seen No Polys or Bacteria seen 1+ Epithelial cells per low power field* 1+ Polys*  2+ Gram positive cocci in pairs*  --    LEGIONELLA URINARY ANTIGEN   --   --   --   --  Negative       Last 24 Hours Medication List:   Current Facility-Administered Medications   Medication Dose Route Frequency Provider Last Rate    acetaminophen  650 mg Oral Q6H PRN Keerthi Lee MD      albuterol  2 5 mg Nebulization Q6H PRN Steven DO Melany      azithromycin  500 mg Oral Q24H Porfirio Olvera MD      benzonatate  100 mg Oral TID PRN Keerthi Lee MD      cefTRIAXone  1,000 mg Intravenous Q24H Porfirio Olvera MD 1,000 mg (04/14/21 0905)    cholecalciferol  1,000 Units Oral Daily Keerthi Lee MD      docusate sodium  100 mg Oral BID Keerthi eLe MD      furosemide  40 mg Oral Daily MANI Carvajal      guaiFENesin  600 mg Oral Q12H [address] Keerthi Lee MD      levothyroxine  75 mcg Oral Early Morning Keerthi Lee MD      metoprolol succinate  12 5 mg Oral BID MANI Carvajal      ondansetron  4 mg Intravenous Q6H PRN Keerthi Lee MD      pantoprazole  40 mg Oral Early Morning Keerthi Lee MD      polyethylene glycol  17 g Oral Daily Keerthi Lee MD      potassium chloride  10 mEq Oral Daily Keerthi Lee MD      pravastatin  20 mg Oral Daily With Wilman Stone MD      senna  8 8 mg Oral Daily Keerthi Lee MD      tamsulosin  0 4 mg Oral Daily With Wilman Stone MD      tiotropium  18 mcg Inhalation Daily Keerthi Lee MD          Today, Patient Was Seen By: Juany Bliss MD    ** Please Note: Dictation voice to text software may have been used in the creation of this document   **

## 2021-04-14 NOTE — PLAN OF CARE
Problem: OCCUPATIONAL THERAPY ADULT  Goal: Performs self-care activities at highest level of function for planned discharge setting  See evaluation for individualized goals  Outcome: Progressing  Note: Limitation: Decreased ADL status, Decreased UE strength, Decreased Safe judgement during ADL, Decreased endurance, Decreased high-level ADLs, Decreased self-care trans     Assessment: Patient participated in Skilled OT session this date with interventions consisting of ADL re training with the use of correct body mechanics, deep breathing technique and  therapeutic activities to: increase activity tolerance   Patient agreeable to OT treatment session, upon arrival patient was found supine in bed  In comparison to previous session, patient with improvements in activity tolerance  Patient requiring frequent rest periods and ocassional safety reminders  Patient continues to be functioning below baseline level, occupational performance remains limited secondary to factors listed above and increased risk for falls and injury  From OT standpoint, recommendation at time of d/c would be Short Term Rehab  Patient to benefit from continued Occupational Therapy treatment while in the hospital to address deficits as defined above and maximize level of functional independence with ADLs and functional mobility        OT Discharge Recommendation: Post acute rehabilitation services  OT - OK to Discharge: Yes(When medically appropriate)

## 2021-04-14 NOTE — OCCUPATIONAL THERAPY NOTE
[address] Progress Note     Patient Name: Joshua Escobedo  YKEVN'Y Date: 4/14/2021  Problem List  Principal Problem:    Acute respiratory failure with hypoxia [facility])  Active Problems:    Severe aortic stenosis    PAF (paroxysmal atrial fibrillation) ([facility])    Hyperlipidemia    Hypertension    Hypothyroidism    Abdominal aortic aneurysm (AAA) without rupture (HCC)    Ambulatory dysfunction    S/P TAVR (transcatheter aortic valve replacement)    Chronic diastolic congestive heart failure (HCC)    Shortness of breath          04/14/21 0745   OT Last Visit   OT Visit Date 04/14/21   Note Type   Note Type Treatment   Restrictions/Precautions   Weight Bearing Precautions Per Order No   Other Precautions Telemetry; Fall Risk;Pain   General   Response to Previous Treatment Patient with no complaints from previous session   Lifestyle   Autonomy pt reports mostly being independent w self care, mobility  his wife will pitch in and help if needed  Reciprocal Relationships supportive wife   Pain Assessment   Pain Assessment Tool Pain Assessment not indicated - pt denies pain   Pain Score No Pain   ADL   Where Assessed Edge of bed   UB Dressing Assistance 4  Minimal Assistance   UB Dressing Deficit Setup;Supervision/safety; Increased time to complete; Thread RUE; Thread LUE;Pull around back   UB Dressing Comments A to [facility] robe   Bed Mobility   Supine to Sit 4  Minimal assistance   Additional items Assist x 1; Increased time required;Verbal cues;LE management   Additional Comments After OT session pt in chair with alarm on and all needs within reach  Transfers   Sit to Stand 4  Minimal assistance   Additional items Assist x 1; Increased time required;Verbal cues   Stand to Sit 4  Minimal assistance   Additional items Assist x 1; Increased time required;Verbal cues   Functional Mobility   Functional Mobility 4  Minimal assistance   Additional Comments Pt demonstrated short mobility within room      Additional items Hand hold assistance   Cognition   Arousal/Participation Alert; Cooperative   Attention Within functional limits   Orientation Level Oriented X4   Memory Decreased recall of precautions   Following Commands Follows one step commands without difficulty   Comments Pt is overall pleasant and cooperative this session  Activity Tolerance   Activity Tolerance Patient limited by fatigue   Medical Staff Made Aware RN confirmed okay to see pt   Assessment   Assessment Patient participated in Skilled OT session this date with interventions consisting of ADL re training with the use of correct body mechanics, deep breathing technique and  therapeutic activities to: increase activity tolerance   Patient agreeable to OT treatment session, upon arrival patient was found supine in bed  In comparison to previous session, patient with improvements in activity tolerance  Patient requiring frequent rest periods and ocassional safety reminders  Patient continues to be functioning below baseline level, occupational performance remains limited secondary to factors listed above and increased risk for falls and injury  From OT standpoint, recommendation at time of d/c would be Short Term Rehab  Patient to benefit from continued Occupational Therapy treatment while in the hospital to address deficits as defined above and maximize level of functional independence with ADLs and functional mobility  Plan   Treatment Interventions ADL retraining;Functional transfer training;UE strengthening/ROM; Endurance training;Patient/family training;Equipment evaluation/education; Compensatory technique education;Continued evaluation   Goal Expiration Date 04/26/21   OT Treatment Day 1   OT Frequency 3-5x/wk   Recommendation   OT Discharge Recommendation Post acute rehabilitation services   OT - OK to Discharge Yes  (When medically appropriate)   AM-PAC Daily Activity Inpatient   Lower Body Dressing 2   Bathing 2   Toileting 2   Upper Body Dressing 3   Grooming 3   Eating 4   Daily Activity Raw Score 16   Daily Activity Standardized Score (Calc for Raw Score >=11) 35 96   AM-PAC Applied Cognition Inpatient   Following a Speech/Presentation 3   Understanding Ordinary Conversation 3   Taking Medications 3   Remembering Where Things Are Placed or Put Away 3   Remembering List of 4-5 Errands 3   Taking Care of Complicated Tasks 3   Applied Cognition Raw Score 18   Applied Cognition Standardized Score 38 07   Modified Hawkins Scale   Modified Estuardo Scale 4       Cindy Box, IKER, OTR/L

## 2021-04-14 NOTE — PROGRESS NOTES
Progress Note - Cardiology   Mercy Garcia 80 y o  male MRN: [id number]  Unit/Bed#: -01 Encounter: [id number]        Principal Problem:    Acute respiratory failure with hypoxia ([address] )  Active Problems:    Severe aortic stenosis    PAF (paroxysmal atrial fibrillation) (HCC)    Hyperlipidemia    Hypertension    Hypothyroidism    Abdominal aortic aneurysm (AAA) without rupture (HCC)    Ambulatory dysfunction    S/P TAVR (transcatheter aortic valve replacement)    Chronic diastolic congestive heart failure (HCC)    Shortness of breath      Assessment/Plan     1  Acute hypoxic respiratory failure  Despite diuretics pt had  ongoing hypoxia, dyspnea  CT chest  x2 noted  No PE despite elevated D-dimer  Ground-glass densities upper lobes  No pleural effusions  Was  not examining volume overloaded thus stopped further IV 4/11 diuretics  Procalcitonin negative x2  COVID 19 negative  Pulmonary following-etiology unclear-hypersensitivity pneumonitis, atypical infection, alveolar hemorrhage although less likely  S/P Bronch- moderate thick secretions  Cytology and cultures pending  Given 40mg IV lasix last PM for respiratory distress last PM, up to 8 L oxygen  950 out  Patient on 1 liter this am  Feels good       2  Chronic diastolic heart failure  TTE November/2020-LVEF 60%   Grade 2 diastolic dysfunction   RV size and function normal   IV diuretics initiated for concern for volume overload   Despite diuresis hypoxia had worsened shortness of breath worsened  See # 1    Resume oral diuretics- lasix at 40mg PO daily     3  AS status post TAVR 10/2020  10/2020 2D echo #26 Braxton Jeffrey bioprosthesis present with mild para valvular regurgitation      4   CAD s/p CABG 2010  Left heart catheterization 09/2020-bypass grafts patent     On aspirin 81/BB-Lopressor 12 5 b i d /statin  Troponin negative x3  EKG- NSR RBBB/ 1st degree AV block- similar to prior     5  PAF-maintaining sinus rhythm, on low-dose beta-blocker (Toprol 12 5 b i d )  Not on AC as outpatient     6  HTN- SBP 's  On low-dose beta-blocker    Subjective/Objective   Chief Complaint/Subjective  Patient feels good today  Noted respiratory distress last evening at oxygen up to 8 L  Now on 1 L  No chest pain or pressure  Still coughing productive mucus        Vitals: /56   Pulse 80   Temp 98 7 °F (37 1 °C)   Resp 18   Ht 5' 6" (1 676 m)   Wt 62 2 kg (137 lb 2 oz)   SpO2 95%   BMI 22 13 kg/m²     Vitals:    04/11/21 1131 04/11/21 1431   Weight: 68 kg (149 lb 14 6 oz) 62 2 kg (137 lb 2 oz)     Orthostatic Blood Pressures      Most Recent Value   Blood Pressure  100/56 filed at 04/14/2021 0940   Patient Position - Orthostatic VS  Lying filed at 04/13/2021 2002            Intake/Output Summary (Last 24 hours) at 4/14/2021 0955  Last data filed at 4/14/2021 0417  Gross per 24 hour   Intake 750 ml   Output 1375 ml   Net -625 ml       Invasive Devices     Peripheral Intravenous Line            Peripheral IV 04/11/21 Left;Ventral (anterior) Forearm 2 days    Peripheral IV 04/11/21 Right;Ventral (anterior) Forearm 2 days    Peripheral IV 04/12/21 Right Antecubital 1 day          Drain            Urethral Catheter Coude 16 Fr  78 days                Current Facility-Administered Medications   Medication Dose Route Frequency    acetaminophen (TYLENOL) tablet 650 mg  650 mg Oral Q6H PRN    albuterol inhalation solution 2 5 mg  2 5 mg Nebulization Q6H PRN    azithromycin (ZITHROMAX) tablet 500 mg  500 mg Oral Q24H    benzonatate (TESSALON PERLES) capsule 100 mg  100 mg Oral TID PRN    cefTRIAXone (ROCEPHIN) 1,000 mg in dextrose 5 % 50 mL IVPB  1,000 mg Intravenous Q24H    cholecalciferol (VITAMIN D3) tablet 1,000 Units  1,000 Units Oral Daily    docusate sodium (COLACE) capsule 100 mg  100 mg Oral BID    guaiFENesin (MUCINEX) 12 hr tablet 600 mg  600 mg Oral Q12H FAVIAN    levothyroxine tablet 75 mcg  75 mcg Oral Early Morning    metoprolol succinate (TOPROL-XL) 24 hr tablet 12 5 mg  12 5 mg Oral BID    ondansetron (ZOFRAN) injection 4 mg  4 mg Intravenous Q6H PRN    pantoprazole (PROTONIX) EC tablet 40 mg  40 mg Oral Early Morning    polyethylene glycol (MIRALAX) packet 17 g  17 g Oral Daily    potassium chloride (K-DUR,KLOR-CON) CR tablet 10 mEq  10 mEq Oral Daily    pravastatin (PRAVACHOL) tablet 20 mg  20 mg Oral Daily With Dinner    senna oral syrup 8 8 mg  8 8 mg Oral Daily    tamsulosin (FLOMAX) capsule 0 4 mg  0 4 mg Oral Daily With Dinner    tiotropium (SPIRIVA) capsule for inhaler 18 mcg  18 mcg Inhalation Daily         Physical Exam: /56   Pulse 80   Temp 98 7 °F (37 1 °C)   Resp 18   Ht 5' 6" (1 676 m)   Wt 62 2 kg (137 lb 2 oz)   SpO2 95%   BMI 22 13 kg/m²     General Appearance:    Alert, cooperative, no distress, appears stated age   Head:    Normocephalic, no scleral icterus   Eyes:    PERRL   Nose:   Nares normal, septum midline, no drainage    Throat:   Lips, mucosa, and tongue normal   Neck:   Supple, symmetrical, trachea midline,            Lungs:     Clear to auscultation bilaterally, respirations unlabored   Chest Wall:    No tenderness or deformity    Heart:    Regular rate and rhythm, S1 and S2 normal, no murmur, rub   or gallop   Abdomen:     Soft, non-tender, bowel sounds active all four quadrants,     no masses, no organomegaly   Extremities:   Extremities normal, atraumatic, no cyanosis or edema   Pulses:   2+ and symmetric all extremities   Skin:   Skin color, texture, turgor normal, no rashes or lesions   Neurologic:   Alert and oriented to person place and time, no focal deficits                 Lab Results:   Recent Results (from the past 72 hour(s))   ECG 12 lead    Collection Time: 04/11/21 11:36 AM   Result Value Ref Range    Ventricular Rate 90 BPM    Atrial Rate 90 BPM    OR Interval 228 ms    QRSD Interval 142 ms    QT Interval 408 ms    QTC Interval 499 ms    P Axis 71 degrees    QRS Axis 60 degrees    T Wave Axis 37 degrees   CBC and differential    Collection Time: 04/11/21 11:52 AM   Result Value Ref Range    WBC 6 89 4 31 - 10 16 Thousand/uL    RBC 3 43 (L) 3 88 - 5 62 Million/uL    Hemoglobin 10 9 (L) 12 0 - 17 0 g/dL    Hematocrit 32 8 (L) 36 5 - 49 3 %    MCV 96 82 - 98 fL    MCH 31 8 26 8 - 34 3 pg    MCHC 33 2 31 4 - 37 4 g/dL    RDW 14 4 11 6 - 15 1 %    MPV 11 1 8 9 - 12 7 fL    Platelets 406 (L) 329 - 390 Thousands/uL    nRBC 0 /100 WBCs    Neutrophils Relative 71 43 - 75 %    Immat GRANS % 1 0 - 2 %    Lymphocytes Relative 8 (L) 14 - 44 %    Monocytes Relative 18 (H) 4 - 12 %    Eosinophils Relative 2 0 - 6 %    Basophils Relative 0 0 - 1 %    Neutrophils Absolute 4 92 1 85 - 7 62 Thousands/µL    Immature Grans Absolute 0 04 0 00 - 0 20 Thousand/uL    Lymphocytes Absolute 0 55 (L) 0 60 - 4 47 Thousands/µL    Monocytes Absolute 1 26 (H) 0 17 - 1 22 Thousand/µL    Eosinophils Absolute 0 10 0 00 - 0 61 Thousand/µL    Basophils Absolute 0 02 0 00 - 0 10 Thousands/µL   Basic metabolic panel    Collection Time: 04/11/21 11:52 AM   Result Value Ref Range    Sodium 136 136 - 145 mmol/L    Potassium 3 9 3 5 - 5 3 mmol/L    Chloride 105 100 - 108 mmol/L    CO2 26 21 - 32 mmol/L    ANION GAP 5 4 - 13 mmol/L    BUN 18 5 - 25 mg/dL    Creatinine 0 88 0 60 - 1 30 mg/dL    Glucose 105 65 - 140 mg/dL    Calcium 8 4 8 3 - 10 1 mg/dL    eGFR 77 ml/min/1 73sq m   Troponin I    Collection Time: 04/11/21 11:52 AM   Result Value Ref Range    Troponin I <0 02 <=0 04 ng/mL   NT-BNP PRO    Collection Time: 04/11/21 11:52 AM   Result Value Ref Range    NT-proBNP 1,603 (H) <450 pg/mL   COVID19, Influenza A/B, RSV PCR, [facility]    Collection Time: 04/11/21 11:52 AM    Specimen: Nasopharyngeal Swab; Nares   Result Value Ref Range    SARS-CoV-2 Negative Negative    INFLUENZA A PCR Negative Negative    INFLUENZA B PCR Negative Negative    RSV PCR Negative Negative   Platelet count    Collection Time: 04/11/21  4:44 PM Result Value Ref Range    Platelets 260 (L) 959 - 390 Thousands/uL    MPV 10 4 8 9 - 12 7 fL   Troponin I    Collection Time: 04/11/21  4:44 PM   Result Value Ref Range    Troponin I <0 02 <=0 04 ng/mL   Procalcitonin with AM Reflex    Collection Time: 04/11/21  4:44 PM   Result Value Ref Range    Procalcitonin <0 05 <=0 25 ng/ml   Legionella antigen, urine    Collection Time: 04/11/21 10:16 PM    Specimen: Urine, Catheter   Result Value Ref Range    Legionella Urinary Antigen Negative Negative   Strep Pneumoniae, Urine    Collection Time: 04/11/21 10:16 PM    Specimen: Urine, Catheter   Result Value Ref Range    Strep pneumoniae antigen, urine Negative Negative   Sputum culture and Gram stain    Collection Time: 04/11/21 10:22 PM    Specimen: Expectorated Sputum   Result Value Ref Range    Sputum Culture 2+ Growth of      Gram Stain Result 1+ Epithelial cells per low power field (A)     Gram Stain Result 1+ Polys (A)     Gram Stain Result 2+ Gram positive cocci in pairs (A)    MRSA culture    Collection Time: 04/11/21 10:22 PM    Specimen: Nose; Nares   Result Value Ref Range    MRSA Culture Only       No Methicillin Resistant Staphlyococcus aureus (MRSA) isolated   Troponin I    Collection Time: 04/11/21 10:27 PM   Result Value Ref Range    Troponin I <0 02 <=0 04 ng/mL   Comprehensive metabolic panel    Collection Time: 04/12/21  4:43 AM   Result Value Ref Range    Sodium 135 (L) 136 - 145 mmol/L    Potassium 3 9 3 5 - 5 3 mmol/L    Chloride 104 100 - 108 mmol/L    CO2 26 21 - 32 mmol/L    ANION GAP 5 4 - 13 mmol/L    BUN 21 5 - 25 mg/dL    Creatinine 0 81 0 60 - 1 30 mg/dL    Glucose 91 65 - 140 mg/dL    Calcium 8 3 8 3 - 10 1 mg/dL    Corrected Calcium 9 1 8 3 - 10 1 mg/dL    AST 18 5 - 45 U/L    ALT 17 12 - 78 U/L    Alkaline Phosphatase 98 46 - 116 U/L    Total Protein 6 4 6 4 - 8 2 g/dL    Albumin 3 0 (L) 3 5 - 5 0 g/dL    Total Bilirubin 0 64 0 20 - 1 00 mg/dL    eGFR 79 ml/min/1 73sq m   Magnesium Collection Time: 04/12/21  4:43 AM   Result Value Ref Range    Magnesium 2 2 1 6 - 2 6 mg/dL   CBC (With Platelets)    Collection Time: 04/12/21  4:43 AM   Result Value Ref Range    WBC 6 11 4 31 - 10 16 Thousand/uL    RBC 3 22 (L) 3 88 - 5 62 Million/uL    Hemoglobin 10 0 (L) 12 0 - 17 0 g/dL    Hematocrit 31 1 (L) 36 5 - 49 3 %    MCV 97 82 - 98 fL    MCH 31 1 26 8 - 34 3 pg    MCHC 32 2 31 4 - 37 4 g/dL    RDW 14 3 11 6 - 15 1 %    Platelets 009 (L) 818 - 390 Thousands/uL    MPV 11 4 8 9 - 12 7 fL   D-dimer, quantitative    Collection Time: 04/12/21  4:43 AM   Result Value Ref Range    D-Dimer, Quant 2 62 (H) <0 50 ug/ml FEU   Procalcitonin Reflex    Collection Time: 04/12/21  5:18 AM   Result Value Ref Range    Procalcitonin <0 05 <=0 25 ng/ml   CBC and differential    Collection Time: 04/13/21  5:53 AM   Result Value Ref Range    WBC 6 67 4 31 - 10 16 Thousand/uL    RBC 3 24 (L) 3 88 - 5 62 Million/uL    Hemoglobin 10 1 (L) 12 0 - 17 0 g/dL    Hematocrit 30 7 (L) 36 5 - 49 3 %    MCV 95 82 - 98 fL    MCH 31 2 26 8 - 34 3 pg    MCHC 32 9 31 4 - 37 4 g/dL    RDW 14 4 11 6 - 15 1 %    MPV 10 5 8 9 - 12 7 fL    Platelets 273 (L) 286 - 390 Thousands/uL    nRBC 0 /100 WBCs    Neutrophils Relative 70 43 - 75 %    Immat GRANS % 1 0 - 2 %    Lymphocytes Relative 9 (L) 14 - 44 %    Monocytes Relative 17 (H) 4 - 12 %    Eosinophils Relative 3 0 - 6 %    Basophils Relative 0 0 - 1 %    Neutrophils Absolute 4 67 1 85 - 7 62 Thousands/µL    Immature Grans Absolute 0 04 0 00 - 0 20 Thousand/uL    Lymphocytes Absolute 0 59 (L) 0 60 - 4 47 Thousands/µL    Monocytes Absolute 1 16 0 17 - 1 22 Thousand/µL    Eosinophils Absolute 0 20 0 00 - 0 61 Thousand/µL    Basophils Absolute 0 01 0 00 - 0 10 Thousands/µL   Basic metabolic panel    Collection Time: 04/13/21  5:53 AM   Result Value Ref Range    Sodium 136 136 - 145 mmol/L    Potassium 3 7 3 5 - 5 3 mmol/L    Chloride 104 100 - 108 mmol/L    CO2 26 21 - 32 mmol/L    ANION GAP 6 4 - 13 mmol/L    BUN 22 5 - 25 mg/dL    Creatinine 0 84 0 60 - 1 30 mg/dL    Glucose 98 65 - 140 mg/dL    Calcium 8 4 8 3 - 10 1 mg/dL    eGFR 78 ml/min/1 73sq m   Bronchial culture and Gram stain    Collection Time: 04/13/21  1:36 PM    Specimen: Lung, Right Upper Lobe Bronchial Lavage   Result Value Ref Range    Gram Stain Result No Polys or Bacteria seen    Bronchial culture and Gram stain    Collection Time: 04/13/21  1:38 PM    Specimen: Tracheal, Washing; Bronchial   Result Value Ref Range    Gram Stain Result No Polys or Bacteria seen    Bronchial culture and Gram stain    Collection Time: 04/13/21  1:42 PM    Specimen: Lung, Left Lingula Lavage; Bronchial   Result Value Ref Range    Gram Stain Result Rare Polys     Gram Stain Result No bacteria seen    CBC    Collection Time: 04/14/21  5:06 AM   Result Value Ref Range    WBC 11 47 (H) 4 31 - 10 16 Thousand/uL    RBC 3 36 (L) 3 88 - 5 62 Million/uL    Hemoglobin 10 4 (L) 12 0 - 17 0 g/dL    Hematocrit 32 4 (L) 36 5 - 49 3 %    MCV 96 82 - 98 fL    MCH 31 0 26 8 - 34 3 pg    MCHC 32 1 31 4 - 37 4 g/dL    RDW 14 3 11 6 - 15 1 %    Platelets 390 702 - 136 Thousands/uL    MPV 11 1 8 9 - 12 7 fL   Basic metabolic panel    Collection Time: 04/14/21  5:06 AM   Result Value Ref Range    Sodium 136 136 - 145 mmol/L    Potassium 4 3 3 5 - 5 3 mmol/L    Chloride 104 100 - 108 mmol/L    CO2 26 21 - 32 mmol/L    ANION GAP 6 4 - 13 mmol/L    BUN 22 5 - 25 mg/dL    Creatinine 0 99 0 60 - 1 30 mg/dL    Glucose 106 65 - 140 mg/dL    Calcium 8 5 8 3 - 10 1 mg/dL    eGFR 68 ml/min/1 73sq m   Procalcitonin    Collection Time: 04/14/21  5:06 AM   Result Value Ref Range    Procalcitonin 0 25 <=0 25 ng/ml     Imaging: I have personally reviewed pertinent reports  Counseling / Coordination of Care  Total time spent today 20 minutes  Greater than 50% of total time was spent with the patient and / or family counseling and / or coordination of care

## 2021-04-14 NOTE — ASSESSMENT & PLAN NOTE
· Patient came to the hospital with worsening shortness of breath  Patient required oxygen 2 L nasal cannula which is new  Patient definitely has element of fluid overload causing symptoms and has put out 1430 mL overnight, however Dimer elevated at 2 62  Was started on antibiotics for suspected pneumonia, however Procalcitonin negative x2  Family did note patient spitting out blood tinged sputum   Was not on anticoagulation prehospital   · Continue supplementary O2 to keep sats at 92  · CTA without PE  · S/p bronchoscopy with BAL, await studies and cultures  · Continue azithro for suspected atypical pneumonia, ceftriaxone added  · Pt had a fever of 102 yesterday, check respiratory viral panel in the setting of fever and persistently normal procal

## 2021-04-15 ENCOUNTER — APPOINTMENT (INPATIENT)
Dept: RADIOLOGY | Facility: HOSPITAL | Age: 86
DRG: 196 | End: 2021-04-15
Attending: INTERNAL MEDICINE
Payer: MEDICARE

## 2021-04-15 LAB
BACTERIA BRONCH AEROBE CULT: NORMAL
GRAM STN SPEC: NORMAL
PROCALCITONIN SERPL-MCNC: 0.19 NG/ML

## 2021-04-15 PROCEDURE — 82785 ASSAY OF IGE: CPT | Performed by: INTERNAL MEDICINE

## 2021-04-15 PROCEDURE — 99232 SBSQ HOSP IP/OBS MODERATE 35: CPT | Performed by: INTERNAL MEDICINE

## 2021-04-15 PROCEDURE — 84145 PROCALCITONIN (PCT): CPT | Performed by: INTERNAL MEDICINE

## 2021-04-15 PROCEDURE — 71046 X-RAY EXAM CHEST 2 VIEWS: CPT

## 2021-04-15 PROCEDURE — 86003 ALLG SPEC IGE CRUDE XTRC EA: CPT | Performed by: INTERNAL MEDICINE

## 2021-04-15 RX ORDER — PREDNISONE 20 MG/1
40 TABLET ORAL DAILY
Status: DISCONTINUED | OUTPATIENT
Start: 2021-04-15 | End: 2021-04-16

## 2021-04-15 RX ADMIN — GUAIFENESIN 600 MG: 600 TABLET, EXTENDED RELEASE ORAL at 20:14

## 2021-04-15 RX ADMIN — FUROSEMIDE 40 MG: 40 TABLET ORAL at 08:54

## 2021-04-15 RX ADMIN — Medication 1000 UNITS: at 08:57

## 2021-04-15 RX ADMIN — METOPROLOL SUCCINATE 12.5 MG: 25 TABLET, EXTENDED RELEASE ORAL at 08:55

## 2021-04-15 RX ADMIN — PANTOPRAZOLE SODIUM 40 MG: 40 TABLET, DELAYED RELEASE ORAL at 05:21

## 2021-04-15 RX ADMIN — TAMSULOSIN HYDROCHLORIDE 0.4 MG: 0.4 CAPSULE ORAL at 17:07

## 2021-04-15 RX ADMIN — TIOTROPIUM BROMIDE 18 MCG: 18 CAPSULE ORAL; RESPIRATORY (INHALATION) at 11:25

## 2021-04-15 RX ADMIN — SENNOSIDES 8.8 MG: 8.8 SYRUP ORAL at 08:58

## 2021-04-15 RX ADMIN — METOPROLOL SUCCINATE 12.5 MG: 25 TABLET, EXTENDED RELEASE ORAL at 20:14

## 2021-04-15 RX ADMIN — LEVOTHYROXINE SODIUM 75 MCG: 75 TABLET ORAL at 05:21

## 2021-04-15 RX ADMIN — DOCUSATE SODIUM 100 MG: 100 CAPSULE, LIQUID FILLED ORAL at 08:55

## 2021-04-15 RX ADMIN — DOCUSATE SODIUM 100 MG: 100 CAPSULE, LIQUID FILLED ORAL at 17:07

## 2021-04-15 RX ADMIN — CEFTRIAXONE SODIUM 1000 MG: 10 INJECTION, POWDER, FOR SOLUTION INTRAVENOUS at 08:59

## 2021-04-15 RX ADMIN — ENOXAPARIN SODIUM 40 MG: 40 INJECTION SUBCUTANEOUS at 08:58

## 2021-04-15 RX ADMIN — PREDNISONE 40 MG: 20 TABLET ORAL at 11:25

## 2021-04-15 RX ADMIN — GUAIFENESIN 600 MG: 600 TABLET, EXTENDED RELEASE ORAL at 08:58

## 2021-04-15 RX ADMIN — POLYETHYLENE GLYCOL 3350 17 G: 17 POWDER, FOR SOLUTION ORAL at 08:59

## 2021-04-15 RX ADMIN — POTASSIUM CHLORIDE 10 MEQ: 750 TABLET, EXTENDED RELEASE ORAL at 08:57

## 2021-04-15 RX ADMIN — PRAVASTATIN SODIUM 20 MG: 20 TABLET ORAL at 17:07

## 2021-04-15 NOTE — ASSESSMENT & PLAN NOTE
· Patient came to the hospital with worsening shortness of breath  Patient required oxygen 2 L nasal cannula which is new  Patient definitely has element of fluid overload causing symptoms and has put out 1430 mL overnight, however Dimer elevated at 2 62  Was started on antibiotics for suspected pneumonia, however Procalcitonin negative x2  Family did note patient spitting out blood tinged sputum   Was not on anticoagulation prehospital   · Continue supplementary O2 to keep sats at 92  · CTA without PE  · S/p bronchoscopy with BAL, await studies and cultures  · Continue azithro for suspected atypical pneumonia, ceftriaxone added  · Pt had a fever of 102 on 4/13, improved after IV abx administered, possible bacterial pneumonia given rise in procal  · On CXR today there appears to be a RML pneumonia developing

## 2021-04-15 NOTE — ASSESSMENT & PLAN NOTE
Wt Readings from Last 3 Encounters:   04/15/21 58 8 kg (129 lb 10 1 oz)   03/17/21 67 7 kg (149 lb 4 8 oz)   02/24/21 67 1 kg (148 lb)   · volume status looks euvolemic, weights are acceptable  · Monitor off diuretics today  · Monitor daily weights, intake and output

## 2021-04-15 NOTE — PROGRESS NOTES
Progress Note - Pulmonary   Matheus Mage 80 y o  male MRN: [id number]  Unit/Bed#: -01 Encounter: [id number]      Assessment:  1   Acute hypoxic respiratory failure, unclear etiology, possibly due to hypersensitivity pneumonitis, atypical infection, alveolar hemorrhage although less likely  2  Abnormal CT chest with upper lobe predominant ground-glass opacities, possibly due to hypersensitivity pneumonitis, atypical infection, less likely alveolar hemorrhage  3  Non massive, scant hemoptysis  4   Shortness of breath secondary to 1 , 2  5  History of diastolic CHF, Cardiology following  6  Aortic stenosis status post TAVR in October 2020  7  CAD status post CABG 2010  8  Paroxysmal atrial fibrillation, not on outpatient anticoagulation  9  Hypertension  10  HLD     Plan:  -  Patient currently saturating  Mid 90s on 1L nasal cannula, continue to wean as tolerated, out of bed to chair, pulmonary toilet, incentive spirometry, goal SpO2 greater than 88%  -  Etiology of hypoxia is not clear, to consider infectious etiology versus hypersensitivity pneumonitis, versus inhalational lung disease  CTA neg for PE, did show  Bilateral upper lobe predominant ground-glass opacities  - status post bronchoscopy with moderate thick secretions and areas of hyperpigmentation  Underwent bronchoalveolar lavage in the right upper lobe and lingula with cyto brushing of the right upper lobe  No evidence of hemorrhage     cultures with mixed branden, pending further studies including cytology, cell count  -    despite negative procalcitonin, would continue treatment with Rocephin for 5 days and azithromycin for 3 days total, day 3/5 rocephin, completed 2/2 azithro   - start prednisone 40mg with plan for 1 month of steroids for treat any pneumonitis, HP,   -  RP2 panel negative  -  Follow-up cell count, cytology, cultures from bronchoscopy  - OK to resume dvt prophylaxis   - rest of care per cardiology, primary team - oral diuretic resumed      Subjective:    patient seen and examined at bedside  No further fevers overnight  Patient currently saturating 98% on 2 L nasal cannula, wean to 1 L  Mild shortness of breath and nonproductive cough  No further hemoptysis  Denies fever, chills, nausea, vomiting, chest pain  Review of Systems   Constitutional: Positive for fatigue  Negative for activity change, appetite change, chills, fever and unexpected weight change  HENT: Negative for congestion, rhinorrhea, sneezing and sore throat  Respiratory: Positive for cough and shortness of breath  Negative for wheezing  Cardiovascular: Negative for chest pain, palpitations and leg swelling  Gastrointestinal: Negative for abdominal pain, constipation, diarrhea, nausea and vomiting  Genitourinary: Negative for dysuria  Musculoskeletal: Negative for arthralgias  Neurological: Negative for dizziness and numbness  Objective:     Vitals:    04/14/21 2205 04/14/21 2235 04/15/21 0600 04/15/21 0645   BP:  112/62  111/62   Pulse:  77     Resp: 16 14     Temp:  97 7 °F (36 5 °C)  98 °F (36 7 °C)   TempSrc:       SpO2:  98%     Weight:   58 8 kg (129 lb 10 1 oz)    Height:               Intake/Output Summary (Last 24 hours) at 4/15/2021 0806  Last data filed at 4/15/2021 0500  Gross per 24 hour   Intake 240 ml   Output 600 ml   Net -360 ml         Physical Exam  Constitutional:       General: He is not in acute distress  Appearance: He is well-developed  He is not diaphoretic  Comments: Thin, chronically ill-appearing male in no distress   HENT:      Head: Normocephalic and atraumatic  Mouth/Throat:      Mouth: Mucous membranes are moist       Pharynx: Oropharynx is clear  No oropharyngeal exudate  Eyes:      General: No scleral icterus  Cardiovascular:      Rate and Rhythm: Normal rate and regular rhythm  Heart sounds: Normal heart sounds  No murmur     Pulmonary:      Effort: Pulmonary effort is normal  No respiratory distress  Breath sounds: No wheezing  Comments:  Few rales  Abdominal:      General: Bowel sounds are normal  There is no distension  Palpations: Abdomen is soft  Tenderness: There is no abdominal tenderness  Musculoskeletal:      Right lower leg: No edema  Left lower leg: No edema  Neurological:      Mental Status: He is alert and oriented to person, place, and time  Labs: I have personally reviewed pertinent lab results  Results from last 7 days   Lab Units 04/14/21  0506 04/13/21  0553 04/12/21  0443  04/11/21  1152   WBC Thousand/uL 11 47* 6 67 6 11  --  6 89   HEMOGLOBIN g/dL 10 4* 10 1* 10 0*  --  10 9*   HEMATOCRIT % 32 4* 30 7* 31 1*  --  32 8*   PLATELETS Thousands/uL 158 148* 143*   < > 143*   NEUTROS PCT %  --  70  --   --  71   MONOS PCT %  --  17*  --   --  18*    < > = values in this interval not displayed        Results from last 7 days   Lab Units 04/14/21  0506 04/13/21  0553 04/12/21  0443   POTASSIUM mmol/L 4 3 3 7 3 9   CHLORIDE mmol/L 104 104 104   CO2 mmol/L 26 26 26   BUN mg/dL 22 22 21   CREATININE mg/dL 0 99 0 84 0 81   CALCIUM mg/dL 8 5 8 4 8 3   ALK PHOS U/L  --   --  98   ALT U/L  --   --  17   AST U/L  --   --  18     Results from last 7 days   Lab Units 04/12/21  0443   MAGNESIUM mg/dL 2 2                  0   Lab Value Date/Time    TROPONINI <0 02 04/11/2021 2227    TROPONINI <0 02 04/11/2021 1644    TROPONINI <0 02 04/11/2021 1152    TROPONINI <0 02 10/21/2020 0457    TROPONINI <0 02 10/21/2020 0028    TROPONINI <0 02 10/20/2020 2108    TROPONINI <0 02 10/20/2020 1114    TROPONINI <0 02 12/19/2019 1033     Microbiology:  Sputum pending-  Mixed branden  RP2 panel negative   Strep/legionella negative    COVID negative   bronchial cultures  Mixed branden     Imaging and other studies: I have personally reviewed pertinent reports    and I have personally reviewed pertinent films in PACS   CTA chest 04/12/2021   negative for PE   emphysema   upper lobe predominant bilateral ground-glass opacities      Ramon Todd MD  Pulmonary & Critical Care Fellow, [facility]

## 2021-04-15 NOTE — PLAN OF CARE
Problem: Prexisting or High Potential for Compromised Skin Integrity  Goal: Skin integrity is maintained or improved  Description: INTERVENTIONS:  - Identify patients at risk for skin breakdown  - Assess and monitor skin integrity  - Assess and monitor nutrition and hydration status  - Monitor labs   - Assess for incontinence   - Turn and reposition patient  - Assist with mobility/ambulation  - Relieve pressure over bony prominences  - Avoid friction and shearing  - Provide appropriate hygiene as needed including keeping skin clean and dry  - Evaluate need for skin moisturizer/barrier cream  - Collaborate with interdisciplinary team   - Patient/family teaching  - Consider wound care consult   Outcome: Progressing     Problem: PAIN - ADULT  Goal: Verbalizes/displays adequate comfort level or baseline comfort level  Description: Interventions:  - Encourage patient to monitor pain and request assistance  - Assess pain using appropriate pain scale  - Administer analgesics based on type and severity of pain and evaluate response  - Implement non-pharmacological measures as appropriate and evaluate response  - Consider cultural and social influences on pain and pain management  - Notify physician/advanced practitioner if interventions unsuccessful or patient reports new pain  Outcome: Progressing     Problem: RESPIRATORY - ADULT  Goal: Achieves optimal ventilation and oxygenation  Description: INTERVENTIONS:  - Assess for changes in respiratory status  - Assess for changes in mentation and behavior  - Position to facilitate oxygenation and minimize respiratory effort  - Oxygen administered by appropriate delivery if ordered  - Initiate smoking cessation education as indicated  - Encourage broncho-pulmonary hygiene including cough, deep breathe, Incentive Spirometry  - Assess the need for suctioning and aspirate as needed  - Assess and instruct to report SOB or any respiratory difficulty  - Respiratory Therapy support as indicated  Outcome: Progressing

## 2021-04-15 NOTE — PROGRESS NOTES
Pastoral Care Progress Note    4/15/2021  Patient: Maria Dolores Castle : 1933  Admission Date & Time: 2021 1125  MRN: [id number] [facility]: [id number]      Ritual: Rogers Juárez provided sacrament of the sick and Provided prayer       04/15/21 1100   Clinical Encounter Type   Visited With Patient   Mormon Encounters   Mormon Needs Prayer   Sacramental Encounters   Sacrament of Sick-Anointing Anointed

## 2021-04-15 NOTE — PROGRESS NOTES
General Cardiology   Progress Note -  Team One   Dana Bolus 80 y o  male MRN: [id number]    Unit/Bed#: -01 Encounter: [id number]    Assessment/ Plan:    1  Acute hypoxic resp failure: initially given IV diuretics for possible volume overload but continued to be hypoxic; stopped on 4/11  He underwent CT chest x2  No PE, Covid 19 neg  He undwent bronch and pulmonary following  Being treated for atypical PNA with Abx; today chest xray showing developing RML PNA per primary team   2  Chronic diastolic CHF: s/p IV diuretics initially wntout imporvement in hypoxia; stopped 4/11 and now back on oral lasix 40mg daily  Wt stable at 129lbs and he is negative 2 9L overall  Cr and K stable; continue curernt dose diuretics; monriot I/Os and daily wts as he is at risk for volume overload  3  PAF: typically maintains SR; on lopressore 12 5mg BID; he is not on systemic AC as OP  4  Severe AS s p TAVR 10/2020  5  HTN: well controlled; currenly 112/62  6  CAD with prior CABG: on ASA, statin, BB; no anginal symptoms  EF 60% preserved on echo 11/2020    Continue treatment as in #1 for hypoxia per pulm and primary team, continue oral diuretics; cardiology will sign off  Please call with questions  Subjective: pt seen for follow up  He reports feeling very SOB with exertion this AM going down for cxray; improved with rest  He is not having any chest pain or increased LE edema  Review of Systems   Constitution: Positive for malaise/fatigue  Negative for decreased appetite and fever  Cardiovascular: Positive for dyspnea on exertion  Negative for chest pain, leg swelling, orthopnea, palpitations and syncope  Respiratory: Positive for cough, shortness of breath and sputum production  Gastrointestinal: Negative for nausea and vomiting  Genitourinary: Negative for dysuria  Neurological: Negative for dizziness and light-headedness  Psychiatric/Behavioral: Negative for altered mental status     All other systems reviewed and are negative  Objective:   Vitals: Blood pressure 111/62, pulse 77, temperature 98 °F (36 7 °C), resp  rate 14, height 5' 6" (1 676 m), weight 58 8 kg (129 lb 10 1 oz), SpO2 98 %  ,     Body mass index is 20 92 kg/m²  ,     Systolic (45WXR), IEE:445 , Min:98 , FERNANDA:390     Diastolic (71UZK), AYX:73, Min:54, Max:62      Intake/Output Summary (Last 24 hours) at 4/15/2021 1006  Last data filed at 4/15/2021 0946  Gross per 24 hour   Intake 400 ml   Output 600 ml   Net -200 ml     Weight (last 2 days)     Date/Time   Weight    04/15/21 0600   58 8 (129 63)    04/14/21 1300   59 4 (130 95)            Telemetry Review: no telemetry    Physical Exam  Vitals signs reviewed  Constitutional:       Appearance: Normal appearance  Comments: Chronically ill appearing/frail; sitting up in chair in NAD   HENT:      Head: Normocephalic  Mouth/Throat:      Mouth: Mucous membranes are moist    Cardiovascular:      Rate and Rhythm: Normal rate and regular rhythm  Heart sounds: S1 normal and S2 normal       Comments: Trace ankle edema  Pulmonary:      Effort: Pulmonary effort is normal       Breath sounds: Wheezing and rhonchi present  Comments: On O2 via NC 2L  Skin:     General: Skin is warm  Neurological:      Mental Status: He is alert and oriented to person, place, and time     Psychiatric:         Mood and Affect: Mood normal        LABORATORY RESULTS  Results from last 7 days   Lab Units 04/11/21  2227 04/11/21  1644 04/11/21  1152   TROPONIN I ng/mL <0 02 <0 02 <0 02     CBC with diff:   Results from last 7 days   Lab Units 04/14/21  0506 04/13/21  0553 04/12/21  0443 04/11/21  1644 04/11/21  1152   WBC Thousand/uL 11 47* 6 67 6 11  --  6 89   HEMOGLOBIN g/dL 10 4* 10 1* 10 0*  --  10 9*   HEMATOCRIT % 32 4* 30 7* 31 1*  --  32 8*   MCV fL 96 95 97  --  96   PLATELETS Thousands/uL 158 148* 143* 130* 143*   MCH pg 31 0 31 2 31 1  --  31 8   MCHC g/dL 32 1 32 9 32 2  --  33 2   RDW % 14 3 14 4 14 3  --  14 4   MPV fL 11 1 10 5 11 4 10 4 11 1   NRBC AUTO /100 WBCs  --  0  --   --  0       CMP:  Results from last 7 days   Lab Units 21  0506 21  0553 21  0443 21  1152   POTASSIUM mmol/L 4 3 3 7 3 9 3 9   CHLORIDE mmol/L 104 104 104 105   CO2 mmol/L 26 26 26 26   BUN mg/dL 22 22 21 18   CREATININE mg/dL 0 99 0 84 0 81 0 88   CALCIUM mg/dL 8 5 8 4 8 3 8 4   AST U/L  --   --  18  --    ALT U/L  --   --  17  --    ALK PHOS U/L  --   --  98  --    EGFR ml/min/1 73sq m 68 78 79 77       BMP:  Results from last 7 days   Lab Units 21  0506 21  0553 21  0443 21  1152   POTASSIUM mmol/L 4 3 3 7 3 9 3 9   CHLORIDE mmol/L 104 104 104 105   CO2 mmol/L 26 26 26 26   BUN mg/dL 22 22 21 18   CREATININE mg/dL 0 99 0 84 0 81 0 88   CALCIUM mg/dL 8 5 8 4 8 3 8 4       Lab Results   Component Value Date    NTBNP 1,603 (H) 2021    NTBNP 300 10/20/2020             Results from last 7 days   Lab Units 21  0443   MAGNESIUM mg/dL 2 2                           Lipid Profile:   Lab Results   Component Value Date    CHOL 121 2015    CHOL 109 2014    CHOL 113 2014     Lab Results   Component Value Date    HDL 41 10/21/2020    HDL 42 2020    HDL 39 (L) 2019     Lab Results   Component Value Date    LDLCALC 43 10/21/2020    LDLCALC 29 2020    LDLCALC 38 2019     Lab Results   Component Value Date    TRIG 60 10/21/2020    TRIG 81 2020    TRIG 67 2019       Cardiac testing:   Results for orders placed during the hospital encounter of 20   Echo complete with contrast if indicated    [address]  [phone]    Transthoracic Echocardiogram  2D, M-mode, Doppler, and Color Doppler    Study date:  2020    Patient: Radha Yang  MR number: [id number]  Account number: [de-identified]  : 1933  Age: 80 years  Gender: Male  Status: Outpatient  Location: Echo lab  Height: 67 in  Weight: 147 lb  BP: 110/ 50 mmHg    Indications: S/P 30 day TAVR- 26mm ES3    Diagnoses: I35 9 - Nonrheumatic aortic valve disorder, unspecified    Sonographer:  PATY Sprague, RDCS  Primary Physician:  Karri Melvin MD  Referring Physician:  MANI Moses  Group:  [id number]  Interpreting Physician:  Tad Couch MD    SUMMARY    LEFT VENTRICLE:  Systolic function was normal  Ejection fraction was estimated to be 60 %  There were no regional wall motion abnormalities  Wall thickness was mildly increased  There was mild concentric hypertrophy  Features were consistent with a pseudonormal left ventricular filling pattern, with concomitant abnormal relaxation and increased filling pressure (grade 2 diastolic dysfunction)  RIGHT VENTRICLE:  The size was normal   Systolic function was normal     LEFT ATRIUM:  The atrium was mildly dilated  MITRAL VALVE:  There was trace regurgitation  AORTIC VALVE:  A #26 Braxton Jeffrey TAVR bioprosthesis was present  It exhibited normal function  There was mild paravalular regurgitation  The peak valve velocity was 168 cm/s  The mean valve velocity was 115 cm/s  Valve peak gradient was 11 mmHg  Valve mean gradient was 6 mmHg  Estimated aortic valve area (by VTI) was 1 87 cmï¾²  TRICUSPID VALVE:  There was mild regurgitation  Estimated peak PA pressure was 48 mmHg  PULMONIC VALVE:  There was mild regurgitation  AORTA:  There was mild dilatation of the ascending aorta  HISTORY: PRIOR HISTORY: Aortic stenosis S/P 30 day TAVR-26mm ES3; Pulmonary emphysema; PAF; CAD; Hyperlipidemia; Hypertension; AAA; Chest pain; Hypothyroid; Parkinsons Disease    PROCEDURE: The procedure was performed in the echo lab  This was a routine study  The transthoracic approach was used  The study included complete 2D imaging, M-mode, complete spectral Doppler, and color Doppler   The heart rate was 70 bpm,  at the start of the study  Images were obtained from the parasternal, apical, subcostal, and suprasternal notch acoustic windows  Image quality was adequate  LEFT VENTRICLE: Size was normal  Systolic function was normal  Ejection fraction was estimated to be 60 %  There were no regional wall motion abnormalities  Wall thickness was mildly increased  There was mild concentric hypertrophy  DOPPLER: Features were consistent with a pseudonormal left ventricular filling pattern, with concomitant abnormal relaxation and increased filling pressure (grade 2 diastolic dysfunction)  RIGHT VENTRICLE: The size was normal  Systolic function was normal  Wall thickness was normal     LEFT ATRIUM: The atrium was mildly dilated  RIGHT ATRIUM: Size was normal     MITRAL VALVE: Valve structure was normal  There was normal leaflet separation  DOPPLER: The transmitral velocity was within the normal range  There was no evidence for stenosis  There was trace regurgitation  AORTIC VALVE: A #26 Braxton Jeffrey TAVR bioprosthesis was present  It exhibited normal function  There was mild paravalular regurgitation  TRICUSPID VALVE: The valve structure was normal  There was normal leaflet separation  DOPPLER: The transtricuspid velocity was within the normal range  There was no evidence for stenosis  There was mild regurgitation  Estimated peak PA  pressure was 48 mmHg  PULMONIC VALVE: Leaflets exhibited normal thickness, no calcification, and normal cuspal separation  DOPPLER: The transpulmonic velocity was within the normal range  There was mild regurgitation  PERICARDIUM: There was no pericardial effusion  AORTA: There was mild dilatation of the ascending aorta  SYSTEMIC VEINS: IVC: The inferior vena cava was normal in size      MEASUREMENT TABLES    2D MEASUREMENTS  LVOT   (Reference normals)  Diam   21 mm   (--)    DOPPLER MEASUREMENTS  LVOT   (Reference normals)  Peak thai   83 cm/s   (--)  Mean thai 51 cm/s   (--)  VTI   19 cm   (--)  Stroke vol   65 81 ml   (--)  Stroke index   0 45 ml/mï¾²   (--)  Aortic valve   (Reference normals)  Peak thai   168 cm/s   (--)  Mean thai   115 cm/s   (--)  VTI   35 cm   (--)  Peak gradient   11 mmHg   (--)  Mean gradient   6 mmHg   (--)  Obstr index, VTI   0 54    (--)  Valve area, VTI   1 87 cmï¾²   (--)  Area index, VTI   1 05 cmï¾²/mï¾²   (--)  Obstr index, Vmax   0 49    (--)  Valve area, Vmax   1 7 cmï¾²   (--)  Area index, Vmax   0 96 cmï¾²/mï¾²   (--)  Obstr index, Vmean   0 44    (--)  Valve area, Vmean   1 52 cmï¾²   (--)  Area index, Vmean   0 85 cmï¾²/mï¾²   (--)    SYSTEM MEASUREMENT TABLES    2D  %FS: 34 42 %  %FS: 34 42 %  Ao Diam: 3 13 cm  Ao Diam: 3 13 cm  Ao asc: 3 85 cm  Ao asc: 3 85 cm  EDV(Teich): 89 43 ml  EDV(Teich): 89 43 ml  EF(Teich): 63 69 %  EF(Teich): 63 69 %  ESV(Teich): 32 48 ml  ESV(Teich): 32 48 ml  IVSd: 1 19 cm  IVSd: 1 19 cm  LA Area: 13 35 cm2  LA Area: 13 35 cm2  LA Diam: 4 56 cm  LA Diam: 4 56 cm  LVEDV MOD A4C: 86 4 ml  LVEDV MOD A4C: 86 4 ml  LVEF MOD A4C: 67 03 %  LVEF MOD A4C: 67 03 %  LVESV MOD A4C: 28 49 ml  LVESV MOD A4C: 28 49 ml  LVIDd: 4 44 cm  LVIDd: 4 44 cm  LVIDs: 2 91 cm  LVIDs: 2 91 cm  LVLd A4C: 8 56 cm  LVLd A4C: 8 56 cm  LVLs A4C: 6 8 cm  LVLs A4C: 6 8 cm  LVOT Diam: 2 12 cm  LVPWd: 1 23 cm  LVPWd: 1 23 cm  RA Area: 21 85 cm2  RA Area: 21 85 cm2  RVIDd: 3 97 cm  RVIDd: 3 97 cm  SV MOD A4C: 57 91 ml  SV MOD A4C: 57 91 ml  SV(Teich): 56 96 ml  SV(Teich): 56 96 ml    CW  AV Env  Ti: 319 7 ms  AV Env  Ti: 319 7 ms  AV VTI: 38 98 cm  AV VTI: 38 98 cm  AV Vmax: 1 74 m/s  AV Vmax: 1 74 m/s  AV Vmean: 1 22 m/s  AV Vmean: 1 22 m/s  AV maxP 07 mmHg  AV maxP 07 mmHg  AV meanP 54 mmHg  AV meanP 54 mmHg  TR Vmax: 3 24 m/s  TR Vmax: 3 24 m/s  TR maxP 97 mmHg  TR maxP 97 mmHg    MM  TAPSE: 1 37 cm  TAPSE: 1 37 cm    PW  OSMAR (VTI): 1 75 cm2  OSMAR Vmax: 1 64 cm2  DVI: 0 5  DVI: 0 5  E' Sept: 0 06 m/s  E' Sept: 0 06 m/s  E/E' Sept:   E/E' Sept:   LVOT Env  Ti: 369 17 ms  LVOT Env  Ti: 369 17 ms  LVOT VTI: 19 34 cm  LVOT VTI: 19 34 cm  LVOT Vmax: 0 81 m/s  LVOT Vmax: 0 81 m/s  LVOT Vmean: 0 52 m/s  LVOT Vmean: 0 52 m/s  LVOT maxP 6 mmHg  LVOT maxP 6 mmHg  LVOT meanP 28 mmHg  LVOT meanP 28 mmHg  LVSV Dopp: 68 22 ml  MV A [place]: 0 89 m/s  MV A [place]: 0 89 m/s  MV Dec [place]: 4 06 m/s2  MV Dec [place]: 4 06 m/s2  MV DecT: 206 95 ms  MV DecT: 206 95 ms  MV E [place]: 0 84 m/s  MV E [place]: 0 84 m/s  MV E/A Ratio: 0 95  MV E/A Ratio: 0 95  MV PHT: 60 01 ms  MV PHT: 60 01 ms  MVA By PHT: 3 67 cm2  MVA By PHT: 3 67 cm2    [address] [facility]redited Ech[facility]    Prepared and electronically signed by    Christiana Deleon MD  Signed [id number] 13:13:29       Meds/Allergies   all current active meds have been reviewed  Medications Prior to Admission   Medication    acetaminophen (TYLENOL) 325 mg tablet    alfuzosin (UROXATRAL) 10 mg 24 hr tablet    aspirin 81 MG tablet    Cholecalciferol (HM VITAMIN D3) 2000 units CAPS    furosemide (LASIX) 20 mg tablet    levothyroxine 75 mcg tablet    metoprolol succinate (TOPROL-XL) 25 mg 24 hr tablet    Multiple Vitamin (multivitamin) capsule    omeprazole (PriLOSEC) 20 mg delayed release capsule    potassium chloride (K-DUR,KLOR-CON) 10 mEq tablet    simvastatin (ZOCOR) 20 mg tablet    SPIRIVA RESPIMAT 2 5 MCG/ACT AERS inhaler    hydrocortisone 2 5 % cream     Assessment:  Principal Problem:    Acute respiratory failure with hypoxia (HCC)  Active Problems:    Severe aortic stenosis    PAF (paroxysmal atrial fibrillation) ([facility])    Hyperlipidemia    Hypertension    Hypothyroidism    Abdominal aortic aneurysm (AAA) without rupture ([facility])    Ambulatory dysfunction    S/P TAVR (transcatheter aortic valve replacement)    Chronic diastolic congestive heart failure (HCC)    Shortness of breath    Counseling / Coordination of Care  Total floor / unit time spent today 20 minutes  Greater than 50% of total time was spent with the patient and / or family counseling and / or coordination of care  ** Please Note: Dragon 360 Dictation voice to text software may have been used in the creation of this document   **

## 2021-04-15 NOTE — PROGRESS NOTES
[address] [facility]  Progress Note - Claudio Samaniego 1933, 80 y o  male MRN: [id number]  Unit/Bed#: -01 Encounter: [id number]  Primary Care Provider: Hortensia Park MD   Date and time admitted to hospital: 4/11/2021 11:25 AM    Shortness of breath  Assessment & Plan  · Patient came to the hospital with shortness of breath that has been going on for the past 3 4 days  Patient with chronic bilateral lower extremity swelling which is slightly worse  Currently patient requiring oxygen which is unusual for him  Patient has been febrile overnight  Started on antibiotics for pneumonia, procalcitonin has risen and fallen  · Continue supplementary O2, wean as tolerated    * Acute respiratory failure with hypoxia ([address] )  Assessment & Plan  · Patient came to the hospital with worsening shortness of breath  Patient required oxygen 2 L nasal cannula which is new  Patient definitely has element of fluid overload causing symptoms and has put out 1430 mL overnight, however Dimer elevated at 2 62  Was started on antibiotics for suspected pneumonia, however Procalcitonin negative x2  Family did note patient spitting out blood tinged sputum   Was not on anticoagulation prehospital   · Continue supplementary O2 to keep sats at 92  · CTA without PE  · S/p bronchoscopy with BAL, await studies and cultures  · Continue azithro for suspected atypical pneumonia, ceftriaxone added  · Pt had a fever of 102 on 4/13, improved after IV abx administered, possible bacterial pneumonia given rise in procal  · On CXR today there appears to be a RML pneumonia developing      Chronic diastolic congestive heart failure (HCC)  Assessment & Plan  Wt Readings from Last 3 Encounters:   04/15/21 58 8 kg (129 lb 10 1 oz)   03/17/21 67 7 kg (149 lb 4 8 oz)   02/24/21 67 1 kg (148 lb)   · volume status looks euvolemic, weights are acceptable  · Monitor off diuretics today  · Monitor daily weights, intake and output          PAF (paroxysmal atrial fibrillation) (HCC)  Assessment & Plan  · Rate controlled  · Not on any anticoagulation likely secondary to fall risk  · Continue Metoprolol-XL 12 5 mg BID     S/P TAVR (transcatheter aortic valve replacement)  Assessment & Plan  · Murmur noted   · Outpatient cardiology follow up    Ambulatory dysfunction  Assessment & Plan  · PT OT evaluation    Abdominal aortic aneurysm (AAA) without rupture (HCC)  Assessment & Plan  · No abdominal pain  · BP controlled at this time     Hypertension  Assessment & Plan  · BP controlled   · Monitor blood pressure with diuresis     Hyperlipidemia  Assessment & Plan  · Continue with statin      VTE Pharmacologic Prophylaxis:   Pharmacologic: Heparin  Mechanical VTE Prophylaxis in Place: Yes    Patient Centered Rounds: I have performed bedside rounds with nursing staff today  Education and Discussions with Family / Patient: patient, plan of care    Time Spent for Care: 20 minutes  More than 50% of total time spent on counseling and coordination of care as described above  Current Length of Stay: 4 day(s)    Current Patient Status: Inpatient   Certification Statement: The patient will continue to require additional inpatient hospital stay due to shortness of breath    Discharge Plan: TBD    Code Status: Level 1 - Full Code      Subjective:   Reports that he is still short of breath, especially with exertion  Has mild cough with white sputum  Objective:     Vitals:   Temp (24hrs), Av 6 °F (36 4 °C), Min:97 1 °F (36 2 °C), Max:98 °F (36 7 °C)    Temp:  [97 1 °F (36 2 °C)-98 °F (36 7 °C)] 98 °F (36 7 °C)  HR:  [72-80] 77  Resp:  [14-16] 14  BP: ()/(54-62) 111/62  SpO2:  [96 %-98 %] 98 %  Body mass index is 20 92 kg/m²  Input and Output Summary (last 24 hours):        Intake/Output Summary (Last 24 hours) at 4/15/2021 1153  Last data filed at 4/15/2021 0946  Gross per 24 hour   Intake 400 ml   Output 600 ml   Net -200 ml Physical Exam:     Physical Exam  Constitutional:       Appearance: Normal appearance  HENT:      Head: Normocephalic and atraumatic  Nose: Nose normal       Mouth/Throat:      Mouth: Mucous membranes are moist       Pharynx: Oropharynx is clear  Eyes:      Extraocular Movements: Extraocular movements intact  Cardiovascular:      Rate and Rhythm: Normal rate and regular rhythm  Pulmonary:      Effort: Pulmonary effort is normal       Breath sounds: No wheezing or rales  Neurological:      General: No focal deficit present  Mental Status: He is alert and oriented to person, place, and time  Psychiatric:         Mood and Affect: Mood normal          Behavior: Behavior normal            Additional Data:     Labs:    Results from last 7 days   Lab Units 04/14/21  0506 04/13/21  0553   WBC Thousand/uL 11 47* 6 67   HEMOGLOBIN g/dL 10 4* 10 1*   HEMATOCRIT % 32 4* 30 7*   PLATELETS Thousands/uL 158 148*   NEUTROS PCT %  --  70   LYMPHS PCT %  --  9*   MONOS PCT %  --  17*   EOS PCT %  --  3     Results from last 7 days   Lab Units 04/14/21  0506  04/12/21  0443   SODIUM mmol/L 136   < > 135*   POTASSIUM mmol/L 4 3   < > 3 9   CHLORIDE mmol/L 104   < > 104   CO2 mmol/L 26   < > 26   BUN mg/dL 22   < > 21   CREATININE mg/dL 0 99   < > 0 81   ANION GAP mmol/L 6   < > 5   CALCIUM mg/dL 8 5   < > 8 3   ALBUMIN g/dL  --   --  3 0*   TOTAL BILIRUBIN mg/dL  --   --  0 64   ALK PHOS U/L  --   --  98   ALT U/L  --   --  17   AST U/L  --   --  18   GLUCOSE RANDOM mg/dL 106   < > 91    < > = values in this interval not displayed  Results from last 7 days   Lab Units 04/15/21  0442 04/14/21  0506 04/12/21  0518 04/11/21  1644   PROCALCITONIN ng/ml 0 19 0 25 <0 05 <0 05           * I Have Reviewed All Lab Data Listed Above  * Additional Pertinent Lab Tests Reviewed:  All Labs Within Last 24 Hours Reviewed    Imaging:    Imaging Reports Reviewed Today Include: CXR  Imaging Personally Reviewed by Myself Includes:  CXR, new RML infiltrate, upper lobe opacities are improved      Recent Cultures (last 7 days):     Results from last 7 days   Lab Units 04/13/21  1342 04/13/21  1338 04/13/21  1336 04/11/21  2222 04/11/21  2216   SPUTUM CULTURE   --   --   --  2+ Growth of   --    GRAM STAIN RESULT  Rare Polys  No bacteria seen No Polys or Bacteria seen No Polys or Bacteria seen 1+ Epithelial cells per low power field*  1+ Polys*  2+ Gram positive cocci in pairs*  --    LEGIONELLA URINARY ANTIGEN   --   --   --   --  Negative       Last 24 Hours Medication List:   Current Facility-Administered Medications   Medication Dose Route Frequency Provider Last Rate    acetaminophen  650 mg Oral Q6H PRN Maris Herring MD      albuterol  2 5 mg Nebulization Q6H PRN Tiffany Haley DO      benzonatate  100 mg Oral TID PRN Maris Herring MD      cefTRIAXone  1,000 mg Intravenous Q24H Vandana Anderson MD Stopped (04/15/21 0946)    cholecalciferol  1,000 Units Oral Daily Maris Herring MD      docusate sodium  100 mg Oral BID Maris Herring MD      enoxaparin  40 mg Subcutaneous Q24H [address] Vandana Anderson MD      furosemide  40 mg Oral Daily MANI May      guaiFENesin  600 mg Oral Q12H [address] Maris Herring MD      levothyroxine  75 mcg Oral Early Morning Maris Herring MD      metoprolol succinate  12 5 mg Oral BID MANI May      ondansetron  4 mg Intravenous Q6H PRN Maris Herring MD      pantoprazole  40 mg Oral Early Morning Maris Herring MD      polyethylene glycol  17 g Oral Daily Maris Herring MD      potassium chloride  10 mEq Oral Daily Maris Herring MD      pravastatin  20 mg Oral Daily With Leelee Ha MD      predniSONE  40 mg Oral Daily Vandana Anderson MD      senna  8 8 mg Oral Daily Maris Herring MD      tamsulosin  0 4 mg Oral Daily With Leelee Ha MD      tiotropium  18 mcg Inhalation Daily Maris Herring MD          Today, Patient Was Seen By: Esther Alanis, MD    ** Please Note: Dictation voice to text software may have been used in the creation of this document   **

## 2021-04-15 NOTE — ASSESSMENT & PLAN NOTE
· Patient came to the hospital with shortness of breath that has been going on for the past 3 4 days  Patient with chronic bilateral lower extremity swelling which is slightly worse  Currently patient requiring oxygen which is unusual for him  Patient has been febrile overnight   Started on antibiotics for pneumonia, procalcitonin has risen and fallen  · Continue supplementary O2, wean as tolerated

## 2021-04-16 LAB
A ALTERNATA IGE QN: <0.1 KUA/I
A FUMIGATUS IGE QN: <0.1 KUA/I
ALLERGEN COMMENT: ABNORMAL
BERMUDA GRASS IGE QN: <0.1 KUA/I
BOXELDER IGE QN: <0.1 KUA/I
C HERBARUM IGE QN: <0.1 KUA/I
CAT DANDER IGE QN: <0.1 KUA/I
CMN PIGWEED IGE QN: <0.1 KUA/I
COMMON RAGWEED IGE QN: 1.44 KUA/I
COTTONWOOD IGE QN: <0.1 KUA/I
D FARINAE IGE QN: 0.17 KUA/I
D PTERONYSS IGE QN: 0.22 KUA/I
DOG DANDER IGE QN: <0.1 KUA/I
LONDON PLANE IGE QN: <0.1 KUA/I
MOUSE URINE PROT IGE QN: <0.1 KUA/I
MT JUNIPER IGE QN: <0.1 KUA/I
MUGWORT IGE QN: <0.1 KUA/I
P NOTATUM IGE QN: <0.1 KUA/I
ROACH IGE QN: <0.1 KUA/I
SHEEP SORREL IGE QN: <0.1 KUA/I
SILVER BIRCH IGE QN: <0.1 KUA/I
TIMOTHY IGE QN: <0.1 KUA/I
TOTAL IGE SMQN RAST: 965 KU/L (ref 0–113)
WALNUT IGE QN: <0.1 KUA/I
WHITE ASH IGE QN: <0.1 KUA/I
WHITE ELM IGE QN: <0.1 KUA/I
WHITE MULBERRY IGE QN: <0.1 KUA/I
WHITE OAK IGE QN: <0.1 KUA/I

## 2021-04-16 PROCEDURE — 97530 THERAPEUTIC ACTIVITIES: CPT

## 2021-04-16 PROCEDURE — 99233 SBSQ HOSP IP/OBS HIGH 50: CPT | Performed by: INTERNAL MEDICINE

## 2021-04-16 PROCEDURE — 97110 THERAPEUTIC EXERCISES: CPT

## 2021-04-16 PROCEDURE — 97116 GAIT TRAINING THERAPY: CPT

## 2021-04-16 PROCEDURE — 99232 SBSQ HOSP IP/OBS MODERATE 35: CPT | Performed by: INTERNAL MEDICINE

## 2021-04-16 RX ORDER — PREDNISONE 20 MG/1
20 TABLET ORAL DAILY
Status: DISCONTINUED | OUTPATIENT
Start: 2021-04-29 | End: 2021-04-18 | Stop reason: HOSPADM

## 2021-04-16 RX ORDER — PREDNISONE 10 MG/1
10 TABLET ORAL DAILY
Status: DISCONTINUED | OUTPATIENT
Start: 2021-05-06 | End: 2021-04-18 | Stop reason: HOSPADM

## 2021-04-16 RX ORDER — PREDNISONE 10 MG/1
10 TABLET ORAL DAILY
Status: DISCONTINUED | OUTPATIENT
Start: 2021-05-05 | End: 2021-04-16

## 2021-04-16 RX ORDER — PREDNISONE 20 MG/1
40 TABLET ORAL DAILY
Status: DISCONTINUED | OUTPATIENT
Start: 2021-04-16 | End: 2021-04-16

## 2021-04-16 RX ORDER — PREDNISONE 20 MG/1
20 TABLET ORAL DAILY
Status: DISCONTINUED | OUTPATIENT
Start: 2021-04-28 | End: 2021-04-16

## 2021-04-16 RX ORDER — PREDNISONE 20 MG/1
40 TABLET ORAL DAILY
Status: DISCONTINUED | OUTPATIENT
Start: 2021-04-17 | End: 2021-04-18 | Stop reason: HOSPADM

## 2021-04-16 RX ADMIN — ENOXAPARIN SODIUM 40 MG: 40 INJECTION SUBCUTANEOUS at 10:06

## 2021-04-16 RX ADMIN — GUAIFENESIN 600 MG: 600 TABLET, EXTENDED RELEASE ORAL at 21:14

## 2021-04-16 RX ADMIN — TIOTROPIUM BROMIDE 18 MCG: 18 CAPSULE ORAL; RESPIRATORY (INHALATION) at 10:12

## 2021-04-16 RX ADMIN — PANTOPRAZOLE SODIUM 40 MG: 40 TABLET, DELAYED RELEASE ORAL at 04:45

## 2021-04-16 RX ADMIN — Medication 1000 UNITS: at 10:06

## 2021-04-16 RX ADMIN — DOCUSATE SODIUM 100 MG: 100 CAPSULE, LIQUID FILLED ORAL at 10:06

## 2021-04-16 RX ADMIN — POTASSIUM CHLORIDE 10 MEQ: 750 TABLET, EXTENDED RELEASE ORAL at 10:08

## 2021-04-16 RX ADMIN — METOPROLOL SUCCINATE 12.5 MG: 25 TABLET, EXTENDED RELEASE ORAL at 21:15

## 2021-04-16 RX ADMIN — PREDNISONE 40 MG: 20 TABLET ORAL at 10:10

## 2021-04-16 RX ADMIN — PRAVASTATIN SODIUM 20 MG: 20 TABLET ORAL at 17:12

## 2021-04-16 RX ADMIN — GUAIFENESIN 600 MG: 600 TABLET, EXTENDED RELEASE ORAL at 10:07

## 2021-04-16 RX ADMIN — FUROSEMIDE 40 MG: 40 TABLET ORAL at 10:07

## 2021-04-16 RX ADMIN — TAMSULOSIN HYDROCHLORIDE 0.4 MG: 0.4 CAPSULE ORAL at 17:12

## 2021-04-16 RX ADMIN — METOPROLOL SUCCINATE 12.5 MG: 25 TABLET, EXTENDED RELEASE ORAL at 10:11

## 2021-04-16 RX ADMIN — DOCUSATE SODIUM 100 MG: 100 CAPSULE, LIQUID FILLED ORAL at 17:12

## 2021-04-16 RX ADMIN — LEVOTHYROXINE SODIUM 75 MCG: 75 TABLET ORAL at 04:45

## 2021-04-16 RX ADMIN — CEFTRIAXONE SODIUM 1000 MG: 10 INJECTION, POWDER, FOR SOLUTION INTRAVENOUS at 09:56

## 2021-04-16 NOTE — PLAN OF CARE
Problem: PHYSICAL THERAPY ADULT  Goal: Performs mobility at highest level of function for planned discharge setting  See evaluation for individualized goals  Description: Treatment/Interventions: Functional transfer training, LE strengthening/ROM, Elevations, Therapeutic exercise, Endurance training, Bed mobility, Gait training, Patient/family training, Equipment eval/education  Equipment Recommended: Walker(already owns)       See flowsheet documentation for full assessment, interventions and recommendations  Outcome: Progressing  Note: Prognosis: Fair  Problem List: Decreased strength, Decreased endurance, Decreased range of motion, Impaired balance, Decreased mobility, Decreased coordination, Impaired judgement  Assessment: Pt  on 2L O2 upon entry  Pt  destated to mid [de-identified] with activity  Icnreased O2 to 3L for ambulation except for the first ambulation which was on 2L  Pt  needed seated rests between all ambulation and breathing cues given to improve his stats  RN aware of it  Will continue to follow during the stay to [facility] functional mobility  Pt  noted with decreased endurance and needed main physical ubaldo only for scooting forward to be seated at EOB  Donned special shoe with wedge for ambulation and noted gait to be steady and needed CGS/S  DCP could possibly change if patient continue to make progress  Recommended to restorative to ambulate patient on unit if able  Barriers to Discharge: None     PT Discharge Recommendation: Post-Acute Rehabilitation Services  PT Discharge Recommendation: Post acute rehabilitation services     PT - OK to Discharge: Yes    See flowsheet documentation for full assessment

## 2021-04-16 NOTE — ASSESSMENT & PLAN NOTE
Wt Readings from Last 3 Encounters:   04/16/21 58 7 kg (129 lb 6 6 oz)   03/17/21 67 7 kg (149 lb 4 8 oz)   02/24/21 67 1 kg (148 lb)   · volume status looks euvolemic, weights are acceptable  · Diuretics restarted

## 2021-04-16 NOTE — ASSESSMENT & PLAN NOTE
· Patient came to the hospital with worsening shortness of breath  Patient required oxygen 2 L nasal cannula which is new  Patient definitely has element of fluid overload causing symptoms and has put out 1430 mL overnight, however Dimer elevated at 2 62  Was started on antibiotics for suspected pneumonia, however Procalcitonin negative x2  Family did note patient spitting out blood tinged sputum  Was not on anticoagulation prehospital   · Differential is PNA vs hypersensitivity pneumonitis  · Continue supplementary O2 to keep sats at 92  · CTA without PE  · S/p bronchoscopy with BAL, await studies and cultures  · Continue antibiotics per pulm recomendations  · Pt had a fever of 102 on 4/13, improved after IV abx administered, possible bacterial pneumonia given rise in procal  · Respiratory allergen panel with positivity to ragweed and elevated IgE   Continue prednisone and start taper in one week  · Wean off oxygen as tolerated

## 2021-04-16 NOTE — MALNUTRITION/BMI
This medical record reflects one or more clinical indicators suggestive of malnutrition  Malnutrition Findings:   Adult Malnutrition type: Acute illness  Adult Degree of Malnutrition: Other severe protein calorie malnutrition(malnutrition related to disease/condition as evidenced by <50% energy intake for >5 days, severe depletion of muscle mass seen at clavicle, and 13% weight loss x 1 month to be treated with liberalization of diet order & addition of nutrition supplements)  Malnutrition Characteristics: Muscle loss, Inadequate energy, Weight loss      See Nutrition note dated 4/16/21 for additional details  Completed nutrition assessment is viewable in the nutrition documentation

## 2021-04-16 NOTE — PLAN OF CARE
Problem: Potential for Falls  Goal: Patient will remain free of falls  Description: INTERVENTIONS:  - Assess patient frequently for physical needs  -  Identify cognitive and physical deficits and behaviors that affect risk of falls  -  [place] fall precautions as indicated by assessment   - Educate patient/family on patient safety including physical limitations  - Instruct patient to call for assistance with activity based on assessment  - Modify environment to reduce risk of injury  - Consider OT/PT consult to assist with strengthening/mobility  Outcome: Progressing     Problem: Prexisting or High Potential for Compromised Skin Integrity  Goal: Skin integrity is maintained or improved  Description: INTERVENTIONS:  - Identify patients at risk for skin breakdown  - Assess and monitor skin integrity  - Assess and monitor nutrition and hydration status  - Monitor labs   - Assess for incontinence   - Turn and reposition patient  - Assist with mobility/ambulation  - Relieve pressure over bony prominences  - Avoid friction and shearing  - Provide appropriate hygiene as needed including keeping skin clean and dry  - Evaluate need for skin moisturizer/barrier cream  - Collaborate with interdisciplinary team   - Patient/family teaching  - Consider wound care consult   Outcome: Progressing     Problem: Nutrition/Hydration-ADULT  Goal: Nutrient/Hydration intake appropriate for improving, restoring or maintaining nutritional needs  Description: Monitor and assess patient's nutrition/hydration status for malnutrition  Collaborate with interdisciplinary team and initiate plan and interventions as ordered  Monitor patient's weight and dietary intake as ordered or per policy  Utilize nutrition screening tool and intervene as necessary  Determine patient's food preferences and provide high-protein, high-caloric foods as appropriate       INTERVENTIONS:  - Monitor oral intake, urinary output, labs, and treatment plans  - Assess nutrition and hydration status and recommend course of action  - Evaluate amount of meals eaten  - Assist patient with eating if necessary   - Allow adequate time for meals  - Recommend/ encourage appropriate diets, oral nutritional supplements, and vitamin/mineral supplements  - Order, calculate, and assess calorie counts as needed  - Recommend, monitor, and adjust tube feedings and TPN/PPN based on assessed needs  - Assess need for intravenous fluids  - Provide specific nutrition/hydration education as appropriate  - Include patient/family/caregiver in decisions related to nutrition  Outcome: Progressing     Problem: PAIN - ADULT  Goal: Verbalizes/displays adequate comfort level or baseline comfort level  Description: Interventions:  - Encourage patient to monitor pain and request assistance  - Assess pain using appropriate pain scale  - Administer analgesics based on type and severity of pain and evaluate response  - Implement non-pharmacological measures as appropriate and evaluate response  - Consider cultural and social influences on pain and pain management  - Notify physician/advanced practitioner if interventions unsuccessful or patient reports new pain  Outcome: Progressing     Problem: INFECTION - ADULT  Goal: Absence or prevention of progression during hospitalization  Description: INTERVENTIONS:  - Assess and monitor for signs and symptoms of infection  - Monitor lab/diagnostic results  - Monitor all insertion sites, i e  indwelling lines, tubes, and drains  - Monitor endotracheal if appropriate and nasal secretions for changes in amount and color  - [place] appropriate cooling/warming therapies per order  - Administer medications as ordered  - Instruct and encourage patient and family to use good hand hygiene technique  - Identify and instruct in appropriate isolation precautions for identified infection/condition  Outcome: Progressing  Goal: Absence of fever/infection during neutropenic period  Description: INTERVENTIONS:  - Monitor WBC    Outcome: Progressing     Problem: SAFETY ADULT  Goal: Patient will remain free of falls  Description: INTERVENTIONS:  - Assess patient frequently for physical needs  -  Identify cognitive and physical deficits and behaviors that affect risk of falls    -  [place] fall precautions as indicated by assessment   - Educate patient/family on patient safety including physical limitations  - Instruct patient to call for assistance with activity based on assessment  - Modify environment to reduce risk of injury  - Consider OT/PT consult to assist with strengthening/mobility  Outcome: Progressing  Goal: Maintain or return to baseline ADL function  Description: INTERVENTIONS:  -  Assess patient's ability to carry out ADLs; assess patient's baseline for ADL function and identify physical deficits which impact ability to perform ADLs (bathing, care of mouth/teeth, toileting, grooming, dressing, etc )  - Assess/evaluate cause of self-care deficits   - Assess range of motion  - Assess patient's mobility; develop plan if impaired  - Assess patient's need for assistive devices and provide as appropriate  - Encourage maximum independence but intervene and supervise when necessary  - Involve family in performance of ADLs  - Assess for home care needs following discharge   - Consider OT consult to assist with ADL evaluation and planning for discharge  - Provide patient education as appropriate  Outcome: Progressing  Goal: Maintain or return mobility status to optimal level  Description: INTERVENTIONS:  - Assess patient's baseline mobility status (ambulation, transfers, stairs, etc )    - Identify cognitive and physical deficits and behaviors that affect mobility  - Identify mobility aids required to assist with transfers and/or ambulation (gait belt, sit-to-stand, lift, walker, cane, etc )  - [place] fall precautions as indicated by assessment  - Record patient progress and toleration of activity level on Mobility SBAR; progress patient to next Phase/Stage  - Instruct patient to call for assistance with activity based on assessment  - Consider rehabilitation consult to assist with strengthening/weightbearing, etc   Outcome: Progressing     Problem: DISCHARGE PLANNING  Goal: Discharge to home or other facility with appropriate resources  Description: INTERVENTIONS:  - Identify barriers to discharge w/patient and caregiver  - Arrange for needed discharge resources and transportation as appropriate  - Identify discharge learning needs (meds, wound care, etc )  - Arrange for interpretive services to assist at discharge as needed  - Refer to Case Management Department for coordinating discharge planning if the patient needs post-hospital services based on physician/advanced practitioner order or complex needs related to functional status, cognitive ability, or social support system  Outcome: Progressing     Problem: Knowledge Deficit  Goal: Patient/family/caregiver demonstrates understanding of disease process, treatment plan, medications, and discharge instructions  Description: Complete learning assessment and assess knowledge base    Interventions:  - Provide teaching at level of understanding  - Provide teaching via preferred learning methods  Outcome: Progressing     Problem: RESPIRATORY - ADULT  Goal: Achieves optimal ventilation and oxygenation  Description: INTERVENTIONS:  - Assess for changes in respiratory status  - Assess for changes in mentation and behavior  - Position to facilitate oxygenation and minimize respiratory effort  - Oxygen administered by appropriate delivery if ordered  - Initiate smoking cessation education as indicated  - Encourage broncho-pulmonary hygiene including cough, deep breathe, Incentive Spirometry  - Assess the need for suctioning and aspirate as needed  - Assess and instruct to report SOB or any respiratory difficulty  - Respiratory Therapy support as indicated  Outcome: Progressing

## 2021-04-16 NOTE — PROGRESS NOTES
[address] [facility]  Progress Note - Remberto Cruise 1933, 80 y o  male MRN: [id number]  Unit/Bed#: -[facility] Encounter: [id number]  Primary Care Provider: Patt Curiel MD   Date and time admitted to hospital: 4/11/2021 11:25 AM    Shortness of breath  Assessment & Plan  · Patient came to the hospital with shortness of breath that has been going on for the past 3 4 days  Patient with chronic bilateral lower extremity swelling which is slightly worse  Currently patient requiring oxygen which is unusual for him  Patient has been febrile overnight  Started on antibiotics for pneumonia, procalcitonin has risen and fallen  · Continue supplementary O2, wean as tolerated    * Acute respiratory failure with hypoxia ([address] )  Assessment & Plan  · Patient came to the hospital with worsening shortness of breath  Patient required oxygen 2 L nasal cannula which is new  Patient definitely has element of fluid overload causing symptoms and has put out 1430 mL overnight, however Dimer elevated at 2 62  Was started on antibiotics for suspected pneumonia, however Procalcitonin negative x2  Family did note patient spitting out blood tinged sputum  Was not on anticoagulation prehospital   · Differential is PNA vs hypersensitivity pneumonitis  · Continue supplementary O2 to keep sats at 92  · CTA without PE  · S/p bronchoscopy with BAL, await studies and cultures  · Continue antibiotics per pulm recomendations  · Pt had a fever of 102 on 4/13, improved after IV abx administered, possible bacterial pneumonia given rise in procal  · Respiratory allergen panel with positivity to ragweed and elevated IgE   Continue prednisone and start taper in one week  · Wean off oxygen as tolerated      Chronic diastolic congestive heart failure [facility])  Assessment & Plan  Wt Readings from Last 3 Encounters:   04/16/21 58 7 kg (129 lb 6 6 oz)   03/17/21 67 7 kg (149 lb 4 8 oz)   02/24/21 67 1 kg (148 lb)   · volume status looks euvolemic, weights are acceptable  · Diuretics restarted          PAF (paroxysmal atrial fibrillation) (HCC)  Assessment & Plan  · Rate controlled  · Not on any anticoagulation likely secondary to fall risk  · Continue Metoprolol-XL 12 5 mg BID     S/P TAVR (transcatheter aortic valve replacement)  Assessment & Plan  · Murmur noted   · Outpatient cardiology follow up    Ambulatory dysfunction  Assessment & Plan  · PT OT evaluation    Abdominal aortic aneurysm (AAA) without rupture (HCC)  Assessment & Plan  · No abdominal pain  · BP controlled at this time     Hypothyroidism  Assessment & Plan  · Continue with Synthroid    Hypertension  Assessment & Plan  · BP controlled   · Monitor blood pressure with diuresis         VTE Pharmacologic Prophylaxis:   Pharmacologic: Heparin  Mechanical VTE Prophylaxis in Place: Yes    Patient Centered Rounds: I have performed bedside rounds with nursing staff today  Discussions with Specialists or Other Care Team Provider: pulmonology    Education and Discussions with Family / Patient: patient, wife and daughter, plan of care    Time Spent for Care: 30 minutes  More than 50% of total time spent on counseling and coordination of care as described above  Current Length of Stay: 5 day(s)    Current Patient Status: Inpatient   Certification Statement: The patient will continue to require additional inpatient hospital stay due to disharge planning    Discharge Plan: SNF when stable    Code Status: Level 1 - Full Code      Subjective:   Reports that his breathing is closer to normal, still has cough and sputum  Sputum has scant blood  Objective:     Vitals:   Temp (24hrs), Av °F (36 7 °C), Min:97 9 °F (36 6 °C), Max:98 1 °F (36 7 °C)    Temp:  [97 9 °F (36 6 °C)-98 1 °F (36 7 °C)] 97 9 °F (36 6 °C)  HR:  [69-86] 80  Resp:  [16-17] 17  BP: (113-124)/(59-67) 123/64  SpO2:  [91 %-96 %] 94 %  Body mass index is 20 89 kg/m²       Input and Output Summary (last 24 hours): Intake/Output Summary (Last 24 hours) at 4/16/2021 1243  Last data filed at 4/16/2021 0900  Gross per 24 hour   Intake 1560 ml   Output 1150 ml   Net 410 ml       Physical Exam:     Physical Exam  Constitutional:       Appearance: Normal appearance  HENT:      Head: Normocephalic and atraumatic  Nose: Nose normal       Mouth/Throat:      Mouth: Mucous membranes are moist       Pharynx: Oropharynx is clear  Eyes:      Extraocular Movements: Extraocular movements intact  Cardiovascular:      Rate and Rhythm: Normal rate and regular rhythm  Pulmonary:      Effort: Pulmonary effort is normal       Breath sounds: No wheezing or rales  Abdominal:      General: Abdomen is flat  Neurological:      General: No focal deficit present  Mental Status: He is alert and oriented to person, place, and time  Psychiatric:         Mood and Affect: Mood normal          Behavior: Behavior normal          Additional Data:     Labs:    Results from last 7 days   Lab Units 04/14/21  0506 04/13/21  0553   WBC Thousand/uL 11 47* 6 67   HEMOGLOBIN g/dL 10 4* 10 1*   HEMATOCRIT % 32 4* 30 7*   PLATELETS Thousands/uL 158 148*   NEUTROS PCT %  --  70   LYMPHS PCT %  --  9*   MONOS PCT %  --  17*   EOS PCT %  --  3     Results from last 7 days   Lab Units 04/14/21  0506  04/12/21  0443   SODIUM mmol/L 136   < > 135*   POTASSIUM mmol/L 4 3   < > 3 9   CHLORIDE mmol/L 104   < > 104   CO2 mmol/L 26   < > 26   BUN mg/dL 22   < > 21   CREATININE mg/dL 0 99   < > 0 81   ANION GAP mmol/L 6   < > 5   CALCIUM mg/dL 8 5   < > 8 3   ALBUMIN g/dL  --   --  3 0*   TOTAL BILIRUBIN mg/dL  --   --  0 64   ALK PHOS U/L  --   --  98   ALT U/L  --   --  17   AST U/L  --   --  18   GLUCOSE RANDOM mg/dL 106   < > 91    < > = values in this interval not displayed                   Results from last 7 days   Lab Units 04/15/21  0442 04/14/21  0506 04/12/21  0518 04/11/21  1644   PROCALCITONIN ng/ml 0 19 0 25 <0 05 <0 05           * I Have Reviewed All Lab Data Listed Above  * Additional Pertinent Lab Tests Reviewed:  All Labs Within Last 24 Hours Reviewed      Recent Cultures (last 7 days):     Results from last 7 days   Lab Units 04/13/21  1342 04/13/21  1338 04/13/21  1336 04/11/21  2222 04/11/21  2216   SPUTUM CULTURE   --   --   --  2+ Growth of   --    GRAM STAIN RESULT  Rare Polys  No bacteria seen No Polys or Bacteria seen No Polys or Bacteria seen 1+ Epithelial cells per low power field*  1+ Polys*  2+ Gram positive cocci in pairs*  --    LEGIONELLA URINARY ANTIGEN   --   --   --   --  Negative       Last 24 Hours Medication List:   Current Facility-Administered Medications   Medication Dose Route Frequency Provider Last Rate    acetaminophen  650 mg Oral Q6H PRN Lorenzo Blood MD      albuterol  2 5 mg Nebulization Q6H PRN Monae Fonseca DO      benzonatate  100 mg Oral TID PRN Lorenzo Blood MD      cefTRIAXone  1,000 mg Intravenous Q24H Carlota Maravilla MD 1,000 mg (04/16/21 0956)    cholecalciferol  1,000 Units Oral Daily Lorenzo Blood MD      docusate sodium  100 mg Oral BID Lorenzo Blood MD      enoxaparin  40 mg Subcutaneous Q24H [facility] Carlota Maravilla MD      furosemide  40 mg Oral Daily MANI Carvajal      guaiFENesin  600 mg Oral Q12H [facility] Lorenzo Blood MD      levothyroxine  75 mcg Oral Early Morning Lorenzo Blood MD      metoprolol succinate  12 5 mg Oral BID MANI Carvajal      ondansetron  4 mg Intravenous Q6H PRN Lorenzo Bolod MD      pantoprazole  40 mg Oral Early Morning Lorenzo Blood MD      polyethylene glycol  17 g Oral Daily Lorenzo Blood MD      potassium chloride  10 mEq Oral Daily Lorenzo Blood MD      pravastatin  20 mg Oral Daily With Barrie Crabtree MD      predniSONE  40 mg Oral Daily Carlota Maravilla MD      senna  8 8 mg Oral Daily Lorenzo Blood MD      tamsulosin  0 4 mg Oral Daily With Barrie Crabtree MD      tiotropium  18 mcg Inhalation Daily Lorenzo Blood MD Today, Patient Was Seen By: Kelly Valentine MD    ** Please Note: Dictation voice to text software may have been used in the creation of this document   **

## 2021-04-16 NOTE — CASE MANAGEMENT
MELANIE was informed that pt would need SNF level of care after d/c  CM met with the pt and provided pt with the SNF list for pt to review  Per pt he would speak to his wife and they will discuss the choices together  CM later met with te pt and his wife and they chose [facility] as their first choice and [facility] as their second choice  The pt and his wife had questions regarding visitation policy of SNF, CM informed them that each facility is different but most SNF's are not allowing visitation due to COVID-19  Pt's wife requested a call before d/c as she would like to bring pt his clothes and see him before he leaves  Pt and his wife had no other questions or concerns  CM sent SNF referrals to [facility] and [facility]  CM will follow

## 2021-04-16 NOTE — PHYSICAL THERAPY NOTE
Physical Therapy Treatment Note     04/16/21 0955   PT Last Visit   PT Visit Date 04/16/21   Note Type   Note Type Treatment   Pain Assessment   Pain Assessment Tool Pain Assessment not indicated - pt denies pain   Restrictions/Precautions   Weight Bearing Precautions Per Order No   Other Precautions O2;Fall Risk;Telemetry;Multiple lines   General   Chart Reviewed Yes   Family/Caregiver Present No   Subjective   Subjective Pt  in bed upon entry  Reported no pain  Agreeable to PT   Bed Mobility   Supine to Sit 3  Moderate assistance   Additional items Assist x 1; Increased time required; Bedrails  (HOB flat  Mod A given only to scoot forward to the EOB)   Transfers   Sit to Stand   (CGA)   Additional items Armrests; Verbal cues; Assist x 1   Stand to Sit 5  Supervision   Additional items Assist x 1;Verbal cues;Armrests; Impulsive   Stand pivot 4  Minimal assistance   Additional items Assist x 1; Increased time required;Verbal cues   Ambulation/Elevation   Gait pattern Decreased foot clearance; Forward Flexion; Improper Weight shift; Excessively slow; Short stride; Foward flexed; Inconsistent daniel   Gait Assistance 4  Minimal assist   Additional items Assist x 1;Assist x 2;Verbal cues  (2nd A for chair follow)   Assistive Device Rolling walker   Distance 60ft, 50ft x 2, 35 ft   Balance   Static Sitting Good   Ambulatory Fair -   Endurance Deficit   Endurance Deficit Yes   Endurance Deficit Description Fatigue, SOB, decreased SPO2   Activity Tolerance   Activity Tolerance Patient tolerated treatment well;Patient limited by fatigue  (Dyspena)   Nurse Made Aware Yes   Exercises   THR Sitting;Supine;Bilateral;AROM;10 reps  (SLR, HS, hip abd, AP, LAQ)   Assessment   Prognosis Fair   Problem List Decreased strength;Decreased endurance;Decreased range of motion; Impaired balance;Decreased mobility; Decreased coordination; Impaired judgement   Assessment Pt  on 2L O2 upon entry  Pt  destated to mid [de-identified] with activity   Icnreased O2 to 3L for ambulation except for the first ambulation which was on 2L  Pt  needed seated rests between all ambulation and breathing cues given to improve his stats  RN aware of it  Will continue to follow during the stay to lj functional mobility  Pt  noted with decreased endurance and needed main physical ubaldo only for scooting forward to be seated at EOB  Donned special shoe with wedge for ambulation and noted gait to be steady and needed CGS/S  DCP could possibly change if patient continue to make progress  Recommended to restorative to ambulate patient on unit if able  Barriers to Discharge None   Goals   Patient Goals Go home   STG Expiration Date 04/26/21   PT Treatment Day 1   Plan   Treatment/Interventions Functional transfer training;LE strengthening/ROM; Therapeutic exercise;Patient/family training;Equipment eval/education; Bed mobility;Gait training;Spoke to nursing   Progress Progressing toward goals   PT Frequency   (3-5x/week)   Recommendation   PT Discharge Recommendation Post acute rehabilitation services   Equipment Recommended Walker   PT - OK to Discharge Yes         Brooklyn Kaye, PTA

## 2021-04-16 NOTE — PROGRESS NOTES
Progress Note - Pulmonary   Debbrah Backers 80 y o  male MRN: [id number]  Unit/Bed#: -01 Encounter: [id number]      Assessment:  1   Acute hypoxic respiratory failure, unclear etiology, possibly due to infectious process, HP, COPD  2  Abnormal CT chest with upper lobe predominant ground-glass opacities,  Likely infectious etiology vs hp   3  Non massive, scant hemoptysis  4   Shortness of breath secondary to 1 , 2  5  History of diastolic CHF, Cardiology following  6  Aortic stenosis status post TAVR in October 2020  7  CAD status post CABG 2010  8  Paroxysmal atrial fibrillation, not on outpatient anticoagulation  9  Hypertension  10  HLD     Plan:  -  Patient currently saturating  Mid 90s on 2L nasal cannula, continue to wean as tolerated, out of bed to chair, pulmonary toilet, incentive spirometry, goal SpO2 greater than 88%  -   etiology of hypoxia most likely infectious  Cell count returned with increased neutrophil percentage, cytology negative  Less likely differential including hypersensitivity pneumonitis, versus inhalational lung disease  CTA neg for PE, did show  Bilateral upper lobe predominant ground-glass opacities  - status post bronchoscopy with moderate thick secretions and areas of hyperpigmentation   Underwent bronchoalveolar lavage in the right upper lobe and lingula with cyto brushing of the right upper lobe  No evidence of hemorrhage  cultures with mixed branden  -    despite negative procalcitonin, would continue treatment with Rocephin for 5 days and azithromycin for 3 days total, day 4/5 rocephin, completed 3/3 azithro   -  Continue prednisone,  But now would favor more rapid taper, will discuss  -  RP2 panel negative  - OK to resume dvt prophylaxis   - rest of care per cardiology, primary team - oral diuretic resumed      Subjective:    patient seen examined at bedside  Slept well overnight  Still with mild shortness of breath and intermittent cough with green /maroon phlegm  Denies fever, chills, nausea, vomiting, chest pain  Review of Systems   Constitutional: Positive for fatigue  Negative for activity change, appetite change, chills, fever and unexpected weight change  HENT: Negative for congestion, rhinorrhea, sneezing and sore throat  Respiratory: Positive for cough and shortness of breath  Negative for wheezing  Cardiovascular: Negative for chest pain, palpitations and leg swelling  Gastrointestinal: Negative for abdominal pain, constipation, diarrhea, nausea and vomiting  Genitourinary: Negative for dysuria  Musculoskeletal: Negative for arthralgias  Neurological: Negative for dizziness and numbness  Objective:     Vitals:    04/15/21 2012 04/15/21 2210 04/16/21 0440 04/16/21 0629   BP: 114/59 124/67  122/59   BP Location:  Right arm  Right arm   Pulse: 86 76  69   Resp:  16  17   Temp:  98 1 °F (36 7 °C)  97 9 °F (36 6 °C)   TempSrc:  Oral  Oral   SpO2: 91% 93%  96%   Weight:   58 7 kg (129 lb 6 6 oz)    Height:               Intake/Output Summary (Last 24 hours) at 4/16/2021 0851  Last data filed at 4/16/2021 0455  Gross per 24 hour   Intake 1600 ml   Output 1150 ml   Net 450 ml         Physical Exam  Constitutional:       General: He is not in acute distress  Appearance: He is well-developed  He is not diaphoretic  Comments: thin   HENT:      Head: Normocephalic and atraumatic  Mouth/Throat:      Mouth: Mucous membranes are moist       Pharynx: Oropharynx is clear  No oropharyngeal exudate  Eyes:      General: No scleral icterus  Cardiovascular:      Rate and Rhythm: Normal rate and regular rhythm  Heart sounds: Normal heart sounds  No murmur  Pulmonary:      Effort: Pulmonary effort is normal  No respiratory distress  Breath sounds: No wheezing  Comments: Few rales   Abdominal:      General: Bowel sounds are normal  There is no distension  Palpations: Abdomen is soft  Tenderness:  There is no abdominal tenderness  Musculoskeletal:      Right lower leg: No edema  Left lower leg: No edema  Neurological:      Mental Status: He is alert and oriented to person, place, and time  Labs: I have personally reviewed pertinent lab results  Results from last 7 days   Lab Units 04/14/21  0506 04/13/21  0553 04/12/21  0443  04/11/21  1152   WBC Thousand/uL 11 47* 6 67 6 11  --  6 89   HEMOGLOBIN g/dL 10 4* 10 1* 10 0*  --  10 9*   HEMATOCRIT % 32 4* 30 7* 31 1*  --  32 8*   PLATELETS Thousands/uL 158 148* 143*   < > 143*   NEUTROS PCT %  --  70  --   --  71   MONOS PCT %  --  17*  --   --  18*    < > = values in this interval not displayed        Results from last 7 days   Lab Units 04/14/21  0506 04/13/21  0553 04/12/21  0443   POTASSIUM mmol/L 4 3 3 7 3 9   CHLORIDE mmol/L 104 104 104   CO2 mmol/L 26 26 26   BUN mg/dL 22 22 21   CREATININE mg/dL 0 99 0 84 0 81   CALCIUM mg/dL 8 5 8 4 8 3   ALK PHOS U/L  --   --  98   ALT U/L  --   --  17   AST U/L  --   --  18     Results from last 7 days   Lab Units 04/12/21  0443   MAGNESIUM mg/dL 2 2                  0   Lab Value Date/Time    TROPONINI <0 02 04/11/2021 2227    TROPONINI <0 02 04/11/2021 1644    TROPONINI <0 02 04/11/2021 1152    TROPONINI <0 02 10/21/2020 0457    TROPONINI <0 02 10/21/2020 0028    TROPONINI <0 02 10/20/2020 2108    TROPONINI <0 02 10/20/2020 1114    TROPONINI <0 02 12/19/2019 1033     Microbiology:  Sputum pending-  Mixed branden  RP2 panel negative   Strep/legionella negative    COVID negative   bronchial cultures  Mixed branden     Imaging and other studies: I have personally reviewed pertinent reports    and I have personally reviewed pertinent films in PACS   CTA chest 04/12/2021   negative for PE   emphysema   upper lobe predominant bilateral ground-glass opacities    CXR 4/15/21    Bilateral upper lobe predominant opacities        Maris Blackmon MD  Pulmonary & Critical Care Fellow, [facility]

## 2021-04-17 PROBLEM — E43 SEVERE PROTEIN-CALORIE MALNUTRITION (HCC): Status: ACTIVE | Noted: 2021-04-17

## 2021-04-17 PROCEDURE — 99232 SBSQ HOSP IP/OBS MODERATE 35: CPT | Performed by: INTERNAL MEDICINE

## 2021-04-17 PROCEDURE — 0241U HB NFCT DS VIR RESP RNA 4 TRGT: CPT | Performed by: INTERNAL MEDICINE

## 2021-04-17 RX ORDER — ALBUTEROL SULFATE 90 UG/1
2 AEROSOL, METERED RESPIRATORY (INHALATION) EVERY 4 HOURS PRN
Status: DISCONTINUED | OUTPATIENT
Start: 2021-04-17 | End: 2021-04-18 | Stop reason: HOSPADM

## 2021-04-17 RX ADMIN — METOPROLOL SUCCINATE 12.5 MG: 25 TABLET, EXTENDED RELEASE ORAL at 20:37

## 2021-04-17 RX ADMIN — LEVOTHYROXINE SODIUM 75 MCG: 75 TABLET ORAL at 05:46

## 2021-04-17 RX ADMIN — GUAIFENESIN 600 MG: 600 TABLET, EXTENDED RELEASE ORAL at 09:23

## 2021-04-17 RX ADMIN — TAMSULOSIN HYDROCHLORIDE 0.4 MG: 0.4 CAPSULE ORAL at 16:21

## 2021-04-17 RX ADMIN — POTASSIUM CHLORIDE 10 MEQ: 750 TABLET, EXTENDED RELEASE ORAL at 09:24

## 2021-04-17 RX ADMIN — CARBAMIDE PEROXIDE - 6.5% 5 DROP: 65 SOLUTION/ DROPS TOPICAL at 20:38

## 2021-04-17 RX ADMIN — PREDNISONE 40 MG: 20 TABLET ORAL at 09:24

## 2021-04-17 RX ADMIN — FUROSEMIDE 40 MG: 40 TABLET ORAL at 09:24

## 2021-04-17 RX ADMIN — CEFTRIAXONE SODIUM 1000 MG: 10 INJECTION, POWDER, FOR SOLUTION INTRAVENOUS at 09:25

## 2021-04-17 RX ADMIN — Medication 1000 UNITS: at 09:23

## 2021-04-17 RX ADMIN — METOPROLOL SUCCINATE 12.5 MG: 25 TABLET, EXTENDED RELEASE ORAL at 09:24

## 2021-04-17 RX ADMIN — PRAVASTATIN SODIUM 20 MG: 20 TABLET ORAL at 16:21

## 2021-04-17 RX ADMIN — TIOTROPIUM BROMIDE 18 MCG: 18 CAPSULE ORAL; RESPIRATORY (INHALATION) at 09:30

## 2021-04-17 RX ADMIN — PANTOPRAZOLE SODIUM 40 MG: 40 TABLET, DELAYED RELEASE ORAL at 05:46

## 2021-04-17 RX ADMIN — ENOXAPARIN SODIUM 40 MG: 40 INJECTION SUBCUTANEOUS at 09:24

## 2021-04-17 RX ADMIN — GUAIFENESIN 600 MG: 600 TABLET, EXTENDED RELEASE ORAL at 20:38

## 2021-04-17 NOTE — PROGRESS NOTES
[facility]  Progress Note - Remberto Cruise 1933, 80 y o  male MRN: [id number]  Unit/Bed#: -Mahendra Encounter: [id number]  Primary Care Provider: Patt Curiel MD   Date and time admitted to hospital: 4/11/2021 11:25 AM    Shortness of breath  Assessment & Plan  · Patient came to the hospital with shortness of breath that has been going on for the past 3 4 days  Patient with chronic bilateral lower extremity swelling which is slightly worse  Currently patient requiring oxygen which is unusual for him  Patient has been febrile overnight  Started on antibiotics for pneumonia, procalcitonin has risen and fallen  · Continue supplementary O2, wean as tolerated    * Acute respiratory failure with hypoxia ([address] )  Assessment & Plan  · Patient came to the hospital with worsening shortness of breath  Patient required oxygen 2 L nasal cannula which is new  Patient definitely has element of fluid overload causing symptoms and has put out 1430 mL overnight, however Dimer elevated at 2 62  Was started on antibiotics for suspected pneumonia, however Procalcitonin negative x2  Family did note patient spitting out blood tinged sputum  Was not on anticoagulation prehospital   · Differential is PNA vs hypersensitivity pneumonitis  · Continue supplementary O2 to keep sats at 92  · CTA without PE  · S/p bronchoscopy with BAL, await studies and cultures  · Continue antibiotics per pulm recomendations  · Pt had a fever of 102 on 4/13, improved after IV abx administered, possible bacterial pneumonia given rise in procal  · Respiratory allergen panel with positivity to ragweed and elevated IgE   Continue prednisone and start taper in one week  · Wean off oxygen as tolerated      Chronic diastolic congestive heart failure (HCC)  Assessment & Plan  Wt Readings from Last 3 Encounters:   04/17/21 61 2 kg (134 lb 14 7 oz)   03/17/21 67 7 kg (149 lb 4 8 oz)   02/24/21 67 1 kg (148 lb)   · volume status looks euvolemic, weights are acceptable  · Diuretics restarted          PAF (paroxysmal atrial fibrillation) (HCC)  Assessment & Plan  · Rate controlled  · Not on any anticoagulation likely secondary to fall risk  · Continue Metoprolol-XL 12 5 mg BID     S/P TAVR (transcatheter aortic valve replacement)  Assessment & Plan  · Murmur noted   · Outpatient cardiology follow up    Abdominal aortic aneurysm (AAA) without rupture (HCC)  Assessment & Plan  · No abdominal pain  · BP controlled at this time         VTE Pharmacologic Prophylaxis:   Pharmacologic: Heparin  Mechanical VTE Prophylaxis in Place: Yes    Patient Centered Rounds: I have performed bedside rounds with nursing staff today  Education and Discussions with Family / Patient: patient, plan of care    Time Spent for Care: 20 minutes  More than 50% of total time spent on counseling and coordination of care as described above  Current Length of Stay: 6 day(s)    Current Patient Status: Inpatient   Certification Statement: The patient will continue to require additional inpatient hospital stay due to discharge tomorrow    Discharge Plan: juli tomorrow    Code Status: Level 1 - Full Code      Subjective:   Reports his breathing is better, mild cough and sputum  Objective:     Vitals:   Temp (24hrs), Av 9 °F (36 6 °C), Min:97 4 °F (36 3 °C), Max:98 4 °F (36 9 °C)    Temp:  [97 4 °F (36 3 °C)-98 4 °F (36 9 °C)] 97 4 °F (36 3 °C)  HR:  [62-74] 72  Resp:  [16-18] 16  BP: (100-123)/(55-62) 100/55  SpO2:  [90 %-99 %] 99 %  Body mass index is 21 78 kg/m²  Input and Output Summary (last 24 hours): Intake/Output Summary (Last 24 hours) at 2021 1757  Last data filed at 2021 0900  Gross per 24 hour   Intake 240 ml   Output 1325 ml   Net -1085 ml       Physical Exam:     Physical Exam  Constitutional:       Appearance: Normal appearance  HENT:      Head: Normocephalic and atraumatic        Nose: Nose normal       Mouth/Throat: Mouth: Mucous membranes are moist       Pharynx: Oropharynx is clear  Cardiovascular:      Rate and Rhythm: Normal rate and regular rhythm  Pulmonary:      Effort: Pulmonary effort is normal       Breath sounds: Rales present  Abdominal:      General: Abdomen is flat  Musculoskeletal:      Right lower leg: No edema  Left lower leg: No edema  Neurological:      General: No focal deficit present  Mental Status: He is alert and oriented to person, place, and time  Psychiatric:         Mood and Affect: Mood normal          Behavior: Behavior normal            Additional Data:     Labs:    Results from last 7 days   Lab Units 04/14/21  0506 04/13/21  0553   WBC Thousand/uL 11 47* 6 67   HEMOGLOBIN g/dL 10 4* 10 1*   HEMATOCRIT % 32 4* 30 7*   PLATELETS Thousands/uL 158 148*   NEUTROS PCT %  --  70   LYMPHS PCT %  --  9*   MONOS PCT %  --  17*   EOS PCT %  --  3     Results from last 7 days   Lab Units 04/14/21  0506  04/12/21  0443   SODIUM mmol/L 136   < > 135*   POTASSIUM mmol/L 4 3   < > 3 9   CHLORIDE mmol/L 104   < > 104   CO2 mmol/L 26   < > 26   BUN mg/dL 22   < > 21   CREATININE mg/dL 0 99   < > 0 81   ANION GAP mmol/L 6   < > 5   CALCIUM mg/dL 8 5   < > 8 3   ALBUMIN g/dL  --   --  3 0*   TOTAL BILIRUBIN mg/dL  --   --  0 64   ALK PHOS U/L  --   --  98   ALT U/L  --   --  17   AST U/L  --   --  18   GLUCOSE RANDOM mg/dL 106   < > 91    < > = values in this interval not displayed  Results from last 7 days   Lab Units 04/15/21  0442 04/14/21  0506 04/12/21  0518 04/11/21  1644   PROCALCITONIN ng/ml 0 19 0 25 <0 05 <0 05           * I Have Reviewed All Lab Data Listed Above  * Additional Pertinent Lab Tests Reviewed:  All Labs Within Last 24 Hours Reviewed      Recent Cultures (last 7 days):     Results from last 7 days   Lab Units 04/13/21  1342 04/13/21  1338 04/13/21  1336 04/11/21  2222 04/11/21  2216   SPUTUM CULTURE   --   --   --  2+ Growth of   --    GRAM STAIN RESULT Rare Polys  No bacteria seen No Polys or Bacteria seen No Polys or Bacteria seen 1+ Epithelial cells per low power field*  1+ Polys*  2+ Gram positive cocci in pairs*  --    LEGIONELLA URINARY ANTIGEN   --   --   --   --  Negative       Last 24 Hours Medication List:   Current Facility-Administered Medications   Medication Dose Route Frequency Provider Last Rate    acetaminophen  650 mg Oral Q6H PRN Shawna Sosa MD      albuterol  2 puff Inhalation Q4H PRN Salome Dueñas MD      benzonatate  100 mg Oral TID PRN Shawna Sosa MD      carbamide peroxide  5 drop Both Ears BID Marilin Clemons MD      cholecalciferol  1,000 Units Oral Daily Shawna Sosa MD      docusate sodium  100 mg Oral BID Shawna Sosa MD      enoxaparin  40 mg Subcutaneous Q24H [address] Anatoly Weeks MD      furosemide  40 mg Oral Daily MANI Carvajal      guaiFENesin  600 mg Oral Q12H Kade Kumar MD      levothyroxine  75 mcg Oral Early Morning Shawna Sosa MD      metoprolol succinate  12 5 mg Oral BID MANI Carvajal      ondansetron  4 mg Intravenous Q6H PRN Shawna Sosa MD      pantoprazole  40 mg Oral Early Morning Shawna Sosa MD      polyethylene glycol  17 g Oral Daily Shawna Sosa MD      potassium chloride  10 mEq Oral Daily Shawna Sosa MD      pravastatin  20 mg Oral Daily With Neeru Multani MD      predniSONE  40 mg Oral Daily Anatoly Weeks MD      Followed by   Rock Tobias ON 4/22/2021] predniSONE  30 mg Oral Daily Anatoly Weeks MD      Followed by   Rock Tobias ON 4/29/2021] predniSONE  20 mg Oral Daily Anatoly Weeks MD      Followed by   Rock Tobias ON 5/6/2021] predniSONE  10 mg Oral Daily Anatoly Weeks MD      senna  8 8 mg Oral Daily Shawna Sosa MD      tamsulosin  0 4 mg Oral Daily With Neeru Multani MD      tiotropium  18 mcg Inhalation Daily Shawna Sosa MD          Today, Patient Was Seen By: Ofelia Lobo MD    ** Please Note: Dictation voice to text software may have been used in the creation of this document   **

## 2021-04-17 NOTE — TRANSPORTATION MEDICAL NECESSITY
Section I - General Information    Name of Patient: Uriel Ramos                 : 1933    Medicare #: [id number]  Transport Date: 21 (PCS is valid for round trips on this date and for all repetitive trips in the 60-day range as noted below )  Origin: [address]: [facility]  Is the pt's stay covered under Medicare Part A (PPS/DRG)   [x]     Closest appropriate facility? If no, why is transport to more distant facility required? Yes  If hospice pt, is this transport related to pt's terminal illness? NA       Section II - Medical Necessity Questionnaire  Ambulance transportation is medically necessary only if other means of transport are contraindicated or would be potentially harmful to the patient  To meet this requirement, the patient must either be "bed confined" or suffer from a condition such that transport by means other than ambulance is contraindicated by the patient's condition  The following questions must be answered by the medical professional signing below for this form to be valid:    1)  Describe the MEDICAL CONDITION (physical and/or mental) of this patient AT [address] [place] [address] that requires the patient to be transported in an ambulance and why transport by other means is contraindicated by the patient's condition: Acute respiratory failure with hypoxia    2) Is the patient "bed confined" as defined below? No  To be "be confined" the patient must satisfy all three of the following conditions: (1) unable to get up from bed without Assistance; AND (2) unable to ambulate; AND (3) unable to sit in a chair or wheelchair  3) Can this patient safely be transported by car or wheelchair van (i e , seated during transport without a medical attendant or monitoring)?    No    4) In addition to completing questions 1-3 above, please check any of the following conditions that apply*:   *Note: supporting documentation for any boxes checked must be maintained in the patient's medical records  If hosp-hosp transfer, describe services needed at 2nd facility not available at 1st facility? Moderate/severe pain on movement   Requires oxygen-unable to self administer      Section III - Signature of Physician or Healthcare Professional  I certify that the above information is true and correct based on my evaluation of this patient, and represent that the patient requires transport by ambulance and that other forms of transport are contraindicated  I understand that this information will be used by the Centers for Medicare and Medicaid Services (CMS) to support the determination of medical necessity for ambulance services, and I represent that I have personal knowledge of the patient's condition at time of transport  []  If this box is checked, I also certify that the patient is physically or mentally incapable of signing the ambulance service's claim and that the institution with which I am affiliated has furnished care, services, or assistance to the patient  My signature below is made on behalf of the patient pursuant to 42 CFR §424 36(b)(4)  In accordance with 42 CFR §424 37, the specific reason(s) that the patient is physically or mentally incapable of signing the claim form is as follows: Les Rota of Physician* or Healthcare Professional______________________________________________________________  Signature Date 04/17/21 (For scheduled repetitive transports, this form is not valid for transports performed more than 60 days after this date)    Printed Name & Credentials of Physician or Healthcare Professional (MD, DO, RN, etc )________________________________  *Form must be signed by patient's attending physician for scheduled, repetitive transports   For non-repetitive, unscheduled ambulance transports, if unable to obtain the signature of the attending physician, any of the following may sign (choose appropriate option below)  [] Physician Assistant []  Clinical Nurse Specialist []  Registered Nurse  []  Nurse Practitioner  [x] Discharge Planner

## 2021-04-17 NOTE — ASSESSMENT & PLAN NOTE
Wt Readings from Last 3 Encounters:   04/17/21 61 2 kg (134 lb 14 7 oz)   03/17/21 67 7 kg (149 lb 4 8 oz)   02/24/21 67 1 kg (148 lb)   · volume status looks euvolemic, weights are acceptable  · Diuretics restarted

## 2021-04-17 NOTE — CASE MANAGEMENT
Cm informed pt and spouse per [place] If pt has been fully vaccinated, he can have visits  They are scheduled visits in common areas for 30 minutes currently 3x weekly  Awaiting confirmation from [facility] if bed would be available today  Spoke with Diane Guillen and they will have bed available for pt today  Pt will need COVID test prior to admit  MD placed order for testing  Numerous transportation companies contacted however no available transports for today  BLS transport scheduled with [facility] for 1300 on Sunday 4/18  CM made spouse John Kang and MD aware

## 2021-04-18 ENCOUNTER — TELEPHONE (OUTPATIENT)
Dept: OTHER | Facility: OTHER | Age: 86
End: 2021-04-18

## 2021-04-18 VITALS
HEART RATE: 65 BPM | DIASTOLIC BLOOD PRESSURE: 58 MMHG | WEIGHT: 132.7 LBS | RESPIRATION RATE: 18 BRPM | SYSTOLIC BLOOD PRESSURE: 116 MMHG | HEIGHT: 66 IN | TEMPERATURE: 97.5 F | BODY MASS INDEX: 21.33 KG/M2 | OXYGEN SATURATION: 93 %

## 2021-04-18 PROBLEM — J67.9 HYPERSENSITIVITY PNEUMONITIS (HCC): Status: ACTIVE | Noted: 2021-04-11

## 2021-04-18 PROCEDURE — 99239 HOSP IP/OBS DSCHRG MGMT >30: CPT | Performed by: INTERNAL MEDICINE

## 2021-04-18 RX ORDER — METOPROLOL SUCCINATE 25 MG/1
TABLET, EXTENDED RELEASE ORAL
Qty: 45 TABLET | Refills: 0 | Status: SHIPPED | OUTPATIENT
Start: 2021-04-18 | End: 2021-06-08 | Stop reason: SDUPTHER

## 2021-04-18 RX ORDER — FUROSEMIDE 40 MG/1
40 TABLET ORAL DAILY
Start: 2021-04-18 | End: 2021-06-01 | Stop reason: SDUPTHER

## 2021-04-18 RX ORDER — PREDNISONE 20 MG/1
TABLET ORAL
Refills: 0
Start: 2021-04-18 | End: 2021-05-14

## 2021-04-18 RX ORDER — GUAIFENESIN 600 MG
600 TABLET, EXTENDED RELEASE 12 HR ORAL EVERY 12 HOURS SCHEDULED
Refills: 0
Start: 2021-04-18 | End: 2021-04-25

## 2021-04-18 RX ADMIN — LEVOTHYROXINE SODIUM 75 MCG: 75 TABLET ORAL at 05:26

## 2021-04-18 RX ADMIN — CARBAMIDE PEROXIDE - 6.5% 5 DROP: 65 SOLUTION/ DROPS TOPICAL at 08:58

## 2021-04-18 RX ADMIN — TIOTROPIUM BROMIDE 18 MCG: 18 CAPSULE ORAL; RESPIRATORY (INHALATION) at 08:57

## 2021-04-18 RX ADMIN — Medication 1000 UNITS: at 08:55

## 2021-04-18 RX ADMIN — METOPROLOL SUCCINATE 12.5 MG: 25 TABLET, EXTENDED RELEASE ORAL at 08:56

## 2021-04-18 RX ADMIN — PANTOPRAZOLE SODIUM 40 MG: 40 TABLET, DELAYED RELEASE ORAL at 05:26

## 2021-04-18 RX ADMIN — PREDNISONE 40 MG: 20 TABLET ORAL at 08:55

## 2021-04-18 RX ADMIN — FUROSEMIDE 40 MG: 40 TABLET ORAL at 08:55

## 2021-04-18 RX ADMIN — POTASSIUM CHLORIDE 10 MEQ: 750 TABLET, EXTENDED RELEASE ORAL at 08:55

## 2021-04-18 RX ADMIN — ENOXAPARIN SODIUM 40 MG: 40 INJECTION SUBCUTANEOUS at 08:56

## 2021-04-18 RX ADMIN — GUAIFENESIN 600 MG: 600 TABLET, EXTENDED RELEASE ORAL at 08:56

## 2021-04-18 NOTE — TELEPHONE ENCOUNTER
[facility] from [address] [place] [address],[address]  [facility] : 1933 Verify new admission orders  On call provider paged

## 2021-04-18 NOTE — DISCHARGE SUMMARY
Transition of Care Discharge Summary - [address] [facility]    Patient Information: Parth Trivedi 80 y o  male MRN: [id number]  Unit/Bed#: -01 Encounter: [id number]    Discharging Physician / Practitioner: Arik Lou MD  PCP: Lauro Daley MD  Admission Date: 4/11/2021  Discharge Date: 04/18/21    Disposition:      Short Term Rehab or SNF at [address] [place] [address] to [facility] SNF:   · [address] Texted SNF Physician    Reason for Admission:     Discharge Diagnoses:     Principal Problem:    Acute respiratory failure with hypoxia ([facility] [address] )  Active Problems:    Hypersensitivity pneumonitis (HCC)    Chronic diastolic congestive heart failure (HCC)    PAF (paroxysmal atrial fibrillation) (HCC)    S/P TAVR (transcatheter aortic valve replacement)    Severe aortic stenosis    Hyperlipidemia    Hypertension    Hypothyroidism    Abdominal aortic aneurysm (AAA) without rupture ([facility] [address] )    Ambulatory dysfunction    Severe protein-calorie malnutrition ([facility] )  Resolved Problems:    * No resolved hospital problems  *      Consultations During Hospital Stay:  · Pulmonology  · cardiology    Procedures Performed:     bronchoscopyFINDINGS:   1  mucosa appeared striated and friable throughout the bilateral airways  2  multiple areas of hyperpigmented airways throughout the bilateral airways, most notable in the medial basal segment of the right lower lobe, posterior segment of the right upper lobe, and at the takeoff of the left lower lobe, as imaged  3  mild diffuse bloody secretions, but no one area of active bleeding  4  BAL performed in RUL as above  5  BAL performed in lingula as above  6   Cytobrushing performed in RUL as above         PLAN:  · Sedation recovery per protocol  · Await results of specimens obtained,  including cytology, cell count, cultures from bronchoalveolar lavage is, as well as cyto brushing from right upper lobe  ·  Hold aspirin and Lovenox for now given mild bleeding  · Start azithromycin for atypical coverage   ·  Will follow    Medication Adjustments and Discharge Medications:  · Summary of Medication Adjustments made as a result of this hospitalization: see medication list  · Medication Dosing Tapers - Please refer to Discharge Medication List for details on any medication dosing tapers (if applicable to patient)  · Medications being temporarily held (include recommended restart time): None  · Discharge Medication List: See after visit summary for reconciled discharge medications  Wound Care Recommendations:  When applicable, please see wound care section of After Visit Summary  Diet Recommendations at Discharge:  Diet -        Diet Orders   (From admission, onward)             Start     Ordered    04/16/21 1507  Diet Cardiovascular; Sodium 2 GM  Diet effective midnight     Question Answer Comment   Diet Type Cardiovascular    Cardiac Sodium 2 GM    RD to adjust diet per protocol? Yes        04/16/21 1506    04/16/21 1507  Dietary nutrition supplements  Once     Question Answer Comment   Select Supplement: Ensure Enlive-Chocolate    Frequency Dinner        04/16/21 1506              Fluid Restriction - No Fluid Restriction at Discharge      Instructions for any Catheters / Lines Present at Discharge (including removal date, if applicable): None    Significant Findings / Test Results:     CTA: IMPRESSION:  No pulmonary embolism seen     Areas of groundglass density in the posterior aspect of the both upper lobes, more pronounced from the previous study, may be on infectious basis/infiltrate        There are small bilateral effusion      Emphysema     Follow-up suggested at 3 months to demonstrate resolution    Incidental Findings:   · None    Test Results Pending at Discharge (will require follow up):   · CD4/CD8 bronchial  · Fungal culture     Outpatient Tests Requested:  · Ct chest in 3 months    Complications:  None    Hospital Course:     Lonzie Laser is a 80 y o  male patient who originally presented to the hospital on 4/11/2021 due to shortness of breath, cough and sputum  He is admitted for further evaluation  Was treated with IV diuretics with some improvement to his shortness of breath  During hospital course he did have continued cough sputum  He was empirically treated with antibiotics and did have an isolated fever of 102°  A CTA of the chest ruled out pulmonary embolism  During hospital course he had a bronchoscopy performed with the above-mentioned results  He was placed on a course of intravenous corticosteroids with significant improvement  During his hospital course his cultures remain negative, he completed his antibiotics in the hospital   When his hypoxia improved he was discharged to inpatient rehabilitation  Is to complete 4 weeks of a prednisone taper  He will need to follow-up with his pulmonologist in 2 weeks  Condition at Discharge: stable     Discharge Day Visit / Exam:     Subjective:  Shortness of breath is improved  Mild cough with gnio sputum  Vitals: Blood Pressure: 116/58 (04/18/21 0701)  Pulse: 65 (04/18/21 0701)  Temperature: 97 5 °F (36 4 °C) (04/18/21 0701)  Temp Source: Oral (04/16/21 0629)  Respirations: 18 (04/18/21 0701)  Height: 5' 6" (167 6 cm) (04/11/21 1431)  Weight - Scale: 60 2 kg (132 lb 11 2 oz) (04/18/21 0529)  SpO2: 93 % (04/18/21 0701)  Exam:   Physical Exam  Constitutional:       Appearance: Normal appearance  HENT:      Head: Normocephalic and atraumatic  Nose: Nose normal       Mouth/Throat:      Mouth: Mucous membranes are moist       Pharynx: Oropharynx is clear  Eyes:      Extraocular Movements: Extraocular movements intact  Cardiovascular:      Rate and Rhythm: Normal rate and regular rhythm  Pulmonary:      Effort: Pulmonary effort is normal       Breath sounds: No wheezing or rales  Neurological:      Mental Status: He is alert and oriented to person, place, and time   Mental status is at baseline  Psychiatric:         Mood and Affect: Mood normal          Behavior: Behavior normal        Discussion with Family: wife at bedside    Goals of Care Discussions:  · Code Status at Discharge: Level 1 - Full Code  · Were there any Goals of Care Discussions during Hospitalization?: No  · Results of any General Goals of Care Discussions: N/A   · POLST Completed: No   · If POLST Completed, Summary of POLST Agreement Provided Here: N/A   · OK to Rehospitalize if Needed? Yes    Discharge instructions/Information to patient and family:   See after visit summary section titled Discharge Instructions for information provided to patient and family  Planned Readmission: No      Discharge Statement:  I spent 40 minutes discharging the patient  This time was spent on the day of discharge  I had direct contact with the patient on the day of discharge  Greater than 50% of the total time was spent examining patient, answering all patient questions, arranging and discussing plan of care with patient as well as directly providing post-discharge instructions  Additional time then spent on discharge activities      ** Please Note: This note has been constructed using a voice recognition system **

## 2021-04-18 NOTE — PLAN OF CARE
Problem: Potential for Falls  Goal: Patient will remain free of falls  Description: INTERVENTIONS:  - Assess patient frequently for physical needs  -  Identify cognitive and physical deficits and behaviors that affect risk of falls  -  [place] fall precautions as indicated by assessment   - Educate patient/family on patient safety including physical limitations  - Instruct patient to call for assistance with activity based on assessment  - Modify environment to reduce risk of injury  - Consider OT/PT consult to assist with strengthening/mobility  Outcome: Progressing     Problem: Prexisting or High Potential for Compromised Skin Integrity  Goal: Skin integrity is maintained or improved  Description: INTERVENTIONS:  - Identify patients at risk for skin breakdown  - Assess and monitor skin integrity  - Assess and monitor nutrition and hydration status  - Monitor labs   - Assess for incontinence   - Turn and reposition patient  - Assist with mobility/ambulation  - Relieve pressure over bony prominences  - Avoid friction and shearing  - Provide appropriate hygiene as needed including keeping skin clean and dry  - Evaluate need for skin moisturizer/barrier cream  - Collaborate with interdisciplinary team   - Patient/family teaching  - Consider wound care consult   Outcome: Progressing     Problem: Nutrition/Hydration-ADULT  Goal: Nutrient/Hydration intake appropriate for improving, restoring or maintaining nutritional needs  Description: Monitor and assess patient's nutrition/hydration status for malnutrition  Collaborate with interdisciplinary team and initiate plan and interventions as ordered  Monitor patient's weight and dietary intake as ordered or per policy  Utilize nutrition screening tool and intervene as necessary  Determine patient's food preferences and provide high-protein, high-caloric foods as appropriate       INTERVENTIONS:  - Monitor oral intake, urinary output, labs, and treatment plans  - Assess nutrition and hydration status and recommend course of action  - Evaluate amount of meals eaten  - Assist patient with eating if necessary   - Allow adequate time for meals  - Recommend/ encourage appropriate diets, oral nutritional supplements, and vitamin/mineral supplements  - Order, calculate, and assess calorie counts as needed  - Recommend, monitor, and adjust tube feedings and TPN/PPN based on assessed needs  - Assess need for intravenous fluids  - Provide specific nutrition/hydration education as appropriate  - Include patient/family/caregiver in decisions related to nutrition  Outcome: Progressing     Problem: PAIN - ADULT  Goal: Verbalizes/displays adequate comfort level or baseline comfort level  Description: Interventions:  - Encourage patient to monitor pain and request assistance  - Assess pain using appropriate pain scale  - Administer analgesics based on type and severity of pain and evaluate response  - Implement non-pharmacological measures as appropriate and evaluate response  - Consider cultural and social influences on pain and pain management  - Notify physician/advanced practitioner if interventions unsuccessful or patient reports new pain  Outcome: Progressing     Problem: INFECTION - ADULT  Goal: Absence or prevention of progression during hospitalization  Description: INTERVENTIONS:  - Assess and monitor for signs and symptoms of infection  - Monitor lab/diagnostic results  - Monitor all insertion sites, i e  indwelling lines, tubes, and drains  - Monitor endotracheal if appropriate and nasal secretions for changes in amount and color  - [place] appropriate cooling/warming therapies per order  - Administer medications as ordered  - Instruct and encourage patient and family to use good hand hygiene technique  - Identify and instruct in appropriate isolation precautions for identified infection/condition  Outcome: Progressing  Goal: Absence of fever/infection during neutropenic period  Description: INTERVENTIONS:  - Monitor WBC    Outcome: Progressing     Problem: SAFETY ADULT  Goal: Patient will remain free of falls  Description: INTERVENTIONS:  - Assess patient frequently for physical needs  -  Identify cognitive and physical deficits and behaviors that affect risk of falls    -  [place] fall precautions as indicated by assessment   - Educate patient/family on patient safety including physical limitations  - Instruct patient to call for assistance with activity based on assessment  - Modify environment to reduce risk of injury  - Consider OT/PT consult to assist with strengthening/mobility  Outcome: Progressing  Goal: Maintain or return to baseline ADL function  Description: INTERVENTIONS:  -  Assess patient's ability to carry out ADLs; assess patient's baseline for ADL function and identify physical deficits which impact ability to perform ADLs (bathing, care of mouth/teeth, toileting, grooming, dressing, etc )  - Assess/evaluate cause of self-care deficits   - Assess range of motion  - Assess patient's mobility; develop plan if impaired  - Assess patient's need for assistive devices and provide as appropriate  - Encourage maximum independence but intervene and supervise when necessary  - Involve family in performance of ADLs  - Assess for home care needs following discharge   - Consider OT consult to assist with ADL evaluation and planning for discharge  - Provide patient education as appropriate  Outcome: Progressing  Goal: Maintain or return mobility status to optimal level  Description: INTERVENTIONS:  - Assess patient's baseline mobility status (ambulation, transfers, stairs, etc )    - Identify cognitive and physical deficits and behaviors that affect mobility  - Identify mobility aids required to assist with transfers and/or ambulation (gait belt, sit-to-stand, lift, walker, cane, etc )  - [place] fall precautions as indicated by assessment  - Record patient progress and toleration of activity level on Mobility SBAR; progress patient to next Phase/Stage  - Instruct patient to call for assistance with activity based on assessment  - Consider rehabilitation consult to assist with strengthening/weightbearing, etc   Outcome: Progressing     Problem: DISCHARGE PLANNING  Goal: Discharge to home or other facility with appropriate resources  Description: INTERVENTIONS:  - Identify barriers to discharge w/patient and caregiver  - Arrange for needed discharge resources and transportation as appropriate  - Identify discharge learning needs (meds, wound care, etc )  - Arrange for interpretive services to assist at discharge as needed  - Refer to Case Management Department for coordinating discharge planning if the patient needs post-hospital services based on physician/advanced practitioner order or complex needs related to functional status, cognitive ability, or social support system  Outcome: Progressing     Problem: Knowledge Deficit  Goal: Patient/family/caregiver demonstrates understanding of disease process, treatment plan, medications, and discharge instructions  Description: Complete learning assessment and assess knowledge base    Interventions:  - Provide teaching at level of understanding  - Provide teaching via preferred learning methods  Outcome: Progressing     Problem: RESPIRATORY - ADULT  Goal: Achieves optimal ventilation and oxygenation  Description: INTERVENTIONS:  - Assess for changes in respiratory status  - Assess for changes in mentation and behavior  - Position to facilitate oxygenation and minimize respiratory effort  - Oxygen administered by appropriate delivery if ordered  - Initiate smoking cessation education as indicated  - Encourage broncho-pulmonary hygiene including cough, deep breathe, Incentive Spirometry  - Assess the need for suctioning and aspirate as needed  - Assess and instruct to report SOB or any respiratory difficulty  - Respiratory Therapy support as indicated  Outcome: Progressing

## 2021-04-19 ENCOUNTER — NURSING HOME VISIT (OUTPATIENT)
Dept: GERIATRICS | Facility: OTHER | Age: 86
End: 2021-04-19
Payer: MEDICARE

## 2021-04-19 ENCOUNTER — TRANSITIONAL CARE MANAGEMENT (OUTPATIENT)
Dept: FAMILY MEDICINE CLINIC | Facility: CLINIC | Age: 86
End: 2021-04-19

## 2021-04-19 ENCOUNTER — TELEPHONE (OUTPATIENT)
Dept: FAMILY MEDICINE CLINIC | Facility: CLINIC | Age: 86
End: 2021-04-19

## 2021-04-19 DIAGNOSIS — I50.32 CHRONIC DIASTOLIC CONGESTIVE HEART FAILURE (HCC): ICD-10-CM

## 2021-04-19 DIAGNOSIS — J96.01 ACUTE RESPIRATORY FAILURE WITH HYPOXIA (HCC): Primary | ICD-10-CM

## 2021-04-19 DIAGNOSIS — J67.9 HYPERSENSITIVITY PNEUMONITIS (HCC): ICD-10-CM

## 2021-04-19 DIAGNOSIS — N40.1 BENIGN PROSTATIC HYPERPLASIA WITH URINARY RETENTION: ICD-10-CM

## 2021-04-19 DIAGNOSIS — I48.0 PAF (PAROXYSMAL ATRIAL FIBRILLATION) (HCC): ICD-10-CM

## 2021-04-19 DIAGNOSIS — J43.9 PULMONARY EMPHYSEMA, UNSPECIFIED EMPHYSEMA TYPE (HCC): ICD-10-CM

## 2021-04-19 DIAGNOSIS — D64.9 NORMOCYTIC ANEMIA: ICD-10-CM

## 2021-04-19 DIAGNOSIS — E03.9 ACQUIRED HYPOTHYROIDISM: ICD-10-CM

## 2021-04-19 DIAGNOSIS — I35.0 SEVERE AORTIC STENOSIS: ICD-10-CM

## 2021-04-19 DIAGNOSIS — E43 SEVERE PROTEIN-CALORIE MALNUTRITION (HCC): ICD-10-CM

## 2021-04-19 DIAGNOSIS — R33.8 BENIGN PROSTATIC HYPERPLASIA WITH URINARY RETENTION: ICD-10-CM

## 2021-04-19 DIAGNOSIS — N18.31 STAGE 3A CHRONIC KIDNEY DISEASE (HCC): ICD-10-CM

## 2021-04-19 DIAGNOSIS — R53.81 DEBILITY: ICD-10-CM

## 2021-04-19 DIAGNOSIS — M15.9 GENERALIZED OSTEOARTHRITIS OF MULTIPLE SITES: ICD-10-CM

## 2021-04-19 PROCEDURE — 99306 1ST NF CARE HIGH MDM 50: CPT | Performed by: FAMILY MEDICINE

## 2021-04-19 RX ORDER — TAMSULOSIN HYDROCHLORIDE 0.4 MG/1
0.4 CAPSULE ORAL
Start: 2021-04-19 | End: 2021-05-05 | Stop reason: CLARIF

## 2021-04-19 NOTE — ASSESSMENT & PLAN NOTE
With associated pain and debility  Continue scheduled acetaminophen 975mg TID  Plan to add topical agents if pain not well controlled  Consider trial of OxyIR 2 5mg one hour prior to therapy if pain not well controlled

## 2021-04-19 NOTE — TELEPHONE ENCOUNTER
I spoke with patients wife Azul Degree  Patient was in [facility] 04/11-04/18/2021 with Acute Respiratory failure with Hypoxia  PAtient was discharged to [facility], will call upon release for a TCM

## 2021-04-19 NOTE — ASSESSMENT & PLAN NOTE
Continue metoprolol succinate; goal HR<100, Hold for HR<60 and SBP<100  Continue aspirin; not on anticoagulation outpatient- risks vs benefits with [id number] of 4; follow up with PCP, cardiology   Will not start at this time in setting of recent hemoptysis in setting of hypersensitivity pneumonitis as above

## 2021-04-19 NOTE — ASSESSMENT & PLAN NOTE
Wt Readings from Last 3 Encounters:   04/18/21 60 2 kg (132 lb 11 2 oz)   03/17/21 67 7 kg (149 lb 4 8 oz)   02/24/21 67 1 kg (148 lb)   With acute exacerbation requiring IV diuresis on presentation to the hospital  Monitor daily weights/CHF pathway  Continue lasix 40mg daily (hold for SBP<100) + KCl  Continue metoprolol

## 2021-04-19 NOTE — PROGRESS NOTES
[facility]   [address] [facility], [place], [address]  History and Physical  POS: SNF-31    Records Reviewed include: [facility] records  Unable to obtain from patient due to: History obtained from patient  Additional history obtained speaking with nursing/nursing note review along with medical record review    Chief Complaint/ Reason for Admission: Acute respiratory failure with hypoxia, acute on chronic diastolic CHF, hypersensitivity pneumonitis with hemoptysis, debility    History of Present Illness:            80year old male admitted for SNF rehab following hospitalization at [facility]  Presented with progressive shortness of breath, episodes of hemoptysis  Found to have acute respiratory failure with hypoxia  Evaluated by cardiology and pulmonary medicine; with initial concern for volume overload and acute on chronic diastolic CHF; treated with IV diuresis and transitioned to lasix 40mg daily prior to discharge  CT imaging negative for PE but showing bilateral upper lobe ground glass disease; underwent bronchoscopy; allergy profile with elevated IgE, started on prednisone with prolonged taper for suspected hypersensitivity pneumonitis  With underlying emphysema with previous tobacco use and environmental exposure on Spiriva  Weaned to room air this morning with O2 sat at rest measured at 92%  Replaced following therapy session for noted increase in dyspnea  Continues with sputum production, light red tinged per patient  No fever or chills  Patient with chronic chavis catheter in place due to obstructive uropathy/BPH; on tamsulosin per inpatient medication list review; discharge on afluvosin and not tamsulosin; taking finasteride during SNF stay in February of this year  Med list per PCP office notes without any of these 3 medications  Additional with pAfib; HR trending generally 80s-90s since SNF admission, continues on metoprolol; SBP trending generally 100s-120s  Allergies    Allergies   Allergen Reactions    Aleve [Naproxen]      Other reaction(s): FACIAL SWELLING  Category: Allergy; Annotation - [id number]: lip/eye edema    Ancef [Cefazolin] Anaphylaxis     Hypotension, rash, itching    Augmentin [Amoxicillin-Pot Clavulanate] Diarrhea and Vomiting       Past Medical History  Past Medical History:   Diagnosis Date    Anemia     Bladder cancer ([facility] )     Cancer ([facility] )     bladder    Carotid artery occlusion     Cataract     Colon polyp     COPD (chronic obstructive pulmonary disease) (HCC)     Coronary artery disease     Disease of thyroid gland     History of transfusion     Hyperlipidemia     Hypertension     Hypothyroidism 9/15/2017    Multiple pulmonary nodules     Peptic ulcer     Scoliosis     SIRS (systemic inflammatory response syndrome) ([facility] [address] ) 12/19/2019    Transient cerebral ischemia     Tremors of nervous system       CHF baseline IP:47%, grade 2 diastolic dysfunction (19/6486)  CKD: Stage 3a, baseline creatinine 0 8-1 0    Past Surgical History:   Procedure Laterality Date   [facility] SURGERY      1973, 1987, 2005    BUNIONECTOMY Left     Simple exostectomy (silver procedure)    CAROTID ENDARTERECTOMY Right 09/10/2008    Common  Melvena Score P MD    CATARACT EXTRACTION      CATARACT EXTRACTION, BILATERAL      CERVICAL DISCECTOMY  1969    COLONOSCOPY      CORONARY ARTERY BYPASS GRAFT  10/20/2010    x3 (LIMA-LAD, SVG-OM, SVG-RCA)    CYSTOSCOPY      ELBOW SURGERY Right 07/2012    FEMORAL ARTERY - TIBIAL ARTERY BYPASS GRAFT  12/05/2011    using reversed saphenous vein from right leg  Fito Bazan MD    SC ECHO TRANSESOPHAG R-T 2D W/PRB IMG ACQUISMARIA EUGENIA I&R N/A 10/6/2020    Procedure: TRANSESOPHAGEAL ECHOCARDIOGRAM (DAVID);   Surgeon: Carlos Kay DO;  [facility]: [facility];  Service: Cardiac Surgery    SC REPLACE AORTIC VALVE OPENFEMORAL ARTERY APPROACH N/A 10/6/2020    Procedure: REPLACEMENT AORTIC VALVE TRANSCATHETER (TAVR) TRANSFEMORAL W/ 26MM MONTALVO BREE S3 ULTRA VALVE(ACCESS ON LEFT); Surgeon: Shun Good DO;  Location[facility];  Service: Cardiac Surgery    REPLACEMENT TOTAL KNEE Left     SHOULDER SURGERY Right        Family History  Family History   Problem Relation Age of Onset    Cervical cancer Mother     Cervical cancer Sister     Heart disease Sister     Coronary artery disease Sister     Lung cancer Brother        Social History  Social History     Tobacco Use   Smoking Status Former Smoker    Packs/day: 1 00    Years: 50 00    Pack years: 50 00    Types: Cigarettes    Start date: 56    Quit date: 200    Years since quittin 3   Smokeless Tobacco Never Used      Social History     Substance and Sexual Activity   Alcohol Use Not Currently    Alcohol/week: 0 0 standard drinks    Comment: Social       Social History     Substance and Sexual Activity   Drug Use Never        Lives: Home, with spouse,  Social Support: Family  Education Level:  Occupation:Retired; [employer]- ; [employer]  Fall in the past 12 months: Yes  Use of assistance Device: Rolling Walker    Physical Exam    Weight: 130lb Temp:97 6F BP:117/66 Pulse:90 Resp:20 O2 Sat:92%RA  Constitutional: Normocephalic  Orientation:Person and Place, situation    Physical Exam  Vitals signs and nursing note reviewed  Constitutional:       General: He is awake  He is not in acute distress  Appearance: He is well-developed, well-groomed and underweight  He is ill-appearing (chronically)  He is not toxic-appearing or diaphoretic  Interventions: Nasal cannula in place  HENT:      Head: Normocephalic and atraumatic  Right Ear: External ear normal       Left Ear: External ear normal       Nose: No rhinorrhea  Mouth/Throat:      Mouth: Mucous membranes are moist       Pharynx: Oropharynx is clear  Eyes:      General: No scleral icterus  Right eye: No discharge           Left eye: No discharge  Conjunctiva/sclera: Conjunctivae normal    Neck:      Musculoskeletal: Neck supple  No neck rigidity  Cardiovascular:      Rate and Rhythm: Normal rate and regular rhythm  Heart sounds: S1 normal and S2 normal  Murmur present  Systolic murmur present  Pulmonary:      Effort: Pulmonary effort is normal  No respiratory distress  Breath sounds: No wheezing, rhonchi or rales  Comments: Decreased breath sounds throughout  Abdominal:      General: There is no distension  Palpations: Abdomen is soft  Tenderness: There is no abdominal tenderness  Musculoskeletal:         General: No tenderness or deformity  Right lower leg: Edema (1+) present  Left lower leg: Edema (1+) present  Skin:     General: Skin is warm and dry  Coloration: Skin is not jaundiced or pale  Neurological:      Mental Status: He is alert  Mental status is at baseline  Cranial Nerves: No cranial nerve deficit or dysarthria  Motor: No abnormal muscle tone  Psychiatric:         Attention and Perception: Attention and perception normal          Mood and Affect: Mood and affect normal          Speech: Speech normal          Behavior: Behavior is cooperative  Review of Systems:  Review of Systems   Constitutional: Negative for chills, fever and unexpected weight change  HENT: Negative for congestion  Respiratory: Positive for shortness of breath  Negative for cough, chest tightness and wheezing  Cardiovascular: Negative for palpitations and leg swelling  Gastrointestinal: Negative for abdominal pain  Genitourinary: Negative for hematuria  Musculoskeletal: Negative for arthralgias  Neurological: Negative for dizziness and light-headedness  All other systems reviewed and are negative        List of Current Medications: Medication list reviewed and updated in Epic to reflect most current SNF orders    Allergies    Allergies   Allergen Reactions    Aleve [Naproxen]      Other reaction(s): FACIAL SWELLING  Category: Allergy;  Annotation - [id number]: lip/eye edema    Ancef [Cefazolin] Anaphylaxis     Hypotension, rash, itching    Augmentin [Amoxicillin-Pot Clavulanate] Diarrhea and Vomiting       Labs/Diagnostics (reviewed by this provider): Hospital Paperwork and Old Records  Lab Results   Component Value Date    WBC 11 47 (H) 04/14/2021    HGB 10 4 (L) 04/14/2021    HCT 32 4 (L) 04/14/2021    MCV 96 04/14/2021     04/14/2021     Lab Results   Component Value Date     06/25/2015    SODIUM 136 04/14/2021    K 4 3 04/14/2021     04/14/2021    CO2 26 04/14/2021    ANIONGAP 7 06/25/2015    AGAP 6 04/14/2021    BUN 22 04/14/2021    CREATININE 0 99 04/14/2021    GLUC 106 04/14/2021    GLUF 98 11/02/2020    CALCIUM 8 5 04/14/2021    AST 18 04/12/2021    ALT 17 04/12/2021    ALKPHOS 98 04/12/2021    PROT 7 2 09/15/2015    TP 6 4 04/12/2021    BILITOT 0 47 09/15/2015    TBILI 0 64 04/12/2021    EGFR 68 04/14/2021     Lab Results   Component Value Date    TROPONINI <0 02 04/11/2021     Lab Results   Component Value Date    NTBNP 1,603 (H) 04/11/2021      Lab Results   Component Value Date    XZC2UVCPBWRE 4 620 (H) 03/10/2021   Free T4: 1 22    Allergy Panel: D farinae, D pteronyssinus, Ragweed, IgE elevated  Viral Respiratory Panel: negative  Pulmonary cytology: negative for malignancy  Urine strep/leg: negative  COVID-19 PCR: negative    Microbiology:  Fungal Cx: pending  Bronchial Cx: 1+ mixed respiratory branden    Imaging Reviewed:  EKG: (4/11/21)- sinus rhythm with 1* AVB; RBBB  CXR: (4/15/21) imaging personally reviewed in GE/PACS; hyperinflation with flattened diaphragms bilaterally; bilateral scattered upper lung airspace opacities; no focal consolidation; no large pleural effusions  CT Scan: CTA Chest (4/12/21)- negative for PE; areas of groundglass density posterior upper lobes bilaterally; small bilateral effusions  Other: Bilateral lower extremity venous doppler (4/12/21)- negative for acute DVT; chronic right distal popliteal artery occlusion; chronic left popliteal artery occlusion    Assessment/Plan:  80year old male with:    Acute respiratory failure with hypoxia (HCC)  In setting of volume overload acute on chronic diastolic CHF along with hypersensitivity pneumonitis; with underlying emphysema at baseline- management of each as outlined  Wean/titrate O2 via nasal cannula to maintain sats >88%    Hypersensitivity pneumonitis (HCC)  Allergy profile with IgE elevation  Awaiting specialized bronchoscopy study results; fungal and bacterial cultures, cytology negative  Continue prolonged prednisone taper: 40mg through 4/23, then 30mg daily 4/24-4/30, then 20mg daily 5/1-5/7, then 10mg daily starting 5/8 x7 days, then stop  Follow up with pulmonology as scheduled    Chronic diastolic congestive heart failure ([facility] [address] )  Wt Readings from Last 3 Encounters:   04/18/21 60 2 kg (132 lb 11 2 oz)   03/17/21 67 7 kg (149 lb 4 8 oz)   02/24/21 67 1 kg (148 lb)   With acute exacerbation requiring IV diuresis on presentation to the hospital  Monitor daily weights/CHF pathway  Continue lasix 40mg daily (hold for SBP<100) + KCl  Continue metoprolol     Pulmonary emphysema ([facility] [address] )  With previous history of tobacco smoking as well as occupational exposures  Continue Spiriva- consider as contributing factor to urinary retention  Add albuterol inhaler 2 puffs Q4h PRN for SOB or wheezing    PAF (paroxysmal atrial fibrillation) ([facility])  Continue metoprolol succinate; goal HR<100, Hold for HR<60 and SBP<100  Continue aspirin; not on anticoagulation outpatient- risks vs benefits with [id number] of 4; follow up with PCP, cardiology   Will not start at this time in setting of recent hemoptysis in setting of hypersensitivity pneumonitis as above    Severe aortic stenosis  S/p previous TAVR    Stage 3a chronic kidney disease  Lab Results   Component Value Date    EGFR 68 04/14/2021    EGFR 78 04/13/2021    EGFR 79 04/12/2021    CREATININE 0 99 04/14/2021    CREATININE 0 84 04/13/2021    CREATININE 0 81 04/12/2021   Baseline creatinine 0 8-1 0  BMP ordered on admission for today reviewed and stable- recheck in one week  Avoid hypotension- hold parameters added    Benign prostatic hyperplasia  With obstructive uropathy  With chronic chavis in place  Was on tamsulosin inpatient- will restart this; hold for SBP<110  Previously on finasteride, alfuzosin which have been discontinued  Outpatient urology follow up  Would avoid anticholinergic agents as patient already on spiriva; if bladder spasms occur, would trial myrbetriq rather than oxybutynin, etc    Severe protein-calorie malnutrition (HCC)  In setting of acute and chronic illness   With marginal PO intake of <50% for more than 5 days inpatient  With 13% weight loss noted over past month as documented per inpatient weight assessment  With subcutaneous fat loss and muscle wasting noted  Dietary/nutrition consult    Normocytic anemia  Recommend outpatient iron studies  CBC w/diff ordered to be drawn with next scheduled BMP  Mild leukocytosis noted on most recent CBC inpatient, in setting of steroid use    Generalized osteoarthritis of multiple sites  With associated pain and debility  Continue scheduled acetaminophen 975mg TID  Plan to add topical agents if pain not well controlled  Consider trial of OxyIR 2 5mg one hour prior to therapy if pain not well controlled    Hypothyroidism  Recent outpatient TSH mildly elevated with normal Free T4  Continue levothyroxine    Debility  Multifactorial  Admit to SNF for rehab  PT/OT consult- evaluate and treat  Supportive care, nutritional support, ADL support  Fall precautions  Management of acute and chronic medical conditions as outlined    Pain: Present no  Rehab Potential:Fair  Patient Informed of Medical Condition: yes  Patient is Capable of Understanding Their Right: yes  Prognosis:Guarded  Discharge Plan: STR-> Home w/wife  Surrogate Decision Maker: Wife  Advanced Directives: Yes  Code status:Full Code   Reviewed with patient, accepts intubation if worsening of respiratory status  PCP: Lucetta Bosworth, MD    Immunization History   Administered Date(s) Administered    INFLUENZA 12/09/2009, 10/16/2012, 10/02/2013, 11/10/2014, 09/08/2016, 10/16/2018    Influenza Split High Dose Preservative Free IM 09/08/2016, 09/15/2017, 10/16/2018, 10/17/2019    Influenza, high dose seasonal 0 7 mL 10/14/2020    Influenza, seasonal, injectable 1933, 10/01/2013    Pneumococcal Conjugate 13-Valent 07/15/2015    Pneumococcal Polysaccharide PPV23 08/18/2011    SARS-CoV-2 / COVID-19 mRNA IM ([facility]) 01/23/2021, 02/20/2021     Gonzalez Jin DO  4/19/21

## 2021-04-19 NOTE — ASSESSMENT & PLAN NOTE
In setting of volume overload acute on chronic diastolic CHF along with hypersensitivity pneumonitis; with underlying emphysema at baseline- management of each as outlined  Wean/titrate O2 via nasal cannula to maintain sats >88%

## 2021-04-19 NOTE — ASSESSMENT & PLAN NOTE
Lab Results   Component Value Date    EGFR 68 04/14/2021    EGFR 78 04/13/2021    EGFR 79 04/12/2021    CREATININE 0 99 04/14/2021    CREATININE 0 84 04/13/2021    CREATININE 0 81 04/12/2021   Baseline creatinine 0 8-1 0  BMP ordered on admission for today reviewed and stable- recheck in one week  Avoid hypotension- hold parameters added

## 2021-04-19 NOTE — ASSESSMENT & PLAN NOTE
Recommend outpatient iron studies  CBC w/diff ordered to be drawn with next scheduled BMP  Mild leukocytosis noted on most recent CBC inpatient, in setting of steroid use

## 2021-04-19 NOTE — ASSESSMENT & PLAN NOTE
Allergy profile with IgE elevation  Awaiting specialized bronchoscopy study results; fungal and bacterial cultures, cytology negative  Continue prolonged prednisone taper: 40mg through 4/23, then 30mg daily 4/24-4/30, then 20mg daily 5/1-5/7, then 10mg daily starting 5/8 x7 days, then stop  Follow up with pulmonology as scheduled

## 2021-04-19 NOTE — ASSESSMENT & PLAN NOTE
In setting of acute and chronic illness   With marginal PO intake of <50% for more than 5 days inpatient  With 13% weight loss noted over past month as documented per inpatient weight assessment  With subcutaneous fat loss and muscle wasting noted  Dietary/nutrition consult

## 2021-04-19 NOTE — ASSESSMENT & PLAN NOTE
With previous history of tobacco smoking as well as occupational exposures  Continue Spiriva- consider as contributing factor to urinary retention  Add albuterol inhaler 2 puffs Q4h PRN for SOB or wheezing

## 2021-04-19 NOTE — ASSESSMENT & PLAN NOTE
With obstructive uropathy  With chronic chavis in place  Was on tamsulosin inpatient- will restart this; hold for SBP<110  Previously on finasteride, alfuzosin which have been discontinued  Outpatient urology follow up  Would avoid anticholinergic agents as patient already on spiriva; if bladder spasms occur, would trial myrbetriq rather than oxybutynin, etc

## 2021-04-20 ENCOUNTER — PATIENT OUTREACH (OUTPATIENT)
Dept: CASE MANAGEMENT | Facility: OTHER | Age: 86
End: 2021-04-20

## 2021-04-21 ENCOUNTER — NURSING HOME VISIT (OUTPATIENT)
Dept: GERIATRICS | Facility: OTHER | Age: 86
End: 2021-04-21
Payer: MEDICARE

## 2021-04-21 DIAGNOSIS — I35.0 SEVERE AORTIC STENOSIS: ICD-10-CM

## 2021-04-21 DIAGNOSIS — R33.8 BENIGN PROSTATIC HYPERPLASIA WITH URINARY RETENTION: ICD-10-CM

## 2021-04-21 DIAGNOSIS — N18.31 STAGE 3A CHRONIC KIDNEY DISEASE (HCC): ICD-10-CM

## 2021-04-21 DIAGNOSIS — R53.81 DEBILITY: ICD-10-CM

## 2021-04-21 DIAGNOSIS — N40.1 BENIGN PROSTATIC HYPERPLASIA WITH URINARY RETENTION: ICD-10-CM

## 2021-04-21 DIAGNOSIS — D72.829 LEUKOCYTOSIS, UNSPECIFIED TYPE: ICD-10-CM

## 2021-04-21 DIAGNOSIS — E43 SEVERE PROTEIN-CALORIE MALNUTRITION (HCC): ICD-10-CM

## 2021-04-21 DIAGNOSIS — I50.32 CHRONIC DIASTOLIC CONGESTIVE HEART FAILURE (HCC): ICD-10-CM

## 2021-04-21 DIAGNOSIS — J96.01 ACUTE RESPIRATORY FAILURE WITH HYPOXIA (HCC): Primary | ICD-10-CM

## 2021-04-21 DIAGNOSIS — J43.9 PULMONARY EMPHYSEMA, UNSPECIFIED EMPHYSEMA TYPE (HCC): ICD-10-CM

## 2021-04-21 DIAGNOSIS — J67.9 HYPERSENSITIVITY PNEUMONITIS (HCC): ICD-10-CM

## 2021-04-21 DIAGNOSIS — I48.0 PAF (PAROXYSMAL ATRIAL FIBRILLATION) (HCC): ICD-10-CM

## 2021-04-21 PROCEDURE — 99309 SBSQ NF CARE MODERATE MDM 30: CPT | Performed by: NURSE PRACTITIONER

## 2021-04-21 NOTE — ASSESSMENT & PLAN NOTE
-heart rate trending 70s to 80s  -continue aspirin  -currently not on an anticoagulant  -follow-up with cardiology as scheduled

## 2021-04-21 NOTE — ASSESSMENT & PLAN NOTE
-with recent hospitalization in setting of hypersensitivity pneumonitis, chronic emphysema, fluid overload with history of chronic diastolic congestive heart failure  -continue oxygen via nasal cannula to maintain O2 sats >88% which patient continues to require  -continue to monitor closely

## 2021-04-21 NOTE — ASSESSMENT & PLAN NOTE
Lab Results   Component Value Date    EGFR 68 04/14/2021    EGFR 78 04/13/2021    EGFR 79 04/12/2021    CREATININE 0 99 04/14/2021    CREATININE 0 84 04/13/2021    CREATININE 0 81 04/12/2021   -most recent creatinine 0 74  -avoid hypotension  -ensure adequate hydration  -monitor BMP

## 2021-04-21 NOTE — ASSESSMENT & PLAN NOTE
-with history of tobacco smoking and occupational exposures  -easily dyspneic on exertion  -continue Spiriva  -continue albuterol inhaler p r n  for shortness of breath or wheezing  -follow-up with pulmonology as scheduled

## 2021-04-21 NOTE — ASSESSMENT & PLAN NOTE
Wt Readings from Last 3 Encounters:   04/18/21 60 2 kg (132 lb 11 2 oz)   03/17/21 67 7 kg (149 lb 4 8 oz)   02/24/21 67 1 kg (148 lb)   -with recent hospitalization with acute exacerbation requiring IV diuretics on presentation to the hospital  -weight loss of 1 6 lb noted since 04/19/2021  -with fine crackles bibasilar and trace bilateral ankle edema  -continue daily weights/CHF pathway  -continue Lasix with hold parameter  -continue potassium chloride  -continue metoprolol  -follow-up with cardiology as scheduled on 04/26/2021

## 2021-04-21 NOTE — PROGRESS NOTES
[facility] CHILDREN   [address] [place] [address], [place], [address]  POS: 31 (STR)  Progress Note    Chief Complaint/Reason for visit: STR follow up  History obtained from patient, nursing staff, the MR  History of Present Illness:  77-year-old male seen and examined for STR follow up  Seated in chair with oxygen on via nasal cannula  No respiratory distress noted  patient admits to feeling SOB on exertion  Denies pain or discomfort  No acute concerns reported by nursing  Past Medical History: unchanged from history and physical  Past Medical History:   Diagnosis Date    Anemia     [facility] [address] )     [facility]     bladder    Carotid artery occlusion     Cataract     Colon polyp     COPD (chronic obstructive pulmonary disease) (HCC)     Coronary artery disease     Disease of thyroid gland     History of transfusion     Hyperlipidemia     Hypertension     Hypothyroidism 9/15/2017    Multiple pulmonary nodules     Peptic ulcer     Scoliosis     SIRS (systemic inflammatory response syndrome) ([facility] ) 12/19/2019    Transient cerebral ischemia     Tremors of nervous system      Family History: unchanged from history and physical  Social History: unchanged from history and physical  Resident Since:  04/18/2021  Review of systems: Review of Systems   Constitutional: Positive for activity change  Negative for appetite change, chills and diaphoresis  Fatigues easily  HENT: Negative  Eyes: Negative  Respiratory: Positive for cough and shortness of breath  Occasional nonproductive cough  Shortness of breath on exertion  Cardiovascular: Negative  Gastrointestinal: Negative  Endocrine: Negative  Genitourinary: Negative  Musculoskeletal: Positive for gait problem  Intermittent left groin pain (previous catheterization site per patient)  No signs of infection   Neurological: Negative for dizziness, syncope, speech difficulty, light-headedness, numbness and headaches  Psychiatric/Behavioral: Negative  All other systems reviewed and are negative  Medications: All medication orders were reviewed  Allergies: Reviewed and unchanged  Consults reviewed:PT, OT and Other  Labs/Diagnostics (reviewed by this provider): Copy in Chart BMP reviewed from 04/19/2021; stable  Imaging Reviewed:  None today    Physical Exam  All weights and vital signs were reviewed  Weight:  128 4 lb Temp:  98 3 BP:  92/60  Pulse:  84 Resp:  20 O2 Sat:  Constitutional: Normocephalic  Orientation:Person, Place and Day     Physical Exam  Vitals signs and nursing note reviewed  Constitutional:       General: He is not in acute distress  Appearance: He is not toxic-appearing or diaphoretic  Comments: Frail elderly male who appears with chronic illness  HENT:      Head: Normocephalic  Nose: No congestion or rhinorrhea  Mouth/Throat:      Mouth: Mucous membranes are moist       Pharynx: No oropharyngeal exudate  Eyes:      General: No scleral icterus  Right eye: No discharge  Left eye: No discharge  Extraocular Movements: Extraocular movements intact  Conjunctiva/sclera: Conjunctivae normal       Pupils: Pupils are equal, round, and reactive to light  Comments: Wears glasses  Neck:      Musculoskeletal: Neck supple  No neck rigidity  Cardiovascular:      Rate and Rhythm: Normal rate and regular rhythm  Pulses: Normal pulses  Heart sounds: Murmur present  Comments: Trace bilateral ankle edema  Pulmonary:      Effort: Pulmonary effort is normal  No respiratory distress  Breath sounds: No wheezing or rhonchi  Comments: Fine crackles bibasilar with decreased breath sounds bilateral lung fields  Oxygen on via nasal cannula  Abdominal:      General: Bowel sounds are normal  There is no distension  Palpations: Abdomen is soft  Tenderness: There is no abdominal tenderness  There is no guarding     Genitourinary: Comments: Indwelling urinary catheter patent for yellow urine  Wears leg bag during the day  Musculoskeletal:      Right lower leg: Edema present  Left lower leg: Edema present  Comments: Moves all 4 extremities  Lymphadenopathy:      Cervical: No cervical adenopathy  Skin:     General: Skin is warm and dry  Capillary Refill: Capillary refill takes less than 2 seconds  Neurological:      Mental Status: He is alert  Mental status is at baseline  Cranial Nerves: No cranial nerve deficit  Motor: Weakness present  Gait: Gait abnormal    Psychiatric:         Mood and Affect: Mood normal          Behavior: Behavior normal          Thought Content:  Thought content normal        Assessment/Plan:  27-year-old male with:    Acute respiratory failure with hypoxia ([address] )  -with recent hospitalization in setting of hypersensitivity pneumonitis, chronic emphysema, fluid overload with history of chronic diastolic congestive heart failure  -continue oxygen via nasal cannula to maintain O2 sats >88% which patient continues to require  -continue to monitor closely    Hypersensitivity pneumonitis (HCC)  -allergy profile with IgE elevation  -fungal, bacterial cultures, and cytology negative  -bronchial studies negative for malignancy -final results on 04/15/2021  -infrequent nonproductive cough  -continue prednisone taper as ordered  -continue Mucinex q 12 hours  -follow-up with pulmonology as scheduled 05/04/2021    Chronic diastolic congestive heart failure (HCC)  Wt Readings from Last 3 Encounters:   04/18/21 60 2 kg (132 lb 11 2 oz)   03/17/21 67 7 kg (149 lb 4 8 oz)   02/24/21 67 1 kg (148 lb)   -with recent hospitalization with acute exacerbation requiring IV diuretics on presentation to the hospital  -weight loss of 1 6 lb noted since 04/19/2021  -with fine crackles bibasilar and trace bilateral ankle edema  -continue daily weights/CHF pathway  -continue Lasix with hold parameter  -continue potassium chloride  -continue metoprolol  -follow-up with cardiology as scheduled on 04/26/2021    Severe aortic stenosis  -s/p previous TAVR  -asymptomatic    PAF (paroxysmal atrial fibrillation) (HCC)  -heart rate trending 70s to 80s  -continue aspirin  -currently not on an anticoagulant  -follow-up with cardiology as scheduled    Benign prostatic hyperplasia  -with obstructive uropathy and chronic indwelling urinary catheter in place  -continue IUC care per protocol  -continue tamsulosin with hold parameter  No bladder spasms reported    Pulmonary emphysema (HCC)  -with history of tobacco smoking and occupational exposures  -easily dyspneic on exertion  -continue Spiriva  -continue albuterol inhaler p r n  for shortness of breath or wheezing  -follow-up with pulmonology as scheduled    Severe protein-calorie malnutrition ([address] )  -in setting of acute and chronic illness  -with muscle wasting and subcutaneous fat loss noted  -patient states that he has been eating at least 50% of meals  -dietitian following an added fortified old male and Ensure Plus  -monitor weight    Stage 3a chronic kidney disease  Lab Results   Component Value Date    EGFR 68 04/14/2021    EGFR 78 04/13/2021    EGFR 79 04/12/2021    CREATININE 0 99 04/14/2021    CREATININE 0 84 04/13/2021    CREATININE 0 81 04/12/2021   -most recent creatinine 0 74  -avoid hypotension  -ensure adequate hydration  -monitor BMP    Debility  -multifactorial  -continue care and support at SNF for ADLs  -continue PT/OT  -continue fall precautions  -ensure adequate hydration and nutrition    Leukocytosis  -WBC 11 47 on 04/14/2021 inpatient  No signs or symptoms of obvious infection  -recheck CBC with diff with next BMP on 04/26/2021    **Please note: This follow-up note was constructed using a voice recognition system  [address] [place], [address]  1/98/[id number]:08 PM

## 2021-04-21 NOTE — ASSESSMENT & PLAN NOTE
-allergy profile with IgE elevation  -fungal, bacterial cultures, and cytology negative  -bronchial studies negative for malignancy -final results on 04/15/2021  -infrequent nonproductive cough  -continue prednisone taper as ordered  -continue Mucinex q 12 hours  -follow-up with pulmonology as scheduled 05/04/2021

## 2021-04-21 NOTE — ASSESSMENT & PLAN NOTE
-in setting of acute and chronic illness  -with muscle wasting and subcutaneous fat loss noted  -patient states that he has been eating at least 50% of meals  -dietitian following an added fortified old male and Ensure Plus  -monitor weight

## 2021-04-21 NOTE — ASSESSMENT & PLAN NOTE
-WBC 11 47 on 04/14/2021 inpatient    No signs or symptoms of obvious infection  -recheck CBC with diff with next BMP on 04/26/2021

## 2021-04-22 ENCOUNTER — EPISODE CHANGES (OUTPATIENT)
Dept: CASE MANAGEMENT | Facility: HOSPITAL | Age: 86
End: 2021-04-22

## 2021-04-26 ENCOUNTER — NURSING HOME VISIT (OUTPATIENT)
Dept: GERIATRICS | Facility: OTHER | Age: 86
End: 2021-04-26
Payer: MEDICARE

## 2021-04-26 DIAGNOSIS — N18.31 STAGE 3A CHRONIC KIDNEY DISEASE (HCC): ICD-10-CM

## 2021-04-26 DIAGNOSIS — I35.0 SEVERE AORTIC STENOSIS: ICD-10-CM

## 2021-04-26 DIAGNOSIS — R53.81 DEBILITY: ICD-10-CM

## 2021-04-26 DIAGNOSIS — I50.32 CHRONIC DIASTOLIC CONGESTIVE HEART FAILURE (HCC): Primary | ICD-10-CM

## 2021-04-26 DIAGNOSIS — N40.1 BENIGN PROSTATIC HYPERPLASIA WITH URINARY RETENTION: ICD-10-CM

## 2021-04-26 DIAGNOSIS — J43.9 PULMONARY EMPHYSEMA, UNSPECIFIED EMPHYSEMA TYPE (HCC): ICD-10-CM

## 2021-04-26 DIAGNOSIS — J67.9 HYPERSENSITIVITY PNEUMONITIS (HCC): ICD-10-CM

## 2021-04-26 DIAGNOSIS — R33.8 BENIGN PROSTATIC HYPERPLASIA WITH URINARY RETENTION: ICD-10-CM

## 2021-04-26 PROBLEM — J96.01 ACUTE RESPIRATORY FAILURE WITH HYPOXIA (HCC): Status: RESOLVED | Noted: 2021-04-11 | Resolved: 2021-04-26

## 2021-04-26 PROCEDURE — 99309 SBSQ NF CARE MODERATE MDM 30: CPT | Performed by: NURSE PRACTITIONER

## 2021-04-26 NOTE — ASSESSMENT & PLAN NOTE
-multifactorial  -patient doing well at SNF  -continue PT/OT  -continue fall precautions  -provide nutritional support

## 2021-04-26 NOTE — ASSESSMENT & PLAN NOTE
-with obstructive uropathy and chronic indwelling urinary catheter  -continue IUC care per protocol  -continue tamsulosin with hold parameter    No further reports of urine leaking around catheter  -followup with Urology

## 2021-04-26 NOTE — ASSESSMENT & PLAN NOTE
Wt Readings from Last 3 Encounters:   04/18/21 60 2 kg (132 lb 11 2 oz)   03/17/21 67 7 kg (149 lb 4 8 oz)   02/24/21 67 1 kg (148 lb)   -appears euvolemic on exam  -O2 sats stable on room air and no longer requiring oxygen  -cardiology appointment was canceled today-not sure of reason  -most recent BMP stable  -continue daily weights/CHF pathway  -continue Lasix with hold parameter  -continue potassium chloride  -up with Cardiology

## 2021-04-26 NOTE — ASSESSMENT & PLAN NOTE
-with history of tobacco smoking and occupational exposures  -continue Spiriva  -continue albuterol inhaler p r n  for shortness of breath or wheezing  -followup with pulmonology as scheduled

## 2021-04-26 NOTE — PROGRESS NOTES
[facility] CHILDREN   [address] [place] [address], [place], [address]  POS: 31 (STR)  Progress Note    Chief Complaint/Reason for visit: STR follow-up  History of Present Illness:  68-year-old male seen and examined for STR follow up  Observed patient with therapist kaity ornelas  performing exercises  He is doing well at [facility]  Weight loss of 2 pounds noted in 1 day and patient states that he is eating  No further edema noted  No SOB at rest   No orthopnea  C/o episodes of loose stools which may be due to ensure supplements since he is not on any scheduled stool softeners  Past Medical History: unchanged from history and physical  Past Medical History:   Diagnosis Date    Anemia     [facility] )     [facility]     bladder    Carotid artery occlusion     Cataract     Colon polyp     COPD (chronic obstructive pulmonary disease) (HCC)     Coronary artery disease     Disease of thyroid gland     History of transfusion     Hyperlipidemia     Hypertension     Hypothyroidism 9/15/2017    Multiple pulmonary nodules     Peptic ulcer     Scoliosis     S[facility] (systemic inflammatory response syndrome) [facility] 12/19/2019    Transient cerebral ischemia     Tremors of nervous system      Family History: unchanged from history and physical  Social History: unchanged from history and physical  Resident Since:  04/18/2020  Review of systems: Review of Systems   Constitutional: Negative  HENT: Negative  Eyes: Negative  Respiratory: Positive for cough and shortness of breath  Occasional nonproductive cough  Shortness of breath on moderate exertion  Cardiovascular: Negative  Gastrointestinal: Negative for constipation  Had episodes of loose stools  Endocrine: Negative  Genitourinary:        Indwelling urinary catheter   Musculoskeletal: Positive for gait problem  Neurological: Negative for dizziness, syncope, speech difficulty, light-headedness, numbness and headaches     Psychiatric/Behavioral: Negative for agitation, behavioral problems, dysphoric mood and hallucinations  The patient is not nervous/anxious  All other systems reviewed and are negative  Medications: All medication orders were reviewed  Allergies: Reviewed and unchanged  Consults reviewed:PT, OT and Other  Labs/Diagnostics (reviewed by this provider): Copy in Chart    Imaging Reviewed:  None today    Physical Exam    Weight:  126 6 pounds Temp:  97 4 BP:  102/58 Pulse:77  Resp: 16 O2 Sat:  92 percent on room air  Constitutional: Normocephalic  Orientation:Person, Place and Day     Physical Exam  Vitals signs and nursing note reviewed  Constitutional:       General: He is not in acute distress  Appearance: He is not toxic-appearing or diaphoretic  Comments: Frail elderly male who appears in no distress  HENT:      Head: Normocephalic  Nose: No congestion or rhinorrhea  Mouth/Throat:      Mouth: Mucous membranes are moist       Pharynx: No oropharyngeal exudate  Eyes:      General: No scleral icterus  Right eye: No discharge  Left eye: No discharge  Extraocular Movements: Extraocular movements intact  Conjunctiva/sclera: Conjunctivae normal       Pupils: Pupils are equal, round, and reactive to light  Comments: Wears glasses  Neck:      Musculoskeletal: Neck supple  No neck rigidity  Cardiovascular:      Rate and Rhythm: Normal rate and regular rhythm  Pulses: Normal pulses  Heart sounds: Murmur present  Pulmonary:      Effort: Pulmonary effort is normal  No respiratory distress  Breath sounds: Normal breath sounds  No wheezing, rhonchi or rales  Comments: Decreased breath sounds bibasilar  Abdominal:      General: Bowel sounds are normal  There is no distension  Palpations: Abdomen is soft  Tenderness: There is no abdominal tenderness  There is no guarding  Genitourinary:     Comments:  Indwelling urinary catheter intact and patent for yellow urine  Musculoskeletal:      Right lower leg: No edema  Left lower leg: No edema  Comments: Moves all 4 extremities  Lymphadenopathy:      Cervical: No cervical adenopathy  Skin:     General: Skin is warm and dry  Capillary Refill: Capillary refill takes less than 2 seconds  Neurological:      Mental Status: He is alert  Mental status is at baseline  Cranial Nerves: No cranial nerve deficit  Gait: Gait abnormal    Psychiatric:         Mood and Affect: Mood normal          Behavior: Behavior normal          Thought Content:  Thought content normal        Assessment/Plan:  45-year-old male with:    Chronic diastolic congestive heart failure ([place] [address] )  Wt Readings from Last 3 Encounters:   04/18/21 60 2 kg (132 lb 11 2 oz)   03/17/21 67 7 kg (149 lb 4 8 oz)   02/24/21 67 1 kg (148 lb)   -appears euvolemic on exam  -O2 sats stable on room air and no longer requiring oxygen  -cardiology appointment was canceled today-not sure of reason  -most recent BMP stable  -continue daily weights/CHF pathway  -continue Lasix with hold parameter  -continue potassium chloride  -up with Cardiology    Hypersensitivity pneumonitis ([place] [address] )  -bronchial studies negative for malignancy  -respiratory status stable on room air  -currently on prednisone taper with last dose on May 14, 2021  -continue Mucinex q 12 hours  -followup with pulmonology as scheduled on May 4, 2021    Severe aortic stenosis  -s/p TAVR  -follow-up with cardiology    Pulmonary emphysema ([place] [address] )  -with history of tobacco smoking and occupational exposures  -continue Spiriva  -continue albuterol inhaler p r n  for shortness of breath or wheezing  -followup with pulmonology as scheduled    Stage 3a chronic kidney disease ([place] [address] )  Lab Results   Component Value Date    EGFR 68 04/14/2021    EGFR 78 04/13/2021    EGFR 79 04/12/2021    CREATININE 0 99 04/14/2021    CREATININE 0 84 04/13/2021    CREATININE 0 81 04/12/2021   -creatinine 0 74 today  -avoid hypotension  -ensure adequate hydration    Benign prostatic hyperplasia  -with obstructive uropathy and chronic indwelling urinary catheter  -continue IUC care per protocol  -continue tamsulosin with hold parameter  No further reports of urine leaking around catheter  -followup with Urology    Debility  -multifactorial  -patient doing well at [facility]  -continue PT/OT  -continue fall precautions  -provide nutritional support    Acute respiratory failure with hypoxia  -resolved    **Please note: This follow-up note was constructed using a voice recognition system  [address] [place], [place]  1/63/233943:05 PM

## 2021-04-26 NOTE — ASSESSMENT & PLAN NOTE
Lab Results   Component Value Date    EGFR 68 04/14/2021    EGFR 78 04/13/2021    EGFR 79 04/12/2021    CREATININE 0 99 04/14/2021    CREATININE 0 84 04/13/2021    CREATININE 0 81 04/12/2021   -creatinine 0 74 today  -avoid hypotension  -ensure adequate hydration

## 2021-04-26 NOTE — ASSESSMENT & PLAN NOTE
-bronchial studies negative for malignancy  -respiratory status stable on room air  -currently on prednisone taper with last dose on May 14, 2021  -continue Mucinex q 12 hours  -followup with pulmonology as scheduled on May 4, 2021

## 2021-04-27 ENCOUNTER — TELEPHONE (OUTPATIENT)
Dept: FAMILY MEDICINE CLINIC | Facility: CLINIC | Age: 86
End: 2021-04-27

## 2021-04-30 ENCOUNTER — NURSING HOME VISIT (OUTPATIENT)
Dept: GERIATRICS | Facility: OTHER | Age: 86
End: 2021-04-30
Payer: MEDICARE

## 2021-04-30 DIAGNOSIS — N40.1 BENIGN PROSTATIC HYPERPLASIA WITH URINARY RETENTION: ICD-10-CM

## 2021-04-30 DIAGNOSIS — J43.9 PULMONARY EMPHYSEMA, UNSPECIFIED EMPHYSEMA TYPE (HCC): Primary | ICD-10-CM

## 2021-04-30 DIAGNOSIS — N18.31 STAGE 3A CHRONIC KIDNEY DISEASE (HCC): ICD-10-CM

## 2021-04-30 DIAGNOSIS — I50.32 CHRONIC DIASTOLIC CONGESTIVE HEART FAILURE (HCC): ICD-10-CM

## 2021-04-30 DIAGNOSIS — R53.81 DEBILITY: ICD-10-CM

## 2021-04-30 DIAGNOSIS — M15.9 GENERALIZED OSTEOARTHRITIS OF MULTIPLE SITES: ICD-10-CM

## 2021-04-30 DIAGNOSIS — R33.8 BENIGN PROSTATIC HYPERPLASIA WITH URINARY RETENTION: ICD-10-CM

## 2021-04-30 DIAGNOSIS — E03.9 ACQUIRED HYPOTHYROIDISM: ICD-10-CM

## 2021-04-30 DIAGNOSIS — D64.9 NORMOCYTIC ANEMIA: ICD-10-CM

## 2021-04-30 DIAGNOSIS — I48.0 PAF (PAROXYSMAL ATRIAL FIBRILLATION) (HCC): ICD-10-CM

## 2021-04-30 DIAGNOSIS — J67.9 HYPERSENSITIVITY PNEUMONITIS (HCC): ICD-10-CM

## 2021-04-30 DIAGNOSIS — I35.0 SEVERE AORTIC STENOSIS: ICD-10-CM

## 2021-04-30 DIAGNOSIS — E43 SEVERE PROTEIN-CALORIE MALNUTRITION (HCC): ICD-10-CM

## 2021-04-30 PROCEDURE — 99316 NF DSCHRG MGMT 30 MIN+: CPT | Performed by: NURSE PRACTITIONER

## 2021-04-30 NOTE — ASSESSMENT & PLAN NOTE
-recommend outpatient iron studies  -most recent hemoglobin 10 8/hematocrit 32 1 on 04/26/2021  Mild leukocytosis noted on most recent CBC inpatient in setting of steroid use    Most recent WBC 9 3 on 04/26/2021

## 2021-04-30 NOTE — ASSESSMENT & PLAN NOTE
-recent outpatient TSH mildly elevated with normal free T4  -continue Synthroid  -follow-up with PCP for management

## 2021-04-30 NOTE — LETTER
April 30, 2021     Carmen Marcial MD  [address] [place] [address]    Patient: Parth Trivedi   YOB: 1933   Date of Visit: 4/30/2021       Dear Dr Kera Chamorro:    Thank you for referring Geneva Prince to me for evaluation  Below are my notes for this consultation  If you have questions, please do not hesitate to call me  I look forward to following your patient along with you  Sincerely,        MANI Arce        CC: No Recipients  Allan Arce  4/30/2021 11:13 AM  Incomplete  [facility]  [address]  [phone]  DISCHARGE SUMMARY  POS: 32 (ZMG)   Facility:  [facility]    NAME: Parth Trivedi  AGE: 80 y o  SEX: male  DATE OF ADMISSION:  04/18/2021 DATE OF DISCHARGE:  05/01/2021 DISCHARGE DISPOSITION:  Home    Reason for admission: [facility] hospitalization for acute respiratory failure with hypoxia, suspected hypersensitivity pneumonitis with underlying emphysema     Admission Diagnoses:  Acute respiratory failure with hypoxia, acute on chronic diastolic congestive heart failure, hypersensitivity pneumonitis with hemoptysis, debility  Additional Problems:   Past Medical History:   Diagnosis Date    Anemia     Bladder cancer ([address] )     Cancer ([facility] )     bladder    Carotid artery occlusion     Cataract     Colon polyp     COPD (chronic obstructive pulmonary disease) (HCC)     Coronary artery disease     Disease of thyroid gland     History of transfusion     Hyperlipidemia     Hypertension     Hypothyroidism 9/15/2017    Multiple pulmonary nodules     Peptic ulcer     Scoliosis     SIRS (systemic inflammatory response syndrome) ([facility] ) 12/19/2019    Transient cerebral ischemia     Tremors of nervous system      Discharge Diagnoses: See problem list follow up recommendations below      Course of stay: Patient was admitted to [facility] for rehabilitation following hospitalization for above mentioned  During the resident's stay at [facility], he received skilled nursing care, OT, PT, dietitian support, social service support, and medical management for an overall improvement in his functional status  He is scheduled to be discharged home on 05/01/2021  A referral was placed to [facility] by social service  Labs and testing performed during stay:  CBC with diff, BMP stable  Routine screening for COVID 19 study negative on 04/22/2021    Discharge Medications: See discharge medication list which was reviewed   Status at time of discharge exam: Stable    Today's Visit: 4/30/202110:56 AM    Subjective:  No complaints or concerns    Review of systems:   Constitutional:  Negative  HEENT:  Negative   Eyes:  Negative  Respiratory: Positive for occasional nonproductive cough  Dyspneic on moderate exertion  Cardiovascular:  Negative for chest pain or palpitations  Gastrointestinal:  Negative for constipation or abdominal pain  Endocrine:  Negative  Genitourinary:  Indwelling urinary catheter intact  Musculoskeletal:  Positive for gait problem  Neurological:  Negative for dizziness, syncope, speech difficulty, lightheadedness, numbness, and headaches  Psychiatric/behavioral:  Negative for agitation, behavioral problems, dysphoric mood, and hallucinations  The patient is not nervous/anxious  Vitals:  Weight:  126 8 lb   BP: 112/69   T:  97 3  HR:  62   R: 16  O2 sat: 93% on room air    Exam: Physical Exam  Vitals signs and nursing note reviewed  Constitutional:       General: He is not in acute distress  Appearance: He is not toxic-appearing or diaphoretic  Comments: Elderly male who appears with chronic illness  HENT:      Head: Normocephalic  Nose: No congestion or rhinorrhea  Mouth/Throat:      Mouth: Mucous membranes are moist       Pharynx: No oropharyngeal exudate  Eyes:      General: No scleral icterus  Right eye: No discharge  Left eye: No discharge  Extraocular Movements: Extraocular movements intact  Conjunctiva/sclera: Conjunctivae normal       Pupils: Pupils are equal, round, and reactive to light  Neck:      Musculoskeletal: Neck supple  No neck rigidity  Cardiovascular:      Rate and Rhythm: Normal rate  Rhythm irregular  Pulses: Normal pulses  Comments: Chronic edema bilateral lower extremities  Pulmonary:      Effort: Pulmonary effort is normal  No respiratory distress  Breath sounds: Normal breath sounds  No wheezing, rhonchi or rales  Abdominal:      General: Bowel sounds are normal  There is no distension  Palpations: Abdomen is soft  Tenderness: There is no abdominal tenderness  There is no guarding  Genitourinary:     Comments: Indwelling urinary catheter intact and patent for yellow urine  Patient wears a leg bag during the day  Musculoskeletal:      Right lower leg: Edema present  Left lower leg: Edema present  Comments: Moves all 4 extremities  Compression stockings in place bilateral lower extremities  Lymphadenopathy:      Cervical: No cervical adenopathy  Skin:     General: Skin is warm and dry  Capillary Refill: Capillary refill takes less than 2 seconds  Neurological:      Mental Status: He is alert  Mental status is at baseline  Cranial Nerves: No cranial nerve deficit  Motor: Weakness present  Gait: Gait abnormal    Psychiatric:         Mood and Affect: Mood normal          Behavior: Behavior normal          Thought Content:  Thought content normal        Discussion with patient/family and further instructions:  -Fall precautions  -Aspiration precautions  -Bleeding precautions  -Monitor for signs/symptoms of infection  -Medication list was reviewed     Follow-up Recommendations: Please follow-up with your primary care physician within 7-10 days of discharge to review medication changes and current status       Problem List Follow-up Recommendations:  70-year-old male with:    Pulmonary emphysema ([facility] )  -with history of tobacco smoking in occupational exposures  -stable without exacerbation  -continue Spiriva  -continue albuterol inhaler p r n  for shortness of breath or wheezing  -follow-up with pulmonology as scheduled    Hypersensitivity pneumonitis ([place] [address] )  -with recent hospitalization  -s/p bronchoscopy  -bronchial studies negative for malignancy  -respiratory status stable on room air  -currently on prednisone taper with last dose on May 14, 2021  -continue Mucinex q 12 hours  -follow-up with pulmonology as scheduled on May 4, 2021    PAF (paroxysmal atrial fibrillation) ([facility])  -heart rate trending 70s to 80s  -continue aspirin  -currently not on an anticoagulant  -follow-up with cardiology as scheduled    Chronic diastolic congestive heart failure (HCC)  Wt Readings from Last 3 Encounters:   04/18/21 60 2 kg (132 lb 11 2 oz)   03/17/21 67 7 kg (149 lb 4 8 oz)   02/24/21 67 1 kg (148 lb)   -with chronic bilateral lower extremity edema  -appears euvolemic on exam  -most recent BMP stable  -continue daily weights and record home  -continue Lasix  -continue potassium supplement  -follow-up with cardiology    Severe aortic stenosis  -s/p TAVR  -follow-up with cardiology    Stage 3a chronic kidney disease ([place] [address] )  Lab Results   Component Value Date    EGFR 68 04/14/2021    EGFR 78 04/13/2021    EGFR 79 04/12/2021    CREATININE 0 99 04/14/2021    CREATININE 0 84 04/13/2021    CREATININE 0 81 04/12/2021   -most recent creatinine 0 74  -avoid hypotension  -ensure adequate hydration    Benign prostatic hyperplasia  -with obstructive uropathy and chronic indwelling urinary catheter required  -continue indwelling urinary catheter care at home as instructed  -continue tamsulosin  -follow-up with urology    Severe protein-calorie malnutrition ([facility] )  -in setting of acute and chronic illness, with muscle wasting and subcutaneous fat loss  -continue nutritional supplements at home  -monitor weights    Normocytic anemia  -recommend outpatient iron studies  -most recent hemoglobin 10 8/hematocrit 32 1 on 04/26/2021  Mild leukocytosis noted on most recent CBC inpatient in setting of steroid use  Most recent WBC 9 3 on 04/26/2021    Generalized osteoarthritis of multiple sites  -stable without complaints of pain  Patient also currently on prednisone which also may be helping with pain  -continue scheduled acetaminophen    Hypothyroidism  -recent outpatient TSH mildly elevated with normal free T4  -continue Synthroid  -follow-up with PCP for management    Debility  -multifactorial  -continue PT/OT at home with [facility]  -continue fall precautions    Acute respiratory failure with hypoxia  -resolved    I have spent >30 minutes with patient today in which greater than 50% of this time was spent in counseling/coordination of care regarding Intructions for management, Patient and family education and Importance of tx compliance  PCP made aware of discharge summary via epic communications  **Please note: This discharge summary was constructed using a voice recognition system  [address] [place] [place]   1/16/818375:83 AM

## 2021-04-30 NOTE — ASSESSMENT & PLAN NOTE
-in setting of acute and chronic illness, with muscle wasting and subcutaneous fat loss  -continue nutritional supplements at home  -monitor weights

## 2021-04-30 NOTE — ASSESSMENT & PLAN NOTE
-with history of tobacco smoking in occupational exposures  -stable without exacerbation  -continue Spiriva  -continue albuterol inhaler p r n  for shortness of breath or wheezing  -follow-up with pulmonology as scheduled

## 2021-04-30 NOTE — ASSESSMENT & PLAN NOTE
-stable without complaints of pain    Patient also currently on prednisone which also may be helping with pain  -continue scheduled acetaminophen

## 2021-04-30 NOTE — ASSESSMENT & PLAN NOTE
Wt Readings from Last 3 Encounters:   04/18/21 60 2 kg (132 lb 11 2 oz)   03/17/21 67 7 kg (149 lb 4 8 oz)   02/24/21 67 1 kg (148 lb)   -with chronic bilateral lower extremity edema  -appears euvolemic on exam  -most recent BMP stable  -continue daily weights and record home  -continue Lasix  -continue potassium supplement  -follow-up with cardiology

## 2021-04-30 NOTE — ASSESSMENT & PLAN NOTE
-with obstructive uropathy and chronic indwelling urinary catheter required  -continue indwelling urinary catheter care at home as instructed  -continue tamsulosin  -follow-up with urology

## 2021-04-30 NOTE — ASSESSMENT & PLAN NOTE
Lab Results   Component Value Date    EGFR 68 04/14/2021    EGFR 78 04/13/2021    EGFR 79 04/12/2021    CREATININE 0 99 04/14/2021    CREATININE 0 84 04/13/2021    CREATININE 0 81 04/12/2021   -most recent creatinine 0 74  -avoid hypotension  -ensure adequate hydration

## 2021-04-30 NOTE — ASSESSMENT & PLAN NOTE
-with recent hospitalization  -bronchial studies negative for malignancy  -respiratory status stable on room air  -currently on prednisone taper with last dose on May 14, 2021  -continue Mucinex q 12 hours  -follow-up with pulmonology as scheduled on May 4, 2021

## 2021-04-30 NOTE — PROGRESS NOTES
[facility]  [address]  [phone]  DISCHARGE SUMMARY  POS: 31 (LFD)   Facility:  [facility]    NAME: Joshua Escobedo  AGE: 80 y o  SEX: male  DATE OF ADMISSION:  04/18/2021 DATE OF DISCHARGE:  05/01/2021 DISCHARGE DISPOSITION:  Home    Reason for admission: Patient was admitted from [facility] for rehabilitation after hospitalization for acute respiratory failure with hypoxia, suspected hypersensitivity pneumonitis with underlying emphysema     Admission Diagnoses:  Acute respiratory failure with hypoxia, acute on chronic diastolic congestive heart failure, hypersensitivity pneumonitis with hemoptysis, debility  Additional Problems:   Past Medical History:   Diagnosis Date    Anemia     Bladder cancer ([facility])     Cancer ([facility])     bladder    Carotid artery occlusion     Cataract     Colon polyp     COPD (chronic obstructive pulmonary disease) (HCC)     Coronary artery disease     Disease of thyroid gland     History of transfusion     Hyperlipidemia     Hypertension     Hypothyroidism 9/15/2017    Multiple pulmonary nodules     Peptic ulcer     Scoliosis     SIRS (systemic inflammatory response syndrome) ([facility]) 12/19/2019    Transient cerebral ischemia     Tremors of nervous system      Discharge Diagnoses: See problem list follow up recommendations below  Course of stay: Patient was admitted to [facility] for rehabilitation following hospitalization for above mentioned  During the resident's stay at [facility], he received skilled nursing care, OT, PT, dietitian support, social service support, and medical management for an overall improvement in his functional status  He is scheduled to be discharged home on 05/01/2021  Patient ambulating approximately 54 ft with roller walker and CGA and able to ascend/descend a few stairs  A referral was placed to [facility] by social service      Labs and testing performed during stay:  CBC with diff, BMP stable  Routine screening for COVID 19 study negative on 04/22/2021    Discharge Medications: See discharge medication list which was reviewed   Status at time of discharge exam: Stable    Today's Visit: 4/30/202110:56 AM    Subjective:  No complaints or concerns    Review of systems:   Constitutional:  Negative  HEENT:  Negative   Eyes:  Negative  Respiratory: Positive for occasional nonproductive cough  Dyspneic on moderate exertion  Cardiovascular:  Negative for chest pain or palpitations  Gastrointestinal:  Negative for constipation or abdominal pain  Endocrine:  Negative  Genitourinary:  Indwelling urinary catheter intact  Musculoskeletal:  Positive for gait problem  Neurological:  Negative for dizziness, syncope, speech difficulty, lightheadedness, numbness, and headaches  Psychiatric/behavioral:  Negative for agitation, behavioral problems, dysphoric mood, and hallucinations  The patient is not nervous/anxious  Vitals:  Weight:  126 8 lb   BP: 112/69   T:  97 3  HR:  62   R: 16  O2 sat: 93% on room air    Exam: Physical Exam  Vitals signs and nursing note reviewed  Constitutional:       General: He is not in acute distress  Appearance: He is not toxic-appearing or diaphoretic  Comments: Elderly male who appears with chronic illness  HENT:      Head: Normocephalic  Nose: No congestion or rhinorrhea  Mouth/Throat:      Mouth: Mucous membranes are moist       Pharynx: No oropharyngeal exudate  Eyes:      General: No scleral icterus  Right eye: No discharge  Left eye: No discharge  Extraocular Movements: Extraocular movements intact  Conjunctiva/sclera: Conjunctivae normal       Pupils: Pupils are equal, round, and reactive to light  Neck:      Musculoskeletal: Neck supple  No neck rigidity  Cardiovascular:      Rate and Rhythm: Normal rate  Rhythm irregular  Pulses: Normal pulses        Comments: Chronic edema bilateral lower extremities  Pulmonary:      Effort: Pulmonary effort is normal  No respiratory distress  Breath sounds: Normal breath sounds  No wheezing, rhonchi or rales  Abdominal:      General: Bowel sounds are normal  There is no distension  Palpations: Abdomen is soft  Tenderness: There is no abdominal tenderness  There is no guarding  Genitourinary:     Comments: Indwelling urinary catheter intact and patent for yellow urine  Patient wears a leg bag during the day  Musculoskeletal:      Right lower leg: Edema present  Left lower leg: Edema present  Comments: Moves all 4 extremities  Compression stockings in place bilateral lower extremities  Lymphadenopathy:      Cervical: No cervical adenopathy  Skin:     General: Skin is warm and dry  Capillary Refill: Capillary refill takes less than 2 seconds  Neurological:      Mental Status: He is alert  Mental status is at baseline  Cranial Nerves: No cranial nerve deficit  Motor: Weakness present  Gait: Gait abnormal    Psychiatric:         Mood and Affect: Mood normal          Behavior: Behavior normal          Thought Content: Thought content normal        Discussion with patient/family and further instructions:  -Fall precautions  -Aspiration precautions  -Bleeding precautions  -Monitor for signs/symptoms of infection  -Medication list was reviewed     Follow-up Recommendations: Please follow-up with your primary care physician within 7-10 days of discharge to review medication changes and current status       Problem List Follow-up Recommendations:  51-year-old male with:    Pulmonary emphysema ([facility] )  -with history of tobacco smoking in occupational exposures  -stable without exacerbation  -continue Spiriva  -continue albuterol inhaler p r n  for shortness of breath or wheezing  -follow-up with pulmonology as scheduled    Hypersensitivity pneumonitis ([facility] )  -with recent hospitalization  -s/p bronchoscopy  -bronchial studies negative for malignancy  -respiratory status stable on room air  -currently on prednisone taper with last dose on May 14, 2021  -continue Mucinex q 12 hours  -follow-up with pulmonology as scheduled on May 4, 2021    PAF (paroxysmal atrial fibrillation) (HCC)  -heart rate trending 70s to 80s  -continue aspirin  -currently not on an anticoagulant  -follow-up with cardiology as scheduled    Chronic diastolic congestive heart failure (HCC)  Wt Readings from Last 3 Encounters:   04/18/21 60 2 kg (132 lb 11 2 oz)   03/17/21 67 7 kg (149 lb 4 8 oz)   02/24/21 67 1 kg (148 lb)   -with chronic bilateral lower extremity edema  -appears euvolemic on exam  -most recent BMP stable  -continue daily weights and record home  -continue Lasix  -continue potassium supplement  -follow-up with cardiology    Severe aortic stenosis  -s/p TAVR  -follow-up with cardiology    Stage 3a chronic kidney disease ([facility] )  Lab Results   Component Value Date    EGFR 68 04/14/2021    EGFR 78 04/13/2021    EGFR 79 04/12/2021    CREATININE 0 99 04/14/2021    CREATININE 0 84 04/13/2021    CREATININE 0 81 04/12/2021   -most recent creatinine 0 74  -avoid hypotension  -ensure adequate hydration    Benign prostatic hyperplasia  -with obstructive uropathy and chronic indwelling urinary catheter required  -continue indwelling urinary catheter care at home as instructed  -continue tamsulosin  -follow-up with urology    Severe protein-calorie malnutrition ([facility] )  -in setting of acute and chronic illness, with muscle wasting and subcutaneous fat loss  -continue nutritional supplements at home  -monitor weights    Normocytic anemia  -recommend outpatient iron studies  -most recent hemoglobin 10 8/hematocrit 32 1 on 04/26/2021  Mild leukocytosis noted on most recent CBC inpatient in setting of steroid use  Most recent WBC 9 3 on 04/26/2021    Generalized osteoarthritis of multiple sites  -stable without complaints of pain  Patient also currently on prednisone which also may be helping with pain  -continue scheduled acetaminophen    Hypothyroidism  -recent outpatient TSH mildly elevated with normal free T4  -continue Synthroid  -follow-up with PCP for management    Debility  -multifactorial  -continue PT/OT at home with [facility]  -continue fall precautions    Acute respiratory failure with hypoxia  -resolved    I have spent >30 minutes with patient today in which greater than 50% of this time was spent in counseling/coordination of care regarding Intructions for management, Patient and family education and Importance of tx compliance  PCP made aware of discharge summary via epic communications  **Please note: This discharge summary was constructed using a voice recognition system  [address] [place] [facility]  3/37/995039:14 AM

## 2021-05-02 ENCOUNTER — TELEPHONE (OUTPATIENT)
Dept: OTHER | Facility: OTHER | Age: 86
End: 2021-05-02

## 2021-05-02 NOTE — TELEPHONE ENCOUNTER
[facility] to advise that the pt was admitted to [address] [place] [address] as well as PT and OT   No call back necessary

## 2021-05-03 ENCOUNTER — TRANSITIONAL CARE MANAGEMENT (OUTPATIENT)
Dept: FAMILY MEDICINE CLINIC | Facility: CLINIC | Age: 86
End: 2021-05-03

## 2021-05-03 ENCOUNTER — PATIENT OUTREACH (OUTPATIENT)
Dept: FAMILY MEDICINE CLINIC | Facility: CLINIC | Age: 86
End: 2021-05-03

## 2021-05-03 DIAGNOSIS — Z71.89 COMPLEX CARE COORDINATION: Primary | ICD-10-CM

## 2021-05-03 NOTE — PROGRESS NOTES
Spoke with Javier Rollins  He is doing good  [facility] visiting nurses are working with him  PCP appointment on 5/6/21  He will also have OT and PT  He and his wife did not have any questions or concerns today  I will call back after PCP appointment

## 2021-05-04 ENCOUNTER — TELEPHONE (OUTPATIENT)
Dept: CARDIOLOGY CLINIC | Facility: CLINIC | Age: 86
End: 2021-05-04

## 2021-05-04 ENCOUNTER — OFFICE VISIT (OUTPATIENT)
Dept: PULMONOLOGY | Facility: CLINIC | Age: 86
End: 2021-05-04
Payer: MEDICARE

## 2021-05-04 VITALS
TEMPERATURE: 96.9 F | RESPIRATION RATE: 18 BRPM | WEIGHT: 127 LBS | HEIGHT: 66 IN | BODY MASS INDEX: 20.41 KG/M2 | SYSTOLIC BLOOD PRESSURE: 110 MMHG | DIASTOLIC BLOOD PRESSURE: 60 MMHG | OXYGEN SATURATION: 100 %

## 2021-05-04 DIAGNOSIS — N13.8 BPH WITH OBSTRUCTION/LOWER URINARY TRACT SYMPTOMS: Primary | ICD-10-CM

## 2021-05-04 DIAGNOSIS — J43.9 PULMONARY EMPHYSEMA, UNSPECIFIED EMPHYSEMA TYPE (HCC): Primary | ICD-10-CM

## 2021-05-04 DIAGNOSIS — J67.9 HYPERSENSITIVITY PNEUMONITIS (HCC): ICD-10-CM

## 2021-05-04 DIAGNOSIS — N40.1 BPH WITH OBSTRUCTION/LOWER URINARY TRACT SYMPTOMS: Primary | ICD-10-CM

## 2021-05-04 DIAGNOSIS — R06.00 DOE (DYSPNEA ON EXERTION): ICD-10-CM

## 2021-05-04 PROCEDURE — 99215 OFFICE O/P EST HI 40 MIN: CPT | Performed by: INTERNAL MEDICINE

## 2021-05-04 NOTE — TELEPHONE ENCOUNTER
LM for [facility] that last document partha was in January 16 f jeanie  The only medication we prescribe is the Flomax   Other medication would be by PCP

## 2021-05-04 NOTE — TELEPHONE ENCOUNTER
Thornell Spray from A calling to see what size cath Patient uses-next change due 5/18  Also questioning meds that should be taken  Discharge papers are not clear    [phone]

## 2021-05-04 NOTE — PROGRESS NOTES
Office Progress Note - Pulmonary    Jovanny Noe 80 y o  male MRN: [id number]    Encounter: [id number]      Assessment:   Presumed hypersensitivity pneumonitis   Emphysema   Dyspnea on exertion  Plan:     Continue the prednisone taper   Chest x-ray PA and lateral the end of next week   Spiriva Respimat 2 inhalations once a day   Follow-up in 4 weeks with Dr Susan Spann  Discussion:   The patient has presumed hypersensitivity pneumonitis  It is improving clinically  He is off oxygen  He still has dyspnea on exertion which is multifactorial   It is due to his emphysema as well as deconditioning  I have continued him on the prednisone taper  I have ordered a chest x-ray PA and lateral to ensure resolution of the bilateral airspace disease  I have maintained him on Spiriva once a day  I have advised him to try to take walks with help of his walker and exercise while sitting down to improve his stamina  He will see Dr Susan Spann in 4 weeks in a follow-up visit  Subjective: The patient is here for a follow-up visit  He was admitted to the hospital about 4 weeks ago  He had acute hypoxemic respiratory failure  He had bilateral airspace disease on the chest x-ray and CT of the chest   He had workup which included bronchoscopy and bronchioalveolar lavage  All his workup was unremarkable  He had [facility] allergy panel which was unremarkable except for IgE was elevated  The patient was treated with prednisone 40 mg with a taper by 10 mg every week  Currently he is on 20 mg once a day  He is off the oxygen  After discharge from the hospital he went to a skilled nursing facility for rehab for 1 week  He has been home for about 4 days  He has dyspnea on exertion  He has occasional cough  No fever or chills  He has no nocturnal symptoms  He is using the Spiriva Respimat 2 inhalations once a day  He does not need to use a rescue inhaler      Review of systems:  A 12 point system review is done and aside from what is stated above the rest of the review of systems is negative  Family history and social history are reviewed  Medications list is reviewed  Vitals: Blood pressure 110/60, temperature (!) 96 9 °F (36 1 °C), temperature source Tympanic, resp  rate 18, height 5' 6" (1 676 m), weight 57 6 kg (127 lb), SpO2 100 %  ,     Physical Exam  Gen: Awake, alert, oriented x 3, no acute distress  HEENT: Mucous membranes moist, no oral lesions, no thrush  NECK: No accessory muscle use, JVP not elevated  Cardiac: Regular, single S1, single S2, no murmurs, no rubs, no gallops  Lungs:  Diminished breath sounds  No wheezing or rhonchi  Abdomen: normoactive bowel sounds, soft nontender, nondistended, no rebound or rigidity, no guarding  Extremities: no cyanosis, no clubbing, no edema  Neuro:  Grossly nonfocal   Skin:  No rash  Chest x-rays reviewed on the [facility]  It showed bilateral airspace disease which was persistent on the chest x-ray done on April 15 when compared to the chest x-ray done on April 11th  Ref Range & Units 4/15/21 4:52 PM   A  ALTERNATA 0 00 - 0 09 kUA/I <0 10    A  FUMIGATUS 0 00 - 0 09 kUA/I <0 10    Bermuda Grass 0 00 - 0 09 kUA/I <0 10    Box Elder  0 00 - 0 09 kUA/I <0 10    Cat Epithellium-Dander 0 00 - 0 09 kUA/I <0 10    C  HERBARUM 0 00 - 0 09 kUA/I <0 10    Cockroach 0 00 - 0 09 kUA/I <0 10    Common Silver Birch 0 00 - 0 09 kUA/I <0 10    Kingman 0 00 - 0 09 kUA/I <0 10    D  farinae 0 00 - 0 09 kUA/I 0  17High     D  pteronyssinus 0 00 - 0 09 kUA/I 0  22High     Dog Dander 0 00 - 0 09 kUA/I <0 10    Elm IgE 0 00 - 0 09 kUA/I <0 10    Mountain Barren Tree 0 00 - 0 09 kUA/I <0 10    Mugwort 0 00 - 0 09 kUA/I <0 10    [place] Tree 0 00 - 0 09 kUA/I <0 10    Oak 0 00 - 0 09 kUA/I <0 10    P CHRYSOGENUM 0 00 - 0 09 kUA/I <0 10    Rough Pigweed  IgE 0 00 - 0 09 kUA/I <0 10    Common Ragweed 0 00 - 0 09 kUA/I 1  44High     Sheep Sorrel IgE 0 00 - 0 09 kUA/I <0 10 Laona Tree 0 00 - 0 09 kUA/I <0 10    Terrell Grass 0 00 - 0 09 kUA/I <0 10    Poland Tree 0 00 - 0 09 kUA/I <0 10    White Kavon Tree 0 00 - 0 09 kUA/I <0 10    IgE 0 - 113 kU/l 965High     Allergen Comment  See Below    MOUSE URINE 0 00 - 0 09 kUA/I <0 10

## 2021-05-04 NOTE — TELEPHONE ENCOUNTER
Received a call from Ryan Quintanilla [phone] stating pt discharged from [facility] on lasix 40mg daily, but pt previously 20mg prior to hospitalization, and asking which dose should pt remain on? After reviewing hospital d/c list, I recommended August Pals continues to follow the discharge medication list and f/u in office       LMOVM instructing the same  Vianney# [phone]

## 2021-05-05 RX ORDER — ALFUZOSIN HYDROCHLORIDE 10 MG/1
10 TABLET, EXTENDED RELEASE ORAL DAILY
Qty: 90 TABLET | Refills: 3 | Status: SHIPPED | OUTPATIENT
Start: 2021-05-05 | End: 2021-09-25 | Stop reason: HOSPADM

## 2021-05-05 NOTE — TELEPHONE ENCOUNTER
Derick Ac from [address] called to clarify that the patient is NOT to be taking Tamsulosin (FLOMAX)  She was shown a bottle that contained Alfuzosin ER 10mg; she stated this was previously prescribed by Stephanie Castle PA-C  Historical documentation supports same  Patient would like this medication refilled      Script for the requested medication was queued and forwarded to the Advanced Practitioner covering the [facility] for approval

## 2021-05-06 ENCOUNTER — OFFICE VISIT (OUTPATIENT)
Dept: FAMILY MEDICINE CLINIC | Facility: CLINIC | Age: 86
End: 2021-05-06
Payer: MEDICARE

## 2021-05-06 VITALS
OXYGEN SATURATION: 96 % | TEMPERATURE: 96.7 F | DIASTOLIC BLOOD PRESSURE: 64 MMHG | SYSTOLIC BLOOD PRESSURE: 120 MMHG | RESPIRATION RATE: 20 BRPM | HEART RATE: 88 BPM

## 2021-05-06 DIAGNOSIS — I71.4 ABDOMINAL AORTIC ANEURYSM (AAA) WITHOUT RUPTURE (HCC): ICD-10-CM

## 2021-05-06 DIAGNOSIS — J67.9 HYPERSENSITIVITY PNEUMONITIS (HCC): Primary | ICD-10-CM

## 2021-05-06 DIAGNOSIS — I48.0 PAF (PAROXYSMAL ATRIAL FIBRILLATION) (HCC): ICD-10-CM

## 2021-05-06 DIAGNOSIS — I72.3 ILIAC ARTERY ANEURYSM, BILATERAL (HCC): ICD-10-CM

## 2021-05-06 DIAGNOSIS — H61.23 BILATERAL IMPACTED CERUMEN: ICD-10-CM

## 2021-05-06 DIAGNOSIS — I25.118 ATHEROSCLEROTIC HEART DISEASE OF NATIVE CORONARY ARTERY WITH OTHER FORMS OF ANGINA PECTORIS (HCC): ICD-10-CM

## 2021-05-06 DIAGNOSIS — E03.9 ACQUIRED HYPOTHYROIDISM: ICD-10-CM

## 2021-05-06 DIAGNOSIS — R26.2 AMBULATORY DYSFUNCTION: ICD-10-CM

## 2021-05-06 DIAGNOSIS — C67.9 MALIGNANT NEOPLASM OF URINARY BLADDER, UNSPECIFIED SITE (HCC): ICD-10-CM

## 2021-05-06 DIAGNOSIS — I35.0 SEVERE AORTIC STENOSIS: ICD-10-CM

## 2021-05-06 DIAGNOSIS — N18.31 STAGE 3A CHRONIC KIDNEY DISEASE (HCC): ICD-10-CM

## 2021-05-06 DIAGNOSIS — D69.6 THROMBOCYTOPENIA (HCC): ICD-10-CM

## 2021-05-06 DIAGNOSIS — I10 ESSENTIAL HYPERTENSION: ICD-10-CM

## 2021-05-06 DIAGNOSIS — E43 SEVERE PROTEIN-CALORIE MALNUTRITION (HCC): ICD-10-CM

## 2021-05-06 DIAGNOSIS — J96.01 ACUTE RESPIRATORY FAILURE WITH HYPOXIA (HCC): ICD-10-CM

## 2021-05-06 DIAGNOSIS — I50.32 CHRONIC DIASTOLIC CONGESTIVE HEART FAILURE (HCC): ICD-10-CM

## 2021-05-06 DIAGNOSIS — D64.9 NORMOCYTIC ANEMIA: ICD-10-CM

## 2021-05-06 DIAGNOSIS — I73.9 PERIPHERAL ARTERIAL DISEASE (HCC): ICD-10-CM

## 2021-05-06 PROCEDURE — 69210 REMOVE IMPACTED EAR WAX UNI: CPT | Performed by: FAMILY MEDICINE

## 2021-05-06 PROCEDURE — 99496 TRANSJ CARE MGMT HIGH F2F 7D: CPT | Performed by: FAMILY MEDICINE
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
## 2021-05-07 ENCOUNTER — TELEPHONE (OUTPATIENT)
Dept: FAMILY MEDICINE CLINIC | Facility: CLINIC | Age: 86
End: 2021-05-07

## 2021-05-07 PROBLEM — R53.81 DEBILITY: Status: RESOLVED | Noted: 2021-02-08 | Resolved: 2021-05-07

## 2021-05-07 PROBLEM — D72.829 LEUKOCYTOSIS: Status: RESOLVED | Noted: 2021-04-21 | Resolved: 2021-05-07

## 2021-05-07 PROBLEM — A41.9 SEPSIS WITHOUT ACUTE ORGAN DYSFUNCTION (HCC): Status: RESOLVED | Noted: 2021-01-26 | Resolved: 2021-05-07

## 2021-05-07 NOTE — TELEPHONE ENCOUNTER
I spoke with Elisa Charles at [facility], phone # [phone], gave verbal and also  Faxed orders to [phone]  [facility]/ [facility] (Provider) [phone]

## 2021-05-07 NOTE — TELEPHONE ENCOUNTER
Keith from [address] called to let you know that OT is no longer needed for patient  Discharged from his care

## 2021-05-07 NOTE — TELEPHONE ENCOUNTER
----- Message from Smita Trivedi MD sent at 5/7/2021  8:51 AM EDT -----   I put in an order for blood work for the [facility] to draw sometime in the next 1-2 weeks

## 2021-05-10 ENCOUNTER — PATIENT OUTREACH (OUTPATIENT)
Dept: FAMILY MEDICINE CLINIC | Facility: CLINIC | Age: 86
End: 2021-05-10

## 2021-05-10 NOTE — PROGRESS NOTES
Spoke with patients wife  Home health nurse was out today  He continues with physical therapy  Getting back to his normal  No questions or concerns  She feels they are getting the help they need with the home care agency  No need for me to call back  Chart review- blood work to be done by home health in 1-2 weeks

## 2021-05-13 ENCOUNTER — TRANSCRIBE ORDERS (OUTPATIENT)
Dept: ADMINISTRATIVE | Age: 86
End: 2021-05-13

## 2021-05-13 ENCOUNTER — TELEMEDICINE (OUTPATIENT)
Dept: GERIATRICS | Age: 86
End: 2021-05-13
Payer: MEDICARE

## 2021-05-13 ENCOUNTER — APPOINTMENT (OUTPATIENT)
Dept: LAB | Age: 86
End: 2021-05-13
Payer: MEDICARE

## 2021-05-13 ENCOUNTER — TELEPHONE (OUTPATIENT)
Dept: FAMILY MEDICINE CLINIC | Facility: CLINIC | Age: 86
End: 2021-05-13

## 2021-05-13 DIAGNOSIS — N18.31 CHRONIC KIDNEY DISEASE (CKD) STAGE G3A/A1, MODERATELY DECREASED GLOMERULAR FILTRATION RATE (GFR) BETWEEN 45-59 ML/MIN/1.73 SQUARE METER AND ALBUMINURIA CREATININE RATIO LESS THAN 30 MG/G (HCC): ICD-10-CM

## 2021-05-13 DIAGNOSIS — I48.0 PAF (PAROXYSMAL ATRIAL FIBRILLATION) (HCC): ICD-10-CM

## 2021-05-13 DIAGNOSIS — E03.9 ACQUIRED HYPOTHYROIDISM: ICD-10-CM

## 2021-05-13 DIAGNOSIS — M15.9 GENERALIZED OSTEOARTHRITIS OF MULTIPLE SITES: ICD-10-CM

## 2021-05-13 DIAGNOSIS — J43.9 PULMONARY EMPHYSEMA, UNSPECIFIED EMPHYSEMA TYPE (HCC): ICD-10-CM

## 2021-05-13 DIAGNOSIS — N18.31 CHRONIC KIDNEY DISEASE (CKD) STAGE G3A/A1, MODERATELY DECREASED GLOMERULAR FILTRATION RATE (GFR) BETWEEN 45-59 ML/MIN/1.73 SQUARE METER AND ALBUMINURIA CREATININE RATIO LESS THAN 30 MG/G (HCC): Primary | ICD-10-CM

## 2021-05-13 DIAGNOSIS — N18.31 STAGE 3A CHRONIC KIDNEY DISEASE (HCC): ICD-10-CM

## 2021-05-13 DIAGNOSIS — E87.6 HYPOKALEMIA: ICD-10-CM

## 2021-05-13 DIAGNOSIS — I50.32 CHRONIC DIASTOLIC CONGESTIVE HEART FAILURE (HCC): ICD-10-CM

## 2021-05-13 DIAGNOSIS — R26.2 AMBULATORY DYSFUNCTION: Primary | ICD-10-CM

## 2021-05-13 DIAGNOSIS — I10 ESSENTIAL HYPERTENSION: ICD-10-CM

## 2021-05-13 DIAGNOSIS — D64.9 NORMOCYTIC ANEMIA: ICD-10-CM

## 2021-05-13 DIAGNOSIS — E87.6 HYPOKALEMIA: Primary | ICD-10-CM

## 2021-05-13 LAB
ANION GAP SERPL CALCULATED.3IONS-SCNC: 8 MMOL/L (ref 4–13)
BASOPHILS # BLD AUTO: 0.02 THOUSANDS/ΜL (ref 0–0.1)
BASOPHILS NFR BLD AUTO: 0 % (ref 0–1)
BUN SERPL-MCNC: 23 MG/DL (ref 5–25)
CALCIUM SERPL-MCNC: 8.7 MG/DL (ref 8.3–10.1)
CHLORIDE SERPL-SCNC: 101 MMOL/L (ref 100–108)
CO2 SERPL-SCNC: 29 MMOL/L (ref 21–32)
CREAT SERPL-MCNC: 0.79 MG/DL (ref 0.6–1.3)
EOSINOPHIL # BLD AUTO: 0.14 THOUSAND/ΜL (ref 0–0.61)
EOSINOPHIL NFR BLD AUTO: 2 % (ref 0–6)
ERYTHROCYTE [DISTWIDTH] IN BLOOD BY AUTOMATED COUNT: 16.8 % (ref 11.6–15.1)
GFR SERPL CREATININE-BSD FRML MDRD: 80 ML/MIN/1.73SQ M
GLUCOSE SERPL-MCNC: 77 MG/DL (ref 65–140)
HCT VFR BLD AUTO: 37.4 % (ref 36.5–49.3)
HGB BLD-MCNC: 11.7 G/DL (ref 12–17)
IMM GRANULOCYTES # BLD AUTO: 0.02 THOUSAND/UL (ref 0–0.2)
IMM GRANULOCYTES NFR BLD AUTO: 0 % (ref 0–2)
LYMPHOCYTES # BLD AUTO: 0.91 THOUSANDS/ΜL (ref 0.6–4.47)
LYMPHOCYTES NFR BLD AUTO: 12 % (ref 14–44)
MCH RBC QN AUTO: 31.8 PG (ref 26.8–34.3)
MCHC RBC AUTO-ENTMCNC: 31.3 G/DL (ref 31.4–37.4)
MCV RBC AUTO: 102 FL (ref 82–98)
MONOCYTES # BLD AUTO: 0.97 THOUSAND/ΜL (ref 0.17–1.22)
MONOCYTES NFR BLD AUTO: 13 % (ref 4–12)
NEUTROPHILS # BLD AUTO: 5.48 THOUSANDS/ΜL (ref 1.85–7.62)
NEUTS SEG NFR BLD AUTO: 73 % (ref 43–75)
NRBC BLD AUTO-RTO: 0 /100 WBCS
PLATELET # BLD AUTO: 125 THOUSANDS/UL (ref 149–390)
PMV BLD AUTO: 11.5 FL (ref 8.9–12.7)
POTASSIUM SERPL-SCNC: 3.4 MMOL/L (ref 3.5–5.3)
RBC # BLD AUTO: 3.68 MILLION/UL (ref 3.88–5.62)
SODIUM SERPL-SCNC: 138 MMOL/L (ref 136–145)
T4 FREE SERPL-MCNC: 1.39 NG/DL (ref 0.76–1.46)
TSH SERPL DL<=0.05 MIU/L-ACNC: 3.76 UIU/ML (ref 0.36–3.74)
WBC # BLD AUTO: 7.54 THOUSAND/UL (ref 4.31–10.16)

## 2021-05-13 PROCEDURE — 99350 HOME/RES VST EST HIGH MDM 60: CPT | Performed by: NURSE PRACTITIONER

## 2021-05-13 PROCEDURE — 84443 ASSAY THYROID STIM HORMONE: CPT

## 2021-05-13 PROCEDURE — 80048 BASIC METABOLIC PNL TOTAL CA: CPT

## 2021-05-13 PROCEDURE — 84439 ASSAY OF FREE THYROXINE: CPT

## 2021-05-13 PROCEDURE — 36415 COLL VENOUS BLD VENIPUNCTURE: CPT

## 2021-05-13 PROCEDURE — 85025 COMPLETE CBC W/AUTO DIFF WBC: CPT

## 2021-05-13 RX ORDER — LEVOTHYROXINE SODIUM 0.07 MG/1
TABLET ORAL
Qty: 90 TABLET | Refills: 3 | Status: CANCELLED
Start: 2021-05-13

## 2021-05-13 NOTE — TELEPHONE ENCOUNTER
Updated chart to reflect he is taking 1 1/2 tablets of levothyroxine on sat and sun  Can you sign order, I set it to no print, he does not need refill at this time

## 2021-05-13 NOTE — ASSESSMENT & PLAN NOTE
· Doing well at home  · PT in home to work on balance and strength  · Continue with exercises in home when PT on in  · Lives with wife who helps with ADL and IADL

## 2021-05-13 NOTE — PROGRESS NOTES
Virtual Regular Visit      Assessment/Plan:    Problem List Items Addressed This Visit        Respiratory    Pulmonary emphysema ([facility] )     · Continues to be at baseline  · Denies shortness of breath  · Continue with spiriva and prednisone  · F/u with pulmonary            Cardiovascular and Mediastinum    PAF (paroxysmal atrial fibrillation) (HCC)     · Rate controlled  · Continue with metoprolol  · 81 mg aspirin         Hypertension     · Controlled  · Continue with furosemide, metoprolol  · Continue to monitor BP  · F/u with PCP         Chronic diastolic congestive heart failure (HCC)     · Asymptomatic  · No edema  · Lives with wife who takes care of medications  · Continues with furosemide 40mg daily  · F/u with PCP and Cardiology PRN              Musculoskeletal and Integument    Generalized osteoarthritis of multiple sites     · Controlled at this time  · Pain controlled  · F/u with PCP            Genitourinary    Stage 3a chronic kidney disease ([facility] )     · Labs at baseline  · Continue to avoid nephrotoxins as much as possible  · Monitor BMP periodically for renal function            Other    Ambulatory dysfunction - Primary     · Doing well at home  · PT in home to work on balance and strength  · Continue with exercises in home when PT on in  · Lives with wife who helps with ADL and IADL                    Reason for visit is   Chief Complaint   Patient presents with    Virtual Regular Visit        Encounter provider MANI Lawrence    Provider located at [address] [place] [address] Road  [address] [place] [address] [address] [place] [address]  [phone]      Recent Visits  Date Type Provider Dept   05/13/21 Telephone Leticia Haywood  [facility]   05/13/21 Telemedicine Fermin Lawrence 17   Showing recent visits within past 7 days and meeting all other requirements     Future Appointments  No visits were found meeting these conditions     Showing future appointments within next 150 days and meeting all other requirements        The patient was identified by name and date of birth  Jared Isaac was informed that this is a telemedicine visit and that the visit is being conducted through [facility] and patient was informed that this is a secure, HIPAA-compliant platform  He agrees to proceed     My office door was closed  No one else was in the room  He acknowledged consent and understanding of privacy and security of the video platform  The patient has agreed to participate and understands they can discontinue the visit at any time  Patient is aware this is a billable service  Subjective  Jared Isaac is a 80 y o  male past history COPD, CAD, HTN, bladder CA presented to hospital for shortness of breath, cough and sputum  Pt was treated with IV diuretics and antibiotics for shortness of breath and the sputum  A bronchoscopy and CT of chest was done in the hospital   When pt completed antibiotics and medically stable he was discharged to home  Pt lives with his wife who is closely involved in his care  [facility] RN/PT/OT in home           Past Medical History:   Diagnosis Date    Anemia     [facility] )     [facility]     bladder    Carotid artery occlusion     Cataract     Colon polyp     COPD (chronic obstructive pulmonary disease) (HCC)     Coronary artery disease     Disease of thyroid gland     History of transfusion     Hyperlipidemia     Hypertension     Hypothyroidism 9/15/2017    Multiple pulmonary nodules     Peptic ulcer     Scoliosis     Sepsis without acute organ dysfunction ([facility] ) 1/26/2021    SIRS (systemic inflammatory response syndrome) ([facility] ) 12/19/2019    Transient cerebral ischemia     Tremors of nervous system        Past Surgical History:   Procedure Laterality Date   [facility] SURGERY      1973, 1987, 2005    BUNIONECTOMY Left     Simple exostectomy (silver procedure)    CAROTID ENDARTERECTOMY Right 09/10/2008    Randy LEDBETTER MD    CATARACT EXTRACTION      CATARACT EXTRACTION, BILATERAL      CERVICAL DISCECTOMY  1969    COLONOSCOPY      CORONARY ARTERY BYPASS GRAFT  10/20/2010    x3 (LIMA-LAD, SVG-OM, SVG-RCA)    CYSTOSCOPY      ELBOW SURGERY Right 07/2012    FEMORAL ARTERY - TIBIAL ARTERY BYPASS GRAFT  12/05/2011    using reversed saphenous vein from right leg  Jp Jackson MD    GA ECHO TRANSESOPHAG R-T 2D W/PRB IMG ACQUISJ I&R N/A 10/6/2020    Procedure: TRANSESOPHAGEAL ECHOCARDIOGRAM (DAVID); Surgeon: Luan Garvin DO;  Location: [facility];  Service: Cardiac Surgery    GA REPLACE AORTIC VALVE OPENFEMORAL ARTERY APPROACH N/A 10/6/2020    Procedure: REPLACEMENT AORTIC VALVE TRANSCATHETER (TAVR) TRANSFEMORAL W/ 26MM MONTALVO BREE S3 ULTRA VALVE(ACCESS ON LEFT);   Surgeon: Luan Garvin DO;  Location: [facility];  Service: Cardiac Surgery    REPLACEMENT TOTAL KNEE Left     SHOULDER SURGERY Right 1997       Current Outpatient Medications   Medication Sig Dispense Refill    acetaminophen (TYLENOL) 325 mg tablet Take 3 tablets (975 mg total) by mouth 3 (three) times a day      alfuzosin (UROXATRAL) 10 mg 24 hr tablet Take 1 tablet (10 mg total) by mouth daily 90 tablet 3    aspirin 81 MG tablet Take 81 mg by mouth daily       Cholecalciferol (HM VITAMIN D3) 2000 units CAPS Take 1 tablet by mouth daily      furosemide (LASIX) 40 mg tablet Take 1 tablet (40 mg total) by mouth daily      GUAIFENESIN ER PO Take 600 mg by mouth 2 (two) times a day      levothyroxine 75 mcg tablet TAKE 1 TABLET(75 MCG) BY MOUTH DAILY 90 tablet 3    metoprolol succinate (TOPROL-XL) 25 mg 24 hr tablet TAKE 1/2 TABLET every 12 hous 45 tablet 0    Multiple Vitamin (multivitamin) capsule Take 1 capsule by mouth daily      omeprazole (PriLOSEC) 20 mg delayed release capsule Take 20 mg by mouth daily      potassium chloride (K-DUR,KLOR-CON) 10 mEq tablet Take 1 tablet (10 mEq total) by mouth daily 90 tablet 1    predniSONE 20 mg tablet Take 2 tablets (40 mg total) by mouth daily for 5 days, THEN 1 5 tablets (30 mg total) daily for 7 days, THEN 1 tablet (20 mg total) daily for 7 days, THEN 0 5 tablets (10 mg total) daily for 7 days  0    simvastatin (ZOCOR) 20 mg tablet TAKE 1 TABLET(20 MG) BY MOUTH DAILY AT BEDTIME 90 tablet 1    SPIRIVA RESPIMAT 2 5 MCG/ACT AERS inhaler INHALE 2 PUFFS BY MOUTH DAILY 12 g 6     No current facility-administered medications for this visit  Allergies   Allergen Reactions    Aleve [Naproxen]      Other reaction(s): FACIAL SWELLING  Category: Allergy; Annotation - [id number]: lip/eye edema    Ancef [Cefazolin] Anaphylaxis     Hypotension, rash, itching    Augmentin [Amoxicillin-Pot Clavulanate] Diarrhea and Vomiting       Review of Systems   Constitutional: Negative for chills and fever  HENT: Negative for ear pain and sore throat  Eyes: Negative for pain and visual disturbance  Respiratory: Negative for cough, shortness of breath and wheezing  Cardiovascular: Negative for chest pain, palpitations and leg swelling  Gastrointestinal: Negative for abdominal pain and vomiting  Genitourinary: Negative for dysuria and hematuria  Musculoskeletal: Positive for gait problem  Negative for arthralgias and back pain  Skin: Negative for color change and rash  Neurological: Positive for weakness  Negative for seizures and syncope  All other systems reviewed and are negative  Video Exam    There were no vitals filed for this visit  Physical Exam  Vitals signs reviewed  Constitutional:       Appearance: He is well-developed  HENT:      Head: Normocephalic and atraumatic  Eyes:      Conjunctiva/sclera: Conjunctivae normal    Neck:      Musculoskeletal: Normal range of motion  Cardiovascular:      Rate and Rhythm: Normal rate and regular rhythm  Heart sounds: Normal heart sounds, S1 normal and S2 normal  No murmur     Pulmonary: Effort: Pulmonary effort is normal  No respiratory distress  Breath sounds: Normal breath sounds  No wheezing  Abdominal:      General: Bowel sounds are normal  There is no distension  Palpations: Abdomen is soft  Tenderness: There is no abdominal tenderness  Musculoskeletal: Normal range of motion  Comments: Generalized weakness   Skin:     General: Skin is warm and dry  Neurological:      Mental Status: He is alert  Psychiatric:         Attention and Perception: Attention normal          Mood and Affect: Mood normal          Speech: Speech normal          Behavior: Behavior normal          Thought Content: Thought content normal          Cognition and Memory: Cognition normal           Ambulation: Unassisted: yes   Walker: yes Wheelchair: no   Nonambulatory: no      Basic ADLs Independent:   Hygiene: no   Toileting: yes    Bathing: yes   Dressing: yes   Eating: yes   Transfers: yes     IADLs:   Shopping: no     Medications: no      Finances: no     Meals: no   Driving: no    Continence:   Stool: yes    Urine: yes    Advance Directives  Code status: Level 1: Full Code  Living Will on file: Keegan Cone spouse health care rep    VIRTUAL VISIT DISCLAIMER    Caridad Garland acknowledges that he has consented to an online visit or consultation  He understands that the online visit is based solely on information provided by him, and that, in the absence of a face-to-face physical evaluation by the physician, the diagnosis he receives is both limited and provisional in terms of accuracy and completeness  This is not intended to replace a full medical face-to-face evaluation by the physician  Caridad Garland understands and accepts these terms

## 2021-05-13 NOTE — TELEPHONE ENCOUNTER
Call re labs potassium low at 3 4-currently on KCl 10 mEq once a day  Increase  to 2 tablets daily  TSH slightly out of range although I would not change his dose of levothyroxine at this time  Make sure he is taking his levothyroxine on empty stomach waiting 30 minutes to eat drink or take with any other medications  Hemoglobin her red cell count improved from 10 1 to 11 7  Normal 12-17   Repeat  Potassium and CBC in 1 month order placed    Recent Results (from the past 336 hour(s))   TSH, 3rd generation with Free T4 reflex    Collection Time: 05/13/21 11:20 AM   Result Value Ref Range    TSH 3RD GENERATON 3 760 (H) 0 358 - 3 740 uIU/mL   Basic metabolic panel    Collection Time: 05/13/21 11:20 AM   Result Value Ref Range    Sodium 138 136 - 145 mmol/L    Potassium 3 4 (L) 3 5 - 5 3 mmol/L    Chloride 101 100 - 108 mmol/L    CO2 29 21 - 32 mmol/L    ANION GAP 8 4 - 13 mmol/L    BUN 23 5 - 25 mg/dL    Creatinine 0 79 0 60 - 1 30 mg/dL    Glucose 77 65 - 140 mg/dL    Calcium 8 7 8 3 - 10 1 mg/dL    eGFR 80 ml/min/1 73sq m   CBC and differential    Collection Time: 05/13/21 11:20 AM   Result Value Ref Range    WBC 7 54 4 31 - 10 16 Thousand/uL    RBC 3 68 (L) 3 88 - 5 62 Million/uL    Hemoglobin 11 7 (L) 12 0 - 17 0 g/dL    Hematocrit 37 4 36 5 - 49 3 %     (H) 82 - 98 fL    MCH 31 8 26 8 - 34 3 pg    MCHC 31 3 (L) 31 4 - 37 4 g/dL    RDW 16 8 (H) 11 6 - 15 1 %    MPV 11 5 8 9 - 12 7 fL    Platelets 622 (L) 193 - 390 Thousands/uL    nRBC 0 /100 WBCs    Neutrophils Relative 73 43 - 75 %    Immat GRANS % 0 0 - 2 %    Lymphocytes Relative 12 (L) 14 - 44 %    Monocytes Relative 13 (H) 4 - 12 %    Eosinophils Relative 2 0 - 6 %    Basophils Relative 0 0 - 1 %    Neutrophils Absolute 5 48 1 85 - 7 62 Thousands/µL    Immature Grans Absolute 0 02 0 00 - 0 20 Thousand/uL    Lymphocytes Absolute 0 91 0 60 - 4 47 Thousands/µL    Monocytes Absolute 0 97 0 17 - 1 22 Thousand/µL    Eosinophils Absolute 0 14 0 00 - 0 61 Thousand/µL    Basophils Absolute 0 02 0 00 - 0 10 Thousands/µL   T4, free    Collection Time: 05/13/21 11:20 AM   Result Value Ref Range    Free T4 1 39 0 76 - 1 46 ng/dL       Hemoglobin   Date Value Ref Range Status   05/13/2021 11 7 (L) 12 0 - 17 0 g/dL Final   04/14/2021 10 4 (L) 12 0 - 17 0 g/dL Final   04/13/2021 10 1 (L) 12 0 - 17 0 g/dL Final   09/15/2015 14 6 12 0 - 17 0 g/dL Final   06/25/2015 14 4 12 0 - 17 0 g/dL Final   05/10/2015 12 9 12 0 - 17 0 g/dL Final           Current Outpatient Medications:     acetaminophen (TYLENOL) 325 mg tablet, Take 3 tablets (975 mg total) by mouth 3 (three) times a day, Disp: , Rfl:     alfuzosin (UROXATRAL) 10 mg 24 hr tablet, Take 1 tablet (10 mg total) by mouth daily, Disp: 90 tablet, Rfl: 3    aspirin 81 MG tablet, Take 81 mg by mouth daily , Disp: , Rfl:     Cholecalciferol (HM VITAMIN D3) 2000 units CAPS, Take 1 tablet by mouth daily, Disp: , Rfl:     furosemide (LASIX) 40 mg tablet, Take 1 tablet (40 mg total) by mouth daily, Disp: , Rfl:     GUAIFENESIN ER PO, Take 600 mg by mouth 2 (two) times a day, Disp: , Rfl:     levothyroxine 75 mcg tablet, TAKE 1 TABLET(75 MCG) BY MOUTH DAILY, Disp: 90 tablet, Rfl: 3    metoprolol succinate (TOPROL-XL) 25 mg 24 hr tablet, TAKE 1/2 TABLET every 12 hous, Disp: 45 tablet, Rfl: 0    Multiple Vitamin (multivitamin) capsule, Take 1 capsule by mouth daily, Disp: , Rfl:     omeprazole (PriLOSEC) 20 mg delayed release capsule, Take 20 mg by mouth daily, Disp: , Rfl:     potassium chloride (K-DUR,KLOR-CON) 10 mEq tablet, Take 1 tablet (10 mEq total) by mouth daily, Disp: 90 tablet, Rfl: 1    predniSONE 20 mg tablet, Take 2 tablets (40 mg total) by mouth daily for 5 days, THEN 1 5 tablets (30 mg total) daily for 7 days, THEN 1 tablet (20 mg total) daily for 7 days, THEN 0 5 tablets (10 mg total) daily for 7 days  , Disp: , Rfl: 0    simvastatin (ZOCOR) 20 mg tablet, TAKE 1 TABLET(20 MG) BY MOUTH DAILY AT BEDTIME, Disp: 90 tablet, Rfl: 1    SPIRIVA RESPIMAT 2 5 MCG/ACT AERS inhaler, INHALE 2 PUFFS BY MOUTH DAILY, Disp: 12 g, Rfl: 6

## 2021-05-13 NOTE — ASSESSMENT & PLAN NOTE
· Continues to be at baseline  · Denies shortness of breath  · Continue with spiriva and prednisone  · F/u with pulmonary

## 2021-05-13 NOTE — ASSESSMENT & PLAN NOTE
· Labs at baseline  · Continue to avoid nephrotoxins as much as possible  · Monitor BMP periodically for renal function

## 2021-05-14 ENCOUNTER — APPOINTMENT (OUTPATIENT)
Dept: RADIOLOGY | Age: 86
End: 2021-05-14
Payer: MEDICARE

## 2021-05-14 DIAGNOSIS — E03.9 ACQUIRED HYPOTHYROIDISM: ICD-10-CM

## 2021-05-14 DIAGNOSIS — J67.9 HYPERSENSITIVITY PNEUMONITIS (HCC): ICD-10-CM

## 2021-05-14 PROCEDURE — 71046 X-RAY EXAM CHEST 2 VIEWS: CPT

## 2021-05-14 RX ORDER — LEVOTHYROXINE SODIUM 0.07 MG/1
TABLET ORAL
Qty: 90 TABLET | Refills: 3
Start: 2021-05-14 | End: 2021-09-25 | Stop reason: HOSPADM

## 2021-05-14 NOTE — ASSESSMENT & PLAN NOTE
· Asymptomatic  · No edema  · Lives with wife who takes care of medications  · Continues with furosemide 40mg daily  · F/u with PCP and Cardiology PRN

## 2021-05-16 LAB
FUNGUS SPEC CULT: NORMAL
FUNGUS SPEC CULT: NORMAL

## 2021-05-26 ENCOUNTER — TELEPHONE (OUTPATIENT)
Dept: CARDIOLOGY CLINIC | Facility: CLINIC | Age: 86
End: 2021-05-26

## 2021-05-26 ENCOUNTER — TELEPHONE (OUTPATIENT)
Dept: FAMILY MEDICINE CLINIC | Facility: CLINIC | Age: 86
End: 2021-05-26

## 2021-05-26 NOTE — TELEPHONE ENCOUNTER
VNA reports patient had a fall at home yesterday  He landed on his buttocks & right elbow  He did not hit his head  Reports patient is not complaining of any pain  BP is 92/52   VNA left message for patient's cardiologist  [facility]

## 2021-05-26 NOTE — TELEPHONE ENCOUNTER
GINETTEI: Spoke to Nam Weesk, [address] nurse, she stated that patient is able to walk at base  Line level and move right arm  Has some discomfort in right elbow, area is ecchymotic  She informed patient that if pain should worsen to call PCP

## 2021-05-26 NOTE — TELEPHONE ENCOUNTER
S/w Mrs Celso Carrington advised of recommendations verbally understood  Med list will reflect change     Transferred patient to scheduling to schedule o/v

## 2021-05-26 NOTE — TELEPHONE ENCOUNTER
Rakan JAMES left a message on nurse line  VNA visits weekly  Weight today 131 8 lbs  Weight 5/20 during last visit 130 4 lbs    Denies any worsening SOB or LE edema  No lightheadedness or dizziness    Some NP right foot edema R>L  LLL lung sounds decreased     BP 92/52, repeat 94/52  HR 70-75    Currently taking Lasix 40 mg daily, Potassium 10 meq daily and Toprol XL 12 5 mg BID  Has not taken Lasix today due to hypotension  Asking about dosing due to low bp today? I called Rakan CHAMORROA, BP's prior to this visit have been 102/60, 104/56, 110/52  Today is more of an isolated event  Patient has not been seen since d/c from [facility]  I will have scheduling schedule him a visit

## 2021-06-01 DIAGNOSIS — I10 HTN (HYPERTENSION): ICD-10-CM

## 2021-06-01 RX ORDER — FUROSEMIDE 20 MG/1
20 TABLET ORAL DAILY
Qty: 90 TABLET | Refills: 2 | Status: SHIPPED | OUTPATIENT
Start: 2021-06-01 | End: 2021-09-25 | Stop reason: HOSPADM

## 2021-06-02 ENCOUNTER — HOSPITAL ENCOUNTER (INPATIENT)
Facility: HOSPITAL | Age: 86
LOS: 4 days | Discharge: HOME HEALTH | DRG: 196 | End: 2021-06-06
Attending: EMERGENCY MEDICINE | Admitting: INTERNAL MEDICINE
Payer: MEDICARE

## 2021-06-02 ENCOUNTER — APPOINTMENT (EMERGENCY)
Dept: RADIOLOGY | Facility: HOSPITAL | Age: 86
DRG: 196 | End: 2021-06-02
Payer: MEDICARE

## 2021-06-02 ENCOUNTER — TELEPHONE (OUTPATIENT)
Dept: FAMILY MEDICINE CLINIC | Facility: CLINIC | Age: 86
End: 2021-06-02

## 2021-06-02 DIAGNOSIS — R06.02 SHORTNESS OF BREATH: ICD-10-CM

## 2021-06-02 DIAGNOSIS — I50.9 ACUTE EXACERBATION OF CHF (CONGESTIVE HEART FAILURE) (HCC): Primary | ICD-10-CM

## 2021-06-02 DIAGNOSIS — J96.01 ACUTE RESPIRATORY FAILURE WITH HYPOXIA (HCC): ICD-10-CM

## 2021-06-02 DIAGNOSIS — R06.00 DYSPNEA ON EXERTION: ICD-10-CM

## 2021-06-02 DIAGNOSIS — J67.9 HYPERSENSITIVITY PNEUMONITIS (HCC): ICD-10-CM

## 2021-06-02 DIAGNOSIS — R09.02 HYPOXIA: ICD-10-CM

## 2021-06-02 LAB
AMORPH URATE CRY URNS QL MICRO: ABNORMAL /HPF
ANION GAP SERPL CALCULATED.3IONS-SCNC: 8 MMOL/L (ref 4–13)
ATRIAL RATE: 416 BPM
BACTERIA UR QL AUTO: ABNORMAL /HPF
BASOPHILS # BLD AUTO: 0.02 THOUSANDS/ΜL (ref 0–0.1)
BASOPHILS NFR BLD AUTO: 0 % (ref 0–1)
BILIRUB UR QL STRIP: NEGATIVE
BUN SERPL-MCNC: 8 MG/DL (ref 5–25)
CALCIUM SERPL-MCNC: 8.4 MG/DL (ref 8.3–10.1)
CAOX CRY URNS QL MICRO: ABNORMAL /HPF
CHLORIDE SERPL-SCNC: 101 MMOL/L (ref 100–108)
CLARITY UR: ABNORMAL
CO2 SERPL-SCNC: 23 MMOL/L (ref 21–32)
COARSE GRAN CASTS URNS QL MICRO: ABNORMAL /LPF
COLOR UR: YELLOW
CREAT SERPL-MCNC: 0.71 MG/DL (ref 0.6–1.3)
EOSINOPHIL # BLD AUTO: 0.05 THOUSAND/ΜL (ref 0–0.61)
EOSINOPHIL NFR BLD AUTO: 1 % (ref 0–6)
ERYTHROCYTE [DISTWIDTH] IN BLOOD BY AUTOMATED COUNT: 14.9 % (ref 11.6–15.1)
GFR SERPL CREATININE-BSD FRML MDRD: 84 ML/MIN/1.73SQ M
GLUCOSE SERPL-MCNC: 93 MG/DL (ref 65–140)
GLUCOSE UR STRIP-MCNC: NEGATIVE MG/DL
HCT VFR BLD AUTO: 32.1 % (ref 36.5–49.3)
HGB BLD-MCNC: 10.7 G/DL (ref 12–17)
HGB UR QL STRIP.AUTO: ABNORMAL
IMM GRANULOCYTES # BLD AUTO: 0.03 THOUSAND/UL (ref 0–0.2)
IMM GRANULOCYTES NFR BLD AUTO: 1 % (ref 0–2)
KETONES UR STRIP-MCNC: NEGATIVE MG/DL
LEUKOCYTE ESTERASE UR QL STRIP: ABNORMAL
LYMPHOCYTES # BLD AUTO: 0.59 THOUSANDS/ΜL (ref 0.6–4.47)
LYMPHOCYTES NFR BLD AUTO: 10 % (ref 14–44)
MCH RBC QN AUTO: 32.9 PG (ref 26.8–34.3)
MCHC RBC AUTO-ENTMCNC: 33.3 G/DL (ref 31.4–37.4)
MCV RBC AUTO: 99 FL (ref 82–98)
MONOCYTES # BLD AUTO: 1.29 THOUSAND/ΜL (ref 0.17–1.22)
MONOCYTES NFR BLD AUTO: 22 % (ref 4–12)
NEUTROPHILS # BLD AUTO: 3.77 THOUSANDS/ΜL (ref 1.85–7.62)
NEUTS SEG NFR BLD AUTO: 66 % (ref 43–75)
NITRITE UR QL STRIP: NEGATIVE
NON-SQ EPI CELLS URNS QL MICRO: ABNORMAL /HPF
NRBC BLD AUTO-RTO: 0 /100 WBCS
NT-PROBNP SERPL-MCNC: 1202 PG/ML
PH UR STRIP.AUTO: 7.5 [PH]
PLATELET # BLD AUTO: 188 THOUSANDS/UL (ref 149–390)
PMV BLD AUTO: 10.6 FL (ref 8.9–12.7)
POTASSIUM SERPL-SCNC: 3.9 MMOL/L (ref 3.5–5.3)
PROT UR STRIP-MCNC: NEGATIVE MG/DL
QRS AXIS: 70 DEGREES
QRSD INTERVAL: 154 MS
QT INTERVAL: 436 MS
QTC INTERVAL: 483 MS
RBC # BLD AUTO: 3.25 MILLION/UL (ref 3.88–5.62)
RBC #/AREA URNS AUTO: ABNORMAL /HPF
SARS-COV-2 RNA RESP QL NAA+PROBE: NEGATIVE
SODIUM SERPL-SCNC: 132 MMOL/L (ref 136–145)
SP GR UR STRIP.AUTO: 1.01 (ref 1–1.03)
T WAVE AXIS: 60 DEGREES
TROPONIN I SERPL-MCNC: <0.02 NG/ML
UROBILINOGEN UR QL STRIP.AUTO: 1 E.U./DL
VENTRICULAR RATE: 74 BPM
WBC # BLD AUTO: 5.75 THOUSAND/UL (ref 4.31–10.16)
WBC #/AREA URNS AUTO: ABNORMAL /HPF

## 2021-06-02 PROCEDURE — U0005 INFEC AGEN DETEC AMPLI PROBE: HCPCS | Performed by: EMERGENCY MEDICINE

## 2021-06-02 PROCEDURE — 71046 X-RAY EXAM CHEST 2 VIEWS: CPT

## 2021-06-02 PROCEDURE — 93010 ELECTROCARDIOGRAM REPORT: CPT | Performed by: INTERNAL MEDICINE

## 2021-06-02 PROCEDURE — 71250 CT THORAX DX C-: CPT

## 2021-06-02 PROCEDURE — 99285 EMERGENCY DEPT VISIT HI MDM: CPT | Performed by: EMERGENCY MEDICINE

## 2021-06-02 PROCEDURE — 87077 CULTURE AEROBIC IDENTIFY: CPT

## 2021-06-02 PROCEDURE — 84484 ASSAY OF TROPONIN QUANT: CPT | Performed by: EMERGENCY MEDICINE

## 2021-06-02 PROCEDURE — 85025 COMPLETE CBC W/AUTO DIFF WBC: CPT | Performed by: EMERGENCY MEDICINE

## 2021-06-02 PROCEDURE — 87186 SC STD MICRODIL/AGAR DIL: CPT

## 2021-06-02 PROCEDURE — 36415 COLL VENOUS BLD VENIPUNCTURE: CPT | Performed by: EMERGENCY MEDICINE

## 2021-06-02 PROCEDURE — 99285 EMERGENCY DEPT VISIT HI MDM: CPT

## 2021-06-02 PROCEDURE — 80048 BASIC METABOLIC PNL TOTAL CA: CPT | Performed by: EMERGENCY MEDICINE

## 2021-06-02 PROCEDURE — 81001 URINALYSIS AUTO W/SCOPE: CPT

## 2021-06-02 PROCEDURE — U0003 INFECTIOUS AGENT DETECTION BY NUCLEIC ACID (DNA OR RNA); SEVERE ACUTE RESPIRATORY SYNDROME CORONAVIRUS 2 (SARS-COV-2) (CORONAVIRUS DISEASE [COVID-19]), AMPLIFIED PROBE TECHNIQUE, MAKING USE OF HIGH THROUGHPUT TECHNOLOGIES AS DESCRIBED BY CMS-2020-01-R: HCPCS | Performed by: EMERGENCY MEDICINE

## 2021-06-02 PROCEDURE — 93005 ELECTROCARDIOGRAM TRACING: CPT

## 2021-06-02 PROCEDURE — 99223 1ST HOSP IP/OBS HIGH 75: CPT | Performed by: INTERNAL MEDICINE

## 2021-06-02 PROCEDURE — 83880 ASSAY OF NATRIURETIC PEPTIDE: CPT | Performed by: EMERGENCY MEDICINE

## 2021-06-02 PROCEDURE — 87086 URINE CULTURE/COLONY COUNT: CPT

## 2021-06-02 RX ORDER — PANTOPRAZOLE SODIUM 20 MG/1
20 TABLET, DELAYED RELEASE ORAL
Status: DISCONTINUED | OUTPATIENT
Start: 2021-06-03 | End: 2021-06-06 | Stop reason: HOSPADM

## 2021-06-02 RX ORDER — METOPROLOL SUCCINATE 25 MG/1
12.5 TABLET, EXTENDED RELEASE ORAL 2 TIMES DAILY
Status: DISCONTINUED | OUTPATIENT
Start: 2021-06-02 | End: 2021-06-06 | Stop reason: HOSPADM

## 2021-06-02 RX ORDER — VANCOMYCIN HYDROCHLORIDE 1 G/200ML
15 INJECTION, SOLUTION INTRAVENOUS ONCE
Status: COMPLETED | OUTPATIENT
Start: 2021-06-02 | End: 2021-06-02

## 2021-06-02 RX ORDER — POTASSIUM CHLORIDE 20 MEQ/1
20 TABLET, EXTENDED RELEASE ORAL 2 TIMES DAILY
Status: DISCONTINUED | OUTPATIENT
Start: 2021-06-02 | End: 2021-06-06 | Stop reason: HOSPADM

## 2021-06-02 RX ORDER — TAMSULOSIN HYDROCHLORIDE 0.4 MG/1
0.4 CAPSULE ORAL
Status: DISCONTINUED | OUTPATIENT
Start: 2021-06-02 | End: 2021-06-06 | Stop reason: HOSPADM

## 2021-06-02 RX ORDER — ASPIRIN 81 MG/1
81 TABLET, CHEWABLE ORAL DAILY
Status: DISCONTINUED | OUTPATIENT
Start: 2021-06-03 | End: 2021-06-06 | Stop reason: HOSPADM

## 2021-06-02 RX ORDER — METHYLPREDNISOLONE SODIUM SUCCINATE 40 MG/ML
40 INJECTION, POWDER, LYOPHILIZED, FOR SOLUTION INTRAMUSCULAR; INTRAVENOUS EVERY 8 HOURS SCHEDULED
Status: DISCONTINUED | OUTPATIENT
Start: 2021-06-02 | End: 2021-06-03

## 2021-06-02 RX ORDER — MELATONIN
2000 DAILY
Status: DISCONTINUED | OUTPATIENT
Start: 2021-06-03 | End: 2021-06-06 | Stop reason: HOSPADM

## 2021-06-02 RX ORDER — DOCUSATE SODIUM 100 MG/1
100 CAPSULE, LIQUID FILLED ORAL 2 TIMES DAILY
Status: DISCONTINUED | OUTPATIENT
Start: 2021-06-02 | End: 2021-06-06 | Stop reason: HOSPADM

## 2021-06-02 RX ORDER — ACETAMINOPHEN 325 MG/1
650 TABLET ORAL EVERY 6 HOURS PRN
Status: DISCONTINUED | OUTPATIENT
Start: 2021-06-02 | End: 2021-06-06 | Stop reason: HOSPADM

## 2021-06-02 RX ORDER — GUAIFENESIN 600 MG
600 TABLET, EXTENDED RELEASE 12 HR ORAL 2 TIMES DAILY
Status: DISCONTINUED | OUTPATIENT
Start: 2021-06-02 | End: 2021-06-04

## 2021-06-02 RX ORDER — MAGNESIUM HYDROXIDE/ALUMINUM HYDROXICE/SIMETHICONE 120; 1200; 1200 MG/30ML; MG/30ML; MG/30ML
30 SUSPENSION ORAL EVERY 6 HOURS PRN
Status: DISCONTINUED | OUTPATIENT
Start: 2021-06-02 | End: 2021-06-06 | Stop reason: HOSPADM

## 2021-06-02 RX ORDER — FUROSEMIDE 10 MG/ML
20 INJECTION INTRAMUSCULAR; INTRAVENOUS DAILY
Status: DISCONTINUED | OUTPATIENT
Start: 2021-06-03 | End: 2021-06-04

## 2021-06-02 RX ORDER — PRAVASTATIN SODIUM 20 MG
40 TABLET ORAL
Status: DISCONTINUED | OUTPATIENT
Start: 2021-06-02 | End: 2021-06-06 | Stop reason: HOSPADM

## 2021-06-02 RX ORDER — POLYETHYLENE GLYCOL 3350 17 G/17G
17 POWDER, FOR SOLUTION ORAL DAILY
Status: DISCONTINUED | OUTPATIENT
Start: 2021-06-03 | End: 2021-06-06 | Stop reason: HOSPADM

## 2021-06-02 RX ORDER — LEVOTHYROXINE SODIUM 0.07 MG/1
75 TABLET ORAL
Status: DISCONTINUED | OUTPATIENT
Start: 2021-06-03 | End: 2021-06-02

## 2021-06-02 RX ORDER — ONDANSETRON 2 MG/ML
4 INJECTION INTRAMUSCULAR; INTRAVENOUS EVERY 6 HOURS PRN
Status: DISCONTINUED | OUTPATIENT
Start: 2021-06-02 | End: 2021-06-06 | Stop reason: HOSPADM

## 2021-06-02 RX ORDER — LEVOTHYROXINE SODIUM 112 UG/1
112 TABLET ORAL
Status: DISCONTINUED | OUTPATIENT
Start: 2021-06-05 | End: 2021-06-06 | Stop reason: HOSPADM

## 2021-06-02 RX ORDER — LEVOTHYROXINE SODIUM 0.07 MG/1
75 TABLET ORAL
Status: DISCONTINUED | OUTPATIENT
Start: 2021-06-03 | End: 2021-06-06 | Stop reason: HOSPADM

## 2021-06-02 RX ADMIN — TAMSULOSIN HYDROCHLORIDE 0.4 MG: 0.4 CAPSULE ORAL at 20:22

## 2021-06-02 RX ADMIN — PRAVASTATIN SODIUM 40 MG: 20 TABLET ORAL at 20:22

## 2021-06-02 RX ADMIN — POTASSIUM CHLORIDE 20 MEQ: 1500 TABLET, EXTENDED RELEASE ORAL at 20:14

## 2021-06-02 RX ADMIN — DOCUSATE SODIUM 100 MG: 100 CAPSULE ORAL at 20:14

## 2021-06-02 RX ADMIN — GUAIFENESIN 600 MG: 600 TABLET, EXTENDED RELEASE ORAL at 20:22

## 2021-06-02 RX ADMIN — METHYLPREDNISOLONE SODIUM SUCCINATE 40 MG: 40 INJECTION, POWDER, FOR SOLUTION INTRAMUSCULAR; INTRAVENOUS at 20:13

## 2021-06-02 RX ADMIN — CEFTRIAXONE SODIUM 1000 MG: 10 INJECTION, POWDER, FOR SOLUTION INTRAVENOUS at 17:04

## 2021-06-02 RX ADMIN — VANCOMYCIN HYDROCHLORIDE 1000 MG: 1 INJECTION, SOLUTION INTRAVENOUS at 17:46

## 2021-06-02 NOTE — H&P
[facility]  H&P- Katey Ron 1933, 80 y o  male MRN: [id number]  Unit/Bed#: ED 03 Encounter: [id number]  Primary Care Provider: Katy Graham MD   Date and time admitted to hospital: 6/2/2021 12:07 PM    * Acute respiratory failure with hypoxia [facility])  Assessment & Plan  Acute on chronic hypoxic respiratory failure multifactorial  Patient with known history of emphysema, presumed hypersensitivity mellitus  Worsening shortness of breath presently requiring 3 L supplemental oxygen, wean as tolerated to keep O2 sats more than 90-92%  IV Solu-Medrol  Continue respiratory treatments  Will request Pulmonary inputs    Hypersensitivity pneumonitis [facility])  Assessment & Plan  Patient presumed hypersensitivity pneumonitis  IV Solu-Medrol  Pulmonary following    Chronic diastolic congestive heart failure (HCC)  Assessment & Plan  Wt Readings from Last 3 Encounters:   06/02/21 63 2 kg (139 lb 5 3 oz)   05/04/21 57 6 kg (127 lb)   04/18/21 60 2 kg (132 lb 11 2 oz)     Chronic diastolic congestive heart failure  On Lasix 20 mg p o  Daily  Presently provide IV Lasix and transition to p o   Lasix in 24-48 hours  Monitor I/O, daily weights  Less than 2 g salt diet fluid restrictions      S/P TAVR (transcatheter aortic valve replacement)  Assessment & Plan  Status post TAVR    Ambulatory dysfunction  Assessment & Plan  Multifactorial  Safe ambulation  Fall precautions  Physical therapy    Urine retention  Assessment & Plan  History of chronic urinary retention with Porter catheter in place  Outpatient follow-up with Urology    Pulmonary emphysema [facility])  Assessment & Plan  History of pulmonary emphysema  Continue respiratory treatment    Hypothyroidism  Assessment & Plan  Continue levothyroxine    Hypertension  Assessment & Plan  Monitor blood pressures  Patient usually runs low blood pressures  Avoid hypotension    VTE Prophylaxis: Enoxaparin (Lovenox)  / sequential compression device Code Status:  DNR discussed with patient  POLST: There is no POLST form on file for this patient (pre-hospital)  Discussion with family:  Discussed with the patient, spouse at bedside discussed in detail questions answered  They verbalized understanding of the update and are agreeable to ongoing management and consult requested  Anticipated Length of Stay:  Patient will be admitted on an Inpatient basis with an anticipated length of stay of  more than 2 midnights  Justification for Hospital Stay:  Acute hypoxic respiratory failure for management as outlined      Chief Complaint:       Worsening shortness of breath    History of Present Illness:    Holli Gu is a 80 y o  male who presents with worsening shortness of breath  Patient was admitted in the month of April for acute hypoxic respiratory failure, he was at that time evaluated by Pulmonary he underwent extensive workup including bronchoscopy, he was diagnosed with possible hypersensitivity pneumonitis, he received IV Solu-Medrol and was discharged on taper to SNF for rehabilitation  He has since been discharged home from SNF, he has also followed up with Pulmonary Dr Cat Hutchison and completed the prednisone taper  He has been working with physical therapy at home, he has noticed decreased effort tolerance last few days  Since a week or so he has noticed worsening dyspnea on exertion, decreased effort tolerance easy fatigability and lack of energy  He also reports cough associated with brownish phlegm  He gets 1 or 2 episodes of cough usually 1 episode in the morning and reports bringing up "quarter-size phlegm"  Occasionally phlegm is pinkish  Denies wheezing or chest tightness  He reports his shortness of breath is progressively getting worse, he has noticed in the last couple of days he is unable to walk more than 3 minutes at a stretch  He has to rest for 5 minutes or so and then is able to walk again    No history suggestive of orthopnea or PND  Denies chest pain palpitations diaphoresis  Symptoms have been getting progressively worse, today he noticed complete lack of energy and shortness of breath when he woke up in the morning  Given these symptoms he presents to the hospital for further management  In the ER he was noted to have acute hypoxic respiratory failure placed on 3 L supplemental oxygen, he was also provided with IV antibiotics and is being admitted  He denies history of fever chills sweats constitutional symptoms  His appetite is poor, he has been losing weight since April  He has chronic lower extremity lower swelling which is unchanged  He ambulates with a walker  History chart labs medications, Outpatient Pulmonary inputs noted  Review of Systems:    Review of Systems   All other systems reviewed and are negative  Past Medical and Surgical History:     Past Medical History:   Diagnosis Date    Anemia     Bladder cancer [facility] )     Cancer ([facility] )     bladder    Carotid artery occlusion     Cataract     Colon polyp     COPD (chronic obstructive pulmonary disease) (HCC)     Coronary artery disease     Disease of thyroid gland     History of transfusion     Hyperlipidemia     Hypertension     Hypothyroidism 9/15/2017    Multiple pulmonary nodules     Peptic ulcer     Scoliosis     S[facility] 1/26/2021    SIRS (systemic inflammatory response syndrome) ([facility] ) 12/19/2019    Transient cerebral ischemia     Tremors of nervous system        Past Surgical History:   Procedure Laterality Date   [facility] SURGERY      1973, 1987, 2005    BUNIONECTOMY Left     Simple exostectomy (silver procedure)    CAROTID ENDARTERECTOMY Right 09/10/2008    Randy LEDBETTER MD    CATARACT EXTRACTION      CATARACT EXTRACTION, BILATERAL      CERVICAL DISCECTOMY  1969    COLONOSCOPY      CORONARY ARTERY BYPASS GRAFT  10/20/2010    x3 (LIMA-LAD, SVG-OM, SVG-RCA)    CYSTOSCOPY      ELBOW SURGERY Right 07/2012    FEMORAL ARTERY - TIBIAL ARTERY BYPASS GRAFT  12/05/2011    using reversed saphenous vein from right leg  Will Rocha MD    OH ECHO TRANSESOPHAG R-T 2D W/PRB IMG ACQUISJ I&R N/A 10/6/2020    Procedure: TRANSESOPHAGEAL ECHOCARDIOGRAM (DAVID); Surgeon: Gem Michael DO;  [facility]: [facility];  Service: Cardiac Surgery    OH REPLACE AORTIC VALVE OPENFEMORAL ARTERY APPROACH N/A 10/6/2020    Procedure: REPLACEMENT AORTIC VALVE TRANSCATHETER (TAVR) TRANSFEMORAL W/ 26MM MONTALVO BREE S3 ULTRA VALVE(ACCESS ON LEFT); Surgeon: Gem Michael DO;  Location: [facility];  Service: Cardiac Surgery    REPLACEMENT TOTAL KNEE Left     SHOULDER SURGERY Right 1997       Meds/Allergies:    Prior to Admission medications    Medication Sig Start Date End Date Taking? Authorizing Provider   acetaminophen (TYLENOL) 325 mg tablet Take 3 tablets (975 mg total) by mouth 3 (three) times a day 2/8/21  Yes Annemarie Kim DO   aspirin 81 MG tablet Take 81 mg by mouth daily  4/27/12  Yes Historical Provider, MD   Cholecalciferol (HM VITAMIN D3) 2000 units CAPS Take 1 tablet by mouth daily   Yes Historical Provider, MD   furosemide (LASIX) 20 mg tablet Take 1 tablet (20 mg total) by mouth daily 6/1/21  Yes Víctor Barreto MD   levothyroxine 75 mcg tablet 1 tablet daily M-Fridays   1 and 1/2 tablets Sat-Sundays 5/14/21  Yes Hortensia Park MD   Multiple Vitamin (multivitamin) capsule Take 1 capsule by mouth daily   Yes Historical Provider, MD   omeprazole (PriLOSEC) 20 mg delayed release capsule Take 20 mg by mouth daily   Yes Historical Provider, MD   potassium chloride (K-DUR,KLOR-CON) 10 mEq tablet Take 1 tablet (10 mEq total) by mouth daily 2/27/21 6/2/21 Yes Hortensia Park MD   simvastatin (ZOCOR) 20 mg tablet TAKE 1 TABLET(20 MG) BY MOUTH DAILY AT BEDTIME 1/21/21  Yes Víctor Barreot MD   SPIRIVA RESPIMAT 2 5 MCG/ACT AERS inhaler INHALE 2 PUFFS BY MOUTH DAILY 6/15/20 Yes Justice Cortes PA-C   alfuzosin (UROXATRAL) 10 mg 24 hr tablet Take 1 tablet (10 mg total) by mouth daily 21   MANI Carrion   GUAIFENESIN ER PO Take 600 mg by mouth 2 (two) times a day    Historical Provider, MD   metoprolol succinate (TOPROL-XL) 25 mg 24 hr tablet TAKE 1/2 TABLET every 12 hous 21 MD Jaqueline     I have reviewed home medications with patient family member  Allergies: Allergies   Allergen Reactions    Aleve [Naproxen]      Other reaction(s): FACIAL SWELLING  Category: Allergy; Annotation - [id number]: lip/eye edema    Ancef [Cefazolin] Anaphylaxis     Hypotension, rash, itching    Augmentin [Amoxicillin-Pot Clavulanate] Diarrhea and Vomiting       Social History:     Marital Status: /Civil Union   Occupation:  He was a  by occupation, retired in  [employer]  He stopped smoking 40 years back    Patient Pre-hospital Living Situation: home  Patient Pre-hospital Level of Mobility:  Ambulatory dysfunction ambulates with a walker  Patient Pre-hospital Diet Restrictions: no  Substance Use History:   Social History     Substance and Sexual Activity   Alcohol Use Not Currently    Alcohol/week: 0 0 standard drinks    Comment: Social      Social History     Tobacco Use   Smoking Status Former Smoker    Packs/day: 1 00    Years: 50 00    Pack years: 50 00    Types: Cigarettes    Start date:     Quit date: Colton Atul Years since quittin 4   Smokeless Tobacco Never Used     Social History     Substance and Sexual Activity   Drug Use Never       Family History:    Family History   Problem Relation Age of Onset    Cervical cancer Mother     Cervical cancer Sister     Heart disease Sister     Coronary artery disease Sister     Lung cancer Brother        Physical Exam:     Vitals:   Blood Pressure: 98/54 (21 1800)  Pulse: 72 (21 1800)  Temperature: 98 °F (36 7 °C) (21 1217)  Temp Source: Tympanic (21 1217)  Respirations: 18 (06/02/21 1800)  Weight - Scale: 63 2 kg (139 lb 5 3 oz) (06/02/21 1304)  SpO2: 97 % (06/02/21 1800)    Physical Exam    Comfortably in bed  Clubbing noted  Mucous members are moist  Features of protein calorie malnutrition noted  Neck supple  Lungs emphysematous  Diminished breath sounds bilaterally  Heart sounds S1-S2 noted  Abdomen soft nontender  Awake alert obeys simple commands  Pulses noted  Bilateral pitting pedal edema noted  No rash      Additional Data:     Lab Results: I have personally reviewed pertinent reports  Results from last 7 days   Lab Units 06/02/21  1244   WBC Thousand/uL 5 75   HEMOGLOBIN g/dL 10 7*   HEMATOCRIT % 32 1*   PLATELETS Thousands/uL 188   NEUTROS PCT % 66   LYMPHS PCT % 10*   MONOS PCT % 22*   EOS PCT % 1     Results from last 7 days   Lab Units 06/02/21  1244   SODIUM mmol/L 132*   POTASSIUM mmol/L 3 9   CHLORIDE mmol/L 101   CO2 mmol/L 23   BUN mg/dL 8   CREATININE mg/dL 0 71   ANION GAP mmol/L 8   CALCIUM mg/dL 8 4   GLUCOSE RANDOM mg/dL 93       Imaging: I have personally reviewed pertinent reports  CT chest personally reviewed - emphysematous changes noted, bilateral ground-glass opacities noted    CT chest without contrast   Final Result by Shahram Guy DO (06/02 1558)      Bilateral groundglass density with associated reticulation and bronchiectasis in both lungs  Findings have improved in the left upper lobe with worsening changes in the right upper, lower and middle lobes  Findings may be indicative of infectious or    inflammatory etiologies with bronchiectasis implying some scarring  9 x 8 mm right upper lobe nodule (series 601 image 86), previously measured about 5 x 4 mm  This could represent an inflammatory pseudonodule, however, follow-up CT chest is recommended in 3 months to exclude evolving neoplasm  Background of moderately severe emphysema  Small bilateral pleural effusions, slightly increased on the right           Workstation performed: [id number]         XR chest 2 views    (Results Pending)       EKG, Pathology, and Other Studies Reviewed on Admission:   · EKG:  Right bundle-branch block no ST T-wave changes    Allscripts / Epic Records Reviewed: Yes     ** Please Note: This note has been constructed using a voice recognition system   **

## 2021-06-02 NOTE — ASSESSMENT & PLAN NOTE
Wt Readings from Last 3 Encounters:   06/02/21 63 2 kg (139 lb 5 3 oz)   05/04/21 57 6 kg (127 lb)   04/18/21 60 2 kg (132 lb 11 2 oz)     Chronic diastolic congestive heart failure  On Lasix 20 mg p o  Daily  Presently provide IV Lasix and transition to p o   Lasix in 24-48 hours  Monitor I/O, daily weights  Less than 2 g salt diet fluid restrictions

## 2021-06-02 NOTE — ED PROVIDER NOTES
History  Chief Complaint   Patient presents with    Shortness of Breath     worsening SOB at rest and on exertion past 2 days  hx of emphysema and CHF  89% O2 RA at triage  pt placed on 3L NC O2  pt denies CP  pt A+Ox4  Mild resp distress      80year-old male history of CAD on aspirin, bladder cancer, COPD, heart failure status post CABG and endarterectomy presenting with some to history of worsening shortness of breath and dyspnea on exertion  Patient states he is normally able to walk with a walker undo several labs around his house  However for the past few days, unable to take more than a few steps before having to stop because he is winded  Denies any chest pain or other associated symptoms  No systemic symptoms including fevers or chills  Reports compliance with medications and recently decreased his Lasix dose because of some hypotension  Reports COVID vaccination  No other complaints at this time  Of note, patient does have an indwelling urinary catheter after prostate cancer that was changed about 2 weeks ago and wife states urine has been looking normal and not cloudy  Denies any headache, vision changes, chest pain, abdominal pain, nausea vomiting, fever/chills, or any bladder or bowel changes  Prior to Admission Medications   Prescriptions Last Dose Informant Patient Reported? Taking?    Cholecalciferol (HM VITAMIN D3) 2000 units CAPS 6/1/2021 at Unknown time Spouse/Significant Other Yes Yes   Sig: Take 1 tablet by mouth daily   GUAIFENESIN ER PO  Spouse/Significant Other Yes No   Sig: Take 600 mg by mouth 2 (two) times a day   Multiple Vitamin (multivitamin) capsule 6/1/2021 at Unknown time Spouse/Significant Other Yes Yes   Sig: Take 1 capsule by mouth daily   SPIRIVA RESPIMAT 2 5 MCG/ACT AERS inhaler 6/1/2021 at Unknown time Spouse/Significant Other No Yes   Sig: INHALE 2 PUFFS BY MOUTH DAILY   acetaminophen (TYLENOL) 325 mg tablet Past Week at Unknown time Spouse/Significant Other No Yes   Sig: Take 3 tablets (975 mg total) by mouth 3 (three) times a day   alfuzosin (UROXATRAL) 10 mg 24 hr tablet   No No   Sig: Take 1 tablet (10 mg total) by mouth daily   aspirin 81 MG tablet 2021 at Unknown time Spouse/Significant Other Yes Yes   Sig: Take 81 mg by mouth daily    furosemide (LASIX) 20 mg tablet 2021 at 0800  No Yes   Sig: Take 1 tablet (20 mg total) by mouth daily   levothyroxine 75 mcg tablet 2021 at Unknown time  No Yes   Si tablet daily -   1 and 1/2 tablets Sat-Sundays   metoprolol succinate (TOPROL-XL) 25 mg 24 hr tablet  Spouse/Significant Other No No   Sig: TAKE 1/2 TABLET every 12 hous   omeprazole (PriLOSEC) 20 mg delayed release capsule 2021 at Unknown time Spouse/Significant Other Yes Yes   Sig: Take 20 mg by mouth daily   potassium chloride (K-DUR,KLOR-CON) 10 mEq tablet 2021 at Unknown time Spouse/Significant Other No Yes   Sig: Take 1 tablet (10 mEq total) by mouth daily   simvastatin (ZOCOR) 20 mg tablet 2021 at Unknown time Spouse/Significant Other No Yes   Sig: TAKE 1 TABLET(20 MG) BY MOUTH DAILY AT BEDTIME      Facility-Administered Medications: None       Past Medical History:   Diagnosis Date    Anemia     Bladder cancer ([facility] )     Cancer ([place] [address] )     bladder    Carotid artery occlusion     Cataract     Colon polyp     COPD (chronic obstructive pulmonary disease) ([place] [address] )     Coronary artery disease     Disease of thyroid gland     History of transfusion     Hyperlipidemia     Hypertension     Hypothyroidism 9/15/2017    Multiple pulmonary nodules     Peptic ulcer     Scoliosis     Sepsis without acute organ dysfunction ([facility] ) 2021    SIRS (systemic inflammatory response syndrome) ([place] [address] ) 2019    Transient cerebral ischemia     Tremors of nervous system        Past Surgical History:   Procedure Laterality Date   [facility] SURGERY      , ,     BUNIONECTOMY Left     Simple exostectomy (silver procedure)    CAROTID ENDARTERECTOMY Right 09/10/2008    Common  Sydnie LEDBETTER MD    CATARACT EXTRACTION      CATARACT EXTRACTION, BILATERAL      CERVICAL DISCECTOMY  1969    COLONOSCOPY      CORONARY ARTERY BYPASS GRAFT  10/20/2010    x3 (LIMA-LAD, SVG-OM, SVG-RCA)    CYSTOSCOPY      ELBOW SURGERY Right 2012    FEMORAL ARTERY - TIBIAL ARTERY BYPASS GRAFT  2011    using reversed saphenous vein from right leg  Marrian Kehr MD    CO ECHO TRANSESOPHAG R-T 2D W/PRB IMG ACQUISJ I&R N/A 10/6/2020    Procedure: TRANSESOPHAGEAL ECHOCARDIOGRAM (DAVID); Surgeon: Tony Ramos DO;  [facility];  Service: Cardiac Surgery    CO REPLACE AORTIC VALVE OPENFEMORAL ARTERY APPROACH N/A 10/6/2020    Procedure: REPLACEMENT AORTIC VALVE TRANSCATHETER (TAVR) TRANSFEMORAL W/ 26MM MONTALVO BREE S3 ULTRA VALVE(ACCESS ON LEFT); Surgeon: Tony Ramos DO;  [facility];  Service: Cardiac Surgery    REPLACEMENT TOTAL KNEE Left     SHOULDER SURGERY Right        Family History   Problem Relation Age of Onset    Cervical cancer Mother     Cervical cancer Sister     Heart disease Sister     Coronary artery disease Sister     Lung cancer Brother      I have reviewed and agree with the history as documented  E-Cigarette/Vaping    E-Cigarette Use Never User      E-Cigarette/Vaping Substances    Nicotine No     THC No     CBD No     Flavoring No      Social History     Tobacco Use    Smoking status: Former Smoker     Packs/day: 1 00     Years: 50 00     Pack years: 50 00     Types: Cigarettes     Start date: 56     Quit date:      Years since quittin 4    Smokeless tobacco: Never Used   Substance Use Topics    Alcohol use: Not Currently     Alcohol/week: 0 0 standard drinks     Comment: Social     Drug use: Never        Review of Systems   Constitutional: Negative for appetite change, chills, diaphoresis, fever and unexpected weight change     HENT: Negative for congestion and rhinorrhea  Eyes: Negative for photophobia and visual disturbance  Respiratory: Positive for shortness of breath  Negative for cough and chest tightness  Cardiovascular: Positive for leg swelling  Negative for chest pain and palpitations  Gastrointestinal: Negative for abdominal distention, abdominal pain, blood in stool, constipation, diarrhea, nausea and vomiting  Genitourinary: Negative for dysuria and hematuria  Musculoskeletal: Negative for back pain, joint swelling, neck pain and neck stiffness  Skin: Negative for color change, pallor, rash and wound  Neurological: Negative for dizziness, syncope, weakness, light-headedness and headaches  Psychiatric/Behavioral: Negative for agitation  All other systems reviewed and are negative  Physical Exam  ED Triage Vitals   Temperature Pulse Respirations Blood Pressure SpO2   06/02/21 1217 06/02/21 1217 06/02/21 1217 06/02/21 1217 06/02/21 1217   98 °F (36 7 °C) 75 22 108/53 95 %      Temp Source Heart Rate Source Patient Position - Orthostatic VS BP Location FiO2 (%)   06/02/21 1217 06/02/21 1217 06/02/21 1217 06/02/21 1217 --   Tympanic Monitor Sitting Right arm       Pain Score       06/03/21 0515       No Pain             Orthostatic Vital Signs  Vitals:    06/04/21 1610 06/04/21 1620 06/04/21 1630 06/04/21 2009   BP:    133/62   Pulse: 86 86 90 97   Patient Position - Orthostatic VS:           Physical Exam  Vitals signs and nursing note reviewed  Constitutional:       General: He is not in acute distress  Appearance: Normal appearance  He is well-developed  He is not ill-appearing, toxic-appearing or diaphoretic  HENT:      Head: Normocephalic and atraumatic  Nose: Nose normal  No congestion or rhinorrhea  Mouth/Throat:      Mouth: Mucous membranes are moist       Pharynx: Oropharynx is clear  No oropharyngeal exudate or posterior oropharyngeal erythema  Eyes:      General: No scleral icterus          Right eye: No discharge  Left eye: No discharge  Extraocular Movements: Extraocular movements intact  Conjunctiva/sclera: Conjunctivae normal       Pupils: Pupils are equal, round, and reactive to light  Neck:      Musculoskeletal: Normal range of motion and neck supple  No neck rigidity or muscular tenderness  Vascular: No JVD  Trachea: No tracheal deviation  Cardiovascular:      Rate and Rhythm: Normal rate  Rhythm irregular  Heart sounds: Normal heart sounds  No murmur  No friction rub  No gallop  Comments: Normal rate in the 70s and irregularly irregular rhythm  Aortic murmur  Pulmonary:      Effort: Pulmonary effort is normal  No tachypnea or respiratory distress  Breath sounds: No stridor  Examination of the right-upper field reveals decreased breath sounds  Examination of the left-upper field reveals decreased breath sounds  Examination of the right-middle field reveals decreased breath sounds  Examination of the left-middle field reveals decreased breath sounds  Examination of the right-lower field reveals decreased breath sounds and rales  Examination of the left-lower field reveals decreased breath sounds and rales  Decreased breath sounds and rales present  No wheezing  Comments: Diffusely decreased breath sounds with bibasilar crackles  Chest:      Chest wall: No tenderness  Abdominal:      General: Bowel sounds are normal  There is no distension  Palpations: Abdomen is soft  Tenderness: There is no abdominal tenderness  There is no right CVA tenderness, left CVA tenderness, guarding or rebound  Musculoskeletal: Normal range of motion  General: No swelling, tenderness, deformity or signs of injury  Right lower leg: Edema present  Left lower leg: Edema present  Comments: 1-2+ pitting edema bilaterally up to mid shins   Lymphadenopathy:      Cervical: No cervical adenopathy  Skin:     General: Skin is warm and dry  Coloration: Skin is not pale  Findings: No erythema or rash  Neurological:      General: No focal deficit present  Mental Status: He is alert and oriented to person, place, and time  Mental status is at baseline  Cranial Nerves: No cranial nerve deficit  Sensory: No sensory deficit  Motor: No weakness or abnormal muscle tone  Coordination: Coordination normal       Gait: Gait normal       Comments: A&Ox3 to person, place, and time  CN 2-12 intact  Strength 5/5 throughout  Sensation grossly intact  Cerebellar exam including gait intact  Psychiatric:         Behavior: Behavior normal          Thought Content:  Thought content normal          ED Medications  Medications   cholecalciferol (VITAMIN D3) tablet 2,000 Units (2,000 Units Oral Given 6/4/21 0852)   aspirin chewable tablet 81 mg (81 mg Oral Given 6/4/21 0851)   pravastatin (PRAVACHOL) tablet 40 mg (40 mg Oral Given 6/4/21 1722)   acetaminophen (TYLENOL) tablet 650 mg (has no administration in time range)   pantoprazole (PROTONIX) EC tablet 20 mg (20 mg Oral Given 6/4/21 0626)   multivitamin-minerals (CENTRUM) tablet 1 tablet (1 tablet Oral Given 6/4/21 0852)   tamsulosin (FLOMAX) capsule 0 4 mg (0 4 mg Oral Given 6/4/21 1722)   metoprolol succinate (TOPROL-XL) 24 hr tablet 12 5 mg (12 5 mg Oral Given 6/4/21 2009)   docusate sodium (COLACE) capsule 100 mg (100 mg Oral Given 6/4/21 1722)   polyethylene glycol (MIRALAX) packet 17 g (17 g Oral Given 6/4/21 0851)   ondansetron (ZOFRAN) injection 4 mg (has no administration in time range)   aluminum-magnesium hydroxide-simethicone (MYLANTA) oral suspension 30 mL (has no administration in time range)   enoxaparin (LOVENOX) subcutaneous injection 40 mg (40 mg Subcutaneous Given 6/4/21 0851)   potassium chloride (K-DUR,KLOR-CON) CR tablet 20 mEq (20 mEq Oral Given 6/4/21 1722)   levothyroxine tablet 75 mcg (75 mcg Oral Given 6/4/21 0626)   levothyroxine tablet 112 mcg (has no administration in time range)   ipratropium (ATROVENT) 0 02 % inhalation solution 0 5 mg (0 5 mg Nebulization Given 6/4/21 1900)   levalbuterol (XOPENEX) inhalation solution 0 63 mg (0 63 mg Nebulization Given 6/4/21 1900)   predniSONE tablet 40 mg (has no administration in time range)   furosemide (LASIX) tablet 20 mg (has no administration in time range)   ceftriaxone (ROCEPHIN) 1 g/50 mL in dextrose IVPB (0 mg Intravenous Stopped 6/2/21 1741)   vancomycin (VANCOCIN) IVPB (premix in dextrose) 1,000 mg 200 mL (0 mg/kg × 63 2 kg Intravenous Stopped 6/2/21 1846)       Diagnostic Studies  Results Reviewed     Procedure Component Value Units Date/Time    Urine culture [[id number]]  (Abnormal) Collected: 06/02/21 1511    Lab Status: Preliminary result Specimen: Urine, Indwelling Porter Catheter Updated: 06/04/21 1400     Urine Culture >100,000 cfu/ml Klebsiella oxytoca      50,000-59,000 cfu/ml Enterococcus faecalis    Procalcitonin [[id number]]  (Normal) Collected: 06/03/21 0500    Lab Status: Final result Specimen: Blood from Arm, Left Updated: 06/03/21 0615     Procalcitonin <0 05 ng/ml     Basic metabolic panel [[id number]]  (Abnormal) Collected: 06/03/21 0500    Lab Status: Final result Specimen: Blood from Arm, Left Updated: 06/03/21 0551     Sodium 132 mmol/L      Potassium 4 1 mmol/L      Chloride 101 mmol/L      CO2 24 mmol/L      ANION GAP 7 mmol/L      BUN 10 mg/dL      Creatinine 0 57 mg/dL      Glucose 113 mg/dL      Calcium 8 7 mg/dL      eGFR 92 ml/min/1 73sq m     Narrative:      [place] guidelines for Chronic Kidney Disease (CKD):     Stage 1 with normal or high GFR (GFR > 90 mL/min/1 73 square meters)    Stage 2 Mild CKD (GFR = 60-89 mL/min/1 73 square meters)    Stage 3A Moderate CKD (GFR = 45-59 mL/min/1 73 square meters)    Stage 3B Moderate CKD (GFR = 30-44 mL/min/1 73 square meters)    Stage 4 Severe CKD (GFR = 15-29 mL/min/1 73 square meters)    Stage 5 End Stage CKD (GFR <15 mL/min/1 73 square meters)  Note: GFR calculation is accurate only with a steady state creatinine    Magnesium [[id number]]  (Normal) Collected: 06/03/21 0500    Lab Status: Final result Specimen: Blood from Arm, Left Updated: 06/03/21 0551     Magnesium 2 1 mg/dL     CBC and differential [[id number]]  (Abnormal) Collected: 06/03/21 0500    Lab Status: Final result Specimen: Blood from Arm, Left Updated: 06/03/21 0510     WBC 2 18 Thousand/uL      RBC 3 53 Million/uL      Hemoglobin 11 2 g/dL      Hematocrit 34 2 %      MCV 97 fL      MCH 31 7 pg      MCHC 32 7 g/dL      RDW 14 6 %      MPV 10 6 fL      Platelets 283 Thousands/uL      nRBC 0 /100 WBCs      Neutrophils Relative 87 %      Immat GRANS % 1 %      Lymphocytes Relative 8 %      Monocytes Relative 3 %      Eosinophils Relative 0 %      Basophils Relative 1 %      Neutrophils Absolute 1 90 Thousands/µL      Immature Grans Absolute 0 02 Thousand/uL      Lymphocytes Absolute 0 18 Thousands/µL      Monocytes Absolute 0 07 Thousand/µL      Eosinophils Absolute 0 00 Thousand/µL      Basophils Absolute 0 01 Thousands/µL     Platelet count [[id number]] Collected: 06/03/21 0500    Lab Status: No result Specimen: Blood from Arm, Left     Urine Microscopic [[id number]]  (Abnormal) Collected: 06/02/21 1511    Lab Status: Final result Specimen: Urine, Indwelling Porter Catheter Updated: 06/02/21 1601     RBC, UA 2-4 /hpf      WBC, UA Innumerable /hpf      Epithelial Cells None Seen /hpf      Bacteria, UA Innumerable /hpf      COARSE GRANULAR CASTS 3-5 /lpf      AMORPH URATES Moderate /hpf      Ca Oxalate Cherelle, UA Occasional /hpf     UA w Reflex to Microscopic w Reflex to Culture [[id number]]  (Abnormal) Collected: 06/02/21 1511    Lab Status: Final result Specimen: Urine, Indwelling Porter Catheter Updated: 06/02/21 1528     Color, UA Yellow     Clarity, UA Turbid     Specific Christoval, UA 1 010     pH, UA 7 5     Leukocytes, UA Large     Nitrite, UA Negative Protein, UA Negative mg/dl      Glucose, UA Negative mg/dl      Ketones, UA Negative mg/dl      Urobilinogen, UA 1 0 E U /dl      Bilirubin, UA Negative     Blood, UA Small    Novel Coronavirus (Covid-19),PCR [facility] - 2 Hour Stat [[id number]]  (Normal) Collected: 06/02/21 1348    Lab Status: Final result Specimen: Nares from Nose Updated: 06/02/21 1509     SARS-CoV-2 Negative    Narrative:           Basic metabolic panel [[id number]]  (Abnormal) Collected: 06/02/21 1244    Lab Status: Final result Specimen: Blood from Arm, Left Updated: 06/02/21 1326     Sodium 132 mmol/L      Potassium 3 9 mmol/L      Chloride 101 mmol/L      CO2 23 mmol/L      ANION GAP 8 mmol/L      BUN 8 mg/dL      Creatinine 0 71 mg/dL      Glucose 93 mg/dL      Calcium 8 4 mg/dL      eGFR 84 ml/min/1 73sq m     Narrative:      [place] guidelines for Chronic Kidney Disease (CKD):     Stage 1 with normal or high GFR (GFR > 90 mL/min/1 73 square meters)    Stage 2 Mild CKD (GFR = 60-89 mL/min/1 73 square meters)    Stage 3A Moderate CKD (GFR = 45-59 mL/min/1 73 square meters)    Stage 3B Moderate CKD (GFR = 30-44 mL/min/1 73 square meters)    Stage 4 Severe CKD (GFR = 15-29 mL/min/1 73 square meters)    Stage 5 End Stage CKD (GFR <15 mL/min/1 73 square meters)  Note: GFR calculation is accurate only with a steady state creatinine    Troponin I [[id number]]  (Normal) Collected: 06/02/21 1244    Lab Status: Final result Specimen: Blood from Arm, Left Updated: 06/02/21 1326     Troponin I <0 02 ng/mL     NT-BNP PRO [[id number]]  (Abnormal) Collected: 06/02/21 1244    Lab Status: Final result Specimen: Blood from Arm, Left Updated: 06/02/21 1326     NT-proBNP 1,202 pg/mL     CBC and differential [[id number]]  (Abnormal) Collected: 06/02/21 1244    Lab Status: Final result Specimen: Blood from Arm, Left Updated: 06/02/21 1303     WBC 5 75 Thousand/uL      RBC 3 25 Million/uL      Hemoglobin 10 7 g/dL      Hematocrit 32 1 % MCV 99 fL      MCH 32 9 pg      MCHC 33 3 g/dL      RDW 14 9 %      MPV 10 6 fL      Platelets 819 Thousands/uL      nRBC 0 /100 WBCs      Neutrophils Relative 66 %      Immat GRANS % 1 %      Lymphocytes Relative 10 %      Monocytes Relative 22 %      Eosinophils Relative 1 %      Basophils Relative 0 %      Neutrophils Absolute 3 77 Thousands/µL      Immature Grans Absolute 0 03 Thousand/uL      Lymphocytes Absolute 0 59 Thousands/µL      Monocytes Absolute 1 29 Thousand/µL      Eosinophils Absolute 0 05 Thousand/µL      Basophils Absolute 0 02 Thousands/µL                  CT chest without contrast   Final Result by Shelby Byrne DO (06/02 1558)      Bilateral groundglass density with associated reticulation and bronchiectasis in both lungs  Findings have improved in the left upper lobe with worsening changes in the right upper, lower and middle lobes  Findings may be indicative of infectious or    inflammatory etiologies with bronchiectasis implying some scarring  9 x 8 mm right upper lobe nodule (series 601 image 86), previously measured about 5 x 4 mm  This could represent an inflammatory pseudonodule, however, follow-up CT chest is recommended in 3 months to exclude evolving neoplasm  Background of moderately severe emphysema  Small bilateral pleural effusions, slightly increased on the right  Workstation performed: [id number]         XR chest 2 views   Final Result by Mariana Aponte MD (06/03 6904)      Emphysema  Multifocal pneumonia  Postoperative change  Bilateral pleural effusions                    Workstation performed: [id number]               Procedures  ECG 12 Lead Documentation Only    Date/Time: 6/2/2021 12:20 PM  Performed by: Cleo Reynoso MD  Authorized by: Cleo Reynoso MD     Indications / Diagnosis:  SOB  ECG reviewed by me, the ED Provider: yes    Patient location:  ED  Previous ECG:     Previous ECG:  Compared to current    Similarity:  No change Comparison to cardiac monitor: No    Interpretation:     Interpretation: abnormal    Rate:     ECG rate:  74    ECG rate assessment: normal    Rhythm:     Rhythm: sinus rhythm    Ectopy:     Ectopy: none    QRS:     QRS axis:  Normal    QRS intervals: Wide  Conduction:     Conduction: abnormal      Abnormal conduction: complete RBBB and 1st degree    ST segments:     ST segments:  Non-specific  T waves:     T waves: non-specific            ED Course               Identification of Seniors at Risk      Most Recent Value   (ISAR) Identification of Seniors at Risk   Before the illness or injury that brought you to the Emergency, did you need someone to help you on a regular basis? 1 Filed at: 06/02/2021 1220   In the last 24 hours, have you needed more help than usual?  1 Filed at: 06/02/2021 1220   Have you been hospitalized for one or more nights during the past 6 months? 1 Filed at: 06/02/2021 1220   In general, do you see well?  0 Filed at: 06/02/2021 1220   In general, do you have serious problems with your memory? 0 Filed at: 06/02/2021 1220   Do you take more than three different medications every day? 1 Filed at: 06/02/2021 1220   ISAR Score  4 Filed at: 06/02/2021 1220                              MDM  Number of Diagnoses or Management Options  Acute exacerbation of CHF (congestive heart failure) ([facility] [address] ):   Dyspnea on exertion:   Hypoxia:   Shortness of breath:   Diagnosis management comments: 66-year-old male history of CAD on aspirin, bladder cancer, COPD, heart failure status post CABG and endarterectomy presenting with some to history of worsening shortness of breath and dyspnea on exertion  Constellation of symptoms consistent with volume overload from heart failure especially in setting of recently decreasing Lasix dosing  Plan for basic labs plus cardiac plus BNP  COVID swab   4 L of oxygen for sats in the 80s  Chest x-ray  Frequent reassessment      Chest x-ray concerning for volume overload and airspace disease concerning for possible pneumonia  Per chart review, chest x-ray in April with similar pattern that was interpreted as being inflammatory  Plan for CT chest  COVID neg  Patient with some low blood pressures 90s over 50s that patient and wife reported as normal   Patient remains warm and well perfused with normal mentation  Labs notable for BNP of 1200  EKG significant ST abnormalities  CT concern for inflammation  Urine suspicious for infection  Of note, urine sent was from leg bag and not fresh sample  Plan to cover with abx given patient and wife concern for UTI given fatigue  No SIRS  Will attempt new sample  Patient allergy to ancef but per chart review and wife, patient has received ceftriaxone several times before without issue  Admit          Amount and/or Complexity of Data Reviewed  Clinical lab tests: reviewed and ordered  Tests in the radiology section of CPT®: ordered and reviewed  Tests in the medicine section of CPT®: ordered and reviewed  Review and summarize past medical records: yes  Independent visualization of images, tracings, or specimens: yes    Risk of Complications, Morbidity, and/or Mortality  Presenting problems: high  Diagnostic procedures: high  Management options: high    Patient Progress  Patient progress: stable      Disposition  Final diagnoses:   Acute exacerbation of CHF (congestive heart failure) ([place] [address] )   Dyspnea on exertion   Shortness of breath   Hypoxia     Time reflects when diagnosis was documented in both MDM as applicable and the Disposition within this note     Time User Action Codes Description Comment    6/2/2021  3:03 PM Viann Bitters Add [I50 9] Acute exacerbation of CHF (congestive heart failure) ([facility] )     6/2/2021  3:03 PM Viann Bitters Add [R06 00] Dyspnea on exertion     6/2/2021  3:03 PM Viann Bitters Add [R06 02] Shortness of breath     6/2/2021  3:03 PM Viann Bitters Add [R09 02] Hypoxia     6/2/2021  6:18 PM Vaughn Currie B Add [J96 01] Acute respiratory failure with hypoxia [facility])       ED Disposition     ED Disposition Condition Date/Time Comment    Admit Stable Wed Jun 2, 2021  5:00 PM Case was discussed with RANGEL and the patient's admission status was agreed to be Admission Status: inpatient status to the service of Dr Darwin Andrade   Follow-up Information     Follow up With Specialties Details Why [address] [facility]  Follow up  [address]  [facility]  [phone]            Current Discharge Medication List      CONTINUE these medications which have NOT CHANGED    Details   acetaminophen (TYLENOL) 325 mg tablet Take 3 tablets (975 mg total) by mouth 3 (three) times a day    Associated Diagnoses: S/P TAVR (transcatheter aortic valve replacement)      aspirin 81 MG tablet Take 81 mg by mouth daily       Cholecalciferol (HM VITAMIN D3) 2000 units CAPS Take 1 tablet by mouth daily      furosemide (LASIX) 20 mg tablet Take 1 tablet (20 mg total) by mouth daily  Qty: 90 tablet, Refills: 2    Associated Diagnoses: HTN (hypertension)      levothyroxine 75 mcg tablet 1 tablet daily M-Fridays   1 and 1/2 tablets Sat-Sundays  Qty: 90 tablet, Refills: 3    Associated Diagnoses: Acquired hypothyroidism      Multiple Vitamin (multivitamin) capsule Take 1 capsule by mouth daily      omeprazole (PriLOSEC) 20 mg delayed release capsule Take 20 mg by mouth daily      potassium chloride (K-DUR,KLOR-CON) 10 mEq tablet Take 1 tablet (10 mEq total) by mouth daily  Qty: 90 tablet, Refills: 1    Associated Diagnoses: Hypokalemia      simvastatin (ZOCOR) 20 mg tablet TAKE 1 TABLET(20 MG) BY MOUTH DAILY AT BEDTIME  Qty: 90 tablet, Refills: 1    Associated Diagnoses: Dyslipidemia      SPIRIVA RESPIMAT 2 5 MCG/ACT AERS inhaler INHALE 2 PUFFS BY MOUTH DAILY  Qty: 12 g, Refills: 6    Associated Diagnoses: Chronic obstructive pulmonary disease, unspecified COPD type (HCC)      alfuzosin (UROXATRAL) 10 mg 24 hr tablet Take 1 tablet (10 mg total) by mouth daily  Qty: 90 tablet, Refills: 3    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      GUAIFENESIN ER PO Take 600 mg by mouth 2 (two) times a day      metoprolol succinate (TOPROL-XL) 25 mg 24 hr tablet TAKE 1/2 TABLET every 12 hous  Qty: 45 tablet, Refills: 0    Comments: **Patient requests 90 days supply**  Associated Diagnoses: S/P TAVR (transcatheter aortic valve replacement)           No discharge procedures on file  PDMP Review       Value Time User    PDMP Reviewed  Yes 2/8/2021 10:31 PM Camron Rebolledo DO           ED Provider  Attending physically available and evaluated Liza Smith I managed the patient along with the ED Attending      Electronically Signed by         Timo Akers MD  06/04/21 [id number]

## 2021-06-02 NOTE — ASSESSMENT & PLAN NOTE
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

## 2021-06-02 NOTE — ASSESSMENT & PLAN NOTE
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

## 2021-06-02 NOTE — TELEPHONE ENCOUNTER
PEDRO PABLO DicksonA nurse called stating that patient past couple of days has had decreased appetite, increased shortness breath and sputum production  Pulse ox 93% on room air  BP's are 92/44 and 98/42  Also has non -pitting bilateral lower leg edema  [facility] nurse is inquiring if he should go to ER? Stated that patient should go to ER  Dr Roberson Patch informed and agreed that patient should go to ER

## 2021-06-02 NOTE — ED ATTENDING ATTESTATION
Sari Carlson DO, saw and evaluated the patient  I have discussed the patient with the resident/non-physician practitioner and agree with the resident's/non-physician practitioner's findings, Plan of Care, and MDM as documented in the resident's/non-physician practitioner's note, except where noted  All available labs and Radiology studies were reviewed  At this point I agree with the current assessment done in the Emergency Department  I have conducted an independent evaluation of this patient including a focused history and a physical exam     ED Note - Liza Smith 80 y o  male MRN: [id number]  Unit/Bed#: ED 03 Encounter: [id number]    History of Present Illness   HPI  Liza Smith is a 80 y o  male who presents for evaluation of progressively worsening shortness of breath  Patient has history of COPD and heart failure  Patient states decreased exertional capacity  No chest pain/pressure/heaviness  No recent illness/no fever or chills  Compliant with medications  States recently decrease Lasix dosing  Indwelling Porter catheter for history of prostate cancer  Last changed approximately 2 weeks ago  No changes in urine quality/consistency  REVIEW OF SYSTEMS  See HPI for further details  12 systems reviewed and otherwise negative except as noted     Historical Information     PAST MEDICAL HISTORY  Past Medical History:   Diagnosis Date    Anemia     [facility] [address] [facility]     Cancer ([facility] [address] [facility]     bladder    Carotid artery occlusion     Cataract     Colon polyp     COPD (chronic obstructive pulmonary disease) (HCC)     Coronary artery disease     Disease of thyroid gland     History of transfusion     Hyperlipidemia     Hypertension     Hypothyroidism 9/15/2017    Multiple pulmonary nodules     Peptic ulcer     Scoliosis     Sepsis without acute organ dysfunction ([facility] [address] ) 1/26/2021    SIRS (systemic inflammatory response syndrome) ([facility] [address] ) 12/19/2019    Transient cerebral ischemia     Tremors of nervous system        FAMILY HISTORY  Family History   Problem Relation Age of Onset    Cervical cancer Mother     Cervical cancer Sister     Heart disease Sister     Coronary artery disease Sister     Lung cancer Brother        SOCIAL HISTORY  Social History     Socioeconomic History    Marital status: /Civil Union     Spouse name: None    Number of children: None    Years of education: None    Highest education level: None   Occupational History    Occupation: Retired   Social Needs    Financial resource strain: None    Food insecurity     Worry: None     Inability: None    Transportation needs     Medical: None     Non-medical: None   Tobacco Use    Smoking status: Former Smoker     Packs/day: 1 00     Years: 50 00     Pack years: 50 00     Types: Cigarettes     Start date:      Quit date:      Years since quittin 4    Smokeless tobacco: Never Used   Substance and Sexual Activity    Alcohol use: Not Currently     Alcohol/week: 0 0 standard drinks     Comment: Social     Drug use: Never    Sexual activity: Not Currently   Lifestyle    Physical activity     Days per week: None     Minutes per session: None    Stress: None   Relationships    Social connections     Talks on phone: None     Gets together: None     Attends Sikh service: None     Active member of club or organization: None     Attends meetings of clubs or organizations: None     Relationship status: None    Intimate partner violence     Fear of current or ex partner: None     Emotionally abused: None     Physically abused: None     Forced sexual activity: None   Other Topics Concern    None   Social History Narrative    None       SURGICAL HISTORY  Past Surgical History:   Procedure Laterality Date    BACK SURGERY      , ,     BUNIONECTOMY Left     Simple exostectomy (silver procedure)    CAROTID ENDARTERECTOMY Right 09/10/2008    Randy Foster CATARACT EXTRACTION      CATARACT EXTRACTION, BILATERAL      CERVICAL DISCECTOMY  1969    COLONOSCOPY      CORONARY ARTERY BYPASS GRAFT  10/20/2010    x3 (LIMA-LAD, SVG-OM, SVG-RCA)    CYSTOSCOPY      ELBOW SURGERY Right 07/2012    FEMORAL ARTERY - TIBIAL ARTERY BYPASS GRAFT  12/05/2011    using reversed saphenous vein from right leg  Elpidio Baez MD    AL ECHO TRANSESOPHAG R-T 2D W/PRB IMG ACQUISJ I&R N/A 10/6/2020    Procedure: TRANSESOPHAGEAL ECHOCARDIOGRAM (DAVID); Surgeon: Shun Good DO;  Location: [facility];  Service: Cardiac Surgery    AL REPLACE AORTIC VALVE OPENFEMORAL ARTERY APPROACH N/A 10/6/2020    Procedure: REPLACEMENT AORTIC VALVE TRANSCATHETER (TAVR) TRANSFEMORAL W/ 26MM MONTALVO BREE S3 ULTRA VALVE(ACCESS ON LEFT); Surgeon: Shun Good DO;  Location: [facility];  Service: Cardiac Surgery    REPLACEMENT TOTAL KNEE Left     SHOULDER SURGERY Right 1997     Meds/Allergies     CURRENT MEDICATIONS  No current facility-administered medications for this encounter  Current Outpatient Medications:     acetaminophen (TYLENOL) 325 mg tablet, Take 3 tablets (975 mg total) by mouth 3 (three) times a day, Disp: , Rfl:     aspirin 81 MG tablet, Take 81 mg by mouth daily , Disp: , Rfl:     Cholecalciferol (HM VITAMIN D3) 2000 units CAPS, Take 1 tablet by mouth daily, Disp: , Rfl:     furosemide (LASIX) 20 mg tablet, Take 1 tablet (20 mg total) by mouth daily, Disp: 90 tablet, Rfl: 2    levothyroxine 75 mcg tablet, 1 tablet daily M-Fridays   1 and 1/2 tablets Sat-Sundays, Disp: 90 tablet, Rfl: 3    Multiple Vitamin (multivitamin) capsule, Take 1 capsule by mouth daily, Disp: , Rfl:     omeprazole (PriLOSEC) 20 mg delayed release capsule, Take 20 mg by mouth daily, Disp: , Rfl:     potassium chloride (K-DUR,KLOR-CON) 10 mEq tablet, Take 1 tablet (10 mEq total) by mouth daily, Disp: 90 tablet, Rfl: 1    SPIRIVA RESPIMAT 2 5 MCG/ACT AERS inhaler, INHALE 2 PUFFS BY MOUTH DAILY, Disp: 12 g, Rfl: 6    alfuzosin (UROXATRAL) 10 mg 24 hr tablet, Take 1 tablet (10 mg total) by mouth daily, Disp: 90 tablet, Rfl: 3    GUAIFENESIN ER PO, Take 600 mg by mouth 2 (two) times a day, Disp: , Rfl:     metoprolol succinate (TOPROL-XL) 25 mg 24 hr tablet, TAKE 1/2 TABLET every 12 hous, Disp: 45 tablet, Rfl: 0    simvastatin (ZOCOR) 20 mg tablet, TAKE 1 TABLET(20 MG) BY MOUTH DAILY AT BEDTIME, Disp: 90 tablet, Rfl: 1  (Not in a hospital admission)      ALLERGIES  Allergies   Allergen Reactions    Aleve [Naproxen]      Other reaction(s): FACIAL SWELLING  Category: Allergy; Annotation - [id number]: lip/eye edema    Ancef [Cefazolin] Anaphylaxis     Hypotension, rash, itching    Augmentin [Amoxicillin-Pot Clavulanate] Diarrhea and Vomiting     Objective     PHYSICAL EXAM    VITAL SIGNS: Blood pressure 108/53, pulse 75, temperature 98 °F (36 7 °C), temperature source Tympanic, resp  rate 22, weight 63 2 kg (139 lb 5 3 oz), SpO2 95 %  Constitutional:  Appears well developed and well nourished, no acute distress, non-toxic appearance   Eyes:  PERRL, EOMI, conjunctivae pink, sclerae non-icteric    HENT:  Normocephalic/Atraumatic, no rhinorrhea, mucous membranes moist   Neck: normal range of motion, no tenderness, supple   Respiratory:  No respiratory distress, decreased breath sounds, bibasilar crackles   Cardiovascular:  Normal rate, irregular rhythm  GI:  Soft, no tenderness, non-distended  :  No CVAT, no flank ecchymosis  Musculoskeletal:  Bilateral pitting edema, no tenderness, no deformities  Integument:  Pink, warm, dry, Well hydrated, no rash, no erythema, no bullae   Lymphatic:  No cervical/ tonsillar/ submandibular lymphadenopathy noted   Neurologic:  Awake, Alert & oriented x 3, CN 2-12 intact, no focal neurological deficits, motor function intact  Psychiatric:  Speech and behavior appropriate       ED COURSE and MDM:    Assessment/Plan   Assessment:  Holli Gu is a 80 y o  male presents for evaluation of dyspnea, COPD/ CHF  Plan:  Labs, EKG, CXR, Symptom management  Disposition as appropriate  CRITICAL CARE TIME:   0      Portions of the record may have been created with voice recognition software  Occasional wrong word or "sound a like" substitutions may have occurred due to the inherent limitations of voice recognition software       ED Provider  Electronically Signed by

## 2021-06-03 ENCOUNTER — APPOINTMENT (INPATIENT)
Dept: NON INVASIVE DIAGNOSTICS | Facility: HOSPITAL | Age: 86
DRG: 196 | End: 2021-06-03
Payer: MEDICARE

## 2021-06-03 PROBLEM — R91.1 LUNG NODULE: Status: ACTIVE | Noted: 2021-06-03

## 2021-06-03 PROBLEM — I50.33 ACUTE ON CHRONIC DIASTOLIC CONGESTIVE HEART FAILURE (HCC): Status: ACTIVE | Noted: 2021-02-06

## 2021-06-03 LAB
ANION GAP SERPL CALCULATED.3IONS-SCNC: 7 MMOL/L (ref 4–13)
BACTERIA SPT RESP CULT: ABNORMAL
BASOPHILS # BLD AUTO: 0.01 THOUSANDS/ΜL (ref 0–0.1)
BASOPHILS NFR BLD AUTO: 1 % (ref 0–1)
BUN SERPL-MCNC: 10 MG/DL (ref 5–25)
CALCIUM SERPL-MCNC: 8.7 MG/DL (ref 8.3–10.1)
CHLORIDE SERPL-SCNC: 101 MMOL/L (ref 100–108)
CO2 SERPL-SCNC: 24 MMOL/L (ref 21–32)
CREAT SERPL-MCNC: 0.57 MG/DL (ref 0.6–1.3)
EOSINOPHIL # BLD AUTO: 0 THOUSAND/ΜL (ref 0–0.61)
EOSINOPHIL NFR BLD AUTO: 0 % (ref 0–6)
ERYTHROCYTE [DISTWIDTH] IN BLOOD BY AUTOMATED COUNT: 14.6 % (ref 11.6–15.1)
GFR SERPL CREATININE-BSD FRML MDRD: 92 ML/MIN/1.73SQ M
GLUCOSE SERPL-MCNC: 113 MG/DL (ref 65–140)
GRAM STN SPEC: ABNORMAL
HCT VFR BLD AUTO: 34.2 % (ref 36.5–49.3)
HGB BLD-MCNC: 11.2 G/DL (ref 12–17)
IMM GRANULOCYTES # BLD AUTO: 0.02 THOUSAND/UL (ref 0–0.2)
IMM GRANULOCYTES NFR BLD AUTO: 1 % (ref 0–2)
L PNEUMO1 AG UR QL IA.RAPID: NEGATIVE
LYMPHOCYTES # BLD AUTO: 0.18 THOUSANDS/ΜL (ref 0.6–4.47)
LYMPHOCYTES NFR BLD AUTO: 8 % (ref 14–44)
MAGNESIUM SERPL-MCNC: 2.1 MG/DL (ref 1.6–2.6)
MCH RBC QN AUTO: 31.7 PG (ref 26.8–34.3)
MCHC RBC AUTO-ENTMCNC: 32.7 G/DL (ref 31.4–37.4)
MCV RBC AUTO: 97 FL (ref 82–98)
MONOCYTES # BLD AUTO: 0.07 THOUSAND/ΜL (ref 0.17–1.22)
MONOCYTES NFR BLD AUTO: 3 % (ref 4–12)
NEUTROPHILS # BLD AUTO: 1.9 THOUSANDS/ΜL (ref 1.85–7.62)
NEUTS SEG NFR BLD AUTO: 87 % (ref 43–75)
NRBC BLD AUTO-RTO: 0 /100 WBCS
PLATELET # BLD AUTO: 200 THOUSANDS/UL (ref 149–390)
PMV BLD AUTO: 10.6 FL (ref 8.9–12.7)
POTASSIUM SERPL-SCNC: 4.1 MMOL/L (ref 3.5–5.3)
PROCALCITONIN SERPL-MCNC: <0.05 NG/ML
RBC # BLD AUTO: 3.53 MILLION/UL (ref 3.88–5.62)
SODIUM SERPL-SCNC: 132 MMOL/L (ref 136–145)
WBC # BLD AUTO: 2.18 THOUSAND/UL (ref 4.31–10.16)

## 2021-06-03 PROCEDURE — 99233 SBSQ HOSP IP/OBS HIGH 50: CPT | Performed by: INTERNAL MEDICINE

## 2021-06-03 PROCEDURE — 80048 BASIC METABOLIC PNL TOTAL CA: CPT | Performed by: INTERNAL MEDICINE

## 2021-06-03 PROCEDURE — 84145 PROCALCITONIN (PCT): CPT | Performed by: INTERNAL MEDICINE

## 2021-06-03 PROCEDURE — 99222 1ST HOSP IP/OBS MODERATE 55: CPT | Performed by: INTERNAL MEDICINE

## 2021-06-03 PROCEDURE — 85049 AUTOMATED PLATELET COUNT: CPT | Performed by: INTERNAL MEDICINE

## 2021-06-03 PROCEDURE — 97163 PT EVAL HIGH COMPLEX 45 MIN: CPT

## 2021-06-03 PROCEDURE — 97110 THERAPEUTIC EXERCISES: CPT

## 2021-06-03 PROCEDURE — 83735 ASSAY OF MAGNESIUM: CPT | Performed by: INTERNAL MEDICINE

## 2021-06-03 PROCEDURE — 87449 NOS EACH ORGANISM AG IA: CPT | Performed by: INTERNAL MEDICINE

## 2021-06-03 PROCEDURE — 87205 SMEAR GRAM STAIN: CPT | Performed by: INTERNAL MEDICINE

## 2021-06-03 PROCEDURE — 94760 N-INVAS EAR/PLS OXIMETRY 1: CPT

## 2021-06-03 PROCEDURE — 93306 TTE W/DOPPLER COMPLETE: CPT

## 2021-06-03 PROCEDURE — 85025 COMPLETE CBC W/AUTO DIFF WBC: CPT | Performed by: INTERNAL MEDICINE

## 2021-06-03 PROCEDURE — 94640 AIRWAY INHALATION TREATMENT: CPT

## 2021-06-03 RX ORDER — LEVALBUTEROL INHALATION SOLUTION 0.63 MG/3ML
0.63 SOLUTION RESPIRATORY (INHALATION)
Status: DISCONTINUED | OUTPATIENT
Start: 2021-06-03 | End: 2021-06-06 | Stop reason: HOSPADM

## 2021-06-03 RX ORDER — METHYLPREDNISOLONE SODIUM SUCCINATE 40 MG/ML
40 INJECTION, POWDER, LYOPHILIZED, FOR SOLUTION INTRAMUSCULAR; INTRAVENOUS DAILY
Status: DISCONTINUED | OUTPATIENT
Start: 2021-06-04 | End: 2021-06-04

## 2021-06-03 RX ADMIN — METOPROLOL SUCCINATE 12.5 MG: 25 TABLET, FILM COATED, EXTENDED RELEASE ORAL at 11:01

## 2021-06-03 RX ADMIN — ASPIRIN 81 MG: 81 TABLET, CHEWABLE ORAL at 10:50

## 2021-06-03 RX ADMIN — Medication 2000 UNITS: at 10:48

## 2021-06-03 RX ADMIN — IPRATROPIUM BROMIDE 0.5 MG: 0.5 SOLUTION RESPIRATORY (INHALATION) at 20:13

## 2021-06-03 RX ADMIN — LEVOTHYROXINE SODIUM 75 MCG: 75 TABLET ORAL at 06:16

## 2021-06-03 RX ADMIN — POTASSIUM CHLORIDE 20 MEQ: 1500 TABLET, EXTENDED RELEASE ORAL at 10:47

## 2021-06-03 RX ADMIN — PRAVASTATIN SODIUM 40 MG: 20 TABLET ORAL at 17:55

## 2021-06-03 RX ADMIN — LEVALBUTEROL HYDROCHLORIDE 0.63 MG: 0.63 SOLUTION RESPIRATORY (INHALATION) at 20:13

## 2021-06-03 RX ADMIN — Medication 1 TABLET: at 10:48

## 2021-06-03 RX ADMIN — METHYLPREDNISOLONE SODIUM SUCCINATE 40 MG: 40 INJECTION, POWDER, FOR SOLUTION INTRAMUSCULAR; INTRAVENOUS at 15:23

## 2021-06-03 RX ADMIN — METHYLPREDNISOLONE SODIUM SUCCINATE 40 MG: 40 INJECTION, POWDER, FOR SOLUTION INTRAMUSCULAR; INTRAVENOUS at 06:16

## 2021-06-03 RX ADMIN — GUAIFENESIN 600 MG: 600 TABLET, EXTENDED RELEASE ORAL at 21:54

## 2021-06-03 RX ADMIN — ENOXAPARIN SODIUM 40 MG: 40 INJECTION SUBCUTANEOUS at 10:51

## 2021-06-03 RX ADMIN — DOCUSATE SODIUM 100 MG: 100 CAPSULE ORAL at 17:54

## 2021-06-03 RX ADMIN — GUAIFENESIN 600 MG: 600 TABLET, EXTENDED RELEASE ORAL at 10:49

## 2021-06-03 RX ADMIN — POTASSIUM CHLORIDE 20 MEQ: 1500 TABLET, EXTENDED RELEASE ORAL at 17:54

## 2021-06-03 RX ADMIN — TAMSULOSIN HYDROCHLORIDE 0.4 MG: 0.4 CAPSULE ORAL at 17:54

## 2021-06-03 RX ADMIN — FUROSEMIDE 20 MG: 10 INJECTION, SOLUTION INTRAMUSCULAR; INTRAVENOUS at 10:50

## 2021-06-03 RX ADMIN — PANTOPRAZOLE SODIUM 20 MG: 20 TABLET, DELAYED RELEASE ORAL at 06:16

## 2021-06-03 NOTE — PLAN OF CARE
Problem: PHYSICAL THERAPY ADULT  Goal: Performs mobility at highest level of function for planned discharge setting  See evaluation for individualized goals  Description: Treatment/Interventions: ADL retraining, Functional transfer training, LE strengthening/ROM, Elevations, Therapeutic exercise, Endurance training, Cognitive reorientation, Patient/family training, Equipment eval/education, Bed mobility, Gait training, Spoke to nursing  Equipment Recommended: (has RW for d/c )       See flowsheet documentation for full assessment, interventions and recommendations  Outcome: Progressing  Note: Prognosis: Fair  Problem List: Decreased strength, Decreased endurance, Impaired balance, Decreased mobility, Impaired judgement, Decreased safety awareness, Impaired vision, Impaired hearing  Assessment: Pt is 80 y o  male seen for PT evaluation s/p admit to [facility] 6/2/[facility]21  Pt presenting w/ increased SOB and FLORES; fatigue and cough; recent fall onto buttocks  Of note pt w/ admission for  acute hypoxia respiratory failure in April and was receiving home therapies  Current dx/ problem list includes:  Respiratory failure with hypoxia, acute on chronic diastolic CHF  Pt seen by pulmonary and differential reported as " atypical infection vs bronchiectasis flare vs component of diastolic dysfunction" Pt   has a past medical history of Anemia, Bladder cancer ([address] ), Cancer ([address] ), Carotid artery occlusion, Cataract, Colon polyp, COPD (chronic obstructive pulmonary disease) ([address] ), Coronary artery disease, Disease of thyroid gland, History of transfusion, Hyperlipidemia, Hypertension, Hypothyroidism (9/15/2017), Multiple pulmonary nodules, Peptic ulcer, Scoliosis, Sepsis without acute organ dysfunction ([address] ) (1/26/2021), SIRS (systemic inflammatory response syndrome) ([address] ) (12/19/2019), Transient cerebral ischemia, and Tremors of nervous system    Personal factors affecting pt at time of IE include: step to enter environment,  advanced age, past experience, inability to perform IADLs, inability to ambulate household distances w/o external assist , limited insight into impairments and recent fall(s)  Due to acute medical issues, ongoing medical workup for primary dx; pain, fall risk, increased reliance on more restrictive AD compared to baseline;  decreased activity tolerance compared to baseline, inc O2 needs;  increased assistance needed from caregiver at current time, continuous telemetry monitoring, multiple lines, decline in overall functional mobility status; health management issues; note unstable clinical picture (high complexity)   Prior to admission, pt was living with spouse, Poonam Magdaleno, in a ranch style home with 1 step to enter  Patient was MI w/ ADLs and mobility prior to admission and using a rolling walker  Spouse does driving shopping cleaning  Patient has a rolling walker; Rollator; grab bars; SPC  and bedside commode at home   Patient was receiving home PT and OT and nursing following last admission  Patient reports 1 recent fall onto buttocks without injury  Currently pt  is requiring Erik A for bed skills; Erik for functional transfers and Erik for ambulation w/ RW on 3Lo2 ~15'x2 w/ SOB and FLORES limiting further distances Spo2 w/ ambulation dropping to 85%- recovering to 91% w/ seated rest and cues for breathing    Pt presents     Barriers to Discharge Comments: 1 CHERRY/ medical status      PT Discharge Recommendation: Home with home health rehabilitation          See flowsheet documentation for full assessment

## 2021-06-03 NOTE — CONSULTS
PULMONOLOGY CONSULT NOTE     Name: Sheyla Griffin   Age & Sex: 80 y o  male   MRN: [id number]  Unit/Bed#: -01   Encounter: [id number]        Reason for consultation: possible hypersensitivity pneumonitis     Requesting physician: Dr Jared Munoz and Plan:    Acute hypoxic respiratory failure likely secondary to atypical infection vs bronchiectasis flare vs component of diastolic dysfunction   - bronchoscopy on 4/13 showed no bacterial, fungal, viral growth on cultures, negative for malignancy   - s/p Vancomycin and Rocephin in ED, however, if having urinary symptoms, would continue rocephin x 3 days and f/u urine cx   - PCT negative   - IgE 965  - follows with Dr Thu Nunez  - productive cough- send for sputum cx   - send legionella Ag although much less likely   - he has always had monocytosis on differentials which may indicate a chronic inflammatory disease vs parasitic infection, autoimmune d/o or malignancy   - bnp 1202 (last admission 1603) - consider repeating 2D echo to check TAVR   - has small amount of granular casts in urine but no protein - unclear significance if any   - decrease solumedrol to 40mg IV daily - does not appear to be a COPD exacerbation     Emphysema - unknown severity   - on spiriva at home   - change to atrovent and albuterol TID nebs   - continue mucinex bid     AS s/p TAVR 2020   Chronic diastolic heart failure  - last 2D echo 11/2020 showed EF 60%, grade 2 DD, PASP 48mmHg   - on lasix IV 20mg daily     CAD s/p CABG in 2010       History of Present Illness   HPI:  Sheyla Griffin is a 80 y o  male presents with worsening shortness of breath  Patient was admitted in the month of April for acute hypoxic respiratory failure, he was at that time evaluated by Pulmonary he underwent extensive workup including bronchoscopy, he was diagnosed with possible hypersensitivity pneumonitis, he received IV Solu-Medrol and was discharged on taper to SNF for rehabilitation    He has since been discharged home from SNF, he has also followed up with Pulmonary Dr Karime Buckley and completed the prednisone taper  He has been working with physical therapy at home, he has noticed decreased effort tolerance last few days      Since a week or so he has noticed worsening dyspnea on exertion, decreased effort tolerance easy fatigability and lack of energy  He also reports cough associated with brownish phlegm  He gets 1 or 2 episodes of cough usually 1 episode in the morning and reports bringing up "quarter-size phlegm"  Occasionally phlegm is pinkish  Denies wheezing or chest tightness  He reports his shortness of breath is progressively getting worse, he has noticed in the last couple of days he is unable to walk more than 3 minutes at a stretch  He has to rest for 5 minutes or so and then is able to walk again        Symptoms have been getting progressively worse, today he noticed complete lack of energy and shortness of breath when he woke up in the morning  Given these symptoms he presents to the hospital for further management  Review of systems:  12 point review of systems was completed and was otherwise negative except as listed in HPI        Historical Information   Past Medical History:   Diagnosis Date    Anemia     [facility]     Cancer ([facility] )     bladder    Carotid artery occlusion     Cataract     Colon polyp     COPD (chronic obstructive pulmonary disease) (HCC)     Coronary artery disease     Disease of thyroid gland     History of transfusion     Hyperlipidemia     Hypertension     Hypothyroidism 9/15/2017    Multiple pulmonary nodules     Peptic ulcer     Scoliosis     Sepsis without acute organ dysfunction [facility] 1/26/2021    SIRS (systemic inflammatory response syndrome) ([facility] ) 12/19/2019    Transient cerebral ischemia     Tremors of nervous system      Past Surgical History:   Procedure Laterality Date   [facility] SURGERY      1973, 1987, 2005    BUNIONECTOMY Left     Simple exostectomy (silver procedure)    CAROTID ENDARTERECTOMY Right 09/10/2008    Randy LEDBETTER MD    CATARACT EXTRACTION      CATARACT EXTRACTION, BILATERAL      CERVICAL DISCECTOMY      COLONOSCOPY      CORONARY ARTERY BYPASS GRAFT  10/20/2010    x3 (LIMA-LAD, SVG-OM, SVG-RCA)    CYSTOSCOPY      ELBOW SURGERY Right 2012    FEMORAL ARTERY - TIBIAL ARTERY BYPASS GRAFT  2011    using reversed saphenous vein from right leg  Kaylynn Reed MD    UT ECHO TRANSESOPHAG R-T 2D W/PRB IMG ACQUISJ I&R N/A 10/6/2020    Procedure: TRANSESOPHAGEAL ECHOCARDIOGRAM (DAVID); Surgeon: Brock John DO;  [facility]: [facility];  Service: Cardiac Surgery    UT REPLACE AORTIC VALVE OPENFEMORAL ARTERY APPROACH N/A 10/6/2020    Procedure: REPLACEMENT AORTIC VALVE TRANSCATHETER (TAVR) TRANSFEMORAL W/ 26MM MONTALVO BREE S3 ULTRA VALVE(ACCESS ON LEFT);   Surgeon: Brock John DO;  [facility];  Service: Cardiac Surgery    REPLACEMENT TOTAL KNEE Left     SHOULDER SURGERY Right      Family History   Problem Relation Age of Onset    Cervical cancer Mother     Cervical cancer Sister     Heart disease Sister     Coronary artery disease Sister     Lung cancer Brother        Occupational History: was in the [employer], never traveled overseas, worked as a  in [employer], now retired     Social History: no vaping, no illicit drug use  Social History     Tobacco Use   Smoking Status Former Smoker    Packs/day: 1 00    Years: 50 00    Pack years: 50 00    Types: Cigarettes    Start date:     Quit date: [facility] 49 Years since quittin 4   Smokeless Tobacco Never Used         Meds/Allergies   Current Facility-Administered Medications   Medication Dose Route Frequency    acetaminophen (TYLENOL) tablet 650 mg  650 mg Oral Q6H PRN    aluminum-magnesium hydroxide-simethicone (MYLANTA) oral suspension 30 mL  30 mL Oral Q6H PRN    aspirin chewable tablet 81 mg  81 mg Oral Daily    cholecalciferol (VITAMIN D3) tablet 2,000 Units  2,000 Units Oral Daily    docusate sodium (COLACE) capsule 100 mg  100 mg Oral BID    enoxaparin (LOVENOX) subcutaneous injection 40 mg  40 mg Subcutaneous Daily    furosemide (LASIX) injection 20 mg  20 mg Intravenous Daily    guaiFENesin (MUCINEX) 12 hr tablet 600 mg  600 mg Oral BID    [START ON 6/5/2021] levothyroxine tablet 112 mcg  112 mcg Oral Once per day on Sun Sat    levothyroxine tablet 75 mcg  75 mcg Oral Once per day on Mon Tue Wed Thu Fri    methylPREDNISolone sodium succinate (Solu-MEDROL) injection 40 mg  40 mg Intravenous Q8H [address]    metoprolol succinate (TOPROL-XL) 24 hr tablet 12 5 mg  12 5 mg Oral BID    multivitamin-minerals (CENTRUM) tablet 1 tablet  1 tablet Oral Daily    ondansetron (ZOFRAN) injection 4 mg  4 mg Intravenous Q6H PRN    pantoprazole (PROTONIX) EC tablet 20 mg  20 mg Oral Early Morning    polyethylene glycol (MIRALAX) packet 17 g  17 g Oral Daily    potassium chloride (K-DUR,KLOR-CON) CR tablet 20 mEq  20 mEq Oral BID    pravastatin (PRAVACHOL) tablet 40 mg  40 mg Oral Daily With Dinner    tamsulosin (FLOMAX) capsule 0 4 mg  0 4 mg Oral Daily With Dinner    tiotropium (SPIRIVA) capsule for inhaler 18 mcg  18 mcg Inhalation Daily     Medications Prior to Admission   Medication    acetaminophen (TYLENOL) 325 mg tablet    aspirin 81 MG tablet    Cholecalciferol (HM VITAMIN D3) 2000 units CAPS    furosemide (LASIX) 20 mg tablet    levothyroxine 75 mcg tablet    Multiple Vitamin (multivitamin) capsule    omeprazole (PriLOSEC) 20 mg delayed release capsule    potassium chloride (K-DUR,KLOR-CON) 10 mEq tablet    simvastatin (ZOCOR) 20 mg tablet    SPIRIVA RESPIMAT 2 5 MCG/ACT AERS inhaler    alfuzosin (UROXATRAL) 10 mg 24 hr tablet    GUAIFENESIN ER PO    metoprolol succinate (TOPROL-XL) 25 mg 24 hr tablet     Allergies   Allergen Reactions    Aleve [Naproxen]      Other reaction(s): FACIAL SWELLING  Category: Allergy; Annotation - [id number]: lip/eye edema    Ancef [Cefazolin] Anaphylaxis     Hypotension, rash, itching    Augmentin [Amoxicillin-Pot Clavulanate] Diarrhea and Vomiting       Vitals: Blood pressure 116/66, pulse 84, temperature 98 2 °F (36 8 °C), temperature source Oral, resp  rate 14, height 5' 6" (1 676 m), weight 59 kg (130 lb), SpO2 97 %  ,  Body mass index is 20 98 kg/m²  Intake/Output Summary (Last 24 hours) at 6/3/2021 0721  Last data filed at 6/3/2021 0419  Gross per 24 hour   Intake 250 ml   Output 1470 ml   Net -1220 ml       Physical Exam  Vitals signs reviewed  Constitutional:       Appearance: Normal appearance  Comments: Thin    HENT:      Head: Normocephalic  Nose: Nose normal       Mouth/Throat:      Mouth: Mucous membranes are moist    Eyes:      Pupils: Pupils are equal, round, and reactive to light  Cardiovascular:      Rate and Rhythm: Normal rate and regular rhythm  Pulses: Normal pulses  Heart sounds: Normal heart sounds  Pulmonary:      Effort: Pulmonary effort is normal       Breath sounds: Rales present  Abdominal:      General: Abdomen is flat  Palpations: Abdomen is soft  Musculoskeletal: Normal range of motion  Right lower leg: Edema present  Left lower leg: Edema present  Skin:     General: Skin is warm and dry  Neurological:      General: No focal deficit present  Mental Status: He is alert and oriented to person, place, and time  Labs: I have personally reviewed pertinent lab results    Laboratory and Diagnostics  Results from last 7 days   Lab Units 06/03/21  0500 06/02/21  1244   WBC Thousand/uL 2 18* 5 75   HEMOGLOBIN g/dL 11 2* 10 7*   HEMATOCRIT % 34 2* 32 1*   PLATELETS Thousands/uL 200 188   NEUTROS PCT % 87* 66   MONOS PCT % 3* 22*     Results from last 7 days   Lab Units 06/03/21  0500 06/02/21  1244   SODIUM mmol/L 132* 132*   POTASSIUM mmol/L 4 1 3 9   CHLORIDE mmol/L 101 101   CO2 mmol/L 24 23   ANION GAP mmol/L 7 8   BUN mg/dL 10 8   CREATININE mg/dL 0 57* 0 71   CALCIUM mg/dL 8 7 8 4   GLUCOSE RANDOM mg/dL 113 93     Results from last 7 days   Lab Units 06/03/21  0500   MAGNESIUM mg/dL 2 1           Results from last 7 days   Lab Units 06/02/21  1244   TROPONIN I ng/mL <0 02                         Results from last 7 days   Lab Units 06/03/21  0500   PROCALCITONIN ng/ml <0 05         Imaging and other studies: I have personally reviewed pertinent reports  and I have personally reviewed pertinent films in PACS    Pulmonary function testing: none     EKG, Pathology, and Other Studies: I have personally reviewed pertinent reports  and I have personally reviewed pertinent films in PACS    CT chest: Bilateral groundglass density with associated reticulation and bronchiectasis in both lungs  Findings have improved in the left upper lobe with worsening changes in the right upper, lower and middle lobes  Findings may be indicative of infectious or   inflammatory etiologies with bronchiectasis implying some scarring      9 x 8 mm right upper lobe nodule (series 601 image 86), previously measured about 5 x 4 mm  This could represent an inflammatory pseudonodule, however, follow-up CT chest is recommended in 3 months to exclude evolving neoplasm      Background of moderately severe emphysema      Small bilateral pleural effusions, slightly increased on the right      Code Status: Level 3 - DNAR and DNI    VTE Pharmacologic Prophylaxis: Enoxaparin (Lovenox)  VTE Mechanical Prophylaxis: sequential compression device      Jaime Roberto MD  Pulmonary/Critical Care Fellow  [facility]

## 2021-06-03 NOTE — PROGRESS NOTES
[facility]  Progress Note - Mary Richardson 1933, 80 y o  male MRN: [id number]  Unit/Bed#: -01 Encounter: [id number]  Primary Care Provider: Margot Martel MD   Date and time admitted to hospital: 6/2/2021 12:07 PM    * Acute respiratory failure with hypoxia [facility])  Assessment & Plan  Acute on chronic hypoxic respiratory failure multifactorial  Patient with known history of emphysema, presumed hypersensitivity pneumonitis  Worsening shortness of breath presently requiring 3 L supplemental oxygen, wean as tolerated to keep O2 sats more than 90-92%  IV Solu-Medrol  Continue respiratory treatments  Pulmonology consulted for further management      Acute on chronic diastolic congestive heart failure (HCC)  Assessment & Plan  Wt Readings from Last 3 Encounters:   06/03/21 59 kg (130 lb)   05/04/21 57 6 kg (127 lb)   04/18/21 60 2 kg (132 lb 11 2 oz)     Previous echo with EF 60% and grade 2 diastolic dysfunction status post bio TAVR  On Lasix 20 mg p o  Daily  Presently provide IV Lasix and transition to p o  Lasix in 24-48 hours  Monitor I/O, daily weights  Less than 2 g salt diet fluid restrictions      Hypersensitivity pneumonitis [facility])  Assessment & Plan  Patient presumed hypersensitivity pneumonitis  IV Solu-Medrol  Pulmonary consulted    Pulmonary emphysema (HCC)  Assessment & Plan  History of pulmonary emphysema  Continue respiratory treatment    Lung nodule  Assessment & Plan  9 x 8 mm right upper lobe nodule on CT scan  Repeat chest CT scan in 3 months    Severe protein-calorie malnutrition (HCC)  Assessment & Plan  Malnutrition Findings:           BMI Findings: Body mass index is 20 98 kg/m²       Continue with regular diet    S/P TAVR (transcatheter aortic valve replacement)  Assessment & Plan  Status post bioprosthetic  TAVR    Ambulatory dysfunction  Assessment & Plan  Multifactorial  Safe ambulation  Fall precautions  Physical therapy    Urine retention  Assessment & Plan  History of chronic urinary retention with Porter catheter in place  Outpatient follow-up with Urology    Hyponatremia  Assessment & Plan  Possibly secondary to volume overload  Monitor  Continue oral fluid restriction    Hypothyroidism  Assessment & Plan  Continue levothyroxine    Hypertension  Assessment & Plan  Monitor blood pressures  Patient usually runs low blood pressures  Avoid hypotension           VTE Pharmacologic Prophylaxis:   Pharmacologic agent: Enoxaparin (Lovenox)  Mechanical VTE Prophylaxis in Place: Yes    Patient Centered Rounds: I have performed bedside rounds with nursing staff today  Discussions with Specialists or Other Care Team Provider:     Education and Discussions with Family / Patient: Updated  (wife) via phone  Time Spent for Care: 45 minutes  More than 50% of total time spent on counseling and coordination of care as described above  Current Length of Stay: 1 day(s)  Current Patient Status: Inpatient     Certification Statement: The patient will continue to require additional inpatient hospital stay due to Above    Discharge Plan / Estimated Discharge Date: TBD    Code Status: Level 3 - DNAR and DNI      Subjective:   Patient seen and examined  He feels better  Shortness of breath is improving  No chest pain    Objective:     Vitals:   Temp (24hrs), Av 7 °F (36 5 °C), Min:97 °F (36 1 °C), Max:98 2 °F (36 8 °C)    Temp:  [97 °F (36 1 °C)-98 2 °F (36 8 °C)] 97 °F (36 1 °C)  HR:  [68-84] 84  Resp:  [14-22] 14  BP: ()/(45-70) 104/50  SpO2:  [95 %-98 %] 97 %  Body mass index is 20 98 kg/m²  Input and Output Summary (last 24 hours):        Intake/Output Summary (Last 24 hours) at 6/3/2021 0134  Last data filed at 6/3/2021 0419  Gross per 24 hour   Intake 250 ml   Output 1470 ml   Net -1220 ml       Physical Exam:   Physical Exam     Patient is awake alert in no acute distress  Chronically ill-appearing  Lung with decreased breath sounds bilateral  Heart positive S1-S2 no murmur  Abdomen soft nontender  Chronic Porter catheter in place  Bilateral lower extremity edema  Additional Data:     Labs:  Results from last 7 days   Lab Units 06/03/21  0500   WBC Thousand/uL 2 18*   HEMOGLOBIN g/dL 11 2*   HEMATOCRIT % 34 2*   PLATELETS Thousands/uL 200   NEUTROS PCT % 87*   LYMPHS PCT % 8*   MONOS PCT % 3*   EOS PCT % 0     Results from last 7 days   Lab Units 06/03/21  0500   SODIUM mmol/L 132*   POTASSIUM mmol/L 4 1   CHLORIDE mmol/L 101   CO2 mmol/L 24   BUN mg/dL 10   CREATININE mg/dL 0 57*   ANION GAP mmol/L 7   CALCIUM mg/dL 8 7   GLUCOSE RANDOM mg/dL 113                 Results from last 7 days   Lab Units 06/03/21  0500   PROCALCITONIN ng/ml <0 05       Imaging: Reviewed radiology reports from this admission including: chest CT scan    Recent Cultures (last 7 days):           Lines/Drains:  Invasive Devices     Peripheral Intravenous Line            Peripheral IV 06/02/21 Left Antecubital less than 1 day          Drain            Urethral Catheter Coude 16 Fr  128 days                Telemetry:        Last 24 Hours Medication List:   Current Facility-Administered Medications   Medication Dose Route Frequency Provider Last Rate    acetaminophen  650 mg Oral Q6H PRN Hemal Chang MD      aluminum-magnesium hydroxide-simethicone  30 mL Oral Q6H PRN Hemal Chang MD      aspirin  81 mg Oral Daily Hemal Chang MD      cholecalciferol  2,000 Units Oral Daily Hemal Chang MD      docusate sodium  100 mg Oral BID Hemal Chang MD      enoxaparin  40 mg Subcutaneous Daily Hemal Chang MD      furosemide  20 mg Intravenous Daily Hemal Chang MD      guaiFENesin  600 mg Oral BID Hemal Chang MD      [START ON 6/5/2021] levothyroxine  112 mcg Oral Once per day on Sun Sat Sofya Clark DO      levothyroxine  75 mcg Oral Once per day on Mon Tue Wed Thu Fri Sofya Clark DO      methylPREDNISolone sodium succinate  40 mg Intravenous Q8H Josy Corley MD      metoprolol succinate  12 5 mg Oral BID Jay Austin MD      multivitamin-minerals  1 tablet Oral Daily Jay Austin MD      ondansetron  4 mg Intravenous Q6H PRN Jay Austin MD      pantoprazole  20 mg Oral Early Morning Jay Austin MD      polyethylene glycol  17 g Oral Daily Jay Austin MD      potassium chloride  20 mEq Oral BID Jay Austin MD      pravastatin  40 mg Oral Daily With Naye Chirinos MD      tamsulosin  0 4 mg Oral Daily With Naye Chirinos MD      tiotropium  18 mcg Inhalation Daily Jay Austin MD          Today, Patient Was Seen By: Chaz Colon DO    ** Please Note: This note has been constructed using a voice recognition system   **

## 2021-06-03 NOTE — ASSESSMENT & PLAN NOTE
Wt Readings from Last 3 Encounters:   06/03/21 59 kg (130 lb)   05/04/21 57 6 kg (127 lb)   04/18/21 60 2 kg (132 lb 11 2 oz)     Previous echo with EF 60% and grade 2 diastolic dysfunction status post bio TAVR  On Lasix 20 mg p o  Daily  Presently provide IV Lasix and transition to p o   Lasix in 24-48 hours  Monitor I/O, daily weights  Less than 2 g salt diet fluid restrictions

## 2021-06-03 NOTE — ASSESSMENT & PLAN NOTE
Malnutrition Findings:           BMI Findings: Body mass index is 20 98 kg/m²       Continue with regular diet

## 2021-06-03 NOTE — CASE MANAGEMENT
LOS: Day 1  Bundle: pt is not a bundle  Readmission risk: pt is not a 30 day readmit    CM reviewed role with pt's wife Ramon Klinefelter at [phone]  Ramon Klinefelter confirmed that pt lives with her in a ranch style home with 1 CHERRY  Pt was IPTA, however spouse is the primary caretaker, spouse also does the driving and shopping  Pt uses a walker, rollator, grab bars and has a BSC and SC  Pt is open with [facility] for [address], hx of STR at [facility], hx of [place] at [facility]  PCP is Dr Jad Cavazos; pharmacy of choice is [email or web address] on [address] in [facility]  No reported hx of MH or D&A  Spouse Ramon Klinefelter is POA  CM reviewed d/c planning process including the following: identifying help at home, patient preference for d/c planning needs, Discharge Lounge, [facility] Meds to Bed program, availability of treatment team to discuss questions or concerns patient and/or family may have regarding understanding medications and recognizing signs and symptoms once discharged  CM also encouraged patient to follow up with all recommended appointments after discharge  Patient advised of importance for patient and family to participate in managing patients medical well being  Patient/caregiver received discharge checklist  Content reviewed  Patient/caregiver encouraged to participate in discharge plan of care prior to discharge home

## 2021-06-03 NOTE — ASSESSMENT & PLAN NOTE
Acute on chronic hypoxic respiratory failure multifactorial  Patient with known history of emphysema, presumed hypersensitivity pneumonitis  Worsening shortness of breath presently requiring 3 L supplemental oxygen, wean as tolerated to keep O2 sats more than 90-92%  IV Solu-Medrol  Continue respiratory treatments  Pulmonology consulted for further management

## 2021-06-03 NOTE — RESPIRATORY THERAPY NOTE
RT Protocol Note  Mary Castaneda 80 y o  male MRN: [id number]  Unit/Bed#: ED 03 Encounter: [id number]    Assessment    Principal Problem:    Acute respiratory failure with hypoxia [facility])  Active Problems:    Hypertension    Hypothyroidism    Pulmonary emphysema (HCC)    Urine retention    Ambulatory dysfunction    S/P TAVR (transcatheter aortic valve replacement)    Chronic diastolic congestive heart failure (HCC)    Hypersensitivity pneumonitis (HCC)      Home Pulmonary Medications:  Spiriva inhaler       Past Medical History:   Diagnosis Date    Anemia     Bladder cancer ([place] [address] )     Cancer ([place] [address] )     bladder    Carotid artery occlusion     Cataract     Colon polyp     [facility]     Coronary artery disease     Disease of thyroid gland     History of transfusion     Hyperlipidemia     Hypertension     Hypothyroidism 9/15/2017    Multiple pulmonary nodules     Peptic ulcer     Scoliosis     Sepsis without acute organ dysfunction ([place] [address] ) 2021    SIRS (systemic inflammatory response syndrome) ([place] [address] ) 2019    Transient cerebral ischemia     Tremors of nervous system      Social History     Socioeconomic History    Marital status: /Civil Union     Spouse name: None    Number of children: None    Years of education: None    Highest education level: None   Occupational History    Occupation: Retired   Social Needs    Financial resource strain: None    Food insecurity     Worry: None     Inability: None    Transportation needs     Medical: None     Non-medical: None   Tobacco Use    Smoking status: Former Smoker     Packs/day: 1 00     Years: 50 00     Pack years: 50 00     Types: Cigarettes     Start date: 56     Quit date:      Years since quittin 4    Smokeless tobacco: Never Used   Substance and Sexual Activity    Alcohol use: Not Currently     Alcohol/week: 0 0 standard drinks     Comment: Social     Drug use: Never    Sexual activity: Not Currently   Lifestyle    Physical activity     Days per week: None     Minutes per session: None    Stress: None   Relationships    Social connections     Talks on phone: None     Gets together: None     Attends [employer] service: None     Active member of club or organization: None     Attends meetings of clubs or organizations: None     Relationship status: None    Intimate partner violence     Fear of current or ex partner: None     Emotionally abused: None     Physically abused: None     Forced sexual activity: None   Other Topics Concern    None   Social History Narrative    None       Subjective         Objective    Physical Exam:   Assessment Type: Assess only  General Appearance: Alert, Awake  Respiratory Pattern: Dyspnea with exertion, Symmetrical, Spontaneous, Normal  Chest Assessment: Chest expansion symmetrical, Trachea midline  Bilateral Breath Sounds: Diminished, Clear  Cough: None  O2 Device: 2L NC    Vitals:  Blood pressure 109/56, pulse 76, temperature 98 °F (36 7 °C), temperature source Tympanic, resp  rate 16, weight 63 2 kg (139 lb 5 3 oz), SpO2 98 %  Imaging and other studies: I have personally reviewed pertinent reports  O2 Device: 2L NC     Plan    Respiratory Plan: No distress/Pulmonary history        Resp Comments: SAw pt at this time for resp protocol  No distress or SOB pt stated  Pt came into Ed for increased SOB but states felt much better with 02 NC  Pt takes spiriva at home  PRN to be ordered  Titrating 02 as tolerated  Pt states no CPAP or 02 at home

## 2021-06-03 NOTE — PHYSICAL THERAPY NOTE
PHYSICAL THERAPY EVALUATION [id number]  NAME:  Joshua Escobedo  DATE: 06/03/21    AGE:   80 y o  Mrn:   [id number]  ADMIT DX:  Shortness of breath [R06 02]  SOB (shortness of breath) [R06 02]  Dyspnea on exertion [R06 00]  Hypoxia [R09 02]  Acute exacerbation of CHF (congestive heart failure) (HCC) [I50 9]  Acute respiratory failure with hypoxia (HCC) [J96 01]    Past Medical History:   Diagnosis Date    Anemia     [facility] cancer [facility] )     Cancer ([place] [address] )     bladder    Carotid artery occlusion     Cataract     Colon polyp     COPD (chronic obstructive pulmonary disease) (HCC)     Coronary artery disease     Disease of thyroid gland     History of transfusion     Hyperlipidemia     Hypertension     Hypothyroidism 9/15/2017    Multiple pulmonary nodules     Peptic ulcer     Scoliosis     Sepsis without acute organ dysfunction ([place] [address] ) 1/26/2021    SIRS (systemic inflammatory response syndrome) ([place] [address] ) 12/19/2019    Transient cerebral ischemia     Tremors of nervous system      Past Surgical History:   Procedure Laterality Date   [facility] [place] SURGERY      1973, 1987, 2005    BUNIONECTOMY Left     Simple exostectomy (silver procedure)    CAROTID ENDARTERECTOMY Right 09/10/2008    Common  Lyric LEDBETTER MD    CATARACT EXTRACTION      CATARACT EXTRACTION, BILATERAL      CERVICAL DISCECTOMY  1969    COLONOSCOPY      CORONARY ARTERY BYPASS GRAFT  10/20/2010    x3 (LIMA-LAD, SVG-OM, SVG-RCA)    CYSTOSCOPY      ELBOW SURGERY Right 07/2012    FEMORAL ARTERY - TIBIAL ARTERY BYPASS GRAFT  12/05/2011    using reversed saphenous vein from right leg  Elpidio Baez MD    ID ECHO TRANSESOPHAG R-T 2D W/PRB IMG ACQUISJ I&R N/A 10/6/2020    Procedure: TRANSESOPHAGEAL ECHOCARDIOGRAM (DAVID);   Surgeon: Shun Good DO;  [facility]: [facility];  Service: Cardiac Surgery    ID REPLACE AORTIC VALVE OPENFEMORAL ARTERY APPROACH N/A 10/6/2020    Procedure: REPLACEMENT AORTIC VALVE TRANSCATHETER (TAVR) TRANSFEMORAL W/ 26MM MONTALVO BREE S3 ULTRA VALVE(ACCESS ON LEFT); Surgeon: Gayathri Sage DO;  [facility];  Service: Cardiac Surgery    REPLACEMENT TOTAL KNEE Left     SHOULDER SURGERY Right 1997       Length Of Stay: 1  PHYSICAL THERAPY EVALUATION :    06/03/21 1320   PT Last Visit   PT Visit Date 06/03/21   Note Type   Note type Evaluation   Pain Assessment   Pain Assessment Tool 0-10   Pain Score No Pain   Home Living   Type of [address] One level;Stairs to enter with rails  (1 CHERRY)   Home Equipment Walker;Cane  (rollator )   Prior Function   Level of [place] Independent with ADLs and functional mobility; Needs assistance with IADLs   Lives With Spouse   Receives Help From Family;Home health   ADL Assistance Independent   IADLs Needs assistance   Falls in the last 6 months 1 to 4   Vocational Retired   Comments Prior to admission, pt was living with spouse, Seble Blunt, in a ranch style home with 1 step to enter  Patient was MI w/ ADLs and mobility prior to admission and using a rolling walker  Spouse does driving shopping cleaning  Patient has a rolling walker; Rollator; grab bars; SPC  and bedside commode at home   Patient was receiving home PT and OT and nursing following last admission  Patient reports 1 recent fall onto buttocks without injury      Restrictions/Precautions   Weight Bearing Precautions Per Order No   Braces or Orthoses   (none )   Cognition   Overall Cognitive Status Impaired   Arousal/Participation Alert   Orientation Level Oriented to person;Oriented to place;Oriented to situation   Memory Decreased recall of precautions;Decreased recall of recent events   RUE Assessment   RUE Assessment WFL   LUE Assessment   LUE Assessment WFL   RLE Assessment   RLE Assessment WFL  (3+/5 functional )   LLE Assessment   LLE Assessment WFL  (3+/5 functional )   Coordination   Movements are Fluid and Coordinated 1   Bed Mobility   Supine to Sit 4  Minimal assistance Additional items Increased time required;Verbal cues;LE management   Additional Comments OOB in reclienr w/ all needs in reach- on 3Lo2    Transfers   Sit to Stand 4  Minimal assistance   Additional items Assist x 1; Increased time required;Verbal cues   Stand to Sit 4  Minimal assistance   Additional items Assist x 1; Increased time required;Verbal cues   Stand pivot 4  Minimal assistance   Additional items Assist x 1; Increased time required;Verbal cues   Ambulation/Elevation   Gait pattern Excessively slow; Inconsistent daniel; Short stride; Forward Flexion; Improper Weight shift   Gait Assistance 4  Minimal assist   Additional items Assist x 1;Verbal cues; Tactile cues  (cues for breathing and upright posture- forward gaze )   Assistive Device Rolling walker   Distance 3Lo2 ~15'x2 w/ SOB and FLORES limiting further distances Spo2 w/ ambulation dropping to 85%- recovering to 91% w/ seated rest and cues for breathing     Balance   Static Sitting Fair   Dynamic Sitting Fair -   Static Standing Poor +   Dynamic Standing Poor +   Ambulatory Poor  (rw)   Endurance Deficit   Endurance Deficit Yes   Activity Tolerance   Activity Tolerance Patient limited by fatigue  (drop in Spo2 w/ ambualtion )   Nurse Made Aware yes-    Assessment   Prognosis Fair   Problem List Decreased strength;Decreased endurance; Impaired balance;Decreased mobility; Impaired judgement;Decreased safety awareness; Impaired vision; Impaired hearing   Assessment Pt is 80 y o  male seen for PT evaluation s/p admit to [facility] on 6/2/2021  Pt presenting w/ increased SOB and FLORES; fatigue and cough; recent fall onto buttocks  Of note pt w/ admission for  acute hypoxia respiratory failure in April and was receiving home therapies  Current dx/ problem list includes:  Respiratory failure with hypoxia, acute on chronic diastolic CHF   Pt seen by pulmonary and differential reported as " atypical infection vs bronchiectasis flare vs component of diastolic dysfunction" Pt   has a past medical history of Anemia, Bladder cancer ([facility] ), Cancer ([facility] ), Carotid artery occlusion, Cataract, Colon polyp, COPD (chronic obstructive pulmonary disease) ([facility] ), Coronary artery disease, Disease of thyroid gland, History of transfusion, Hyperlipidemia, Hypertension, Hypothyroidism (9/15/2017), Multiple pulmonary nodules, Peptic ulcer, Scoliosis, Sepsis without acute organ dysfunction ([place] [address] ) (1/26/2021), SIRS (systemic inflammatory response syndrome) ([place] [address] ) (12/19/2019), Transient cerebral ischemia, and Tremors of nervous system  Personal factors affecting pt at time of IE include: step to enter environment,  advanced age, past experience, inability to perform IADLs, inability to ambulate household distances w/o external assist , limited insight into impairments and recent fall(s)  Due to acute medical issues, ongoing medical workup for primary dx; pain, fall risk, increased reliance on more restrictive AD compared to baseline;  decreased activity tolerance compared to baseline, inc O2 needs;  increased assistance needed from caregiver at current time, continuous telemetry monitoring, multiple lines, decline in overall functional mobility status; health management issues; note unstable clinical picture (high complexity)   Prior to admission, pt was living with spouse, Isra Sheehan, in a ranch style home with 1 step to enter  Patient was MI w/ ADLs and mobility prior to admission and using a rolling walker  Spouse does driving shopping cleaning  Patient has a rolling walker; Rollator; grab bars; SPC  and bedside commode at home   Patient was receiving home PT and OT and nursing following last admission  Patient reports 1 recent fall onto buttocks without injury   Currently pt  is requiring Erik A for bed skills; Erik for functional transfers and Erik for ambulation w/ RW on 3Lo2 ~15'x2 w/ SOB and FLORES limiting further distances Spo2 w/ ambulation dropping to 85%- recovering to 91% w/ seated rest and cues for breathing    Pt presents   Barriers to Discharge Comments 1 CHERRY/ medical status    Goals   Patient Goals go home and continue w/ home PT    STG Expiration Date 06/13/21   Short Term Goal #1 In 10 days pt will complete: 1) Bed mobility skills with S to facilitate safe return to previous living environment 2) Functional transfers with S  to facilitate safe return to previous living environment  3) Ambulation with RW 50'x2 without LOB and stable vitals for safe ambulation in home environment  4) Stair training up/ down 1 step/s with Erik and RW for safe home entry  5) Improve balance grades to F+ w/ RW  6) Improve LE strength grades by 1 to increase independence w/ transfers and gait  7) LE HEP independently  8) PT for ongoing pt and family education; DME needs and D/C planning to promote highest level of function in least restrictive environment  PT Treatment Day 0   Plan   Treatment/Interventions ADL retraining;Functional transfer training;LE strengthening/ROM; Elevations; Therapeutic exercise; Endurance training;Cognitive reorientation;Patient/family training;Equipment eval/education; Bed mobility;Gait training;Spoke to nursing   PT Frequency   (3-5x/wk )   Recommendation   PT Discharge Recommendation Home with home health rehabilitation   Equipment Recommended   (has RW for d/c )   AM-PAC Basic Mobility Inpatient   Turning in Bed Without Bedrails 3   Lying on Back to Sitting on Edge of Flat Bed 3   Moving Bed to Chair 3   Standing Up From Chair 3   Walk in Room 3   Climb 3-5 Stairs 2   Basic Mobility Inpatient Raw Score 17   Basic Mobility Standardized Score 39 67       Pt is 80 y o  male seen for PT evaluation s/p admit to [facility] on 6/2/2021  Pt presenting w/ increased SOB and FLORES; fatigue and cough; recent fall onto buttocks  Of note pt w/ admission for  acute hypoxia respiratory failure in April and was receiving home therapies    Current dx/ problem list includes: Respiratory failure with hypoxia, acute on chronic diastolic CHF  Pt seen by pulmonary and differential reported as " atypical infection vs bronchiectasis flare vs component of diastolic dysfunction" Pt   has a past medical history of Anemia, Bladder cancer ([facility] ), Cancer ([place] [address] ), Carotid artery occlusion, Cataract, Colon polyp, COPD (chronic obstructive pulmonary disease) ([place] [address] ), Coronary artery disease, Disease of thyroid gland, History of transfusion, Hyperlipidemia, Hypertension, Hypothyroidism (9/15/2017), Multiple pulmonary nodules, Peptic ulcer, Scoliosis, Sepsis without acute organ dysfunction ([place] [address] ) (1/26/2021), SIRS (systemic inflammatory response syndrome) ([place] [address] ) (12/19/2019), Transient cerebral ischemia, and Tremors of nervous system  Personal factors affecting pt at time of IE include: step to enter environment,  advanced age, past experience, inability to perform IADLs, inability to ambulate household distances w/o external assist , limited insight into impairments and recent fall(s)  Due to acute medical issues, ongoing medical workup for primary dx; pain, fall risk, increased reliance on more restrictive AD compared to baseline;  decreased activity tolerance compared to baseline, inc O2 needs;  increased assistance needed from caregiver at current time, continuous telemetry monitoring, multiple lines, decline in overall functional mobility status; health management issues; note unstable clinical picture (high complexity)   Prior to admission, pt was living with spouse, Tatiana Quinonez, in a ranch style home with 1 step to enter  Patient was MI w/ ADLs and mobility prior to admission and using a rolling walker  Spouse does driving shopping cleaning  Patient has a rolling walker; Rollator; grab bars; SPC  and bedside commode at home   Patient was receiving home PT and OT and nursing following last admission  Patient reports 1 recent fall onto buttocks without injury   Currently pt  is requiring Erik A for bed skills; Erik for functional transfers and Erik for ambulation w/ RW on 3Lo2 ~15'x2 w/ SOB and FLORES limiting further distances Spo2 w/ ambulation dropping to 85%- recovering to 91% w/ seated rest and cues for breathing    Pt presents functioning below baseline and currently w/ overall mobility deficits 2* to: decreased LE strength/AROM; limited flexibility;  generalized weakness/ deconditioning; decreased endurance; decreased activity tolerance; decreased coordination; impaired balance; gait deviations; decreased safety awareness; SOB/FLORES; fatigue; impaired safety and judgement; limited insight into current deficits; bed/ chair alarms; multiple lines; hearing impairment/ visual impairments; Pt currently at risk for falls  (Please find additional objective findings from PT assessment regarding body systems outlined above ) Pt will continue to benefit from skilled PT interventions to address stated impairments; to maximize functional potential; for ongoing pt/ family training; and DME needs  Anticipate that with continued progress pt to d/c home with family support and continued [facility]HA PT/ OT and RW use when medically cleared  If pt fails to progress may require inpt rehab on d/c- pt ideally wants only to go home and continue w/ home PT   Pt/ family agreeable to plan and goals as stated on evaluation  PT TREATMENT: [id number]    Pt seen for skilled PT session following evaluation consisting of instruction in seated therex/ HEP instruction; for increased LE strengthening to assist w/ increasing independence w/ transfers and gait  Pt tolerates therex w/o complaints or increase in pain  - reports he "enjoys" exercises that he completes w/ home PT- pt was able to tolerate seated marching; LAQ; AP 2x15 reps ea ch w/ therapeutic rest b/t sets and STS an additional 2 times w/ Erik for pad change and repo for comfort   Pt tolerated fair w/ SOB reported throughout , but w/o drop in Spo2 below 88% Will continue to  benefit from repeated instruction for correct technique and compliance      Ragena Search, PT

## 2021-06-04 LAB
ANION GAP SERPL CALCULATED.3IONS-SCNC: 7 MMOL/L (ref 4–13)
BUN SERPL-MCNC: 19 MG/DL (ref 5–25)
CALCIUM SERPL-MCNC: 9.1 MG/DL (ref 8.3–10.1)
CHLORIDE SERPL-SCNC: 105 MMOL/L (ref 100–108)
CO2 SERPL-SCNC: 25 MMOL/L (ref 21–32)
CREAT SERPL-MCNC: 0.76 MG/DL (ref 0.6–1.3)
GFR SERPL CREATININE-BSD FRML MDRD: 81 ML/MIN/1.73SQ M
GLUCOSE SERPL-MCNC: 130 MG/DL (ref 65–140)
POTASSIUM SERPL-SCNC: 4.2 MMOL/L (ref 3.5–5.3)
PROCALCITONIN SERPL-MCNC: <0.05 NG/ML
SODIUM SERPL-SCNC: 137 MMOL/L (ref 136–145)

## 2021-06-04 PROCEDURE — 99233 SBSQ HOSP IP/OBS HIGH 50: CPT | Performed by: INTERNAL MEDICINE

## 2021-06-04 PROCEDURE — 80048 BASIC METABOLIC PNL TOTAL CA: CPT | Performed by: INTERNAL MEDICINE

## 2021-06-04 PROCEDURE — 86671 FUNGUS NES ANTIBODY: CPT | Performed by: INTERNAL MEDICINE

## 2021-06-04 PROCEDURE — 94760 N-INVAS EAR/PLS OXIMETRY 1: CPT

## 2021-06-04 PROCEDURE — 94640 AIRWAY INHALATION TREATMENT: CPT

## 2021-06-04 PROCEDURE — 97166 OT EVAL MOD COMPLEX 45 MIN: CPT

## 2021-06-04 PROCEDURE — 84145 PROCALCITONIN (PCT): CPT | Performed by: INTERNAL MEDICINE

## 2021-06-04 PROCEDURE — 94761 N-INVAS EAR/PLS OXIMETRY MLT: CPT

## 2021-06-04 PROCEDURE — 97110 THERAPEUTIC EXERCISES: CPT

## 2021-06-04 PROCEDURE — 93306 TTE W/DOPPLER COMPLETE: CPT | Performed by: INTERNAL MEDICINE

## 2021-06-04 PROCEDURE — 86602 ANTINOMYCES ANTIBODY: CPT | Performed by: INTERNAL MEDICINE

## 2021-06-04 PROCEDURE — 97530 THERAPEUTIC ACTIVITIES: CPT

## 2021-06-04 PROCEDURE — 86606 ASPERGILLUS ANTIBODY: CPT | Performed by: INTERNAL MEDICINE

## 2021-06-04 PROCEDURE — 97116 GAIT TRAINING THERAPY: CPT

## 2021-06-04 PROCEDURE — 99232 SBSQ HOSP IP/OBS MODERATE 35: CPT | Performed by: INTERNAL MEDICINE

## 2021-06-04 PROCEDURE — 86331 IMMUNODIFFUSION OUCHTERLONY: CPT | Performed by: INTERNAL MEDICINE

## 2021-06-04 RX ORDER — FUROSEMIDE 20 MG/1
20 TABLET ORAL DAILY
Status: DISCONTINUED | OUTPATIENT
Start: 2021-06-05 | End: 2021-06-06 | Stop reason: HOSPADM

## 2021-06-04 RX ORDER — PREDNISONE 20 MG/1
40 TABLET ORAL DAILY
Status: DISCONTINUED | OUTPATIENT
Start: 2021-06-05 | End: 2021-06-06 | Stop reason: HOSPADM

## 2021-06-04 RX ADMIN — POTASSIUM CHLORIDE 20 MEQ: 1500 TABLET, EXTENDED RELEASE ORAL at 08:52

## 2021-06-04 RX ADMIN — POTASSIUM CHLORIDE 20 MEQ: 1500 TABLET, EXTENDED RELEASE ORAL at 17:22

## 2021-06-04 RX ADMIN — Medication 2000 UNITS: at 08:52

## 2021-06-04 RX ADMIN — DOCUSATE SODIUM 100 MG: 100 CAPSULE ORAL at 17:22

## 2021-06-04 RX ADMIN — METOPROLOL SUCCINATE 12.5 MG: 25 TABLET, FILM COATED, EXTENDED RELEASE ORAL at 20:09

## 2021-06-04 RX ADMIN — ASPIRIN 81 MG: 81 TABLET, CHEWABLE ORAL at 08:51

## 2021-06-04 RX ADMIN — Medication 1 TABLET: at 08:52

## 2021-06-04 RX ADMIN — IPRATROPIUM BROMIDE 0.5 MG: 0.5 SOLUTION RESPIRATORY (INHALATION) at 13:34

## 2021-06-04 RX ADMIN — PANTOPRAZOLE SODIUM 20 MG: 20 TABLET, DELAYED RELEASE ORAL at 06:26

## 2021-06-04 RX ADMIN — ENOXAPARIN SODIUM 40 MG: 40 INJECTION SUBCUTANEOUS at 08:51

## 2021-06-04 RX ADMIN — LEVALBUTEROL HYDROCHLORIDE 0.63 MG: 0.63 SOLUTION RESPIRATORY (INHALATION) at 08:01

## 2021-06-04 RX ADMIN — LEVALBUTEROL HYDROCHLORIDE 0.63 MG: 0.63 SOLUTION RESPIRATORY (INHALATION) at 13:34

## 2021-06-04 RX ADMIN — PRAVASTATIN SODIUM 40 MG: 20 TABLET ORAL at 17:22

## 2021-06-04 RX ADMIN — DOCUSATE SODIUM 100 MG: 100 CAPSULE ORAL at 08:53

## 2021-06-04 RX ADMIN — LEVALBUTEROL HYDROCHLORIDE 0.63 MG: 0.63 SOLUTION RESPIRATORY (INHALATION) at 19:00

## 2021-06-04 RX ADMIN — TAMSULOSIN HYDROCHLORIDE 0.4 MG: 0.4 CAPSULE ORAL at 17:22

## 2021-06-04 RX ADMIN — POLYETHYLENE GLYCOL 3350 17 G: 17 POWDER, FOR SOLUTION ORAL at 08:51

## 2021-06-04 RX ADMIN — FUROSEMIDE 20 MG: 10 INJECTION, SOLUTION INTRAMUSCULAR; INTRAVENOUS at 08:51

## 2021-06-04 RX ADMIN — GUAIFENESIN 600 MG: 600 TABLET, EXTENDED RELEASE ORAL at 08:52

## 2021-06-04 RX ADMIN — IPRATROPIUM BROMIDE 0.5 MG: 0.5 SOLUTION RESPIRATORY (INHALATION) at 19:00

## 2021-06-04 RX ADMIN — LEVOTHYROXINE SODIUM 75 MCG: 75 TABLET ORAL at 06:26

## 2021-06-04 RX ADMIN — IPRATROPIUM BROMIDE 0.5 MG: 0.5 SOLUTION RESPIRATORY (INHALATION) at 08:02

## 2021-06-04 RX ADMIN — METHYLPREDNISOLONE SODIUM SUCCINATE 40 MG: 40 INJECTION, POWDER, FOR SOLUTION INTRAMUSCULAR; INTRAVENOUS at 08:51

## 2021-06-04 NOTE — PLAN OF CARE
Problem: PHYSICAL THERAPY ADULT  Goal: Performs mobility at highest level of function for planned discharge setting  See evaluation for individualized goals  Description: Treatment/Interventions: ADL retraining, Functional transfer training, LE strengthening/ROM, Elevations, Therapeutic exercise, Endurance training, Cognitive reorientation, Patient/family training, Equipment eval/education, Bed mobility, Gait training, Spoke to nursing  Equipment Recommended: (has RW for d/c )       See flowsheet documentation for full assessment, interventions and recommendations  Outcome: Progressing  Note: Prognosis: Good  Problem List: Decreased strength, Decreased range of motion, Decreased endurance, Impaired balance, Decreased mobility, Decreased coordination, Decreased cognition, Impaired judgement, Decreased safety awareness, Impaired vision, Impaired hearing  Assessment: Patient seen for skilled physical therapy treatment session with spouse present  Initiated family training for safety with oxygen tubing during transfers and gait in room  Spouse was able to provide safe cues to assist pt and expressed good understanding  Patient tolerated increased gait distances and trials today  Required less assistance for sit to stand transfers in comparison to prior session  Continues to require cues for upright posture and occasional cues for safety  Patient cleared for home with ongoing home health care PT when medically stable  Patient and spouse in agreement with plan and discharge recommendations  PT to continue with plan of care while in acute care setting to maximize functional potential and increased activity tolerance  Barriers to Discharge: None  Barriers to Discharge Comments: 1 CHERRY/ medical status      PT Discharge Recommendation: Home with home health rehabilitation     PT - OK to Discharge: Yes    See flowsheet documentation for full assessment
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
## 2021-06-04 NOTE — ASSESSMENT & PLAN NOTE
Wt Readings from Last 3 Encounters:   06/03/21 59 kg (130 lb)   05/04/21 57 6 kg (127 lb)   04/18/21 60 2 kg (132 lb 11 2 oz)     Previous echo with EF 60% and grade 2 diastolic dysfunction status post bio TAVR  On Lasix 20 mg p o   Daily  Presently provide IV Lasix, negative fluid balance  Repeat echo with EF 55 %  Monitor I/O, daily weights  Less than 2 g salt diet fluid restrictions  Change diuretics to oral

## 2021-06-04 NOTE — PHYSICAL THERAPY NOTE
PHYSICAL THERAPY TREATMENT  NAME:  Matheus Smalls  DATE: 06/04/21    AGE:   80 y o  Mrn:   [id number]  ADMIT DX:  Shortness of breath [R06 02]  SOB (shortness of breath) [R06 02]  Dyspnea on exertion [R06 00]  Hypoxia [R09 02]  Acute exacerbation of CHF (congestive heart failure) (HCC) [I50 9]  Acute respiratory failure with hypoxia (HCC) [J96 01]    Past Medical History:   Diagnosis Date    Anemia     Bladder cancer ([facility] )     Cancer ([place] [address] )     bladder    Carotid artery occlusion     Cataract     Colon polyp     COPD (chronic obstructive pulmonary disease) (HCC)     Coronary artery disease     Disease of thyroid gland     History of transfusion     Hyperlipidemia     Hypertension     Hypothyroidism 9/15/2017    Multiple pulmonary nodules     Peptic ulcer     Scoliosis     Sepsis without acute organ dysfunction ([place] [address] ) 1/26/2021    SIRS (systemic inflammatory response syndrome) ([place] [address] ) 12/19/2019    Transient cerebral ischemia     Tremors of nervous system      Past Surgical History:   Procedure Laterality Date   [facility] SURGERY      1973, 1987, 2005    BUNIONECTOMY Left     Simple exostectomy (silver procedure)    CAROTID ENDARTERECTOMY Right 09/10/2008    Common  Poppy LEDBETTER MD    CATARACT EXTRACTION      CATARACT EXTRACTION, BILATERAL      CERVICAL DISCECTOMY  1969    COLONOSCOPY      CORONARY ARTERY BYPASS GRAFT  10/20/2010    x3 (LIMA-LAD, SVG-OM, SVG-RCA)    CYSTOSCOPY      ELBOW SURGERY Right 07/2012    FEMORAL ARTERY - TIBIAL ARTERY BYPASS GRAFT  12/05/2011    using reversed saphenous vein from right leg  Isaias Diamond MD    [place] ECHO TRANSESOPHAG R-T 2D W/PRB IMG ACQUISJ I&R N/A 10/6/2020    Procedure: TRANSESOPHAGEAL ECHOCARDIOGRAM (DAVID);   Surgeon: Dayan Santoyo DO;  [facility];  Service: Cardiac Surgery    HI REPLACE AORTIC VALVE OPENFEMORAL ARTERY APPROACH N/A 10/6/2020    Procedure: REPLACEMENT AORTIC VALVE TRANSCATHETER (TAVR) TRANSFEMORAL W/ 26MM MONTALVO BREE S3 ULTRA VALVE(ACCESS ON LEFT); Surgeon: Cyndie Schrader DO;  [facility];  Service: Cardiac Surgery    REPLACEMENT TOTAL KNEE Left     SHOULDER SURGERY Right 1997       Length Of Stay: 2     06/04/21 1430   PT Last Visit   PT Visit Date 06/04/21   Note Type   Note Type Treatment   Pain Assessment   Pain Assessment Tool Pain Assessment not indicated - pt denies pain   Pain Score No Pain   Restrictions/Precautions   Weight Bearing Precautions Per Order No   Braces or Orthoses   (lift shoe R)   Other Precautions Cognitive; Chair Alarm; Bed Alarm;Multiple lines;O2;Fall Risk;Hard of hearing   General   Chart Reviewed Yes   Additional Pertinent History pt will be d/c home on O2 per RN and spouse report-    Family/Caregiver Present Yes   Cognition   Overall Cognitive Status Impaired   Arousal/Participation Alert; Cooperative   Attention Attends with cues to redirect   Orientation Level Oriented X4   Memory Within functional limits;Decreased recall of precautions;Decreased recall of recent events   Following Commands Follows one step commands without difficulty   Comments cooperative and motivated- required cues for safety w/ O2 tubing- sposue was educated on safety w/ O2 cord during session to assist pt on d/c for safety    Bed Mobility   Additional Comments OOB in recliner on presentation    Transfers   Sit to Stand 5  Supervision  (clsoe S w/ cues for hand placement on chair )   Additional items Trapeze bar;Verbal cues   Stand to Sit 4  Minimal assistance   Additional items Increased time required;Verbal cues   Ambulation/Elevation   Gait pattern Excessively slow; Short stride; Foward flexed; Shuffling   Gait Assistance 5  Supervision  (close S/ - CGA on 2nd trial w/ fatigue )   Additional items Assist x 1;Verbal cues; Tactile cues   Assistive Device Rolling walker   Distance 25' x3 trials on2Lo2- CGA for balance required on last 2 attempts- VC and education provided on safe management of O2 tubing w/ spouse present    Balance   Static Sitting Good   Dynamic Sitting Fair +   Static Standing Fair   Dynamic Standing Fair -   Ambulatory Fair -   Endurance Deficit   Endurance Deficit Yes   Activity Tolerance   Activity Tolerance Patient limited by fatigue   Medical Staff Made Aware RN    Nurse Made Aware Therman Roseann    Exercises   Knee AROM Long Arc Quad Sitting;15 reps  (x2)   Ankle Pumps Sitting;25 reps  (x2)   Marching Sitting;20 reps  (x2)   Assessment   Prognosis Good   Problem List Decreased strength;Decreased range of motion;Decreased endurance; Impaired balance;Decreased mobility; Decreased coordination;Decreased cognition; Impaired judgement;Decreased safety awareness; Impaired vision; Impaired hearing   Assessment Patient seen for skilled physical therapy treatment session with spouse present  Initiated family training for safety with oxygen tubing during transfers and gait in room  Spouse was able to provide safe cues to assist pt and expressed good understanding  Patient tolerated increased gait distances and trials today  Required less assistance for sit to stand transfers in comparison to prior session  Continues to require cues for upright posture and occasional cues for safety  Patient cleared for home with ongoing home health care PT when medically stable  Patient and spouse in agreement with plan and discharge recommendations  PT to continue with plan of care while in acute care setting to maximize functional potential and increased activity tolerance  Barriers to Discharge None   Goals   Patient Goals go home tomorrow    STG Expiration Date 06/13/21   PT Treatment Day 1   Plan   Treatment/Interventions Functional transfer training;LE strengthening/ROM; Therapeutic exercise; Endurance training;Elevations;Cognitive reorientation;Patient/family training;Equipment eval/education; Bed mobility;Gait training;Spoke to nursing;Spoke to case management; Family  (spouse)   Progress Slow progress, decreased activity tolerance   PT Frequency   (3-5x/wk )   Recommendation   PT Discharge Recommendation Home with home health rehabilitation   Equipment Recommended   (has RW for d/c )   PT - OK to Discharge Yes   Additional Comments when medically cleared w/ [facility] Mobility Inpatient   Turning in Bed Without Bedrails 3   Lying on Back to Sitting on Edge of Flat Bed 3   Moving Bed to Chair 3   Standing Up From Chair 3   Walk in Room 3   Climb 3-5 Stairs 3   Basic Mobility Inpatient Raw Score 18   Basic Mobility Standardized Score 41 05            Stef Giron, PT

## 2021-06-04 NOTE — PLAN OF CARE
Problem: OCCUPATIONAL THERAPY ADULT  Goal: Performs self-care activities at highest level of function for planned discharge setting  See evaluation for individualized goals  Description: Treatment Interventions: ADL retraining, Functional transfer training, Endurance training, Cognitive reorientation, Patient/family training, Equipment evaluation/education, Compensatory technique education, Continued evaluation, Energy conservation, Activityengagement          See flowsheet documentation for full assessment, interventions and recommendations  Note: Limitation: Decreased ADL status, Decreased Safe judgement during ADL, Decreased endurance, Decreased cognition, Decreased self-care trans, Decreased high-level ADLs  Prognosis: Fair  Assessment: Pt is a 81 y/o male seen for OT eval s/p adm to [facility] w/ worsening shortness of breath  Pt is dx'd w/ acute respiratory failure  Pt  has a past medical history of Anemia, Bladder cancer ([facility] ), Cancer ([place] [address] ), Carotid artery occlusion, Cataract, Colon polyp, COPD (chronic obstructive pulmonary disease) ([place] [address] ), Coronary artery disease, Disease of thyroid gland, History of transfusion, Hyperlipidemia, Hypertension, Hypothyroidism (9/15/2017), Multiple pulmonary nodules, Peptic ulcer, Scoliosis, Sepsis without acute organ dysfunction ([facility] ) (1/26/2021), SIRS (systemic inflammatory response syndrome) ([place] [address] ) (12/19/2019), Transient cerebral ischemia, and Tremors of nervous system  Pt with active OT orders and up with assistance  orders  Pt lives with his spouse in 1 story ranch home w/ 2 CHERRY  Pt has assist w/ ADLS and IADLS, does not drive, & required use of DME PTA including RW, rollator, BSC, w/c and grab bars  Pt is currently demonstrating the following occupational deficits: S UB ADLS, Min A LB ADLS, Min A bed mobility, Min A transfers and functional mobility w/ RW   These deficits that are impacting pt's baseline areas of occupation are a result of the following impairments: endurance, activity tolerance, functional mobility, forward functional reach, balance, functional standing tolerance, decreased I w/ ADLS/IADLS, cognitive impairments, decreased safety awareness and decreased insight into deficits  The following Occupational Performance Areas to address include: bathing/shower, toilet hygiene, dressing, health maintenance, functional mobility and clothing management  Based on the aforementioned OT evaluation, functional performance deficits, and assessments, pt has been identified as a moderate complexity evaluation  Recommend home OT upon D/C   Pt to continue to benefit from acute immediate OT services to address the following goals 3-5x/week to  w/in 10-14 days:     OT Discharge Recommendation: Home with home health rehabilitation(Home OT and increased family support)  OT - OK to Discharge: Yes(when medically stable)     Miley Cates MS, OTR/L

## 2021-06-04 NOTE — ASSESSMENT & PLAN NOTE
Acute on chronic hypoxic respiratory failure, multifactorial  Patient with known history of emphysema, presumed hypersensitivity pneumonitis  Worsening shortness of breath presently requiring 3 L supplemental oxygen, wean as tolerated to keep O2 sats more than 90-92%  Evaluated by pulmonology, hypersensitivity pneumonitis panel was sent  Patient improving on IV steroids with plan to discharge home on taper prednisone and outpatient pulmonology follow-up  Continue respiratory treatments  Home oxygen evaluation

## 2021-06-04 NOTE — PROGRESS NOTES
[facility]  Progress Note - Mercy Garcia 1933, 80 y o  male MRN: [id number]  Unit/Bed#: -01 Encounter: [id number]  Primary Care Provider: Jordan Wilkins MD   Date and time admitted to hospital: 6/2/2021 12:07 PM    * Acute respiratory failure with hypoxia [facility])  Assessment & Plan  Acute on chronic hypoxic respiratory failure, multifactorial  Patient with known history of emphysema, presumed hypersensitivity pneumonitis  Worsening shortness of breath presently requiring 3 L supplemental oxygen, wean as tolerated to keep O2 sats more than 90-92%  Evaluated by pulmonology, hypersensitivity pneumonitis panel was sent  Patient improving on IV steroids with plan to discharge home on taper prednisone and outpatient pulmonology follow-up  Continue respiratory treatments  Home oxygen evaluation      Acute on chronic diastolic congestive heart failure ([facility] [address] )  Assessment & Plan  Wt Readings from Last 3 Encounters:   06/03/21 59 kg (130 lb)   05/04/21 57 6 kg (127 lb)   04/18/21 60 2 kg (132 lb 11 2 oz)     Previous echo with EF 60% and grade 2 diastolic dysfunction status post bio TAVR  On Lasix 20 mg p o  Daily  Presently provide IV Lasix, negative fluid balance  Repeat echo with EF 55 %  Monitor I/O, daily weights  Less than 2 g salt diet fluid restrictions  Change diuretics to oral      Hypersensitivity pneumonitis [facility])  Assessment & Plan  Patient presumed hypersensitivity pneumonitis  IV Solu-Medrol with plan for taper oral  prednisone  Pulmonology is following  Hypersensitivity pneumonitis profile was sent    Pulmonary emphysema (HCC)  Assessment & Plan  History of pulmonary emphysema  Continue respiratory treatment    Lung nodule  Assessment & Plan  9 x 8 mm right upper lobe nodule on CT scan  Repeat chest CT scan in 3 months    Severe protein-calorie malnutrition ([facility] [address] )  Assessment & Plan  Malnutrition Findings:           BMI Findings:         Body mass index is 20 98 kg/m²  Continue with regular diet    S/P TAVR (transcatheter aortic valve replacement)  Assessment & Plan  Status post bioprosthetic  TAVR    Ambulatory dysfunction  Assessment & Plan  Multifactorial  Safe ambulation  Fall precautions  PT recommending home with VNA    Urine retention  Assessment & Plan  History of chronic urinary retention with Porter catheter in place  Outpatient follow-up with Urology    Hyponatremia  Assessment & Plan  Possibly secondary to volume overload  Improving  Continue oral fluid restriction    Hypothyroidism  Assessment & Plan  Continue levothyroxine    Hypertension  Assessment & Plan  Monitor blood pressures  Patient usually runs low blood pressures  Avoid hypotension      VTE Pharmacologic Prophylaxis:   Pharmacologic agent: Enoxaparin (Lovenox)  Mechanical VTE Prophylaxis in Place: Yes    Patient Centered Rounds: I have performed bedside rounds with nursing staff today  Discussions with Specialists or Other Care Team Provider:     Education and Discussions with Family / Patient: Updated  (wife) via phone  Time Spent for Care: 45 minutes  More than 50% of total time spent on counseling and coordination of care as described above  Current Length of Stay: 2 day(s)  Current Patient Status: Inpatient     Certification Statement: The patient will continue to require additional inpatient hospital stay due to above    Discharge Plan / Estimated Discharge Date: Anticipate discharge tomorrow to home with home services      Code Status: Level 3 - DNAR and DNI      Subjective:   Patient seen and examined  Comfortable in bed  Shortness of breath is improving  No nausea vomiting or diarrhea    Objective:     Vitals:   Temp (24hrs), Av 1 °F (36 7 °C), Min:97 4 °F (36 3 °C), Max:98 6 °F (37 °C)    Temp:  [97 4 °F (36 3 °C)-98 6 °F (37 °C)] 98 4 °F (36 9 °C)  HR:  [68-96] 92  Resp:  [18] 18  BP: ()/(44-58) 121/58  SpO2:  [93 %-99 %] 97 %  Body mass index is 20 98 kg/m²  Input and Output Summary (last 24 hours): Intake/Output Summary (Last 24 hours) at 6/4/2021 1117  Last data filed at 6/4/2021 1012  Gross per 24 hour   Intake 450 ml   Output 2550 ml   Net -2100 ml       Physical Exam:   Physical Exam   Patient is awake alert in no acute distress  Lung with decreased breath sounds bilateral  Heart positive S1-S2 no murmur  Abdomen soft nontender  Chronic Porter catheter in place  Improving Bilateral lower extremity edema  Additional Data:     Labs:  Results from last 7 days   Lab Units 06/03/21  0500   WBC Thousand/uL 2 18*   HEMOGLOBIN g/dL 11 2*   HEMATOCRIT % 34 2*   PLATELETS Thousands/uL 200   NEUTROS PCT % 87*   LYMPHS PCT % 8*   MONOS PCT % 3*   EOS PCT % 0     Results from last 7 days   Lab Units 06/04/21  0448   SODIUM mmol/L 137   POTASSIUM mmol/L 4 2   CHLORIDE mmol/L 105   CO2 mmol/L 25   BUN mg/dL 19   CREATININE mg/dL 0 76   ANION GAP mmol/L 7   CALCIUM mg/dL 9 1   GLUCOSE RANDOM mg/dL 130                 Results from last 7 days   Lab Units 06/04/21  0448 06/03/21  0500   PROCALCITONIN ng/ml <0 05 <0 05       Imaging: Reviewed radiology reports from this admission including: ECHO    Recent Cultures (last 7 days):     Results from last 7 days   Lab Units 06/03/21  1051 06/03/21  1041 06/02/21  1511   SPUTUM CULTURE  Test not performed  Suggest repeat specimen  --   --    GRAM STAIN RESULT  >10 squamous epithelial cells/lpf, indicating orophayngeal contamination  *  No polys seen*  1+ Gram positive cocci in pairs, chains and clusters*  Rare Gram negative rods*  --   --    URINE CULTURE   --   --  >100,000 cfu/ml Gram Negative Timo Enteric Like*  50,000-59,000 cfu/ml Staphylococcus coagulase negative*   LEGIONELLA URINARY ANTIGEN   --  Negative  --        Lines/Drains:  Invasive Devices     Peripheral Intravenous Line            Peripheral IV 06/02/21 Left Antecubital 1 day    Peripheral IV 06/03/21 Right Antecubital less than 1 day          Drain Urethral Catheter Coude 16 Fr  129 days                Telemetry:        Last 24 Hours Medication List:   Current Facility-Administered Medications   Medication Dose Route Frequency Provider Last Rate    acetaminophen  650 mg Oral Q6H PRN Gabriela Clements MD      aluminum-magnesium hydroxide-simethicone  30 mL Oral Q6H PRN Gabriela Clements MD      aspirin  81 mg Oral Daily Gabriela Clements MD      cholecalciferol  2,000 Units Oral Daily Gabriela Clements MD      docusate sodium  100 mg Oral BID Gabriela Clements MD      enoxaparin  40 mg Subcutaneous Daily Gabriela Clements MD      [START ON 6/5/2021] furosemide  20 mg Oral Daily Monica Wood DO      ipratropium  0 5 mg Nebulization TID Rivera Irvin MD      levalbuterol  0 63 mg Nebulization TID Rivera Irvin MD      [START ON 6/5/2021] levothyroxine  112 mcg Oral Once per day on Sun Sat Love Spoon, DO      levothyroxine  75 mcg Oral Once per day on Mon Tue Wed Thu Fri Love Spoon, DO      metoprolol succinate  12 5 mg Oral BID Gabriela Clements MD      multivitamin-minerals  1 tablet Oral Daily Gabriela Clements MD      ondansetron  4 mg Intravenous Q6H PRN Gabriela Clements MD      pantoprazole  20 mg Oral Early Morning Gabriela Clements MD      polyethylene glycol  17 g Oral Daily Gabriela Clements MD      potassium chloride  20 mEq Oral BID Gabriela Clements MD      pravastatin  40 mg Oral Daily With Ila Wang MD      [START ON 6/5/2021] predniSONE  40 mg Oral Daily Dick Aden, DO      tamsulosin  0 4 mg Oral Daily With Ila Wang MD          Today, Patient Was Seen By: Monica Wood DO    ** Please Note: This note has been constructed using a voice recognition system   **

## 2021-06-04 NOTE — RESPIRATORY THERAPY NOTE
Home Oxygen Qualifying Test       Patient name: Carito Tong        : 1933   Date of Test:  2021  Diagnosis:      Home Oxygen Test:    **Medicare Guidelines require item(s) 1-5 on all ambulatory patients or 1 and 2 on non-ambulatory patients  1   Baseline SPO2 on Room Air at rest 81 %  2   SPO2 during exercise on Room Air 84 %  During exercise monitor SpO2  If SPO2 increases >=89% with ambulation do not add supplemental             oxygen  If <= 88% on room air add O2 via NC and titrate patient  Patient must be ambulated with O2 and titrated to > 88% with exertion  3   SPO2 on Oxygen at rest 94 % 4 lpm     4   SPO2 during exercise on Oxygen  91% a liter flow of 4 lpm     5   Exercise performed:          duration 4 (min)          [x]  Supplemental Home Oxygen is indicated  []  Client does not qualify for home oxygen        Patient walked with OT and RRT using walker, patient walked in room for 4 minutes       Jorge Luis Roman, RT

## 2021-06-04 NOTE — ASSESSMENT & PLAN NOTE
Patient presumed hypersensitivity pneumonitis  IV Solu-Medrol with plan for taper oral  prednisone  Pulmonology is following  Hypersensitivity pneumonitis profile was sent

## 2021-06-04 NOTE — PROGRESS NOTES
Progress Note - Pulmonary   Lonnie Camacho 80 y o  male MRN: [id number]  Unit/Bed#: -01 Encounter: [id number]      Assessment and Plan:  Acute hypoxic respiratory failure  Abnormal CT with biapical infiltrates, waxing and waning with previous infectious workup negative, consider inflammation, currently treated for hypersensitivity pneumonitis also consider medication related pneumonitis vs other  Centrilobular emphysema  Aortic stenosis s/p TAVR  Chronic heart failure, pEF  Multivessel cad s/p CABG    - Infectious panel from April negative including respiratory panel  Hypersensitivity panel was not sent  Will send today  - Suspect he does not need oxygen at rest  Evaluate ambulatory needs prior to discharge  - Change to Prednisone 40mg daily  Discharge with Prednisone 40mg x 3 days, then 20mg for 1 week and then 10mg for 3 weeks  He will follow up with Dr Lianet Green  Chief complaint:  I feel better    Subjective:   States he feels better  No longer SOB as he was prior to admission  He ambulated yesterday with assistance  States he had difficulty due to mechanical issues - feet were slipping in socks - but denies breathlessness  Feels oxygen helped his breathing  Objective:   Afebrile  Vitals: Blood pressure 101/50, pulse 68, temperature 98 4 °F (36 9 °C), temperature source Oral, resp  rate 18, height 5' 6" (1 676 m), weight 59 kg (130 lb), SpO2 99 %  , 2L, Body mass index is 20 98 kg/m²  Intake/Output Summary (Last 24 hours) at 6/4/2021 1011  Last data filed at 6/4/2021 0001  Gross per 24 hour   Intake 450 ml   Output 1250 ml   Net -800 ml         Physical Exam  GEN: Frail, no distress, comfortable  HEENT: NCAT, EOMI  CVS: Regular  LUNGS: diminished b/l, no wheezing  ABD: soft, nd, nt  EXT: No c/c/e  NEURO: No focal deficits  MS: Moving all extremities  SKIN: warm, dry  PSYCH: calm, cooperative      Labs: I have personally reviewed pertinent lab results    Results from last 7 days   Lab Units 06/03/21  0500 06/02/21  1244   WBC Thousand/uL 2 18* 5 75   HEMOGLOBIN g/dL 11 2* 10 7*   HEMATOCRIT % 34 2* 32 1*   PLATELETS Thousands/uL 200 188   NEUTROS PCT % 87* 66   MONOS PCT % 3* 22*      Results from last 7 days   Lab Units 06/04/21  0448 06/03/21  0500 06/02/21  1244   POTASSIUM mmol/L 4 2 4 1 3 9   CHLORIDE mmol/L 105 101 101   CO2 mmol/L 25 24 23   BUN mg/dL 19 10 8   CREATININE mg/dL 0 76 0 57* 0 71   CALCIUM mg/dL 9 1 8 7 8 4     Results from last 7 days   Lab Units 06/03/21  0500   MAGNESIUM mg/dL 2 1                  0   Lab Value Date/Time    TROPONINI <0 02 06/02/2021 1244    TROPONINI <0 02 04/11/2021 2227    TROPONINI <0 02 04/11/2021 1644    TROPONINI <0 02 04/11/2021 1152    TROPONINI <0 02 10/21/2020 0457    TROPONINI <0 02 10/21/2020 0028    TROPONINI <0 02 10/20/2020 2108    TROPONINI <0 02 10/20/2020 1114    TROPONINI <0 02 12/19/2019 1033       Labs per my review reveal normal renal function, leukopenia, stable anemia    Imaging and other studies: I have personally reviewed pertinent films in PACS  CT per my review shows severe centrilobular emphysema with GGO in ZARA and RUL and RML, enlarged RUL nodule    AMOS Castellanos Shown [facility]

## 2021-06-04 NOTE — CASE MANAGEMENT
Therapy recs for home with VNA  Pt is open with [facility], a referral was placed in [address] for continuation of care

## 2021-06-05 LAB
BACTERIA UR CULT: ABNORMAL
DME PARACHUTE DELIVERY DATE ACTUAL: NORMAL
DME PARACHUTE DELIVERY DATE EXPECTED: NORMAL
DME PARACHUTE DELIVERY DATE REQUESTED: NORMAL
DME PARACHUTE ITEM DESCRIPTION: NORMAL
DME PARACHUTE ORDER STATUS: NORMAL
DME PARACHUTE SUPPLIER NAME: NORMAL
DME PARACHUTE SUPPLIER PHONE: NORMAL

## 2021-06-05 PROCEDURE — 94664 DEMO&/EVAL PT USE INHALER: CPT

## 2021-06-05 PROCEDURE — 94760 N-INVAS EAR/PLS OXIMETRY 1: CPT

## 2021-06-05 PROCEDURE — 99233 SBSQ HOSP IP/OBS HIGH 50: CPT | Performed by: INTERNAL MEDICINE

## 2021-06-05 PROCEDURE — 94640 AIRWAY INHALATION TREATMENT: CPT

## 2021-06-05 PROCEDURE — 94761 N-INVAS EAR/PLS OXIMETRY MLT: CPT

## 2021-06-05 RX ADMIN — IPRATROPIUM BROMIDE 0.5 MG: 0.5 SOLUTION RESPIRATORY (INHALATION) at 07:09

## 2021-06-05 RX ADMIN — FUROSEMIDE 20 MG: 20 TABLET ORAL at 09:31

## 2021-06-05 RX ADMIN — POTASSIUM CHLORIDE 20 MEQ: 1500 TABLET, EXTENDED RELEASE ORAL at 09:30

## 2021-06-05 RX ADMIN — DOCUSATE SODIUM 100 MG: 100 CAPSULE ORAL at 09:30

## 2021-06-05 RX ADMIN — IPRATROPIUM BROMIDE 0.5 MG: 0.5 SOLUTION RESPIRATORY (INHALATION) at 19:42

## 2021-06-05 RX ADMIN — LEVALBUTEROL HYDROCHLORIDE 0.63 MG: 0.63 SOLUTION RESPIRATORY (INHALATION) at 07:09

## 2021-06-05 RX ADMIN — ENOXAPARIN SODIUM 40 MG: 40 INJECTION SUBCUTANEOUS at 09:31

## 2021-06-05 RX ADMIN — ASPIRIN 81 MG: 81 TABLET, CHEWABLE ORAL at 09:30

## 2021-06-05 RX ADMIN — Medication 2000 UNITS: at 09:30

## 2021-06-05 RX ADMIN — METOPROLOL SUCCINATE 12.5 MG: 25 TABLET, FILM COATED, EXTENDED RELEASE ORAL at 20:38

## 2021-06-05 RX ADMIN — POLYETHYLENE GLYCOL 3350 17 G: 17 POWDER, FOR SOLUTION ORAL at 09:31

## 2021-06-05 RX ADMIN — PRAVASTATIN SODIUM 40 MG: 20 TABLET ORAL at 17:51

## 2021-06-05 RX ADMIN — PANTOPRAZOLE SODIUM 20 MG: 20 TABLET, DELAYED RELEASE ORAL at 06:50

## 2021-06-05 RX ADMIN — DOCUSATE SODIUM 100 MG: 100 CAPSULE ORAL at 17:51

## 2021-06-05 RX ADMIN — LEVALBUTEROL HYDROCHLORIDE 0.63 MG: 0.63 SOLUTION RESPIRATORY (INHALATION) at 19:42

## 2021-06-05 RX ADMIN — TAMSULOSIN HYDROCHLORIDE 0.4 MG: 0.4 CAPSULE ORAL at 17:51

## 2021-06-05 RX ADMIN — METOPROLOL SUCCINATE 12.5 MG: 25 TABLET, FILM COATED, EXTENDED RELEASE ORAL at 09:30

## 2021-06-05 RX ADMIN — POTASSIUM CHLORIDE 20 MEQ: 1500 TABLET, EXTENDED RELEASE ORAL at 17:51

## 2021-06-05 RX ADMIN — PREDNISONE 40 MG: 20 TABLET ORAL at 09:30

## 2021-06-05 RX ADMIN — IPRATROPIUM BROMIDE 0.5 MG: 0.5 SOLUTION RESPIRATORY (INHALATION) at 13:24

## 2021-06-05 RX ADMIN — LEVOTHYROXINE SODIUM 112 MCG: 112 TABLET ORAL at 06:50

## 2021-06-05 RX ADMIN — LEVALBUTEROL HYDROCHLORIDE 0.63 MG: 0.63 SOLUTION RESPIRATORY (INHALATION) at 13:24

## 2021-06-05 RX ADMIN — Medication 1 TABLET: at 09:30

## 2021-06-05 NOTE — PROGRESS NOTES
[address] [facility]  Progress Note - Mary Richardson 1933, 80 y o  male MRN: [id number]  Unit/Bed#: -01 Encounter: [id number]  Primary Care Provider: Margot Martel MD   Date and time admitted to hospital: 6/2/2021 12:07 PM    * Acute respiratory failure with hypoxia [facility])  Assessment & Plan  Acute on chronic hypoxic respiratory failure, multifactorial  Patient with known history of emphysema, presumed hypersensitivity pneumonitis  Worsening shortness of breath presently requiring 3 L supplemental oxygen, wean as tolerated to keep O2 sats more than 90-92%  Evaluated by pulmonology, hypersensitivity pneumonitis panel was sent  Patient improving on IV steroids, now he was placed on taper dose oral prednisone with plan outpatient pulmonology follow-up  Continue respiratory treatments  Patient will require home oxygen at time of discharge      Acute on chronic diastolic congestive heart failure ([facility] [address] )  Assessment & Plan  Wt Readings from Last 3 Encounters:   06/03/21 59 kg (130 lb)   05/04/21 57 6 kg (127 lb)   04/18/21 60 2 kg (132 lb 11 2 oz)     Previous echo with EF 60% and grade 2 diastolic dysfunction status post bio TAVR  On Lasix 20 mg p o   Daily  Patient was diuresed with IV Lasix with negative fluid balance  Repeat echo with EF 55 %  Monitor I/O, daily weights  Less than 2 g salt diet fluid restrictions  Continue with oral Lasix      Hypersensitivity pneumonitis [facility])  Assessment & Plan  Patient presumed hypersensitivity pneumonitis  IV Solu-Medrol with plan for taper oral  prednisone  Pulmonology is following  Hypersensitivity pneumonitis profile was sent    Pulmonary emphysema (HCC)  Assessment & Plan  History of pulmonary emphysema  Continue respiratory treatment    Lung nodule  Assessment & Plan  9 x 8 mm right upper lobe nodule on CT scan  Repeat chest CT scan in 3 months    Severe protein-calorie malnutrition ([facility] [address] )  Assessment & Plan  Malnutrition Findings: BMI Findings: Body mass index is 20 98 kg/m²  Continue with regular diet    S/P TAVR (transcatheter aortic valve replacement)  Assessment & Plan  Status post bioprosthetic  TAVR    Ambulatory dysfunction  Assessment & Plan  Multifactorial  Safe ambulation  Fall precautions  PT recommending home with VNA    Urine retention  Assessment & Plan  History of chronic urinary retention with Porter catheter in place  Outpatient follow-up with Urology    Hyponatremia  Assessment & Plan  Possibly secondary to volume overload  Resolved  Continue oral fluid restriction    Hypothyroidism  Assessment & Plan  Continue levothyroxine    Hypertension  Assessment & Plan  Monitor blood pressures  Patient usually runs low blood pressures  Avoid hypotension           VTE Pharmacologic Prophylaxis:   Pharmacologic agent: Enoxaparin (Lovenox)  Mechanical VTE Prophylaxis in Place: Yes    Patient Centered Rounds: I have performed bedside rounds with nursing staff today  Discussions with Specialists or Other Care Team Provider:     Education and Discussions with Family / Patient: Updated  (wife) via phone  Time Spent for Care: 45 minutes  More than 50% of total time spent on counseling and coordination of care as described above  Current Length of Stay: 3 day(s)  Current Patient Status: Inpatient     Certification Statement: The patient will continue to require additional inpatient hospital stay due to Above    Discharge Plan / Estimated Discharge Date: Anticipate discharge tomorrow to home with home services   on home oxygen    Code Status: Level 3 - DNAR and DNI      Subjective:   Patient seen and examined  Comfortable in bed  Clinically improving with less short of breath  No nausea vomiting or diarrhea    Objective:     Vitals:   Temp (24hrs), Av 9 °F (36 6 °C), Min:97 6 °F (36 4 °C), Max:98 1 °F (36 7 °C)    Temp:  [97 6 °F (36 4 °C)-98 1 °F (36 7 °C)] 97 6 °F (36 4 °C)  HR:  [] 80  Resp: [16-20] 16  BP: (102-133)/(51-62) 106/51  SpO2:  [65 %-100 %] 96 %  Body mass index is 20 98 kg/m²  Input and Output Summary (last 24 hours): Intake/Output Summary (Last 24 hours) at 6/5/2021 1124  Last data filed at 6/4/2021 2101  Gross per 24 hour   Intake 240 ml   Output 1250 ml   Net -1010 ml       Physical Exam:   Physical Exam   Patient is awake alert in no acute distress  Lung with decreased breath sounds bilateral  Heart positive S1-S2 no murmur  Abdomen soft nontender  Chronic Porter catheter in place  Improving lower extremities edema  Additional Data:     Labs:  Results from last 7 days   Lab Units 06/03/21  0500   WBC Thousand/uL 2 18*   HEMOGLOBIN g/dL 11 2*   HEMATOCRIT % 34 2*   PLATELETS Thousands/uL 200   NEUTROS PCT % 87*   LYMPHS PCT % 8*   MONOS PCT % 3*   EOS PCT % 0     Results from last 7 days   Lab Units 06/04/21  0448   SODIUM mmol/L 137   POTASSIUM mmol/L 4 2   CHLORIDE mmol/L 105   CO2 mmol/L 25   BUN mg/dL 19   CREATININE mg/dL 0 76   ANION GAP mmol/L 7   CALCIUM mg/dL 9 1   GLUCOSE RANDOM mg/dL 130                 Results from last 7 days   Lab Units 06/04/21  0448 06/03/21  0500   PROCALCITONIN ng/ml <0 05 <0 05       Imaging: No pertinent imaging reviewed  Recent Cultures (last 7 days):     Results from last 7 days   Lab Units 06/03/21  1051 06/03/21  1041 06/02/21  1511   SPUTUM CULTURE  Test not performed  Suggest repeat specimen  --   --    GRAM STAIN RESULT  >10 squamous epithelial cells/lpf, indicating orophayngeal contamination  *  No polys seen*  1+ Gram positive cocci in pairs, chains and clusters*  Rare Gram negative rods*  --   --    URINE CULTURE   --   --  >100,000 cfu/ml Klebsiella oxytoca*  50,000-59,000 cfu/ml Enterococcus faecalis*   LEGIONELLA URINARY ANTIGEN   --  Negative  --        Lines/Drains:  Invasive Devices     Peripheral Intravenous Line            Peripheral IV 06/02/21 Left Antecubital 2 days    Peripheral IV 06/03/21 Right Antecubital 1 day Drain            Urethral Catheter Coude 16 Fr  130 days                Telemetry:        Last 24 Hours Medication List:   Current Facility-Administered Medications   Medication Dose Route Frequency Provider Last Rate    acetaminophen  650 mg Oral Q6H PRN Sonia Rapp MD      aluminum-magnesium hydroxide-simethicone  30 mL Oral Q6H PRN Sonia Rapp MD      aspirin  81 mg Oral Daily Sonia Rapp MD      cholecalciferol  2,000 Units Oral Daily Sonia Rapp MD      docusate sodium  100 mg Oral BID Sonia Rapp MD      enoxaparin  40 mg Subcutaneous Daily Sonia Rapp MD      furosemide  20 mg Oral Daily Adolph Barnes DO      ipratropium  0 5 mg Nebulization TID Laurita Reza MD      levalbuterol  0 63 mg Nebulization TID Laurita Reza MD      levothyroxine  112 mcg Oral Once per day on Sun Sat [facility], DO      levothyroxine  75 mcg Oral Once per day on Mon Tue Wed Thu Fri [facility], DO      metoprolol succinate  12 5 mg Oral BID Sonia Rapp MD      multivitamin-minerals  1 tablet Oral Daily Sonia Rapp MD      ondansetron  4 mg Intravenous Q6H PRN Sonia Rapp MD      pantoprazole  20 mg Oral Early Morning Sonia Rapp MD      polyethylene glycol  17 g Oral Daily Sonia Rapp MD      potassium chloride  20 mEq Oral BID Sonia Rapp MD      pravastatin  40 mg Oral Daily With Barbara Barrera MD      predniSONE  40 mg Oral Daily Marciano Ramirez DO      tamsulosin  0 4 mg Oral Daily With Barbara Barrera MD          Today, Patient Was Seen By: Adolph Barnes DO    ** Please Note: This note has been constructed using a voice recognition system   **

## 2021-06-05 NOTE — ASSESSMENT & PLAN NOTE
Acute on chronic hypoxic respiratory failure, multifactorial  Patient with known history of emphysema, presumed hypersensitivity pneumonitis  Worsening shortness of breath presently requiring 3 L supplemental oxygen, wean as tolerated to keep O2 sats more than 90-92%  Evaluated by pulmonology, hypersensitivity pneumonitis panel was sent  Patient improving on IV steroids, now he was placed on taper dose oral prednisone with plan outpatient pulmonology follow-up  Continue respiratory treatments  Patient will require home oxygen at time of discharge

## 2021-06-05 NOTE — INCIDENTAL FINDINGS
The following findings require follow up:  Radiographic finding   Finding:  Lung nodule   Follow up required:  Chest CT scan   Follow up should be done within 3 month(s)    Please notify the following clinician to assist with the follow up:   Dr Leticia Haywood MD

## 2021-06-05 NOTE — ASSESSMENT & PLAN NOTE
Wt Readings from Last 3 Encounters:   06/03/21 59 kg (130 lb)   05/04/21 57 6 kg (127 lb)   04/18/21 60 2 kg (132 lb 11 2 oz)     Previous echo with EF 60% and grade 2 diastolic dysfunction status post bio TAVR  On Lasix 20 mg p o   Daily  Patient was diuresed with IV Lasix with negative fluid balance  Repeat echo with EF 55 %  Monitor I/O, daily weights  Less than 2 g salt diet fluid restrictions  Continue with oral Lasix

## 2021-06-05 NOTE — CASE MANAGEMENT
Pt reviewed with medical team  per medical team, pt is not medically stable for discharged  Potential discharged tomm 6/6  Pt to discharged home with home O2  Cm to arrange DME via [employer]ute with [employer]  Home O2 completed  Portable home O2 delivered at bed side and concentrator for home delivery  Cm spoke to pt's spouse Dago Ramírez, confirmed concentrator delivered at home  Cm will continue to follow

## 2021-06-06 VITALS
SYSTOLIC BLOOD PRESSURE: 161 MMHG | OXYGEN SATURATION: 99 % | WEIGHT: 130 LBS | HEIGHT: 66 IN | RESPIRATION RATE: 20 BRPM | TEMPERATURE: 98.1 F | HEART RATE: 78 BPM | DIASTOLIC BLOOD PRESSURE: 78 MMHG | BODY MASS INDEX: 20.89 KG/M2

## 2021-06-06 PROCEDURE — 94760 N-INVAS EAR/PLS OXIMETRY 1: CPT

## 2021-06-06 PROCEDURE — 99239 HOSP IP/OBS DSCHRG MGMT >30: CPT | Performed by: INTERNAL MEDICINE

## 2021-06-06 PROCEDURE — 94640 AIRWAY INHALATION TREATMENT: CPT

## 2021-06-06 RX ORDER — PREDNISONE 10 MG/1
10 TABLET ORAL DAILY
Qty: 90 TABLET | Refills: 0 | Status: SHIPPED | OUTPATIENT
Start: 2021-06-06 | End: 2021-08-25 | Stop reason: ALTCHOICE

## 2021-06-06 RX ADMIN — IPRATROPIUM BROMIDE 0.5 MG: 0.5 SOLUTION RESPIRATORY (INHALATION) at 07:06

## 2021-06-06 RX ADMIN — PREDNISONE 40 MG: 20 TABLET ORAL at 08:48

## 2021-06-06 RX ADMIN — PANTOPRAZOLE SODIUM 20 MG: 20 TABLET, DELAYED RELEASE ORAL at 05:20

## 2021-06-06 RX ADMIN — FUROSEMIDE 20 MG: 20 TABLET ORAL at 08:48

## 2021-06-06 RX ADMIN — LEVOTHYROXINE SODIUM 112 MCG: 112 TABLET ORAL at 05:20

## 2021-06-06 RX ADMIN — Medication 1 TABLET: at 08:48

## 2021-06-06 RX ADMIN — ENOXAPARIN SODIUM 40 MG: 40 INJECTION SUBCUTANEOUS at 08:49

## 2021-06-06 RX ADMIN — LEVALBUTEROL HYDROCHLORIDE 0.63 MG: 0.63 SOLUTION RESPIRATORY (INHALATION) at 07:08

## 2021-06-06 RX ADMIN — DOCUSATE SODIUM 100 MG: 100 CAPSULE ORAL at 08:48

## 2021-06-06 RX ADMIN — METOPROLOL SUCCINATE 12.5 MG: 25 TABLET, FILM COATED, EXTENDED RELEASE ORAL at 08:49

## 2021-06-06 RX ADMIN — Medication 2000 UNITS: at 08:48

## 2021-06-06 RX ADMIN — ASPIRIN 81 MG: 81 TABLET, CHEWABLE ORAL at 08:48

## 2021-06-06 RX ADMIN — POTASSIUM CHLORIDE 20 MEQ: 1500 TABLET, EXTENDED RELEASE ORAL at 08:47

## 2021-06-06 RX ADMIN — POLYETHYLENE GLYCOL 3350 17 G: 17 POWDER, FOR SOLUTION ORAL at 08:49

## 2021-06-06 NOTE — DISCHARGE SUMMARY
[address] [facility]  Discharge- Cale Sanon 1933, 80 y o  male MRN: [id number]  Unit/Bed#: -01 Encounter: [id number]  Primary Care Provider: Nichole Crowder MD   Date and time admitted to hospital: 6/2/2021 12:07 PM    * Acute respiratory failure with hypoxia [facility])  Assessment & Plan  Acute on chronic hypoxic respiratory failure, multifactorial  Patient with known history of emphysema, presumed hypersensitivity pneumonitis  Worsening shortness of breath presently requiring 3 L supplemental oxygen, wean as tolerated to keep O2 sats more than 90-92%  Evaluated by pulmonology, hypersensitivity pneumonitis panel was sent  Patient improving on IV steroids, now he was placed on taper dose oral prednisone with plan outpatient pulmonology follow-up  Continue respiratory treatments  Patient will require home oxygen       Acute on chronic diastolic congestive heart failure ([facility] [address] )  Assessment & Plan  Wt Readings from Last 3 Encounters:   06/03/21 59 kg (130 lb)   05/04/21 57 6 kg (127 lb)   04/18/21 60 2 kg (132 lb 11 2 oz)     Previous echo with EF 60% and grade 2 diastolic dysfunction status post bio TAVR  On Lasix 20 mg p o   Daily  Patient was diuresed with IV Lasix with negative fluid balance  Repeat echo with EF 55 %  Monitor I/O, daily weights  Less than 2 g salt diet fluid restrictions  Continue with oral Lasix      Hypersensitivity pneumonitis (HCC)  Assessment & Plan  Patient presumed hypersensitivity pneumonitis  Treated with IV Solu-Medrol and now on taper dose prednisone   Pulmonology is following  Hypersensitivity pneumonitis profile was sent  Outpatient pulmonology follow-up    Pulmonary emphysema ([facility] [address] )  Assessment & Plan  History of pulmonary emphysema  Continue respiratory treatment    Lung nodule  Assessment & Plan  9 x 8 mm right upper lobe nodule on CT scan  Repeat chest CT scan in 3 months    Severe protein-calorie malnutrition ([address] )  Assessment & Plan  Malnutrition Findings:           BMI Findings: Body mass index is 20 98 kg/m²  Continue with regular diet    S/P TAVR (transcatheter aortic valve replacement)  Assessment & Plan  Status post bioprosthetic  TAVR    Ambulatory dysfunction  Assessment & Plan  Multifactorial  Safe ambulation  Fall precautions  PT recommending home with VNA    Urine retention  Assessment & Plan  History of chronic urinary retention with Porter catheter in place  Outpatient follow-up with Urology    Hyponatremia  Assessment & Plan  Possibly secondary to volume overload  Resolved  Continue oral fluid restriction    Hypothyroidism  Assessment & Plan  Continue levothyroxine    Hypertension  Assessment & Plan  Monitor blood pressures  Patient usually runs low blood pressures  Avoid hypotension    Discharge Summary - [facility]    Patient Information: Lonnie Camacho 80 y o  male MRN: [id number]  Unit/Bed#: -01 Encounter: [id number]    Discharging Physician / Practitioner: Ros Pastrana DO  PCP: Estefania Ambriz MD  Admission Date: 6/2/2021  Discharge Date: 06/06/21    Disposition:     Home with VNA Services (Reminder: Complete face to face encounter)    Reason for Admission:   Acute respiratory failure with hypoxia secondary to acute on chronic diastolic CHF and hypersensitivity pneumonitis    Discharge Diagnoses:     Principal Problem:    Acute respiratory failure with hypoxia ([address] )  Active Problems:    Acute on chronic diastolic congestive heart failure ([address] )    Pulmonary emphysema (HCC)    Hypersensitivity pneumonitis ([address] )    Hypertension    Hypothyroidism    Hyponatremia    Urine retention    Ambulatory dysfunction    S/P TAVR (transcatheter aortic valve replacement)    Severe protein-calorie malnutrition ([address] )    Lung nodule  Resolved Problems:    * No resolved hospital problems   *      Consultations During Hospital Stay:  · Pulmonology    Procedures Performed:     · Chest CT scan  Bilateral groundglass density with associated reticulation and bronchiectasis in both lungs  Findings have improved in the left upper lobe with worsening changes in the right upper, lower and middle lobes  Findings may be indicative of infectious or   inflammatory etiologies with bronchiectasis implying some scarring      9 x 8 mm right upper lobe nodule (series 601 image 86), previously measured about 5 x 4 mm  This could represent an inflammatory pseudonodule, however, follow-up CT chest is recommended in 3 months to exclude evolving neoplasm      Background of moderately severe emphysema      Small bilateral pleural effusions, slightly increased on the right  Cardiac echo with EF 55% and moderate TR with moderate pulmonary hypertension  Significant Findings / Test Results:     · As above    Incidental Findings:   · Pulmonary nodule    Test Results Pending at Discharge (will require follow up): · Hypersensitivity pneumonitis profile     Outpatient Tests Requested:  · Repeat chest CT scan 3 months    Complications:  None    Hospital Course:     Sunita Gilmore is a 80 y o  male patient who originally presented to the hospital on 6/2/2021 due to worsening shortness of breath     Patient was admitted in the month of April for acute hypoxic respiratory failure, he was at that time evaluated by Pulmonary he underwent extensive workup including bronchoscopy, he was diagnosed with possible hypersensitivity pneumonitis, he received IV Solu-Medrol and was discharged on taper to SNF for rehabilitation  He has since been discharged home from SNF, he has also followed up with Pulmonary Dr Bernabe Garvin and completed the prednisone taper  He has been working with physical therapy at home, he has noticed decreased effort tolerance last few days      Since a week or so he has noticed worsening dyspnea on exertion, decreased effort tolerance easy fatigability and lack of energy  He also reports cough associated with brownish phlegm  He gets 1 or 2 episodes of cough usually 1 episode in the morning and reports bringing up "quarter-size phlegm"  Occasionally phlegm is pinkish  Denies wheezing or chest tightness  He reports his shortness of breath is progressively getting worse, he has noticed in the last couple of days he is unable to walk more than 3 minutes at a stretch  No history suggestive of orthopnea or PND  Denies chest pain palpitations diaphoresis        In the ER he was noted to have acute hypoxic respiratory failure placed on 3 L supplemental oxygen, he was also provided with IV antibiotics and is being admitted      Patient was admitted the hospital, evaluated by pulmonology he was treated with IV steroids and IV Lasix with improvement in his symptoms  PT recommending home with home PT  Patient did qualify for home oxygen, he will be discharged home on oxygen with VNA on taper dose of prednisone to follow up with his family doctor in 1 week and with pulmonology Dr Katey Liz at Discharge: stable     Discharge Day Visit / Exam:     Subjective:    Patient seen and examined  Comfortable in bed  Denied depression or suicidal ideation  You told me I love to live and I  live to love  Vitals: Blood Pressure: 161/78 (06/05/21 2328)  Pulse: 78 (06/05/21 2334)  Temperature: 98 1 °F (36 7 °C) (06/05/21 2328)  Temp Source: Oral (06/05/21 2328)  Respirations: 20 (06/05/21 2328)  Height: 5' 6" (167 6 cm) (06/03/21 0500)  Weight - Scale: 59 kg (130 lb) (06/03/21 0500)  SpO2: 99 % (06/06/21 0706)  Exam:   Physical Exam  Patient is awake alert in no acute distress  On nasal cannula  Lung with decreased breath sounds bilateral  Heart positive S1-S2 no murmur  Abdomen soft nontender  Chronic Porter catheter in place  Improving lower extremities edema  Discussion with Family:  Wife    Discharge instructions/Information to patient and family:   See after visit summary for information provided to patient and family        Provisions for Follow-Up Care:  See after visit summary for information related to follow-up care and any pertinent home health orders  Planned Readmission: no     Discharge Statement:  I spent 45  minutes discharging the patient  This time was spent on the day of discharge  I had direct contact with the patient on the day of discharge  Greater than 50% of the total time was spent examining patient, answering all patient questions, arranging and discussing plan of care with patient as well as directly providing post-discharge instructions  Additional time then spent on discharge activities  Discharge Medications:  See after visit summary for reconciled discharge medications provided to patient and family        ** Please Note: This note has been constructed using a voice recognition system **

## 2021-06-06 NOTE — QUICK NOTE
Discussed with patient what he had said to RN  States he did say that but did not mean it, states it"rolled out" and did think anything of it  No depression, hopelessness, anxiety  Is excited to possibly go home today, is not upset about chronic illnesses  States he enjoys life  Reiterated multiple times that he has no SI/HI  Does not feel that he needs to speak with [place] or psych  Given 1 x nature of statement, no need for psych assessment

## 2021-06-06 NOTE — PROGRESS NOTES
At 0630 pt stated that "he wanted to commit suicide because it's not [address], RANGEL BLACKWELL notified

## 2021-06-06 NOTE — ASSESSMENT & PLAN NOTE
Acute on chronic hypoxic respiratory failure, multifactorial  Patient with known history of emphysema, presumed hypersensitivity pneumonitis  Worsening shortness of breath presently requiring 3 L supplemental oxygen, wean as tolerated to keep O2 sats more than 90-92%  Evaluated by pulmonology, hypersensitivity pneumonitis panel was sent  Patient improving on IV steroids, now he was placed on taper dose oral prednisone with plan outpatient pulmonology follow-up  Continue respiratory treatments  Patient will require home oxygen

## 2021-06-06 NOTE — ASSESSMENT & PLAN NOTE
Patient presumed hypersensitivity pneumonitis  Treated with IV Solu-Medrol and now on taper dose prednisone   Pulmonology is following  Hypersensitivity pneumonitis profile was sent  Outpatient pulmonology follow-up

## 2021-06-07 ENCOUNTER — TELEMEDICINE (OUTPATIENT)
Dept: FAMILY MEDICINE CLINIC | Facility: CLINIC | Age: 86
End: 2021-06-07
Payer: MEDICARE

## 2021-06-07 ENCOUNTER — TELEPHONE (OUTPATIENT)
Dept: FAMILY MEDICINE CLINIC | Facility: CLINIC | Age: 86
End: 2021-06-07

## 2021-06-07 ENCOUNTER — TRANSITIONAL CARE MANAGEMENT (OUTPATIENT)
Dept: FAMILY MEDICINE CLINIC | Facility: CLINIC | Age: 86
End: 2021-06-07

## 2021-06-07 DIAGNOSIS — R73.01 IMPAIRED FASTING GLUCOSE: ICD-10-CM

## 2021-06-07 DIAGNOSIS — R33.9 URINE RETENTION: ICD-10-CM

## 2021-06-07 DIAGNOSIS — D61.818 PANCYTOPENIA (HCC): ICD-10-CM

## 2021-06-07 DIAGNOSIS — D64.9 NORMOCYTIC ANEMIA: ICD-10-CM

## 2021-06-07 DIAGNOSIS — I48.0 PAF (PAROXYSMAL ATRIAL FIBRILLATION) (HCC): ICD-10-CM

## 2021-06-07 DIAGNOSIS — J96.01 ACUTE RESPIRATORY FAILURE WITH HYPOXIA (HCC): Primary | ICD-10-CM

## 2021-06-07 DIAGNOSIS — I50.33 ACUTE ON CHRONIC DIASTOLIC CHF (CONGESTIVE HEART FAILURE) (HCC): ICD-10-CM

## 2021-06-07 DIAGNOSIS — J43.9 PULMONARY EMPHYSEMA, UNSPECIFIED EMPHYSEMA TYPE (HCC): ICD-10-CM

## 2021-06-07 DIAGNOSIS — E03.9 ACQUIRED HYPOTHYROIDISM: ICD-10-CM

## 2021-06-07 DIAGNOSIS — I10 ESSENTIAL HYPERTENSION: ICD-10-CM

## 2021-06-07 DIAGNOSIS — R26.2 AMBULATORY DYSFUNCTION: ICD-10-CM

## 2021-06-07 DIAGNOSIS — I73.9 PERIPHERAL ARTERIAL DISEASE (HCC): ICD-10-CM

## 2021-06-07 DIAGNOSIS — J67.9 HYPERSENSITIVITY PNEUMONITIS (HCC): ICD-10-CM

## 2021-06-07 DIAGNOSIS — Z95.2 S/P TAVR (TRANSCATHETER AORTIC VALVE REPLACEMENT): ICD-10-CM

## 2021-06-07 PROCEDURE — 99496 TRANSJ CARE MGMT HIGH F2F 7D: CPT | Performed by: FAMILY MEDICINE

## 2021-06-07 NOTE — TELEPHONE ENCOUNTER
Left message for María Elena Guillen to call the office  Please schedule patient for this week, either virtual or in office  High Risk discharge  Please send call back to clinical staff to  ask TCM questions  María Elena Guillen called back, TCM questions completed
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
## 2021-06-07 NOTE — TELEPHONE ENCOUNTER
[facility] called to report patient resumed VN visits today  Also patient no longer takes mucinex   Please remove from med list

## 2021-06-07 NOTE — TELEPHONE ENCOUNTER
----- Message from Cinthya Pollard MD sent at 6/7/2021  9:38 AM EDT -----    ----- Message -----  From: Rose Habermann, MA  Sent: 6/7/2021   8:50 AM EDT  To: Cinthya Pollard MD      ----- Message -----  From: Maricarmen Pablo  Sent: 6/7/2021   8:35 AM EDT  To: [id number]    Patient is being discharged from their inpatient hospitalization today  Patient's unplanned readmission score is red or yellow (meaning that they are at high risk of readmission) and requires a TCM appointment within 3-5 days of discharge  Please contact this patient to schedule appointment      Thank you    Inpatient Care Management

## 2021-06-08 ENCOUNTER — OFFICE VISIT (OUTPATIENT)
Dept: CARDIOLOGY CLINIC | Facility: CLINIC | Age: 86
End: 2021-06-08
Payer: MEDICARE

## 2021-06-08 VITALS
SYSTOLIC BLOOD PRESSURE: 120 MMHG | OXYGEN SATURATION: 100 % | BODY MASS INDEX: 20.98 KG/M2 | HEART RATE: 97 BPM | DIASTOLIC BLOOD PRESSURE: 68 MMHG | HEIGHT: 66 IN

## 2021-06-08 DIAGNOSIS — J67.9 HYPERSENSITIVITY PNEUMONITIS (HCC): ICD-10-CM

## 2021-06-08 DIAGNOSIS — I50.42 CHRONIC COMBINED SYSTOLIC (CONGESTIVE) AND DIASTOLIC (CONGESTIVE) HEART FAILURE (HCC): Primary | ICD-10-CM

## 2021-06-08 DIAGNOSIS — I27.21 PAH (PULMONARY ARTERY HYPERTENSION) (HCC): ICD-10-CM

## 2021-06-08 DIAGNOSIS — J96.01 ACUTE RESPIRATORY FAILURE WITH HYPOXIA (HCC): ICD-10-CM

## 2021-06-08 DIAGNOSIS — E43 SEVERE PROTEIN-CALORIE MALNUTRITION (HCC): ICD-10-CM

## 2021-06-08 PROCEDURE — 99215 OFFICE O/P EST HI 40 MIN: CPT | Performed by: INTERNAL MEDICINE

## 2021-06-08 RX ORDER — METOPROLOL SUCCINATE 25 MG/1
TABLET, EXTENDED RELEASE ORAL
Qty: 45 TABLET | Refills: 1 | Status: ON HOLD | OUTPATIENT
Start: 2021-06-08 | End: 2021-08-31 | Stop reason: SDUPTHER

## 2021-06-08 NOTE — PROGRESS NOTES
Cardiology Follow Up    Mary Richardson  1933  [id number]  [facility]  [facility]  [address]  [phone]    No diagnosis found  Interval History:   Mr Anil Lugo was admitted to [facility] on 6/02 - 6/06/21 with acute respiratory failure with hypoxia  Mr Lawrance Castleman to the emergency room with worsening shortness of breath  He had been admitted to the hospital in April with acute hypoxic respiratory failure  At he was evaluated by Pulmonary and underwent extensive for possible hypersensitivity pneumonitis  He  His followed by Pulmonary Dr Lunsford Dom  He was at home undergoing PT and was noticed to   Experience worsening dyspnea on exertion decreased effort tolerance lack of energy and easy  Fatigue  On admission to the emergency room he was noted have acute hypoxic respiratory failure placed on 3 L nasal cannula  He was started on IV antibiotics  He was evaluated by Pulmonary and treated with IV steroids and IV Lasix with improvement  6/03/21 TTE showed   Left ventricular systolic function normal, LVEF 55% no diagnostic regional wall motion abnormality identified  Ventricular septum changes were consistent with RV pressure overload  Right ventricle mildly dilated systolic function mildly reduced hypokinesis of the mid apical free wall  Left atrium mildly dilated, mild MR, 26 mm Braxton Jeffrey bioprosthetic valve present normal function no regurgitation no significant perivalvular regurgitation  OSMAR 1 84mc2,  Mod TR, PAP 57mmHg, mod PHTN  He was discharged on tapering dose of prednisone Cardiology was not consulted during this hospitalization  Discharge weight 130 pounds  Mr Anil Lugo presents to our office for a recent hospitalization follow up visit  He is accompanied by his wife  Mateo Art is sitting in a wheelchair using oxygen at [facility] around the clock    He It is a dyspnea with minimal exertion such as walking 3 steps and lower extremity edema that is not worse  He is followed by [facility] and physical therapy  Joann Lujan denies chest pain palpitations lightheadedness or dizziness  His weight at home was 127 6 lb      HPI:  Severe AS sp TAVR 10/20  Paroxysmal atrial fibrillation not on AC due to ambulatory dysfunction   Chronic HFpEF  CAD sp CABG 2010  Hypertension  Dyslipidemia  PAD  Dyspnea  RBBB  Emphysema  Ambulatory dysfunction     Patient Active Problem List   Diagnosis    Severe aortic stenosis    PAF (paroxysmal atrial fibrillation) (HCC)    Benign prostatic hyperplasia    Carotid stenosis, bilateral    Atherosclerotic heart disease of native coronary artery with other forms of angina pectoris ([facility] )    Esophageal reflux    Essential and other specified forms of tremor    Generalized osteoarthritis of multiple sites    Hyperlipidemia    Hypertension    Hypothyroidism    Impaired fasting glucose    Left foot drop    Mononeuritis    Peripheral arterial disease (HCC)    Pulmonary emphysema (HCC)    RBBB    Abdominal aortic aneurysm (AAA) without rupture ([facility] )    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    Stenosis of carotid artery    Iliac artery aneurysm, bilateral (HCC)    Hyponatremia    Urine retention    Indwelling Porter catheter calcification (HCC)    Ambulatory dysfunction    S/P TAVR (transcatheter aortic valve replacement)    Normocytic anemia    Thrombocytopenia (HCC)    Pancytopenia (HCC)    Acute on chronic diastolic congestive heart failure (HCC)    Stage 3a chronic kidney disease (HCC)    Acute respiratory failure with hypoxia (HCC)    Hypersensitivity pneumonitis (HCC)    Bladder cancer (HCC)    Severe protein-calorie malnutrition (HCC)    Lung nodule     Past Medical History:   Diagnosis Date    Anemia     Bl[facility]     Cancer ([facility]     bladder    Carotid artery occlusion     Cataract     Colon polyp     COPD (chronic obstructive pulmonary disease) (HCC)     Coronary artery disease     Disease of thyroid gland     History of transfusion     Hyperlipidemia     Hypertension     Hypothyroidism 9/15/2017    Multiple pulmonary nodules     Peptic ulcer     Scoliosis     Sepsis without acute organ dysfunction ([facility] ) 2021    SIRS (systemic inflammatory response syndrome) ([facility] ) 2019    Transient cerebral ischemia     Tremors of nervous system      Social History     Socioeconomic History    Marital status: /Civil Union     Spouse name: Not on file    Number of children: Not on file    Years of education: Not on file    Highest education level: Not on file   Occupational History    Occupation: Retired   Social Needs    Financial resource strain: Not on file    Food insecurity     Worry: Not on file     Inability: Not on file   [employer] needs     Medical: Not on file     Non-medical: Not on file   Tobacco Use    Smoking status: Former Smoker     Packs/day: 1 00     Years: 50 00     Pack years: 50 00     Types: Cigarettes     Start date: 56     Quit date:      Years since quittin 4    Smokeless tobacco: Never Used   Substance and Sexual Activity    Alcohol use: Not Currently     Alcohol/week: 0 0 standard drinks     Comment: Social     Drug use: Never    Sexual activity: Not Currently   Lifestyle    Physical activity     Days per week: Not on file     Minutes per session: Not on file    Stress: Not on file   Relationships    Social connections     Talks on phone: Not on file     Gets together: Not on file     Attends Zoroastrianism service: Not on file     Active member of club or organization: Not on file     Attends meetings of clubs or organizations: Not on file     Relationship status: Not on file    Intimate partner violence     Fear of current or ex partner: Not on file     Emotionally abused: Not on file     Physically abused: Not on file     Forced sexual activity: Not on file   Other Topics Concern    Not on file   Social History Narrative    Not on file      Family History   Problem Relation Age of Onset    Cervical cancer Mother     Cervical cancer Sister     Heart disease Sister     Coronary artery disease Sister     Lung cancer Brother      Past Surgical History:   Procedure Laterality Date   [facility] SURGERY      1973, 1987, 2005    BUNIONECTOMY Left     Simple exostectomy (silver procedure)    CAROTID ENDARTERECTOMY Right 09/10/2008    Randy LEDBETTER MD    CATARACT EXTRACTION      CATARACT EXTRACTION, BILATERAL      CERVICAL DISCECTOMY  1969    COLONOSCOPY      CORONARY ARTERY BYPASS GRAFT  10/20/2010    x3 (LIMA-LAD, SVG-OM, SVG-RCA)    CYSTOSCOPY      ELBOW SURGERY Right 07/2012    FEMORAL ARTERY - TIBIAL ARTERY BYPASS GRAFT  12/05/2011    using reversed saphenous vein from right leg  Elpidio Baez MD    GA ECHO TRANSESOPHAG R-T 2D W/PRB IMG ACQUISJ I&R N/A 10/6/2020    Procedure: TRANSESOPHAGEAL ECHOCARDIOGRAM (DAVID); Surgeon: Shun Good DO;  Location: [facility];  Service: Cardiac Surgery    GA REPLACE AORTIC VALVE OPENFEMORAL ARTERY APPROACH N/A 10/6/2020    Procedure: REPLACEMENT AORTIC VALVE TRANSCATHETER (TAVR) TRANSFEMORAL W/ 26MM MONTALVO BREE S3 ULTRA VALVE(ACCESS ON LEFT);   Surgeon: Shun Good DO;  Location[facility];  Service: Cardiac Surgery    REPLACEMENT TOTAL KNEE Left     SHOULDER SURGERY Right 1997       Current Outpatient Medications:     acetaminophen (TYLENOL) 325 mg tablet, Take 3 tablets (975 mg total) by mouth 3 (three) times a day, Disp: , Rfl:     alfuzosin (UROXATRAL) 10 mg 24 hr tablet, Take 1 tablet (10 mg total) by mouth daily, Disp: 90 tablet, Rfl: 3    aspirin 81 MG tablet, Take 81 mg by mouth daily , Disp: , Rfl:     Cholecalciferol (HM VITAMIN D3) 2000 units CAPS, Take 1 tablet by mouth daily, Disp: , Rfl:     Diclofenac Sodium (Voltaren) 1 %, Voltaren 1 % topical gel, Disp: , Rfl:     furosemide (LASIX) 20 mg tablet, Take 1 tablet (20 mg total) by mouth daily, Disp: 90 tablet, Rfl: 2    levothyroxine 75 mcg tablet, 1 tablet daily M-Fridays  1 and 1/2 tablets Sat-Sundays, Disp: 90 tablet, Rfl: 3    metoprolol succinate (TOPROL-XL) 25 mg 24 hr tablet, TAKE 1/2 TABLET every 12 hous, Disp: 45 tablet, Rfl: 0    Multiple Vitamin (multivitamin) capsule, Take 1 capsule by mouth daily, Disp: , Rfl:     omeprazole (PriLOSEC) 20 mg delayed release capsule, Take 20 mg by mouth daily, Disp: , Rfl:     potassium chloride (K-DUR,KLOR-CON) 10 mEq tablet, Take 1 tablet (10 mEq total) by mouth daily, Disp: 90 tablet, Rfl: 1    predniSONE 10 mg tablet, Take 1 tablet (10 mg total) by mouth daily, Disp: 90 tablet, Rfl: 0    simvastatin (ZOCOR) 20 mg tablet, TAKE 1 TABLET(20 MG) BY MOUTH DAILY AT BEDTIME, Disp: 90 tablet, Rfl: 1    SPIRIVA RESPIMAT 2 5 MCG/ACT AERS inhaler, INHALE 2 PUFFS BY MOUTH DAILY, Disp: 12 g, Rfl: 6  Allergies   Allergen Reactions    Aleve [Naproxen]      Other reaction(s): FACIAL SWELLING  Category: Allergy;  Annotation - [id number]: lip/eye edema    Ancef [Cefazolin] Anaphylaxis     Hypotension, rash, itching    Augmentin [Amoxicillin-Pot Clavulanate] Diarrhea and Vomiting       Labs:  Admission on 06/02/2021, Discharged on 06/06/2021   Component Date Value    WBC 06/02/2021 5 75     RBC 06/02/2021 3 25*    Hemoglobin 06/02/2021 10 7*    Hematocrit 06/02/2021 32 1*    MCV 06/02/2021 99*    MCH 06/02/2021 32 9     MCHC 06/02/2021 33 3     RDW 06/02/2021 14 9     MPV 06/02/2021 10 6     Platelets 78/28/5833 188     nRBC 06/02/2021 0     Neutrophils Relative 06/02/2021 66     Immat GRANS % 06/02/2021 1     Lymphocytes Relative 06/02/2021 10*    Monocytes Relative 06/02/2021 22*    Eosinophils Relative 06/02/2021 1     Basophils Relative 06/02/2021 0     Neutrophils Absolute 06/02/2021 3 77     Immature Grans Absolute 06/02/2021 0 03     Lymphocytes Absolute 06/02/2021 0 59*    Monocytes Absolute 06/02/2021 1 29*    Eosinophils Absolute 06/02/2021 0 05     Basophils Absolute 06/02/2021 0 02     Sodium 06/02/2021 132*    Potassium 06/02/2021 3 9     Chloride 06/02/2021 101     CO2 06/02/2021 23     ANION GAP 06/02/2021 8     BUN 06/02/2021 8     Creatinine 06/02/2021 0 71     Glucose 06/02/2021 93     Calcium 06/02/2021 8 4     eGFR 06/02/2021 84     Troponin I 06/02/2021 <0 02     NT-proBNP 06/02/2021 1,202*    SARS-CoV-2 06/02/2021 Negative     Color, UA 06/02/2021 Yellow     Clarity, UA 06/02/2021 Turbid     Specific Gravity, UA 06/02/2021 1 010     pH, UA 06/02/2021 7 5     Leukocytes, UA 06/02/2021 Large*    Nitrite, UA 06/02/2021 Negative     Protein, UA 06/02/2021 Negative     Glucose, UA 06/02/2021 Negative     Ketones, UA 06/02/2021 Negative     Urobilinogen, UA 06/02/2021 1 0     Bilirubin, UA 06/02/2021 Negative     Blood, UA 06/02/2021 Small*    RBC, UA 06/02/2021 2-4     WBC, UA 06/02/2021 Innumerable*    Epithelial Cells 06/02/2021 None Seen     Bacteria, UA 06/02/2021 Innumerable*    COARSE GRANULAR CASTS 06/02/2021 3-5     AMORPH URATES 06/02/2021 Moderate     Ca Oxalate Cherelle, UA 06/02/2021 Occasional*    Urine Culture 06/02/2021 >100,000 cfu/ml Klebsiella oxytoca*    Urine Culture 06/02/2021 50,000-59,000 cfu/ml Enterococcus faecalis*    Urine Culture 06/02/2021 >100,000 cfu/ml Pseudomonas aeruginosa*    Ventricular Rate 06/02/2021 74     Atrial Rate 06/02/2021 416     QRSD Interval 06/02/2021 154     QT Interval 06/02/2021 436     QTC Interval 06/02/2021 483     QRS Axis 06/02/2021 70     T Wave Axis 06/02/2021 60     Sodium 06/03/2021 132*    Potassium 06/03/2021 4 1     Chloride 06/03/2021 101     CO2 06/03/2021 24     ANION GAP 06/03/2021 7     BUN 06/03/2021 10     Creatinine 06/03/2021 0 57*    Glucose 06/03/2021 113     Calcium 06/03/2021 8 7     eGFR 06/03/2021 92     WBC 06/03/2021 2 18*    RBC 06/03/2021 3 53*    Hemoglobin 06/03/2021 11 2*    Hematocrit 06/03/2021 34 2*    MCV 06/03/2021 97     MCH 06/03/2021 31 7     MCHC 06/03/2021 32 7     RDW 06/03/2021 14 6     MPV 06/03/2021 10 6     Platelets 25/03/0209 200     nRBC 06/03/2021 0     Neutrophils Relative 06/03/2021 87*    Immat GRANS % 06/03/2021 1     Lymphocytes Relative 06/03/2021 8*    Monocytes Relative 06/03/2021 3*    Eosinophils Relative 06/03/2021 0     Basophils Relative 06/03/2021 1     Neutrophils Absolute 06/03/2021 1 90     Immature Grans Absolute 06/03/2021 0 02     Lymphocytes Absolute 06/03/2021 0 18*    Monocytes Absolute 06/03/2021 0 07*    Eosinophils Absolute 06/03/2021 0 00     Basophils Absolute 06/03/2021 0 01     Magnesium 06/03/2021 2 1     Procalcitonin 06/03/2021 <0 05     Procalcitonin 06/04/2021 <0 05     Sputum Culture 06/03/2021 Test not performed  Suggest repeat specimen   Gram Stain Result 06/03/2021 >10 squamous epithelial cells/lpf, indicating orophayngeal contamination  *    Gram Stain Result 06/03/2021 No polys seen*    Gram Stain Result 06/03/2021 1+ Gram positive cocci in pairs, chains and clusters*    Gram Stain Result 06/03/2021 Rare Gram negative rods*    Legionella Urinary Antig* 06/03/2021 Negative     Sodium 06/04/2021 137     Potassium 06/04/2021 4 2     Chloride 06/04/2021 105     CO2 06/04/2021 25     ANION GAP 06/04/2021 7     BUN 06/04/2021 19     Creatinine 06/04/2021 0 76     Glucose 06/04/2021 130     Calcium 06/04/2021 9 1     eGFR 06/04/2021 [address] Name 06/05/2021 [facility] [address] Supplier Phone Number 06/05/2021 [phone]     Order Status 06/05/2021 Delivery Successful     Delivery Request Date 06/05/2021 06/05/2021     Date Delivered  06/05/2021 06/06/2021     Supplier Name 06/05/2021 06/05/2021     Item Description 06/05/2021 Home Oxygen Concentrator with Portability, Adult, Standard Liter Flow     Item Description 06/05/2021 Portable Gaseous Oxygen System     Item Description 06/05/2021 Portable O2 Contents, Gas     Item Description 06/05/2021 No Conserving Device     Item Description 06/05/2021 O2 Humidifier Bottle, Standard Liter Flow    Appointment on 05/13/2021   Component Date Value    Free T4 05/13/2021 1 39    Appointment on 05/13/2021   Component Date Value    TSH 3RD GENERATON 05/13/2021 3 760*    Sodium 05/13/2021 138     Potassium 05/13/2021 3 4*    Chloride 05/13/2021 101     CO2 05/13/2021 29     ANION GAP 05/13/2021 8     BUN 05/13/2021 23     Creatinine 05/13/2021 0 79     Glucose 05/13/2021 77     Calcium 05/13/2021 8 7     eGFR 05/13/2021 80     WBC 05/13/2021 7 54     RBC 05/13/2021 3 68*    Hemoglobin 05/13/2021 11 7*    Hematocrit 05/13/2021 37 4     MCV 05/13/2021 102*    MCH 05/13/2021 31 8     MCHC 05/13/2021 31 3*    RDW 05/13/2021 16 8*    MPV 05/13/2021 11 5     Platelets 50/54/1688 125*    nRBC 05/13/2021 0     Neutrophils Relative 05/13/2021 73     Immat GRANS % 05/13/2021 0     Lymphocytes Relative 05/13/2021 12*    Monocytes Relative 05/13/2021 13*    Eosinophils Relative 05/13/2021 2     Basophils Relative 05/13/2021 0     Neutrophils Absolute 05/13/2021 5 48     Immature Grans Absolute 05/13/2021 0 02     Lymphocytes Absolute 05/13/2021 0 91     Monocytes Absolute 05/13/2021 0 97     Eosinophils Absolute 05/13/2021 0 14     Basophils Absolute 05/13/2021 0 02    No results displayed because visit has over 200 results  Imaging: Xr Chest 2 Views    Result Date: 6/3/2021  Narrative: CHEST INDICATION:   SOB  COMPARISON:  05/14/2021 EXAM PERFORMED/VIEWS:  XR CHEST PA & LATERAL Images: 2 FINDINGS: Cardiomediastinal silhouette appears unremarkable  A prosthetic aortic valve is present  A median sternotomy has been performed  The patient has emphysema    Multifocal infiltrates in both upper lobes and at the right lung base  No pneumothorax  Bilateral pleural effusions  Osseous structures appear within normal limits for patient age  Impression: Emphysema  Multifocal pneumonia  Postoperative change  Bilateral pleural effusions  Workstation performed: [id number]     Xr Chest Pa & Lateral    Result Date: 5/16/2021  Narrative: CHEST INDICATION:   J67 9: Hypersensitivity pneumonitis due to unspecified organic dust  COMPARISON:  Chest radiograph from 4/15/2021 and chest CT from 4/12/2021  EXAM PERFORMED/VIEWS:  XR CHEST PA & LATERAL  DUAL ENERGY SUBTRACTION  FINDINGS: Normal heart size  CABG  TAVR  Mild upper lobe groundglass opacity, moderately improved from 4/15/2021  No effusion or pneumothorax  Osseous structures normal for age  Impression: Mild upper lobe groundglass opacity, moderately improved from 4/15/2021  Workstation performed: [id number]     Ct Chest Without Contrast    Result Date: 6/2/2021  Narrative: CT CHEST WITHOUT IV CONTRAST INDICATION:   cxr opacities, further eval pneumonia and airspace disease  COMPARISON:  CT chest 4/12/2021 and CTA chest abdomen pelvis 9/30/2020, chest radiographs earlier today and 5/14/2021 TECHNIQUE: CT examination of the chest was performed without intravenous contrast   Axial, sagittal, and coronal 2D reformatted images were created from the source data and submitted for interpretation  Radiation dose length product (DLP) for this visit:  441 26 mGy-cm  This examination, like all CT scans performed in the [facility], was performed utilizing techniques to minimize radiation dose exposure, including the use of iterative reconstruction and automated exposure control  FINDINGS: The study is limited without IV contrast  LUNGS:  Background of moderately to severe emphysema  Groundglass opacity and aeration in the left upper lobe has improved compared to previous CT study    Some mild residual groundglass density, subpleural reticular thickening and mild bronchiectasis suggesting some element of scarring  Subpleural bands and/or reticulation in the left lower lobe appears more prominent  There appears to be development of bronchiectasis in the right upper and lower lobes with associated mildly increased groundglass density and reticular thickening  This may be infectious or inflammatory  9 x 8 mm right upper lobe nodule series 601 image 86 corresponding to series 3 image 62, previously measured about 5 x 4 mm  This could represent an inflammatory pseudonodule, however CT follow-up is recommended to exclude evolving neoplasm  There is a new irregular opacity in the lateral segment right middle lobe, likely infectious or inflammatory  4 mm anterior right lower lobe nodule series 3/79, unchanged  Some adherent mucoid material is suggested along the anterior trachea such as series 3 image 34  PLEURA:  Small bilateral pleural effusions, slightly increased on the right  HEART/GREAT VESSELS:  Normal heart size  Status post TAVR and CABG  Atherosclerotic changes thoracic aorta and native coronary arteries  MEDIASTINUM AND OLIVIA:  Unremarkable  CHEST WALL AND LOWER NECK:   Median sternotomy wires  VISUALIZED STRUCTURES IN THE UPPER ABDOMEN:  Bilateral renal cysts  Descending colon diverticulosis  OSSEOUS STRUCTURES:  No acute fracture or destructive osseous lesion  Degenerative changes of the spine  Impression: Bilateral groundglass density with associated reticulation and bronchiectasis in both lungs  Findings have improved in the left upper lobe with worsening changes in the right upper, lower and middle lobes  Findings may be indicative of infectious or inflammatory etiologies with bronchiectasis implying some scarring  9 x 8 mm right upper lobe nodule (series 601 image 86), previously measured about 5 x 4 mm  This could represent an inflammatory pseudonodule, however, follow-up CT chest is recommended in 3 months to exclude evolving neoplasm   Background of moderately severe emphysema  Small bilateral pleural effusions, slightly increased on the right  Workstation performed: [id number]       Review of Systems:  Review of Systems   Constitutional: Positive for fatigue  Respiratory: Positive for shortness of breath  Genitourinary:        Fgley catheter to leg bag    Musculoskeletal: Positive for arthralgias, gait problem and myalgias  All other systems reviewed and are negative  Physical Exam:  Physical Exam  Vitals signs reviewed  Constitutional:       Comments: Elderly cachectic male   HENT:      Head: Normocephalic  Eyes:      Pupils: Pupils are equal, round, and reactive to light  Neck:      Musculoskeletal: Normal range of motion  Cardiovascular:      Rate and Rhythm: Normal rate and regular rhythm  Pulses: Normal pulses  Heart sounds: Normal heart sounds  Pulmonary:      Effort: Pulmonary effort is normal       Breath sounds: Normal breath sounds  Abdominal:      General: Bowel sounds are normal  There is distension  Palpations: Abdomen is soft  Comments: Slightly distended   Musculoskeletal: Normal range of motion  Right lower leg: Edema present  Left lower leg: Edema present  Comments: Trace to 1+ bilateral lower extremity edema left greater than right   Skin:     General: Skin is warm and dry  Capillary Refill: Capillary refill takes less than 2 seconds  Neurological:      General: No focal deficit present  Mental Status: He is alert and oriented to person, place, and time  Psychiatric:         Mood and Affect: Mood normal          Behavior: Behavior normal          Discussion/Summary:  1   Chronic HFpEF LVEF 55% with new reduced RV systolic function, Hypokinesis of the mid apical free wall, NYHA class III stage C- RV dysfunction most likely due to pulmonary disease,  weight stable 127 6 pounds, trace to 1+ vasquez LE edema which is stable, HR and BP controlled,  Continue Lasix 20 mg daily, metoprolol succinate 12 5 mg Q12 hours, K-Dur 10 mEq b i d ,  If lower extremities worsening consider changing Lasix to torsemide  Continue on 2 g sodium diet and daily weights  2  PAH PAP 57mmHg WHO group II LVDD, WHO group III COPD and hypersensitive pneumonitis  3  Chronic respiratory failure with hypoxia using oxygen at [facility] around the clock   4  Presumed hypersensitivity pneumonitis followed by Dr Susan Spann   5   Severe Protein calorie mal nutrition - good appetite, taking boost supplements

## 2021-06-08 NOTE — PATIENT INSTRUCTIONS
Maintain a 2 gram daily sodium diet and 1500 ml daily fluid restriction  Check daily weights  If you gain 3 pounds in one day, 5 pounds in one week, or experience worsening shortness of breath or increasing lower leg swelling  Please call the heart failure office at [phone]    Please bring a  list of your current medications and daily weights to the office visit      Daily boost or ensure

## 2021-06-09 ENCOUNTER — TELEPHONE (OUTPATIENT)
Dept: FAMILY MEDICINE CLINIC | Facility: CLINIC | Age: 86
End: 2021-06-09

## 2021-06-09 LAB
A FUMIGATUS1 AB SER QL ID: NEGATIVE
A PULLULANS AB SER QL: NEGATIVE
LACEYELLA SACCHARI AB SER QL: NEGATIVE
PIGEON SERUM AB QL ID: NEGATIVE
S RECTIVIRGULA AB SER QL ID: NEGATIVE
T VULGARIS AB SER QL ID: NEGATIVE

## 2021-06-09 NOTE — TELEPHONE ENCOUNTER
----- Message from Leelee Ortiz MD sent at 6/9/2021 12:23 PM EDT -----  Can we notify [facility] patient will need home draw for BMP and CBC in 3 to 4 weeks

## 2021-06-17 ENCOUNTER — TELEPHONE (OUTPATIENT)
Dept: FAMILY MEDICINE CLINIC | Facility: CLINIC | Age: 86
End: 2021-06-17

## 2021-06-17 ENCOUNTER — TELEPHONE (OUTPATIENT)
Dept: CARDIOLOGY CLINIC | Facility: CLINIC | Age: 86
End: 2021-06-17

## 2021-06-17 NOTE — TELEPHONE ENCOUNTER
Ileana Bailey returned my call  SBP's have been 90's since hospital d/c 6/6  Admitted for CHF  Nurse called 5/26 due to same complaints  Patient asymptomatic  Dr Gertrudis Joy advised to decrease Lasix from 40 mg daily to 20 mg daily  Weight today 130 lbs  Weight 6/14 128 8 lbs  O/v note 6/8 127 6 lbs-- does not look like patient was weighed in office???    SOB is unchanged  NP LLE unchanged   No edema RLE     Denies any lightheadedness or dizziness    Currently taking Toprol XL 12 5 mg Q12 and Lasix 20 mg daily  Nurse asking if you want to make any med changes or hold parameters?     O/v w/ Dr Gertrudis Joy 8/26

## 2021-06-17 NOTE — TELEPHONE ENCOUNTER
Zach Liz w/ [facility] called to report SBP 90's  I returned Ani's called requesting a return call to discuss CHF symptoms and confirm meds

## 2021-06-18 NOTE — TELEPHONE ENCOUNTER
Spoke to Matthew Cobb  Would like to get a  to come and evaluate patient for the PALS program  Can an order/referral be placed and then faxed to [phone]? Please advise

## 2021-06-21 ENCOUNTER — TELEPHONE (OUTPATIENT)
Dept: CARDIOLOGY CLINIC | Facility: CLINIC | Age: 86
End: 2021-06-21

## 2021-06-21 ENCOUNTER — APPOINTMENT (OUTPATIENT)
Dept: LAB | Age: 86
End: 2021-06-21
Payer: MEDICARE

## 2021-06-21 DIAGNOSIS — I50.33 ACUTE ON CHRONIC DIASTOLIC CHF (CONGESTIVE HEART FAILURE) (HCC): ICD-10-CM

## 2021-06-21 LAB
ANION GAP SERPL CALCULATED.3IONS-SCNC: 8 MMOL/L (ref 4–13)
BASOPHILS # BLD AUTO: 0.03 THOUSANDS/ΜL (ref 0–0.1)
BASOPHILS NFR BLD AUTO: 0 % (ref 0–1)
BUN SERPL-MCNC: 14 MG/DL (ref 5–25)
CALCIUM SERPL-MCNC: 8.4 MG/DL (ref 8.3–10.1)
CHLORIDE SERPL-SCNC: 100 MMOL/L (ref 100–108)
CO2 SERPL-SCNC: 29 MMOL/L (ref 21–32)
CREAT SERPL-MCNC: 0.66 MG/DL (ref 0.6–1.3)
EOSINOPHIL # BLD AUTO: 0.11 THOUSAND/ΜL (ref 0–0.61)
EOSINOPHIL NFR BLD AUTO: 1 % (ref 0–6)
ERYTHROCYTE [DISTWIDTH] IN BLOOD BY AUTOMATED COUNT: 15.5 % (ref 11.6–15.1)
FERRITIN SERPL-MCNC: 95 NG/ML (ref 8–388)
GFR SERPL CREATININE-BSD FRML MDRD: 86 ML/MIN/1.73SQ M
GLUCOSE SERPL-MCNC: 53 MG/DL (ref 65–140)
HCT VFR BLD AUTO: 34.8 % (ref 36.5–49.3)
HGB BLD-MCNC: 11.1 G/DL (ref 12–17)
IMM GRANULOCYTES # BLD AUTO: 0.06 THOUSAND/UL (ref 0–0.2)
IMM GRANULOCYTES NFR BLD AUTO: 1 % (ref 0–2)
IRON SATN MFR SERPL: 27 %
IRON SERPL-MCNC: 69 UG/DL (ref 65–175)
LYMPHOCYTES # BLD AUTO: 1.36 THOUSANDS/ΜL (ref 0.6–4.47)
LYMPHOCYTES NFR BLD AUTO: 11 % (ref 14–44)
MCH RBC QN AUTO: 32.7 PG (ref 26.8–34.3)
MCHC RBC AUTO-ENTMCNC: 31.9 G/DL (ref 31.4–37.4)
MCV RBC AUTO: 103 FL (ref 82–98)
MONOCYTES # BLD AUTO: 1.72 THOUSAND/ΜL (ref 0.17–1.22)
MONOCYTES NFR BLD AUTO: 14 % (ref 4–12)
NEUTROPHILS # BLD AUTO: 9.41 THOUSANDS/ΜL (ref 1.85–7.62)
NEUTS SEG NFR BLD AUTO: 73 % (ref 43–75)
NRBC BLD AUTO-RTO: 0 /100 WBCS
PLATELET # BLD AUTO: 144 THOUSANDS/UL (ref 149–390)
PMV BLD AUTO: 12 FL (ref 8.9–12.7)
POTASSIUM SERPL-SCNC: 3.2 MMOL/L (ref 3.5–5.3)
RBC # BLD AUTO: 3.39 MILLION/UL (ref 3.88–5.62)
SODIUM SERPL-SCNC: 137 MMOL/L (ref 136–145)
TIBC SERPL-MCNC: 260 UG/DL (ref 250–450)
TSH SERPL DL<=0.05 MIU/L-ACNC: 1.87 UIU/ML (ref 0.36–3.74)
WBC # BLD AUTO: 12.69 THOUSAND/UL (ref 4.31–10.16)

## 2021-06-21 PROCEDURE — 82728 ASSAY OF FERRITIN: CPT | Performed by: FAMILY MEDICINE

## 2021-06-21 PROCEDURE — 36415 COLL VENOUS BLD VENIPUNCTURE: CPT | Performed by: FAMILY MEDICINE

## 2021-06-21 PROCEDURE — 83550 IRON BINDING TEST: CPT | Performed by: FAMILY MEDICINE

## 2021-06-21 PROCEDURE — 85025 COMPLETE CBC W/AUTO DIFF WBC: CPT | Performed by: FAMILY MEDICINE

## 2021-06-21 PROCEDURE — 83540 ASSAY OF IRON: CPT | Performed by: FAMILY MEDICINE

## 2021-06-21 PROCEDURE — 80048 BASIC METABOLIC PNL TOTAL CA: CPT

## 2021-06-21 PROCEDURE — 84443 ASSAY THYROID STIM HORMONE: CPT | Performed by: FAMILY MEDICINE

## 2021-06-21 NOTE — TELEPHONE ENCOUNTER
Vn saw pt today  He c/o CP Bp 96/52 Hr 87 Wt,131, 98 3, O2 sat on 2 lnc 96-98%  She did encourage him to be evaluated in the ER  He said he would see how he feels as the day goes on  VN varghese CBCD and Bmp

## 2021-06-22 ENCOUNTER — TELEPHONE (OUTPATIENT)
Dept: FAMILY MEDICINE CLINIC | Facility: CLINIC | Age: 86
End: 2021-06-22

## 2021-06-22 ENCOUNTER — HOSPITAL ENCOUNTER (EMERGENCY)
Facility: HOSPITAL | Age: 86
Discharge: HOME | End: 2021-06-22
Attending: EMERGENCY MEDICINE
Payer: MEDICARE

## 2021-06-22 ENCOUNTER — APPOINTMENT (EMERGENCY)
Dept: RADIOLOGY | Facility: HOSPITAL | Age: 86
End: 2021-06-22
Payer: MEDICARE

## 2021-06-22 VITALS
HEART RATE: 60 BPM | OXYGEN SATURATION: 100 % | HEIGHT: 66 IN | SYSTOLIC BLOOD PRESSURE: 114 MMHG | DIASTOLIC BLOOD PRESSURE: 57 MMHG | WEIGHT: 130.07 LBS | TEMPERATURE: 97.8 F | BODY MASS INDEX: 20.9 KG/M2 | RESPIRATION RATE: 16 BRPM

## 2021-06-22 DIAGNOSIS — R07.9 CHEST PAIN: Primary | ICD-10-CM

## 2021-06-22 DIAGNOSIS — E87.6 HYPOKALEMIA: ICD-10-CM

## 2021-06-22 LAB
ANION GAP SERPL CALCULATED.3IONS-SCNC: 4 MMOL/L (ref 4–13)
ATRIAL RATE: 74 BPM
BASOPHILS # BLD AUTO: 0.02 THOUSANDS/ΜL (ref 0–0.1)
BASOPHILS NFR BLD AUTO: 0 % (ref 0–1)
BUN SERPL-MCNC: 18 MG/DL (ref 5–25)
CALCIUM SERPL-MCNC: 8.9 MG/DL (ref 8.3–10.1)
CHLORIDE SERPL-SCNC: 108 MMOL/L (ref 100–108)
CO2 SERPL-SCNC: 31 MMOL/L (ref 21–32)
CREAT SERPL-MCNC: 0.76 MG/DL (ref 0.6–1.3)
EOSINOPHIL # BLD AUTO: 0.12 THOUSAND/ΜL (ref 0–0.61)
EOSINOPHIL NFR BLD AUTO: 1 % (ref 0–6)
ERYTHROCYTE [DISTWIDTH] IN BLOOD BY AUTOMATED COUNT: 15.7 % (ref 11.6–15.1)
GFR SERPL CREATININE-BSD FRML MDRD: 81 ML/MIN/1.73SQ M
GLUCOSE SERPL-MCNC: 115 MG/DL (ref 65–140)
GLUCOSE SERPL-MCNC: 126 MG/DL (ref 65–140)
HCT VFR BLD AUTO: 35.4 % (ref 36.5–49.3)
HGB BLD-MCNC: 11.2 G/DL (ref 12–17)
IMM GRANULOCYTES # BLD AUTO: 0.08 THOUSAND/UL (ref 0–0.2)
IMM GRANULOCYTES NFR BLD AUTO: 1 % (ref 0–2)
LYMPHOCYTES # BLD AUTO: 1.05 THOUSANDS/ΜL (ref 0.6–4.47)
LYMPHOCYTES NFR BLD AUTO: 8 % (ref 14–44)
MCH RBC QN AUTO: 32.3 PG (ref 26.8–34.3)
MCHC RBC AUTO-ENTMCNC: 31.6 G/DL (ref 31.4–37.4)
MCV RBC AUTO: 102 FL (ref 82–98)
MONOCYTES # BLD AUTO: 1.48 THOUSAND/ΜL (ref 0.17–1.22)
MONOCYTES NFR BLD AUTO: 11 % (ref 4–12)
NEUTROPHILS # BLD AUTO: 10.6 THOUSANDS/ΜL (ref 1.85–7.62)
NEUTS SEG NFR BLD AUTO: 79 % (ref 43–75)
NRBC BLD AUTO-RTO: 0 /100 WBCS
P AXIS: 81 DEGREES
PLATELET # BLD AUTO: 147 THOUSANDS/UL (ref 149–390)
PMV BLD AUTO: 11.5 FL (ref 8.9–12.7)
POTASSIUM SERPL-SCNC: 3.6 MMOL/L (ref 3.5–5.3)
PR INTERVAL: 228 MS
QRS AXIS: 83 DEGREES
QRSD INTERVAL: 158 MS
QT INTERVAL: 414 MS
QTC INTERVAL: 459 MS
RBC # BLD AUTO: 3.47 MILLION/UL (ref 3.88–5.62)
SODIUM SERPL-SCNC: 143 MMOL/L (ref 136–145)
T WAVE AXIS: 12 DEGREES
TROPONIN I SERPL-MCNC: <0.02 NG/ML
TSH SERPL DL<=0.05 MIU/L-ACNC: 1.69 UIU/ML (ref 0.36–3.74)
VENTRICULAR RATE: 74 BPM
WBC # BLD AUTO: 13.35 THOUSAND/UL (ref 4.31–10.16)

## 2021-06-22 PROCEDURE — 93010 ELECTROCARDIOGRAM REPORT: CPT | Performed by: INTERNAL MEDICINE

## 2021-06-22 PROCEDURE — 80048 BASIC METABOLIC PNL TOTAL CA: CPT | Performed by: EMERGENCY MEDICINE

## 2021-06-22 PROCEDURE — 84484 ASSAY OF TROPONIN QUANT: CPT | Performed by: EMERGENCY MEDICINE

## 2021-06-22 PROCEDURE — 36415 COLL VENOUS BLD VENIPUNCTURE: CPT | Performed by: EMERGENCY MEDICINE

## 2021-06-22 PROCEDURE — 82948 REAGENT STRIP/BLOOD GLUCOSE: CPT

## 2021-06-22 PROCEDURE — 93005 ELECTROCARDIOGRAM TRACING: CPT

## 2021-06-22 PROCEDURE — 99285 EMERGENCY DEPT VISIT HI MDM: CPT | Performed by: EMERGENCY MEDICINE

## 2021-06-22 PROCEDURE — 99284 EMERGENCY DEPT VISIT MOD MDM: CPT

## 2021-06-22 PROCEDURE — 85025 COMPLETE CBC W/AUTO DIFF WBC: CPT | Performed by: EMERGENCY MEDICINE

## 2021-06-22 PROCEDURE — 84443 ASSAY THYROID STIM HORMONE: CPT | Performed by: EMERGENCY MEDICINE

## 2021-06-22 PROCEDURE — 71045 X-RAY EXAM CHEST 1 VIEW: CPT

## 2021-06-22 RX ORDER — SODIUM CHLORIDE 9 MG/ML
3 INJECTION INTRAVENOUS
Status: DISCONTINUED | OUTPATIENT
Start: 2021-06-22 | End: 2021-06-22 | Stop reason: HOSPADM

## 2021-06-22 RX ORDER — POTASSIUM CHLORIDE 750 MG/1
10 TABLET, EXTENDED RELEASE ORAL 3 TIMES DAILY
Qty: 90 TABLET | Refills: 0 | Status: SHIPPED | OUTPATIENT
Start: 2021-06-22 | End: 2021-08-11 | Stop reason: SDUPTHER

## 2021-06-22 NOTE — TELEPHONE ENCOUNTER
Call patient's wife Hemoglobin or red cell count 11 1 slightly improved from 10 7  range 11 5 to 15  4  iron levels normal  TSH normal-no changes needed in Levothyroxine dose  Potassium low  He is on KCL 10 meQ one tablet twice a day  I would increase to a total of 3/day  Repeat BMP and CBC in 2-3 weeks  Order placed       Hemoglobin   Date Value Ref Range Status   06/21/2021 11 1 (L) 12 0 - 17 0 g/dL Final   06/03/2021 11 2 (L) 12 0 - 17 0 g/dL Final   06/02/2021 10 7 (L) 12 0 - 17 0 g/dL Final   09/15/2015 14 6 12 0 - 17 0 g/dL Final   06/25/2015 14 4 12 0 - 17 0 g/dL Final   05/10/2015 12 9 12 0 - 17 0 g/dL Final           Recent Results (from the past 168 hour(s))   CBC and differential    Collection Time: 06/21/21  1:51 PM   Result Value Ref Range    WBC 12 69 (H) 4 31 - 10 16 Thousand/uL    RBC 3 39 (L) 3 88 - 5 62 Million/uL    Hemoglobin 11 1 (L) 12 0 - 17 0 g/dL    Hematocrit 34 8 (L) 36 5 - 49 3 %     (H) 82 - 98 fL    MCH 32 7 26 8 - 34 3 pg    MCHC 31 9 31 4 - 37 4 g/dL    RDW 15 5 (H) 11 6 - 15 1 %    MPV 12 0 8 9 - 12 7 fL    Platelets 896 (L) 152 - 390 Thousands/uL    nRBC 0 /100 WBCs    Neutrophils Relative 73 43 - 75 %    Immat GRANS % 1 0 - 2 %    Lymphocytes Relative 11 (L) 14 - 44 %    Monocytes Relative 14 (H) 4 - 12 %    Eosinophils Relative 1 0 - 6 %    Basophils Relative 0 0 - 1 %    Neutrophils Absolute 9 41 (H) 1 85 - 7 62 Thousands/µL    Immature Grans Absolute 0 06 0 00 - 0 20 Thousand/uL    Lymphocytes Absolute 1 36 0 60 - 4 47 Thousands/µL    Monocytes Absolute 1 72 (H) 0 17 - 1 22 Thousand/µL    Eosinophils Absolute 0 11 0 00 - 0 61 Thousand/µL    Basophils Absolute 0 03 0 00 - 0 10 Thousands/µL   TSH, 3rd generation with Free T4 reflex    Collection Time: 06/21/21  1:51 PM   Result Value Ref Range    TSH 3RD GENERATON 1 870 0 358 - 3 740 uIU/mL   Basic metabolic panel    Collection Time: 06/21/21  1:51 PM   Result Value Ref Range    Sodium 137 136 - 145 mmol/L    Potassium 3 2 (L) 3 5 - 5 3 mmol/L    Chloride 100 100 - 108 mmol/L    CO2 29 21 - 32 mmol/L    ANION GAP 8 4 - 13 mmol/L    BUN 14 5 - 25 mg/dL    Creatinine 0 66 0 60 - 1 30 mg/dL    Glucose 53 (L) 65 - 140 mg/dL    Calcium 8 4 8 3 - 10 1 mg/dL    eGFR 86 ml/min/1 73sq m   Iron Saturation %    Collection Time: 06/21/21  1:51 PM   Result Value Ref Range    Iron Saturation 27 %    TIBC 260 250 - 450 ug/dL    Iron 69 65 - 175 ug/dL   Ferritin    Collection Time: 06/21/21  1:51 PM   Result Value Ref Range    Ferritin 95 8 - 388 ng/mL         Current Outpatient Medications:     acetaminophen (TYLENOL) 325 mg tablet, Take 3 tablets (975 mg total) by mouth 3 (three) times a day, Disp: , Rfl:     alfuzosin (UROXATRAL) 10 mg 24 hr tablet, Take 1 tablet (10 mg total) by mouth daily, Disp: 90 tablet, Rfl: 3    aspirin 81 MG tablet, Take 81 mg by mouth daily , Disp: , Rfl:     Cholecalciferol (HM VITAMIN D3) 2000 units CAPS, Take 1 tablet by mouth daily, Disp: , Rfl:     Diclofenac Sodium (Voltaren) 1 %, Voltaren 1 % topical gel, Disp: , Rfl:     furosemide (LASIX) 20 mg tablet, Take 1 tablet (20 mg total) by mouth daily, Disp: 90 tablet, Rfl: 2    levothyroxine 75 mcg tablet, 1 tablet daily M-Fridays   1 and 1/2 tablets Sat-Sundays, Disp: 90 tablet, Rfl: 3    metoprolol succinate (TOPROL-XL) 25 mg 24 hr tablet, TAKE 1/2 TABLET every 12 hous, Disp: 45 tablet, Rfl: 1    Multiple Vitamin (multivitamin) capsule, Take 1 capsule by mouth daily, Disp: , Rfl:     omeprazole (PriLOSEC) 20 mg delayed release capsule, Take 20 mg by mouth daily, Disp: , Rfl:     potassium chloride (K-DUR,KLOR-CON) 10 mEq tablet, Take 1 tablet (10 mEq total) by mouth daily (Patient taking differently: Take 10 mEq by mouth 2 (two) times a day ), Disp: 90 tablet, Rfl: 1    predniSONE 10 mg tablet, Take 1 tablet (10 mg total) by mouth daily, Disp: 90 tablet, Rfl: 0    simvastatin (ZOCOR) 20 mg tablet, TAKE 1 TABLET(20 MG) BY MOUTH DAILY AT BEDTIME, Disp: 90 tablet, Rfl: 1    SPIRIVA RESPIMAT 2 5 MCG/ACT AERS inhaler, INHALE 2 PUFFS BY MOUTH DAILY, Disp: 12 g, Rfl: 6

## 2021-06-22 NOTE — ED ATTENDING ATTESTATION
6/22/2021  IHollie DO, saw and evaluated the patient  I have discussed the patient with the resident/non-physician practitioner and agree with the resident's/non-physician practitioner's findings, Plan of Care, and MDM as documented in the resident's/non-physician practitioner's note, except where noted  All available labs and Radiology studies were reviewed  I was present for key portions of any procedure(s) performed by the resident/non-physician practitioner and I was immediately available to provide assistance  At this point I agree with the current assessment done in the Emergency Department  I have conducted an independent evaluation of this patient a history and physical is as follows:    54-year-old male presents for evaluation after having a low blood sugar reading this a m     For grandson patient had a blood sugar of 26 this a m  And felt a little dizzy but otherwise no symptoms  Had low blood sugar about a week ago that was 29 that resolved with juice  Patient  only takes metformin, no insulin, no recent changes to his medications  Currently denies complaints  No recent illness, no change his diet  Does not regularly follow a diabetic diet  On exam-no acute distress, heart regular, no respiratory distress, abdomen soft nontender  Plan-check BMP  Patient has appointment with his family doctor tomorrow      ED Course         Critical Care Time  Procedures

## 2021-06-22 NOTE — TELEPHONE ENCOUNTER
Per Camron Rebolledo, Patient needs to go to the Emergency Room ASAP  He has significantly abnormal labs and needs to be admitted  Left message for wife Álvaro Cohen to call the office

## 2021-06-22 NOTE — TELEPHONE ENCOUNTER
Patient's wife St bright notified of message  She states she will take Chacorta to the ER right away

## 2021-06-22 NOTE — TELEPHONE ENCOUNTER
Wife called to speak with Donna Hardy  She had more questions from this mornings conversation   Joannelver Lujan is home from hospital  Please call Nimo Duran at

## 2021-06-22 NOTE — ED ATTENDING ATTESTATION
6/22/2021  IJero DO, saw and evaluated the patient  I have discussed the patient with the resident/non-physician practitioner and agree with the resident's/non-physician practitioner's findings, Plan of Care, and MDM as documented in the resident's/non-physician practitioner's note, except where noted  All available labs and Radiology studies were reviewed  I was present for key portions of any procedure(s) performed by the resident/non-physician practitioner and I was immediately available to provide assistance  At this point I agree with the current assessment done in the Emergency Department  I have conducted an independent evaluation of this patient a history and physical is as follows:    59-year-old male presents for evaluation of abnormal labs  Patient was called by family doctor this morning to come in for evaluation of white blood cell count and platelets  Patient currently denies complaints  He does say he had an episode of some indigestion yesterday that he attributes to a donut  Currently denies chest pain, shortness of breath, abdominal pain  On exam-no acute distress, heart regular, lungs clear, abdomen soft nontender  Plan-check cardiac labs  Discussed with patient regarding plan    Also offered admission even if tests normal given risk of heart disease but will discuss with patient once results are reviewed    ED Course         Critical Care Time  Procedures

## 2021-06-23 PROBLEM — R15.9 FULL INCONTINENCE OF FECES: Status: ACTIVE | Noted: 2021-06-23

## 2021-06-28 ENCOUNTER — TELEPHONE (OUTPATIENT)
Dept: OTHER | Facility: OTHER | Age: 86
End: 2021-06-28

## 2021-06-30 ENCOUNTER — TELEPHONE (OUTPATIENT)
Dept: FAMILY MEDICINE CLINIC | Facility: CLINIC | Age: 86
End: 2021-06-30

## 2021-06-30 ENCOUNTER — TELEPHONE (OUTPATIENT)
Dept: CARDIOLOGY CLINIC | Facility: CLINIC | Age: 86
End: 2021-06-30

## 2021-06-30 NOTE — TELEPHONE ENCOUNTER
Received a call from Bo Mac, [address] CHAMORROA reporting BP's 82/62, 84/62, HR 65  Asymptomatic  Nurse called with similar complaints 5/26, 6/17  Ani# [phone]    LM to call the office for more information

## 2021-06-30 NOTE — TELEPHONE ENCOUNTER
[facility] are going to keep seeing [facility], once a week for 9 weeks     He is also  Being evaluated  for [facility] program

## 2021-07-01 NOTE — ED PROVIDER NOTES
History  Chief Complaint   Patient presents with    Abnormal Lab     Patient was called to come into the ED due to abnormal blood work  States he had some chest pain yesterday which he thought was indigestion since it went away  Patient is an 80-year-old male who presents for evaluation of abnormal blood work  Patient apparently was sent in because the PCP saw that he had elevated leukocytes and slightly decreased platelet count  Patient is unsure why he was sent in  Upon further questioning  He does report that he had some indigestion yesterday and some mild substernal chest discomfort associated with this  He last about 1/2 hour completely resolved  Chest discomfort is nonradiating, nonexertional, nonpleuritic, non positional nature  He denies associated dyspnea, nausea vomiting, diaphoresis  He has been asymptomatic since that time  Denies recent infectious symptoms  Denies abdominal pain, urinary or bowel symptoms  Prior to Admission Medications   Prescriptions Last Dose Informant Patient Reported? Taking?    Cholecalciferol (HM VITAMIN D3) 2000 units CAPS  Spouse/Significant Other Yes Yes   Sig: Take 1 tablet by mouth daily   Diclofenac Sodium (Voltaren) 1 %  Spouse/Significant Other Yes Yes   Sig: Voltaren 1 % topical gel   Multiple Vitamin (multivitamin) capsule  Spouse/Significant Other Yes Yes   Sig: Take 1 capsule by mouth daily   SPIRIVA RESPIMAT 2 5 MCG/ACT AERS inhaler  Spouse/Significant Other No Yes   Sig: INHALE 2 PUFFS BY MOUTH DAILY   acetaminophen (TYLENOL) 325 mg tablet  Spouse/Significant Other No Yes   Sig: Take 3 tablets (975 mg total) by mouth 3 (three) times a day   alfuzosin (UROXATRAL) 10 mg 24 hr tablet  Spouse/Significant Other No Yes   Sig: Take 1 tablet (10 mg total) by mouth daily   aspirin 81 MG tablet  Spouse/Significant Other Yes Yes   Sig: Take 81 mg by mouth daily    furosemide (LASIX) 20 mg tablet  Spouse/Significant Other No Yes   Sig: Take 1 tablet (20 mg total) by mouth daily   levothyroxine 75 mcg tablet  Spouse/Significant Other No Yes   Si tablet daily -  1 and 1/2 tablets Sat-Sundays   metoprolol succinate (TOPROL-XL) 25 mg 24 hr tablet   No Yes   Sig: TAKE 1/2 TABLET every 12 hous   omeprazole (PriLOSEC) 20 mg delayed release capsule  Spouse/Significant Other Yes Yes   Sig: Take 20 mg by mouth daily   potassium chloride (K-DUR,KLOR-CON) 10 mEq tablet  Spouse/Significant Other No Yes   Sig: Take 1 tablet (10 mEq total) by mouth daily   Patient taking differently: Take 10 mEq by mouth 2 (two) times a day    potassium chloride (K-DUR,KLOR-CON) 10 mEq tablet   No No   Sig: Take 1 tablet (10 mEq total) by mouth 3 (three) times a day   predniSONE 10 mg tablet  Spouse/Significant Other No Yes   Sig: Take 1 tablet (10 mg total) by mouth daily   simvastatin (ZOCOR) 20 mg tablet  Spouse/Significant Other No Yes   Sig: TAKE 1 TABLET(20 MG) BY MOUTH DAILY AT BEDTIME      Facility-Administered Medications: None       Past Medical History:   Diagnosis Date    Anemia     Bladder cancer (HCC)     Cancer (HCC)     bladder    Carotid artery occlusion     Cataract     Colon polyp     COPD (chronic obstructive pulmonary disease) (HCC)     Coronary artery disease     Disease of thyroid gland     History of transfusion     Hyperlipidemia     Hypertension     Hypothyroidism 9/15/2017    Multiple pulmonary nodules     Peptic ulcer     Scoliosis     Sepsis without acute organ dysfunction ([facility] [address] ) 2021    SIRS (systemic inflammatory response syndrome) ([facility] [address] ) 2019    Transient cerebral ischemia     Tremors of nervous system        Past Surgical History:   Procedure Laterality Date   [facility] SURGERY      1973, ,     BUNIONECTOMY Left     Simple exostectomy (silver procedure)    CAROTID ENDARTERECTOMY Right 09/10/2008    Randy Frederick MD    CATARACT EXTRACTION      CATARACT EXTRACTION, BILATERAL      CERVICAL DISCECTOMY 1969    COLONOSCOPY      CORONARY ARTERY BYPASS GRAFT  10/20/2010    x3 (LIMA-LAD, SVG-OM, SVG-RCA)    CYSTOSCOPY      ELBOW SURGERY Right 2012    FEMORAL ARTERY - TIBIAL ARTERY BYPASS GRAFT  2011    using reversed saphenous vein from right leg  Raymond Murillo MD    ME ECHO TRANSESOPHAG R-T 2D W/PRB IMG ACQUISJ I&R N/A 10/6/2020    Procedure: TRANSESOPHAGEAL ECHOCARDIOGRAM (DAVID); Surgeon: Broderick Kunz DO;  [facility]: [facility];  Service: Cardiac Surgery    ME REPLACE AORTIC VALVE OPENFEMORAL ARTERY APPROACH N/A 10/6/2020    Procedure: REPLACEMENT AORTIC VALVE TRANSCATHETER (TAVR) TRANSFEMORAL W/ 26MM MONTALVO BREE S3 ULTRA VALVE(ACCESS ON LEFT); Surgeon: Broderick Kunz DO;  Location: [facility];  Service: Cardiac Surgery    REPLACEMENT TOTAL KNEE Left     SHOULDER SURGERY Right        Family History   Problem Relation Age of Onset    Cervical cancer Mother     Cervical cancer Sister     Heart disease Sister     Coronary artery disease Sister     Lung cancer Brother      I have reviewed and agree with the history as documented  E-Cigarette/Vaping    E-Cigarette Use Never User      E-Cigarette/Vaping Substances    Nicotine No     THC No     CBD No     Flavoring No      Social History     Tobacco Use    Smoking status: Former Smoker     Packs/day: 1 00     Years: 50 00     Pack years: 50 00     Types: Cigarettes     Start date:      Quit date:      Years since quittin 5    Smokeless tobacco: Never Used   Vaping Use    Vaping Use: Never used   Substance Use Topics    Alcohol use: Not Currently     Alcohol/week: 0 0 standard drinks     Comment: Social     Drug use: Never        Review of Systems   Constitutional: Negative for fever  HENT: Negative for sore throat  Eyes: Negative for photophobia  Respiratory: Negative for shortness of breath  Cardiovascular: Negative for chest pain  Gastrointestinal: Negative for abdominal pain  Genitourinary: Negative for dysuria  Musculoskeletal: Negative for back pain  Skin: Negative for rash  Neurological: Negative for light-headedness  Hematological: Negative for adenopathy  Psychiatric/Behavioral: Negative for agitation  All other systems reviewed and are negative  Physical Exam  ED Triage Vitals   Temperature Pulse Respirations Blood Pressure SpO2   06/22/21 1110 06/22/21 1105 06/22/21 1105 06/22/21 1105 06/22/21 1105   97 8 °F (36 6 °C) 79 17 122/65 98 %      Temp Source Heart Rate Source Patient Position - Orthostatic VS BP Location FiO2 (%)   06/22/21 1110 06/22/21 1105 06/22/21 1105 06/22/21 1105 --   Oral Monitor Sitting Left arm       Pain Score       06/22/21 1230       No Pain             Orthostatic Vital Signs  Vitals:    06/22/21 1105 06/22/21 1230   BP: 122/65 114/57   Pulse: 79 60   Patient Position - Orthostatic VS: Sitting Lying       Physical Exam  Vitals reviewed  Constitutional:       General: He is not in acute distress  Appearance: He is well-developed  HENT:      Head: Normocephalic  Eyes:      Pupils: Pupils are equal, round, and reactive to light  Cardiovascular:      Rate and Rhythm: Normal rate and regular rhythm  Heart sounds: Normal heart sounds  No murmur heard  No friction rub  No gallop  Pulmonary:      Effort: Pulmonary effort is normal       Breath sounds: Normal breath sounds  Abdominal:      General: Bowel sounds are normal  There is no distension  Palpations: Abdomen is soft  Tenderness: There is no abdominal tenderness  There is no guarding  Musculoskeletal:         General: Normal range of motion  Cervical back: Normal range of motion and neck supple  Skin:     Capillary Refill: Capillary refill takes less than 2 seconds  Neurological:      Mental Status: He is alert and oriented to person, place, and time  Cranial Nerves: No cranial nerve deficit  Sensory: No sensory deficit        Motor: No abnormal muscle tone  Psychiatric:         Behavior: Behavior normal          Thought Content: Thought content normal          Judgment: Judgment normal          ED Medications  Medications - No data to display    Diagnostic Studies  Results Reviewed     Procedure Component Value Units Date/Time    TSH, 3rd generation with Free T4 reflex [[id number]]  (Normal) Collected: 06/22/21 1148    Lab Status: Final result Specimen: Blood from Arm, Right Updated: 06/22/21 1224     TSH 3RD GENERATON 1 690 uIU/mL     Narrative:      Patients undergoing fluorescein dye angiography may retain small amounts of fluorescein in the body for 48-72 hours post procedure  Samples containing fluorescein can produce falsely depressed TSH values  If the patient had this procedure,a specimen should be resubmitted post fluorescein clearance        Basic metabolic panel [[id number]] Collected: 06/22/21 1148    Lab Status: Final result Specimen: Blood from Arm, Right Updated: 06/22/21 1219     Sodium 143 mmol/L      Potassium 3 6 mmol/L      Chloride 108 mmol/L      CO2 31 mmol/L      ANION GAP 4 mmol/L      BUN 18 mg/dL      Creatinine 0 76 mg/dL      Glucose 115 mg/dL      Calcium 8 9 mg/dL      eGFR 81 ml/min/1 73sq m     Narrative:      [facility] guidelines for Chronic Kidney Disease (CKD):     Stage 1 with normal or high GFR (GFR > 90 mL/min/1 73 square meters)    Stage 2 Mild CKD (GFR = 60-89 mL/min/1 73 square meters)    Stage 3A Moderate CKD (GFR = 45-59 mL/min/1 73 square meters)    Stage 3B Moderate CKD (GFR = 30-44 mL/min/1 73 square meters)    Stage 4 Severe CKD (GFR = 15-29 mL/min/1 73 square meters)    Stage 5 End Stage CKD (GFR <15 mL/min/1 73 square meters)  Note: GFR calculation is accurate only with a steady state creatinine    Troponin I [[id number]]  (Normal) Collected: 06/22/21 1148    Lab Status: Final result Specimen: Blood from Arm, Right Updated: 06/22/21 1218     Troponin I <0 02 ng/mL CBC and differential [[id number]]  (Abnormal) Collected: 06/22/21 1148    Lab Status: Final result Specimen: Blood from Arm, Right Updated: 06/22/21 1157     WBC 13 35 Thousand/uL      RBC 3 47 Million/uL      Hemoglobin 11 2 g/dL      Hematocrit 35 4 %       fL      MCH 32 3 pg      MCHC 31 6 g/dL      RDW 15 7 %      MPV 11 5 fL      Platelets 498 Thousands/uL      nRBC 0 /100 WBCs      Neutrophils Relative 79 %      Immat GRANS % 1 %      Lymphocytes Relative 8 %      Monocytes Relative 11 %      Eosinophils Relative 1 %      Basophils Relative 0 %      Neutrophils Absolute 10 60 Thousands/µL      Immature Grans Absolute 0 08 Thousand/uL      Lymphocytes Absolute 1 05 Thousands/µL      Monocytes Absolute 1 48 Thousand/µL      Eosinophils Absolute 0 12 Thousand/µL      Basophils Absolute 0 02 Thousands/µL     Fingerstick Glucose (POCT) [[id number]]  (Normal) Collected: 06/22/21 1111    Lab Status: Final result Updated: 06/22/21 1112     POC Glucose 126 mg/dl                  X-ray chest 1 view portable   ED Interpretation by Solomon Ruffin MD (06/22 1235)   Nine ED wet read:  No acute cardiopulmonary process noted      Final Result by Catrina Parra MD (06/22 1656)      Mild bilateral groundglass opacity in the upper lungs, slightly improved on the right, likely post infectious scar  Workstation performed: [id number]               Procedures  Procedures      ED Course               Identification of Seniors at Risk      Most Recent Value   (ISAR) Identification of Seniors at Risk   Before the illness or injury that brought you to the Emergency, did you need someone to help you on a regular basis? 0 Filed at: 06/22/2021 1107   In the last 24 hours, have you needed more help than usual?  0 Filed at: 06/22/2021 1107   Have you been hospitalized for one or more nights during the past 6 months?   1 Filed at: 06/22/2021 1107   In general, do you see well?  0 Filed at: 06/22/2021 1107   In general, do you have serious problems with your memory? 0 Filed at: 06/22/2021 1107   Do you take more than three different medications every day? 1 Filed at: 06/22/2021 1107   ISAR Score  2 Filed at: 06/22/2021 1107                    SBIRT 20yo+      Most Recent Value   SBIRT (25 yo +)   In order to provide better care to our patients, we are screening all of our patients for alcohol and drug use  Would it be okay to ask you these screening questions? No Filed at: 06/22/2021 1128                [facility]  Number of Diagnoses or Management Options  Chest pain  Diagnosis management comments: Patient is an 60-year-old male presents for evaluation of abnormal blood work  Blood work is unremarkable and there is no further workup necessary for it  Patient's chest pain yesterday is overall non concerning, initial workup including troponin EKG was negative  I discussed with the patient shared decision making regarding possible admission for ACS what secondary to the patient's age and risk factors but he and his wife were adamantly against this  Patient recently admitted  I agreed with the patient that this admission would likely be low yield and would expose him to increase complication risk  Patient was advised return to care if he develops any new or symptoms  Disposition  Final diagnoses:   Chest pain     Time reflects when diagnosis was documented in both MDM as applicable and the Disposition within this note     Time User Action Codes Description Comment    6/22/2021 12:37 PM Charolet Riser Add [R07 9] Chest pain     6/22/2021 12:38 PM Charolet Riser Add [E87 6] Hypokalemia       ED Disposition     ED Disposition Condition Date/Time Comment    Discharge Stable Tue Jun 22, 2021 12:37 PM Hao Alfonso discharge to home/self care              Follow-up Information     Follow up With Specialties Details Why Contact Info Additional [address] Emergency Department Emergency Medicine If symptoms worsen [address]  [address] [address] [address] Emergency Department, [address], [place], [address] [facility] [address]          Discharge Medication List as of 6/22/2021 12:39 PM      CONTINUE these medications which have CHANGED    Details   potassium chloride (K-DUR,KLOR-CON) 10 mEq tablet Take 1 tablet (10 mEq total) by mouth 3 (three) times a day, Starting Tue 6/22/2021, Until Thu 7/22/2021, Normal         CONTINUE these medications which have NOT CHANGED    Details   acetaminophen (TYLENOL) 325 mg tablet Take 3 tablets (975 mg total) by mouth 3 (three) times a day, Starting Mon 2/8/2021, No Print      alfuzosin (UROXATRAL) 10 mg 24 hr tablet Take 1 tablet (10 mg total) by mouth daily, Starting Wed 5/5/2021, Normal      aspirin 81 MG tablet Take 81 mg by mouth daily , Starting Fri 4/27/2012, Historical Med      Cholecalciferol (HM VITAMIN D3) 2000 units CAPS Take 1 tablet by mouth daily, Historical Med      Diclofenac Sodium (Voltaren) 1 % Voltaren 1 % topical gel, Historical Med      furosemide (LASIX) 20 mg tablet Take 1 tablet (20 mg total) by mouth daily, Starting Tue 6/1/2021, Normal      levothyroxine 75 mcg tablet 1 tablet daily M-Fridays  1 and 1/2 tablets Sat-Sundays, No Print      metoprolol succinate (TOPROL-XL) 25 mg 24 hr tablet TAKE 1/2 TABLET every 12 hous, Normal      Multiple Vitamin (multivitamin) capsule Take 1 capsule by mouth daily, Historical Med      omeprazole (PriLOSEC) 20 mg delayed release capsule Take 20 mg by mouth daily, Historical Med      predniSONE 10 mg tablet Take 1 tablet (10 mg total) by mouth daily, Starting Sun 6/6/2021, Normal      simvastatin (ZOCOR) 20 mg tablet TAKE 1 TABLET(20 MG) BY MOUTH DAILY AT BEDTIME, Normal      SPIRIVA RESPIMAT 2 5 MCG/ACT AERS inhaler INHALE 2 PUFFS BY MOUTH DAILY, Normal           No discharge procedures on file      PDMP Review       Value Time User    PDMP Reviewed  Yes 2/8/2021 10:31 PM Evy Dietrich DO           ED Provider  Attending physically available and evaluated Quita Allen  I managed the patient along with the ED Attending      Electronically Signed by         Chelle Jang MD  07/01/21 9391

## 2021-07-12 ENCOUNTER — TELEPHONE (OUTPATIENT)
Dept: UROLOGY | Facility: MEDICAL CENTER | Age: 86
End: 2021-07-12

## 2021-07-12 DIAGNOSIS — N39.0 RECURRENT UTI: Primary | ICD-10-CM

## 2021-07-12 NOTE — TELEPHONE ENCOUNTER
[facility] and she is requesting order to be put in for a ua as she took sample to take to lab    Patient presenting with uti symptoms and she had to change catheter today

## 2021-07-13 ENCOUNTER — LAB REQUISITION (OUTPATIENT)
Dept: LAB | Facility: HOSPITAL | Age: 86
End: 2021-07-13
Payer: MEDICARE

## 2021-07-13 DIAGNOSIS — R30.9 PAINFUL MICTURITION, UNSPECIFIED: ICD-10-CM

## 2021-07-13 LAB
BACTERIA UR QL AUTO: ABNORMAL /HPF
BILIRUB UR QL STRIP: NEGATIVE
CLARITY UR: ABNORMAL
COLOR UR: YELLOW
GLUCOSE UR STRIP-MCNC: NEGATIVE MG/DL
HGB UR QL STRIP.AUTO: ABNORMAL
HYALINE CASTS #/AREA URNS LPF: ABNORMAL /LPF
KETONES UR STRIP-MCNC: NEGATIVE MG/DL
LEUKOCYTE ESTERASE UR QL STRIP: ABNORMAL
NITRITE UR QL STRIP: NEGATIVE
NON-SQ EPI CELLS URNS QL MICRO: ABNORMAL /HPF
PH UR STRIP.AUTO: 7.5 [PH]
PROT UR STRIP-MCNC: NEGATIVE MG/DL
RBC #/AREA URNS AUTO: ABNORMAL /HPF
SP GR UR STRIP.AUTO: 1.01 (ref 1–1.03)
UROBILINOGEN UR QL STRIP.AUTO: 0.2 E.U./DL
WBC #/AREA URNS AUTO: ABNORMAL /HPF

## 2021-07-13 PROCEDURE — 81001 URINALYSIS AUTO W/SCOPE: CPT | Performed by: PHYSICIAN ASSISTANT

## 2021-07-13 PROCEDURE — 87086 URINE CULTURE/COLONY COUNT: CPT | Performed by: PHYSICIAN ASSISTANT

## 2021-07-13 PROCEDURE — 87077 CULTURE AEROBIC IDENTIFY: CPT | Performed by: PHYSICIAN ASSISTANT

## 2021-07-13 PROCEDURE — 87186 SC STD MICRODIL/AGAR DIL: CPT | Performed by: PHYSICIAN ASSISTANT

## 2021-07-13 RX ORDER — CIPROFLOXACIN 500 MG/1
500 TABLET, FILM COATED ORAL EVERY 12 HOURS SCHEDULED
Qty: 10 TABLET | Refills: 0 | Status: SHIPPED | OUTPATIENT
Start: 2021-07-13 | End: 2021-07-18

## 2021-07-13 NOTE — TELEPHONE ENCOUNTER
Called Josefa Zimmerman back and notified her that Cipro was sent to their pharmacy and it is not one of the antibiotics that he is allergic too

## 2021-07-13 NOTE — TELEPHONE ENCOUNTER
Urine culture remains pending  Will send prescription for Cipro due to report of symptoms  We will call with final urine culture results

## 2021-07-13 NOTE — TELEPHONE ENCOUNTER
Patient wife called and would like to know the results of UA  She would like to know if the patient is going to be put on antibiotics because she does not want him to end up in the hospital like he normal does  Please advise

## 2021-07-15 ENCOUNTER — OFFICE VISIT (OUTPATIENT)
Dept: PULMONOLOGY | Facility: CLINIC | Age: 86
End: 2021-07-15
Payer: MEDICARE

## 2021-07-15 ENCOUNTER — DOCUMENTATION (OUTPATIENT)
Dept: PULMONOLOGY | Facility: CLINIC | Age: 86
End: 2021-07-15

## 2021-07-15 VITALS
HEIGHT: 66 IN | OXYGEN SATURATION: 98 % | HEART RATE: 73 BPM | RESPIRATION RATE: 16 BRPM | BODY MASS INDEX: 21.28 KG/M2 | DIASTOLIC BLOOD PRESSURE: 68 MMHG | TEMPERATURE: 97.8 F | SYSTOLIC BLOOD PRESSURE: 124 MMHG | WEIGHT: 132.4 LBS

## 2021-07-15 DIAGNOSIS — I50.32 CHRONIC DIASTOLIC CONGESTIVE HEART FAILURE (HCC): ICD-10-CM

## 2021-07-15 DIAGNOSIS — J96.11 CHRONIC RESPIRATORY FAILURE WITH HYPOXIA (HCC): ICD-10-CM

## 2021-07-15 DIAGNOSIS — J43.9 PULMONARY EMPHYSEMA, UNSPECIFIED EMPHYSEMA TYPE (HCC): Primary | ICD-10-CM

## 2021-07-15 DIAGNOSIS — J67.9 HYPERSENSITIVITY PNEUMONITIS (HCC): ICD-10-CM

## 2021-07-15 PROBLEM — I27.20 PULMONARY HYPERTENSION (HCC): Status: ACTIVE | Noted: 2021-07-15

## 2021-07-15 PROCEDURE — 99215 OFFICE O/P EST HI 40 MIN: CPT | Performed by: INTERNAL MEDICINE

## 2021-07-15 PROCEDURE — 94618 PULMONARY STRESS TESTING: CPT | Performed by: INTERNAL MEDICINE

## 2021-07-15 RX ORDER — FLUTICASONE FUROATE, UMECLIDINIUM BROMIDE AND VILANTEROL TRIFENATATE 200; 62.5; 25 UG/1; UG/1; UG/1
1 POWDER RESPIRATORY (INHALATION) DAILY
Qty: 60 BLISTER | Refills: 2 | Status: SHIPPED | OUTPATIENT
Start: 2021-07-15 | End: 2021-09-25 | Stop reason: HOSPADM

## 2021-07-15 NOTE — ASSESSMENT & PLAN NOTE
Wt Readings from Last 3 Encounters:   07/15/21 60 1 kg (132 lb 6 4 oz)   06/22/21 59 kg (130 lb 1 1 oz)   06/03/21 59 kg (130 lb)     ·   He has had continued elevation in his BNP levels on the 2 recent hospitalizations  Significantly over baseline levels  · Suspect congestive heart failure was contributing to both hospital stays  ·   Diuresis has been limited by low blood pressure    Remains on daily Lasix at low dose

## 2021-07-15 NOTE — ASSESSMENT & PLAN NOTE
·  Suspect recent exacerbation  · Recommended transitioning Spiriva to Trelegy Ellipta 2/100 strength, prescription sent to his pharmacy   · He is on prednisone at 10 mg daily    Recommended decreasing to 5 mg daily for the next 2 weeks and then to discontinue if he does not notice any change in symptoms after dose reduction  ·  continue to monitor productive cough

## 2021-07-15 NOTE — ASSESSMENT & PLAN NOTE
·  This diagnosis remains questionable  · Responded to steroid therapy but hypersensitivity panel normal   · Suspect combined COPD exacerbation plus congestive heart failure as recent etiology for his deterioration   · Given cough that is productive of dark phlegm that may include bleeding, will consider ANCA testing and inflammatory workup once off systemic steroids, particularly if symptoms do not continue to improve

## 2021-07-15 NOTE — PROGRESS NOTES
Progress note - Pulmonary Medicine   Tomnicole Robbinst 80 y o  male MRN: [id number]       Impression & Plan:     Pulmonary emphysema ([facility] [address] )  ·  Suspect recent exacerbation  · Recommended transitioning Spiriva to Trelegy Ellipta 2/100 strength, prescription sent to his pharmacy   · He is on prednisone at 10 mg daily  Recommended decreasing to 5 mg daily for the next 2 weeks and then to discontinue if he does not notice any change in symptoms after dose reduction  ·  continue to monitor productive cough    Hypersensitivity pneumonitis (HCC)  ·  This diagnosis remains questionable  · Responded to steroid therapy but hypersensitivity panel normal   · Suspect combined COPD exacerbation plus congestive heart failure as recent etiology for his deterioration   · Given cough that is productive of dark phlegm that may include bleeding, will consider ANCA testing and inflammatory workup once off systemic steroids, particularly if symptoms do not continue to improve    Chronic respiratory failure with hypoxia (HCC)  ·  Continues to require supplemental oxygen based on today's oximetry  · Continue 2 L continuous, has benefited from therapy  · Recommended portability evaluation with portable oxygen concentrator if he qualifies  Request for this has been sent to [facility], his DME company    Chronic diastolic congestive heart failure ([facility] [address] )  Wt Readings from Last 3 Encounters:   07/15/21 60 1 kg (132 lb 6 4 oz)   06/22/21 59 kg (130 lb 1 1 oz)   06/03/21 59 kg (130 lb)     ·   He has had continued elevation in his BNP levels on the 2 recent hospitalizations  Significantly over baseline levels  · Suspect congestive heart failure was contributing to both hospital stays  ·   Diuresis has been limited by low blood pressure    Remains on daily Lasix at low dose        Pulmonary hypertension (HCC)  · This is multifactorial secondary to cardiac parameters as well as emphysema with his pulmonary issues as well  · At this time would not recommend primary pulmonary hypertension agents has more likely to result in decompensation      Florentinromario Vann will follow up with me in 2 months  We will re-evaluate his symptoms and response to the change in inhalers and reduction of steroids  ______________________________________________________________________    HPI:    Renita Gallardo presents today for follow-up of  hospitalizations for flares of respiratory symptoms  His wife accompanies him to today's visit and his daughter was on the phone  They did provide x-ray history and follow-up information  He has had bronchoscopy and label of hypersensitivity pneumonitis  Hypersensitivity testing was negative  He was rehospitalized with element of congestive heart failure  Has had markedly elevated BNP levels on both occasions  He has been on supplemental oxygen since that time  After the first hospitalization he was discharged to rehabilitation on steroids with rapid taper  Once the steroids were tapered off, he was experiencing significant flare of symptoms and ultimately required rehospitalization     currently he is on supplemental oxygen at 2 L, he remains on systemic steroids at 10 mg daily  He does report improvement in his breathing but still having cough  On high-dose steroids his cough went away  Currently he coughs once to twice daily and brings up some dark phlegm  No fever or chills  No infection symptoms  He remains on diuretic therapy for congestive heart failure  Attempt at increased Lasix dosing resulted in low blood pressure and dosing had to be reduced  His weight and appetite otherwise remains fairly stable  He does not demonstrate any signs or symptoms of leg edema  No palpitations  No chest pain  No abdominal pain or GERD symptoms      Current Medications:    Current Outpatient Medications:     acetaminophen (TYLENOL) 325 mg tablet, Take 3 tablets (975 mg total) by mouth 3 (three) times a day, Disp: , Rfl:    alfuzosin (UROXATRAL) 10 mg 24 hr tablet, Take 1 tablet (10 mg total) by mouth daily, Disp: 90 tablet, Rfl: 3    aspirin 81 MG tablet, Take 81 mg by mouth daily , Disp: , Rfl:     Cholecalciferol (HM VITAMIN D3) 2000 units CAPS, Take 1 tablet by mouth daily, Disp: , Rfl:     ciprofloxacin (CIPRO) 500 mg tablet, Take 1 tablet (500 mg total) by mouth every 12 (twelve) hours for 5 days, Disp: 10 tablet, Rfl: 0    Diclofenac Sodium (Voltaren) 1 %, Voltaren 1 % topical gel, Disp: , Rfl:     furosemide (LASIX) 20 mg tablet, Take 1 tablet (20 mg total) by mouth daily, Disp: 90 tablet, Rfl: 2    levothyroxine 75 mcg tablet, 1 tablet daily M-Fridays   1 and 1/2 tablets Sat-Sundays, Disp: 90 tablet, Rfl: 3    metoprolol succinate (TOPROL-XL) 25 mg 24 hr tablet, TAKE 1/2 TABLET every 12 hous, Disp: 45 tablet, Rfl: 1    Multiple Vitamin (multivitamin) capsule, Take 1 capsule by mouth daily, Disp: , Rfl:     omeprazole (PriLOSEC) 20 mg delayed release capsule, Take 20 mg by mouth daily, Disp: , Rfl:     potassium chloride (K-DUR,KLOR-CON) 10 mEq tablet, Take 1 tablet (10 mEq total) by mouth 3 (three) times a day, Disp: 90 tablet, Rfl: 0    predniSONE 10 mg tablet, Take 1 tablet (10 mg total) by mouth daily, Disp: 90 tablet, Rfl: 0    simvastatin (ZOCOR) 20 mg tablet, TAKE 1 TABLET(20 MG) BY MOUTH DAILY AT BEDTIME, Disp: 90 tablet, Rfl: 1    fluticasone-umeclidinium-vilanterol (Trelegy Ellipta) 200-62 5-25 MCG/INH AEPB inhaler, Inhale 1 puff daily Rinse mouth after use , Disp: 60 blister, Rfl: 2    Review of Systems:  Aside from what is mentioned in the HPI, the review of systems is otherwise negative    Past medical history, surgical history, and family history were reviewed and updated as appropriate    Social history updates:  Social History     Tobacco Use   Smoking Status Former Smoker    Packs/day: 1 00    Years: 50 00    Pack years: 50 00    Types: Cigarettes    Start date: 56    Quit date: 1990    Years since quittin 5   Smokeless Tobacco Never Used       PhysicalExamination:  Vitals:   /68   Pulse 73   Temp 97 8 °F (36 6 °C)   Resp 16   Ht 5' 6" (1 676 m)   Wt 60 1 kg (132 lb 6 4 oz)   SpO2 98%   BMI 21 37 kg/m²   Gen:  He appears slightly thin  Not any respiratory distress and comfortable on nasal cannula oxygen  HEENT:  Conjugate gaze  No scleral icterus  Oropharynx is clear, moist  Neck: Supple  There is no JVD, lymphadenopathy or thyromegaly  Trachea is midline  Chest:  Chest excursion symmetric  Lungs are hyperinflated  Breath sounds are distant bilaterally but otherwise clear  Hyper-resonant to percussion  Cardiac: He has murmur of aortic stenosis  Abdomen: Soft and nontender  Benign  Extremities: No clubbing, cyanosis or edema  Neurologic: No focal deficits  Normal affect  Skin: No appreciable rashes  Diagnostic Data:  Labs: I personally reviewed the most recent laboratory data pertinent to today's visit    Lab Results   Component Value Date    WBC 13 35 (H) 2021    HGB 11 2 (L) 2021    HCT 35 4 (L) 2021     (H) 2021     (L) 2021     Lab Results   Component Value Date    SODIUM 143 2021    K 3 6 2021    CO2 31 2021     2021    BUN 18 2021    CREATININE 0 76 2021    CALCIUM 8 9 2021         Imaging:  I personally reviewed the images on the [facility] system pertinent to today's visit  I reviewed his chest x-ray and CT scan on the [facility] system  There are emphysema changes  There upper lobe ground-glass changes  No significant consolidation  Other studies:  Echocardiogram from  shows EF 55%  He has dilated right ventricle with mildly reduced right ventricular systolic function  Bioprosthetic AVR  Mild mitral regurgitation    Moderate tricuspid regurgitation with estimated pulmonary artery pressure 57 mmHg    Dilip Richardson MD

## 2021-07-15 NOTE — ASSESSMENT & PLAN NOTE
· This is multifactorial secondary to cardiac parameters as well as emphysema with his pulmonary issues as well  · At this time would not recommend primary pulmonary hypertension agents has more likely to result in decompensation

## 2021-07-15 NOTE — ASSESSMENT & PLAN NOTE
·  Continues to require supplemental oxygen based on today's oximetry  · Continue 2 L continuous, has benefited from therapy  · Recommended portability evaluation with portable oxygen concentrator if he qualifies    Request for this has been sent to [facility], his DME company

## 2021-07-16 LAB
BACTERIA UR CULT: ABNORMAL
BACTERIA UR CULT: ABNORMAL

## 2021-07-19 NOTE — TELEPHONE ENCOUNTER
Spoke to [facility] regarding antibiotic change and medication sent to her pharmacy  [facility] agreeable

## 2021-07-19 NOTE — TELEPHONE ENCOUNTER
Bindu Rubio PA-C  [facility] Clinical  Please let the patient know I changed his antibiotics due to resistance and sent a new prescription to his pharmacy

## 2021-08-11 DIAGNOSIS — E87.6 HYPOKALEMIA: ICD-10-CM

## 2021-08-11 RX ORDER — POTASSIUM CHLORIDE 750 MG/1
10 TABLET, EXTENDED RELEASE ORAL 3 TIMES DAILY
Qty: 90 TABLET | Refills: 1 | Status: SHIPPED | OUTPATIENT
Start: 2021-08-11 | End: 2021-08-11

## 2021-08-11 RX ORDER — POTASSIUM CHLORIDE 750 MG/1
TABLET, EXTENDED RELEASE ORAL
Qty: 270 TABLET | Refills: 1 | Status: SHIPPED | OUTPATIENT
Start: 2021-08-11 | End: 2021-09-25 | Stop reason: HOSPADM

## 2021-08-18 ENCOUNTER — TELEPHONE (OUTPATIENT)
Dept: CARDIOLOGY CLINIC | Facility: CLINIC | Age: 86
End: 2021-08-18

## 2021-08-18 NOTE — TELEPHONE ENCOUNTER
Agree with mild fluid restriction and compression stockings  Will have [facility] reassess later this week  Thanks

## 2021-08-18 NOTE — TELEPHONE ENCOUNTER
vn saw pt today  Bp 88/58, Hr 76 wt 136  Wt is up, was 130 2 on 8/4/21 and 135 6 on 8/11/21  Has non pitting edema BLLE  Denies increased sob , no dizziness or lightheadedness  Taking all medication as prescribed  May be drinking too much, advised to decrease his fluids by VN  Will elevate legs and wear compression socks  VN will see pt Friday 6/22/21 Cr-0 76 K-3 6      Taking: Lasix 20 mg qd                Potassium 10 meq tid                Toprol-xl 12 5 mg bid    Please advise

## 2021-08-20 NOTE — TELEPHONE ENCOUNTER
Vn saw pt today  Bp 92/52 Hr 76  No pain , +1 edema bble, sob unchanged Lungs decreased at bases( CTA)  Wt today 135 2  VN will see pt next Tuesday and Friday  Any changes?

## 2021-08-24 ENCOUNTER — TELEPHONE (OUTPATIENT)
Dept: FAMILY MEDICINE CLINIC | Facility: CLINIC | Age: 86
End: 2021-08-24
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
## 2021-08-25 ENCOUNTER — TELEPHONE (OUTPATIENT)
Dept: FAMILY MEDICINE CLINIC | Facility: CLINIC | Age: 86
End: 2021-08-25

## 2021-08-25 ENCOUNTER — OFFICE VISIT (OUTPATIENT)
Dept: FAMILY MEDICINE CLINIC | Facility: CLINIC | Age: 86
End: 2021-08-25
Payer: MEDICARE

## 2021-08-25 VITALS
RESPIRATION RATE: 16 BRPM | SYSTOLIC BLOOD PRESSURE: 110 MMHG | WEIGHT: 137 LBS | DIASTOLIC BLOOD PRESSURE: 64 MMHG | OXYGEN SATURATION: 98 % | TEMPERATURE: 97.5 F | BODY MASS INDEX: 22.02 KG/M2 | HEART RATE: 76 BPM | HEIGHT: 66 IN

## 2021-08-25 DIAGNOSIS — E78.2 MIXED HYPERLIPIDEMIA: ICD-10-CM

## 2021-08-25 DIAGNOSIS — I48.0 PAF (PAROXYSMAL ATRIAL FIBRILLATION) (HCC): ICD-10-CM

## 2021-08-25 DIAGNOSIS — I27.20 PULMONARY HYPERTENSION (HCC): ICD-10-CM

## 2021-08-25 DIAGNOSIS — E03.9 ACQUIRED HYPOTHYROIDISM: ICD-10-CM

## 2021-08-25 DIAGNOSIS — R26.2 AMBULATORY DYSFUNCTION: ICD-10-CM

## 2021-08-25 DIAGNOSIS — I10 ESSENTIAL HYPERTENSION: Primary | ICD-10-CM

## 2021-08-25 DIAGNOSIS — I71.4 ABDOMINAL AORTIC ANEURYSM (AAA) WITHOUT RUPTURE (HCC): ICD-10-CM

## 2021-08-25 DIAGNOSIS — J43.9 PULMONARY EMPHYSEMA, UNSPECIFIED EMPHYSEMA TYPE (HCC): ICD-10-CM

## 2021-08-25 DIAGNOSIS — D64.9 NORMOCYTIC ANEMIA: ICD-10-CM

## 2021-08-25 DIAGNOSIS — I25.118 ATHEROSCLEROTIC HEART DISEASE OF NATIVE CORONARY ARTERY WITH OTHER FORMS OF ANGINA PECTORIS (HCC): ICD-10-CM

## 2021-08-25 DIAGNOSIS — N40.1 BENIGN PROSTATIC HYPERPLASIA WITH URINARY RETENTION: ICD-10-CM

## 2021-08-25 DIAGNOSIS — M15.9 GENERALIZED OSTEOARTHRITIS OF MULTIPLE SITES: ICD-10-CM

## 2021-08-25 DIAGNOSIS — R73.01 IMPAIRED FASTING GLUCOSE: ICD-10-CM

## 2021-08-25 DIAGNOSIS — R33.8 BENIGN PROSTATIC HYPERPLASIA WITH URINARY RETENTION: ICD-10-CM

## 2021-08-25 DIAGNOSIS — C67.9 MALIGNANT NEOPLASM OF URINARY BLADDER, UNSPECIFIED SITE (HCC): ICD-10-CM

## 2021-08-25 DIAGNOSIS — I72.3 ILIAC ARTERY ANEURYSM, BILATERAL (HCC): ICD-10-CM

## 2021-08-25 DIAGNOSIS — I73.9 PERIPHERAL ARTERIAL DISEASE (HCC): ICD-10-CM

## 2021-08-25 DIAGNOSIS — J96.11 CHRONIC RESPIRATORY FAILURE WITH HYPOXIA (HCC): ICD-10-CM

## 2021-08-25 DIAGNOSIS — I50.32 CHRONIC DIASTOLIC CONGESTIVE HEART FAILURE (HCC): ICD-10-CM

## 2021-08-25 DIAGNOSIS — J67.9 HYPERSENSITIVITY PNEUMONITIS (HCC): ICD-10-CM

## 2021-08-25 DIAGNOSIS — K21.9 GASTROESOPHAGEAL REFLUX DISEASE WITHOUT ESOPHAGITIS: ICD-10-CM

## 2021-08-25 DIAGNOSIS — D69.6 THROMBOCYTOPENIA (HCC): ICD-10-CM

## 2021-08-25 PROBLEM — E43 SEVERE PROTEIN-CALORIE MALNUTRITION (HCC): Status: RESOLVED | Noted: 2021-04-17 | Resolved: 2021-08-25

## 2021-08-25 PROBLEM — N18.31 STAGE 3A CHRONIC KIDNEY DISEASE (HCC): Status: RESOLVED | Noted: 2021-02-08 | Resolved: 2021-08-25

## 2021-08-25 PROBLEM — E87.1 HYPONATREMIA: Status: RESOLVED | Noted: 2020-09-07 | Resolved: 2021-08-25

## 2021-08-25 PROCEDURE — 99214 OFFICE O/P EST MOD 30 MIN: CPT | Performed by: FAMILY MEDICINE

## 2021-08-25 NOTE — TELEPHONE ENCOUNTER
----- Message from Jesse Dena MD sent at 8/25/2021 12:20 PM EDT -----  Patient needs labs drawn next month by [facility]   Orders placed in Epic

## 2021-08-26 ENCOUNTER — OFFICE VISIT (OUTPATIENT)
Dept: CARDIOLOGY CLINIC | Facility: CLINIC | Age: 86
End: 2021-08-26
Payer: MEDICARE

## 2021-08-26 VITALS
HEIGHT: 66 IN | BODY MASS INDEX: 22.11 KG/M2 | HEART RATE: 69 BPM | SYSTOLIC BLOOD PRESSURE: 102 MMHG | DIASTOLIC BLOOD PRESSURE: 60 MMHG

## 2021-08-26 DIAGNOSIS — I48.0 PAF (PAROXYSMAL ATRIAL FIBRILLATION) (HCC): Primary | ICD-10-CM

## 2021-08-26 DIAGNOSIS — I73.9 PERIPHERAL ARTERIAL DISEASE (HCC): ICD-10-CM

## 2021-08-26 DIAGNOSIS — I10 ESSENTIAL HYPERTENSION: ICD-10-CM

## 2021-08-26 DIAGNOSIS — Z95.2 S/P TAVR (TRANSCATHETER AORTIC VALVE REPLACEMENT): ICD-10-CM

## 2021-08-26 DIAGNOSIS — E78.2 MIXED HYPERLIPIDEMIA: ICD-10-CM

## 2021-08-26 DIAGNOSIS — I25.118 ATHEROSCLEROTIC HEART DISEASE OF NATIVE CORONARY ARTERY WITH OTHER FORMS OF ANGINA PECTORIS (HCC): ICD-10-CM

## 2021-08-26 PROCEDURE — 93000 ELECTROCARDIOGRAM COMPLETE: CPT | Performed by: INTERNAL MEDICINE

## 2021-08-26 PROCEDURE — 99214 OFFICE O/P EST MOD 30 MIN: CPT | Performed by: INTERNAL MEDICINE

## 2021-08-26 NOTE — PROGRESS NOTES
Cardiology   Luis Felipe Victor 80 y o  male MRN: [id number]        Impression:  1  Coronary artery disease - stable    2  Paroxysmal atrial fibrillation - in normal sinus rhythm  Not anticoagulation candidate  3  Vasovagal syndrome - stable    4  Dyslipidemia - on statin    5  Aortic stenosis s/p TAVR 10/20 - doing well  No further chest pain or lightheadedness  6  Peripheral arterial disease - stable    7  Dyspnea - stable  Appears mostly due to pulmonary etiology  8  Right heart failure - due to pulmonary issues  Unable to increase diuretics due to hypotension       Recommendations:  1  Continue current medications    2  Follow up in 4 months  HPI: Luis Felipe Victor is a 80y o  year old male with coronary artery disease, severe aortic stenosis s/p TAVR 10/20, and paroxysmal atrial fibrillation returns for follow up  Admitted to hospital on 6/21 with hypersensitivity pneumonitis  Wears 2L O2 NC  Does have LE edema  Review of Systems   Constitutional: Negative  HENT: Negative  Eyes: Negative  Respiratory: Positive for shortness of breath  Negative for chest tightness  Cardiovascular: Positive for leg swelling  Negative for chest pain and palpitations  Gastrointestinal: Negative  Endocrine: Negative  Genitourinary: Negative  Musculoskeletal: Negative  Skin: Negative  Allergic/Immunologic: Negative  Neurological: Negative  Hematological: Negative  Psychiatric/Behavioral: Negative  All other systems reviewed and are negative          Past Medical History:   Diagnosis Date    Anemia     [facility]adder [facility] [address] [facility]     Cancer ([facility] [address] )     bladder    Carotid artery occlusion     Cataract     Colon polyp     COPD (chronic obstructive pulmonary disease) (HCC)     Coronary artery disease     Disease of thyroid gland     History of transfusion     Hyperlipidemia     Hypertension     Hypothyroidism 9/15/2017    Multiple pulmonary nodules     Peptic ulcer     Scoliosis     [facility] 2021    Severe protein-calorie malnutrition ([facility] ) 2021    SIRS (systemic inflammatory response syndrome) ([facility] 2019    Transient cerebral ischemia     Tremors of nervous system      Past Surgical History:   Procedure Laterality Date   [facility] SURGERY      1973, ,     BUNIONECTOMY Left     Simple exostectomy (silver procedure)    CAROTID ENDARTERECTOMY Right 09/10/2008    Randy LEDBETTER MD    CATARACT EXTRACTION      CATARACT EXTRACTION, BILATERAL      CERVICAL DISCECTOMY      COLONOSCOPY      CORONARY ARTERY BYPASS GRAFT  10/20/2010    x3 (LIMA-LAD, SVG-OM, SVG-RCA)    CYSTOSCOPY      ELBOW SURGERY Right 2012    FEMORAL ARTERY - TIBIAL ARTERY BYPASS GRAFT  2011    using reversed saphenous vein from right leg  Nieves Regalado MD    VT ECHO TRANSESOPHAG R-T 2D W/PRB IMG ACQUISJ I&R N/A 10/6/2020    Procedure: TRANSESOPHAGEAL ECHOCARDIOGRAM (DAVID); Surgeon: Dino Aase, DO;  Location: [facility];  Service: Cardiac Surgery    VT REPLACE AORTIC VALVE OPENFEMORAL ARTERY APPROACH N/A 10/6/2020    Procedure: REPLACEMENT AORTIC VALVE TRANSCATHETER (TAVR) TRANSFEMORAL W/ 26MM MONTALVO BREE S3 ULTRA VALVE(ACCESS ON LEFT);   Surgeon: Dino Aase, DO;  Location: [facility];  Service: Cardiac Surgery    REPLACEMENT TOTAL KNEE Left     SHOULDER SURGERY Right      Social History     Substance and Sexual Activity   Alcohol Use Not Currently    Alcohol/week: 0 0 standard drinks    Comment: Social      Social History     Substance and Sexual Activity   Drug Use Never     Social History     Tobacco Use   Smoking Status Former Smoker    Packs/day: 1 00    Years: 50 00    Pack years: 50 00    Types: Cigarettes    Start date:     Quit date:  Codding Years since quittin 6   Smokeless Tobacco Never Used     Family History   Problem Relation Age of Onset    Cervical cancer Mother  Cervical cancer Sister     Heart disease Sister     Coronary artery disease Sister     Lung cancer Brother        Allergies: Allergies   Allergen Reactions    Aleve [Naproxen]      Other reaction(s): FACIAL SWELLING  Category: Allergy; Annotation - [id number]: lip/eye edema    Ancef [Cefazolin] Anaphylaxis     Hypotension, rash, itching    Augmentin [Amoxicillin-Pot Clavulanate] Diarrhea and Vomiting       Medications:     Current Outpatient Medications:     acetaminophen (TYLENOL) 325 mg tablet, Take 3 tablets (975 mg total) by mouth 3 (three) times a day, Disp: , Rfl:     alfuzosin (UROXATRAL) 10 mg 24 hr tablet, Take 1 tablet (10 mg total) by mouth daily, Disp: 90 tablet, Rfl: 3    aspirin 81 MG tablet, Take 81 mg by mouth daily , Disp: , Rfl:     Cholecalciferol (HM VITAMIN D3) 2000 units CAPS, Take 1 tablet by mouth daily, Disp: , Rfl:     Diclofenac Sodium (Voltaren) 1 %, Voltaren 1 % topical gel, Disp: , Rfl:     fluticasone-umeclidinium-vilanterol (Trelegy Ellipta) 200-62 5-25 MCG/INH AEPB inhaler, Inhale 1 puff daily Rinse mouth after use , Disp: 60 blister, Rfl: 2    furosemide (LASIX) 20 mg tablet, Take 1 tablet (20 mg total) by mouth daily, Disp: 90 tablet, Rfl: 2    levothyroxine 75 mcg tablet, 1 tablet daily M-Fridays   1 and 1/2 tablets Sat-Sundays, Disp: 90 tablet, Rfl: 3    metoprolol succinate (TOPROL-XL) 25 mg 24 hr tablet, TAKE 1/2 TABLET every 12 hous, Disp: 45 tablet, Rfl: 1    Multiple Vitamin (multivitamin) capsule, Take 1 capsule by mouth daily, Disp: , Rfl:     omeprazole (PriLOSEC) 20 mg delayed release capsule, Take 20 mg by mouth daily, Disp: , Rfl:     potassium chloride (K-DUR,KLOR-CON) 10 mEq tablet, TAKE 1 TABLET(10 MEQ) BY MOUTH THREE TIMES DAILY, Disp: 270 tablet, Rfl: 1    simvastatin (ZOCOR) 20 mg tablet, TAKE 1 TABLET(20 MG) BY MOUTH DAILY AT BEDTIME, Disp: 90 tablet, Rfl: 1      Wt Readings from Last 3 Encounters:   08/25/21 62 1 kg (137 lb)   07/15/21 60 1 kg (132 lb 6 4 oz)   06/22/21 59 kg (130 lb 1 1 oz)     Temp Readings from Last 3 Encounters:   08/25/21 97 5 °F (36 4 °C)   07/15/21 97 8 °F (36 6 °C)   06/22/21 97 8 °F (36 6 °C) (Oral)     BP Readings from Last 3 Encounters:   08/26/21 102/60   08/25/21 110/64   07/15/21 124/68     Pulse Readings from Last 3 Encounters:   08/26/21 69   08/25/21 76   07/15/21 73         Physical Exam  HENT:      Head: Atraumatic  Mouth/Throat:      Mouth: Mucous membranes are moist    Eyes:      Extraocular Movements: Extraocular movements intact  Cardiovascular:      Rate and Rhythm: Normal rate and regular rhythm  Heart sounds: Normal heart sounds  Pulmonary:      Effort: Pulmonary effort is normal    Abdominal:      General: Abdomen is flat  Musculoskeletal:         General: Normal range of motion  Cervical back: Normal range of motion  Skin:     General: Skin is warm  Neurological:      General: No focal deficit present  Mental Status: He is alert and oriented to person, place, and time     Psychiatric:         Mood and Affect: Mood normal          Behavior: Behavior normal            Laboratory Studies:  CMP:  Lab Results   Component Value Date     06/25/2015    K 3 6 06/22/2021     06/22/2021    CO2 31 06/22/2021    ANIONGAP 7 06/25/2015    BUN 18 06/22/2021    CREATININE 0 76 06/22/2021    GLUCOSE 95 10/06/2020    AST 18 04/12/2021    ALT 17 04/12/2021    BILITOT 0 47 09/15/2015    EGFR 81 06/22/2021       Lipid Profile:   Lab Results   Component Value Date    CHOL 121 06/25/2015     Lab Results   Component Value Date    HDL 41 10/21/2020     Lab Results   Component Value Date    LDLCALC 43 10/21/2020     Lab Results   Component Value Date    TRIG 60 10/21/2020       Cardiac testing:   EKG reviewed personally: NSR 69 1st deg AV block RBBB  Results for orders placed during the hospital encounter of 06/02/21    Echo complete with contrast if indicated    Narrative  [facility], [address] [facility]  [phone]    Transthoracic Echocardiogram  2D, M-mode, Doppler, and Color Doppler    Study date:  2021    Patient: Nancie Calvert  MR number: [id number]  Account number: [de-identified]  : 1933  Age: 80 years  Gender: Male  Status: Inpatient  Location: Bedside  Height: 66 in  Weight: 130 lb  BP: 110/ 50 mmHg    Indications: Dyspnoea;SOB; Wheezing; Tachypnoea  Diagnoses: R06 00 - Dyspnea, unspecified, R06 02 - Shortness of breath, R06 2 - Wheezing    Sonographer:  BINU Palma  Interpreting Physician:  Amanda Nichols DO  Primary Physician:  Viviana Anderson MD  Referring Physician:  Krysta [facility]  Group:  [facility]  Cardiology Fellow:  Geovanny Watkins DO    SUMMARY    PROCEDURE INFORMATION:  This was a technically difficult study  LEFT VENTRICLE:  Systolic function was normal  Ejection fraction was estimated to be 55 %  Although no diagnostic regional wall motion abnormality was identified, this possibility cannot be completely excluded on the basis of this study  VENTRICULAR SEPTUM:  There was systolic flattening  These changes are consistent with RV pressure overload  RIGHT VENTRICLE:  The ventricle was mildly dilated  Systolic function was mildly reduced  There is hypokinesis of the mid-apical free wall  LEFT ATRIUM:  The atrium was mildly dilated  MITRAL VALVE:  There was mild regurgitation  AORTIC VALVE:  A 26 mm Braxton Jeffrey bioprosthesis was present  It exhibited normal function  There was no regurgitation  There was no significant perivalvular regurgitation  The peak valve velocity was 190 cm/s  The mean valve velocity was 115 cm/s  Valve peak gradient was 14 5 mmHg  Valve mean gradient was 5 3 mmHg  Estimated aortic valve area (by VTI) was 1 84 cmï¾²  The valve was not well visualized  TRICUSPID VALVE:  There was moderate regurgitation    Estimated peak PA pressure was 57 mmHg  The findings suggest moderate pulmonary hypertension  AORTA:  The root exhibited mild dilatation  HISTORY: PRIOR HISTORY: AS ;TAVR;PAF;CAD;HTN;HLD;AAA;Hypothyroid;Parkinson  PROCEDURE: The procedure was performed at the bedside  This was a routine study  The transthoracic approach was used  The study included complete 2D imaging, M-mode, complete spectral Doppler, and color Doppler  The heart rate was 100 bpm,  at the start of the study  Images were obtained from the parasternal, apical, subcostal, and suprasternal notch acoustic windows  This was a technically difficult study  LEFT VENTRICLE: Size was normal  Systolic function was normal  Ejection fraction was estimated to be 55 %  Although no diagnostic regional wall motion abnormality was identified, this possibility cannot be completely excluded on the basis  of this study  Wall thickness was normal  DOPPLER: The study was not technically sufficient to allow evaluation of LV diastolic function  VENTRICULAR SEPTUM: There was systolic flattening  These changes are consistent with RV pressure overload  RIGHT VENTRICLE: The ventricle was mildly dilated  Systolic function was mildly reduced  There is hypokinesis of the mid-apical free wall  LEFT ATRIUM: The atrium was mildly dilated  RIGHT ATRIUM: Size was normal     MITRAL VALVE: There was mild thickening  There was normal leaflet separation  DOPPLER: The transmitral velocity was within the normal range  There was no evidence for stenosis  There was mild regurgitation  AORTIC VALVE: A 26 mm Braxton Jeffrey bioprosthesis was present  It exhibited normal function  The valve was not well visualized  DOPPLER: There was no regurgitation  There was no significant perivalvular regurgitation  TRICUSPID VALVE: The valve structure was normal  There was normal leaflet separation  DOPPLER: The transtricuspid velocity was within the normal range  There was no evidence for stenosis  There was moderate regurgitation  Pulmonary artery  systolic pressure was moderately increased  Estimated peak PA pressure was 57 mmHg  The findings suggest moderate pulmonary hypertension  PULMONIC VALVE: Leaflets exhibited normal thickness, no calcification, and normal cuspal separation  DOPPLER: The transpulmonic velocity was within the normal range  There was mild regurgitation  PERICARDIUM: There was no pericardial effusion  AORTA: The root exhibited mild dilatation  SYSTEMIC VEINS: IVC: The inferior vena cava was normal in size and course   Respirophasic changes were normal     MEASUREMENT TABLES    2D MEASUREMENTS  LVOT   (Reference normals)  Diam   21 mm   (--)    DOPPLER MEASUREMENTS  LVOT   (Reference normals)  Peak thai   81 cm/s   (--)  Mean thai   57 cm/s   (--)  VTI   16 4 cm   (--)  Peak gradient   3 mmHg   (--)  Mean gradient   1 4 mmHg   (--)  Stroke vol   56 8 ml   (--)  Stroke index   0 44 ml/mï¾²   (--)  Aortic valve   (Reference normals)  Peak thai   190 cm/s   (--)  Mean thai   115 cm/s   (--)  VTI   31 cm   (--)  Peak gradient   14 5 mmHg   (--)  Mean gradient   5 3 mmHg   (--)  Obstr index, VTI   0 53    (--)  Valve area, VTI   1 84 cmï¾²   (--)  Area index, VTI   1 1 cmï¾²/mï¾²   (--)  Obstr index, Vmax   0 43    (--)  Valve area, Vmax   1 49 cmï¾²   (--)  Area index, Vmax   0 89 cmï¾²/mï¾²   (--)  Obstr index, Vmean   0 5    (--)  Valve area, Vmean   1 73 cmï¾²   (--)  Area index, Vmean   1 04 cmï¾²/mï¾²   (--)    SYSTEM MEASUREMENT TABLES    2D  %FS: 29 21 %  Ao Diam: 3 44 cm  EDV(Teich): 99 77 ml  EF(Teich): 56 05 %  ESV(Teich): 43 84 ml  IVSd: 0 55 cm  LA Area: 13 44 cm2  LA Diam: 3 93 cm  LVEDV MOD A4C: 72 06 ml  LVEF MOD A4C: 54 11 %  LVESV MOD A4C: 33 07 ml  LVIDd: 4 65 cm  LVIDs: 3 29 cm  LVLd A4C: 8 29 cm  LVLs A4C: 6 22 cm  LVOT Diam: 2 01 cm  LVPWd: 0 71 cm  RA Area: 14 4 cm2  RVIDd: 4 03 cm  SV MOD A4C: 38 99 ml  SV(Teich): 55 93 ml    CW  AR Dec King: 2 83 m/s2  AR Dec Time: 1162 54 ms  AR PHT: 337 14 ms  AR Vmax: 3 29 m/s  AR maxP 29 mmHg  AV Env  Ti: 258 8 ms  AV VTI: 27 99 cm  AV Vmax: 1 68 m/s  AV Vmean: 1 08 m/s  AV maxP 31 mmHg  AV meanP 33 mmHg  TR Vmax: 3 61 m/s  TR maxP 21 mmHg    MM  TAPSE: 1 17 cm    PW  OSMAR (VTI): 1 86 cm2  OSMAR Vmax: 1 52 cm2  E' Sept: 0 05 m/s  LVOT Env  Ti: 289 25 ms  LVOT VTI: 16 37 cm  LVOT Vmax: 0 81 m/s  LVOT Vmean: 0 57 m/s  LVOT maxP 6 mmHg  LVOT meanP 42 mmHg  LVSV Dopp: 51 95 ml    [employer] Accredited Echocardi[facility]    Prepared and electronically signed by    Bev Hager DO  Signed 2021 08:42:13    No results found for this or any previous visit  No results found for this or any previous visit  No results found for this or any previous visit

## 2021-08-27 ENCOUNTER — TELEPHONE (OUTPATIENT)
Dept: FAMILY MEDICINE CLINIC | Facility: CLINIC | Age: 86
End: 2021-08-27

## 2021-08-27 ENCOUNTER — TELEPHONE (OUTPATIENT)
Dept: CARDIOLOGY CLINIC | Facility: CLINIC | Age: 86
End: 2021-08-27

## 2021-08-27 NOTE — TELEPHONE ENCOUNTER
Becca Hendrickson from [facility] ([facility]) home health called with a patient update  Weight today 139 8 up approximately 2 lbs from 8/24  +1 LE edema, BP 98/52 HR 76  97% on 2ltrs  No complaints from patient

## 2021-08-28 ENCOUNTER — NURSE TRIAGE (OUTPATIENT)
Dept: OTHER | Facility: OTHER | Age: 86
End: 2021-08-28

## 2021-08-28 DIAGNOSIS — R31.9 HEMATURIA OF UNKNOWN CAUSE: Primary | ICD-10-CM

## 2021-08-28 NOTE — TELEPHONE ENCOUNTER
Ordered UA and UC to be done  Lab closed today  Wife will go tomorrow  Stated oral intake has been adequate  He has been more sleepy today  No fever  Advised to call back if fever develops  Wife understands disposition  Reason for Disposition   Tea-colored or red-tinged urine, present < 24 hours    Answer Assessment - Initial Assessment Questions  1  SYMPTOMS: "What symptoms are you concerned about?"      Blood in urine     2  ONSET:  "When did the symptoms start?"      Yesterday was worse  Today still there but not as bad  3  FEVER: "Is there a fever?" If Yes, ask: "What is the temperature, how was it measured, and when did it start?"      No 97 4     4  ABDOMINAL PAIN: "Is there any abdominal pain?" (e g , Scale 1-10; or mild, moderate, severe)      No     5  URINE COLOR: "What color is the urine?"  "Is there blood present in the urine?" (e g , clear, yellow, cloudy, tea-colored, blood streaks, bright red)      Tinge of blood     6  ONSET: "When was the catheter inserted?"      1 year ago     7  OTHER SYMPTOMS: "Do you have any other symptoms?" (e g , back pain, bad urine odor)       Chronic back pain and slight odor of urine      Protocols used: URINARY CATHETER SYMPTOMS AND QUESTIONS-ADULT-AH Bladder non-tender and non-distended. Urine clear yellow.

## 2021-08-28 NOTE — TELEPHONE ENCOUNTER
Regarding: lethargy, wife is concerned of infection  ----- Message from Dee Dee Bernstein RN sent at 8/28/2021 10:46 AM EDT -----  Patient's wife states he is lethargic, back pain, urine red in color  No fever  She states his back pain is improved and urine has now cleared since yesterday  He is currently on an antibiotic

## 2021-08-29 ENCOUNTER — TELEPHONE (OUTPATIENT)
Dept: OTHER | Facility: OTHER | Age: 86
End: 2021-08-29

## 2021-08-29 ENCOUNTER — APPOINTMENT (EMERGENCY)
Dept: RADIOLOGY | Facility: HOSPITAL | Age: 86
DRG: 641 | End: 2021-08-29
Payer: MEDICARE

## 2021-08-29 ENCOUNTER — APPOINTMENT (OUTPATIENT)
Dept: LAB | Age: 86
DRG: 641 | End: 2021-08-29
Payer: MEDICARE

## 2021-08-29 ENCOUNTER — HOSPITAL ENCOUNTER (INPATIENT)
Facility: HOSPITAL | Age: 86
LOS: 5 days | Discharge: NURSING, REHAB OR LONG-TERM CARE | DRG: 641 | End: 2021-09-03
Attending: EMERGENCY MEDICINE | Admitting: INTERNAL MEDICINE
Payer: MEDICARE

## 2021-08-29 DIAGNOSIS — E87.1 ACUTE HYPONATREMIA: ICD-10-CM

## 2021-08-29 DIAGNOSIS — N39.0 COMPLICATED UTI (URINARY TRACT INFECTION): ICD-10-CM

## 2021-08-29 DIAGNOSIS — I48.0 PAF (PAROXYSMAL ATRIAL FIBRILLATION) (HCC): ICD-10-CM

## 2021-08-29 DIAGNOSIS — R31.9 HEMATURIA OF UNKNOWN CAUSE: ICD-10-CM

## 2021-08-29 DIAGNOSIS — E87.1 HYPONATREMIA: ICD-10-CM
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
## 2021-08-29 DIAGNOSIS — I50.9 CHF (CONGESTIVE HEART FAILURE) (HCC): ICD-10-CM

## 2021-08-29 DIAGNOSIS — R33.9 URINE RETENTION: ICD-10-CM

## 2021-08-29 DIAGNOSIS — S01.90XA: ICD-10-CM

## 2021-08-29 DIAGNOSIS — R53.1 GENERALIZED WEAKNESS: Primary | ICD-10-CM

## 2021-08-29 DIAGNOSIS — J18.9 PNEUMONIA: ICD-10-CM

## 2021-08-29 LAB
ALBUMIN SERPL BCP-MCNC: 3.1 G/DL (ref 3.5–5)
ALP SERPL-CCNC: 96 U/L (ref 46–116)
ALT SERPL W P-5'-P-CCNC: 16 U/L (ref 12–78)
ANION GAP SERPL CALCULATED.3IONS-SCNC: 5 MMOL/L (ref 4–13)
AST SERPL W P-5'-P-CCNC: 21 U/L (ref 5–45)
BACTERIA UR QL AUTO: ABNORMAL /HPF
BACTERIA UR QL AUTO: ABNORMAL /HPF
BASOPHILS # BLD AUTO: 0.01 THOUSANDS/ΜL (ref 0–0.1)
BASOPHILS NFR BLD AUTO: 0 % (ref 0–1)
BILIRUB SERPL-MCNC: 0.7 MG/DL (ref 0.2–1)
BILIRUB UR QL STRIP: NEGATIVE
BILIRUB UR QL STRIP: NEGATIVE
BUN SERPL-MCNC: 12 MG/DL (ref 5–25)
CALCIUM ALBUM COR SERPL-MCNC: 9.1 MG/DL (ref 8.3–10.1)
CALCIUM SERPL-MCNC: 8.4 MG/DL (ref 8.3–10.1)
CHLORIDE SERPL-SCNC: 95 MMOL/L (ref 100–108)
CLARITY UR: ABNORMAL
CLARITY UR: ABNORMAL
CO2 SERPL-SCNC: 28 MMOL/L (ref 21–32)
COLOR UR: YELLOW
COLOR UR: YELLOW
COLOR, POC: NORMAL
CREAT SERPL-MCNC: 0.61 MG/DL (ref 0.6–1.3)
EOSINOPHIL # BLD AUTO: 0.06 THOUSAND/ΜL (ref 0–0.61)
EOSINOPHIL NFR BLD AUTO: 1 % (ref 0–6)
ERYTHROCYTE [DISTWIDTH] IN BLOOD BY AUTOMATED COUNT: 12.4 % (ref 11.6–15.1)
GFR SERPL CREATININE-BSD FRML MDRD: 89 ML/MIN/1.73SQ M
GLUCOSE SERPL-MCNC: 90 MG/DL (ref 65–140)
GLUCOSE UR STRIP-MCNC: NEGATIVE MG/DL
GLUCOSE UR STRIP-MCNC: NEGATIVE MG/DL
HCT VFR BLD AUTO: 28.8 % (ref 36.5–49.3)
HGB BLD-MCNC: 9.8 G/DL (ref 12–17)
HGB UR QL STRIP.AUTO: ABNORMAL
HGB UR QL STRIP.AUTO: ABNORMAL
HYALINE CASTS #/AREA URNS LPF: ABNORMAL /LPF
IMM GRANULOCYTES # BLD AUTO: 0.02 THOUSAND/UL (ref 0–0.2)
IMM GRANULOCYTES NFR BLD AUTO: 0 % (ref 0–2)
KETONES UR STRIP-MCNC: ABNORMAL MG/DL
KETONES UR STRIP-MCNC: NEGATIVE MG/DL
LEUKOCYTE ESTERASE UR QL STRIP: ABNORMAL
LEUKOCYTE ESTERASE UR QL STRIP: ABNORMAL
LYMPHOCYTES # BLD AUTO: 0.59 THOUSANDS/ΜL (ref 0.6–4.47)
LYMPHOCYTES NFR BLD AUTO: 9 % (ref 14–44)
MCH RBC QN AUTO: 32.2 PG (ref 26.8–34.3)
MCHC RBC AUTO-ENTMCNC: 34 G/DL (ref 31.4–37.4)
MCV RBC AUTO: 95 FL (ref 82–98)
MONOCYTES # BLD AUTO: 1.37 THOUSAND/ΜL (ref 0.17–1.22)
MONOCYTES NFR BLD AUTO: 20 % (ref 4–12)
NEUTROPHILS # BLD AUTO: 4.79 THOUSANDS/ΜL (ref 1.85–7.62)
NEUTS SEG NFR BLD AUTO: 70 % (ref 43–75)
NITRITE UR QL STRIP: POSITIVE
NITRITE UR QL STRIP: POSITIVE
NON-SQ EPI CELLS URNS QL MICRO: ABNORMAL /HPF
NON-SQ EPI CELLS URNS QL MICRO: ABNORMAL /HPF
NRBC BLD AUTO-RTO: 0 /100 WBCS
PH UR STRIP.AUTO: 6.5 [PH]
PH UR STRIP.AUTO: 6.5 [PH] (ref 4.5–8)
PLATELET # BLD AUTO: 181 THOUSANDS/UL (ref 149–390)
PMV BLD AUTO: 10.4 FL (ref 8.9–12.7)
POTASSIUM SERPL-SCNC: 3.6 MMOL/L (ref 3.5–5.3)
PROCALCITONIN SERPL-MCNC: <0.05 NG/ML
PROT SERPL-MCNC: 6.5 G/DL (ref 6.4–8.2)
PROT UR STRIP-MCNC: NEGATIVE MG/DL
PROT UR STRIP-MCNC: NEGATIVE MG/DL
RBC # BLD AUTO: 3.04 MILLION/UL (ref 3.88–5.62)
RBC #/AREA URNS AUTO: ABNORMAL /HPF
RBC #/AREA URNS AUTO: ABNORMAL /HPF
SODIUM SERPL-SCNC: 128 MMOL/L (ref 136–145)
SP GR UR STRIP.AUTO: 1.01 (ref 1–1.03)
SP GR UR STRIP.AUTO: 1.02 (ref 1–1.03)
TROPONIN I SERPL-MCNC: <0.02 NG/ML
TSH SERPL DL<=0.05 MIU/L-ACNC: 2.14 UIU/ML (ref 0.36–3.74)
UROBILINOGEN UR QL STRIP.AUTO: 0.2 E.U./DL
UROBILINOGEN UR QL STRIP.AUTO: 0.2 E.U./DL
WBC # BLD AUTO: 6.84 THOUSAND/UL (ref 4.31–10.16)
WBC #/AREA URNS AUTO: ABNORMAL /HPF
WBC #/AREA URNS AUTO: ABNORMAL /HPF

## 2021-08-29 PROCEDURE — 87040 BLOOD CULTURE FOR BACTERIA: CPT | Performed by: INTERNAL MEDICINE

## 2021-08-29 PROCEDURE — 87077 CULTURE AEROBIC IDENTIFY: CPT | Performed by: EMERGENCY MEDICINE

## 2021-08-29 PROCEDURE — 87077 CULTURE AEROBIC IDENTIFY: CPT

## 2021-08-29 PROCEDURE — 84145 PROCALCITONIN (PCT): CPT | Performed by: INTERNAL MEDICINE

## 2021-08-29 PROCEDURE — 81001 URINALYSIS AUTO W/SCOPE: CPT

## 2021-08-29 PROCEDURE — 99285 EMERGENCY DEPT VISIT HI MDM: CPT | Performed by: EMERGENCY MEDICINE

## 2021-08-29 PROCEDURE — 84443 ASSAY THYROID STIM HORMONE: CPT | Performed by: INTERNAL MEDICINE

## 2021-08-29 PROCEDURE — 36415 COLL VENOUS BLD VENIPUNCTURE: CPT | Performed by: EMERGENCY MEDICINE

## 2021-08-29 PROCEDURE — 87186 SC STD MICRODIL/AGAR DIL: CPT | Performed by: EMERGENCY MEDICINE

## 2021-08-29 PROCEDURE — 85025 COMPLETE CBC W/AUTO DIFF WBC: CPT | Performed by: EMERGENCY MEDICINE

## 2021-08-29 PROCEDURE — 87086 URINE CULTURE/COLONY COUNT: CPT | Performed by: EMERGENCY MEDICINE

## 2021-08-29 PROCEDURE — G1004 CDSM NDSC: HCPCS

## 2021-08-29 PROCEDURE — 71045 X-RAY EXAM CHEST 1 VIEW: CPT

## 2021-08-29 PROCEDURE — 96374 THER/PROPH/DIAG INJ IV PUSH: CPT

## 2021-08-29 PROCEDURE — 87186 SC STD MICRODIL/AGAR DIL: CPT

## 2021-08-29 PROCEDURE — 72125 CT NECK SPINE W/O DYE: CPT

## 2021-08-29 PROCEDURE — 70450 CT HEAD/BRAIN W/O DYE: CPT

## 2021-08-29 PROCEDURE — 84484 ASSAY OF TROPONIN QUANT: CPT | Performed by: EMERGENCY MEDICINE

## 2021-08-29 PROCEDURE — 80053 COMPREHEN METABOLIC PANEL: CPT | Performed by: EMERGENCY MEDICINE

## 2021-08-29 PROCEDURE — 87086 URINE CULTURE/COLONY COUNT: CPT

## 2021-08-29 PROCEDURE — 93005 ELECTROCARDIOGRAM TRACING: CPT

## 2021-08-29 PROCEDURE — 99285 EMERGENCY DEPT VISIT HI MDM: CPT

## 2021-08-29 PROCEDURE — 99223 1ST HOSP IP/OBS HIGH 75: CPT | Performed by: INTERNAL MEDICINE

## 2021-08-29 RX ORDER — PANTOPRAZOLE SODIUM 40 MG/1
40 TABLET, DELAYED RELEASE ORAL
Status: DISCONTINUED | OUTPATIENT
Start: 2021-08-30 | End: 2021-09-03 | Stop reason: HOSPADM

## 2021-08-29 RX ORDER — METOPROLOL SUCCINATE 25 MG/1
12.5 TABLET, EXTENDED RELEASE ORAL 2 TIMES DAILY
Status: DISCONTINUED | OUTPATIENT
Start: 2021-08-29 | End: 2021-09-03 | Stop reason: HOSPADM

## 2021-08-29 RX ORDER — FLUTICASONE FUROATE AND VILANTEROL 200; 25 UG/1; UG/1
1 POWDER RESPIRATORY (INHALATION) DAILY
Status: DISCONTINUED | OUTPATIENT
Start: 2021-08-30 | End: 2021-09-03 | Stop reason: HOSPADM

## 2021-08-29 RX ORDER — ONDANSETRON 2 MG/ML
4 INJECTION INTRAMUSCULAR; INTRAVENOUS EVERY 6 HOURS PRN
Status: DISCONTINUED | OUTPATIENT
Start: 2021-08-29 | End: 2021-09-03 | Stop reason: HOSPADM

## 2021-08-29 RX ORDER — ASPIRIN 81 MG/1
81 TABLET, CHEWABLE ORAL DAILY
Status: DISCONTINUED | OUTPATIENT
Start: 2021-08-30 | End: 2021-09-03 | Stop reason: HOSPADM

## 2021-08-29 RX ORDER — DOCUSATE SODIUM 100 MG/1
100 CAPSULE, LIQUID FILLED ORAL 2 TIMES DAILY
Status: DISCONTINUED | OUTPATIENT
Start: 2021-08-29 | End: 2021-09-03 | Stop reason: HOSPADM

## 2021-08-29 RX ORDER — SODIUM CHLORIDE 9 MG/ML
75 INJECTION, SOLUTION INTRAVENOUS CONTINUOUS
Status: DISPENSED | OUTPATIENT
Start: 2021-08-29 | End: 2021-08-30

## 2021-08-29 RX ORDER — ACETAMINOPHEN 325 MG/1
975 TABLET ORAL 3 TIMES DAILY
Status: DISCONTINUED | OUTPATIENT
Start: 2021-08-29 | End: 2021-09-03 | Stop reason: HOSPADM

## 2021-08-29 RX ORDER — PRAVASTATIN SODIUM 40 MG
40 TABLET ORAL
Status: DISCONTINUED | OUTPATIENT
Start: 2021-08-29 | End: 2021-09-03 | Stop reason: HOSPADM

## 2021-08-29 RX ORDER — MELATONIN
1000 DAILY
Status: DISCONTINUED | OUTPATIENT
Start: 2021-08-30 | End: 2021-09-03 | Stop reason: HOSPADM

## 2021-08-29 RX ORDER — CIPROFLOXACIN 2 MG/ML
400 INJECTION, SOLUTION INTRAVENOUS ONCE
Status: COMPLETED | OUTPATIENT
Start: 2021-08-29 | End: 2021-08-29

## 2021-08-29 RX ORDER — LEVOTHYROXINE SODIUM 0.07 MG/1
75 TABLET ORAL
Status: DISCONTINUED | OUTPATIENT
Start: 2021-08-30 | End: 2021-09-03 | Stop reason: HOSPADM

## 2021-08-29 RX ORDER — TAMSULOSIN HYDROCHLORIDE 0.4 MG/1
0.4 CAPSULE ORAL
Status: DISCONTINUED | OUTPATIENT
Start: 2021-08-29 | End: 2021-09-03 | Stop reason: HOSPADM

## 2021-08-29 RX ADMIN — DOCUSATE SODIUM 100 MG: 100 CAPSULE, LIQUID FILLED ORAL at 20:26

## 2021-08-29 RX ADMIN — TAMSULOSIN HYDROCHLORIDE 0.4 MG: 0.4 CAPSULE ORAL at 20:26

## 2021-08-29 RX ADMIN — CIPROFLOXACIN 400 MG: 2 INJECTION, SOLUTION INTRAVENOUS at 18:15

## 2021-08-29 RX ADMIN — AZTREONAM 1000 MG: 2 INJECTION, POWDER, LYOPHILIZED, FOR SOLUTION INTRAMUSCULAR; INTRAVENOUS at 20:26

## 2021-08-29 RX ADMIN — PRAVASTATIN SODIUM 40 MG: 40 TABLET ORAL at 20:26

## 2021-08-29 RX ADMIN — SODIUM CHLORIDE 500 ML: 0.9 INJECTION, SOLUTION INTRAVENOUS at 18:07

## 2021-08-29 RX ADMIN — ACETAMINOPHEN 975 MG: 325 TABLET, FILM COATED ORAL at 20:25

## 2021-08-29 RX ADMIN — SODIUM CHLORIDE 75 ML/HR: 0.9 INJECTION, SOLUTION INTRAVENOUS at 19:35

## 2021-08-29 NOTE — ED PROVIDER NOTES
Emergency Department Trauma Note  Lashaun Pandya 80 y o  male MRN: [id number]  Unit/Bed#: 2 210/2 210-01 Encounter: [id number]      Trauma Alert: Trauma Acuity: C  Model of Arrival: Mode of Arrival: BLS via Trauma Squad Name and Number: Tara Gibbs  Trauma Team: Current Providers  Attending Provider: Balaji Gonzalez MD  Attending Provider: Hunter Mann MD  Attending Provider: Candace Yanez MD  Resident: Ric Rodriguez DO  Registered Nurse: Santi Dooley RN  Registered Nurse: Simon Oliveira RN  Patient Care Assistant: Andrew Castrejon  Patient Care Assistant: Porter Nobles  Consultants: None      History of Present Illness     Chief Complaint:   Chief Complaint   Patient presents with   Michel Beckerwell Fall     Mechanism:Details of Incident: fell at home hitting right face, takes ASA          Lashaun Pandya is a 80y o  year old male with PMH of CAD, COPD, bladder CA presenting to the [facility] ED after a fall  Patient was walking with his walker when he became very weak and fell  Occured earlier today  Patient did strike their head on right side of face  No reported LOC  Patient does take 81mg ASA daily but no other AC/AP medications  Per wife, patient has been very weak over past several days  Urine sample checked this morning reportedly consistent with UTI  Patient has had chavis catheter in place for one year, current chavis exchanged two weeks ago  Patient at this time states they have no headache, neck pain or other arthralgias  History provided by:  Patient and EMS personnel   used: No      Review of Systems   Constitutional: Positive for fatigue  Negative for chills and fever  HENT: Negative for facial swelling and nosebleeds  Eyes: Negative for visual disturbance  Respiratory: Negative for cough and shortness of breath  Cardiovascular: Negative for chest pain and leg swelling     Gastrointestinal: Negative for abdominal distention, abdominal pain, diarrhea, nausea and vomiting  Endocrine: Negative for polyuria  Genitourinary: Negative for dysuria and hematuria  Musculoskeletal: Positive for gait problem  Negative for arthralgias, back pain, joint swelling, neck pain and neck stiffness  Skin: Positive for wound (recent skin graft head)  Neurological: Negative for syncope, weakness, light-headedness and headaches  Hematological: Does not bruise/bleed easily  Psychiatric/Behavioral: Negative for behavioral problems and confusion  All other systems reviewed and are negative        Historical Information     Immunizations:   Immunization History   Administered Date(s) Administered    INFLUENZA 12/09/2009, 10/16/2012, 10/02/2013, 11/10/2014, 09/08/2016, 10/16/2018    Influenza Split High Dose Preservative Free IM 09/08/2016, 09/15/2017, 10/16/2018, 10/17/2019    Influenza, high dose seasonal 0 7 mL 10/14/2020    Influenza, seasonal, injectable 1933, 10/01/2013    Pneumococcal Conjugate 13-Valent 07/15/2015    Pneumococcal Polysaccharide PPV23 08/18/2011    SARS-CoV-2 / COVID-19 mRNA IM (Moderna) 01/23/2021, 02/20/2021       Past Medical History:   Diagnosis Date    Anemia     [facility]     Cancer ([facility] )     bladder    Carotid artery occlusion     Cataract     Colon polyp     COPD (chronic obstructive pulmonary disease) [facility]     Coronary artery disease     Disease of thyroid gland     History of transfusion     Hyperlipidemia     Hypertension     Hypothyroidism 9/15/2017    Multiple pulmonary nodules     Peptic ulcer     Scoliosis     Sepsis without acute organ dysfunction [facility] ) 1/26/2021    Severe protein-calorie malnutrition ([facility] ) 4/17/2021    SIRS (systemic inflammatory response syndrome) ([facility] 12/19/2019    Transient cerebral ischemia     Tremors of nervous system        Family History   Problem Relation Age of Onset    Cervical cancer Mother     Cervical cancer Sister     Heart disease Sister     Coronary artery disease Sister     Lung cancer Brother      Past Surgical History:   Procedure Laterality Date   [facility] SURGERY      1973, ,     BUNIONECTOMY Left     Simple exostectomy (silver procedure)    CAROTID ENDARTERECTOMY Right 09/10/2008    Randy LEDBETTER MD    CATARACT EXTRACTION      CATARACT EXTRACTION, BILATERAL      CERVICAL DISCECTOMY  1969    COLONOSCOPY      CORONARY ARTERY BYPASS GRAFT  10/20/2010    x3 (LIMA-LAD, SVG-OM, SVG-RCA)    CYSTOSCOPY      ELBOW SURGERY Right 2012    FEMORAL ARTERY - TIBIAL ARTERY BYPASS GRAFT  2011    using reversed saphenous vein from right leg  Dianna Quinonez MD    FL ECHO TRANSESOPHAG R-T 2D W/PRB IMG ACQUISJ I&R N/A 10/6/2020    Procedure: TRANSESOPHAGEAL ECHOCARDIOGRAM (DAVID); Surgeon: Gela Fofana DO;  Location: [facility];  Service: Cardiac Surgery    FL REPLACE AORTIC VALVE OPENFEMORAL ARTERY APPROACH N/A 10/6/2020    Procedure: REPLACEMENT AORTIC VALVE TRANSCATHETER (TAVR) TRANSFEMORAL W/ 26MM MONTALVO BREE S3 ULTRA VALVE(ACCESS ON LEFT); Surgeon: Gela Fofana DO;  Location: [facility];  Service: Cardiac Surgery    REPLACEMENT TOTAL KNEE Left     SHOULDER SURGERY Right      Social History     Tobacco Use    Smoking status: Former Smoker     Packs/day: 1 00     Years: 50 00     Pack years: 50 00     Types: Cigarettes     Start date:      Quit date:      Years since quittin 6    Smokeless tobacco: Never Used   Vaping Use    Vaping Use: Never used   Substance Use Topics    Alcohol use: Not Currently     Alcohol/week: 0 0 standard drinks     Comment: Social     Drug use: Never     E-Cigarette/Vaping    E-Cigarette Use Never User      E-Cigarette/Vaping Substances    Nicotine No     THC No     CBD No     Flavoring No        Family History: non-contributory    Meds/Allergies   Prior to Admission Medications   Prescriptions Last Dose Informant Patient Reported? Taking?    Cholecalciferol (HM VITAMIN D3) 2000 units CAPS 2021 at Unknown time Spouse/Significant Other Yes Yes   Sig: Take 1 tablet by mouth daily   Diclofenac Sodium (Voltaren) 1 % Not Taking at Unknown time Spouse/Significant Other Yes No   Sig: Voltaren 1 % topical gel   Patient not taking: Reported on 2021   Multiple Vitamin (multivitamin) capsule 2021 at Unknown time Spouse/Significant Other Yes Yes   Sig: Take 1 capsule by mouth daily   acetaminophen (TYLENOL) 325 mg tablet Past Week at Unknown time Spouse/Significant Other No Yes   Sig: Take 3 tablets (975 mg total) by mouth 3 (three) times a day   alfuzosin (UROXATRAL) 10 mg 24 hr tablet 2021 at Unknown time Spouse/Significant Other No Yes   Sig: Take 1 tablet (10 mg total) by mouth daily   aspirin 81 MG tablet 2021 at Unknown time Spouse/Significant Other Yes Yes   Sig: Take 81 mg by mouth daily    fluticasone-umeclidinium-vilanterol (Trelegy Ellipta) 200-62 5-25 MCG/INH AEPB inhaler 2021 at Unknown time Spouse/Significant Other No Yes   Sig: Inhale 1 puff daily Rinse mouth after use  furosemide (LASIX) 20 mg tablet 2021 at Unknown time Spouse/Significant Other No Yes   Sig: Take 1 tablet (20 mg total) by mouth daily   levothyroxine 75 mcg tablet 2021 at Unknown time Spouse/Significant Other No Yes   Si tablet daily -   1 and 1/2 tablets Sat-Sundays   metoprolol succinate (TOPROL-XL) 25 mg 24 hr tablet 2021 at Unknown time Spouse/Significant Other No Yes   Sig: TAKE 1/2 TABLET every 12 hous   nitrofurantoin (MACROBID) 100 mg capsule   No No   Sig: Take 1 capsule (100 mg total) by mouth 2 (two) times a day   omeprazole (PriLOSEC) 20 mg delayed release capsule 2021 at Unknown time Spouse/Significant Other Yes Yes   Sig: Take 20 mg by mouth daily   potassium chloride (K-DUR,KLOR-CON) 10 mEq tablet 2021 at Unknown time Spouse/Significant Other No Yes   Sig: TAKE 1 TABLET(10 MEQ) BY MOUTH THREE TIMES DAILY   simvastatin (ZOCOR) 20 mg tablet 8/28/2021 at Unknown time Spouse/Significant Other No Yes   Sig: TAKE 1 TABLET(20 MG) BY MOUTH DAILY AT BEDTIME      Facility-Administered Medications: None       Allergies   Allergen Reactions    Aleve [Naproxen]      Other reaction(s): FACIAL SWELLING  Category: Allergy; Annotation - [id number]: lip/eye edema    Ancef [Cefazolin] Anaphylaxis     Hypotension, rash, itching    Augmentin [Amoxicillin-Pot Clavulanate] Diarrhea and Vomiting       PHYSICAL EXAM    PE limited by: N/A    Objective   Vitals:   First set: Temperature: 98 °F (36 7 °C) (08/29/21 1658)  Pulse: 88 (08/29/21 1641)  Respirations: 16 (08/29/21 1641)  Blood Pressure: 105/54 (08/29/21 1641)  SpO2: 96 % (08/29/21 1641)    Primary Survey:   (A) Airway: Intact  (B) Breathing: b/l breath sounds  (C) Circulation: Pulses:   normal  (D) Disabliity:  GCS Total:  14  (E) Expose:  Completed    Secondary Survey:   Physical Exam  Vitals and nursing note reviewed  Constitutional:       General: He is not in acute distress  Appearance: Normal appearance  He is well-developed  He is not ill-appearing, toxic-appearing or diaphoretic  Interventions: Cervical collar in place  HENT:      Head: Normocephalic  Abrasion (skin graft at scalp) present  No Ch's sign, contusion, right periorbital erythema, left periorbital erythema or laceration  Right Ear: External ear normal       Left Ear: External ear normal       Nose: No congestion or rhinorrhea  Right Nostril: No epistaxis  Left Nostril: No epistaxis  Mouth/Throat:      Mouth: No lacerations  Eyes:      General:         Right eye: No discharge  Left eye: No discharge  Pupils: Pupils are equal, round, and reactive to light  Cardiovascular:      Rate and Rhythm: Normal rate and regular rhythm  Pulmonary:      Effort: Pulmonary effort is normal  No respiratory distress  Breath sounds: Normal breath sounds  No wheezing, rhonchi or rales  Abdominal:      General: Abdomen is flat  Bowel sounds are normal  There is no distension  Palpations: Abdomen is soft  Tenderness: There is no abdominal tenderness  There is no guarding or rebound  Genitourinary:     Comments: Porter catheter in place  Musculoskeletal:      Right shoulder: No tenderness  Normal range of motion  Left shoulder: No tenderness  Normal range of motion  Right upper arm: No tenderness  Left upper arm: No tenderness  Right elbow: No swelling  No tenderness  Left elbow: No swelling  No tenderness  Right forearm: No swelling, tenderness or bony tenderness  Left forearm: No swelling, tenderness or bony tenderness  Right wrist: No swelling, deformity, tenderness, bony tenderness or snuff box tenderness  Left wrist: No swelling, deformity, tenderness, bony tenderness or snuff box tenderness  Right hand: No tenderness or bony tenderness  Normal strength  Normal sensation  Left hand: No tenderness or bony tenderness  Normal strength  Normal sensation  Cervical back: Normal range of motion  No rigidity, tenderness or bony tenderness  Thoracic back: No bony tenderness  Lumbar back: No bony tenderness  Right hip: No deformity or bony tenderness  Left hip: No deformity or bony tenderness  Right upper leg: No tenderness  Left upper leg: No tenderness  Right knee: No swelling  No tenderness  Left knee: No swelling  No tenderness  Right lower leg: No bony tenderness  Left lower leg: No bony tenderness  Right ankle: No tenderness  Left ankle: No tenderness  Right foot: No tenderness  Left foot: No tenderness  Skin:     Capillary Refill: Capillary refill takes less than 2 seconds  Neurological:      General: No focal deficit present  Mental Status: He is alert and oriented to person, place, and time  Mental status is at baseline        GCS: GCS eye subscore is 4  GCS verbal subscore is 4  GCS motor subscore is 6  Comments: 4/5 strength b/l LE  5/5 strength b/l UE  Sensation grossly intact   Psychiatric:         Mood and Affect: Mood normal          Behavior: Behavior normal          Cervical spine cleared by clinical criteria? No (imaging required)      Invasive Devices     Peripheral Intravenous Line            Peripheral IV 08/29/21 Left;Ventral (anterior) Forearm <1 day          Drain            Urethral Catheter Coude 16 Fr  <1 day                Lab Results:   Results Reviewed     Procedure Component Value Units Date/Time    TSH, 3rd generation [[id number]]  (Normal) Collected: 08/29/21 1651    Lab Status: Final result Specimen: Blood from Arm, Left Updated: 08/29/21 2011     TSH 3RD GENERATON 2 140 uIU/mL     Narrative:      Patients undergoing fluorescein dye angiography may retain small amounts of fluorescein in the body for 48-72 hours post procedure  Samples containing fluorescein can produce falsely depressed TSH values  If the patient had this procedure,a specimen should be resubmitted post fluorescein clearance  Urine Microscopic [[id number]]  (Abnormal) Collected: 08/29/21 1752    Lab Status: Final result Specimen: Urine, Indwelling Porter Catheter Updated: 08/29/21 1807     RBC, UA Innumerable /hpf      WBC, UA Innumerable /hpf      Epithelial Cells None Seen /hpf      Bacteria, UA Innumerable /hpf      Hyaline Casts, UA 3-5 /lpf     Urine culture [[id number]] Collected: 08/29/21 1752    Lab Status: In process Specimen: Urine, Indwelling Porter Catheter Updated: 08/29/21 1807    Urine culture [[id number]] Collected: 08/29/21 1751    Lab Status:  In process Specimen: Urine, Indwelling Porter Catheter Updated: 08/29/21 1759    POCT urinalysis dipstick [[id number]]  (Normal) Resulted: 08/29/21 1755    Lab Status: Final result Specimen: Urine Updated: 08/29/21 1755     Color, UA see chart    Urine Macroscopic, POC [[id number]]  (Abnormal) Collected: 08/29/21 1752    Lab Status: Final result Specimen: Urine Updated: 08/29/21 1754     Color, UA Yellow     Clarity, UA Cloudy     pH, UA 6 5     Leukocytes, UA Large     Nitrite, UA Positive     Protein, UA Negative mg/dl      Glucose, UA Negative mg/dl      Ketones, UA 15 (1+) mg/dl      Urobilinogen, UA 0 2 E U /dl      Bilirubin, UA Negative     Blood, UA Large     Specific Gravity, UA 1 025    Narrative:      CLINITEK RESULT    Troponin I [[id number]]  (Normal) Collected: 08/29/21 1651    Lab Status: Final result Specimen: Blood from Arm, Left Updated: 08/29/21 1717     Troponin I <0 02 ng/mL     Comprehensive metabolic panel [[id number]]  (Abnormal) Collected: 08/29/21 1651    Lab Status: Final result Specimen: Blood from Arm, Left Updated: 08/29/21 1716     Sodium 128 mmol/L      Potassium 3 6 mmol/L      Chloride 95 mmol/L      CO2 28 mmol/L      ANION GAP 5 mmol/L      BUN 12 mg/dL      Creatinine 0 61 mg/dL      Glucose 90 mg/dL      Calcium 8 4 mg/dL      Corrected Calcium 9 1 mg/dL      AST 21 U/L      ALT 16 U/L      Alkaline Phosphatase 96 U/L      Total Protein 6 5 g/dL      Albumin 3 1 g/dL      Total Bilirubin 0 70 mg/dL      eGFR 89 ml/min/1 73sq m     Narrative:      Caitlin guidelines for Chronic Kidney Disease (CKD):     Stage 1 with normal or high GFR (GFR > 90 mL/min/1 73 square meters)    Stage 2 Mild CKD (GFR = 60-89 mL/min/1 73 square meters)    Stage 3A Moderate CKD (GFR = 45-59 mL/min/1 73 square meters)    Stage 3B Moderate CKD (GFR = 30-44 mL/min/1 73 square meters)    Stage 4 Severe CKD (GFR = 15-29 mL/min/1 73 square meters)    Stage 5 End Stage CKD (GFR <15 mL/min/1 73 square meters)  Note: GFR calculation is accurate only with a steady state creatinine    CBC and differential [[id number]]  (Abnormal) Collected: 08/29/21 1651    Lab Status: Final result Specimen: Blood from Arm, Left Updated: 08/29/21 1701     WBC 6 84 Thousand/uL      RBC 3 04 Million/uL Hemoglobin 9 8 g/dL      Hematocrit 28 8 %      MCV 95 fL      MCH 32 2 pg      MCHC 34 0 g/dL      RDW 12 4 %      MPV 10 4 fL      Platelets 820 Thousands/uL      nRBC 0 /100 WBCs      Neutrophils Relative 70 %      Immat GRANS % 0 %      Lymphocytes Relative 9 %      Monocytes Relative 20 %      Eosinophils Relative 1 %      Basophils Relative 0 %      Neutrophils Absolute 4 79 Thousands/µL      Immature Grans Absolute 0 02 Thousand/uL      Lymphocytes Absolute 0 59 Thousands/µL      Monocytes Absolute 1 37 Thousand/µL      Eosinophils Absolute 0 06 Thousand/µL      Basophils Absolute 0 01 Thousands/µL                  Imaging Studies:   Direct to CT: Yes  CT head without contrast   Final Result by Chaya Robins MD (08/29 1730)      No acute intracranial abnormality  Left occipital scalp contusion and laceration  Workstation performed: [id number]         CT cervical spine without contrast   Final Result by Chaya Robins MD (08/29 1738)      No cervical spine fracture or traumatic malalignment  Workstation performed: [id number]         XR chest 1 view portable    (Results Pending)         Procedures  Procedures         ED Course  ED Course as of Aug 30 0155   Sun Aug 29, 2021   1756 Nitrite, UA(!): Positive   1827 Procedure Note: EKG  Date/Time: 08/29/21 4:43 PM   Interpreted by: Avon Hashimoto, DO  Indications / Diagnosis: weakness  ECG reviewed by me, the ED Provider: yes   The EKG demonstrates:  Rhythm: normal sinus rhythm 88 BPM  Intervals: MD prolonged, first degree AV block  QT interval prolonged 500ms  Axis: Right axis deviation  QRS/Blocks: RBBB  ST Changes: No acute ST/T waves changes  No CHERRY   No TWI  Comparison: Compared to prior EKG performed on 06/22/2021              MDM  Number of Diagnoses or Management Options  Acute hyponatremia  CHF (congestive heart failure) ([facility] )  Complicated UTI (urinary tract infection)  Fall, initial encounter  Generalized weakness  Diagnosis management comments:   80 y o  male presenting for weakness and a fall  Will order labs, UA and imaging to evaluate for ACS, electrolyte abnormality, UTI or traumatic injuries  Due to UTI and generalized weakness, will admit for further evaluation and management  Patient care discussed with Dr Harjit Thompson who will assume care and admit patient to the hospital  I have discussed with the patient our recommendation of inpatient admission for further medical care  I have answered all of the patient's questions and concerns  The patient is in agreement with the plan to proceed with admission to the hospital         Amount and/or Complexity of Data Reviewed  Clinical lab tests: reviewed and ordered  Tests in the radiology section of CPT®: ordered and reviewed  Review and summarize past medical records: yes  Independent visualization of images, tracings, or specimens: yes    Patient Progress  Patient progress: stable          Disposition  Final diagnoses:   Generalized weakness   Acute hyponatremia   CHF (congestive heart failure) ([facility] )   Fall, initial encounter   Complicated UTI (urinary tract infection)     Time reflects when diagnosis was documented in both MDM as applicable and the Disposition within this note     Time User Action Codes Description Comment    8/29/2021  5:58 PM Quail Scarlet Add [R53 1] Generalized weakness     8/29/2021  5:58 PM Quail Scarlet Add [E87 1] Acute hyponatremia     8/29/2021  5:58 PM Quail Scarlet Add [I50 9] [facility]congestive [facility]     8/29/2021  5:59 PM Quail Scarlet Add [X14  XXXA] Fall, initial encounter     8/29/2021  6:18 PM Quail Scarlet Add [X77 7] Complicated UTI (urinary tract infection)     8/29/2021  7:46 PM Breana Ulrich Add [R33 9] Urine retention       ED Disposition     ED Disposition Condition Date/Time Comment    Admit Stable Sun Aug 29, 2021  5:58 PM Case was discussed with Dr Harjit Thompson and the patient's admission status was agreed to be Admission Status: inpatient status to the service of Dr Dane Madrigal  Follow-up Information    None       Current Discharge Medication List      CONTINUE these medications which have NOT CHANGED    Details   acetaminophen (TYLENOL) 325 mg tablet Take 3 tablets (975 mg total) by mouth 3 (three) times a day    Associated Diagnoses: S/P TAVR (transcatheter aortic valve replacement)      alfuzosin (UROXATRAL) 10 mg 24 hr tablet Take 1 tablet (10 mg total) by mouth daily  Qty: 90 tablet, Refills: 3    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      aspirin 81 MG tablet Take 81 mg by mouth daily       Cholecalciferol (HM VITAMIN D3) 2000 units CAPS Take 1 tablet by mouth daily      fluticasone-umeclidinium-vilanterol (Trelegy Ellipta) 200-62 5-25 MCG/INH AEPB inhaler Inhale 1 puff daily Rinse mouth after use  Qty: 60 blister, Refills: 2    Associated Diagnoses: Pulmonary emphysema, unspecified emphysema type (HCC)      furosemide (LASIX) 20 mg tablet Take 1 tablet (20 mg total) by mouth daily  Qty: 90 tablet, Refills: 2    Associated Diagnoses: HTN (hypertension)      levothyroxine 75 mcg tablet 1 tablet daily M-Fridays   1 and 1/2 tablets Sat-Sundays  Qty: 90 tablet, Refills: 3    Associated Diagnoses: Acquired hypothyroidism      metoprolol succinate (TOPROL-XL) 25 mg 24 hr tablet TAKE 1/2 TABLET every 12 hous  Qty: 45 tablet, Refills: 1    Comments: **Patient requests 90 days supply**  Associated Diagnoses: PAH (pulmonary artery hypertension) (HCC)      Multiple Vitamin (multivitamin) capsule Take 1 capsule by mouth daily      omeprazole (PriLOSEC) 20 mg delayed release capsule Take 20 mg by mouth daily      potassium chloride (K-DUR,KLOR-CON) 10 mEq tablet TAKE 1 TABLET(10 MEQ) BY MOUTH THREE TIMES DAILY  Qty: 270 tablet, Refills: 1    Comments: **Patient requests 90 days supply**  Associated Diagnoses: Hypokalemia      simvastatin (ZOCOR) 20 mg tablet TAKE 1 TABLET(20 MG) BY MOUTH DAILY AT BEDTIME  Qty: 90 tablet, Refills: 1    Associated Diagnoses: Dyslipidemia      Diclofenac Sodium (Voltaren) 1 % Voltaren 1 % topical gel      nitrofurantoin (MACROBID) 100 mg capsule Take 1 capsule (100 mg total) by mouth 2 (two) times a day  Qty: 10 capsule, Refills: 0    Associated Diagnoses: Urinary tract infection without hematuria, site unspecified           No discharge procedures on file      PDMP Review       Value Time User    PDMP Reviewed  Yes 2/8/2021 10:31 PM Navdeep Rasheed DO          ED Provider  Electronically Signed by         Vani Lui DO  08/30/21 9547

## 2021-08-29 NOTE — TELEPHONE ENCOUNTER
Called patient's wife back regarding urine testing  She wanted to know the results of the urine and if he needed an antibiotic  Discussed with on call provider and an antibiotic was ordered  Called patient's wife back to make her aware and she stated that she had to call 911 because he was so weak from not being himself that he fell  Made on call provider aware

## 2021-08-29 NOTE — ASSESSMENT & PLAN NOTE
Blood pressure well controlled on admission; continue metoprolol  Will hold Lasix in setting of UTI and hypovolemia

## 2021-08-29 NOTE — ASSESSMENT & PLAN NOTE
Patient presents has fall while ambulating with walker; cleared by Trauma  CT head and CT cervical spine with no acute concerning findings; minor left scalp trauma  Patient reports ambulating with walker and becoming acutely weak causing fall  On admission, noted to have a numeral bacteria in urine; hyponatremia; hypovolemia  Review of previous admission shows Klebsiella oxytocia, Enterococcus faecalis; and Pseudomonas in urine; all 3 greater than 100 K colony-forming units  Given indwelling urinary catheter, no doubt some these bacteria are chronic colonizers; but will treat empirically  Received Cipro in the ED; given patient's age and weakness, will transition to aztreonam in the setting of documented ancef and penicillin allergies    0/4 sirs criteria; will order procalcitonin and daily labs  Consider removal of urinary catheter and voiding trial; may benefit from Urology consult  Given lack of infectious findings, weakness may be related to hyponatremia and other metabolic disturbances  Hemoglobin slightly below baseline, FOBT  Otherwise labs and vital stable; gentle IV hydration for possible infection and specific gravity 1 025  Check TSH, blood cultures, procal  PT/OT; ambulate patient  Fall precautions  Chest x-ray pending; however physical exam and vital signs not currently suggestive of acute pneumonia or pulmonary process

## 2021-08-29 NOTE — TELEPHONE ENCOUNTER
Patients wife called stating that she had spoken to the service yesterday, labs were ordered for urine culture and the labs were completed  She stated she would get a call back today from the nurses with results

## 2021-08-29 NOTE — ASSESSMENT & PLAN NOTE
Continue alfuzosin; indwelling urinary catheter  History of bladder cancer with recent cystoscopy demonstrating no recurrence  Consider voiding trial during this admission; removal of urinary catheter may prevent future UTI  Consider urology consult

## 2021-08-29 NOTE — ASSESSMENT & PLAN NOTE
Currently rate controlled on admission; follows with cardiology in the outpatient setting  Continue aspirin and metoprolol  Not anticoagulation candidate as per Cardiology

## 2021-08-29 NOTE — ASSESSMENT & PLAN NOTE
History of severe aortic stenosis; 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  Follows with cardiology  As per recent echo (06/2021):  A 26 mm Braxton Jeffrey bioprosthesis was present  It exhibited normal function    Continue home medications

## 2021-08-29 NOTE — H&P
Mamadou 1933, 80 y o  male MRN: [id number]  Unit/Bed#: CW2 210-01 Encounter: [id number]  Primary Care Provider: Chance Hernandez MD   Date and time admitted to hospital: 8/29/2021  4:35 PM    * Weakness  Assessment & Plan  Patient presents has fall while ambulating with walker; cleared by Trauma  CT head and CT cervical spine with no acute concerning findings; minor left scalp trauma  Patient reports ambulating with walker and becoming acutely weak causing fall  On admission, noted to have a numeral bacteria in urine; hyponatremia; hypovolemia  Review of previous admission shows Klebsiella oxytocia, Enterococcus faecalis; and Pseudomonas in urine; all 3 greater than 100 K colony-forming units  Given indwelling urinary catheter, no doubt some these bacteria are chronic colonizers; but will treat empirically  Received Cipro in the ED; given patient's age and weakness, will transition to aztreonam in the setting of documented ancef and penicillin allergies    0/4 sirs criteria; will order procalcitonin and daily labs  Consider removal of urinary catheter and voiding trial; may benefit from Urology consult  Given lack of infectious findings, weakness may be related to hyponatremia and other metabolic disturbances  Hemoglobin slightly below baseline, FOBT  Otherwise labs and vital stable; gentle IV hydration for possible infection and specific gravity 1 025  Check TSH, blood cultures, procal  PT/OT; ambulate patient  Fall precautions  Chest x-ray pending; however physical exam and vital signs not currently suggestive of acute pneumonia or pulmonary process    Pulmonary emphysema (HCC)  Assessment & Plan  Continue Trelegy; SpO2 96% on room air  Monitor    Hypothyroidism  Assessment & Plan  Continue L T4    Hypertension  Assessment & Plan  Blood pressure well controlled on admission; continue metoprolol  Will hold Lasix in setting of UTI and hypovolemia    Hyperlipidemia  Assessment & Plan  Continue statin therapy    Esophageal reflux  Assessment & Plan  PPI daily    Atherosclerotic heart disease of native coronary artery with other forms of angina pectoris (HCC)  Assessment & Plan  Continue aspirin, metoprolol, and statin    Benign prostatic hyperplasia  Assessment & Plan  Continue alfuzosin; indwelling urinary catheter  History of bladder cancer with recent cystoscopy demonstrating no recurrence  Consider voiding trial during this admission; removal of urinary catheter may prevent future UTI  Consider urology consult    PAF (paroxysmal atrial fibrillation) ([facility] [address] )  Assessment & Plan  Currently rate controlled on admission; follows with cardiology in the outpatient setting  Continue aspirin and metoprolol  Not anticoagulation candidate as per Cardiology    Severe aortic stenosis  Assessment & Plan  History of severe aortic stenosis; 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  Follows with cardiology  As per recent echo (06/2021):  A 26 mm Braxton Jeffrey bioprosthesis was present  It exhibited normal function    Continue home medications      VTE Prophylaxis: Enoxaparin (Lovenox)  / reason for no mechanical VTE prophylaxis Low risk   Code Status:  Full  POLST: POLST form is not discussed and not completed at this time  Discussion with family: Discussed treatment plan with family and patient who agree with current plan; encouraged to ask questions and participate  Anticipated Length of Stay:  Patient will be admitted on an Inpatient basis with an anticipated length of stay of  greater than 2 midnights  Justification for Hospital Stay:  Weakness with fall    Total Time for Visit, including Counseling / Coordination of Care: 60 minutes  Greater than 50% of this total time spent on direct patient counseling and coordination of care      Chief Complaint:   Weakness with fall    History of Present Illness:    Ranjana Yanez is a 80 y o  male with past medical history of aortic stenosis (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), proximal atrial fibrillation (not on anticoagulation), BPH, GERD, essential tremor, hyperlipidemia, hypertension, indwelling urinary catheter, and remote history of bladder cancer presents with weakness and fall  Patient and wife report he was ambulating in the house with his walker when he acutely became weak and experienced fall  States that he landed on his left arm but did not strike head or lose consciousness; no seizure-like activity appreciated by wife  Patient and wife say that he becomes weak and lethargic when he has an episode of UTI  Urinary analysis while in the ED show multiple bacteria, although no other signs of infection present  Patient also noted to be hyponatremia and reports he feels dehydrated  Denies any bowel or bladder incontinence, melena or bright red blood per rectum, dizziness, chest pain, shortness of breath  Does state that the noticed blood in his urine a few days ago  Patient will be admitted for workup and treatment of weakness  It should be noted that he is bandage on superior central occipital portion of his scalp  According to patient and wife, this is a graft for squamous cell carcinoma treatment and they requested it not be disturbed  Review of Systems:    Review of Systems   Constitutional: Positive for activity change  Negative for chills and fever  HENT: Negative for ear pain and sore throat  Eyes: Negative for pain and visual disturbance  Respiratory: Negative for cough and shortness of breath  Cardiovascular: Negative for chest pain and palpitations  Gastrointestinal: Negative for abdominal pain, anal bleeding, blood in stool and vomiting  Genitourinary: Positive for difficulty urinating and hematuria  Negative for dysuria  Indwelling catheter for at least 1 year   Musculoskeletal: Negative for arthralgias and back pain  Skin: Negative for color change and rash     Neurological: Positive for weakness  Negative for seizures and syncope  Psychiatric/Behavioral: Negative  All other systems reviewed and are negative  Past Medical and Surgical History:     Past Medical History:   Diagnosis Date    Anemia     [facility]adder cancer ([facility] )     Cancer ([place] [address] )     bladder    Carotid artery occlusion     Cataract     Colon polyp     COPD (chronic obstructive pulmonary disease) (HCC)     Coronary artery disease     Disease of thyroid gland     History of transfusion     Hyperlipidemia     Hypertension     Hypothyroidism 9/15/2017    Multiple pulmonary nodules     Peptic ulcer     Scoliosis     Sepsis without acute organ dysfunction ([place] [address] ) 1/26/2021    Severe protein-calorie malnutrition ([place] [address] ) 4/17/2021    SIRS (systemic inflammatory response syndrome) ([place] [address] ) 12/19/2019    Transient cerebral ischemia     Tremors of nervous system        Past Surgical History:   Procedure Laterality Date   [facility] SURGERY      1973, 1987, 2005    BUNIONECTOMY Left     Simple exostectomy (silver procedure)    CAROTID ENDARTERECTOMY Right 09/10/2008    Common  Gerglenys LEDBETTER MD    CATARACT EXTRACTION      CATARACT EXTRACTION, BILATERAL      CERVICAL DISCECTOMY  1969    COLONOSCOPY      CORONARY ARTERY BYPASS GRAFT  10/20/2010    x3 (LIMA-LAD, SVG-OM, SVG-RCA)    CYSTOSCOPY      ELBOW SURGERY Right 07/2012    FEMORAL ARTERY - TIBIAL ARTERY BYPASS GRAFT  12/05/2011    using reversed saphenous vein from right leg  Jolly De Anda MD    WI ECHO TRANSESOPHAG R-T 2D W/PRB IMG ACQUISJ I&R N/A 10/6/2020    Procedure: TRANSESOPHAGEAL ECHOCARDIOGRAM (DAVID); Surgeon: Dallin Estrada DO;  Location: [facility];  Service: Cardiac Surgery    WI REPLACE AORTIC VALVE OPENFEMORAL ARTERY APPROACH N/A 10/6/2020    Procedure: REPLACEMENT AORTIC VALVE TRANSCATHETER (TAVR) TRANSFEMORAL W/ 26MM MONTALVO BREE S3 ULTRA VALVE(ACCESS ON LEFT);   Surgeon: Dallin Estrada DO;  Location: [facility]; Service: Cardiac Surgery    REPLACEMENT TOTAL KNEE Left     SHOULDER SURGERY Right 1997       Meds/Allergies:    Prior to Admission medications    Medication Sig Start Date End Date Taking? Authorizing Provider   acetaminophen (TYLENOL) 325 mg tablet Take 3 tablets (975 mg total) by mouth 3 (three) times a day 2/8/21  Yes Annemarie Kim DO   alfuzosin (UROXATRAL) 10 mg 24 hr tablet Take 1 tablet (10 mg total) by mouth daily 5/5/21  Yes MANI Hampton   aspirin 81 MG tablet Take 81 mg by mouth daily  4/27/12  Yes Historical Provider, MD   Cholecalciferol (HM VITAMIN D3) 2000 units CAPS Take 1 tablet by mouth daily   Yes Historical Provider, MD   fluticasone-umeclidinium-vilanterol (Trelegy Ellipta) 200-62 5-25 MCG/INH AEPB inhaler Inhale 1 puff daily Rinse mouth after use  7/15/21 10/13/21 Yes Ashley Shah MD   furosemide (LASIX) 20 mg tablet Take 1 tablet (20 mg total) by mouth daily 6/1/21  Yes Jose Bryant MD   levothyroxine 75 mcg tablet 1 tablet daily M-Fridays   1 and 1/2 tablets Sat-Sundays 5/14/21  Yes Isa Wu MD   metoprolol succinate (TOPROL-XL) 25 mg 24 hr tablet TAKE 1/2 TABLET every 12 hous 6/8/21  Yes MANI Cummings   Multiple Vitamin (multivitamin) capsule Take 1 capsule by mouth daily   Yes Historical Provider, MD   omeprazole (PriLOSEC) 20 mg delayed release capsule Take 20 mg by mouth daily   Yes Historical Provider, MD   potassium chloride (K-DUR,KLOR-CON) 10 mEq tablet TAKE 1 TABLET(10 MEQ) BY MOUTH THREE TIMES DAILY 8/11/21  Yes Isa Wu MD   simvastatin (ZOCOR) 20 mg tablet TAKE 1 TABLET(20 MG) BY MOUTH DAILY AT BEDTIME 1/21/21  Yes Jose Bryant MD   Diclofenac Sodium (Voltaren) 1 % Voltaren 1 % topical gel  Patient not taking: Reported on 8/29/2021    Historical Provider, MD   nitrofurantoin (MACROBID) 100 mg capsule Take 1 capsule (100 mg total) by mouth 2 (two) times a day 8/29/21   Foster Osgood, CRNP     I have reviewed home medications using allscripts  Allergies: Allergies   Allergen Reactions    Aleve [Naproxen]      Other reaction(s): FACIAL SWELLING  Category: Allergy; Annotation - [id number]: lip/eye edema    Ancef [Cefazolin] Anaphylaxis     Hypotension, rash, itching    Augmentin [Amoxicillin-Pot Clavulanate] Diarrhea and Vomiting       Social History:     Marital Status: /Civil Union   Occupation:  Return  Patient Pre-hospital Living Situation:  Independently with wife  Patient Pre-hospital Level of Mobility:  Restricted to walker  Patient Pre-hospital Diet Restrictions:  Cardiac  Substance Use History:   Social History     Substance and Sexual Activity   Alcohol Use Not Currently    Alcohol/week: 0 0 standard drinks    Comment: Social      Social History     Tobacco Use   Smoking Status Former Smoker    Packs/day: 1 00    Years: 50 00    Pack years: 50 00    Types: Cigarettes    Start date: 56    Quit date: 200    Years since quittin 6   Smokeless Tobacco Never Used     Social History     Substance and Sexual Activity   Drug Use Never       Family History:    Family History   Problem Relation Age of Onset    Cervical cancer Mother     Cervical cancer Sister     Heart disease Sister     Coronary artery disease Sister     Lung cancer Brother        Physical Exam:     Vitals:   Blood Pressure: 96/50 (21 1910)  Pulse: 82 (21 1830)  Temperature: 98 °F (36 7 °C) (21 1658)  Respirations: 16 (21 1830)  Height: 5' 6" (167 6 cm) (21 190)  Weight - Scale: 63 1 kg (139 lb 1 6 oz) (21 190)  SpO2: 99 % (21 1800)    Physical Exam  Vitals and nursing note reviewed  Constitutional:       Appearance: He is well-developed  HENT:      Head: Normocephalic  Comments: Bandage on scalp  Eyes:      Conjunctiva/sclera: Conjunctivae normal    Cardiovascular:      Rate and Rhythm: Normal rate and regular rhythm  Heart sounds: Murmur heard       Pulmonary:      Effort: Pulmonary effort is normal  No respiratory distress  Breath sounds: Rhonchi present  Comments: Mild rhonchi bilateral bases  Abdominal:      Palpations: Abdomen is soft  Tenderness: There is no abdominal tenderness  Musculoskeletal:      Cervical back: Neck supple  Skin:     General: Skin is warm and dry  Neurological:      Mental Status: He is alert  Motor: Weakness present  Gait: Gait abnormal       Comments: No focal neurological deficits noted   Psychiatric:         Mood and Affect: Mood normal          Behavior: Behavior normal            Additional Data:     Lab Results: I have personally reviewed pertinent reports  Results from last 7 days   Lab Units 08/29/21  1651   WBC Thousand/uL 6 84   HEMOGLOBIN g/dL 9 8*   HEMATOCRIT % 28 8*   PLATELETS Thousands/uL 181   NEUTROS PCT % 70   LYMPHS PCT % 9*   MONOS PCT % 20*   EOS PCT % 1     Results from last 7 days   Lab Units 08/29/21  1651   SODIUM mmol/L 128*   POTASSIUM mmol/L 3 6   CHLORIDE mmol/L 95*   CO2 mmol/L 28   BUN mg/dL 12   CREATININE mg/dL 0 61   ANION GAP mmol/L 5   CALCIUM mg/dL 8 4   ALBUMIN g/dL 3 1*   TOTAL BILIRUBIN mg/dL 0 70   ALK PHOS U/L 96   ALT U/L 16   AST U/L 21   GLUCOSE RANDOM mg/dL 90                       Imaging: I have personally reviewed pertinent reports  CT head without contrast   Final Result by Elida Louis MD (08/29 1730)      No acute intracranial abnormality  Left occipital scalp contusion and laceration  Workstation performed: [id number]         CT cervical spine without contrast   Final Result by Elida Louis MD (08/29 1738)      No cervical spine fracture or traumatic malalignment               Workstation performed: [id number]         XR chest 1 view portable    (Results Pending)       EKG, Pathology, and Other Studies Reviewed on Admission:       Allscripts / Epic Records Reviewed: Yes     ** Please Note: This note has been constructed using a voice recognition system   **

## 2021-08-30 PROBLEM — J96.10 CHRONIC RESPIRATORY FAILURE (HCC): Status: ACTIVE | Noted: 2021-04-11

## 2021-08-30 LAB
ALBUMIN SERPL BCP-MCNC: 2.7 G/DL (ref 3.5–5)
ALP SERPL-CCNC: 89 U/L (ref 46–116)
ALT SERPL W P-5'-P-CCNC: 16 U/L (ref 12–78)
ANION GAP SERPL CALCULATED.3IONS-SCNC: 5 MMOL/L (ref 4–13)
AST SERPL W P-5'-P-CCNC: 21 U/L (ref 5–45)
ATRIAL RATE: 88 BPM
BASOPHILS # BLD AUTO: 0.02 THOUSANDS/ΜL (ref 0–0.1)
BASOPHILS NFR BLD AUTO: 0 % (ref 0–1)
BILIRUB SERPL-MCNC: 0.61 MG/DL (ref 0.2–1)
BUN SERPL-MCNC: 10 MG/DL (ref 5–25)
CALCIUM ALBUM COR SERPL-MCNC: 9.1 MG/DL (ref 8.3–10.1)
CALCIUM SERPL-MCNC: 8.1 MG/DL (ref 8.3–10.1)
CHLORIDE SERPL-SCNC: 99 MMOL/L (ref 100–108)
CO2 SERPL-SCNC: 26 MMOL/L (ref 21–32)
CREAT SERPL-MCNC: 0.53 MG/DL (ref 0.6–1.3)
EOSINOPHIL # BLD AUTO: 0.16 THOUSAND/ΜL (ref 0–0.61)
EOSINOPHIL NFR BLD AUTO: 4 % (ref 0–6)
ERYTHROCYTE [DISTWIDTH] IN BLOOD BY AUTOMATED COUNT: 12.3 % (ref 11.6–15.1)
GFR SERPL CREATININE-BSD FRML MDRD: 94 ML/MIN/1.73SQ M
GLUCOSE SERPL-MCNC: 71 MG/DL (ref 65–140)
HCT VFR BLD AUTO: 28.3 % (ref 36.5–49.3)
HGB BLD-MCNC: 9.4 G/DL (ref 12–17)
IMM GRANULOCYTES # BLD AUTO: 0.02 THOUSAND/UL (ref 0–0.2)
IMM GRANULOCYTES NFR BLD AUTO: 0 % (ref 0–2)
LYMPHOCYTES # BLD AUTO: 0.64 THOUSANDS/ΜL (ref 0.6–4.47)
LYMPHOCYTES NFR BLD AUTO: 14 % (ref 14–44)
MAGNESIUM SERPL-MCNC: 1.9 MG/DL (ref 1.6–2.6)
MCH RBC QN AUTO: 32.1 PG (ref 26.8–34.3)
MCHC RBC AUTO-ENTMCNC: 33.2 G/DL (ref 31.4–37.4)
MCV RBC AUTO: 97 FL (ref 82–98)
MONOCYTES # BLD AUTO: 1.06 THOUSAND/ΜL (ref 0.17–1.22)
MONOCYTES NFR BLD AUTO: 24 % (ref 4–12)
NEUTROPHILS # BLD AUTO: 2.61 THOUSANDS/ΜL (ref 1.85–7.62)
NEUTS SEG NFR BLD AUTO: 58 % (ref 43–75)
NRBC BLD AUTO-RTO: 0 /100 WBCS
P AXIS: 61 DEGREES
PHOSPHATE SERPL-MCNC: 2.8 MG/DL (ref 2.3–4.1)
PLATELET # BLD AUTO: 161 THOUSANDS/UL (ref 149–390)
PMV BLD AUTO: 10.1 FL (ref 8.9–12.7)
POTASSIUM SERPL-SCNC: 3.5 MMOL/L (ref 3.5–5.3)
PR INTERVAL: 208 MS
PROCALCITONIN SERPL-MCNC: <0.05 NG/ML
PROT SERPL-MCNC: 6 G/DL (ref 6.4–8.2)
QRS AXIS: 1 DEGREES
QRSD INTERVAL: 158 MS
QT INTERVAL: 414 MS
QTC INTERVAL: 500 MS
RBC # BLD AUTO: 2.93 MILLION/UL (ref 3.88–5.62)
SODIUM SERPL-SCNC: 130 MMOL/L (ref 136–145)
T WAVE AXIS: 49 DEGREES
VENTRICULAR RATE: 88 BPM
WBC # BLD AUTO: 4.51 THOUSAND/UL (ref 4.31–10.16)

## 2021-08-30 PROCEDURE — 97163 PT EVAL HIGH COMPLEX 45 MIN: CPT

## 2021-08-30 PROCEDURE — 85025 COMPLETE CBC W/AUTO DIFF WBC: CPT | Performed by: INTERNAL MEDICINE

## 2021-08-30 PROCEDURE — 80053 COMPREHEN METABOLIC PANEL: CPT | Performed by: INTERNAL MEDICINE

## 2021-08-30 PROCEDURE — 99233 SBSQ HOSP IP/OBS HIGH 50: CPT | Performed by: FAMILY MEDICINE

## 2021-08-30 PROCEDURE — 83735 ASSAY OF MAGNESIUM: CPT | Performed by: INTERNAL MEDICINE

## 2021-08-30 PROCEDURE — 93010 ELECTROCARDIOGRAM REPORT: CPT | Performed by: INTERNAL MEDICINE

## 2021-08-30 PROCEDURE — 84145 PROCALCITONIN (PCT): CPT | Performed by: INTERNAL MEDICINE

## 2021-08-30 PROCEDURE — 99222 1ST HOSP IP/OBS MODERATE 55: CPT | Performed by: PHYSICIAN ASSISTANT

## 2021-08-30 PROCEDURE — 97167 OT EVAL HIGH COMPLEX 60 MIN: CPT

## 2021-08-30 PROCEDURE — 84100 ASSAY OF PHOSPHORUS: CPT | Performed by: INTERNAL MEDICINE

## 2021-08-30 RX ORDER — FUROSEMIDE 20 MG/1
20 TABLET ORAL DAILY
Status: DISCONTINUED | OUTPATIENT
Start: 2021-08-30 | End: 2021-09-03 | Stop reason: HOSPADM

## 2021-08-30 RX ADMIN — ASPIRIN 81 MG: 81 TABLET, CHEWABLE ORAL at 08:36

## 2021-08-30 RX ADMIN — LEVOTHYROXINE SODIUM 75 MCG: 75 TABLET ORAL at 06:34

## 2021-08-30 RX ADMIN — FUROSEMIDE 20 MG: 20 TABLET ORAL at 09:52

## 2021-08-30 RX ADMIN — PANTOPRAZOLE SODIUM 40 MG: 40 TABLET, DELAYED RELEASE ORAL at 06:34

## 2021-08-30 RX ADMIN — PRAVASTATIN SODIUM 40 MG: 40 TABLET ORAL at 17:11

## 2021-08-30 RX ADMIN — METOPROLOL SUCCINATE 12.5 MG: 25 TABLET, FILM COATED, EXTENDED RELEASE ORAL at 08:36

## 2021-08-30 RX ADMIN — DOCUSATE SODIUM 100 MG: 100 CAPSULE, LIQUID FILLED ORAL at 17:11

## 2021-08-30 RX ADMIN — ACETAMINOPHEN 975 MG: 325 TABLET, FILM COATED ORAL at 08:36

## 2021-08-30 RX ADMIN — FLUTICASONE FUROATE AND VILANTEROL TRIFENATATE 1 PUFF: 200; 25 POWDER RESPIRATORY (INHALATION) at 08:35

## 2021-08-30 RX ADMIN — Medication 1000 UNITS: at 08:36

## 2021-08-30 RX ADMIN — AZTREONAM 1000 MG: 2 INJECTION, POWDER, LYOPHILIZED, FOR SOLUTION INTRAMUSCULAR; INTRAVENOUS at 02:31

## 2021-08-30 RX ADMIN — AZTREONAM 1000 MG: 2 INJECTION, POWDER, LYOPHILIZED, FOR SOLUTION INTRAMUSCULAR; INTRAVENOUS at 19:11

## 2021-08-30 RX ADMIN — METOPROLOL SUCCINATE 12.5 MG: 25 TABLET, FILM COATED, EXTENDED RELEASE ORAL at 20:57

## 2021-08-30 RX ADMIN — DOCUSATE SODIUM 100 MG: 100 CAPSULE, LIQUID FILLED ORAL at 08:36

## 2021-08-30 RX ADMIN — TAMSULOSIN HYDROCHLORIDE 0.4 MG: 0.4 CAPSULE ORAL at 17:11

## 2021-08-30 RX ADMIN — ENOXAPARIN SODIUM 40 MG: 40 INJECTION SUBCUTANEOUS at 08:36

## 2021-08-30 RX ADMIN — AZTREONAM 1000 MG: 2 INJECTION, POWDER, LYOPHILIZED, FOR SOLUTION INTRAMUSCULAR; INTRAVENOUS at 11:03

## 2021-08-30 RX ADMIN — ACETAMINOPHEN 975 MG: 325 TABLET, FILM COATED ORAL at 17:11

## 2021-08-30 RX ADMIN — ACETAMINOPHEN 975 MG: 325 TABLET, FILM COATED ORAL at 20:57

## 2021-08-30 NOTE — PLAN OF CARE
Problem: OCCUPATIONAL THERAPY ADULT  Goal: Performs self-care activities at highest level of function for planned discharge setting  See evaluation for individualized goals  Description: Treatment Interventions: ADL retraining, Functional transfer training, Endurance training, Patient/family training, Energy conservation, Activityengagement, Compensatory technique education          See flowsheet documentation for full assessment, interventions and recommendations  Outcome: Progressing  Note: Limitation: Decreased ADL status, Decreased Safe judgement during ADL, Decreased endurance, Decreased cognition, Decreased high-level ADLs  Prognosis: Fair  Assessment: Pt is a 80 y o  male admitted to [facility] on 8/29/2021 with Weakness - Pt fell while ambulating with walker  Pt  has a past medical history of Anemia, Bladder cancer ([facility] [address] ), Cancer ([facility] [address] ), Carotid artery occlusion, Cataract, Colon polyp, COPD (chronic obstructive pulmonary disease) ([facility] [address] ), Coronary artery disease, Disease of thyroid gland, History of transfusion, Hyperlipidemia, Hypertension, Hypothyroidism, Multiple pulmonary nodules, Peptic ulcer, Scoliosis, Sepsis without acute organ dysfunction ([facility] [address] ), Severe protein-calorie malnutrition ([facility] [address] ), SIRS (systemic inflammatory response syndrome) ([facility] [address] ), Transient cerebral ischemia, and Tremors of nervous system  PTA, Pt's baseline was Independent in ADLs and functional mobility (with use of RW and rollator)  Pt reports he does not drive anymore- does manage meds and finances  Pt lives at home with his wife in a 1 story house with 2  CHERRY  Pt is currently Min assist with UB ADLs and Mod assist with LB ADLs and functional mobility (with use of RW)  Pt requires increased time and/or repetition for VCs  Pt is demonstrating deficits including decreased ADL status, decreased cognition, decreased endurance, decreased safety awareness during ADL, decreased self-care trans and decreased high-level ADLs   Pt has supportive wife that can assist prn   Pt would benefit from skilled OT services during hospital stay and rehab upon d/c       OT Discharge Recommendation: Post acute rehabilitation services  OT - OK to Discharge: Yes (when medically cleared)

## 2021-08-30 NOTE — CONSULTS
[address] NOTE   Admission Date: 8/29/2021    Patient Identifiers: Katiuska Alejandra (MRN: [id number])  Service Requesting Consultation: Tim Lugo MD  Service Providing Consultation:  Urology, Marily Barrera PA-C  Consults  Date of Service: 8/30/2021    Reason for Consultation:  Chronic Porter catheter    History of Present Illness:     Katiuska Alejandra is a 80 y o  male well known to me with a history of a chronic Porter catheter  He has failed multiple voiding trials  He presents with a weakness and fall  He landed on his left side but did not strike his head  He is afebrile  WBC 4 51  Procalcitonin normal   There is no recent upper tract imaging  Urine is clear yellow  Past Medical, Past Surgical History:     Past Medical History:   Diagnosis Date    Anemia     [facility]adder [facility]     Cancer ([facility] )     bladder    Carotid artery occlusion     Cataract     Colon polyp     COPD (chronic obstructive pulmonary disease) (HCC)     Coronary artery disease     Disease of thyroid gland     History of transfusion     Hyperlipidemia     Hypertension     Hypothyroidism 9/15/2017    Multiple pulmonary nodules     Peptic ulcer     Scoliosis     Sepsis without acute organ dysfunction ([facility] ) 1/26/2021    Severe protein-calorie malnutrition ([address] ) 4/17/2021    SIRS (systemic inflammatory response syndrome) ([facility] [facility] ) 12/19/2019    Transient cerebral ischemia     Tremors of nervous system    :    Past Surgical History:   Procedure Laterality Date   [facility] SURGERY      1973, 1987, 2005    BUNIONECTOMY Left     Simple exostectomy (silver procedure)    CAROTID ENDARTERECTOMY Right 09/10/2008    Randy LEDBETTER MD    CATARACT EXTRACTION      CATARACT EXTRACTION, BILATERAL      CERVICAL DISCECTOMY  1969    COLONOSCOPY      CORONARY ARTERY BYPASS GRAFT  10/20/2010    x3 (LIMA-LAD, SVG-OM, SVG-RCA)    CYSTOSCOPY      ELBOW SURGERY Right 07/2012    FEMORAL ARTERY - TIBIAL ARTERY BYPASS GRAFT  12/05/2011    using reversed saphenous vein from right leg  Annabella Osorio MD    ND ECHO TRANSESOPHAG R-T 2D W/PRB IMG ACQUISJ I&R N/A 10/6/2020    Procedure: TRANSESOPHAGEAL ECHOCARDIOGRAM (DAVID); Surgeon: Ashley Beckman DO;  [facility];  Service: Cardiac Surgery    ND REPLACE AORTIC VALVE OPENFEMORAL ARTERY APPROACH N/A 10/6/2020    Procedure: REPLACEMENT AORTIC VALVE TRANSCATHETER (TAVR) TRANSFEMORAL W/ 26MM MONTALVO BREE S3 ULTRA VALVE(ACCESS ON LEFT);   Surgeon: Ashley Beckman DO;  [facility];  Service: Cardiac Surgery    REPLACEMENT TOTAL KNEE Left     SHOULDER SURGERY Right 1997   :    Medications, Allergies:     Current Facility-Administered Medications:     acetaminophen (TYLENOL) tablet 975 mg, 975 mg, Oral, TID, Ingris Smith MD, 975 mg at 08/30/21 3776    aspirin chewable tablet 81 mg, 81 mg, Oral, Daily, Ingris Smith MD, 81 mg at 08/30/21 0836    aztreonam (AZACTAM) 1,000 mg in sodium chloride 0 9 % 50 mL IVPB, 1,000 mg, Intravenous, Q8H, Ingris Smith MD, Last Rate: 100 mL/hr at 08/30/21 1103, 1,000 mg at 08/30/21 1103    cholecalciferol (VITAMIN D3) tablet 1,000 Units, 1,000 Units, Oral, Daily, Ingris Smith MD, 1,000 Units at 08/30/21 0836    docusate sodium (COLACE) capsule 100 mg, 100 mg, Oral, BID, Ingris Simth MD, 100 mg at 08/30/21 0836    enoxaparin (LOVENOX) subcutaneous injection 40 mg, 40 mg, Subcutaneous, Daily, Ingris Smith MD, 40 mg at 08/30/21 0836    fluticasone-vilanterol (BREO ELLIPTA) 200-25 MCG/INH inhaler 1 puff, 1 puff, Inhalation, Daily, Ingris Smith MD, 1 puff at 08/30/21 0835    furosemide (LASIX) tablet 20 mg, 20 mg, Oral, Daily, Prema Torres MD, 20 mg at 08/30/21 7305    levothyroxine tablet 75 mcg, 75 mcg, Oral, Early Morning, Ingris Smith MD, 75 mcg at 08/30/21 0634    metoprolol succinate (TOPROL-XL) 24 hr tablet 12 5 mg, 12 5 mg, Oral, BID, Hunter Mann MD, 12 5 mg at 21 0836    ondansetron [facility]) injection 4 mg, 4 mg, Intravenous, Q6H PRN, Hunter Mann MD    pantoprazole (PROTONIX) EC tablet 40 mg, 40 mg, Oral, Early Morning, Hunter Mann MD, 40 mg at 21 1091    pravastatin (PRAVACHOL) tablet 40 mg, 40 mg, Oral, Daily With Dinner, Hunter Mann MD, 40 mg at 21    tamsulosin [facility]) capsule 0 4 mg, 0 4 mg, Oral, Daily With Dinner, Hunter Mann MD, 0 4 mg at 21    tiotropium (SPIRIVA) capsule for inhaler 18 mcg, 18 mcg, Inhalation, Daily, Hunter Mann MD    Allergies: Allergies   Allergen Reactions    Aleve [Naproxen]      Other reaction(s): FACIAL SWELLING  Category: Allergy; Annotation - [id number]: lip/eye edema    Ancef [Cefazolin] Anaphylaxis     Hypotension, rash, itching    Augmentin [Amoxicillin-Pot Clavulanate] Diarrhea and Vomiting   :    Social and Family History:   Social History:   Social History     Tobacco Use    Smoking status: Former Smoker     Packs/day: 1 00     Years: 50 00     Pack years: 50 00     Types: Cigarettes     Start date:      Quit date:      Years since quittin 6    Smokeless tobacco: Never Used   Vaping Use    Vaping Use: Never used   Substance Use Topics    Alcohol use: Not Currently     Alcohol/week: 0 0 standard drinks     Comment: Social     Drug use: Never     Social History     Tobacco Use   Smoking Status Former Smoker    Packs/day: 1 00    Years: 50 00    Pack years: 50 00    Types: Cigarettes    Start date:     Quit date:  Niurka Years since quittin 6   Smokeless Tobacco Never Used       Family History:  Family History   Problem Relation Age of Onset    Cervical cancer Mother     Cervical cancer Sister     Heart disease Sister     Coronary artery disease Sister     Lung cancer Brother    :     Review of Systems:     General: Fever, chills, or night sweats: negative  Cardiac: Negative for chest pain      Pulmonary: Negative for shortness of breath  Gastrointestinal: Abdominal pain negative  Nausea, vomiting, or diarrhea negative,  Genitourinary: See HPI above  Patient does not have hematuria  All other systems queried were negative  Physical Exam:   General: Patient is pleasant and in NAD  Awake and alert  /59   Pulse 66   Temp 97 6 °F (36 4 °C)   Resp 18   Ht 5' 6" (1 676 m)   Wt 63 1 kg (139 lb 1 6 oz)   SpO2 99%   BMI 22 45 kg/m²   HEENT:  Conjunctiva are clear  Constitutional:  pleasant and cooperative     no apparent distress  Cardiac: Peripheral edema: negative  Pulmonary: Non-labored breathing  Abdomen: Soft, non-tender, non-distended  No surgical scars  No masses, tenderness, hernias noted  Genitourinary: Negative CVA tenderness, negative suprapubic tenderness  Extremities:  Moves all extremities  Neurological:CNII-XII intact  No numbness or tingling  Essentially non focal neurologic exam  Psychiatric:mood affect and behavior normal    (Male): Penis circumcised, phallus normal, meatus patent  Testicles descended into scrotum bilaterally without masses nor tenderness  No inguinal hernias bilaterally  CARTER: in place draining clear yellow urine  no clots and       Labs:     Lab Results   Component Value Date    HGB 9 4 (L) 08/30/2021    HCT 28 3 (L) 08/30/2021    WBC 4 51 08/30/2021     08/30/2021   ]    Lab Results   Component Value Date     06/25/2015    K 3 5 08/30/2021    CL 99 (L) 08/30/2021    CO2 26 08/30/2021    BUN 10 08/30/2021    CREATININE 0 53 (L) 08/30/2021    CALCIUM 8 1 (L) 08/30/2021    GLUCOSE 95 10/06/2020   ]    Imaging:   I personally reviewed the images and report of the following studies, and reviewed them with the patient:  None      ASSESSMENT:     1  Weakness and a fall  2  Chronic Carter catheter  3   History of bladder cancer       PLAN:   -Carter catheter should remain in place as he has failed multiple voiding trials  -follow-up in the office as scheduled  -urology will sign off      Thank you for allowing me to participate in this patients care  Please do not hesitate to call with any additional questions    Stephanie Aden PA-C

## 2021-08-30 NOTE — PROGRESS NOTES
[facility]  Progress Note - Gerardo Rogers 1933, 80 y o  male MRN: [id number]  Unit/Bed#: CW2 210-01 Encounter: [id number]  Primary Care Provider: Lucas Mullins MD   Date and time admitted to hospital: 8/29/2021  4:35 PM    * Weakness  Assessment & Plan  Patient presents has fall while ambulating with walker; cleared by Trauma  CT head and CT cervical spine with no acute concerning findings; minor left scalp trauma  Patient reports ambulating with walker and becoming acutely weak causing fall  On admission, noted to have a numeral bacteria in urine; hyponatremia; hypovolemia  0/4 sirs criteria;   Procalcitonin negative  Hemoglobin slightly below baseline, FOBT  Otherwise labs and vital stable; gentle IV hydration for possible infection and specific gravity 1 025  Check TSH, blood cultures  PT/OT; ambulate patient  Fall precautions  Chest x-ray pending; however physical exam and vital signs not currently suggestive of acute pneumonia or pulmonary process    Chronic respiratory failure ([address] )  Assessment & Plan  On 2 L nasal cannula around the clock    UTI (urinary tract infection)  Assessment & Plan  Review of previous admission shows Klebsiella oxytocia, Enterococcus faecalis; and Pseudomonas in urine; all 3 greater than 100 K colony-forming units  Given indwelling urinary catheter, no doubt some these bacteria are chronic colonizers; but will treat empirically  Received Cipro in the ED; given patient's age and weakness, transitioned to aztreonam in the setting of documented ancef and penicillin allergies      Pulmonary emphysema (HCC)  Assessment & Plan  Continue Trelegy; SpO2 96% on room air  Monitor    Hypertension  Assessment & Plan  Well controlled  Continue home medications    Atherosclerotic heart disease of native coronary artery with other forms of angina pectoris [facility])  Assessment & Plan  Continue aspirin, metoprolol, and statin    Benign prostatic hyperplasia  Assessment & Plan  Continue alfuzosin; indwelling urinary catheter  History of bladder cancer with recent cystoscopy demonstrating no recurrence  Will consult Urology    PAF (paroxysmal atrial fibrillation) ([address] )  Assessment & Plan  Currently rate controlled on admission; follows with cardiology in the outpatient setting  Continue aspirin and metoprolol  Not anticoagulation candidate as per Cardiology    Severe aortic stenosis  Assessment & Plan  History of severe aortic stenosis; 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  Follows with cardiology  As per recent echo (2021):  A 26 mm Braxton Jeffrey bioprosthesis was present  It exhibited normal function    Continue home medications      VTE Pharmacologic Prophylaxis:   Pharmacologic: Enoxaparin (Lovenox)  Mechanical VTE Prophylaxis in Place: Yes    Patient Centered Rounds: I have performed bedside rounds with nursing staff today  Discussions with Specialists or Other Care Team Provider:  Cm and nursing    Education and Discussions with Family / Patient:  With patient  Updated patient's spouse    Time Spent for Care: 30 minutes  More than 50% of total time spent on counseling and coordination of care as described above  Current Length of Stay: 1 day(s)    Current Patient Status: Inpatient   Certification Statement: The patient will continue to require additional inpatient hospital stay due to IV antibiotics, pending urology consult, PT OT assessment    Discharge Plan:  Pending clinical course    Code Status: Level 1 - Full Code      Subjective:   Patient seen examined bedside  No acute events  Denies any chest pain, shortness of breath, abdominal pain    Objective:     Vitals:   Temp (24hrs), Av 9 °F (36 6 °C), Min:97 6 °F (36 4 °C), Max:98 °F (36 7 °C)    Temp:  [97 6 °F (36 4 °C)-98 °F (36 7 °C)] 97 6 °F (36 4 °C)  HR:  [62-88] 66  Resp:  [16-22] 18  BP: ()/(38-60) 129/59  SpO2:  [95 %-99 %] 99 %  Body mass index is 22 45 kg/m²       Input and Output Summary (last 24 hours): Intake/Output Summary (Last 24 hours) at 8/30/2021 0903  Last data filed at 8/29/2021 2045  Gross per 24 hour   Intake 500 ml   Output 155 ml   Net 345 ml       Physical Exam:     Physical Exam  Vitals and nursing note reviewed  Constitutional:       General: He is not in acute distress  Appearance: Normal appearance  HENT:      Head: Normocephalic  Nose: Nose normal       Mouth/Throat:      Mouth: Mucous membranes are moist    Eyes:      Conjunctiva/sclera: Conjunctivae normal    Cardiovascular:      Rate and Rhythm: Normal rate  Heart sounds: Normal heart sounds  No murmur heard  Pulmonary:      Effort: Pulmonary effort is normal  No respiratory distress  Breath sounds: Normal breath sounds  No wheezing  Abdominal:      General: There is no distension  Tenderness: There is no abdominal tenderness  There is no guarding  Comments: Porter catheter in place draining yellow urine   Musculoskeletal:         General: No swelling  Right lower leg: No edema  Skin:     General: Skin is warm  Neurological:      General: No focal deficit present  Mental Status: He is alert and oriented to person, place, and time     Psychiatric:         Mood and Affect: Mood normal            Additional Data:     Labs:    Results from last 7 days   Lab Units 08/30/21  0643   WBC Thousand/uL 4 51   HEMOGLOBIN g/dL 9 4*   HEMATOCRIT % 28 3*   PLATELETS Thousands/uL 161   NEUTROS PCT % 58   LYMPHS PCT % 14   MONOS PCT % 24*   EOS PCT % 4     Results from last 7 days   Lab Units 08/30/21  0643   SODIUM mmol/L 130*   POTASSIUM mmol/L 3 5   CHLORIDE mmol/L 99*   CO2 mmol/L 26   BUN mg/dL 10   CREATININE mg/dL 0 53*   ANION GAP mmol/L 5   CALCIUM mg/dL 8 1*   ALBUMIN g/dL 2 7*   TOTAL BILIRUBIN mg/dL 0 61   ALK PHOS U/L 89   ALT U/L 16   AST U/L 21   GLUCOSE RANDOM mg/dL 71                 Results from last 7 days   Lab Units 08/30/21  0643 08/29/21  1949 PROCALCITONIN ng/ml <0 05 <0 05           * I Have Reviewed All Lab Data Listed Above  * Additional Pertinent Lab Tests Reviewed: [address] Admission Reviewed    Imaging:    Imaging Reports Reviewed Today Include:  CT head, C-spine  Imaging Personally Reviewed by Myself Includes:  Chest x-ray    Recent Cultures (last 7 days):     Results from last 7 days   Lab Units 08/29/21 1948   BLOOD CULTURE  Received in Microbiology Lab  Culture in Progress  Last 24 Hours Medication List:   Current Facility-Administered Medications   Medication Dose Route Frequency Provider Last Rate    acetaminophen  975 mg Oral TID Bravo Beltran MD      aspirin  81 mg Oral Daily Bravo Beltran MD      aztreonam  1,000 mg Intravenous Q8H Bravo Beltran MD 1,000 mg (08/30/21 0231)    cholecalciferol  1,000 Units Oral Daily Bravo Beltran MD      docusate sodium  100 mg Oral BID Bravo Beltran MD      enoxaparin  40 mg Subcutaneous Daily Bravo Beltran MD      fluticasone-vilanterol  1 puff Inhalation Daily Bravo Beltran MD      furosemide  20 mg Oral Daily Uche Lozada MD      levothyroxine  75 mcg Oral Early Morning Bravo Beltran MD      metoprolol succinate  12 5 mg Oral BID Bravo Beltran MD      ondansetron  4 mg Intravenous Q6H PRN Bravo Beltran MD      pantoprazole  40 mg Oral Early Morning Bravo Beltran MD      pravastatin  40 mg Oral Daily With Ankita Trevino MD      tamsulosin  0 4 mg Oral Daily With Ankita Trevino MD      tiotropium  18 mcg Inhalation Daily Bravo Beltran MD          Today, Patient Was Seen By: Jay Olea MD    ** Please Note: Dictation voice to text software may have been used in the creation of this document   **

## 2021-08-30 NOTE — ED ATTENDING ATTESTATION
8/29/2021  IDallin MD, saw and evaluated the patient  I have discussed the patient with the resident/non-physician practitioner and agree with the resident's/non-physician practitioner's findings, Plan of Care, and MDM as documented in the resident's/non-physician practitioner's note, except where noted  All available labs and Radiology studies were reviewed  I was present for key portions of any procedure(s) performed by the resident/non-physician practitioner and I was immediately available to provide assistance  At this point I agree with the current assessment done in the Emergency Department  I have conducted an independent evaluation of this patient a history and physical is as follows:    ED Course    80year-old female status post fall patient states he felt weak in his legs prior to fall no loss of consciousness positive head strike on aspirin level C trauma alert  Physical examination remarkable for occipital left scalp contusion with laceration  Impression fall with head strike on aspirin level C trauma  Additionally, will evaluate for cause of generalized weakness fatigue  Results neuroimaging unremarkable for intracranial hemorrhage or C-spine injury  Lab studies reviewed patient does have hyponatremia as well as evidence of UTI  Patient will be admitted to Medicine Service for further evaluation management of his weakness    Critical Care Time  Procedures

## 2021-08-30 NOTE — ASSESSMENT & PLAN NOTE
Patient presents has fall while ambulating with walker; cleared by Trauma  CT head and CT cervical spine with no acute concerning findings; minor left scalp trauma  Patient reports ambulating with walker and becoming acutely weak causing fall  On admission, noted to have a numeral bacteria in urine; hyponatremia; hypovolemia  0/4 sirs criteria;   Procalcitonin negative  Hemoglobin slightly below baseline, FOBT  Otherwise labs and vital stable; gentle IV hydration for possible infection and specific gravity 1 025  Check TSH, blood cultures  PT recommends rehab

## 2021-08-30 NOTE — PLAN OF CARE
Problem: PHYSICAL THERAPY ADULT  Goal: Performs mobility at highest level of function for planned discharge setting  See evaluation for individualized goals  Description: Treatment/Interventions: Functional transfer training, LE strengthening/ROM, Therapeutic exercise, Elevations, Endurance training, Equipment eval/education, Patient/family training, Bed mobility, Gait training, Spoke to nursing          See flowsheet documentation for full assessment, interventions and recommendations  Note: Prognosis: Good  Problem List: Decreased strength, Decreased endurance, Impaired balance, Decreased mobility, Decreased range of motion, Decreased safety awareness, Decreased cognition  Assessment: Pt seen for high complexity PT evaluation due to decrease in functional mobility status compared to baseline  Pt with active PT eval/treat and ambulate pt orders at this time  Pt is a 80 y o  M who presented to [facility] s/p fall with no resulting injuries on 8/29/21  Pt primary dx is weakness  Pt  has a past medical history of Anemia, Bladder cancer ([facility] ), Cancer ([facility] ), Carotid artery occlusion, Cataract, Colon polyp, COPD (chronic obstructive pulmonary disease) ([facility] ), Coronary artery disease, Disease of thyroid gland, History of transfusion, Hyperlipidemia, Hypertension, Hypothyroidism (9/15/2017), Multiple pulmonary nodules, Peptic ulcer, Scoliosis, Sepsis without acute organ dysfunction ([facility] [address] ) (1/26/2021), Severe protein-calorie malnutrition ([facility] ) (4/17/2021), SIRS (systemic inflammatory response syndrome) ([facility] [address] ) (12/19/2019), Transient cerebral ischemia, and Tremors of nervous system  Pt resides with spouse in [facility] with [facility]  Pt presents with decreased strength, balance, endurance that contribute to limitations in bed mobility, functional transfers, functional mobility  Pt requires Mod A for all mobility at this time  Pt left supine in bed with bed alarm intact and with all needs in reach    Pt will benefit from skilled therapy in order to address current impairments and functional limitations  PT to follow pt and recommending rehab once medically cleared  The patient's AM-PAC Basic Mobility Inpatient Short Form Raw Score is 11, Standardized Score is 30 25  A standardized score less than 42 9 suggests the patient may benefit from discharge to post-acute rehabilitation services  Please also refer to the recommendation of the Physical Therapist for safe discharge planning  Barriers to Discharge: Inaccessible home environment, Decreased caregiver support        PT Discharge Recommendation: Post acute rehabilitation services     PT - OK to Discharge: Yes (to rehab once medically cleared)    See flowsheet documentation for full assessment

## 2021-08-30 NOTE — PHYSICAL THERAPY NOTE
Physical Therapy Evaluation     Patient's Name: Martine Harden    Admitting Diagnosis  Acute hyponatremia [E87 1]  CHF (congestive heart failure) ([facility] [address] ) [I50 9]  Generalized weakness [Q30 8]  Complicated UTI (urinary tract infection) [N39 0]  Injury, unspecified, initial encounter [T14 90XA]  Unspecified multiple injuries, initial encounter [T07  XXXA]    Problem List  Patient Active Problem List   Diagnosis    Severe aortic stenosis    PAF (paroxysmal atrial fibrillation) (HCC)    Benign prostatic hyperplasia    Carotid stenosis, bilateral    Atherosclerotic heart disease of native coronary artery with other forms of angina pectoris (HCC)    Esophageal reflux    Essential and other specified forms of tremor    Generalized osteoarthritis of multiple sites    Hyperlipidemia    Hypertension    Hypothyroidism    Impaired fasting glucose    Left foot drop    Mononeuritis    Peripheral arterial disease ([facility] )    Pulmonary emphysema (HCC)    RBBB    [facility]    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    Stenosis of carotid artery    Iliac artery aneurysm, bilateral (HCC)    UTI (urinary tract infection)    Urine retention    Indwelling Porter catheter calcification (HCC)    Ambulatory dysfunction    S/P TAVR (transcatheter aortic valve replacement)    Normocytic anemia    Thrombocytopenia (HCC)    Pancytopenia (HCC)    Chronic diastolic congestive heart failure (HCC)    Weakness    Chronic respiratory failure (HCC)    Hypersensitivity pneumonitis (HCC)    Bladder cancer (HCC)    Lung nodule    Full incontinence of feces    Pulmonary hypertension ([facility] [address] [facility] )       Past Medical History  Past Medical History:   Diagnosis Date    Anemia     Bladder cancer ([facility] [address] [facility] )     Cancer ([facility] [address] [facility] )     bladder    Carotid artery occlusion     Cataract     Colon polyp     COPD (chronic obstructive pulmonary disease) (HCC)     Coronary artery disease     Disease of thyroid gland     History of transfusion     Hyperlipidemia     Hypertension     Hypothyroidism 9/15/2017    Multiple pulmonary nodules     Peptic ulcer     Scoliosis     Sepsis without acute organ dysfunction ([facility] ) 1/26/2021    Severe protein-calorie malnutrition ([facility] ) 4/17/2021    SIRS (systemic inflammatory response syndrome) [facility] ) 12/19/2019    Transient cerebral ischemia     Tremors of nervous system        Past Surgical History  Past Surgical History:   Procedure Laterality Date   [facility] SURGERY      1973, 1987, 2005    BUNIONECTOMY Left     Simple exostectomy (silver procedure)    CAROTID ENDARTERECTOMY Right 09/10/2008    Common  Abraham Miss P MD    CATARACT EXTRACTION      CATARACT EXTRACTION, BILATERAL      CERVICAL DISCECTOMY  1969    COLONOSCOPY      CORONARY ARTERY BYPASS GRAFT  10/20/2010    x3 (LIMA-LAD, SVG-OM, SVG-RCA)    CYSTOSCOPY      ELBOW SURGERY Right 07/2012    FEMORAL ARTERY - TIBIAL ARTERY BYPASS GRAFT  12/05/2011    using reversed saphenous vein from right leg  Nereida Najjar MD    [place] ECHO TRANSESOPHAG R-T 2D W/PRB IMG ACQUISJ I&R N/A 10/6/2020    Procedure: TRANSESOPHAGEAL ECHOCARDIOGRAM (DAVID); Surgeon: Herlinda Vu DO;  [facility];  Service: Cardiac Surgery    [place] REPLACE AORTIC VALVE OPENFEMORAL ARTERY APPROACH N/A 10/6/2020    Procedure: REPLACEMENT AORTIC VALVE TRANSCATHETER (TAVR) TRANSFEMORAL W/ 26MM MONTALVO BREE S3 ULTRA VALVE(ACCESS ON LEFT);   Surgeon: Herlinda Vu DO;  [facility];  Service: Cardiac Surgery    REPLACEMENT TOTAL KNEE Left     SHOULDER SURGERY Right 1997 08/30/21 1140   PT Last Visit   PT Visit Date 08/30/21   Note Type   Note type Evaluation   Pain Assessment   Pain Assessment Tool Pain Assessment not indicated - pt denies pain   Home Living   Type of [address] [facility] One level  (2STE)   Bathroom Shower/Tub Tub/shower unit   Bathroom Toilet Raised   Bathroom Equipment Grab bars in [facility]; Other (Comment)  (rollator)   Additional Comments Pt reports use of RW and rollator, rollator mostly in community  Prior Function   Level of [place] Independent with ADLs and functional mobility   Lives With Spouse   Receives Help From Family   ADL Assistance Independent   IADLs Independent   Falls in the last 6 months 1 to 4   Vocational Retired   Comments -   Restrictions/Precautions   [place] Bearing Precautions Per Order No   Braces or Orthoses   (lift shoe)   Other Precautions Cognitive; Chair Alarm; Bed Alarm; Fall Risk;O2;Multiple lines   General   Family/Caregiver Present No   Cognition   Arousal/Participation Alert   Attention Attends with cues to redirect   Memory Decreased recall of precautions   Following Commands Follows one step commands with increased time or repetition   Comments Pt with decreased safety awareness and insight into deficits  RLE Assessment   RLE Assessment   (functionally 3/5)   LLE Assessment   LLE Assessment   (functionally 3/5)   Bed Mobility   Supine to Sit 3  Moderate assistance   Additional items Assist x 1; Increased time required;Verbal cues;LE management;HOB elevated   Sit to Supine 3  Moderate assistance   Additional items Assist x 1; Increased time required;Verbal cues;LE management;HOB elevated   Additional Comments Supine in bed upon PT arrival   Pt left supine in bed with bed alarm intact and all needs in reach  Transfers   Sit to Stand 3  Moderate assistance   Additional items Assist x 1; Increased time required;Verbal cues   Stand to Sit 3  Moderate assistance   Additional items Assist x 1; Increased time required;Verbal cues   Additional Comments Transfers with RW   VC for hand placement and safety  Ambulation/Elevation   Gait pattern Excessively slow; Short stride; Foward flexed; Shuffling;Decreased foot clearance; Improper Weight shift   Gait Assistance 4  Moderateassist   Additional items Assist x 1;Verbal cues; Tactile cues   Assistive Device Rolling walker   Distance 15 ft x 2   Balance   Static Sitting Fair   Dynamic Sitting Fair -   Static Standing Poor +   Dynamic Standing Poor   Ambulatory Poor -   Endurance Deficit   Endurance Deficit Yes   Endurance Deficit Description fatigue, weakness,   Activity Tolerance   Activity Tolerance Patient limited by fatigue   Medical Staff Made Aware OT   Nurse Made Aware RN cleared pt to be seen by PT   Assessment   Prognosis Good   Problem List Decreased strength;Decreased endurance; Impaired balance;Decreased mobility; Decreased range of motion;Decreased safety awareness;Decreased cognition   Assessment Pt seen for high complexity PT evaluation due to decrease in functional mobility status compared to baseline  Pt with active PT eval/treat and ambulate pt orders at this time  Pt is a 80 y o  M who presented to [facility] s/p fall with no resulting injuries on 8/29/21  Pt primary dx is weakness  Pt  has a past medical history of Anemia, Bladder cancer ([facility] ), Cancer ([facility] [facility] ), Carotid artery occlusion, Cataract, Colon polyp, COPD (chronic obstructive pulmonary disease) ([facility] [facility] ), Coronary artery disease, Disease of thyroid gland, History of transfusion, Hyperlipidemia, Hypertension, Hypothyroidism (9/15/2017), Multiple pulmonary nodules, Peptic ulcer, Scoliosis, Sepsis without acute organ dysfunction ([address] ) (1/26/2021), Severe protein-calorie malnutrition ([facility] ) (4/17/2021), SIRS (systemic inflammatory response syndrome) ([facility] ) (12/19/2019), Transient cerebral ischemia, and Tremors of nervous system  Pt resides with spouse in [facility] with 2STE  Pt presents with decreased strength, balance, endurance that contribute to limitations in bed mobility, functional transfers, functional mobility  Pt requires Mod A for all mobility at this time  Pt left supine in bed with bed alarm intact and with all needs in reach    Pt will benefit from skilled therapy in order to address current impairments and functional limitations  PT to follow pt and recommending rehab once medically cleared  The patient's AM-PAC Basic Mobility Inpatient Short Form Raw Score is 11, Standardized Score is 30 25  A standardized score less than 42 9 suggests the patient may benefit from discharge to post-acute rehabilitation services  Please also refer to the recommendation of the Physical Therapist for safe discharge planning  Barriers to Discharge Inaccessible home environment;Decreased caregiver support   Goals   Patient Goals to get stronger   STG Expiration Date 09/13/21   Short Term Goal #1 1  Pt will demonstrate ability to perform all aspects of bed mobility with I in order to increase independence and decrease burden on caregivers  2  Pt will demonstrate ability to perform functional transfers with Mod I  in order to increase independence and decrease burden on caregivers  3  Pt will demonstrate ability to ambulate 100+ ft with least restrictive AD with Mod I in order to return to mobility safely  4  Pt will demonstrate ability to negotiate 2 steps with/without HR and Mod I in order to return to household/community mobility safely  5  Pt will demonstrate improved balance by one grade order to decrease risk of falls  6  Pt will increase b/l LE strength by 1 grade in order to increase ease of functional mobility and transfers  Plan   Treatment/Interventions Functional transfer training;LE strengthening/ROM; Therapeutic exercise;Elevations; Endurance training;Equipment eval/education;Patient/family training;Bed mobility;Gait training;Spoke to nursing   PT Frequency Other (Comment)  (3-5x/wk)   Recommendation   PT Discharge Recommendation Post acute rehabilitation services   PT - OK to Discharge Yes  (to rehab once medically cleared)   AM-[facility] Basic Mobility Inpatient   Turning in Bed Without Bedrails 2   Lying on Back to Sitting on Edge of Flat Bed 2   Moving Bed to Chair 2   Standing Up From Chair 2   Walk in Room 2   Climb 3-5 Stairs 1   Basic Mobility Inpatient Raw Score 11   Basic Mobility Standardized Score 30 25   Modified [place] Scale   Modified [place] Scale 4         Billie Bañuelos, PT, DPT

## 2021-08-30 NOTE — ASSESSMENT & PLAN NOTE
Patient presents has fall while ambulating with walker; cleared by Trauma  CT head and CT cervical spine with no acute concerning findings; minor left scalp trauma  Patient reports ambulating with walker and becoming acutely weak causing fall  On admission, noted to have a numeral bacteria in urine; hyponatremia; hypovolemia  0/4 sirs criteria;   Procalcitonin negative  Hemoglobin slightly below baseline, FOBT  Otherwise labs and vital stable; gentle IV hydration for possible infection and specific gravity 1 025  Check TSH, blood cultures  PT/OT; ambulate patient  Fall precautions  Chest x-ray pending; however physical exam and vital signs not currently suggestive of acute pneumonia or pulmonary process

## 2021-08-30 NOTE — OCCUPATIONAL THERAPY NOTE
Occupational Therapy Evaluation     Patient Name: Nathan Garcia  MNHCY'Z Date: 8/30/2021  Problem List  Principal Problem:    Weakness  Active Problems:    Severe aortic stenosis    PAF (paroxysmal atrial fibrillation) (HCC)    Benign prostatic hyperplasia    Atherosclerotic heart disease of native coronary artery with other forms of angina pectoris (HCC)    Esophageal reflux    Hyperlipidemia    Hypertension    Hypothyroidism    Pulmonary emphysema (HCC)    UTI (urinary tract infection)    Chronic respiratory failure (HCC)    Past Medical History  Past Medical History:   Diagnosis Date    Anemia     Bladder cancer ([facility] )     Cancer ([facility] )     bladder    Carotid artery occlusion     Cataract     Colon polyp     COPD (chronic obstructive pulmonary disease) ([facility] )     Coronary artery disease     Disease of thyroid gland     History of transfusion     Hyperlipidemia     Hypertension     Hypothyroidism 9/15/2017    Multiple pulmonary nodules     Peptic ulcer     Scoliosis     Sepsis without acute organ dysfunction ([facility] ) 1/26/2021    Severe protein-calorie malnutrition ([facility] ) 4/17/2021    SIRS (systemic inflammatory response syndrome) ([place] [id number] ) 12/19/2019    Transient cerebral ischemia     Tremors of nervous system      Past Surgical History  Past Surgical History:   Procedure Laterality Date   [facility] SURGERY      1973, 1987, 2005    BUNIONECTOMY Left     Simple exostectomy (silver procedure)    CAROTID ENDARTERECTOMY Right 09/10/2008    Common  Minal LEDBETTER MD    CATARACT EXTRACTION      CATARACT EXTRACTION, BILATERAL      CERVICAL DISCECTOMY  1969    COLONOSCOPY      CORONARY ARTERY BYPASS GRAFT  10/20/2010    x3 (LIMA-LAD, SVG-OM, SVG-RCA)    CYSTOSCOPY      ELBOW SURGERY Right 07/2012    FEMORAL ARTERY - TIBIAL ARTERY BYPASS GRAFT  12/05/2011    using reversed saphenous vein from right leg     Soni Means MD    [place] ECHO TRANSESOPHAG R-T 2D W/PRB IMG ACQUISJ I&R N/A 10/6/2020 Procedure: TRANSESOPHAGEAL ECHOCARDIOGRAM (DAVID); Surgeon: Mary Ramos DO;  [facility];  Service: Cardiac Surgery    [place] REPLACE AORTIC VALVE OPENFEMORAL ARTERY APPROACH N/A 10/6/2020    Procedure: REPLACEMENT AORTIC VALVE TRANSCATHETER (TAVR) TRANSFEMORAL W/ 26MM MONTALVO BREE S3 ULTRA VALVE(ACCESS ON LEFT); Surgeon: Mary Ramos DO;  [facility];  Service: Cardiac Surgery    REPLACEMENT TOTAL KNEE Left     SHOULDER SURGERY Right 1997 08/30/21 1120   OT Last Visit   OT Visit Date 08/30/21   Note Type   Note type Evaluation   Restrictions/Precautions   Weight Bearing Precautions Per Order No   Pain Assessment   Pain Assessment Tool 0-10   Pain Score No Pain   Home Living   Type of [address] One level;Performs ADLs on one level; Able to live on main level with bedroom/bathroom;Stairs to enter without rails  (2STE)   Bathroom Shower/Tub Tub/shower unit   Bathroom Toilet Raised   Bathroom Equipment Grab bars in shower   [facility]  (Pt reports using a walker and rollator PTA)   Additional Comments Pt reports living in a 1  with 2STE   Prior Function   Level of [place] Independent with ADLs and functional mobility  (with use of RW and rollator)   Lives With Spouse  (wife)   Receives Help From Family   ADL Assistance Independent   IADLs Needs assistance  (Pt does not drive- does manage meds and finances)   Falls in the last 6 months 1 to 4  (Pt reports 3)   Vocational Retired   Comments Pt reports living with his wife who is able to assist prn   Lifestyle   Autonomy independent in ADLs and functional mobility (with use of RW and rollator)   Reciprocal Relationships supportive wife   Service to Others retired   Intrinsic Gratification active [address] ([place]) [address] "My wife helps me sometimes"   ADL   Where Assessed Edge of bed   Eating Assistance 5 Supervision/Setup   Grooming Assistance 5  Supervision/Setup   UB Bathing Assistance 5  Supervision/Setup   LB Bathing Assistance 3  Moderate Assistance   UB Dressing Assistance 5  Supervision/Setup   LB Dressing Assistance 3  Moderate Assistance   LB Dressing Deficit Don/doff R sock; Don/doff L sock; Don/doff R shoe;Don/doff L shoe   Toileting Assistance  3  Moderate Assistance   Toileting Deficit Other (Comment)  (chavis catheter, incontinent with BM during eval)   Bed Mobility   Supine to Sit 3  Moderate assistance   Additional items Assist x 2; Increased time required;Verbal cues;LE management   Sit to Supine 3  Moderate assistance   Additional items Assist x 2; Increased time required;Verbal cues;LE management   Additional Comments Pt lying supine in bed upon OT arrival, Pt left in bed with all needs met and call bell in reach    Transfers   Sit to Stand 3  Moderate assistance   Additional items Assist x 1; Increased time required;Verbal cues   Stand to Sit 3  Moderate assistance   Additional items Assist x 1; Increased time required;Verbal cues   Additional Comments Pt utilized RW for functional transfers   Functional Mobility   Functional Mobility 3  Moderate assistance   Additional Comments Pt utilized RW for functional mobility   Additional items Rolling walker   Balance   Static Sitting Fair +   Dynamic Sitting Fair +   Static Standing Fair -   Dynamic Standing Poor +   Ambulatory Poor   Activity Tolerance   Activity Tolerance Patient tolerated treatment well;Patient limited by fatigue   Medical Staff Made Aware OT [place]   Nurse Made Aware nursing cleared session   RUE Assessment   RUE Assessment WFL   LUE Assessment   LUE Assessment WFL   Cognition   Overall Cognitive Status WFL   Arousal/Participation Alert; Responsive; Cooperative   Attention Within functional limits   Orientation Level Oriented X4   Memory Decreased recall of precautions   Following Commands Follows one step commands with increased time or repetition   Comments Pt agreeable and cooperative with therapy- demonstrating cognitive deficits such as decreased safety awareness   Assessment   Limitation Decreased ADL status; Decreased Safe judgement during ADL;Decreased endurance;Decreased cognition;Decreased high-level ADLs   Prognosis Fair   Assessment Pt is a 80 y o  male admitted to [facility] on 8/29/2021 with Weakness - Pt fell while ambulating with walker  Pt  has a past medical history of Anemia, Bladder cancer ([facility] ), Cancer ([facility] ), Carotid artery occlusion, Cataract, Colon polyp, COPD (chronic obstructive pulmonary disease) ([facility] ), Coronary artery disease, Disease of thyroid gland, History of transfusion, Hyperlipidemia, Hypertension, Hypothyroidism, Multiple pulmonary nodules, Peptic ulcer, Scoliosis, Sepsis without acute organ dysfunction ([facility] ), Severe protein[facility]calorie malnutrition ([facility] ), SIRS (systemic inflammatory response syndrome) ([facility] ), Transient cerebral ischemia, and Tremors of nervous system  PTA, Pt's baseline was Independent in ADLs and functional mobility (with use of RW and rollator)  Pt reports he does not drive anymore- does manage meds and finances  Pt lives at home with his wife in a 1 story house with 2  CHERRY  Pt is currently Min assist with UB ADLs and Mod assist with LB ADLs and functional mobility (with use of RW)  Pt requires increased time and/or repetition for VCs  Pt is demonstrating deficits including decreased ADL status, decreased cognition, decreased endurance, decreased safety awareness during ADL, decreased self-care trans and decreased high-level ADLs  Pt has supportive wife that can assist prn  Pt would benefit from skilled OT services during hospital stay and rehab upon d/c  Goals   Patient Goals to get better and go home   Plan   Treatment Interventions ADL retraining;Functional transfer training; Endurance training;Patient/family training;Energy conservation; Activityengagement; Compensatory technique education   Goal Expiration Date 09/13/21   OT Frequency 3-5x/wk   Recommendation   OT Discharge Recommendation Post acute rehabilitation services   OT - OK to Discharge Yes  (when medically cleared)   AM-[facility] Daily Activity Inpatient   Lower Body Dressing 2   Bathing 2   Toileting 2   Upper Body Dressing 3   Grooming 3   Eating 3   Daily Activity Raw Score 15   Daily Activity Standardized Score (Calc for Raw Score >=11) 34 69   AM-[facility] Applied Cognition Inpatient   Following a Speech/Presentation 3   Understanding Ordinary Conversation 4   Taking Medications 4   Remembering Where Things Are Placed or Put Away 3   Remembering List of 4-5 Errands 3   Taking Care of Complicated Tasks 3   Applied Cognition Raw Score 20   Applied Cognition Standardized Score 41 76       The patient's raw score on the AM-PAC Daily Activity inpatient short form is 15, standardized score is 34 69, less than 39 4  Patients at this level are likely to benefit from discharge to post-acute rehabilitation services  Please refer to the recommendation of the Occupational Therapist for safe discharge planning  Goals to be met within 10-14 days:    1  Pt will be Independent with toileting with use of AD/DME prn     2  Pt will demonstrate good carryover of body mechanics, safety awareness, and energy conservation techniques during ADL/IADL tasks  3  Pt will be Independent in UB and LB ADLs with use of AD/DME prn     4  Pt will be Independent in functional transfers to and from all surfaces with good safety awareness and good balance to increase independence and safety during ADL/IADL tasks  5  Pt will be Independent with functional mobility with use of AD/DME prn for ADL/IADL tasks  6  Pt will demonstrate Fair problem solving/sequencing skills during ADL/IADL tasks with environmental/verbal cues prn     7  Pt will engage in ongoing cognitive assessment w/ good participation to assist with safe d/c planning recommendations      8  Pt will demonstrate good activity tolerance for 15-20 min during OT session to increase independence in ADL/IADL tasks and safety  9  Pt will increase static and dynamic standing balance to Fair during ADL/IADL activities for safety      HENNA Mccabe

## 2021-08-30 NOTE — ASSESSMENT & PLAN NOTE
Review of previous admission shows Klebsiella oxytocia, Enterococcus faecalis; and Pseudomonas in urine; all 3 greater than 100 K colony-forming units  Given indwelling urinary catheter, no doubt some these bacteria are chronic colonizers; but will treat empirically  Received Cipro in the ED; given patient's age and weakness, transitioned to aztreonam in the setting of documented ancef and penicillin allergies

## 2021-08-30 NOTE — ASSESSMENT & PLAN NOTE
Continue alfuzosin; indwelling urinary catheter  History of bladder cancer with recent cystoscopy demonstrating no recurrence  Neurology consult appreciated    Patient will be discharged with Porter catheter he will follow-up with urology as outpatient

## 2021-08-30 NOTE — ASSESSMENT & PLAN NOTE
Continue alfuzosin; indwelling urinary catheter  History of bladder cancer with recent cystoscopy demonstrating no recurrence  Will consult Urology

## 2021-08-31 DIAGNOSIS — I27.21 PAH (PULMONARY ARTERY HYPERTENSION) (HCC): ICD-10-CM

## 2021-08-31 PROBLEM — J18.9 PNEUMONIA: Status: ACTIVE | Noted: 2021-08-31

## 2021-08-31 LAB
ANION GAP SERPL CALCULATED.3IONS-SCNC: 5 MMOL/L (ref 4–13)
BASOPHILS # BLD AUTO: 0.02 THOUSANDS/ΜL (ref 0–0.1)
BASOPHILS NFR BLD AUTO: 0 % (ref 0–1)
BUN SERPL-MCNC: 9 MG/DL (ref 5–25)
CALCIUM SERPL-MCNC: 8.1 MG/DL (ref 8.3–10.1)
CHLORIDE SERPL-SCNC: 96 MMOL/L (ref 100–108)
CO2 SERPL-SCNC: 28 MMOL/L (ref 21–32)
CREAT SERPL-MCNC: 0.54 MG/DL (ref 0.6–1.3)
EOSINOPHIL # BLD AUTO: 0.14 THOUSAND/ΜL (ref 0–0.61)
EOSINOPHIL NFR BLD AUTO: 2 % (ref 0–6)
ERYTHROCYTE [DISTWIDTH] IN BLOOD BY AUTOMATED COUNT: 12.3 % (ref 11.6–15.1)
GFR SERPL CREATININE-BSD FRML MDRD: 94 ML/MIN/1.73SQ M
GLUCOSE SERPL-MCNC: 85 MG/DL (ref 65–140)
HCT VFR BLD AUTO: 29.1 % (ref 36.5–49.3)
HGB BLD-MCNC: 9.6 G/DL (ref 12–17)
IMM GRANULOCYTES # BLD AUTO: 0.01 THOUSAND/UL (ref 0–0.2)
IMM GRANULOCYTES NFR BLD AUTO: 0 % (ref 0–2)
LYMPHOCYTES # BLD AUTO: 0.63 THOUSANDS/ΜL (ref 0.6–4.47)
LYMPHOCYTES NFR BLD AUTO: 11 % (ref 14–44)
MCH RBC QN AUTO: 31.4 PG (ref 26.8–34.3)
MCHC RBC AUTO-ENTMCNC: 33 G/DL (ref 31.4–37.4)
MCV RBC AUTO: 95 FL (ref 82–98)
MONOCYTES # BLD AUTO: 0.86 THOUSAND/ΜL (ref 0.17–1.22)
MONOCYTES NFR BLD AUTO: 15 % (ref 4–12)
NEUTROPHILS # BLD AUTO: 4.11 THOUSANDS/ΜL (ref 1.85–7.62)
NEUTS SEG NFR BLD AUTO: 72 % (ref 43–75)
NRBC BLD AUTO-RTO: 0 /100 WBCS
PLATELET # BLD AUTO: 187 THOUSANDS/UL (ref 149–390)
PMV BLD AUTO: 10.8 FL (ref 8.9–12.7)
POTASSIUM SERPL-SCNC: 3.5 MMOL/L (ref 3.5–5.3)
RBC # BLD AUTO: 3.06 MILLION/UL (ref 3.88–5.62)
SODIUM SERPL-SCNC: 129 MMOL/L (ref 136–145)
WBC # BLD AUTO: 5.77 THOUSAND/UL (ref 4.31–10.16)

## 2021-08-31 PROCEDURE — 80048 BASIC METABOLIC PNL TOTAL CA: CPT | Performed by: FAMILY MEDICINE

## 2021-08-31 PROCEDURE — 97530 THERAPEUTIC ACTIVITIES: CPT

## 2021-08-31 PROCEDURE — 99223 1ST HOSP IP/OBS HIGH 75: CPT | Performed by: STUDENT IN AN ORGANIZED HEALTH CARE EDUCATION/TRAINING PROGRAM

## 2021-08-31 PROCEDURE — 99233 SBSQ HOSP IP/OBS HIGH 50: CPT | Performed by: FAMILY MEDICINE

## 2021-08-31 PROCEDURE — 97112 NEUROMUSCULAR REEDUCATION: CPT

## 2021-08-31 PROCEDURE — 85025 COMPLETE CBC W/AUTO DIFF WBC: CPT | Performed by: FAMILY MEDICINE

## 2021-08-31 PROCEDURE — 97110 THERAPEUTIC EXERCISES: CPT

## 2021-08-31 RX ORDER — METOPROLOL SUCCINATE 25 MG/1
TABLET, EXTENDED RELEASE ORAL
Qty: 90 TABLET | Refills: 3 | Status: SHIPPED | OUTPATIENT
Start: 2021-08-31 | End: 2021-09-25 | Stop reason: HOSPADM

## 2021-08-31 RX ORDER — METOPROLOL SUCCINATE 25 MG/1
TABLET, EXTENDED RELEASE ORAL
Qty: 45 TABLET | Refills: 2 | Status: SHIPPED | OUTPATIENT
Start: 2021-08-31 | End: 2021-08-31

## 2021-08-31 RX ORDER — SODIUM CHLORIDE 9 MG/ML
100 INJECTION, SOLUTION INTRAVENOUS CONTINUOUS
Status: DISCONTINUED | OUTPATIENT
Start: 2021-08-31 | End: 2021-08-31

## 2021-08-31 RX ORDER — SODIUM CHLORIDE 9 MG/ML
50 INJECTION, SOLUTION INTRAVENOUS CONTINUOUS
Status: DISCONTINUED | OUTPATIENT
Start: 2021-08-31 | End: 2021-09-01

## 2021-08-31 RX ADMIN — FLUTICASONE FUROATE AND VILANTEROL TRIFENATATE 1 PUFF: 200; 25 POWDER RESPIRATORY (INHALATION) at 09:07

## 2021-08-31 RX ADMIN — METOPROLOL SUCCINATE 12.5 MG: 25 TABLET, FILM COATED, EXTENDED RELEASE ORAL at 21:04

## 2021-08-31 RX ADMIN — ASPIRIN 81 MG: 81 TABLET, CHEWABLE ORAL at 09:06

## 2021-08-31 RX ADMIN — ACETAMINOPHEN 975 MG: 325 TABLET, FILM COATED ORAL at 17:42

## 2021-08-31 RX ADMIN — LEVOTHYROXINE SODIUM 75 MCG: 75 TABLET ORAL at 05:36

## 2021-08-31 RX ADMIN — SODIUM CHLORIDE 50 ML/HR: 0.9 INJECTION, SOLUTION INTRAVENOUS at 21:52

## 2021-08-31 RX ADMIN — METOPROLOL SUCCINATE 12.5 MG: 25 TABLET, FILM COATED, EXTENDED RELEASE ORAL at 09:06

## 2021-08-31 RX ADMIN — ACETAMINOPHEN 975 MG: 325 TABLET, FILM COATED ORAL at 09:06

## 2021-08-31 RX ADMIN — Medication 1000 UNITS: at 09:06

## 2021-08-31 RX ADMIN — PANTOPRAZOLE SODIUM 40 MG: 40 TABLET, DELAYED RELEASE ORAL at 05:36

## 2021-08-31 RX ADMIN — PRAVASTATIN SODIUM 40 MG: 40 TABLET ORAL at 17:41

## 2021-08-31 RX ADMIN — ONDANSETRON 4 MG: 2 INJECTION INTRAMUSCULAR; INTRAVENOUS at 21:36

## 2021-08-31 RX ADMIN — SODIUM CHLORIDE 50 ML/HR: 0.9 INJECTION, SOLUTION INTRAVENOUS at 10:10

## 2021-08-31 RX ADMIN — ENOXAPARIN SODIUM 40 MG: 40 INJECTION SUBCUTANEOUS at 09:05

## 2021-08-31 RX ADMIN — ACETAMINOPHEN 975 MG: 325 TABLET, FILM COATED ORAL at 21:52

## 2021-08-31 RX ADMIN — TAMSULOSIN HYDROCHLORIDE 0.4 MG: 0.4 CAPSULE ORAL at 17:42

## 2021-08-31 RX ADMIN — FUROSEMIDE 20 MG: 20 TABLET ORAL at 09:07

## 2021-08-31 RX ADMIN — AZTREONAM 1000 MG: 2 INJECTION, POWDER, LYOPHILIZED, FOR SOLUTION INTRAMUSCULAR; INTRAVENOUS at 04:03

## 2021-08-31 NOTE — PHYSICAL THERAPY NOTE
Physical Therapy Progress Note     08/31/21 1000   PT Last Visit   PT Visit Date 08/31/21   Note Type   Note Type Treatment   Pain Assessment   Pain Assessment Tool 0-10   Pain Score 5   Pain Location/Orientation Location: Head   Hospital Pain Intervention(s) Repositioned; Ambulation/increased activity; Emotional support   Restrictions/Precautions   Other Precautions Cognitive; Chair Alarm; Bed Alarm; Fall Risk;O2  (Alarm active post session )   Subjective   Subjective The pt  states that he is doing alright, but that he is a little tired  He was agreeable to get out to the chair  Bed Mobility   Supine to Sit 4  Minimal assistance   Additional items Assist x 1; Increased time required;Verbal cues;LE management   Transfers   Sit to Stand 3  Moderate assistance   Additional items Assist x 1; Increased time required;Verbal cues   Stand to Sit 4  Minimal assistance   Additional items Assist x 1; Increased time required;Verbal cues   Ambulation/Elevation   Gait pattern Excessively slow; Step to;Short stride; Inconsistent daniel;Decreased foot clearance;Shuffling   Gait Assistance 3  Moderate assist   Additional items Assist x 1;Verbal cues; Tactile cues   Assistive Device Rolling walker   Distance 5 feet x 2  Balance   Static Sitting Fair   Dynamic Sitting Fair -   Static Standing Poor +   Ambulatory Poor   Activity Tolerance   Activity Tolerance Patient tolerated treatment well;Patient limited by fatigue   Nurse [address], RN  Exercises   TKR Sitting;Bilateral;AROM;10 reps  (x2 sets )   Assessment   Prognosis Good   Problem List Decreased strength;Decreased endurance; Impaired balance;Decreased mobility; Decreased range of motion;Decreased safety awareness;Decreased cognition   Assessment The pt  fatigued more easily today, but he did require less assistance with some activities  He remains unable to ambulate household distances   He required to sit after only a few feet for each trial today which is less than yesterday  He was instructed in therapeutic exercise which he completed with increased time  He was able to maintain his seated balance, but he fatigued as he progressed  He remains a high fall risk, and he will benfeit from continued therapy in order to maximize his functional mobility and independence  Barriers to Discharge Inaccessible home environment;Decreased caregiver support   Goals   Patient Goals To go home  STG Expiration Date 09/13/21   PT Treatment Day 1   Plan   Treatment/Interventions Functional transfer training;LE strengthening/ROM; Therapeutic exercise; Endurance training;Patient/family training;Bed mobility;Gait training;Elevations   Progress Slow progress, decreased activity tolerance   PT Frequency   (3-5x a week )   Recommendation   PT Discharge Recommendation Post acute rehabilitation services   Equipment Recommended [facility] Recommended Wheeled walker   AM-PAC Basic Mobility Inpatient   Turning in Bed Without Bedrails 3   Lying on Back to Sitting on Edge of Flat Bed 3   Moving Bed to Chair 2   Standing Up From Chair 2   Walk in Room 2   Climb 3-5 Stairs 1   Basic Mobility Inpatient Raw Score 13   Basic Mobility Standardized Score 33 99     An AM-PAC Basic Mobility standardized score less than 42 9 suggests the patient may benefit from discharge to post-acute rehab services      Yissel Alvarenga, PTA

## 2021-08-31 NOTE — CASE MANAGEMENT
CM s/w pt's spouse via phone Leopold Sadiq, [phone]) to review plan for rehab -- Spouse states she discussed rehab recommendation with pt and he is now in agreement with [facility]  Aura Black is specifically asking for placement at [facility] at this time  Referral placed via [facility] -- CM will continue to follow  Spouse in agreement with broader referral as b/u if needed  CM will await responses  Pt has medicare insurance -- No authorization required for placement

## 2021-08-31 NOTE — PLAN OF CARE
Problem: PHYSICAL THERAPY ADULT  Goal: Performs mobility at highest level of function for planned discharge setting  See evaluation for individualized goals  Description: Treatment/Interventions: Functional transfer training, LE strengthening/ROM, Therapeutic exercise, Elevations, Endurance training, Equipment eval/education, Patient/family training, Bed mobility, Gait training, Spoke to nursing          See flowsheet documentation for full assessment, interventions and recommendations  Outcome: Progressing  Note: Prognosis: Good  Problem List: Decreased strength, Decreased endurance, Impaired balance, Decreased mobility, Decreased range of motion, Decreased safety awareness, Decreased cognition  Assessment: The pt  fatigued more easily today, but he did require less assistance with some activities  He remains unable to ambulate household distances  He required to sit after only a few feet for each trial today which is less than yesterday  He was instructed in therapeutic exercise which he completed with increased time  He was able to maintain his seated balance, but he fatigued as he progressed  He remains a high fall risk, and he will benfeit from continued therapy in order to maximize his functional mobility and independence  Barriers to Discharge: Inaccessible home environment, Decreased caregiver support        PT Discharge Recommendation: Post acute rehabilitation services     PT - OK to Discharge: Yes (to rehab once medically cleared)    See flowsheet documentation for full assessment

## 2021-08-31 NOTE — CONSULTS
Consultation - Infectious Disease   Nathan Garcia 80 y o  male MRN: [id number]  Unit/Bed#: CW2 210-01 Encounter: [id number]      IMPRESSION & RECOMMENDATIONS:     1  Bacteriuria   The patient does have positive urine cultures and UA with pyuria, but this is in the setting of a chronic chavis catheter  He has no urinary symptoms or systemic signs of infection, only weakness which is non-specific  Chavis exchanged in the ED    -would not treat, patient has no symptoms at this time    2  Abnormal CXR  Patient with right sided opacity on CXR, but he has no clinical signs or symptoms of PNA  Patient is stable on baseline home O2    -no clinical signs of PNA, would not treat based on CXR alone  Agree with stoping aztreonam and holding antibiotics  -the patient was recently on a course of doxycycline and has significant GI upset and poor PO intake, which is like contributing to his hyponatremia and weakness  In this elderly gentleman with no clinical evidence of infection, he is likely having more risk than benefit from continued antibiotic exposure    3  Weakness  Likely due to poor PO intake, chronic conditions  No evidence of infection    4  Hyponatremia  Sodium 128 on admission, likely from poor PO intake  Continue to [facility]    5  Emphysema  On baseline home O2     I have discussed the above management plan in detail with the primary service  ID will sign off, please call with questions  I have performed an extensive review of the medical records in Epic including review of the notes, radiographs, and laboratory results     HISTORY OF PRESENT ILLNESS:  Reason for Consult: antibiotic management, bacteriuria, abnormal CXR  HPI: Nathan Garcia is a 80 y o  male with a history of aortic stenosis s/p TAVR 10/2020, atrial fibrillation, BPH with chronic indwelling chavis, GERD, essential tremor, hyperlipidemia, hypertension, and remote history of bladder cancer who presented 8/29/21 with weakness and a fall at home  The patient was ambulating in his house with a walker, and became weak and fell to the ground  Per his wife, the patient recently had a procedure on his scalp for squamous cell carcinoma and was on a 14 day course of doxycycline  This was causing a lot of nausea and stomach upset, and he was not eating or drinking well  He noticed some blood in his urine, but has not been having dysuria, abdominal pain, fever, chills, shortness of breath, or cough  On admission, UA showed 10-20 WBC followed by another sample with innumerable WBC  Sodium was 128  He has been afebrile without leukocytosis since admission  CXR showed a possible right sided opacity  The patient was started on aztreonam for possible pneumonia vs a UTI  ID is consulted for further management  REVIEW OF SYSTEMS:  A complete review of systems is negative other than that noted in the HPI  PAST MEDICAL HISTORY:  Past Medical History:   Diagnosis Date    Anemia     [facility]     Cancer ([facility] )     bladder    Carotid artery occlusion     Cataract     Colon polyp     COPD (chronic obstructive pulmonary disease) (HCC)     Coronary artery disease     Disease of thyroid gland     History of transfusion     Hyperlipidemia     Hypertension     Hypothyroidism 9/15/2017    Multiple pulmonary nodules     Peptic ulcer     Scoliosis     Sepsis without acute organ dysfunction [facility] ) 1/26/2021    Severe protein-calorie malnutrition ([facility] ) 4/17/2021    SIRS (systemic inflammatory response syndrome) ([facility] ) 12/19/2019    Transient cerebral ischemia     Tremors of nervous system      Past Surgical History:   Procedure Laterality Date   [place] SURGERY      1973, 1987, 2005    BUNIONECTOMY Left     Simple exostectomy (silver procedure)    CAROTID ENDARTERECTOMY Right 09/10/2008    Randy Chavez MD    CATARACT EXTRACTION      CATARACT EXTRACTION, BILATERAL      CERVICAL DISCECTOMY  1969    COLONOSCOPY      CORONARY ARTERY BYPASS GRAFT  10/20/2010    x3 (LIMA-LAD, SVG-OM, SVG-RCA)    CYSTOSCOPY      ELBOW SURGERY Right 2012    FEMORAL ARTERY - TIBIAL ARTERY BYPASS GRAFT  2011    using reversed saphenous vein from right leg  Carlita Philip MD    NV ECHO TRANSESOPHAG R-T 2D W/PRB IMG ACQUISJ I&R N/A 10/6/2020    Procedure: TRANSESOPHAGEAL ECHOCARDIOGRAM (DAVID); Surgeon: Dhaval Villalobos DO;  Location: [facility];  Service: Cardiac Surgery    NV REPLACE AORTIC VALVE OPENFEMORAL ARTERY APPROACH N/A 10/6/2020    Procedure: REPLACEMENT AORTIC VALVE TRANSCATHETER (TAVR) TRANSFEMORAL W/ 26MM MONTALVO BREE S3 ULTRA VALVE(ACCESS ON LEFT); Surgeon: Dhaval Villalobos DO;  Location[facility];  Service: Cardiac Surgery    REPLACEMENT TOTAL KNEE Left     SHOULDER SURGERY Right        FAMILY HISTORY:  Non-contributory    SOCIAL HISTORY:  Social History   Social History     Substance and Sexual Activity   Alcohol Use Not Currently    Alcohol/week: 0 0 standard drinks    Comment: Social      Social History     Substance and Sexual Activity   Drug Use Never     Social History     Tobacco Use   Smoking Status Former Smoker    Packs/day: 1 00    Years: 50 00    Pack years: 50 00    Types: Cigarettes    Start date: 56    Quit date: 200    Years since quittin 6   Smokeless Tobacco Never Used       ALLERGIES:  Allergies   Allergen Reactions    Aleve [Naproxen]      Other reaction(s): FACIAL SWELLING  Category: Allergy; Annotation - [id number]: lip/eye edema    Ancef [Cefazolin] Anaphylaxis     Hypotension, rash, itching    Augmentin [Amoxicillin-Pot Clavulanate] Diarrhea and Vomiting       MEDICATIONS:  All current active medications have been reviewed        PHYSICAL EXAM:  Temp:  [97 8 °F (36 6 °C)-98 2 °F (36 8 °C)] 98 2 °F (36 8 °C)  HR:  [51-67] 67  Resp:  [16-18] 16  BP: (103-128)/(50-59) 103/50  SpO2:  [97 %-100 %] 97 %  Temp (24hrs), Av °F (36 7 °C), Min:97 8 °F (36 6 °C), Max:98 2 °F (36 8 °C)  Current: Temperature: 98 2 °F (36 8 °C)    Intake/Output Summary (Last 24 hours) at 8/31/2021 1821  Last data filed at 8/31/2021 1728  Gross per 24 hour   Intake 630 ml   Output 1605 ml   Net -975 ml       General Appearance:  Frail appearing male, in no distress   Head:  Dressing on back of head   Eyes:  Conjunctiva pink and sclera anicteric, both eyes   Nose: Nares normal, mucosa normal, no drainage   Throat: Oropharynx moist without lesions   Neck: Supple, symmetrical, no adenopathy, no tenderness/mass/nodules   Back:   Symmetric, no curvature, ROM normal, no CVA tenderness   Lungs:   Scattered crackles bilaterally    Chest Wall:  No tenderness or deformity   Heart:  RRR; no murmur, rub or gallop   Abdomen:   Soft, non-tender, non-distended, positive bowel sounds    Extremities: No cyanosis, clubbing or edema   Skin: No rashes or lesions  Dressing over back of head   Lymph nodes: Cervical, supraclavicular nodes normal   Neurologic: Alert and oriented times 3       LABS, IMAGING, & OTHER STUDIES:  Lab Results:  I have personally reviewed pertinent labs  Results from last 7 days   Lab Units 08/31/21  0541 08/30/21  0643 08/29/21  1651   WBC Thousand/uL 5 77 4 51 6 84   HEMOGLOBIN g/dL 9 6* 9 4* 9 8*   PLATELETS Thousands/uL 187 161 181     Results from last 7 days   Lab Units 08/31/21  0541 08/30/21  0643 08/29/21  1651   SODIUM mmol/L 129* 130* 128*   POTASSIUM mmol/L 3 5 3 5 3 6   CHLORIDE mmol/L 96* 99* 95*   CO2 mmol/L 28 26 28   BUN mg/dL 9 10 12   CREATININE mg/dL 0 54* 0 53* 0 61   EGFR ml/min/1 73sq m 94 94 89   CALCIUM mg/dL 8 1* 8 1* 8 4   AST U/L  --  21 21   ALT U/L  --  16 16   ALK PHOS U/L  --  89 96     Results from last 7 days   Lab Units 08/29/21  1948 08/29/21  1752 08/29/21  1751   BLOOD CULTURE  No Growth at 24 hrs    No Growth at 24 hrs   --   --    URINE CULTURE   --  >100,000 cfu/ml Gram Negative Timo Enteric Like*  20,000-29,000 cfu/ml  >100,000 cfu/ml Gram Negative Timo Enteric Like* 20,000-29,000 cfu/ml      Results from last 7 days   Lab Units 08/30/21  0643 08/29/21  1949   PROCALCITONIN ng/ml <0 05 <0 05                   Imaging Studies:   I have personally reviewed pertinent imaging study reports and images in PACS  CXR: Interval development of patchy right basal infiltrate and small effusion    Other Studies:   I have personally reviewed pertinent reports

## 2021-08-31 NOTE — CASE MANAGEMENT
Per pt's request, referral sent back to [facility] and accepted for [facility]  Awaiting call back from spouse St bright in regard to her discussion with pt -- Spouse preferring [facility] rather than [facility]  CM will continue to follow closely to d/c

## 2021-08-31 NOTE — CASE MANAGEMENT
LOS 2  Not a bundle  Not a readmission  Unplanned Readmission Risk Score: 40    CM s/w pt at bedside to introduce role of CM and discuss pt's needs upon discharge  Pt resides with spouse in a 1L home (ran) with 1 CHERRY  Pt reports being IPTA, receives assistance from spouse as needed  Pt utilizes a RW for ambulation, as well as a shower chair/bars for bathing  Pt has O2 s/u at home via [employer]  Pt has hx of IP rehab at [facility] and [facility] -- Pt did not like his stay at [facility], but states [facility] was "very nice " Pt is current with [facility]  Pt denies hx or tx of D&SA/MI  Pt states jemima Engel ([phone]) and geronimo Miller are POA -- Geronimo Miller resides in [place]  PCP is Eileen Carter MD  Preferred pharmacy is [id number] [phone] is emergency contact ([phone])  A post acute care recommendation was made by your care team for [facility]  Discussed Freedom of Choice with both patient and caregiver  Patient is not agreeable to STR, prefers to d/c to home with [facility] for at-home PT/OT  CM reviewed therapy differences between [place] [id number] vs  [facility] (length of therapy, level of care)  Pt continues to prefer [facility]  CM s/w caregiver/spouse Jamel Lr to review same -- Jamel Lr prefers STR as she is the caregiver  Jamel Lr states she will s/w pt at bedside to encourage agreement to STR  Jamel Lr prefers referral to [facility] if pt is in agreement  CM will continue to closely follow  CM reviewed d/c planning process including the following: identifying help at home, patient preference for d/c planning needs, Discharge unge, [facility], availability of treatment team to discuss questions or concerns patient and/or family may have regarding understanding medications and recognizing signs and symptoms once discharged  CM also encouraged patient to follow up with all recommended appointments after discharge   Patient advised of importance for patient and family to participate in managing patients medical well being

## 2021-08-31 NOTE — PROGRESS NOTES
[facility]  Progress Note - Rose Marie Crimora 1933, 80 y o  male MRN: [id number]  Unit/Bed#: CW2 210-01 Encounter: [id number]  Primary Care Provider: Surjit Alvares MD   Date and time admitted to hospital: 8/29/2021  4:35 PM    * Weakness  Assessment & Plan  Patient presents has fall while ambulating with walker; cleared by Trauma  CT head and CT cervical spine with no acute concerning findings; minor left scalp trauma  Patient reports ambulating with walker and becoming acutely weak causing fall  On admission, noted to have a numeral bacteria in urine; hyponatremia; hypovolemia  0/4 sirs criteria;   Procalcitonin negative  Hemoglobin slightly below baseline, FOBT  Otherwise labs and vital stable; gentle IV hydration for possible infection and specific gravity 1 025  Check TSH, blood cultures  PT recommends rehab        Pneumonia  Assessment & Plan  Chest x-ray suggestive right lower infiltrate  Continue  aztreonam for total of 7 days     Chronic respiratory failure ([address] )  Assessment & Plan  On 2 L nasal cannula around the clock    UTI (urinary tract infection)  Assessment & Plan  Review of previous admission shows Klebsiella oxytocia, Enterococcus faecalis; and Pseudomonas in urine; all 3 greater than 100 K colony-forming units  Given indwelling urinary catheter, no doubt some these bacteria are chronic colonizers; but will treat empirically  Received Cipro in the ED; given patient's age and weakness, transitioned to aztreonam in the setting of documented ancef and penicillin allergies      Pulmonary emphysema (HCC)  Assessment & Plan  Continue Trelegy; SpO2 96% on room air  Monitor    Hypertension  Assessment & Plan  Well controlled  Continue home medications    Atherosclerotic heart disease of native coronary artery with other forms of angina pectoris [facility])  Assessment & Plan  Continue aspirin, metoprolol, and statin    Benign prostatic hyperplasia  Assessment & Plan  Continue alfuzosin; indwelling urinary catheter  History of bladder cancer with recent cystoscopy demonstrating no recurrence  Neurology consult appreciated  Patient will be discharged with Porter catheter he will follow-up with urology as outpatient    PAF (paroxysmal atrial fibrillation) [facility])  Assessment & Plan  Currently rate controlled on admission; follows with cardiology in the outpatient setting  Continue aspirin and metoprolol  Not anticoagulation candidate as per Cardiology    Severe aortic stenosis  Assessment & Plan  History of severe aortic stenosis; 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  Follows with cardiology  As per recent echo (2021):  A 26 mm Braxton Jeffrey bioprosthesis was present  It exhibited normal function    Continue home medications      VTE Pharmacologic Prophylaxis:   Pharmacologic: Enoxaparin (Lovenox)  Mechanical VTE Prophylaxis in Place: Yes    Patient Centered Rounds: I have performed bedside rounds with nursing staff today  Discussions with Specialists or Other Care Team Provider:  Cm and nursing    Education and Discussions with Family / Patient:  With patient, attempted to bob pt wide, no response    Time Spent for Care: 45 minutes  More than 50% of total time spent on counseling and coordination of care as described above  Current Length of Stay: 2 day(s)    Current Patient Status: Inpatient   Certification Statement: The patient will continue to require additional inpatient hospital stay due to IV fluids, antibiotics    Discharge Plan:  24-48 hours    Code Status: Level 1 - Full Code      Subjective:   Patient seen examined bedside  Acute events    Denies any chest pain stroke,    Objective:     Vitals:   Temp (24hrs), Av 7 °F (36 5 °C), Min:97 4 °F (36 3 °C), Max:97 9 °F (36 6 °C)    Temp:  [97 4 °F (36 3 °C)-97 9 °F (36 6 °C)] 97 8 °F (36 6 °C)  HR:  [51-62] 58  Resp:  [16-18] 16  BP: (125-129)/(58-60) 125/59  SpO2:  [98 %-100 %] 98 %  Body mass index is 22 45 kg/m²  Input and Output Summary (last 24 hours): Intake/Output Summary (Last 24 hours) at 8/31/2021 0909  Last data filed at 8/31/2021 0750  Gross per 24 hour   Intake 370 ml   Output 1255 ml   Net -885 ml       Physical Exam:     Physical Exam  Vitals and nursing note reviewed  Constitutional:       General: He is not in acute distress  Appearance: Normal appearance  HENT:      Head: Normocephalic  Comments: Wound on the back of dressing clean and dry     Nose: Nose normal       Mouth/Throat:      Mouth: Mucous membranes are moist    Eyes:      Conjunctiva/sclera: Conjunctivae normal    Cardiovascular:      Rate and Rhythm: Normal rate  Heart sounds: Normal heart sounds  No murmur heard  Pulmonary:      Effort: Pulmonary effort is normal  No respiratory distress  Breath sounds: Normal breath sounds  No wheezing  Abdominal:      General: There is no distension  Tenderness: There is no abdominal tenderness  There is no guarding  Musculoskeletal:         General: No swelling  Right lower leg: No edema  Skin:     General: Skin is warm  Neurological:      General: No focal deficit present  Mental Status: He is alert and oriented to person, place, and time     Psychiatric:         Mood and Affect: Mood normal            Additional Data:     Labs:    Results from last 7 days   Lab Units 08/31/21  0541   WBC Thousand/uL 5 77   HEMOGLOBIN g/dL 9 6*   HEMATOCRIT % 29 1*   PLATELETS Thousands/uL 187   NEUTROS PCT % 72   LYMPHS PCT % 11*   MONOS PCT % 15*   EOS PCT % 2     Results from last 7 days   Lab Units 08/31/21  0541 08/30/21  0643   SODIUM mmol/L 129* 130*   POTASSIUM mmol/L 3 5 3 5   CHLORIDE mmol/L 96* 99*   CO2 mmol/L 28 26   BUN mg/dL 9 10   CREATININE mg/dL 0 54* 0 53*   ANION GAP mmol/L 5 5   CALCIUM mg/dL 8 1* 8 1*   ALBUMIN g/dL  --  2 7*   TOTAL BILIRUBIN mg/dL  --  0 61   ALK PHOS U/L  --  89   ALT U/L  --  16   AST U/L  --  21   GLUCOSE RANDOM mg/dL 85 71                 Results from last 7 days   Lab Units 08/30/21  0643 08/29/21 1949   PROCALCITONIN ng/ml <0 05 <0 05           * I Have Reviewed All Lab Data Listed Above  * Additional Pertinent Lab Tests Reviewed: [address] Admission Reviewed    Imaging:    Imaging Reports Reviewed Today Include:  Chest x-ray  Imaging Personally Reviewed by Myself Includes:  none    Recent Cultures (last 7 days):     Results from last 7 days   Lab Units 08/29/21 1948   BLOOD CULTURE  Received in Microbiology Lab  Culture in Progress  Received in Microbiology Lab  Culture in Progress  Last 24 Hours Medication List:   Current Facility-Administered Medications   Medication Dose Route Frequency Provider Last Rate    acetaminophen  975 mg Oral TID Maribell Mane MD      aspirin  81 mg Oral Daily Maribell Mane MD      aztreonam  1,000 mg Intravenous Q8H Maribell Mane MD 1,000 mg (08/31/21 0403)    cholecalciferol  1,000 Units Oral Daily Maribell Mane MD      docusate sodium  100 mg Oral BID Maribell Mane MD      enoxaparin  40 mg Subcutaneous Daily Maribell Mane MD      fluticasone-vilanterol  1 puff Inhalation Daily Maribell Mane MD      furosemide  20 mg Oral Daily Jonna Flor MD      levothyroxine  75 mcg Oral Early Morning Maribell Mane MD      metoprolol succinate  12 5 mg Oral BID Maribell Mane MD      ondansetron  4 mg Intravenous Q6H PRN Maribell Mane MD      pantoprazole  40 mg Oral Early Morning Maribell Mane MD      pravastatin  40 mg Oral Daily With Ann Hassan MD      sodium chloride  50 mL/hr Intravenous Continuous Jonna Flor MD      tamsulosin  0 4 mg Oral Daily With Ann Hassan MD      tiotropium  18 mcg Inhalation Daily Maribell Mane MD          Today, Patient Was Seen By: Jordan Ye MD    ** Please Note: Dictation voice to text software may have been used in the creation of this document   **

## 2021-09-01 ENCOUNTER — APPOINTMENT (INPATIENT)
Dept: RADIOLOGY | Facility: HOSPITAL | Age: 86
DRG: 641 | End: 2021-09-01
Payer: MEDICARE

## 2021-09-01 LAB
ANION GAP SERPL CALCULATED.3IONS-SCNC: 1 MMOL/L (ref 4–13)
BACTERIA UR CULT: ABNORMAL
BASOPHILS # BLD AUTO: 0.01 THOUSANDS/ΜL (ref 0–0.1)
BASOPHILS NFR BLD AUTO: 0 % (ref 0–1)
BUN SERPL-MCNC: 8 MG/DL (ref 5–25)
CALCIUM SERPL-MCNC: 7.7 MG/DL (ref 8.3–10.1)
CHLORIDE SERPL-SCNC: 99 MMOL/L (ref 100–108)
CO2 SERPL-SCNC: 29 MMOL/L (ref 21–32)
CORTIS AM PEAK SERPL-MCNC: 4 UG/DL (ref 4.2–22.4)
CREAT SERPL-MCNC: 0.47 MG/DL (ref 0.6–1.3)
CREAT UR-MCNC: 23.2 MG/DL
EOSINOPHIL # BLD AUTO: 0.2 THOUSAND/ΜL (ref 0–0.61)
EOSINOPHIL NFR BLD AUTO: 4 % (ref 0–6)
ERYTHROCYTE [DISTWIDTH] IN BLOOD BY AUTOMATED COUNT: 12.4 % (ref 11.6–15.1)
GFR SERPL CREATININE-BSD FRML MDRD: 99 ML/MIN/1.73SQ M
GLUCOSE SERPL-MCNC: 104 MG/DL (ref 65–140)
GLUCOSE SERPL-MCNC: 75 MG/DL (ref 65–140)
HCT VFR BLD AUTO: 28.4 % (ref 36.5–49.3)
HGB BLD-MCNC: 9.5 G/DL (ref 12–17)
IMM GRANULOCYTES # BLD AUTO: 0.01 THOUSAND/UL (ref 0–0.2)
IMM GRANULOCYTES NFR BLD AUTO: 0 % (ref 0–2)
LYMPHOCYTES # BLD AUTO: 0.67 THOUSANDS/ΜL (ref 0.6–4.47)
LYMPHOCYTES NFR BLD AUTO: 14 % (ref 14–44)
MCH RBC QN AUTO: 31.8 PG (ref 26.8–34.3)
MCHC RBC AUTO-ENTMCNC: 33.5 G/DL (ref 31.4–37.4)
MCV RBC AUTO: 95 FL (ref 82–98)
MONOCYTES # BLD AUTO: 1.1 THOUSAND/ΜL (ref 0.17–1.22)
MONOCYTES NFR BLD AUTO: 23 % (ref 4–12)
NEUTROPHILS # BLD AUTO: 2.78 THOUSANDS/ΜL (ref 1.85–7.62)
NEUTS SEG NFR BLD AUTO: 59 % (ref 43–75)
NRBC BLD AUTO-RTO: 0 /100 WBCS
OSMOLALITY UR/SERPL-RTO: 263 MMOL/KG (ref 282–298)
OSMOLALITY UR: 375 MMOL/KG
PLATELET # BLD AUTO: 177 THOUSANDS/UL (ref 149–390)
PMV BLD AUTO: 10.5 FL (ref 8.9–12.7)
POTASSIUM SERPL-SCNC: 3.6 MMOL/L (ref 3.5–5.3)
RBC # BLD AUTO: 2.99 MILLION/UL (ref 3.88–5.62)
SODIUM 24H UR-SCNC: 105 MOL/L
SODIUM SERPL-SCNC: 129 MMOL/L (ref 136–145)
URATE SERPL-MCNC: 3.4 MG/DL (ref 4.2–8)
WBC # BLD AUTO: 4.77 THOUSAND/UL (ref 4.31–10.16)

## 2021-09-01 PROCEDURE — 71250 CT THORAX DX C-: CPT

## 2021-09-01 PROCEDURE — 84300 ASSAY OF URINE SODIUM: CPT

## 2021-09-01 PROCEDURE — 82948 REAGENT STRIP/BLOOD GLUCOSE: CPT

## 2021-09-01 PROCEDURE — 85025 COMPLETE CBC W/AUTO DIFF WBC: CPT | Performed by: FAMILY MEDICINE

## 2021-09-01 PROCEDURE — 83935 ASSAY OF URINE OSMOLALITY: CPT

## 2021-09-01 PROCEDURE — 99233 SBSQ HOSP IP/OBS HIGH 50: CPT | Performed by: FAMILY MEDICINE

## 2021-09-01 PROCEDURE — 82533 TOTAL CORTISOL: CPT

## 2021-09-01 PROCEDURE — 82570 ASSAY OF URINE CREATININE: CPT

## 2021-09-01 PROCEDURE — 80048 BASIC METABOLIC PNL TOTAL CA: CPT | Performed by: FAMILY MEDICINE

## 2021-09-01 PROCEDURE — 83930 ASSAY OF BLOOD OSMOLALITY: CPT

## 2021-09-01 PROCEDURE — 99223 1ST HOSP IP/OBS HIGH 75: CPT | Performed by: INTERNAL MEDICINE

## 2021-09-01 PROCEDURE — 84550 ASSAY OF BLOOD/URIC ACID: CPT

## 2021-09-01 PROCEDURE — G1004 CDSM NDSC: HCPCS

## 2021-09-01 RX ORDER — SODIUM CHLORIDE 1000 MG
1 TABLET, SOLUBLE MISCELLANEOUS 2 TIMES DAILY WITH MEALS
Status: DISCONTINUED | OUTPATIENT
Start: 2021-09-01 | End: 2021-09-03 | Stop reason: HOSPADM

## 2021-09-01 RX ADMIN — LEVOTHYROXINE SODIUM 75 MCG: 75 TABLET ORAL at 06:25

## 2021-09-01 RX ADMIN — ENOXAPARIN SODIUM 40 MG: 40 INJECTION SUBCUTANEOUS at 09:07

## 2021-09-01 RX ADMIN — ACETAMINOPHEN 975 MG: 325 TABLET, FILM COATED ORAL at 16:43

## 2021-09-01 RX ADMIN — Medication 1 G: at 16:44

## 2021-09-01 RX ADMIN — PANTOPRAZOLE SODIUM 40 MG: 40 TABLET, DELAYED RELEASE ORAL at 06:25

## 2021-09-01 RX ADMIN — TAMSULOSIN HYDROCHLORIDE 0.4 MG: 0.4 CAPSULE ORAL at 16:43

## 2021-09-01 RX ADMIN — FLUTICASONE FUROATE AND VILANTEROL TRIFENATATE 1 PUFF: 200; 25 POWDER RESPIRATORY (INHALATION) at 09:08

## 2021-09-01 RX ADMIN — ONDANSETRON 4 MG: 2 INJECTION INTRAMUSCULAR; INTRAVENOUS at 11:34

## 2021-09-01 RX ADMIN — PRAVASTATIN SODIUM 40 MG: 40 TABLET ORAL at 16:44

## 2021-09-01 RX ADMIN — ASPIRIN 81 MG: 81 TABLET, CHEWABLE ORAL at 09:07

## 2021-09-01 RX ADMIN — FUROSEMIDE 20 MG: 20 TABLET ORAL at 09:07

## 2021-09-01 RX ADMIN — ACETAMINOPHEN 975 MG: 325 TABLET, FILM COATED ORAL at 09:07

## 2021-09-01 RX ADMIN — ACETAMINOPHEN 975 MG: 325 TABLET, FILM COATED ORAL at 22:11

## 2021-09-01 RX ADMIN — DOCUSATE SODIUM 100 MG: 100 CAPSULE, LIQUID FILLED ORAL at 16:43

## 2021-09-01 RX ADMIN — Medication 1000 UNITS: at 09:07

## 2021-09-01 RX ADMIN — METOPROLOL SUCCINATE 12.5 MG: 25 TABLET, FILM COATED, EXTENDED RELEASE ORAL at 09:07

## 2021-09-01 RX ADMIN — DOCUSATE SODIUM 100 MG: 100 CAPSULE, LIQUID FILLED ORAL at 09:08

## 2021-09-01 NOTE — ASSESSMENT & PLAN NOTE
Chest x-ray suggestive right lower infiltrate  No clinical signs of pneumonia  Procalcitonin negative  Antibiotics were discontinued 08/31/2021 as per ID recommendation    Will monitor off antibiotics

## 2021-09-01 NOTE — WOUND OSTOMY CARE
Consult Note - Wound   Demetrius Vergaraer 80 y o  male MRN: [id number]  Unit/Bed#: CW2 210-01 Encounter: [id number]      History and Present Illness: Patient is seen for posterior head wound for a wound care consult today   Patient is alert and pleasant  for the assessment of the skin   Patient reports he went to a dermatologist for the head wound but not clear on where or who he saw  RN reports the dressing was adhered to the scalp with large dry old drainage  RN spoke to the wife and she was unclear of the instructions or where the patient went for the procedure   No noted notes in epic   Min A of 2 for rolling in the bed   Porter in place for management of chronic urinary management   Assessment Findings:    1  Posterior full thickness head wound - noted with sutures in place and wound bed with loose slough brown tissue   Patient may have had a dermatology procedure   Noted on the dressing moderate  drainage of yellow serosanguinous and the wound bed to be wet   Tiger text placed to the attending and will order dressing for wound and to place a consult for dermatology   Consult placed   2  Bilateral heels dry and intact   3  Sacral dry and intact      Discussed the plan of care with the RN and orders placed   Please refer to the assessment below         Skin care plans:  1-Hydraguard to bilateral sacrum, buttock and heels BID and PRN  2-Elevate heels to offload pressure  3-Ehob cushion in chair when out of bed  4-Moisturize skin daily with skin nourishing cream   5-Turn/reposition q2h or when medically stable for pressure re-distribution on skin  6  Posterior head wound - cleanse with Normal saline then apply adaptic and top with maxorb then DSD and secure with tape change every other day   7  Consult placed for dermatology by the attending           Vitals: Blood pressure 108/54, pulse 69, temperature 97 9 °F (36 6 °C), resp   rate 18, height 5' 6" (1 676 m), weight 63 1 kg (139 lb 1 6 oz), SpO2 98 % ,Body mass index is 22 45 kg/m²  Wound 08/29/21 Surgical Head Posterior (Active)   Wound Description ELIZABETH 08/31/21 2000   Dressing Other (Comment) 08/29/21 1900   Dressing Status Clean;Dry; Intact 09/01/21 0745       Wound 09/01/21 Other (Comment) Head Posterior (Active)   Wound Image   09/01/21 1256   Wound Description Brown;Fragile;Slough; Yellow 09/01/21 1256   Anisa-wound Assessment Clean;Dry; Intact 09/01/21 1256   Wound Length (cm) 3 5 cm 09/01/21 1256   Wound Width (cm) 3 cm 09/01/21 1256   Wound Depth (cm) 0 3 cm 09/01/21 1256   Wound Surface Area (cm^2) 10 5 cm^2 09/01/21 1256   Wound Volume (cm^3) 3 15 cm^3 09/01/21 1256   Calculated Wound Volume (cm^3) 3 15 cm^3 09/01/21 1256   Drainage Description Serosanguineous; Yellow 09/01/21 1256   Non-staged Wound Description Full thickness 09/01/21 1256   Treatments Cleansed;Site care 09/01/21 1256   Dressing Calcium Alginate; Other (Comment) 09/01/21 1256   Dressing Changed Changed 09/01/21 1256   Patient Tolerance Tolerated well 09/01/21 1256   Dressing Status Clean;Dry; Intact 09/01/21 1807       Will refer to dermatology for continued treatment       Adán Costa RN BSN Nikki Black

## 2021-09-01 NOTE — PROGRESS NOTES
[address] [facility]  Progress Note - Toan Carmelo 1933, 80 y o  male MRN: [id number]  Unit/Bed#: CW2 210-01 Encounter: [id number]  Primary Care Provider: Anna Marie Mensah MD   Date and time admitted to hospital: 8/29/2021  4:35 PM    * Weakness  Assessment & Plan  Patient presents has fall while ambulating with walker; cleared by Trauma  CT head and CT cervical spine with no acute concerning findings; minor left scalp trauma  Patient reports ambulating with walker and becoming acutely weak causing fall  On admission, noted to have a numeral bacteria in urine; hyponatremia; hypovolemia  0/4 sirs criteria;   Procalcitonin negative  Hemoglobin slightly below baseline, FOBT  Blood cultures negative  PT recommends rehab    Antibiotics were discontinued on 08/31 as per ID recommendations     Monitor off antibiotics        Pneumonia  Assessment & Plan  Chest x-ray suggestive right lower infiltrate  No clinical signs of pneumonia  Procalcitonin negative  Antibiotics were discontinued 08/31/2021 as per ID recommendation  Will monitor off antibiotics    Chronic respiratory failure (HCC)  Assessment & Plan  On 2 L nasal cannula around the clock    UTI (urinary tract infection)  Assessment & Plan  Review of previous admission shows Klebsiella oxytocia, Enterococcus faecalis; and Pseudomonas in urine; all 3 greater than 100 K colony-forming units  Given indwelling urinary catheter, no doubt some these bacteria are chronic colonizers; but will treat empirically  Received Cipro in the ED; given patient's age and weakness, transitioned to aztreonam in the setting of documented ancef and penicillin allergies    Antibiotics were discontinued 8/31/21    Hyponatremia  Assessment & Plan  No improvement with IV fluid fluids  Will consult nephrology    Hypertension  Assessment & Plan  Well controlled  Continue home medications    Atherosclerotic heart disease of native coronary artery with other forms of angina pectoris [facility])  Assessment & Plan  Continue aspirin, metoprolol, and statin    Benign prostatic hyperplasia  Assessment & Plan  Continue alfuzosin; indwelling urinary catheter  History of bladder cancer with recent cystoscopy demonstrating no recurrence  Neurology consult appreciated  Patient will be discharged with Porter catheter he will follow-up with urology as outpatient    PAF (paroxysmal atrial fibrillation) [facility])  Assessment & Plan  Currently rate controlled on admission; follows with cardiology in the outpatient setting  Continue aspirin and metoprolol  Not anticoagulation candidate as per Cardiology    Severe aortic stenosis  Assessment & Plan  History of severe aortic stenosis; 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  Follows with cardiology  As per recent echo (2021):  A 26 mm Braxton Jeffrey bioprosthesis was present  It exhibited normal function    Continue home medications        VTE Pharmacologic Prophylaxis:   Pharmacologic: Enoxaparin (Lovenox)  Mechanical VTE Prophylaxis in Place: Yes    Patient Centered Rounds: I have performed bedside rounds with nursing staff today  Discussions with Specialists or Other Care Team Provider:  Cm and nursing    Education and Discussions with Family / Patient:  Patient, attempted to call patient's spouse but no response    Time Spent for Care: 45 minutes  More than 50% of total time spent on counseling and coordination of care as described above  Current Length of Stay: 3 day(s)    Current Patient Status: Inpatient   Certification Statement: The patient will continue to require additional inpatient hospital stay due to Hyponatremia    Discharge Plan:  24-48 hours    Code Status: Level 1 - Full Code      Subjective:   Patient seen examined bedside  No acute events    Denies any chest pain, shortness of breath, abdominal pain    Objective:     Vitals:   Temp (24hrs), Av 2 °F (36 8 °C), Min:98 2 °F (36 8 °C), Max:98 2 °F (36 8 °C)    Temp:  [98 2 °F (36 8 °C)] 98 2 °F (36 8 °C)  HR:  [60-70] 67  Resp:  [18] 18  BP: (103-128)/(50-55) 121/55  SpO2:  [96 %-97 %] 97 %  Body mass index is 22 45 kg/m²  Input and Output Summary (last 24 hours): Intake/Output Summary (Last 24 hours) at 9/1/2021 1022  Last data filed at 9/1/2021 0907  Gross per 24 hour   Intake 1764 17 ml   Output 1225 ml   Net 539 17 ml       Physical Exam:     Physical Exam  Vitals and nursing note reviewed  Constitutional:       General: He is not in acute distress  Appearance: Normal appearance  HENT:      Head: Normocephalic  Comments: Dressing on the back of the head clean and dry     Nose: Nose normal       Mouth/Throat:      Mouth: Mucous membranes are moist    Eyes:      Conjunctiva/sclera: Conjunctivae normal    Cardiovascular:      Rate and Rhythm: Normal rate  Heart sounds: Normal heart sounds  No murmur heard  Pulmonary:      Effort: Pulmonary effort is normal  No respiratory distress  Breath sounds: Normal breath sounds  No wheezing  Abdominal:      General: There is no distension  Tenderness: There is no abdominal tenderness  There is no guarding  Comments: Porter in place draining yellow urine   Musculoskeletal:         General: No swelling  Right lower leg: No edema  Skin:     General: Skin is warm  Neurological:      General: No focal deficit present  Mental Status: He is alert and oriented to person, place, and time     Psychiatric:         Mood and Affect: Mood normal            Additional Data:     Labs:    Results from last 7 days   Lab Units 09/01/21  0438   WBC Thousand/uL 4 77   HEMOGLOBIN g/dL 9 5*   HEMATOCRIT % 28 4*   PLATELETS Thousands/uL 177   NEUTROS PCT % 59   LYMPHS PCT % 14   MONOS PCT % 23*   EOS PCT % 4     Results from last 7 days   Lab Units 09/01/21  0438 08/30/21  0643   SODIUM mmol/L 129* 130*   POTASSIUM mmol/L 3 6 3 5   CHLORIDE mmol/L 99* 99*   CO2 mmol/L 29 26   BUN mg/dL 8 10   CREATININE mg/dL 0 47* 0 53*   ANION GAP mmol/L 1* 5   CALCIUM mg/dL 7 7* 8 1*   ALBUMIN g/dL  --  2 7*   TOTAL BILIRUBIN mg/dL  --  0 61   ALK PHOS U/L  --  89   ALT U/L  --  16   AST U/L  --  21   GLUCOSE RANDOM mg/dL 75 71                 Results from last 7 days   Lab Units 08/30/21  0643 08/29/21  1949   PROCALCITONIN ng/ml <0 05 <0 05           * I Have Reviewed All Lab Data Listed Above  * Additional Pertinent Lab Tests Reviewed: [facility] Admission Reviewed    Imaging:    Imaging Reports Reviewed Today Include:  None  Imaging Personally Reviewed by Myself Includes:  None    Recent Cultures (last 7 days):     Results from last 7 days   Lab Units 08/29/21  1948 08/29/21  1752 08/29/21 1751   BLOOD CULTURE  No Growth at 24 hrs    No Growth at 24 hrs   --   --    URINE CULTURE   --  >100,000 cfu/ml Escherichia coli*  20,000-29,000 cfu/ml  >100,000 cfu/ml Escherichia coli*  20,000-29,000 cfu/ml        Last 24 Hours Medication List:   Current Facility-Administered Medications   Medication Dose Route Frequency Provider Last Rate    acetaminophen  975 mg Oral TID Shanna Bills MD      aspirin  81 mg Oral Daily Shanna Bills MD      cholecalciferol  1,000 Units Oral Daily Shanna Bills MD      docusate sodium  100 mg Oral BID Shanna Bills MD      enoxaparin  40 mg Subcutaneous Daily Shanna Bills MD      fluticasone-vilanterol  1 puff Inhalation Daily Shanna Bills MD      furosemide  20 mg Oral Daily Caty Rivera MD      levothyroxine  75 mcg Oral Early Morning Shanna Bills MD      metoprolol succinate  12 5 mg Oral BID Shanna Bills MD      ondansetron  4 mg Intravenous Q6H PRN Shanna Bills MD      pantoprazole  40 mg Oral Early Morning Shanna Bills MD      pravastatin  40 mg Oral Daily With Sandra Calderon MD      tamsulosin  0 4 mg Oral Daily With Sandra Calderon MD      tiotropium  18 mcg Inhalation Daily Shanna Bills MD          Today, Patient Was Seen By: Roni Suresh Carla Rivas MD    ** Please Note: Dictation voice to text software may have been used in the creation of this document   **

## 2021-09-01 NOTE — CONSULTS
Consultation - Nephrology  Ileana Ferny 80 y o  male MRN: [id number]  Unit/Bed#: CW2 210-01 Encounter: [id number]    Assessment: Patient is 80year old male with PMH of severe aortic stenosis (s/p TAVR 2020), chronic HFpEF (EF 55%), indwelling catheter, BPH, pulmonary emphysema presenting with a fall without head trauma or LOC and weakness being evaluated for hyponatremia  Plan:     Hyponatremia  - patient initially presented with a sodium of 128  - s/p weakness and fall  - Hx CHF on lasix  - on chart review, patient sodium was labile and ranged between 132-145 in past year  - sodium ranged between 128 and 130, despite hydration with IV fluids NS  - s/p maintenance fluid NS 50 cc/hour, discontinued today  - poor p o   Intake at home and hypovolemic based on history; euvolemic on exam  - TSH is normal, pt on synthyroid  - CT shows pulmonary lung nodule that is involving size  - Possible etiologies include dehydration vs SIADH in setting of pulmonary nodule vs protein malnutrition vs diuretic use    Plan:   - F/u with Urine sodium, Urine and serum osmolarity, urine creatinine, AM cortisol , uric acid,   - F/u with BMP tomorrow  - follow-up CT for R lung nodule  - Na tablet 1 gm BID supplementation  - Ensure t i d  for protein supplementation  - Fluid restrict to 1 5 L    Electrolytes  - Na - as above  - K - WNL  - Cl- 99 (borderline low-normal)    Acid-Base  - no acid base abnormalities at this time    HTN  - controlled on home meds at this time  - continue lasix     Normocytic Anemia  - 9 5 today, baseline 10 2-11 2 over past year  - last colonoscopy in 2018 showed polyp with 1 tubular adenoma  - manage as per primary team    Weakness  - s/p fall with walker  - CTH with no hemorrhage or masses  - UCx with E Coli in urine   - BCx negative   - Anemia   - add protein shake for nutrition supplementation  - as per primary team    UTI  - >100,000 CFU E Coli  - indwelling urinary catheter with suspicion of chronic colonizing bacteria  - S/p aztreonam; Abx discontinued by ID given age and weakness  - as per primary team     HFpEF  - EF 55%  - on lasix 20 daily  - on Toprol  - management as per primary team    Severe Aortic stenosis  - s/p TAVR in 2020  - home meds    Pneumonia  - CXR right lower lobe infiltrate  - procal negative   - antibiotics discontinued as per ID  - Mgmt as per primary team    VTE Pharmacologic Prophylaxis: Enoxaparin (Lovenox)  VTE Mechanical Prophylaxis: sequential compression device    HISTORY OF PRESENT ILLNESS   Reason for Admission / Principal Problem: Chest pain / Fall  Reason for Consult: Hyponatremia    Lisa Colmenares 80 y o  male with history of chronic HFpEF (EF 55%), severe aortic stenosis (TAVR in 2020), paroxysmal atrial fibrillation (not on [facility]), indwelling  urinary catheter, BPH, pulmonary emphysema (on 2L NC at home), CAD, GERD, hyperlipidemia, hypertension, hypothyroidism, and remote history of bladder cancer  Patient presented due to fall while ambulating in the house with his walker  He states that he started to fell weak and "his legs gave out"  He fell onto his left arm and denies any head strike or loss of consciousness  Patient endorses that he has had previous falls before  He also endorses an incidence of hematuria prior to admission  Patient denies seizure-like activity, incontinence, melena, bloody stools, dizziness, chest pain, shortness of, or new leg swelling  He does endorse chronic swelling of his left lower extremity  Patient also explains that he has a 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  At admission, patient was noted to be dehydrated and hyponatremic  UA showed was positive for moderate blood nitrates and leukocytes  Chest x-ray showed patchy infiltrate in the right lung base  CT was negative for any intracranial hemorrhage or masses    Of note patient has a CT chest from 06/02/2021 showing a 9x8mm right upper lobe pulmonary nodule evolving in size since previous imaging  SUBJECTIVE   Patient was seen and examined at bedside today  At this time he does not have any complaints  He denies shortness of breath, chest pain, palpitation, dizziness, dysuria, new hematuria, fevers or chills  REVIEW OF SYSTEMS   Review of Systems   Constitutional: Negative for chills and fever  HENT: Negative for ear pain and sore throat  Eyes: Negative for pain and visual disturbance  Respiratory: Negative for cough and shortness of breath  Cardiovascular: Positive for leg swelling  Negative for chest pain and palpitations  Gastrointestinal: Negative for abdominal pain and vomiting  Genitourinary: Negative for dysuria, flank pain, hematuria and urgency  Musculoskeletal: Negative for arthralgias and back pain  Skin: Negative for color change and rash  Neurological: Negative for seizures and syncope  Psychiatric/Behavioral: Negative for confusion  All other systems reviewed and are negative  OBJECTIVE     Vitals:    21 2104 21 0656 21 0906 21 1000   BP: 112/52 128/53 121/55    BP Location:       Pulse: 61 60 70 67   Resp:  18     Temp:   98 2 °F (36 8 °C)    TempSrc:       SpO2:  97% 96% 97%   Weight:       Height:          Temperature:   Temp (24hrs), Av 2 °F (36 8 °C), Min:98 2 °F (36 8 °C), Max:98 2 °F (36 8 °C)    Temperature: 98 2 °F (36 8 °C)  Intake & Output:  I/O       701 -  0700  07 -  0700  07 -  0700    P  O  150 750 230    I V  (mL/kg)  585 (9 3) 640 (10 1)    IV Piggyback       Total Intake(mL/kg) 150 (2 4) 1335 (21 2) 870 (13 8)    Urine (mL/kg/hr) 2030 (1 3) 900 (0 6) 675 (2)    Stool  0     Total Output  900 675    Net -1880 +435 +195           Unmeasured Stool Occurrence  1 x         Weights:   IBW (Ideal Body Weight): 63 8 kg    Body mass index is 22 45 kg/m²  Weight (last 2 days)     None        Physical Exam  Vitals and nursing note reviewed  Constitutional:       Appearance: He is well-developed  HENT:      Head: Normocephalic and atraumatic  Mouth/Throat:      Mouth: Mucous membranes are dry  Eyes:      Conjunctiva/sclera: Conjunctivae normal    Cardiovascular:      Rate and Rhythm: Normal rate and regular rhythm  Pulses: Normal pulses  Heart sounds: Murmur heard  Pulmonary:      Effort: Pulmonary effort is normal  No respiratory distress  Breath sounds: Normal breath sounds  Comments: Negative JVD, no bibasilar crackles, coarse sounds on R lung   Abdominal:      Palpations: Abdomen is soft  Tenderness: There is no abdominal tenderness  Musculoskeletal:      Cervical back: Neck supple  Left lower leg: Edema present  Skin:     General: Skin is warm and dry  Capillary Refill: Capillary refill takes less than 2 seconds  Neurological:      Mental Status: He is alert and oriented to person, place, and time  Mental status is at baseline  Motor: Weakness present        Gait: Gait abnormal    Psychiatric:         Mood and Affect: Mood normal        PAST MEDICAL HISTORY     Past Medical History:   Diagnosis Date    Anemia     Bladder cancer ([address]     [facility]     bladder    Carotid artery occlusion     Cataract     Colon polyp     COPD (chronic obstructive pulmonary disease) (HCC)     Coronary artery disease     Disease of thyroid gland     History of transfusion     Hyperlipidemia     Hypertension     Hypothyroidism 9/15/2017    Multiple pulmonary nodules     Peptic ulcer     Scoliosis     Sepsis without acute organ dysfunction ([address] ) 1/26/2021    Severe protein-calorie malnutrition ([address] ) 4/17/2021    SIRS (systemic inflammatory response syndrome) ([address] ) 12/19/2019    Transient cerebral ischemia     Tremors of nervous system      PAST SURGICAL HISTORY     Past Surgical History:   Procedure Laterality Date   [facility] SURGERY      1973, 1987, 2005    BUNIONECTOMY Left     Simple exostectomy (silver procedure)    CAROTID ENDARTERECTOMY Right 09/10/2008    Common  Tere Flow P MD    CATARACT EXTRACTION      CATARACT EXTRACTION, BILATERAL      CERVICAL DISCECTOMY  1969    COLONOSCOPY      CORONARY ARTERY BYPASS GRAFT  10/20/2010    x3 (LIMA-LAD, SVG-OM, SVG-RCA)    CYSTOSCOPY      ELBOW SURGERY Right 2012    FEMORAL ARTERY - TIBIAL ARTERY BYPASS GRAFT  2011    using reversed saphenous vein from right leg  Royer Barrera MD    MI ECHO TRANSESOPHAG R-T 2D W/PRB IMG ACQUISJ I&R N/A 10/6/2020    Procedure: TRANSESOPHAGEAL ECHOCARDIOGRAM (DAVID); Surgeon: Jony Yu DO;  Location: [facility];  Service: Cardiac Surgery    MI REPLACE AORTIC VALVE OPENFEMORAL ARTERY APPROACH N/A 10/6/2020    Procedure: REPLACEMENT AORTIC VALVE TRANSCATHETER (TAVR) TRANSFEMORAL W/ 26MM MONTALVO BREE S3 ULTRA VALVE(ACCESS ON LEFT); Surgeon: Jony Yu DO;  Location: [facility];  Service: Cardiac Surgery    REPLACEMENT TOTAL KNEE Left     SHOULDER SURGERY Right      SOCIAL & FAMILY HISTORY     Social History     Substance and Sexual Activity   Alcohol Use Not Currently    Alcohol/week: 0 0 standard drinks    Comment: Social      Social History     Substance and Sexual Activity   Drug Use Never     Social History     Tobacco Use   Smoking Status Former Smoker    Packs/day: 1 00    Years: 50 00    Pack years: 50 00    Types: Cigarettes    Start date: 56    Quit date: 200    Years since quittin 6   Smokeless Tobacco Never Used     Family History   Problem Relation Age of Onset    Cervical cancer Mother     Cervical cancer Sister     Heart disease Sister     Coronary artery disease Sister     Lung cancer Brother      LABORATORY DATA     Labs: I have personally reviewed pertinent reports      Results from last 7 days   Lab Units 21  0438 21  0541 21  0643   WBC Thousand/uL 4 77 5 77 4 51   HEMOGLOBIN g/dL 9 5* 9 6* 9 4*   HEMATOCRIT % 28 4* 29 1* 28 3*   PLATELETS Thousands/uL 177 187 161   NEUTROS PCT % 59 72 58   MONOS PCT % 23* 15* 24*      Results from last 7 days   Lab Units 09/01/21  0438 08/31/21  0541 08/30/21  0643 08/29/21  1651   POTASSIUM mmol/L 3 6 3 5 3 5 3 6   CHLORIDE mmol/L 99* 96* 99* 95*   CO2 mmol/L 29 28 26 28   BUN mg/dL 8 9 10 12   CREATININE mg/dL 0 47* 0 54* 0 53* 0 61   CALCIUM mg/dL 7 7* 8 1* 8 1* 8 4   ALK PHOS U/L  --   --  89 96   ALT U/L  --   --  16 16   AST U/L  --   --  21 21     Results from last 7 days   Lab Units 08/30/21  0643   MAGNESIUM mg/dL 1 9     Results from last 7 days   Lab Units 08/30/21  0643   PHOSPHORUS mg/dL 2 8              Results from last 7 days   Lab Units 08/29/21  1651   TROPONIN I ng/mL <0 02     Micro:  Lab Results   Component Value Date    BLOODCX No Growth at 24 hrs  08/29/2021    BLOODCX No Growth at 24 hrs  08/29/2021    BLOODCX No Growth After 5 Days  01/26/2021    BLOODCX No Growth After 5 Days  01/26/2021    URINECX >100,000 cfu/ml Escherichia coli (A) 08/29/2021    URINECX 20,000-29,000 cfu/ml  08/29/2021    URINECX >100,000 cfu/ml Escherichia coli (A) 08/29/2021    URINECX 20,000-29,000 cfu/ml  08/29/2021    SPUTUMCULTUR Test not performed  Suggest repeat specimen  06/03/2021    SPUTUMCULTUR 2+ Growth of  04/11/2021     IMAGING & DIAGNOSTIC TESTS     Imaging: I have personally reviewed pertinent reports  XR chest 1 view portable    Result Date: 8/30/2021  Impression: Interval development of patchy right basal infiltrate and small effusion Status post aortic valve replacement and CABG, unchanged Workstation performed: [id number]     CT head without contrast    Result Date: 8/29/2021  Impression: No acute intracranial abnormality  Left occipital scalp contusion and laceration  Workstation performed: [id number]     CT cervical spine without contrast    Result Date: 8/29/2021  Impression: No cervical spine fracture or traumatic malalignment    Workstation performed: Historical Provider, MD   metoprolol succinate (TOPROL-XL) 25 mg 24 hr tablet TAKE 1/2 TABLET BY MOUTH EVERY 12 HOURS 8/31/21   Dean Bridges MD   nitrofurantoin (MACROBID) 100 mg capsule Take 1 capsule (100 mg total) by mouth 2 (two) times a day 8/29/21   MANI Dowling     MEDICATIONS ADMINISTERED IN LAST 24 HOURS     Medication Administration - last 24 hours from 08/31/2021 1228 to 09/01/2021 1228       Date/Time Order Dose Route Action Action by     09/01/2021 0907 acetaminophen (TYLENOL) tablet 975 mg 975 mg Oral Given Lamin Alcocer RN     08/31/2021 2152 acetaminophen (TYLENOL) tablet 975 mg 975 mg Oral Given Nguyen Mena RN     08/31/2021 1742 acetaminophen (TYLENOL) tablet 975 mg 975 mg Oral Given Itz Campos RN     08/31/2021 1742 tamsulosin (FLOMAX) capsule 0 4 mg 0 4 mg Oral Given Itz Campos RN     09/01/2021 6103 aspirin chewable tablet 81 mg 81 mg Oral Given Lamin Alcocer RN     09/01/2021 0907 cholecalciferol (VITAMIN D3) tablet 1,000 Units 1,000 Units Oral Given Lamin Javier RN     09/01/2021 0625 pantoprazole (PROTONIX) EC tablet 40 mg 40 mg Oral Given Nguyen Mena RN     09/01/2021 7462 levothyroxine tablet 75 mcg 75 mcg Oral Given Nguyen Mena RN     08/31/2021 1741 pravastatin (PRAVACHOL) tablet 40 mg 40 mg Oral Given Itz Campos RN     09/01/2021 0908 docusate sodium (COLACE) capsule 100 mg 100 mg Oral Given Lamin Alcocer RN     08/31/2021 1741 docusate sodium (COLACE) capsule 100 mg 100 mg Oral Refused Itz Campos RN     09/01/2021 1134 ondansetron (ZOFRAN) injection 4 mg 4 mg Intravenous Given Lamin Swain RN     08/31/2021 2136 ondansetron (ZOFRAN) injection 4 mg 4 mg Intravenous Given Nguyen Mena RN     09/01/2021 0907 enoxaparin (LOVENOX) subcutaneous injection 40 mg 40 mg Subcutaneous Given Lamin Swain RN     09/01/2021 0908 fluticasone-vilanterol (BREO ELLIPTA) 200-25 MCG/INH inhaler 1 puff 1 puff Inhalation Given Lamin Javier RN 09/01/2021 0908 tiotropium (SPIRIVA) capsule for inhaler 18 mcg 18 mcg Inhalation Not Given Lamin Lr RN     09/01/2021 0907 metoprolol succinate (TOPROL-XL) 24 hr tablet 12 5 mg 12 5 mg Oral Given Lamin Lr RN     08/31/2021 2104 metoprolol succinate (TOPROL-XL) 24 hr tablet 12 5 mg 12 5 mg Oral Given Ronni Matias RN     09/01/2021 2768 furosemide (LASIX) tablet 20 mg 20 mg Oral Given Lamin Swain RN     09/01/2021 1040 sodium chloride 0 9 % infusion 0 mL/hr Intravenous Stopped Lamin Swain RN     08/31/2021 2152 sodium chloride 0 9 % infusion 50 mL/hr Intravenous New Bag Ronni Matias RN     08/31/2021 2000 sodium chloride 0 9 % infusion 50 mL/hr Intravenous Rate/Dose Verify Ronni Matias RN        CURRENT MEDICATIONS     Current Facility-Administered Medications   Medication Dose Route Frequency Provider Last Rate    acetaminophen  975 mg Oral TID Patrica Kowalski MD      aspirin  81 mg Oral Daily Patrica Kowalski MD      cholecalciferol  1,000 Units Oral Daily Patrica Kowalski MD      docusate sodium  100 mg Oral BID Patrica Kowalski MD      enoxaparin  40 mg Subcutaneous Daily Patrica Kowalski MD      fluticasone-vilanterol  1 puff Inhalation Daily Patrica Kowalski MD      furosemide  20 mg Oral Daily Tim Lugo MD      levothyroxine  75 mcg Oral Early Morning Patrica Kowalski MD      metoprolol succinate  12 5 mg Oral BID Patrica Kowalski MD      ondansetron  4 mg Intravenous Q6H PRN Patrica Kowalski MD      pantoprazole  40 mg Oral Early Morning Patrica Kowalski MD      pravastatin  40 mg Oral Daily With Zheng Nelson MD      tamsulosin  0 4 mg Oral Daily With Zheng Nelson MD      tiotropium  18 mcg Inhalation Daily Patrica Kowalski MD          ondansetron, 4 mg, Q6H PRN        Portions of the record may have been created with voice recognition software    Occasional wrong word or "sound a like" substitutions may have occurred due to the inherent limitations of voice recognition software    Read the chart carefully and recognize, using context, where substitutions have occurred     ==  Diamond Hameed MD PGY-1  [address]  Internal Medicine Residency

## 2021-09-01 NOTE — CASE MANAGEMENT
CM anticipating medical clearance for d/c tomorrow -- CM requested update from [facility] re: bed availability -- [facility] is family's first choice, they have been continuing to follow  B/u referrals sent with an accepting placement @ [facility] pending bed availability  CM to continue to follow

## 2021-09-01 NOTE — ASSESSMENT & PLAN NOTE
Review of previous admission shows Klebsiella oxytocia, Enterococcus faecalis; and Pseudomonas in urine; all 3 greater than 100 K colony-forming units  Given indwelling urinary catheter, no doubt some these bacteria are chronic colonizers; but will treat empirically  Received Cipro in the ED; given patient's age and weakness, transitioned to aztreonam in the setting of documented ancef and penicillin allergies    Antibiotics were discontinued 8/31/21

## 2021-09-01 NOTE — CASE MANAGEMENT
CM s/w spouse Zohaib Wong this morning to review [facility] referrals via [address] [place] [address] continues to follow, CM updated them re: tentative clearance tomorrow  Zohaib Wong continues to state that [facility] is 1st choice, despite mandatory 14-day quarantine period and inability to physically visit due to covid protocols  CM reviewed opportunity for broader referral for b/u choices -- Zohaib Wong requesting referrals to be sent in the [place] area ONLY  Referrals placed via [facility] -- CM will continue to await availability of [facility]

## 2021-09-01 NOTE — ASSESSMENT & PLAN NOTE
Patient presents has fall while ambulating with walker; cleared by Trauma  CT head and CT cervical spine with no acute concerning findings; minor left scalp trauma  Patient reports ambulating with walker and becoming acutely weak causing fall  On admission, noted to have a numeral bacteria in urine; hyponatremia; hypovolemia  0/4 sirs criteria;   Procalcitonin negative  Hemoglobin slightly below baseline, FOBT  Blood cultures negative  PT recommends rehab    Antibiotics were discontinued on 08/31 as per ID recommendations     Monitor off antibiotics

## 2021-09-02 PROBLEM — S01.90XA WOUND, OPEN, HEAD: Status: ACTIVE | Noted: 2021-09-02

## 2021-09-02 PROBLEM — R93.89 ABNORMAL CHEST X-RAY: Status: ACTIVE | Noted: 2021-08-31

## 2021-09-02 LAB
ANION GAP SERPL CALCULATED.3IONS-SCNC: 4 MMOL/L (ref 4–13)
BUN SERPL-MCNC: 12 MG/DL (ref 5–25)
CALCIUM SERPL-MCNC: 8.2 MG/DL (ref 8.3–10.1)
CHLORIDE SERPL-SCNC: 97 MMOL/L (ref 100–108)
CO2 SERPL-SCNC: 29 MMOL/L (ref 21–32)
CORTIS AM PEAK SERPL-MCNC: 3.4 UG/DL (ref 4.2–22.4)
CREAT SERPL-MCNC: 0.52 MG/DL (ref 0.6–1.3)
GFR SERPL CREATININE-BSD FRML MDRD: 95 ML/MIN/1.73SQ M
GLUCOSE SERPL-MCNC: 73 MG/DL (ref 65–140)
POTASSIUM SERPL-SCNC: 3.9 MMOL/L (ref 3.5–5.3)
SARS-COV-2 RNA RESP QL NAA+PROBE: NEGATIVE
SODIUM SERPL-SCNC: 130 MMOL/L (ref 136–145)

## 2021-09-02 PROCEDURE — 99231 SBSQ HOSP IP/OBS SF/LOW 25: CPT | Performed by: DERMATOLOGY

## 2021-09-02 PROCEDURE — 97110 THERAPEUTIC EXERCISES: CPT

## 2021-09-02 PROCEDURE — U0005 INFEC AGEN DETEC AMPLI PROBE: HCPCS | Performed by: FAMILY MEDICINE

## 2021-09-02 PROCEDURE — 80048 BASIC METABOLIC PNL TOTAL CA: CPT | Performed by: FAMILY MEDICINE

## 2021-09-02 PROCEDURE — 97535 SELF CARE MNGMENT TRAINING: CPT

## 2021-09-02 PROCEDURE — U0003 INFECTIOUS AGENT DETECTION BY NUCLEIC ACID (DNA OR RNA); SEVERE ACUTE RESPIRATORY SYNDROME CORONAVIRUS 2 (SARS-COV-2) (CORONAVIRUS DISEASE [COVID-19]), AMPLIFIED PROBE TECHNIQUE, MAKING USE OF HIGH THROUGHPUT TECHNOLOGIES AS DESCRIBED BY CMS-2020-01-R: HCPCS | Performed by: FAMILY MEDICINE

## 2021-09-02 PROCEDURE — 97530 THERAPEUTIC ACTIVITIES: CPT

## 2021-09-02 PROCEDURE — 97116 GAIT TRAINING THERAPY: CPT

## 2021-09-02 PROCEDURE — 82533 TOTAL CORTISOL: CPT

## 2021-09-02 PROCEDURE — 99232 SBSQ HOSP IP/OBS MODERATE 35: CPT | Performed by: INTERNAL MEDICINE

## 2021-09-02 PROCEDURE — 99233 SBSQ HOSP IP/OBS HIGH 50: CPT | Performed by: FAMILY MEDICINE

## 2021-09-02 RX ORDER — COSYNTROPIN 0.25 MG/ML
0.25 INJECTION, POWDER, FOR SOLUTION INTRAMUSCULAR; INTRAVENOUS ONCE
Status: COMPLETED | OUTPATIENT
Start: 2021-09-02 | End: 2021-09-03

## 2021-09-02 RX ADMIN — ACETAMINOPHEN 975 MG: 325 TABLET, FILM COATED ORAL at 17:26

## 2021-09-02 RX ADMIN — FUROSEMIDE 20 MG: 20 TABLET ORAL at 09:04

## 2021-09-02 RX ADMIN — ACETAMINOPHEN 975 MG: 325 TABLET, FILM COATED ORAL at 09:01

## 2021-09-02 RX ADMIN — Medication 1 G: at 09:04

## 2021-09-02 RX ADMIN — MUPIROCIN: 20 OINTMENT TOPICAL at 17:27

## 2021-09-02 RX ADMIN — ENOXAPARIN SODIUM 40 MG: 40 INJECTION SUBCUTANEOUS at 09:04

## 2021-09-02 RX ADMIN — PANTOPRAZOLE SODIUM 40 MG: 40 TABLET, DELAYED RELEASE ORAL at 04:58

## 2021-09-02 RX ADMIN — METOPROLOL SUCCINATE 12.5 MG: 25 TABLET, FILM COATED, EXTENDED RELEASE ORAL at 09:02

## 2021-09-02 RX ADMIN — LEVOTHYROXINE SODIUM 75 MCG: 75 TABLET ORAL at 04:58

## 2021-09-02 RX ADMIN — PRAVASTATIN SODIUM 40 MG: 40 TABLET ORAL at 17:27

## 2021-09-02 RX ADMIN — Medication 1000 UNITS: at 09:02

## 2021-09-02 RX ADMIN — DOCUSATE SODIUM 100 MG: 100 CAPSULE, LIQUID FILLED ORAL at 17:27

## 2021-09-02 RX ADMIN — Medication 1 G: at 17:26

## 2021-09-02 RX ADMIN — TAMSULOSIN HYDROCHLORIDE 0.4 MG: 0.4 CAPSULE ORAL at 17:26

## 2021-09-02 RX ADMIN — FLUTICASONE FUROATE AND VILANTEROL TRIFENATATE 1 PUFF: 200; 25 POWDER RESPIRATORY (INHALATION) at 09:01

## 2021-09-02 RX ADMIN — ASPIRIN 81 MG: 81 TABLET, CHEWABLE ORAL at 09:01

## 2021-09-02 RX ADMIN — ACETAMINOPHEN 975 MG: 325 TABLET, FILM COATED ORAL at 22:43

## 2021-09-02 NOTE — PHYSICAL THERAPY NOTE
PHYSICAL THERAPY TREATMENT  NAME:  Nathan Route  DATE: 09/02/21    AGE:   80 y o  Mrn:   [id number]  ADMIT DX:  Acute hyponatremia [E87 1]  CHF (congestive heart failure) (HCC) [I50 9]  Generalized weakness [Y71 0]  Complicated UTI (urinary tract infection) [N39 0]  Injury, unspecified, initial encounter [T14 90XA]  Unspecified multiple injuries, initial encounter [T07  XXXA]    Past Medical History:   Diagnosis Date    Anemia     Bladder cancer ([facility] )     Cancer ([place] [address] )     bladder    Carotid artery occlusion     Cataract     Colon polyp     [facility]     Coronary artery disease     Disease of thyroid gland     History of transfusion     Hyperlipidemia     Hypertension     Hypothyroidism 9/15/2017    Multiple pulmonary nodules     Peptic ulcer     Scoliosis     Sepsis without acute organ dysfunction ([place] [address] ) 1/26/2021    Severe protein-calorie malnutrition ([facility] ) 4/17/2021    SIRS (systemic inflammatory response syndrome) ([place] [address] ) 12/19/2019    Transient cerebral ischemia     Tremors of nervous system      Past Surgical History:   Procedure Laterality Date   [facility] SURGERY      1973, 1987, 2005    BUNIONECTOMY Left     Simple exostectomy (silver procedure)    CAROTID ENDARTERECTOMY Right 09/10/2008    Common  Minal LEDBETTER MD    CATARACT EXTRACTION      CATARACT EXTRACTION, BILATERAL      CERVICAL DISCECTOMY  1969    COLONOSCOPY      CORONARY ARTERY BYPASS GRAFT  10/20/2010    x3 (LIMA-LAD, SVG-OM, SVG-RCA)    CYSTOSCOPY      ELBOW SURGERY Right 07/2012    FEMORAL ARTERY - TIBIAL ARTERY BYPASS GRAFT  12/05/2011    using reversed saphenous vein from right leg  Soni Means MD    UT ECHO TRANSESOPHAG R-T 2D W/PRB IMG ACQUISJ I&R N/A 10/6/2020    Procedure: TRANSESOPHAGEAL ECHOCARDIOGRAM (DAVID);   Surgeon: Imer Ernandez DO;  [facility];  Service: Cardiac Surgery    UT REPLACE AORTIC VALVE OPENFEMORAL ARTERY APPROACH N/A 10/6/2020    Procedure: REPLACEMENT AORTIC VALVE TRANSCATHETER (TAVR) TRANSFEMORAL W/ 26MM MONTALVO BREE S3 ULTRA VALVE(ACCESS ON LEFT); Surgeon: Theresa Astorga DO;  [facility];  Service: Cardiac Surgery    REPLACEMENT TOTAL KNEE Left     SHOULDER SURGERY Right 1997       Length Of Stay: 4     09/02/21 1050   PT Last Visit   PT Visit Date 09/02/21   Note Type   Note Type Treatment   Pain Assessment   Pain Assessment Tool 0-10   Pain Score No Pain   Restrictions/Precautions   Weight Bearing Precautions Per Order No   Braces or Orthoses Other (Comment)  (specialty shoes- lift shoe (L) )   Other Precautions Cognitive; Chair Alarm; Bed Alarm; Fall Risk;Hard of hearing;O2;Impulsive   General   Chart Reviewed Yes   Response to Previous Treatment Patient with no complaints from previous session  Family/Caregiver Present No   Cognition   Overall Cognitive Status Impaired   Arousal/Participation Alert; Responsive   Attention Attends with cues to redirect   Orientation Level Oriented to person;Oriented to place;Oriented to situation   Memory Decreased recall of precautions   Following Commands Follows one step commands with increased time or repetition   Comments Patient is pleasant cooperative and motivated to participate PT  Requires cues for safety  Is limited by hard of hearing  Subjective   Subjective Patient requesting assistance to use toilet  Patient reports he has not had a bowel movement in some time and feels he would feel better by walking to the bathroom  Bed Mobility   Supine to Sit 4  Minimal assistance   Additional items HOB elevated;Assist x 1; Increased time required;Verbal cues   Additional Comments Conclusion of session patient was seated up in recliner with chair alarm intact in all needs in reach  Vitals post ambulation were stable including oxygen saturation at 94% on 2 L      Transfers   Sit to Stand 3  Moderate assistance   Additional items Assist x 1;Trapeze bar;Verbal cues Stand to Sit 4  Minimal assistance   Additional items Assist x 1; Increased time required;Verbal cues  (Poor carryover of hand placement throughout session )   Toilet transfer 3  Moderate assistance   Additional items Assist x 1; Increased time required;Verbal cues;Standard toilet   Ambulation/Elevation   Gait pattern Excessively slow   Gait Assistance 4  Minimal assist  (With specialty shoes on)   Additional items Assist x 1;Verbal cues; Tactile cues   Assistive Device Rolling walker   Distance 20 ft x 1; 15 ft x 2 (bathroom)- kyphotic posture throughout required cues for pacing and breathing  Patient required cues and assist with management of oxygen cord throughout  Balance   Static Sitting Good   Dynamic Sitting Fair +   Static Standing Poor +   Dynamic Standing Poor +   Ambulatory Poor   Endurance Deficit   Endurance Deficit Yes   Endurance Deficit Description Weakness fatigue and shortness of breath on 2 L  Activity Tolerance   Activity Tolerance Patient tolerated treatment well   Medical Staff Made Aware PCP and RN  For care coordination with team    Nurse [address] [place] [address] for session, out of bed to bathroom and sitting up post session  Exercises   Knee AROM Long Arc Quad Sitting;20 reps;Right;Left   Ankle Pumps Sitting   Heel Cord Stretch 25 reps;Bilateral  (X2 sets)   Assessment   Prognosis Good   Problem List Decreased strength;Decreased range of motion;Decreased endurance; Impaired balance;Decreased mobility; Impaired judgement;Decreased safety awareness; Impaired vision;Pain; Impaired hearing;Decreased cognition;Decreased coordination   Assessment Patient was seen for skilled physical therapy treatment session  Was reporting discomfort from:  Needing to going to the bathroom  Patient was able to mobilize out of bed with minimal assistance transfer to walker with minimal assistance and ambulate on 2 L of oxygen to and from bathroom with minimal assistance    Patient wa unable to don  specialty shoes at edge of bed and required therapist assist   Increased time was required for rest breaks secondary to lower extremity fatigue and shortness of breath  Patient continues to require max verbal cues for safety oxygen cord management with all functional mobility as well as hand placement with all transfers  At this time patient remains significantly deconditioned and is limited by decreased balance decreased activity tolerance kyphotic posture leg length discrepancy, and limited functional mobility  Patient remains a high fall risk and discharge to skilled rehab on discharge is recommended  Patient was seated in chair with all needs in reach and alarm intact post session  Patient tolerated well  Barriers to Discharge Inaccessible home environment;Decreased caregiver support   Goals   Patient Goals to walk to bathroom    STG Expiration Date 09/13/21   PT Treatment Day 2   Plan   Treatment/Interventions ADL retraining;Functional transfer training;LE strengthening/ROM; Elevations; Therapeutic exercise; Endurance training;Patient/family training;Equipment eval/education; Bed mobility;Gait training;Cognitive reorientation; Compensatory technique education;Continued evaluation;Spoke to nursing;Spoke to case management;Spoke to advanced practitioner   Progress Slow progress, decreased activity tolerance   PT Frequency   (3-5x/wk)   Recommendation   PT Discharge Recommendation Post acute rehabilitation services   Equipment Recommended [facility] Recommended Wheeled walker   PT - OK to Discharge Yes  (To rehab)   Monica 8 in Bed Without Bedrails 3   Lying on Back to Sitting on Edge of Flat Bed 3   Moving Bed to Chair 3   Standing Up From Chair 3   Walk in Room 2   Climb 3-5 Stairs 1   Basic Mobility Inpatient Raw Score 15   Basic Mobility Standardized Score 36 97            Reinier Seen, PT

## 2021-09-02 NOTE — CONSULTS
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 by another physician 3 weeks ago  It appears that a xenograph was used  To facilitate initial healing of wound  At this point the wound does not appear to be infected though as expected xenograft is slowly avulsing  Remaining sutures were removed  Mupirocin ointment daily to site  Debridement is not recommended at this stage  He describes not pain or discomfort in scalp site  Follow up post discharge should be with his treating mohs physician  Thank you for involving me in the care of your patient  Please call with questions, change in clinical status or if tests recommended above are abnormal      Discussed with the primary service  History:     HISTORY OF PRESENT ILLNESS:    Reason for Consult: scalp wound  HPI: Leslie Bejarano is a 80y o  year old male who is status post MOHS surgery  3 weeks ago for [facility]  of scalp by physician outside our Dermatology group  A xenograft was apparently place on site post MOHS to facilitate initial healing  He is admitted status post fall with hyponatremia        ROS:  NA      PAST MEDICAL HISTORY:  Past Medical History:   Diagnosis Date    Anemia     [facility]     Cancer [facility] )     bladder    Carotid artery occlusion     Cataract     Colon polyp     COPD (chronic obstructive pulmonary disease) (HCC)     Coronary artery disease     Disease of thyroid gland     History of transfusion     Hyperlipidemia     Hypertension     Hypothyroidism 9/15/2017    Multiple pulmonary nodules     Peptic ulcer     Scoliosis     Sepsis without acute [facility] dysfunction [facility] ) 1/26/2021    Severe protein-calorie malnutrition ([facility] ) 4/17/2021    [facility] 12/19/2019    Transient cerebral ischemia     Tremors of nervous system      Past Surgical History:   Procedure Laterality Date   [facility] SURGERY      1973, 1987, 2005    BUNIONECTOMY Left     Simple exostectomy (silver procedure)    CAROTID ENDARTERECTOMY Right 09/10/2008    Randy LEDBETTER MD    CATARACT EXTRACTION      CATARACT EXTRACTION, BILATERAL      CERVICAL DISCECTOMY  1969    COLONOSCOPY      CORONARY ARTERY BYPASS GRAFT  10/20/2010    x3 (LIMA-LAD, SVG-OM, SVG-RCA)    CYSTOSCOPY      ELBOW SURGERY Right 07/2012    FEMORAL ARTERY - TIBIAL ARTERY BYPASS GRAFT  12/05/2011    using reversed saphenous vein from right leg  Sita Frederick MD    AR ECHO TRANSESOPHAG R-T 2D W/PRB IMG ACQUISJ I&R N/A 10/6/2020    Procedure: TRANSESOPHAGEAL ECHOCARDIOGRAM (DAVID); Surgeon: Jared Thorpe DO;  [facility];  Service: Cardiac Surgery    AR REPLACE AORTIC VALVE OPENFEMORAL ARTERY APPROACH N/A 10/6/2020    Procedure: REPLACEMENT AORTIC VALVE TRANSCATHETER (TAVR) TRANSFEMORAL W/ 26MM MONTALVO BREE S3 ULTRA VALVE(ACCESS ON LEFT);   Surgeon: Jared Thorpe DO;  [facility];  Service: Cardiac Surgery    REPLACEMENT TOTAL KNEE Left     SHOULDER SURGERY Right 1997       FAMILY HISTORY:  Non-contributory    SOCIAL HISTORY:  Social History   /Civil Union  Social History     Substance and Sexual Activity   Alcohol Use Not Currently    Alcohol/week: 0 0 standard drinks    Comment: Social      Social History     Substance and Sexual Activity   Drug Use Never     Social History     Tobacco Use   Smoking Status Former Smoker    Packs/day: 1 00    Years: 50 00    Pack years: 50 00    Types: Cigarettes    Start date: 56  Quit date: 200    Years since quittin 6   Smokeless Tobacco Never Used       ALLERGIES:  Allergies   Allergen Reactions    Aleve [Naproxen]      Other reaction(s): FACIAL SWELLING  Category: Allergy; Annotation - [id number]: lip/eye edema    Ancef [Cefazolin] Anaphylaxis     Hypotension, rash, itching    Augmentin [Amoxicillin-Pot Clavulanate] Diarrhea and Vomiting       MEDICATIONS:  All current active medications have been reviewed  no      Physical exam:     Temp:  [97 5 °F (36 4 °C)-97 9 °F (36 6 °C)] 97 5 °F (36 4 °C)  HR:  [66-80] 68  Resp:  [18] 18  BP: ()/(51-69) 116/51  SpO2:  [96 %-98 %] 98 %  Temp (24hrs), Av 8 °F (36 6 °C), Min:97 5 °F (36 4 °C), Max:97 9 °F (36 6 °C)  Current: Temperature: 97 5 °F (36 4 °C)           Moist fibrinoid debree              Labs, Imaging, & Other Studies     Lab Results:  I have personally reviewed pertinent labs  yes    Results from last 7 days   Lab Units 21  0438 21  0541 21  0643   WBC Thousand/uL 4 77 5 77 4 51   HEMOGLOBIN g/dL 9 5* 9 6* 9 4*   PLATELETS Thousands/uL 177 187 161     Results from last 7 days   Lab Units 21  0459 21  0643 21  1651   POTASSIUM mmol/L 3 9 3 5 3 6   CHLORIDE mmol/L 97* 99* 95*   CO2 mmol/L 29 26 28   BUN mg/dL 12 10 12   CREATININE mg/dL 0 52* 0 53* 0 61   EGFR ml/min/1 73sq m 95 94 89   CALCIUM mg/dL 8 2* 8 1* 8 4   AST U/L  --  21 21   ALT U/L  --  16 16   ALK PHOS U/L  --  89 96     Results from last 7 days   Lab Units 21  1948 21  1752 21  175   BLOOD CULTURE  No Growth at 48 hrs  No Growth at 48 hrs   --   --    URINE CULTURE   --  >100,000 cfu/ml Escherichia coli*  20,000-29,000 cfu/ml  >100,000 cfu/ml Escherichia coli*  20,000-29,000 cfu/ml            Pathology:   I have personally reviewed pertinent reports   Not available

## 2021-09-02 NOTE — TRANSPORTATION MEDICAL NECESSITY
Section I - General Information    Name of Patient: Naeem Allan                 : 1933    Medicare #: [id number]  Transport Date: 2021 (PCS is valid for round trips on this date and for all repetitive trips in the 60-day range as noted below )  Origin: [facility]: [facility]  Is the pt's stay covered under Medicare Part A (PPS/DRG)   [x]     Closest appropriate facility? If no, why is transport to more distant facility required? Yes  If hospice pt, is this transport related to pt's terminal illness? NA       Section II - Medical Necessity Questionnaire  Ambulance transportation is medically necessary only if other means of transport are contraindicated or would be potentially harmful to the patient  To meet this requirement, the patient must either be "bed confined" or suffer from a condition such that transport by means other than ambulance is contraindicated by the patient's condition  The following questions must be answered by the medical professional signing below for this form to be valid:    1)  Describe the MEDICAL CONDITION (physical and/or mental) of this patient AT [address] [place] [address] that requires the patient to be transported in an ambulance and why transport by other means is contraindicated by the patient's condition: Decreased strength, Decreased range of motion, Decreased endurance, Impaired balance, Decreased mobility, Impaired judgement, Decreased safety awareness, Impaired vision, Pain, Impaired hearing, Decreased cognition, Decreased coordination, Oxygen    2) Is the patient "bed confined" as defined below? Yes  To be "be confined" the patient must satisfy all three of the following conditions: (1) unable to get up from bed without Assistance; AND (2) unable to ambulate; AND (3) unable to sit in a chair or wheelchair      3) Can this patient safely be transported by car or wheelchair van (i e , seated during transport without a medical attendant or monitoring)? No    4) In addition to completing questions 1-3 above, please check any of the following conditions that apply*:   *Note: supporting documentation for any boxes checked must be maintained in the patient's medical records  If hosp-hosp transfer, describe services needed at 2nd facility not available at 1st facility? Requires oxygen-unable to self administer  Unable to tolerate seated position for time needed to transport       Section III - Signature of Physician or Healthcare Professional  I certify that the above information is true and correct based on my evaluation of this patient, and represent that the patient requires transport by ambulance and that other forms of transport are contraindicated  I understand that this information will be used by the Centers for Medicare and Medicaid Services (CMS) to support the determination of medical necessity for ambulance services, and I represent that I have personal knowledge of the patient's condition at time of transport  []  If this box is checked, I also certify that the patient is physically or mentally incapable of signing the ambulance service's claim and that the institution with which I am affiliated has furnished care, services, or assistance to the patient  Signature of Physician* or Healthcare Professional_________________________________________________  Signature Date 09/02/21 (For scheduled repetitive transports, this form is not valid for transports performed more than 60 days after this date)    Printed Name & Credentials of Physician or Healthcare Professional (MD, DO, RN, etc )_JEREMI Reaves  *Form must be signed by patient's attending physician for scheduled, repetitive transports   For non-repetitive, unscheduled ambulance transports, if unable to obtain the signature of the attending physician, any of the following may sign (choose appropriate option below)  [] Physician Assistant []  Clinical Nurse Specialist []  Registered Nurse  []  Nurse Practitioner  [x] Discharge Planner

## 2021-09-02 NOTE — PLAN OF CARE
Problem: OCCUPATIONAL THERAPY ADULT  Goal: Performs self-care activities at highest level of function for planned discharge setting  See evaluation for individualized goals  Description: Treatment Interventions: ADL retraining          See flowsheet documentation for full assessment, interventions and recommendations  Outcome: Progressing  Note: Limitation: Decreased ADL status, Decreased Safe judgement during ADL, Decreased endurance, Decreased cognition, Decreased high-level ADLs  Prognosis: Fair  Assessment: Pt was seen this date for OT tx session focusing on self care tasks, bed mobility, sit to stand progressions, standing tolerance, tranfers,  safety awareness,  energy conservation, and overall activity tolerance  Pt presents  Seated OOB in chair, completes previously mentioned tasks at documented assist levels please see above in flow sheet  Notable progress has been made with tranfers however decreased activity tolerance noted this date  Continues to require increased assist for LB self care tasks  Pt resting in supine at end of session with alarm on and all needs in reach  WOuld benefit from continued OT Tx to improve overall functional abilities   Continue to follow with current POC     OT Discharge Recommendation: Post acute rehabilitation services  OT - OK to Discharge: Yes (when medically cleared)

## 2021-09-02 NOTE — PLAN OF CARE
Problem: PHYSICAL THERAPY ADULT  Goal: Performs mobility at highest level of function for planned discharge setting  See evaluation for individualized goals  Description: Treatment/Interventions: Functional transfer training, LE strengthening/ROM, Therapeutic exercise, Elevations, Endurance training, Equipment eval/education, Patient/family training, Bed mobility, Gait training, Spoke to nursing          See flowsheet documentation for full assessment, interventions and recommendations  Outcome: Progressing  Note: Prognosis: Good  Problem List: Decreased strength, Decreased range of motion, Decreased endurance, Impaired balance, Decreased mobility, Impaired judgement, Decreased safety awareness, Impaired vision, Pain, Impaired hearing, Decreased cognition, Decreased coordination  Assessment: Patient was seen for skilled physical therapy treatment session  Was reporting discomfort from:  Needing to going to the bathroom  Patient was able to mobilize out of bed with minimal assistance transfer to walker with minimal assistance and ambulate on 2 L of oxygen to and from bathroom with minimal assistance  Patient wa unable to don  specialty shoes at edge of bed and required therapist assist   Increased time was required for rest breaks secondary to lower extremity fatigue and shortness of breath  Patient continues to require max verbal cues for safety oxygen cord management with all functional mobility as well as hand placement with all transfers  At this time patient remains significantly deconditioned and is limited by decreased balance decreased activity tolerance kyphotic posture leg length discrepancy, and limited functional mobility  Patient remains a high fall risk and discharge to skilled rehab on discharge is recommended  Patient was seated in chair with all needs in reach and alarm intact post session  Patient tolerated well    Barriers to Discharge: Inaccessible home environment, Decreased caregiver support        PT Discharge Recommendation: Post acute rehabilitation services     PT - OK to Discharge: Yes (To rehab)    See flowsheet documentation for full assessment

## 2021-09-02 NOTE — CASE MANAGEMENT
Pt medically stable for discharge to SNF tomorrow, per provider  CM received update from [facility] admissions representative Tresia Matters that pt is accepted for admission tomorrow -- Pt must be transported between 10am and 11am  Covid test ordered  Pt must be discharged with hard scripts for controlled substances  CM met with pt and spouse at bedside to review same -- Spouse and pt in agreement  Spouse states she already spoke with their admissions representative and will be gathering clothes this evening in preparation for his stay  CM liaison to schedule transport to facility

## 2021-09-02 NOTE — PROGRESS NOTES
[facility]  Progress Note - Toan Carmelo 1933, 80 y o  male MRN: [id number]  Unit/Bed#: CW2 210-01 Encounter: [id number]  Primary Care Provider: Anna Marie Mensah MD   Date and time admitted to hospital: 8/29/2021  4:35 PM    * Weakness  Assessment & Plan  Patient presents has fall while ambulating with walker; cleared by Trauma  CT head and CT cervical spine with no acute concerning findings; minor left scalp trauma  Patient reports ambulating with walker and becoming acutely weak causing fall  On admission, noted to have a numeral bacteria in urine; hyponatremia; hypovolemia  0/4 sirs criteria;   Procalcitonin negative  Hemoglobin slightly below baseline, FOBT  Blood cultures negative  PT recommends rehab    Antibiotics were discontinued on 08/31 as per ID recommendations     Monitor off antibiotics        Hyponatremia  Assessment & Plan  Nephrology following  Started on sodium tablets, pending urine studies    Wound, open, head  Assessment & Plan  Status post excision of squamous cell carcinoma  Dressing changed by wound care yesterday  Pending dermatology consult    Chronic respiratory failure ([address] )  Assessment & Plan  On 2 L nasal cannula around the clock    UTI (urinary tract infection)  Assessment & Plan  Review of previous admission shows Klebsiella oxytocia, Enterococcus faecalis; and Pseudomonas in urine; all 3 greater than 100 K colony-forming units  Given indwelling urinary catheter, no doubt some these bacteria are chronic colonizers; but will treat empirically  Received Cipro in the ED; given patient's age and weakness, transitioned to aztreonam in the setting of documented ancef and penicillin allergies    Antibiotics were discontinued 8/31/21    Pulmonary emphysema (HCC)  Assessment & Plan  Continue Trelegy; SpO2 96% on room air  Monitor    Hypertension  Assessment & Plan  Well controlled  Continue home medications    Benign prostatic hyperplasia  Assessment & Plan  Continue alfuzosin; indwelling urinary catheter  History of bladder cancer with recent cystoscopy demonstrating no recurrence  Neurology consult appreciated  Patient will be discharged with Porter catheter he will follow-up with urology as outpatient    PAF (paroxysmal atrial fibrillation) [facility])  Assessment & Plan  Currently rate controlled on admission; follows with cardiology in the outpatient setting  Continue aspirin and metoprolol  Not anticoagulation candidate as per Cardiology    Severe aortic stenosis  Assessment & Plan  History of severe aortic stenosis; 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  Follows with cardiology  As per recent echo (2021):  A 26 mm Braxton Jeffrey bioprosthesis was present  It exhibited normal function    Continue home medications      VTE Pharmacologic Prophylaxis:   Pharmacologic: Enoxaparin (Lovenox)  Mechanical VTE Prophylaxis in Place: Yes    Patient Centered Rounds: I have performed bedside rounds with nursing staff today  Discussions with Specialists or Other Care Team Provider:  Wound care, cm, nursing    Education and Discussions with Family / Patient:  With patient, patient's wife    Time Spent for Care: 30 minutes  More than 50% of total time spent on counseling and coordination of care as described above  Current Length of Stay: 4 day(s)    Current Patient Status: Inpatient   Certification Statement: The patient will continue to require additional inpatient hospital stay due to Hyponatremia, pending dermatology consult    Discharge Plan:  Patient medically stable to discharge to post-acute rehab    Code Status: Level 1 - Full Code      Subjective:   Patient seen and examined at bedside    No acute events denies any chest pain, shortness of breath, abdominal    Objective:     Vitals:   Temp (24hrs), Av 8 °F (36 6 °C), Min:97 5 °F (36 4 °C), Max:97 9 °F (36 6 °C)    Temp:  [97 5 °F (36 4 °C)-97 9 °F (36 6 °C)] 97 5 °F (36 4 °C)  HR:  John Sage 68  Resp:  [18] 18  BP: ()/(51-69) 116/51  SpO2:  [96 %-98 %] 98 %  Body mass index is 22 45 kg/m²  Input and Output Summary (last 24 hours): Intake/Output Summary (Last 24 hours) at 9/2/2021 1100  Last data filed at 9/2/2021 0700  Gross per 24 hour   Intake 480 ml   Output 1200 ml   Net -720 ml       Physical Exam:     Physical Exam  Vitals and nursing note reviewed  Constitutional:       General: He is not in acute distress  Appearance: Normal appearance  HENT:      Head: Normocephalic  Comments: Scalp:  Dressing clean and dry     Nose: Nose normal       Mouth/Throat:      Mouth: Mucous membranes are moist    Eyes:      Conjunctiva/sclera: Conjunctivae normal    Cardiovascular:      Rate and Rhythm: Normal rate  Heart sounds: Normal heart sounds  No murmur heard  Pulmonary:      Effort: Pulmonary effort is normal  No respiratory distress  Breath sounds: Normal breath sounds  No wheezing  Abdominal:      General: There is no distension  Tenderness: There is no abdominal tenderness  There is no guarding  Musculoskeletal:         General: No swelling  Right lower leg: No edema  Skin:     General: Skin is warm  Neurological:      General: No focal deficit present  Mental Status: He is alert and oriented to person, place, and time     Psychiatric:         Mood and Affect: Mood normal            Additional Data:     Labs:    Results from last 7 days   Lab Units 09/01/21  0438   WBC Thousand/uL 4 77   HEMOGLOBIN g/dL 9 5*   HEMATOCRIT % 28 4*   PLATELETS Thousands/uL 177   NEUTROS PCT % 59   LYMPHS PCT % 14   MONOS PCT % 23*   EOS PCT % 4     Results from last 7 days   Lab Units 09/02/21  0459 08/30/21  0643   SODIUM mmol/L 130* 130*   POTASSIUM mmol/L 3 9 3 5   CHLORIDE mmol/L 97* 99*   CO2 mmol/L 29 26   BUN mg/dL 12 10   CREATININE mg/dL 0 52* 0 53*   ANION GAP mmol/L 4 5   CALCIUM mg/dL 8 2* 8 1*   ALBUMIN g/dL  --  2 7*   TOTAL BILIRUBIN mg/dL  -- 0  61   ALK PHOS U/L  --  89   ALT U/L  --  16   AST U/L  --  21   GLUCOSE RANDOM mg/dL 73 71         Results from last 7 days   Lab Units 09/01/21  1057   POC GLUCOSE mg/dl 104         Results from last 7 days   Lab Units 08/30/21  0643 08/29/21  1949   PROCALCITONIN ng/ml <0 05 <0 05           * I Have Reviewed All Lab Data Listed Above  * Additional Pertinent Lab Tests Reviewed: [address] Admission Reviewed    Imaging:    Imaging Reports Reviewed Today Include:  CT scan of the chest  Imaging Personally Reviewed by Myself Includes:  None    Recent Cultures (last 7 days):     Results from last 7 days   Lab Units 08/29/21  1948 08/29/21  1752 08/29/21  1751   BLOOD CULTURE  No Growth at 48 hrs    No Growth at 48 hrs   --   --    URINE CULTURE   --  >100,000 cfu/ml Escherichia coli*  20,000-29,000 cfu/ml  >100,000 cfu/ml Escherichia coli*  20,000-29,000 cfu/ml        Last 24 Hours Medication List:   Current Facility-Administered Medications   Medication Dose Route Frequency Provider Last Rate    acetaminophen  975 mg Oral TID Ashlie Hurt MD      aspirin  81 mg Oral Daily Ashlie Hurt MD      cholecalciferol  1,000 Units Oral Daily Ashlie Hurt MD      docusate sodium  100 mg Oral BID Ashlie Hurt MD      enoxaparin  40 mg Subcutaneous Daily Ashlie Hurt MD      fluticasone-vilanterol  1 puff Inhalation Daily Ashlie Hurt MD      furosemide  20 mg Oral Daily Kia Cristina MD      levothyroxine  75 mcg Oral Early Morning Ashlie Hurt MD      metoprolol succinate  12 5 mg Oral BID Ashlie Hurt MD      ondansetron  4 mg Intravenous Q6H PRN Ashlie Hurt MD      pantoprazole  40 mg Oral Early Morning Ashlie Hurt MD      pravastatin  40 mg Oral Daily With Ashley Moreno MD      sodium chloride  1 g Oral BID With Meals Halie Turcios MD      tamsulosin  0 4 mg Oral Daily With Ashley Moreno MD      tiotropium  18 mcg Inhalation Daily Ashlie Hurt MD Today, Patient Was Seen By: Lizzette Gross MD    ** Please Note: Dictation voice to text software may have been used in the creation of this document   **

## 2021-09-02 NOTE — PLAN OF CARE
Problem: Nutrition/Hydration-ADULT  Goal: Nutrient/Hydration intake appropriate for improving, restoring or maintaining nutritional needs  Description: Monitor and assess patient's nutrition/hydration status for malnutrition  Collaborate with interdisciplinary team and initiate plan and interventions as ordered  Monitor patient's weight and dietary intake as ordered or per policy  Utilize nutrition screening tool and intervene as necessary  Determine patient's food preferences and provide high-protein, high-caloric foods as appropriate       INTERVENTIONS:  - Monitor oral intake, urinary output, labs, and treatment plans  - Assess nutrition and hydration status and recommend course of action  - Evaluate amount of meals eaten  - Assist patient with eating if necessary   - Allow adequate time for meals  - Recommend/ encourage appropriate diets, oral nutritional supplements, and vitamin/mineral supplements  - Order, calculate, and assess calorie counts as needed  - Recommend, monitor, and adjust tube feedings and TPN/PPN based on assessed needs  - Assess need for intravenous fluids  - Provide specific nutrition/hydration education as appropriate  - Include patient/family/caregiver in decisions related to nutrition  Outcome: Progressing     Problem: MOBILITY - ADULT  Goal: Maintain or return to baseline ADL function  Description: INTERVENTIONS:  -  Assess patient's ability to carry out ADLs; assess patient's baseline for ADL function and identify physical deficits which impact ability to perform ADLs (bathing, care of mouth/teeth, toileting, grooming, dressing, etc )  - Assess/evaluate cause of self-care deficits   - Assess range of motion  - Assess patient's mobility; develop plan if impaired  - Assess patient's need for assistive devices and provide as appropriate  - Encourage maximum independence but intervene and supervise when necessary  - Involve family in performance of ADLs  - Assess for home care needs following discharge   - Consider OT consult to assist with ADL evaluation and planning for discharge  - Provide patient education as appropriate  Outcome: Progressing     Problem: Prexisting or High Potential for Compromised Skin Integrity  Goal: Skin integrity is maintained or improved  Description: INTERVENTIONS:  - Identify patients at risk for skin breakdown  - Assess and monitor skin integrity  - Assess and monitor nutrition and hydration status  - Monitor labs   - Assess for incontinence   - Turn and reposition patient  - Assist with mobility/ambulation  - Relieve pressure over bony prominences  - Avoid friction and shearing  - Provide appropriate hygiene as needed including keeping skin clean and dry  - Evaluate need for skin moisturizer/barrier cream  - Collaborate with interdisciplinary team   - Patient/family teaching  - Consider wound care consult   Outcome: Progressing

## 2021-09-02 NOTE — OCCUPATIONAL THERAPY NOTE
Occupational Therapy Treatment Note:       09/02/21 1505   OT Last Visit   OT Visit Date 09/02/21   Note Type   Note Type Treatment   Restrictions/Precautions   Weight Bearing Precautions Per Order No   Braces or Orthoses Other (Comment)  (Specality shoes ( lift shoe RLE)   Other Precautions Cognitive; Bed Alarm;O2;Fall Risk   Pain Assessment   Pain Score No Pain   ADL   Where Assessed Edge of bed   LB Dressing Assistance 2  Maximal Assistance   LB Dressing Deficit Don/doff R shoe;Don/doff L shoe   LB Dressing Comments seated eob   Functional Standing Tolerance   Time ~ 1 min   Activity static standing   Comments HHA   Bed Mobility   Sit to Supine 4  Minimal assistance   Additional items Assist x 1   Additional Comments OOB upon presentation   Transfers   Sit to Stand 3  Moderate assistance   Additional items Assist x 1   Stand to Sit 4  Minimal assistance   Additional items Assist x 1   Stand pivot 3  Moderate assistance   Additional items Assist x 1   Additional Comments HHA    Cognition   Overall Cognitive Status Impaired   Arousal/Participation Alert; Cooperative   Attention Attends with cues to redirect   Orientation Level Oriented to person;Oriented to place   Memory Decreased recall of recent events   Following Commands Follows one step commands with increased time or repetition   Comments Plesant and cooperative, incresed time require for processing   Activity Tolerance   Activity Tolerance Patient limited by fatigue   Medical Staff Made Aware NSG Aware   Assessment   Assessment Pt was seen this date for OT tx session focusing on self care tasks, bed mobility, sit to stand progressions, standing tolerance, tranfers,  safety awareness,  energy conservation, and overall activity tolerance  Pt presents  Seated OOB in chair, completes previously mentioned tasks at documented assist levels please see above in flow sheet   Notable progress has been made with tranfers however decreased activity tolerance noted this date  Continues to require increased assist for LB self care tasks  Pt resting in supine at end of session with alarm on and all needs in reach  WOuld benefit from continued OT Tx to improve overall functional abilities   Continue to follow with current POC   Plan   Treatment Interventions ADL retraining   Goal Expiration Date 09/13/21   OT Treatment Day 1   OT Frequency 3-5x/wk   Recommendation   OT Discharge Recommendation Post acute rehabilitation services     JOHN Johnson

## 2021-09-02 NOTE — ASSESSMENT & PLAN NOTE
Status post excision of squamous cell carcinoma  Dressing changed by wound care yesterday  Pending dermatology consult

## 2021-09-02 NOTE — PROGRESS NOTES
Progress Note - Nephrology   Joana Tavarez 80 y o  male MRN: [id number]  Unit/Bed#: CW2 210-01 Encounter: [id number]      Assessment:  80year-old, male with a past medical history significant for emphysema, hypertension, hypothyroidism, aortic stenosis, indwelling catheter and bladder cancer, who presents after a fall in his home following weakness in his legs  The patient was found to have a urinary tract infection and is being consulted for hyponatremia  Plan:    1) Hyponatremia  -Fluid restricted to 1 5L  -Salt tablets 1g BID  -Ensure diet supplement  - CT chest revealed: background emphysema; previous right middle lobe nodule resolved; stable 4 mm right lower lobe nodule  -Na+ 130 (up from 129 on 9/1, initial 128), patients baseline over the past year fluctuates from 132-145  -Uric acid 3 4  -Urine creatinine 23 2  -Urine osm 375  -Urine sodium 105  -Serum osm 263  -AM cortisol 3 4 (low), suspicious for adrenal insufficiency v  Steroid use in June 2021  - Other possible etiologies could be protein malnutrition, poor po intake, SIADH  -Cosyntropin stim test  -F/U with nephrology as an outpatient    2) Urinary Tract Infection  -UA showed pyuria, hematuria, and bacteria  -urine culture revealed E  Coli  -blood culture pending  -Cipro discontinued on 8/31/21  -treatment per primary care and ID    3) Hypertension  -well controlled  -continue metoprolol and lasix    4) BPH  -continue home meds    6) Aortic Stenosis  -s/pTAVR 2020  -continue home meds    7) Emphysema  -stable on 2L oxygen    8)Pneumonia  -CXR right lower base infiltrate  -procalcitonin negative  -management per primary team    9) Squamous Cell Carcinoma  -s/p posterior scalp graft and 12 day Doxycycline course  -management per primary team    10) Hypothyroidism  -TSH normal  -continue levothyroxine      Subjective:   Mr Leeann Corrales states that he feels fine today  He is breathing without difficulty on 2L NC   However, he is frequently experiencing episodes of nausea occasionally followed by vomiting  Movement seems to make the nausea worse  Apparently, he has been experiencing these nauseous episodes since Sunday (8/29/21)  He currently denies fever, chills, lightheadedness, dizziness, SOB, chest pain, abdominal pain, diarrhea, and dysuria  Objective:     Vitals: Blood pressure 116/51, pulse 68, temperature 97 5 °F (36 4 °C), resp  rate 18, height 5' 6" (1 676 m), weight 63 1 kg (139 lb 1 6 oz), SpO2 98 %  ,Body mass index is 22 45 kg/m²  Weight (last 2 days)     None            Intake/Output Summary (Last 24 hours) at 9/2/2021 1011  Last data filed at 9/2/2021 0700  Gross per 24 hour   Intake 700 83 ml   Output 1200 ml   Net -499 17 ml       Urethral Catheter Coude 16 Fr  (Active)   Amt returned on insertion(mL) 5 mL 08/29/21 1833   Reasons to continue Urinary Catheter  Chronic urinary catheter 09/02/21 0100   Goal for Removal N/A- chronic chavis 09/02/21 0100   Site Assessment Clean;Skin intact 09/02/21 0100   Chavis Care Done 09/02/21 0100   Collection Container Standard drainage bag 09/02/21 0100   Securement Method Securing device (Describe) 09/02/21 0100   Output (mL) 300 mL 09/02/21 0500       Physical Exam:   Limited Orthostatics:   Supine 135/57  Seated 107/53  HR supine 71  HR seated 81  Physical Exam  Constitutional:       General: He is not in acute distress  Comments: frail   HENT:      Head:      Comments: Recent skin graft on posterior scalp, PMHx of SCC     Mouth/Throat:      Mouth: Mucous membranes are moist       Pharynx: Oropharynx is clear  No oropharyngeal exudate  Eyes:      General: No scleral icterus  Conjunctiva/sclera: Conjunctivae normal    Cardiovascular:      Rate and Rhythm: Normal rate  Pulses: Normal pulses  Heart sounds: No friction rub  No gallop  Comments: PMHx aortic stenosis, s/p TAVR 2020  Pulmonary:      Effort: Pulmonary effort is normal  No respiratory distress  Breath sounds:  No wheezing  Abdominal:      General: Abdomen is flat  Bowel sounds are normal       Tenderness: There is no abdominal tenderness  Musculoskeletal:         General: Swelling (bilateral lower extremity swelling, worse on left side) present  Right lower leg: Right lower leg edema: bilateral lower extremity swelling, worse on left side  Skin:     General: Skin is warm and dry  Findings: Bruising present  Neurological:      Mental Status: He is alert and oriented to person, place, and time  Psychiatric:         Mood and Affect: Mood normal          Behavior: Behavior normal          Thought Content: Thought content normal          Lab, Imaging and other studies:   I have personally reviewed pertinent labs    CBC: No results found for: WBC, HGB, HCT, MCV, PLT, ADJUSTEDWBC, MCH, MCHC, RDW, MPV, NRBC  CMP:   Lab Results   Component Value Date    SODIUM 130 (L) 09/02/2021    K 3 9 09/02/2021    CL 97 (L) 09/02/2021    CO2 29 09/02/2021    BUN 12 09/02/2021    CREATININE 0 52 (L) 09/02/2021    CALCIUM 8 2 (L) 09/02/2021    EGFR 95 09/02/2021

## 2021-09-03 ENCOUNTER — NURSING HOME VISIT (OUTPATIENT)
Dept: GERIATRICS | Facility: OTHER | Age: 86
End: 2021-09-03
Payer: MEDICARE

## 2021-09-03 ENCOUNTER — TRANSITIONAL CARE MANAGEMENT (OUTPATIENT)
Dept: FAMILY MEDICINE CLINIC | Facility: CLINIC | Age: 86
End: 2021-09-03

## 2021-09-03 VITALS
HEIGHT: 66 IN | TEMPERATURE: 98 F | WEIGHT: 139.1 LBS | OXYGEN SATURATION: 98 % | DIASTOLIC BLOOD PRESSURE: 61 MMHG | BODY MASS INDEX: 22.35 KG/M2 | RESPIRATION RATE: 16 BRPM | SYSTOLIC BLOOD PRESSURE: 119 MMHG | HEART RATE: 73 BPM

## 2021-09-03 DIAGNOSIS — I35.0 SEVERE AORTIC STENOSIS: ICD-10-CM

## 2021-09-03 DIAGNOSIS — N40.1 BENIGN PROSTATIC HYPERPLASIA WITH URINARY RETENTION: ICD-10-CM

## 2021-09-03 DIAGNOSIS — E87.1 HYPONATREMIA: ICD-10-CM

## 2021-09-03 DIAGNOSIS — R93.89 ABNORMAL CHEST X-RAY: ICD-10-CM

## 2021-09-03 DIAGNOSIS — N30.00 ACUTE CYSTITIS WITHOUT HEMATURIA: Primary | ICD-10-CM

## 2021-09-03 DIAGNOSIS — I48.0 PAF (PAROXYSMAL ATRIAL FIBRILLATION) (HCC): ICD-10-CM

## 2021-09-03 DIAGNOSIS — R33.8 BENIGN PROSTATIC HYPERPLASIA WITH URINARY RETENTION: ICD-10-CM

## 2021-09-03 DIAGNOSIS — R26.2 AMBULATORY DYSFUNCTION: ICD-10-CM

## 2021-09-03 DIAGNOSIS — S01.00XD OPEN WOUND OF SCALP, UNSPECIFIED OPEN WOUND TYPE, SUBSEQUENT ENCOUNTER: ICD-10-CM

## 2021-09-03 LAB
ANION GAP SERPL CALCULATED.3IONS-SCNC: 2 MMOL/L (ref 4–13)
BACTERIA BLD CULT: NORMAL
BACTERIA BLD CULT: NORMAL
BUN SERPL-MCNC: 11 MG/DL (ref 5–25)
CALCIUM SERPL-MCNC: 8.4 MG/DL (ref 8.3–10.1)
CHLORIDE SERPL-SCNC: 96 MMOL/L (ref 100–108)
CO2 SERPL-SCNC: 30 MMOL/L (ref 21–32)
CORTIS SERPL-MCNC: 3.4 UG/DL
CORTIS SERPL-MCNC: 8.2 UG/DL
CREAT SERPL-MCNC: 0.54 MG/DL (ref 0.6–1.3)
GFR SERPL CREATININE-BSD FRML MDRD: 94 ML/MIN/1.73SQ M
GLUCOSE SERPL-MCNC: 82 MG/DL (ref 65–140)
POTASSIUM SERPL-SCNC: 3.7 MMOL/L (ref 3.5–5.3)
SODIUM SERPL-SCNC: 128 MMOL/L (ref 136–145)

## 2021-09-03 PROCEDURE — 82533 TOTAL CORTISOL: CPT

## 2021-09-03 PROCEDURE — 99305 1ST NF CARE MODERATE MDM 35: CPT | Performed by: FAMILY MEDICINE

## 2021-09-03 PROCEDURE — 99239 HOSP IP/OBS DSCHRG MGMT >30: CPT | Performed by: FAMILY MEDICINE

## 2021-09-03 PROCEDURE — 82024 ASSAY OF ACTH: CPT

## 2021-09-03 PROCEDURE — 80048 BASIC METABOLIC PNL TOTAL CA: CPT | Performed by: FAMILY MEDICINE

## 2021-09-03 PROCEDURE — 99232 SBSQ HOSP IP/OBS MODERATE 35: CPT | Performed by: INTERNAL MEDICINE

## 2021-09-03 RX ORDER — SODIUM CHLORIDE 1000 MG
2 TABLET, SOLUBLE MISCELLANEOUS 2 TIMES DAILY WITH MEALS
Qty: 60 TABLET | Refills: 0
Start: 2021-09-03 | End: 2021-09-25 | Stop reason: HOSPADM

## 2021-09-03 RX ORDER — METOPROLOL SUCCINATE 25 MG/1
12.5 TABLET, EXTENDED RELEASE ORAL 2 TIMES DAILY
Qty: 60 TABLET | Refills: 0
Start: 2021-09-03 | End: 2021-09-25 | Stop reason: HOSPADM

## 2021-09-03 RX ADMIN — PANTOPRAZOLE SODIUM 40 MG: 40 TABLET, DELAYED RELEASE ORAL at 07:02

## 2021-09-03 RX ADMIN — Medication 1000 UNITS: at 09:26

## 2021-09-03 RX ADMIN — ACETAMINOPHEN 975 MG: 325 TABLET, FILM COATED ORAL at 09:28

## 2021-09-03 RX ADMIN — ASPIRIN 81 MG: 81 TABLET, CHEWABLE ORAL at 09:29

## 2021-09-03 RX ADMIN — COSYNTROPIN 0.25 MG: 0.25 INJECTION, POWDER, LYOPHILIZED, FOR SOLUTION INTRAMUSCULAR; INTRAVENOUS at 07:02

## 2021-09-03 RX ADMIN — METOPROLOL SUCCINATE 12.5 MG: 25 TABLET, FILM COATED, EXTENDED RELEASE ORAL at 09:26

## 2021-09-03 RX ADMIN — FLUTICASONE FUROATE AND VILANTEROL TRIFENATATE 1 PUFF: 200; 25 POWDER RESPIRATORY (INHALATION) at 09:32

## 2021-09-03 RX ADMIN — Medication 1 G: at 07:02

## 2021-09-03 RX ADMIN — MUPIROCIN: 20 OINTMENT TOPICAL at 09:31

## 2021-09-03 RX ADMIN — ENOXAPARIN SODIUM 40 MG: 40 INJECTION SUBCUTANEOUS at 09:31

## 2021-09-03 RX ADMIN — FUROSEMIDE 20 MG: 20 TABLET ORAL at 09:30

## 2021-09-03 RX ADMIN — LEVOTHYROXINE SODIUM 75 MCG: 75 TABLET ORAL at 07:02

## 2021-09-03 NOTE — ASSESSMENT & PLAN NOTE
One hundred twenty-eight today  Decreased from 130  Discussed with nephrology  Increase sodium tablets to 2 g twice a day    Patient will need repeat BMP in rehab  Follow-up with nephrology as outpatient

## 2021-09-03 NOTE — PROGRESS NOTES
Progress Note - Nephrology  Alexandre Trujillo y  geo  male MRN: [id number]  Unit/Bed#: 2 210-01 Encounter: [id number]     Assessment: Patient is 80year old male with PMH of severe aortic stenosis (s/p TAVR 2020), chronic HFpEF (EF 55%), indwelling catheter, BPH, pulmonary emphysema presenting with a fall without head trauma or LOC and weakness being evaluated for hyponatremia       Plan:      Hyponatremia  - patient initially presented with a sodium of 128  - s/p knee weakness and fall  - on chart review, patient sodium was labile and ranged between 132-145 in past year  - sodium ranged between 128 and 130, despite hydration with IV fluids NS  - poor p o   Intake at home and hypovolemic based on history  - TSH is normal, pt on synthyroid  - repeat CT chest show resolution of R middle lobe pulmonary nodule, negative for malignancy  -Na+ 130 (up from 129 on 9/1, initial 128), patients baseline over the past year fluctuates from 132-145  -Uric acid 3 4  -Urine creatinine 23 2  -Urine osm 375  -Urine sodium 105  -Serum osm 263  -AM cortisol 3 4 (low), suspicious for adrenal insufficiency vs 2/2 steroid use in June 2021  - Other possible etiologies could be protein malnutrition, poor po intake, SIADH    Plan:   -F/u Cosyntropin stim test - still pending    -F/U with nephrology as an outpatient, with review of lab results  - F/u with [facility] outpatient  - Na tablet 1 gm BID supplementation  - Ensure t i d  for protein supplementation  - Fluid restrict to 1 5 L     Electrolytes  - Na - as above  - K - WNL  - Cl- 96     Acid-Base  - no acid base abnormalities at this time     HTN  - controlled on home meds at this time  - continue lasix      Normocytic Anemia  - 9 5 today, baseline 10 2-11 2 over past year  - last colonoscopy in 2018 showed polyp with 1 tubular adenoma  - manage as per primary team     Weakness  - s/p fall with walker  - CTH with no hemorrhage or masses  - UCx with E Coli in urine   - BCx negative   - Anemia - add protein shake for nutrition supplementation  - as per primary team     UTI  - UCx - >100,000 CFU E Coli  - UA - pyuria, hematuria  - BCx - no growth at 48h  - indwelling urinary catheter with suspicion of chronic colonizing bacteria  - S/p aztreonam; Abx discontinued by ID given age and weakness  -Cipro discontinued on 21  -treatment per primary care and ID     HFpEF  - EF 55%  - on lasix 20 daily  - on Toprol  - management as per primary team     Severe Aortic stenosis  - s/p TAVR in   - home meds     Pneumonia  - CXR right lower lobe infiltrate  - procal negative   - antibiotics discontinued as per ID  - Mgmt as per primary team     VTE Pharmacologic Prophylaxis: Enoxaparin (Lovenox)  VTE Mechanical Prophylaxis: sequential compression device    SUBJECTIVE     Patient seen and examined  No acute events overnight  No complaints overnight  Nausea seems to have resolved  Spoke to patient and wife about following up outpatient with nephrology for hyponatremia  Patient to be discharged to [facility] today  OBJECTIVE     Vitals:    21 0651 21 2240 21 2244 21 0646   BP: 116/51  103/56 119/61   BP Location:    Right arm   Pulse: 68  65 73   Resp: 18   16   Temp: 97 5 °F (36 4 °C)   98 °F (36 7 °C)   TempSrc:    Oral   SpO2: 98% 98% 98% 98%   Weight:       Height:          Temperature:   Temp (24hrs), Av °F (36 7 °C), Min:98 °F (36 7 °C), Max:98 °F (36 7 °C)    Temperature: 98 °F (36 7 °C)  Intake & Output:  I/O       701 -  07 07 -  0700  07 -  0700    P  O  690 440     I V  (mL/kg) 660 (10 5)      Total Intake(mL/kg) 1350 (21 4) 440 (7)     Urine (mL/kg/hr) 1525 (1)      Stool       Total Output 1525      Net -175 +440                Weights:   IBW (Ideal Body Weight): 63 8 kg    Body mass index is 22 45 kg/m²  Weight (last 2 days)     None        Physical Exam  Vitals and nursing note reviewed     Constitutional:       Appearance: He is well-developed  HENT:      Head: Normocephalic and atraumatic  Comments: Scalp wound s/p moh's surgery  Eyes:      Conjunctiva/sclera: Conjunctivae normal    Cardiovascular:      Rate and Rhythm: Normal rate and regular rhythm  Pulses: Normal pulses  Heart sounds: Murmur heard  Pulmonary:      Effort: Pulmonary effort is normal  No respiratory distress  Breath sounds: Normal breath sounds  No wheezing or rales  Abdominal:      Palpations: Abdomen is soft  Tenderness: There is no abdominal tenderness  Musculoskeletal:      Cervical back: Neck supple  Skin:     General: Skin is warm and dry  Neurological:      Mental Status: He is alert and oriented to person, place, and time  LABORATORY DATA     Labs: I have personally reviewed pertinent reports  Results from last 7 days   Lab Units 09/01/21  0438 08/31/21  0541 08/30/21  0643   WBC Thousand/uL 4 77 5 77 4 51   HEMOGLOBIN g/dL 9 5* 9 6* 9 4*   HEMATOCRIT % 28 4* 29 1* 28 3*   PLATELETS Thousands/uL 177 187 161   NEUTROS PCT % 59 72 58   MONOS PCT % 23* 15* 24*      Results from last 7 days   Lab Units 09/03/21  0741 09/02/21  0459 09/01/21  0438 08/30/21  0643 08/29/21  1651   POTASSIUM mmol/L 3 7 3 9 3 6 3 5 3 6   CHLORIDE mmol/L 96* 97* 99* 99* 95*   CO2 mmol/L 30 29 29 26 28   BUN mg/dL 11 12 8 10 12   CREATININE mg/dL 0 54* 0 52* 0 47* 0 53* 0 61   CALCIUM mg/dL 8 4 8 2* 7 7* 8 1* 8 4   ALK PHOS U/L  --   --   --  89 96   ALT U/L  --   --   --  16 16   AST U/L  --   --   --  21 21     Results from last 7 days   Lab Units 08/30/21  0643   MAGNESIUM mg/dL 1 9     Results from last 7 days   Lab Units 08/30/21  0643   PHOSPHORUS mg/dL 2 8              Results from last 7 days   Lab Units 08/29/21  1651   TROPONIN I ng/mL <0 02       IMAGING & DIAGNOSTIC TESTING     Radiology Results: I have personally reviewed pertinent reports    XR chest 1 view portable    Result Date: 8/30/2021  Impression: Interval development of patchy right basal infiltrate and small effusion Status post aortic valve replacement and CABG, unchanged Workstation performed: [id number]     CT head without contrast    Result Date: 8/29/2021  Impression: No acute intracranial abnormality  Left occipital scalp contusion and laceration  Workstation performed: [id number]     CT chest wo contrast    Result Date: 9/1/2021  Impression: Stable background emphysema  Significant improvement in multifocal areas of infectious opacities particularly in the right middle lobe and left upper lobe  Previously noted right middle lobe nodule has resolved and was therefore inflammatory or infectious  Stable 4 mm right lower lobe nodule image 3/83  Workstation performed: [id number]     CT cervical spine without contrast    Result Date: 8/29/2021  Impression: No cervical spine fracture or traumatic malalignment  Workstation performed: [id number]     Other Diagnostic Testing: I have personally reviewed pertinent reports      ACTIVE MEDICATIONS     Current Facility-Administered Medications   Medication Dose Route Frequency    acetaminophen (TYLENOL) tablet 975 mg  975 mg Oral TID    aspirin chewable tablet 81 mg  81 mg Oral Daily    cholecalciferol (VITAMIN D3) tablet 1,000 Units  1,000 Units Oral Daily    docusate sodium (COLACE) capsule 100 mg  100 mg Oral BID    enoxaparin (LOVENOX) subcutaneous injection 40 mg  40 mg Subcutaneous Daily    fluticasone-vilanterol (BREO ELLIPTA) 200-25 MCG/INH inhaler 1 puff  1 puff Inhalation Daily    furosemide (LASIX) tablet 20 mg  20 mg Oral Daily    levothyroxine tablet 75 mcg  75 mcg Oral Early Morning    metoprolol succinate (TOPROL-XL) 24 hr tablet 12 5 mg  12 5 mg Oral BID    mupirocin (BACTROBAN) 2 % ointment   Topical Daily    ondansetron (ZOFRAN) injection 4 mg  4 mg Intravenous Q6H PRN    pantoprazole (PROTONIX) EC tablet 40 mg  40 mg Oral Early Morning    pravastatin (PRAVACHOL) tablet 40 mg  40 mg Oral Daily With Dinner    sodium chloride tablet 1 g  1 g Oral BID With Meals    tamsulosin (FLOMAX) capsule 0 4 mg  0 4 mg Oral Daily With Dinner    tiotropium [facility]) capsule for inhaler 18 mcg  18 mcg Inhalation Daily       Portions of the record may have been created with voice recognition software  Occasional wrong word or "sound a like" substitutions may have occurred due to the inherent limitations of voice recognition software    Read the chart carefully and recognize, using context, where substitutions have occurred   ==  [address], [place]  [address] [facility]  Internal Medicine Residency PGY-1

## 2021-09-03 NOTE — PROGRESS NOTES
[facility]     History and Physical  POS: 31    Records Reviewed include: Hospital records      Chief Complaint/ Reason for Admission:   UTI, hyponatremia    History of Present Illness:       Mr Gwendolyn Arredondo is an 79 yo male who was recently admitted to [facility] due to generalized weakness and had a fall  He was found to have UTI, hyponatremia  Now is admitted to [facility] for Reji Paulab  Will continue antibiotics, encourage po hydration  Porter in place - chronic due to obstructive uropathy  Patient was seen and examined at bedside, reports generalized weakness, decreased appetite, productive cough at baseline, intermittent blood in sputum  He uses walker for ambulation and had multiple falls  He reports chronic R hip and R knee pain  Denies difficulty sleeping  No CP, SOB  Will continue PT/OT, goal is to return home  Allergies    Allergies   Allergen Reactions    Aleve [Naproxen]      Other reaction(s): FACIAL SWELLING  Category: Allergy;  Annotation - [id number]: lip/eye edema    Ancef [Cefazolin] Anaphylaxis     Hypotension, rash, itching    Augmentin [Amoxicillin-Pot Clavulanate] Diarrhea and Vomiting       Past Medical History  Past Medical History:   Diagnosis Date    Anemia     [facility]     Cancer ([facility] )     bladder    Carotid artery occlusion     Cataract     Colon polyp     COPD (chronic obstructive pulmonary disease) (HCC)     Coronary artery disease     Disease of thyroid gland     History of transfusion     Hyperlipidemia     Hypertension     Hypothyroidism 9/15/2017    Multiple pulmonary nodules     Peptic ulcer     Scoliosis     Sepsis without acute organ dysfunction [facility] ) 1/26/2021    Severe protein-calorie malnutrition ([facility] ) 4/17/2021    SIRS (systemic inflammatory response syndrome) ([facility] ) 12/19/2019    Transient cerebral ischemia     Tremors of nervous system         Past Surgical History:   Procedure Laterality Date    BACK SURGERY 1973, ,     BUNIONECTOMY Left     Simple exostectomy (silver procedure)    CAROTID ENDARTERECTOMY Right 09/10/2008    Randy LEDBETTER MD    CATARACT EXTRACTION      CATARACT EXTRACTION, BILATERAL      CERVICAL DISCECTOMY  1969    COLONOSCOPY      CORONARY ARTERY BYPASS GRAFT  10/20/2010    x3 (LIMA-LAD, SVG-OM, SVG-RCA)    CYSTOSCOPY      ELBOW SURGERY Right 2012    FEMORAL ARTERY - TIBIAL ARTERY BYPASS GRAFT  2011    using reversed saphenous vein from right leg  Roland Desai MD    MA ECHO TRANSESOPHAG R-T 2D W/PRB IMG ACQUISJ I&R N/A 10/6/2020    Procedure: TRANSESOPHAGEAL ECHOCARDIOGRAM (DAVID); Surgeon: Lobo Farooq DO;  Location: [facility];  Service: Cardiac Surgery    MA REPLACE AORTIC VALVE OPENFEMORAL ARTERY APPROACH N/A 10/6/2020    Procedure: REPLACEMENT AORTIC VALVE TRANSCATHETER (TAVR) TRANSFEMORAL W/ 26MM MONTALVO BREE S3 ULTRA VALVE(ACCESS ON LEFT);   Surgeon: Lobo Farooq DO;  Location: [facility];  Service: Cardiac Surgery    REPLACEMENT TOTAL KNEE Left     SHOULDER SURGERY Right        Family History  Family History   Problem Relation Age of Onset    Cervical cancer Mother     Cervical cancer Sister     Heart disease Sister     Coronary artery disease Sister     Lung cancer Brother        Social History  Social History     Tobacco Use   Smoking Status Former Smoker    Packs/day: 1 00    Years: 50 00    Pack years: 50 00    Types: Cigarettes    Start date: 56    Quit date: 200    Years since quittin 6   Smokeless Tobacco Never Used      Social History     Substance and Sexual Activity   Alcohol Use Not Currently    Alcohol/week: 0 0 standard drinks    Comment: Social       Social History     Substance and Sexual Activity   Drug Use Never        Lives: Home,  Social Support: family  Fall in the past 12 months: yes  Use of assistance Device: Walker    Physical Exam    Vital Signs    Vitals:    21 2125   BP: 121/73   Pulse: 77   Resp: 16   Temp: 97 5 °F (36 4 °C)   SpO2: 94%           Constitutional: Frail appearing patient     Physical Exam  Vitals and nursing note reviewed  Constitutional:       General: He is not in acute distress  Appearance: He is not diaphoretic  HENT:      Head: Normocephalic  Comments: Wound scalp  Eyes:      General:         Right eye: No discharge  Left eye: No discharge  Pupils: Pupils are equal, round, and reactive to light  Cardiovascular:      Rate and Rhythm: Normal rate and regular rhythm  Heart sounds: Heart sounds are distant  No murmur heard  Pulmonary:      Effort: Pulmonary effort is normal  No respiratory distress  Breath sounds: No wheezing  Comments: Decreased at basis  Abdominal:      Palpations: Abdomen is soft  Tenderness: There is no abdominal tenderness  There is no guarding or rebound  Genitourinary:     Comments: Porter in place  Musculoskeletal:         General: Tenderness present  Cervical back: Normal range of motion and neck supple  Right lower leg: Edema present  Left lower leg: Edema present  Comments: walker   Skin:     General: Skin is warm and dry  Neurological:      Mental Status: He is alert and oriented to person, place, and time  Mental status is at baseline  Psychiatric:         Behavior: Behavior normal          Review of Systems:  Review of Systems   Constitutional: Positive for appetite change and fatigue  Negative for chills and fever  HENT: Negative for congestion, rhinorrhea and sore throat  Respiratory: Positive for cough and shortness of breath (with exertion)  Negative for wheezing  Cardiovascular: Positive for leg swelling  Negative for chest pain  Gastrointestinal: Negative for abdominal pain, constipation and nausea  Genitourinary: Negative for dysuria and hematuria  Musculoskeletal: Positive for arthralgias and gait problem  Skin: Positive for wound  Negative for rash  Allergic/Immunologic: Negative for environmental allergies  Neurological: Negative for dizziness and syncope  Hematological: Does not bruise/bleed easily  Psychiatric/Behavioral: Negative for behavioral problems and sleep disturbance  List of Current Medications:    Medication reviewed  All orders signed  Complete list is in the paper chart  Allergies    Allergies   Allergen Reactions    Aleve [Naproxen]      Other reaction(s): FACIAL SWELLING  Category: Allergy; Annotation - [id number]: lip/eye edema    Ancef [Cefazolin] Anaphylaxis     Hypotension, rash, itching    Augmentin [Amoxicillin-Pot Clavulanate] Diarrhea and Vomiting       Labs/Diagnostics (reviewed by this provider): I personally reviewed lab results and imaging studies  Full reports are in the paper chart  Assessment/Plan:    UTI (urinary tract infection)  With chronic indwelling cath due to obstructive uropathy  Continue nitrofurantoin for now  Avoid using nitrofurantoin for UTI ppx  Consider cranberry supplements for PPX  Follows up with Urology  Encourage po hydration  Monitor CBC, CMP    Benign prostatic hyperplasia  Denies symptoms currently  Continue Porter cath    Ambulatory dysfunction  Will continue PT/OT  Placed on fall precautions  The goal is to return home  Monitor orthostatic vS    Abnormal chest x-ray  CXR with R lower infiltrate  Patient reports blood in sputum for a while now  He is a former smoker  I would suggest further follow up with Pulmonology  Currently not on antibiotics, monitor closely    Severe aortic stenosis  Stable currently, continue to monitor    PAF (paroxysmal atrial fibrillation) ([facility] )  Currently well controlled  Continue ASA, metoprolol  Follows up with Cardiology    Hyponatremia  Continue sodium supplements  Monitor CMP  Follow up with Nephrology if needed    Wound, open, head  Status post SCC excision  Continue wound care    Follow up with Dermatology Pain: yes  Rehab Potential:Good  Patient Informed of Medical Condition: yes   Patient is Capable of Understanding Their Right: yes   Discharge Plan: home  Vaccination:   Immunization History   Administered Date(s) Administered    INFLUENZA 12/09/2009, 10/16/2012, 10/02/2013, 11/10/2014, 09/08/2016, 10/16/2018    Influenza Split High Dose Preservative Free IM 09/08/2016, 09/15/2017, 10/16/2018, 10/17/2019    Influenza, high dose seasonal 0 7 mL 10/14/2020    Influenza, seasonal, injectable 1933, 10/01/2013    Pneumococcal Conjugate 13-Valent 07/15/2015    Pneumococcal Polysaccharide PPV23 08/18/2011    SARS-CoV-2 / COVID-19 mRNA IM (Nicola Reeder) 01/23/2021, 02/20/2021     Advanced Directives: yes: Yes   Code status:DNR (Per discussion with resident or POA)  PCP: MD Breanna Ovalle MD  Geriatric Medicine  9/3/21  9:45 PM

## 2021-09-03 NOTE — PLAN OF CARE
Problem: Potential for Falls  Goal: Patient will remain free of falls  Description: INTERVENTIONS:  - Educate patient/family on patient safety including physical limitations  - Instruct patient to call for assistance with activity   - Consult OT/PT to assist with strengthening/mobility   - Keep Call bell within reach  - Keep bed low and locked with side rails adjusted as appropriate  - Keep care items and personal belongings within reach  - Initiate and maintain comfort rounds  - Make Fall Risk Sign visible to staff  - Offer Toileting every Hours, in advance of need  - Initiate/Maintain   alarm  - Obtain necessary fall risk management equipment:     - Apply yellow socks and bracelet for high fall risk patients  - Consider moving patient to room near nurses station  Outcome: Progressing     Problem: Nutrition/Hydration-ADULT  Goal: Nutrient/Hydration intake appropriate for improving, restoring or maintaining nutritional needs  Description: Monitor and assess patient's nutrition/hydration status for malnutrition  Collaborate with interdisciplinary team and initiate plan and interventions as ordered  Monitor patient's weight and dietary intake as ordered or per policy  Utilize nutrition screening tool and intervene as necessary  Determine patient's food preferences and provide high-protein, high-caloric foods as appropriate       INTERVENTIONS:  - Monitor oral intake, urinary output, labs, and treatment plans  - Assess nutrition and hydration status and recommend course of action  - Evaluate amount of meals eaten  - Assist patient with eating if necessary   - Allow adequate time for meals  - Recommend/ encourage appropriate diets, oral nutritional supplements, and vitamin/mineral supplements  - Order, calculate, and assess calorie counts as needed  - Recommend, monitor, and adjust tube feedings and TPN/PPN based on assessed needs  - Assess need for intravenous fluids  - Provide specific nutrition/hydration education as appropriate  - Include patient/family/caregiver in decisions related to nutrition  Outcome: Progressing     Problem: MOBILITY - ADULT  Goal: Maintain or return to baseline ADL function  Description: INTERVENTIONS:  -  Assess patient's ability to carry out ADLs; assess patient's baseline for ADL function and identify physical deficits which impact ability to perform ADLs (bathing, care of mouth/teeth, toileting, grooming, dressing, etc )  - Assess/evaluate cause of self-care deficits   - Assess range of motion  - Assess patient's mobility; develop plan if impaired  - Assess patient's need for assistive devices and provide as appropriate  - Encourage maximum independence but intervene and supervise when necessary  - Involve family in performance of ADLs  - Assess for home care needs following discharge   - Consider OT consult to assist with ADL evaluation and planning for discharge  - Provide patient education as appropriate  Outcome: Progressing  Goal: Maintains/Returns to pre admission functional level  Description: INTERVENTIONS:  - Perform BMAT or MOVE assessment daily    - Set and communicate daily mobility goal to care team and patient/family/caregiver  - Collaborate with rehabilitation services on mobility goals if consulted  - Perform Range of Motion    times a day  - Reposition patient every    hours    - Dangle patient    times a day  - Stand patient    times a day  - Ambulate patient    times a day  - Out of bed to chair    times a day   - Out of bed for meals    times a day  - Out of bed for toileting  - Record patient progress and toleration of activity level   Outcome: Progressing     Problem: Prexisting or High Potential for Compromised Skin Integrity  Goal: Skin integrity is maintained or improved  Description: INTERVENTIONS:  - Identify patients at risk for skin breakdown  - Assess and monitor skin integrity  - Assess and monitor nutrition and hydration status  - Monitor labs   - Assess for incontinence   - Turn and reposition patient  - Assist with mobility/ambulation  - Relieve pressure over bony prominences  - Avoid friction and shearing  - Provide appropriate hygiene as needed including keeping skin clean and dry  - Evaluate need for skin moisturizer/barrier cream  - Collaborate with interdisciplinary team   - Patient/family teaching  - Consider wound care consult   Outcome: Progressing

## 2021-09-03 NOTE — ASSESSMENT & PLAN NOTE
Continue alfuzosin; indwelling urinary catheter  History of bladder cancer with recent cystoscopy demonstrating no recurrence  Urology consult appreciated    Patient will be discharged with Porter catheter he will follow-up with urology as outpatient

## 2021-09-03 NOTE — ASSESSMENT & PLAN NOTE
Patient presents has fall while ambulating with walker; cleared by Trauma  CT head and CT cervical spine with no acute concerning findings; minor left scalp trauma  Patient reports ambulating with walker and becoming acutely weak causing fall  On admission, noted to have a numeral bacteria in urine; hyponatremia; hypovolemia  0/4 sirs criteria;   Procalcitonin negative  Blood cultures negative  PT recommends rehab    Antibiotics were discontinued on 08/31 as per ID recommendations     Monitor off antibiotics

## 2021-09-03 NOTE — ASSESSMENT & PLAN NOTE
Status post excision of squamous cell carcinoma  Dressing change  Dermatology consult appreciated    He will need follow-up as outpatient

## 2021-09-03 NOTE — DISCHARGE SUMMARY
[address] [facility]  Discharge- Young Alfonso 1933, 80 y o  male MRN: [id number]  Unit/Bed#: Rola [facility] 210-01 Encounter: [id number]  Primary Care Provider: Camelia Sarah MD   Date and time admitted to hospital: 8/29/2021  4:35 PM    * Weakness  Assessment & Plan  Patient presents has fall while ambulating with walker; cleared by Trauma  CT head and CT cervical spine with no acute concerning findings; minor left scalp trauma  Patient reports ambulating with walker and becoming acutely weak causing fall  On admission, noted to have a numeral bacteria in urine; hyponatremia; hypovolemia  0/4 sirs criteria;   Procalcitonin negative  Blood cultures negative  PT recommends rehab    Antibiotics were discontinued on 08/31 as per ID recommendations     Monitor off antibiotics        Hyponatremia  Assessment & Plan  One hundred twenty-eight today  Decreased from 130  Discussed with nephrology  Increase sodium tablets to 2 g twice a day  Patient will need repeat BMP in rehab  Follow-up with nephrology as outpatient    Wound, open, head  Assessment & Plan  Status post excision of squamous cell carcinoma  Dressing change  Dermatology consult appreciated  He will need follow-up as outpatient    Abnormal chest x-ray  Assessment & Plan  Chest x-ray suggestive right lower infiltrate  No clinical signs of pneumonia  Procalcitonin negative  Antibiotics were discontinued 08/31/2021 as per ID recommendation  Will monitor off antibiotics    Chronic respiratory failure (HCC)  Assessment & Plan  On 2 L nasal cannula around the clock    UTI (urinary tract infection)  Assessment & Plan  Review of previous admission shows Klebsiella oxytocia, Enterococcus faecalis; and Pseudomonas in urine; all 3 greater than 100 K colony-forming units    Given indwelling urinary catheter, no doubt some these bacteria are chronic colonizers; but will treat empirically  Received Cipro in the ED; given patient's age and weakness, transitioned to aztreonam in the setting of documented ancef and penicillin allergies  Antibiotics were discontinued 8/31/21    Pulmonary emphysema (HCC)  Assessment & Plan  Continue Trelegy; SpO2 96% on room air  Monitor    Hypertension  Assessment & Plan  Well controlled  Continue home medications    Atherosclerotic heart disease of native coronary artery with other forms of angina pectoris [facility])  Assessment & Plan  Continue aspirin, metoprolol, and statin    Benign prostatic hyperplasia  Assessment & Plan  Continue alfuzosin; indwelling urinary catheter  History of bladder cancer with recent cystoscopy demonstrating no recurrence  Urology consult appreciated  Patient will be discharged with Porter catheter he will follow-up with urology as outpatient    PAF (paroxysmal atrial fibrillation) [facility])  Assessment & Plan  Currently rate controlled on admission; follows with cardiology in the outpatient setting  Continue aspirin and metoprolol  Not anticoagulation candidate as per Cardiology    Severe aortic stenosis  Assessment & Plan  History of severe aortic stenosis; 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  Follows with cardiology  As per recent echo (06/2021):  A 26 mm Braxton Jeffrey bioprosthesis was present  It exhibited normal function    Continue home medications        Discharging Physician / Practitioner: Shawn De Paz MD  PCP: Staci Morgan MD  Admission Date:   Admission Orders (From admission, onward)     Ordered        08/29/21 1818  Inpatient Admission  Once                   Discharge Date: 09/03/21    Medical Problems     Resolved Problems  Date Reviewed: 8/29/2021    None                Consultations During Hospital Stay:  · Dermatology, Nephrology, Infectious Disease, Urology    Procedures Performed:   · None    Significant Findings / Test Results:   · As above    Incidental Findings:   · None    Test Results Pending at Discharge (will require follow up):    · Non     Outpatient Tests Requested:  · BMP    Complications:  None    Reason for Admission:  Weakness and fall    Hospital Course:     Daren Carpenter is a 80 y o  male patient who originally presented to the hospital on 8/29/2021 due to weakness and fall     Trauma workup was negative  Patient met sepsis criteria  Initially started on aztreonam   With no obvious source of infection  Srinivas Youngblood was denies negative and cultures were negative  As per ID advice antibiotics were discontinued  Patient remained afebrile with stable vital signs  Neurology was consulted and was advised to discharge home with the Porter catheter  Dermatology was consulted for the wound on the head status post squamous cell carcinoma removal   No debridement was indicated  Patient was found to have hyponatremia  Nephrology was consulted  Patient was being treated with sodium tablets  Hyponatremia did not improved  It was discussed with nephrology to increased sodium tablets to 2 g twice a day on discharge  Patient will need BMP repeat  He will need follow-up with nephrology as outpatient     Was evaluated by Physical therapy and post acute rehab was advised  Patient will be discharged to post acute rehab today  Please see above list of diagnoses and related plan for additional information  Condition at Discharge: good     Discharge Day Visit / Exam:     Subjective:  Patient seen examined bedside  No acute events  Denies any chest pain, shortness of breath, abdominal pain  Vitals: Blood Pressure: 119/61 (09/03/21 0646)  Pulse: 73 (09/03/21 0646)  Temperature: 98 °F (36 7 °C) (09/03/21 0646)  Temp Source: Oral (09/03/21 0646)  Respirations: 16 (09/03/21 0646)  Height: 5' 6" (167 6 cm) (08/29/21 1900)  Weight - Scale: 63 1 kg (139 lb 1 6 oz) (08/29/21 1900)  SpO2: 98 % (09/03/21 0646)  Exam:   Physical Exam  Vitals and nursing note reviewed  Constitutional:       General: He is not in acute distress  Appearance: Normal appearance     HENT: Head: Normocephalic  Comments: Dressing on the head clean and dry     Nose: Nose normal       Mouth/Throat:      Mouth: Mucous membranes are moist    Eyes:      Conjunctiva/sclera: Conjunctivae normal    Cardiovascular:      Rate and Rhythm: Normal rate  Heart sounds: Normal heart sounds  No murmur heard  Pulmonary:      Effort: Pulmonary effort is normal  No respiratory distress  Breath sounds: Normal breath sounds  No wheezing  Comments: On 2 L nasal cannula  Abdominal:      General: There is no distension  Tenderness: There is no abdominal tenderness  There is no guarding  Musculoskeletal:         General: No swelling  Right lower leg: No edema  Skin:     General: Skin is warm  Neurological:      General: No focal deficit present  Mental Status: He is alert and oriented to person, place, and time  Psychiatric:         Mood and Affect: Mood normal          Discussion with Family:  With patient    Discharge instructions/Information to patient and family:   See after visit summary for information provided to patient and family  Provisions for Follow-Up Care:  See after visit summary for information related to follow-up care and any pertinent home health orders  Disposition:     Post acute rehab  Planned Readmission:  No     Discharge Statement:  I spent 35 minutes discharging the patient  This time was spent on the day of discharge  I had direct contact with the patient on the day of discharge  Greater than 50% of the total time was spent examining patient, answering all patient questions, arranging and discussing plan of care with patient as well as directly providing post-discharge instructions  Additional time then spent on discharge activities  Discharge Medications:  See after visit summary for reconciled discharge medications provided to patient and family        ** Please Note: This note has been constructed using a voice recognition system **

## 2021-09-03 NOTE — CASE MANAGEMENT
Pt's [address] form completed and stapled to folder with facesheet for accepting facility  CM provided to nurse  Pt remains scheduled for d/c at 10am to [facility]  COVID test negative  Scripts needed for controlled substances  Provider and nursing informed  CM will continue to follow to d/c

## 2021-09-04 VITALS
OXYGEN SATURATION: 94 % | HEART RATE: 77 BPM | RESPIRATION RATE: 16 BRPM | SYSTOLIC BLOOD PRESSURE: 121 MMHG | DIASTOLIC BLOOD PRESSURE: 73 MMHG | TEMPERATURE: 97.5 F

## 2021-09-04 LAB — ACTH PLAS-MCNC: 61.4 PG/ML (ref 7.2–63.3)

## 2021-09-05 NOTE — ASSESSMENT & PLAN NOTE
Will continue PT/OT  Placed on fall precautions  The goal is to return home    Monitor orthostatic vS

## 2021-09-05 NOTE — ASSESSMENT & PLAN NOTE
With chronic indwelling cath due to obstructive uropathy  Continue nitrofurantoin for now  Avoid using nitrofurantoin for UTI ppx  Consider cranberry supplements for PPX  Follows up with Urology  Encourage po hydration    Monitor CBC, CMP

## 2021-09-05 NOTE — ASSESSMENT & PLAN NOTE
CXR with R lower infiltrate  Patient reports blood in sputum for a while now  He is a former smoker  I would suggest further follow up with Pulmonology    Currently not on antibiotics, monitor closely

## 2021-09-07 ENCOUNTER — NURSING HOME VISIT (OUTPATIENT)
Dept: GERIATRICS | Facility: OTHER | Age: 86
End: 2021-09-07
Payer: MEDICARE

## 2021-09-07 DIAGNOSIS — J43.9 PULMONARY EMPHYSEMA, UNSPECIFIED EMPHYSEMA TYPE (HCC): ICD-10-CM

## 2021-09-07 DIAGNOSIS — R26.2 AMBULATORY DYSFUNCTION: ICD-10-CM

## 2021-09-07 DIAGNOSIS — E87.1 HYPONATREMIA: ICD-10-CM

## 2021-09-07 DIAGNOSIS — N30.00 ACUTE CYSTITIS WITHOUT HEMATURIA: Primary | ICD-10-CM

## 2021-09-07 DIAGNOSIS — Z95.2 S/P TAVR (TRANSCATHETER AORTIC VALVE REPLACEMENT): ICD-10-CM

## 2021-09-07 DIAGNOSIS — N13.9 OBSTRUCTIVE UROPATHY: ICD-10-CM

## 2021-09-07 DIAGNOSIS — D64.9 ANEMIA, UNSPECIFIED TYPE: ICD-10-CM

## 2021-09-07 DIAGNOSIS — J96.11 CHRONIC RESPIRATORY FAILURE WITH HYPOXIA (HCC): ICD-10-CM

## 2021-09-07 DIAGNOSIS — I48.0 PAF (PAROXYSMAL ATRIAL FIBRILLATION) (HCC): ICD-10-CM

## 2021-09-07 DIAGNOSIS — S01.00XD OPEN WOUND OF SCALP, UNSPECIFIED OPEN WOUND TYPE, SUBSEQUENT ENCOUNTER: ICD-10-CM

## 2021-09-07 PROCEDURE — 99309 SBSQ NF CARE MODERATE MDM 30: CPT | Performed by: NURSE PRACTITIONER

## 2021-09-07 NOTE — ASSESSMENT & PLAN NOTE
Heart rate trending 60s to 70s  Continue aspirin, metoprolol with hold parameter  Follow-up with cardiology outpatient

## 2021-09-07 NOTE — TELEPHONE ENCOUNTER
He was treated in the hospital and discharged about a week ago  He does not need to take the oral antibiotics prescribed prior to hospitalization   Thanks

## 2021-09-07 NOTE — ASSESSMENT & PLAN NOTE
Stable without exacerbation  O2 sats stable on oxygen supplementation via nasal cannula  Continue Trelegy  Followup with pulmonology outpatient

## 2021-09-07 NOTE — TELEPHONE ENCOUNTER
Called and spoke with Annette Terry for clarification  Pt was prescribed an antibiotic by us the same day he went into the hospital  The hospital gave pt IV antibiotics instead  Pt wife wants to know if pt still needs to take the antibiotics that were originally prescribed prior to the ER visit  Annette Terry stated the pt no longer has blood in urine and is not symptomatic at this time

## 2021-09-07 NOTE — ASSESSMENT & PLAN NOTE
Sodium level 130 today<< 128 previously  Continue sodium chloride 2 g daily  Monitor BMP periodically  Currently not on a fluid restriction  Follow-up with Nephrology if needed

## 2021-09-07 NOTE — ASSESSMENT & PLAN NOTE
S/p SCC excision  Dressing clean dry and intact to occipital area  Follow-up with dermatology outpatient

## 2021-09-07 NOTE — TELEPHONE ENCOUNTER
Reymundo Shafer PA-C Nursing/Rehab Facility called questioning antibiotic which was ordered by on call provider as pt's wife will not allow pt to take unless ordered by urology  [phone]

## 2021-09-07 NOTE — ASSESSMENT & PLAN NOTE
Stable without symptoms  With chronic indwelling urinary catheter due to obstructive uropathy  Patient was treated with Cipro then IV aztreonam which was discontinued on 08/31/2021  Urology following patient outpatient and ordered oral Macrobid but patient never started the medication and patient was discharged on [address]    Awaiting to hear from Urology whether patient needs start Macrobid  Encourage p o  hydration  Continue to monitor  WBC 6 0 today

## 2021-09-07 NOTE — PROGRESS NOTES
Facility: [facility]   POS: 31 (STR)  Progress Note    Chief Complaint/Reason for visit: STR follow up  History of Present Illness:  59-year-old male seen and examined for STR follow up  Received patient seated in chair and appeared in no distress  He has O2 on via nasal cannula  Nursing reports that patient's wife does not want patient on Macrobid because he was not on it at home  Will check with Urology office regarding clarification of Macrobid order  Indwelling urinary catheter intact and patent for yellow urine  Denies having any urinary symptoms  He admits to occasional bilateral knee pain  He was numbness from left knee down to foot from previous injury  He reported to NP student that he is appetite is poor but informed me that his appetite is good  He is receiving fortified cereal daily  As per answering service, patient's wife called today requesting that patient be on a daily Metamucil  Patient states that he has regular bowel movements and does not feel constipated  Patient does have p r n  MiraLax ordered if he should need it for constipation    Past Medical History: unchanged from history and physical  Past Medical History:   Diagnosis Date    Anemia     Bladder cancer [facility] )     Cancer [facility]     bladder    Carotid artery occlusion     Cataract     Colon polyp     COPD (chronic obstructive pulmonary disease) (HCC)     Coronary artery disease     Disease of thyroid gland     History of transfusion     Hyperlipidemia     Hypertension     Hypothyroidism 9/15/2017    Multiple pulmonary nodules     Peptic ulcer     Scoliosis     Sepsis without acute organ dysfunction ([address] ) 1/26/2021    Severe protein-calorie malnutrition ([facility] ) 4/17/2021    SIRS (systemic inflammatory response syndrome) ([facility] 12/19/2019    Transient cerebral ischemia     Tremors of nervous system      Family History: unchanged from history and physical  Social History: unchanged from history and physical  Resident Since:  09/03/2021  Review of systems: Review of Systems   Constitutional: Negative for chills and diaphoresis  HENT: Negative for congestion  Eyes: Negative  Respiratory: Positive for shortness of breath (SOB on exertion)  Negative for cough  Cardiovascular: Negative for chest pain and palpitations  Gastrointestinal: Negative for abdominal pain, constipation, diarrhea and nausea  Endocrine: Negative  Genitourinary: Negative for flank pain and penile pain  Musculoskeletal: Positive for gait problem  Neurological: Negative for dizziness, syncope, speech difficulty, light-headedness, numbness and headaches  Psychiatric/Behavioral: Negative for agitation, behavioral problems, dysphoric mood and hallucinations  The patient is not nervous/anxious  All other systems reviewed and are negative  Medications: All medication and routine orders were reviewed and updated  Allergies: Reviewed and unchanged  Consults reviewed: Other  Labs/Diagnostics (reviewed by this provider): Copy in Chart    Imaging Reviewed: None today    Physical Exam    Weight:  Temp: 97 5       BP:  98/62  Pulse:  71 Resp: 18 O2 Sat:  92% on oxygen  Constitutional: Normocephalic  Orientation:Person, Place and Day     Physical Exam  Vitals and nursing note reviewed  Constitutional:       General: He is not in acute distress  Appearance: He is not toxic-appearing or diaphoretic  HENT:      Head: Normocephalic  Nose: No congestion or rhinorrhea  Mouth/Throat:      Mouth: Mucous membranes are moist       Pharynx: No oropharyngeal exudate  Eyes:      General: No scleral icterus  Right eye: No discharge  Left eye: No discharge  Extraocular Movements: Extraocular movements intact  Conjunctiva/sclera: Conjunctivae normal       Pupils: Pupils are equal, round, and reactive to light  Cardiovascular:      Rate and Rhythm: Normal rate and regular rhythm        Pulses: Normal pulses  Comments: Bioprosthetic valve noted on auscultation  Pulmonary:      Effort: Pulmonary effort is normal  No respiratory distress  Breath sounds: Normal breath sounds  No wheezing, rhonchi or rales  Abdominal:      General: Bowel sounds are normal  There is no distension  Palpations: Abdomen is soft  Tenderness: There is no abdominal tenderness  There is no guarding  Genitourinary:     Comments: Indwelling urinary catheter intact and patent for yellow urine  Musculoskeletal:      Cervical back: Neck supple  No rigidity  Right lower leg: Edema (Trace pitting edema right ankle) present  Left lower leg: Edema (Trace pitting edema left ankle) present  Comments: Moves all 4 extremities  Lymphadenopathy:      Cervical: No cervical adenopathy  Skin:     General: Skin is warm and dry  Capillary Refill: Capillary refill takes less than 2 seconds  Neurological:      Mental Status: He is alert  Mental status is at baseline  Cranial Nerves: No cranial nerve deficit  Motor: Weakness present  Gait: Gait abnormal    Psychiatric:         Mood and Affect: Mood normal          Behavior: Behavior normal          Thought Content: Thought content normal        Assessment/Plan:  22-year-old male with:    UTI (urinary tract infection)  Stable without symptoms  With chronic indwelling urinary catheter due to obstructive uropathy  Patient was treated with Cipro then IV aztreonam which was discontinued on 08/31/2021  Urology following patient outpatient and ordered oral Macrobid but patient never started the medication and patient was discharged on [address]    Awaiting to hear from Urology whether patient needs start Macrobid  Encourage p o  hydration  Continue to monitor  WBC 6 0 today    Hyponatremia  Sodium level 130 today<< 128 previously  Continue sodium chloride 2 g daily  Monitor BMP periodically  Currently not on a fluid restriction  Follow-up with Nephrology if needed    S/P TAVR (transcatheter aortic valve replacement)  Due to severe aortic stenosis  Continue aspirin  Patient is currently not on an anticoagulant  Patient is not a candidate for anticoagulation per record review    Pulmonary emphysema ([address] )  Stable without exacerbation  O2 sats stable on oxygen supplementation via nasal cannula  Continue Trelegy  Followup with pulmonology outpatient    PAF (paroxysmal atrial fibrillation) ([facility])  Heart rate trending 60s to 70s  Continue aspirin, metoprolol with hold parameter  Follow-up with cardiology outpatient    Chronic respiratory failure (HCC)  Requires continuous oxygen via nasal cannula    Wound, open, head  S/p SCC excision  Dressing clean dry and intact to occipital area  Follow-up with dermatology outpatient    Anemia  Most recent hemoglobin 9 6/hematocrit 28 0 today    Ambulatory dysfunction  Multifactorial  Continue PT/OT  Continue supportive care  Continue fall precautions    Obstructive uropathy  With chronic indwelling urinary catheter  Urology following outpatient  Patient is had high risk for CAUTI due to indwelling urinary catheter    This note was completed in part utilizing [employer] direct voice recognition software  Grammatical errors, random word insertion, spelling mistakes, and incomplete sentences may be an occasional consequence of the system secondary to software limitations, ambient noise and hardware issues  At the time of dictation, efforts were made to edit, clarify and/or correct errors  Please read the chart carefully and recognize, using context, where substitutions have occurred  If you have any questions or concerns about the context, text or information contained within the body of this dictation, please contact myself, the provider, for further clarification      Philippe Cooley [address]  9/7/20212:26 PM

## 2021-09-07 NOTE — ASSESSMENT & PLAN NOTE
Due to severe aortic stenosis  Continue aspirin  Patient is currently not on an anticoagulant    Patient is not a candidate for anticoagulation per record review

## 2021-09-07 NOTE — ASSESSMENT & PLAN NOTE
With chronic indwelling urinary catheter  Urology following outpatient  Patient is had high risk for CAUTI due to indwelling urinary catheter

## 2021-09-09 ENCOUNTER — TELEPHONE (OUTPATIENT)
Dept: NEPHROLOGY | Facility: CLINIC | Age: 86
End: 2021-09-09

## 2021-09-09 NOTE — TELEPHONE ENCOUNTER
Received a call from patient's wife stating she spoke with the  in regard to scheduling a hospital follow up with Dr Moni Vora  Patient's wife Koryfiliberto Ramsey would like a direct call back from [place] with any information on this matter   She can be reached at [phone]

## 2021-09-10 ENCOUNTER — OFFICE VISIT (OUTPATIENT)
Dept: PULMONOLOGY | Facility: CLINIC | Age: 86
End: 2021-09-10
Payer: MEDICARE

## 2021-09-10 VITALS
HEART RATE: 73 BPM | WEIGHT: 139 LBS | TEMPERATURE: 97.4 F | HEIGHT: 66 IN | DIASTOLIC BLOOD PRESSURE: 58 MMHG | OXYGEN SATURATION: 100 % | BODY MASS INDEX: 22.34 KG/M2 | SYSTOLIC BLOOD PRESSURE: 118 MMHG

## 2021-09-10 DIAGNOSIS — J96.11 CHRONIC RESPIRATORY FAILURE WITH HYPOXIA (HCC): ICD-10-CM

## 2021-09-10 DIAGNOSIS — J43.9 PULMONARY EMPHYSEMA, UNSPECIFIED EMPHYSEMA TYPE (HCC): Primary | ICD-10-CM

## 2021-09-10 PROCEDURE — 99213 OFFICE O/P EST LOW 20 MIN: CPT | Performed by: INTERNAL MEDICINE

## 2021-09-10 NOTE — LETTER
September 16, 2021     Staci Morgan MD  [address] [place] [address]    Patient: Braxton Deleon   YOB: 1933   Date of Visit: 9/10/2021       Dear Dr Gwendolyn Garcia:    Thank you for referring Katiuska Alejandra to me for evaluation  Below are my notes for this consultation  If you have questions, please do not hesitate to call me  I look forward to following your patient along with you  Sincerely,        Gisselle Bonner MD        CC: No Recipients  Gisselle Bonner MD  9/11/2021  6:52 AM  Signed    Progress note - Pulmonary Medicine   Braxton Deleon 80 y o  male MRN: [id number]       Impression & Plan:     Pulmonary emphysema ([address] )  ·  This is stable on imaging with actual improvement in former inflammatory opacities bilaterally   · Cough has improved  No longer brownish or with any hemoptysis  · Continue Trelegy Ellipta   · Currently in inpatient rehabilitation for weakness  Chronic respiratory failure with hypoxia (HCC)  ·  He is using continuous oxygen but resting room air saturations are adequate at 90%  · I did advise him that while he is sitting and resting he does not require to continuous oxygen use  We will change his oxygen to as needed during rest and he will continue to utilize oxygen for all activity and for sleep  · Notation was sent to his nursing facility rehabilitation to modify usage     recommended pulmonary follow-up in 4 months  _____________________________________________________________________    HPI:    Braxton Deleon presents today for follow-up of  Recent hospitalization for weakness  He has COPD and recently was considered for possible diagnosis of hypersensitivity pneumonitis treated with steroids  He did have alveolar hemorrhage but has multiple risk factors for this and hypersensitivity is felt to be unlikely  He was also noted to be significantly hyponatremic and has upcoming nephrology consultation pending    He is currently in inpatient rehabilitation setting to help improve his mobility  His wife cannot handle his needs at home alone currently and he is hopeful that he can get strong enough to return home  He has had improvement in his respiratory symptoms  He is taking Trelegy Ellipta regularly  The color and frequency of his cough and phlegm production has improved  His phlegm is now clear when he expectorates  He no longer has any brownish discoloration or any blood identified  He does have chronic cardiac issues with severe aortic stenosis  Now status post valve replacement  He has cardiomyopathy  He is on multiple cardiovascular agents and diuretics  He is not on anticoagulation due to alveolar bleeding and fall risk  He also has a substantial component of pulmonary hypertension     otherwise he is doing well  He does have a skin cancer on his head that could not be excised completely  He is following up for this  He does have an open wound on his head posteriorly near the vertex  His appetite remains somewhat poor and his weight has declined since the last visit  Current Medications:    Current Outpatient Medications:     acetaminophen (TYLENOL) 325 mg tablet, Take 3 tablets (975 mg total) by mouth 3 (three) times a day, Disp: , Rfl:     alfuzosin (UROXATRAL) 10 mg 24 hr tablet, Take 1 tablet (10 mg total) by mouth daily, Disp: 90 tablet, Rfl: 3    aspirin 81 MG tablet, Take 81 mg by mouth daily , Disp: , Rfl:     Cholecalciferol (HM VITAMIN D3) 2000 units CAPS, Take 1 tablet by mouth daily, Disp: , Rfl:     fluticasone-umeclidinium-vilanterol (Trelegy Ellipta) 200-62 5-25 MCG/INH AEPB inhaler, Inhale 1 puff daily Rinse mouth after use , Disp: 60 blister, Rfl: 2    furosemide (LASIX) 20 mg tablet, Take 1 tablet (20 mg total) by mouth daily, Disp: 90 tablet, Rfl: 2    levothyroxine 75 mcg tablet, 1 tablet daily M-Fridays   1 and 1/2 tablets Sat-Sundays, Disp: 90 tablet, Rfl: 3    metoprolol succinate (TOPROL-XL) 25 mg 24 hr tablet, TAKE 1/2 TABLET BY MOUTH EVERY 12 HOURS, Disp: 90 tablet, Rfl: 3    metoprolol succinate (TOPROL-XL) 25 mg 24 hr tablet, Take 0 5 tablets (12 5 mg total) by mouth 2 (two) times a day, Disp: 60 tablet, Rfl: 0    Multiple Vitamin (multivitamin) capsule, Take 1 capsule by mouth daily, Disp: , Rfl:     omeprazole (PriLOSEC) 20 mg delayed release capsule, Take 20 mg by mouth daily, Disp: , Rfl:     potassium chloride (K-DUR,KLOR-CON) 10 mEq tablet, TAKE 1 TABLET(10 MEQ) BY MOUTH THREE TIMES DAILY, Disp: 270 tablet, Rfl: 1    psyllium (METAMUCIL) 58 6 % packet, Take 1 packet by mouth daily, Disp: 30 packet, Rfl: 2    simvastatin (ZOCOR) 20 mg tablet, TAKE 1 TABLET(20 MG) BY MOUTH DAILY AT BEDTIME, Disp: 90 tablet, Rfl: 1    sodium chloride 1 g tablet, Take 2 tablets (2 g total) by mouth 2 (two) times a day with meals, Disp: 60 tablet, Rfl: 0    Review of Systems:  Aside from what is mentioned in the HPI, the review of systems is otherwise negative    Past medical history, surgical history, and family history were reviewed and updated as appropriate    Social history updates:  Social History     Tobacco Use   Smoking Status Former Smoker    Packs/day: 1 00    Years: 50 00    Pack years: 50 00    Types: Cigarettes    Start date: 56    Quit date: 200    Years since quittin 7   Smokeless Tobacco Never Used       PhysicalExamination:  Vitals:   /58 (BP Location: Left arm, Patient Position: Sitting)   Pulse 73   Temp (!) 97 4 °F (36 3 °C)   Ht 5' 6" (1 676 m)   Wt 63 kg (139 lb)   SpO2 100%   BMI 22 44 kg/m²     Gen: Comfortable  On nasal cannula  Presents in a wheelchair today  His affect is somewhat flatter and seems slightly depressed due to his medical condition and need for inpatient rehabilitation  Unfortunately the nursing facility also has isolation procedures related to COVID and he feels like "he is in care home "  HEENT: sclerae are anicteric    O/P: clear  moist  Neck: Trachea is midline  No JVD  No adenopathy  Chest:   Symmetric chest wall excursion  He has slight hyperinflation  Breath sounds are distant but otherwise clear today  Cardiac:  Irregular  Slight systolic murmur  Abdomen: Soft and nontender  Bowel sounds are present  Extremities: No edema  Neuro: Speech and mentation normal   Nonambulatory for today's visit  Skin:  Bandage at the vertex of his head for his skin    Diagnostic Data:  Labs: I personally reviewed the most recent laboratory data pertinent to today's visit    Lab Results   Component Value Date    WBC 4 77 09/01/2021    HGB 9 5 (L) 09/01/2021    HCT 28 4 (L) 09/01/2021    MCV 95 09/01/2021     09/01/2021     Lab Results   Component Value Date    SODIUM 128 (L) 09/03/2021    K 3 7 09/03/2021    CO2 30 09/03/2021    CL 96 (L) 09/03/2021    BUN 11 09/03/2021    CREATININE 0 54 (L) 09/03/2021    CALCIUM 8 4 09/03/2021     Imaging:  I personally reviewed the images on the [facility] system pertinent to today's visit  CT scan of the chest was performed on September 1st during the hospitalization  This shows interval resolution of the inflammatory opacities  Background emphysema and tiny pulmonary nodule in the right lower lobe is stable  Other studies:    Echocardiogram in June did show ejection fraction 55%  Right ventricular function was reduced and the ventricle was slightly dilated  Bioprosthetic AVR appeared to be functioning normally     There was pulmonary hypertension with an estimated pulmonary pressure of 57 mmHg    Halie Caputo MD

## 2021-09-10 NOTE — TELEPHONE ENCOUNTER
I spoke to patient's spouse and scheduled the patient for 9/16 with Dr Vashti Hutton in [place]  Patient's spouse requested ASAP due to some concerns regarding the patient

## 2021-09-11 PROBLEM — R93.89 ABNORMAL CHEST X-RAY: Status: RESOLVED | Noted: 2021-08-31 | Resolved: 2021-09-11

## 2021-09-11 NOTE — PROGRESS NOTES
Progress note - Pulmonary Medicine   Lashaun Pandya 80 y o  male MRN: [id number]       Impression & Plan:     Pulmonary emphysema ([address] )  ·  This is stable on imaging with actual improvement in former inflammatory opacities bilaterally   · Cough has improved  No longer brownish or with any hemoptysis  · Continue Trelegy Ellipta   · Currently in inpatient rehabilitation for weakness  Chronic respiratory failure with hypoxia (HCC)  ·  He is using continuous oxygen but resting room air saturations are adequate at 90%  · I did advise him that while he is sitting and resting he does not require to continuous oxygen use  We will change his oxygen to as needed during rest and he will continue to utilize oxygen for all activity and for sleep  · Notation was sent to his nursing facility rehabilitation to modify usage     recommended pulmonary follow-up in 4 months  _____________________________________________________________________    HPI:    Lashaun Pandya presents today for follow-up of  Recent hospitalization for weakness  He has COPD and recently was considered for possible diagnosis of hypersensitivity pneumonitis treated with steroids  He did have alveolar hemorrhage but has multiple risk factors for this and hypersensitivity is felt to be unlikely  He was also noted to be significantly hyponatremic and has upcoming nephrology consultation pending  He is currently in inpatient rehabilitation setting to help improve his mobility  His wife cannot handle his needs at home alone currently and he is hopeful that he can get strong enough to return home  He has had improvement in his respiratory symptoms  He is taking Trelegy Ellipta regularly  The color and frequency of his cough and phlegm production has improved  His phlegm is now clear when he expectorates  He no longer has any brownish discoloration or any blood identified      He does have chronic cardiac issues with severe aortic stenosis Now status post valve replacement  He has cardiomyopathy  He is on multiple cardiovascular agents and diuretics  He is not on anticoagulation due to alveolar bleeding and fall risk  He also has a substantial component of pulmonary hypertension     otherwise he is doing well  He does have a skin cancer on his head that could not be excised completely  He is following up for this  He does have an open wound on his head posteriorly near the vertex  His appetite remains somewhat poor and his weight has declined since the last visit  Current Medications:    Current Outpatient Medications:     acetaminophen (TYLENOL) 325 mg tablet, Take 3 tablets (975 mg total) by mouth 3 (three) times a day, Disp: , Rfl:     alfuzosin (UROXATRAL) 10 mg 24 hr tablet, Take 1 tablet (10 mg total) by mouth daily, Disp: 90 tablet, Rfl: 3    aspirin 81 MG tablet, Take 81 mg by mouth daily , Disp: , Rfl:     Cholecalciferol (HM VITAMIN D3) 2000 units CAPS, Take 1 tablet by mouth daily, Disp: , Rfl:     fluticasone-umeclidinium-vilanterol (Trelegy Ellipta) 200-62 5-25 MCG/INH AEPB inhaler, Inhale 1 puff daily Rinse mouth after use , Disp: 60 blister, Rfl: 2    furosemide (LASIX) 20 mg tablet, Take 1 tablet (20 mg total) by mouth daily, Disp: 90 tablet, Rfl: 2    levothyroxine 75 mcg tablet, 1 tablet daily M-Fridays   1 and 1/2 tablets Sat-Sundays, Disp: 90 tablet, Rfl: 3    metoprolol succinate (TOPROL-XL) 25 mg 24 hr tablet, TAKE 1/2 TABLET BY MOUTH EVERY 12 HOURS, Disp: 90 tablet, Rfl: 3    metoprolol succinate (TOPROL-XL) 25 mg 24 hr tablet, Take 0 5 tablets (12 5 mg total) by mouth 2 (two) times a day, Disp: 60 tablet, Rfl: 0    Multiple Vitamin (multivitamin) capsule, Take 1 capsule by mouth daily, Disp: , Rfl:     omeprazole (PriLOSEC) 20 mg delayed release capsule, Take 20 mg by mouth daily, Disp: , Rfl:     potassium chloride (K-DUR,KLOR-CON) 10 mEq tablet, TAKE 1 TABLET(10 MEQ) BY MOUTH THREE TIMES DAILY, Disp: 270 tablet, Rfl: 1    psyllium (METAMUCIL) 58 6 % packet, Take 1 packet by mouth daily, Disp: 30 packet, Rfl: 2    simvastatin (ZOCOR) 20 mg tablet, TAKE 1 TABLET(20 MG) BY MOUTH DAILY AT BEDTIME, Disp: 90 tablet, Rfl: 1    sodium chloride 1 g tablet, Take 2 tablets (2 g total) by mouth 2 (two) times a day with meals, Disp: 60 tablet, Rfl: 0    Review of Systems:  Aside from what is mentioned in the HPI, the review of systems is otherwise negative    Past medical history, surgical history, and family history were reviewed and updated as appropriate    Social history updates:  Social History     Tobacco Use   Smoking Status Former Smoker    Packs/day: 1 00    Years: 50 00    Pack years: 50 00    Types: Cigarettes    Start date: 56    Quit date: 200    Years since quittin 7   Smokeless Tobacco Never Used       PhysicalExamination:  Vitals:   /58 (BP Location: Left arm, Patient Position: Sitting)   Pulse 73   Temp (!) 97 4 °F (36 3 °C)   Ht 5' 6" (1 676 m)   Wt 63 kg (139 lb)   SpO2 100%   BMI 22 44 kg/m²     Gen: Comfortable  On nasal cannula  Presents in a wheelchair today  His affect is somewhat flatter and seems slightly depressed due to his medical condition and need for inpatient rehabilitation  Unfortunately the nursing facility also has isolation procedures related to COVID and he feels like "he is in intermediate "  HEENT: sclerae are anicteric    O/P: clear  moist  Neck: Trachea is midline  No JVD  No adenopathy  Chest:   Symmetric chest wall excursion  He has slight hyperinflation  Breath sounds are distant but otherwise clear today  Cardiac:  Irregular  Slight systolic murmur  Abdomen: Soft and nontender  Bowel sounds are present  Extremities: No edema  Neuro: Speech and mentation normal   Nonambulatory for today's visit  Skin:  Bandage at the vertex of his head for his skin    Diagnostic Data:  Labs:   I personally reviewed the most recent laboratory data pertinent to today's visit    Lab Results   Component Value Date    WBC 4 77 09/01/2021    HGB 9 5 (L) 09/01/2021    HCT 28 4 (L) 09/01/2021    MCV 95 09/01/2021     09/01/2021     Lab Results   Component Value Date    SODIUM 128 (L) 09/03/2021    K 3 7 09/03/2021    CO2 30 09/03/2021    CL 96 (L) 09/03/2021    BUN 11 09/03/2021    CREATININE 0 54 (L) 09/03/2021    CALCIUM 8 4 09/03/2021     Imaging:  I personally reviewed the images on the [facility] system pertinent to today's visit  CT scan of the chest was performed on September 1st during the hospitalization  This shows interval resolution of the inflammatory opacities  Background emphysema and tiny pulmonary nodule in the right lower lobe is stable  Other studies:    Echocardiogram in June did show ejection fraction 55%  Right ventricular function was reduced and the ventricle was slightly dilated  Bioprosthetic AVR appeared to be functioning normally     There was pulmonary hypertension with an estimated pulmonary pressure of 57 mmHg    Yoselyn Hopson MD

## 2021-09-11 NOTE — ASSESSMENT & PLAN NOTE
·  This is stable on imaging with actual improvement in former inflammatory opacities bilaterally   · Cough has improved  No longer brownish or with any hemoptysis  · Continue Trelegy Ellipta   · Currently in inpatient rehabilitation for weakness

## 2021-09-11 NOTE — ASSESSMENT & PLAN NOTE
·  He is using continuous oxygen but resting room air saturations are adequate at 90%  · I did advise him that while he is sitting and resting he does not require to continuous oxygen use    We will change his oxygen to as needed during rest and he will continue to utilize oxygen for all activity and for sleep  · Notation was sent to his nursing facility rehabilitation to modify usage

## 2021-09-13 ENCOUNTER — TELEPHONE (OUTPATIENT)
Dept: NEPHROLOGY | Facility: HOSPITAL | Age: 86
End: 2021-09-13

## 2021-09-13 ENCOUNTER — NURSING HOME VISIT (OUTPATIENT)
Dept: GERIATRICS | Facility: OTHER | Age: 86
End: 2021-09-13
Payer: MEDICARE

## 2021-09-13 DIAGNOSIS — J43.9 PULMONARY EMPHYSEMA, UNSPECIFIED EMPHYSEMA TYPE (HCC): Primary | ICD-10-CM

## 2021-09-13 DIAGNOSIS — I50.32 CHRONIC DIASTOLIC CONGESTIVE HEART FAILURE (HCC): ICD-10-CM

## 2021-09-13 DIAGNOSIS — N30.00 ACUTE CYSTITIS WITHOUT HEMATURIA: ICD-10-CM

## 2021-09-13 DIAGNOSIS — S01.00XD OPEN WOUND OF SCALP, UNSPECIFIED OPEN WOUND TYPE, SUBSEQUENT ENCOUNTER: ICD-10-CM

## 2021-09-13 DIAGNOSIS — E87.1 HYPONATREMIA: ICD-10-CM

## 2021-09-13 DIAGNOSIS — I48.0 PAF (PAROXYSMAL ATRIAL FIBRILLATION) (HCC): ICD-10-CM

## 2021-09-13 DIAGNOSIS — J96.11 CHRONIC RESPIRATORY FAILURE WITH HYPOXIA (HCC): ICD-10-CM

## 2021-09-13 DIAGNOSIS — N13.9 OBSTRUCTIVE UROPATHY: ICD-10-CM

## 2021-09-13 DIAGNOSIS — R26.2 AMBULATORY DYSFUNCTION: ICD-10-CM

## 2021-09-13 PROCEDURE — 99309 SBSQ NF CARE MODERATE MDM 30: CPT | Performed by: NURSE PRACTITIONER

## 2021-09-13 NOTE — TELEPHONE ENCOUNTER
----- Message from Tiffany Hinojosa DO sent at 9/3/2021 10:33 AM EDT -----  Patient is going to rehab sodium is still slightly low please have him repeat a BMP in 1-2 weeks and have him follow-up in the office    I believe he is going to [facility] thanks

## 2021-09-13 NOTE — ASSESSMENT & PLAN NOTE
Heart rate stable  Continue aspirin, metoprolol with hold parameter  Follow up with Cardiology outpatient

## 2021-09-13 NOTE — ASSESSMENT & PLAN NOTE
Most recent sodium level 130 on 09/07/2021<< 128 previously  Check BMP 09/14/2021  Follow-up with Nephrology 09/16/2021

## 2021-09-13 NOTE — ASSESSMENT & PLAN NOTE
Resting O2 sats has been stable 90% or above  Continue oxygen p r n  during rest and for all activity and during sleep

## 2021-09-13 NOTE — ASSESSMENT & PLAN NOTE
With chronic indwelling urinary catheter  Urology following outpatient  Nursing to continue indwelling urinary catheter care as per protocol

## 2021-09-13 NOTE — ASSESSMENT & PLAN NOTE
Wt Readings from Last 3 Encounters:   09/10/21 63 kg (139 lb)   08/29/21 63 1 kg (139 lb 1 6 oz)   08/25/21 62 1 kg (137 lb)   Patient has lost 2 pounds  Appears euvolemic on exam  SBPs trending 90s to 110s  Continue Lasix 20 mg daily and hold for SBP<100

## 2021-09-13 NOTE — PROGRESS NOTES
Facility: [facility]  POS: 31 (STR)  Progress Note    Chief Complaint/Reason for visit: STR follow up  History of Present Illness:  60-year-old male seen and examined for STR follow up  Received patient seated in chair and appeared in no distress  Patient was eating his lunch and states that his appetite is good but he does not always like the food due to modified diet  He was seen by pulmonology on 09/10/2021 for follow-up of pulmonary emphysema; notes reviewed  Respiratory status is stable  Patient does not require oxygen during rest as per pulmonology  Patient is to follow-up with Nephrology regarding hyponatremia  Denies shortness of breath, chest pain, headache, dizziness, abdominal pain, nausea, vomiting, diarrhea, or constipation  Past Medical History: unchanged from history and physical  Past Medical History:   Diagnosis Date    Anemia     [facility]adder [facility]     [facility]     bladder    Carotid artery occlusion     Cataract     Colon polyp     COPD (chronic obstructive pulmonary disease) (HCC)     Coronary artery disease     Disease of thyroid gland     History of transfusion     Hyperlipidemia     Hypertension     Hypothyroidism 9/15/2017    Multiple pulmonary nodules     Peptic ulcer     Scoliosis     Sepsis without acute organ dysfunction ([facility] ) 1/26/2021    Severe protein-calorie malnutrition ([facility] ) 4/17/2021    S[facility]systemic inflammatory response syndrome) [facility] 12/19/2019    Transient cerebral ischemia     Tremors of nervous system      Family History: unchanged from history and physical  Social History: unchanged from history and physical  Resident Since: 9/3/2021  Review of systems: Review of Systems   HENT: Negative for congestion and sore throat  Eyes: Negative  Respiratory: Positive for shortness of breath (On moderate exertion)  Negative for cough  Cardiovascular: Negative for chest pain and palpitations     Gastrointestinal: Negative for abdominal pain, constipation and diarrhea  Endocrine: Negative  Genitourinary:        Indwelling urinary catheter   Musculoskeletal: Positive for gait problem  Neurological: Negative for dizziness, syncope, speech difficulty, light-headedness, numbness and headaches  Psychiatric/Behavioral: Negative for agitation, behavioral problems, dysphoric mood and hallucinations  The patient is not nervous/anxious  All other systems reviewed and are negative  Medications: All medication and routine orders were reviewed and updated  Allergies: Reviewed and unchanged  Consults reviewed: Other  Labs/Diagnostics (reviewed by this provider): Copy in Chart    Imaging Reviewed:  None today    Physical Exam    Weight:  Temp: 97 5       BP:  101/57  Pulse:  61 Resp: 18 O2 Sat:  92 percent  Constitutional: Normocephalic  Orientation:Person, Place and Day     Physical Exam  Vitals and nursing note reviewed  Constitutional:       General: He is not in acute distress  Appearance: He is not toxic-appearing or diaphoretic  Comments: Elderly male who appears in no distress  HENT:      Head: Normocephalic  Nose: No congestion or rhinorrhea  Mouth/Throat:      Mouth: Mucous membranes are moist       Pharynx: No oropharyngeal exudate  Eyes:      General: No scleral icterus  Right eye: No discharge  Left eye: No discharge  Extraocular Movements: Extraocular movements intact  Conjunctiva/sclera: Conjunctivae normal       Pupils: Pupils are equal, round, and reactive to light  Cardiovascular:      Rate and Rhythm: Normal rate and regular rhythm  Pulses: Normal pulses  Pulmonary:      Effort: Pulmonary effort is normal  No respiratory distress  Breath sounds: Normal breath sounds  No wheezing, rhonchi or rales  Abdominal:      General: Bowel sounds are normal  There is no distension  Palpations: Abdomen is soft  Tenderness: There is no abdominal tenderness   There is no guarding  Genitourinary:     Comments: Indwelling urinary catheter intact and patent for yellow urine  Musculoskeletal:      Cervical back: Neck supple  No rigidity  Right lower leg: No edema  Left lower leg: No edema  Comments: Moves all 4 extremities  Lymphadenopathy:      Cervical: No cervical adenopathy  Skin:     General: Skin is warm and dry  Capillary Refill: Capillary refill takes less than 2 seconds  Comments: Posterior scalp dressing CDI  Neurological:      Mental Status: He is alert  Mental status is at baseline  Cranial Nerves: No cranial nerve deficit  Motor: Weakness present  Gait: Gait abnormal    Psychiatric:         Mood and Affect: Mood normal          Behavior: Behavior normal          Thought Content:  Thought content normal        Assessment/Plan:  27-year-old male with:    Pulmonary emphysema ([facility] )  Respiratory status stable without exacerbation  Had follow-up with pulmonology on 09/11/2021; notes reviewed  Continue oxygen supplementation with activity; patient no longer requires oxygen at rest unless he feels short of breath  Continue Trelegy Ellipta  Follow-up pulmonology outpatient as per routine    Chronic respiratory failure with hypoxia ([facility] )  Resting O2 sats has been stable 90% or above  Continue oxygen p r n  during rest and for all activity and during sleep    Chronic diastolic congestive heart failure ([facility] )  Wt Readings from Last 3 Encounters:   09/10/21 63 kg (139 lb)   08/29/21 63 1 kg (139 lb 1 6 oz)   08/25/21 62 1 kg (137 lb)   Patient has lost 3 pounds from 9/3/2021  Appears euvolemic on exam  SBPs trending 90s to 110s  Continue Lasix 20 mg daily and hold for SBP<100    PAF (paroxysmal atrial fibrillation) ([facility])  Heart rate stable  Continue aspirin, metoprolol with hold parameter  Follow up with Cardiology outpatient    UTI (urinary tract infection)  Patient which treated recently with Cipro and IV aztreonam which was discontinued on 08/31/2021  Afebrile and asymptomatic  BPs at baseline  Encourage p o  hydration  Continue to monitor as patient is at high risk for infection due to indwelling urinary catheter    Wound, open, head  S/p SCC excision  Continue wound care as per Dermatology  Follow-up with dermatology in 3 weeks for wound check    Obstructive uropathy  With chronic indwelling urinary catheter  Urology following outpatient  Nursing to continue indwelling urinary catheter care as per protocol    Hyponatremia  Most recent sodium level 130 on 09/07/2021<< 128 previously  Check BMP 09/14/2021  Follow-up with Nephrology 09/16/2021    Ambulatory dysfunction  Multifactorial  Continue supportive care  Continue PT/OT  Continue fall precautions    This note was completed in part utilizing [employer] direct voice recognition software  Grammatical errors, random word insertion, spelling mistakes, and incomplete sentences may be an occasional consequence of the system secondary to software limitations, ambient noise and hardware issues  At the time of dictation, efforts were made to edit, clarify and/or correct errors  Please read the chart carefully and recognize, using context, where substitutions have occurred  If you have any questions or concerns about the context, text or information contained within the body of this dictation, please contact myself, the provider, for further clarification      Philippe Lenora [address] [place] [address]  2/17/16047:04 PM

## 2021-09-13 NOTE — ASSESSMENT & PLAN NOTE
S/p SCC excision  Continue wound care as per Dermatology  Follow-up with dermatology in 3 weeks for wound check

## 2021-09-13 NOTE — ASSESSMENT & PLAN NOTE
Patient which treated recently with Cipro and IV aztreonam which was discontinued on 08/31/2021  Afebrile and asymptomatic   BPs at baseline  Encourage p o  hydration  Continue to monitor as patient is at high risk for infection due to indwelling urinary catheter

## 2021-09-13 NOTE — ASSESSMENT & PLAN NOTE
Respiratory status stable without exacerbation  Had follow-up with pulmonology on 09/11/2021; notes reviewed  Continue oxygen supplementation with activity; patient no longer requires oxygen at rest unless he feels short of breath  Continue Sandi Kay  Follow-up pulmonology outpatient as per routine

## 2021-09-16 ENCOUNTER — OFFICE VISIT (OUTPATIENT)
Dept: NEPHROLOGY | Facility: CLINIC | Age: 86
End: 2021-09-16
Payer: MEDICARE

## 2021-09-16 ENCOUNTER — NURSING HOME VISIT (OUTPATIENT)
Dept: GERIATRICS | Facility: OTHER | Age: 86
End: 2021-09-16
Payer: MEDICARE

## 2021-09-16 VITALS
SYSTOLIC BLOOD PRESSURE: 114 MMHG | WEIGHT: 132 LBS | BODY MASS INDEX: 21.31 KG/M2 | DIASTOLIC BLOOD PRESSURE: 68 MMHG | HEART RATE: 80 BPM

## 2021-09-16 DIAGNOSIS — E87.1 HYPONATREMIA: ICD-10-CM

## 2021-09-16 DIAGNOSIS — I48.0 PAF (PAROXYSMAL ATRIAL FIBRILLATION) (HCC): ICD-10-CM

## 2021-09-16 DIAGNOSIS — J43.9 PULMONARY EMPHYSEMA, UNSPECIFIED EMPHYSEMA TYPE (HCC): ICD-10-CM

## 2021-09-16 DIAGNOSIS — N13.9 OBSTRUCTIVE UROPATHY: ICD-10-CM

## 2021-09-16 DIAGNOSIS — I50.32 CHRONIC DIASTOLIC CONGESTIVE HEART FAILURE (HCC): Primary | ICD-10-CM

## 2021-09-16 DIAGNOSIS — E87.1 HYPONATREMIA: Primary | ICD-10-CM

## 2021-09-16 DIAGNOSIS — R26.2 AMBULATORY DYSFUNCTION: ICD-10-CM

## 2021-09-16 PROCEDURE — 99309 SBSQ NF CARE MODERATE MDM 30: CPT | Performed by: NURSE PRACTITIONER

## 2021-09-16 PROCEDURE — 99214 OFFICE O/P EST MOD 30 MIN: CPT | Performed by: INTERNAL MEDICINE

## 2021-09-16 NOTE — PATIENT INSTRUCTIONS
You are here for your 1st visit with me  Your hospitalized for weakness and during that hospitalization it was noted that you had a low sodium concentration or hyponatremia  It appears that the workup suggest that you of low glucocorticoids which is a type of steroid hormone that your adrenal glands make  This can lead to fatigue weakness and also can cause low sodium concentration  I am going to give a trial of hydrocortisone 15 mg orally twice a day  I will reduce salt tablets to 1 twice a day    I will repeat the lab work in 1 week on the medication  If this is the cause this should resolve it and hopefully will start to feel more energy as well and in the long run then will need to determine if you should continue this or if it can be tapered off but for now will be continued

## 2021-09-16 NOTE — ASSESSMENT & PLAN NOTE
Wt Readings from Last 3 Encounters:   09/10/21 63 kg (139 lb)   08/29/21 63 1 kg (139 lb 1 6 oz)   08/25/21 62 1 kg (137 lb)   Weight is stable, no edema noted  With soft blood pressures 90s-100s   BP dropped to 83/44 after lasix dose this am  Will hold lasix for SBP<110  Continue daily weights/CHF pathway  Continue to monitor

## 2021-09-16 NOTE — ASSESSMENT & PLAN NOTE
Heart rate trending 70 to 90s  Continue low-dose metoprolol with current hold parameter  Follow up with Cardiology outpatient

## 2021-09-16 NOTE — LETTER
September 16, 2021     Rivera Hankins MD  [address] [place] [address]    Patient: Khloe Robb   YOB: 1933   Date of Visit: 9/16/2021       Dear Dr Colton Oreilly:    Thank you for referring Edi Alfaro to me for evaluation  Below are my notes for this consultation  If you have questions, please do not hesitate to call me  I look forward to following your patient along with you  Sincerely,        Kita Jeronimo MD        CC: No Recipients  Kita Jeronimo MD  9/16/2021  3:01 PM  Sign when Signing Visit  NEPHROLOGY PROGRESS NOTE    Khloe Robb 80 y o  male MRN: [id number]  Unit/Bed#:  Encounter: [id number]  Reason for Consult:  Hyponatremia    The patient was seen by me the 1st time in the office today but he was seen in the hospital by partner mine for hyponatremia  He had been admitted for weakness and he fell  He is here for follow-up  His wife was present and his daughter listened to the visit on a phone call  ASSESSMENT/PLAN:  1  Hyponatremia    Patient had no history of hyponatremia until recently during this admission they were told about it  Patient appears euvolemic  In terms of workup done in the hospital his a m  Cortisol was very low and this was repeated it was low  It was done at the proper time and the level was 3 and then 4  He then had a stimulation test and there was not really a significant improvement although the 2nd blood draw was very close to the time after the medication was given  At this point his sodium concentration is around 133 mEq per L and he is on salt tablets and fluid restriction and loop diuretic  Given the low a m  Cortisol all his extreme fatigue I am 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  I am going to start him on hydrocortisone 15 mg p o  B i d   I will check a BMP in a week or so after this is started    We will also monitor his energy and ability to participate with rehabilitation      If this makes dramatic improvement we will need to determine how long he will need to continue on the medications  I did not feel we had time to have him see an endocrinologist for their opinion given how weak he appears and the convincing tests that suggested glucocorticoid deficiency  This was explained in detail to the patient as well as his wife and daughter  We will then arrange follow-up based upon the results of the medication recommendations  SUBJECTIVE:  Review of Systems   Constitutional: Positive for malaise/fatigue  Negative for chills, fever and night sweats  HENT: Negative  Eyes: Negative  Cardiovascular: Negative for chest pain, dyspnea on exertion, leg swelling and orthopnea  Respiratory: Negative  Negative for cough, shortness of breath, sputum production and wheezing  Musculoskeletal: Positive for back pain  Chronic back pain  Gastrointestinal: Negative for abdominal pain, diarrhea, nausea and vomiting  Genitourinary: Porter catheter in   Neurological: Positive for weakness  Negative for dizziness, focal weakness, headaches and light-headedness  Psychiatric/Behavioral: Negative for altered mental status, depression, hallucinations and hypervigilance  OBJECTIVE:  Current Weight: Weight - Scale: 59 9 kg (132 lb) (pt reported)  [email or web address]:     Weight 59 9 kg (132 lb)  , Body mass index is 21 31 kg/m²  [unfilled]    Physical Exam: Wt 59 9 kg (132 lb) Comment: pt reported  BMI 21 31 kg/m²   Physical Exam  Constitutional:       General: He is not in acute distress  Appearance: He is ill-appearing  He is not toxic-appearing  HENT:      Head: Normocephalic and atraumatic  Nose:      Comments: Wearing mask     Mouth/Throat:      Comments: Wearing mask  Eyes:      General: No scleral icterus  Extraocular Movements: Extraocular movements intact  Cardiovascular:      Rate and Rhythm: Normal rate and regular rhythm  Heart sounds:  No friction rub  No gallop  Comments: Mild ankle edema  Pulmonary:      Effort: Pulmonary effort is normal  No respiratory distress  Breath sounds: Normal breath sounds  No wheezing, rhonchi or rales  Abdominal:      General: Bowel sounds are normal  There is no distension  Palpations: Abdomen is soft  Tenderness: There is no abdominal tenderness  There is no rebound  Musculoskeletal:      Cervical back: Normal range of motion and neck supple  Neurological:      General: No focal deficit present  Mental Status: He is alert and oriented to person, place, and time  Psychiatric:         Mood and Affect: Mood normal          Behavior: Behavior normal          Thought Content: Thought content normal          Judgment: Judgment normal          Medications:    Current Outpatient Medications:     acetaminophen (TYLENOL) 325 mg tablet, Take 3 tablets (975 mg total) by mouth 3 (three) times a day, Disp: , Rfl:     alfuzosin (UROXATRAL) 10 mg 24 hr tablet, Take 1 tablet (10 mg total) by mouth daily, Disp: 90 tablet, Rfl: 3    aspirin 81 MG tablet, Take 81 mg by mouth daily , Disp: , Rfl:     Cholecalciferol (HM VITAMIN D3) 2000 units CAPS, Take 1 tablet by mouth daily, Disp: , Rfl:     fluticasone-umeclidinium-vilanterol (Trelegy Ellipta) 200-62 5-25 MCG/INH AEPB inhaler, Inhale 1 puff daily Rinse mouth after use , Disp: 60 blister, Rfl: 2    furosemide (LASIX) 20 mg tablet, Take 1 tablet (20 mg total) by mouth daily, Disp: 90 tablet, Rfl: 2    levothyroxine 75 mcg tablet, 1 tablet daily M-Fridays   1 and 1/2 tablets Sat-Sundays, Disp: 90 tablet, Rfl: 3    metoprolol succinate (TOPROL-XL) 25 mg 24 hr tablet, TAKE 1/2 TABLET BY MOUTH EVERY 12 HOURS, Disp: 90 tablet, Rfl: 3    Multiple Vitamin (multivitamin) capsule, Take 1 capsule by mouth daily, Disp: , Rfl:     omeprazole (PriLOSEC) 20 mg delayed release capsule, Take 20 mg by mouth daily, Disp: , Rfl:     potassium chloride (K-DUR,KLOR-CON) 10 mEq tablet, TAKE 1 TABLET(10 MEQ) BY MOUTH THREE TIMES DAILY, Disp: 270 tablet, Rfl: 1    psyllium (METAMUCIL) 58 6 % packet, Take 1 packet by mouth daily, Disp: 30 packet, Rfl: 2    simvastatin (ZOCOR) 20 mg tablet, TAKE 1 TABLET(20 MG) BY MOUTH DAILY AT BEDTIME, Disp: 90 tablet, Rfl: 1    sodium chloride 1 g tablet, Take 2 tablets (2 g total) by mouth 2 (two) times a day with meals, Disp: 60 tablet, Rfl: 0    metoprolol succinate (TOPROL-XL) 25 mg 24 hr tablet, Take 0 5 tablets (12 5 mg total) by mouth 2 (two) times a day (Patient not taking: Reported on 9/16/2021), Disp: 60 tablet, Rfl: 0    Laboratory Results:  Lab Results   Component Value Date    WBC 4 77 09/01/2021    HGB 9 5 (L) 09/01/2021    HCT 28 4 (L) 09/01/2021    MCV 95 09/01/2021     09/01/2021     Lab Results   Component Value Date    SODIUM 128 (L) 09/03/2021    K 3 7 09/03/2021    CL 96 (L) 09/03/2021    CO2 30 09/03/2021    BUN 11 09/03/2021    CREATININE 0 54 (L) 09/03/2021    GLUC 82 09/03/2021    CALCIUM 8 4 09/03/2021     Lab Results   Component Value Date    CALCIUM 8 4 09/03/2021    PHOS 2 8 08/30/2021     No results found for: LABPROT

## 2021-09-16 NOTE — ASSESSMENT & PLAN NOTE
Improved sodium chloride tablets  Most recent sodium level 133 on 09/14/2021<< 130 on 09/07/2021<< 128  Continue sodium chloride tablets 1 g b i d     Follow-up with Nephrology today

## 2021-09-16 NOTE — PROGRESS NOTES
Facility: [facility]  POS: 31 (STR)  Progress Note    Chief Complaint/Reason for visit: STR follow up  History obtained from patient, nursing staff, and EMR  History of Present Illness:  59-year-old male seen and examined for STR follow up  Patient was in the bathroom and appeared in no distress  he is only wearing oxygen during sleep  His O2 sat was 90% on room air this morning  Patient's blood pressure was 112/68 at 6:00 a m and received Lasix as ordered done blood pressure dropped to 83/44 at 8:00 a m  Will change hold parameter Lasix  Patient is also on metoprolol with heart rate trending 70s to 90s  Posterior scalp dressing CDI  Indwelling urinary catheter intact and patent for yellow urine  Denies shortness of breath at rest   Denies chest pain, headache, dizziness, abdominal pain, nausea, vomiting, diarrhea, or constipation  Past Medical History: unchanged from history and physical  Past Medical History:   Diagnosis Date    Anemia     Bladder [facility] )     Cancer ([facility] )     bladder    Carotid artery occlusion     Cataract     Colon polyp     COPD (chronic obstructive pulmonary disease) (HCC)     Coronary artery disease     Disease of thyroid gland     History of transfusion     Hyperlipidemia     Hypertension     Hypothyroidism 9/15/2017    Multiple pulmonary nodules     Peptic ulcer     Scoliosis     Sepsis without acute organ dysfunction ([facility] ) 1/26/2021    Severe protein-calorie malnutrition ([facility] ) 4/17/2021    SIRS [facility]systemic inflammatory response syndrome) [facility] 12/19/2019    Transient cerebral ischemia     Tremors of nervous system      Family History: unchanged from history and physical  Social History: unchanged from history and physical  Resident Since:  09/03/2021  Review of systems: Review of Systems   Constitutional: Negative for chills, diaphoresis and fatigue  HENT: Negative for congestion, sore throat and trouble swallowing  Eyes: Negative      Respiratory: Negative  Negative for cough and shortness of breath  Cardiovascular: Negative  Gastrointestinal: Negative for abdominal pain  Endocrine: Negative  Genitourinary: Negative for flank pain and hematuria  Indwelling urinary catheter to leg bag   Musculoskeletal: Positive for gait problem  Neurological: Negative for dizziness, syncope, speech difficulty, light-headedness, numbness and headaches  Psychiatric/Behavioral: Negative for agitation, behavioral problems and hallucinations  The patient is not nervous/anxious  All other systems reviewed and are negative  Medications: All medication and routine orders were reviewed and updated  Allergies: Reviewed and unchanged  Consults reviewed: Other  Labs/Diagnostics (reviewed by this provider): Copy in Chart  BMP reviewed from 09/14/2021  FBS: 75   BUN: 12   creatinine:  0 48   sodium: 133   potassium: 4 2   anion gap:  7   GFR: 98  Imaging Reviewed:  None today    Physical Exam  All vital signs were reviewed via facility EMR  Weight:  Temp: 97 5     BP:  112/68 at 6:00 a m /83/44 8:00 a m  Pulse: 90    Resp:  18 O2 Sat:  90% on room air  Constitutional: Normocephalic  Catalina Addisons and Day     Physical Exam  Vitals and nursing note reviewed  Constitutional:       General: He is not in acute distress  Appearance: He is not toxic-appearing or diaphoretic  Comments: Elderly male who appears in no distress  HENT:      Head: Normocephalic  Nose: No congestion or rhinorrhea  Mouth/Throat:      Mouth: Mucous membranes are moist       Pharynx: No oropharyngeal exudate  Eyes:      General: No scleral icterus  Right eye: No discharge  Left eye: No discharge  Extraocular Movements: Extraocular movements intact  Conjunctiva/sclera: Conjunctivae normal       Pupils: Pupils are equal, round, and reactive to light  Cardiovascular:      Rate and Rhythm: Normal rate and regular rhythm        Pulses: Normal pulses  Pulmonary:      Effort: Pulmonary effort is normal  No respiratory distress  Breath sounds: No wheezing, rhonchi or rales  Comments: Decreased breath sounds bibasilar  Abdominal:      General: Bowel sounds are normal  There is no distension  Palpations: Abdomen is soft  Tenderness: There is no abdominal tenderness  There is no guarding  Genitourinary:     Comments: In for urinary catheter intact and pain for yellow urine  Musculoskeletal:      Cervical back: Neck supple  No rigidity  Right lower leg: No edema  Left lower leg: No edema  Comments: Moves all 4 extremities  Lymphadenopathy:      Cervical: No cervical adenopathy  Skin:     General: Skin is warm and dry  Capillary Refill: Capillary refill takes less than 2 seconds  Comments: Dressing intact to posterior scalp  Neurological:      Mental Status: He is alert  Mental status is at baseline  Cranial Nerves: No cranial nerve deficit  Motor: Weakness present  Gait: Gait abnormal    Psychiatric:         Mood and Affect: Mood normal          Behavior: Behavior normal          Thought Content: Thought content normal        Assessment/Plan:  27-year-old male with:    Chronic diastolic congestive heart failure (HCC)  Wt Readings from Last 3 Encounters:   09/10/21 63 kg (139 lb)   08/29/21 63 1 kg (139 lb 1 6 oz)   08/25/21 62 1 kg (137 lb)   Weight is stable, no edema noted  With soft blood pressures 90s-100s   BP dropped to 83/44 after lasix dose this am  Will hold lasix for SBP<110  Continue daily weights/CHF pathway  Continue to monitor    PAF (paroxysmal atrial fibrillation) ([facility])  Heart rate trending 70 to 90s  Continue low-dose metoprolol with current hold parameter  Follow up with Cardiology outpatient    Pulmonary emphysema ([facility] )  Respiratory status remained stable without exacerbation  Recent follow-up with pulmonology on 09/11/2021  Continue oxygen supplementation with activity and HS  Patient no longer requires oxygen at rest unless he feels short of breath  Continue trelegy Ellipta  Follow-up with pulmonology outpatient as per routine or sooner if needed    Hyponatremia  Improved sodium chloride tablets  Most recent sodium level 133 on 09/14/2021<< 130 on 09/07/2021<< 128  Continue sodium chloride tablets 1 g b i d  Follow-up with Nephrology today    Obstructive uropathy  With chronic indwelling urinary catheter without any issues  Follow up with Urology outpatient     Ambulatory dysfunction  Multifactorial  Continue supportive care  Continue PT/OT  Continue fall precautions    This note was completed in part utilizing [employer] direct voice recognition software  Grammatical errors, random word insertion, spelling mistakes, and incomplete sentences may be an occasional consequence of the system secondary to software limitations, ambient noise and hardware issues  At the time of dictation, efforts were made to edit, clarify and/or correct errors  Please read the chart carefully and recognize, using context, where substitutions have occurred  If you have any questions or concerns about the context, text or information contained within the body of this dictation, please contact myself, the provider, for further clarification      [address] [facility]  7/53/258865:66 AM

## 2021-09-16 NOTE — PROGRESS NOTES
NEPHROLOGY PROGRESS NOTE    Juan Manuel Doyle 80 y o  male MRN: [id number]  Unit/Bed#:  Encounter: [id number]  Reason for Consult:  Hyponatremia    The patient was seen by me the 1st time in the office today but he was seen in the hospital by partner mine for hyponatremia  He had been admitted for weakness and he fell  He is here for follow-up  His wife was present and his daughter listened to the visit on a phone call  ASSESSMENT/PLAN:  1  Hyponatremia    Patient had no history of hyponatremia until recently during this admission they were told about it  Patient appears euvolemic  In terms of workup done in the hospital his a m  Cortisol was very low and this was repeated it was low  It was done at the proper time and the level was 3 and then 4  He then had a stimulation test and there was not really a significant improvement although the 2nd blood draw was very close to the time after the medication was given  At this point his sodium concentration is around 133 mEq per L and he is on salt tablets and fluid restriction and loop diuretic  Given the low a m  Cortisol all his extreme fatigue I am 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  I am going to start him on hydrocortisone 15 mg p o  B i d   I will check a BMP in a week or so after this is started    We will also monitor his energy and ability to participate with rehabilitation  If this makes dramatic improvement we will need to determine how long he will need to continue on the medications  I did not feel we had time to have him see an endocrinologist for their opinion given how weak he appears and the convincing tests that suggested glucocorticoid deficiency  This was explained in detail to the patient as well as his wife and daughter  We will then arrange follow-up based upon the results of the medication recommendations  SUBJECTIVE:  Review of Systems   Constitutional: Positive for malaise/fatigue   Negative for chills, fever and night sweats  HENT: Negative  Eyes: Negative  Cardiovascular: Negative for chest pain, dyspnea on exertion, leg swelling and orthopnea  Respiratory: Negative  Negative for cough, shortness of breath, sputum production and wheezing  Musculoskeletal: Positive for back pain  Chronic back pain  Gastrointestinal: Negative for abdominal pain, diarrhea, nausea and vomiting  Genitourinary: Porter catheter in   Neurological: Positive for weakness  Negative for dizziness, focal weakness, headaches and light-headedness  Psychiatric/Behavioral: Negative for altered mental status, depression, hallucinations and hypervigilance  OBJECTIVE:  Current Weight: Weight - Scale: 59 9 kg (132 lb) (pt reported)  [email or web address]:     Weight 59 9 kg (132 lb)  , Body mass index is 21 31 kg/m²  [unfilled]    Physical Exam: Wt 59 9 kg (132 lb) Comment: pt reported  BMI 21 31 kg/m²   Physical Exam  Constitutional:       General: He is not in acute distress  Appearance: He is ill-appearing  He is not toxic-appearing  HENT:      Head: Normocephalic and atraumatic  Nose:      Comments: Wearing mask     Mouth/Throat:      Comments: Wearing mask  Eyes:      General: No scleral icterus  Extraocular Movements: Extraocular movements intact  Cardiovascular:      Rate and Rhythm: Normal rate and regular rhythm  Heart sounds: No friction rub  No gallop  Comments: Mild ankle edema  Pulmonary:      Effort: Pulmonary effort is normal  No respiratory distress  Breath sounds: Normal breath sounds  No wheezing, rhonchi or rales  Abdominal:      General: Bowel sounds are normal  There is no distension  Palpations: Abdomen is soft  Tenderness: There is no abdominal tenderness  There is no rebound  Musculoskeletal:      Cervical back: Normal range of motion and neck supple  Neurological:      General: No focal deficit present        Mental Status: He is alert and oriented to person, place, and time  Psychiatric:         Mood and Affect: Mood normal          Behavior: Behavior normal          Thought Content: Thought content normal          Judgment: Judgment normal          Medications:    Current Outpatient Medications:     acetaminophen (TYLENOL) 325 mg tablet, Take 3 tablets (975 mg total) by mouth 3 (three) times a day, Disp: , Rfl:     alfuzosin (UROXATRAL) 10 mg 24 hr tablet, Take 1 tablet (10 mg total) by mouth daily, Disp: 90 tablet, Rfl: 3    aspirin 81 MG tablet, Take 81 mg by mouth daily , Disp: , Rfl:     Cholecalciferol (HM VITAMIN D3) 2000 units CAPS, Take 1 tablet by mouth daily, Disp: , Rfl:     fluticasone-umeclidinium-vilanterol (Trelegy Ellipta) 200-62 5-25 MCG/INH AEPB inhaler, Inhale 1 puff daily Rinse mouth after use , Disp: 60 blister, Rfl: 2    furosemide (LASIX) 20 mg tablet, Take 1 tablet (20 mg total) by mouth daily, Disp: 90 tablet, Rfl: 2    levothyroxine 75 mcg tablet, 1 tablet daily M-Fridays   1 and 1/2 tablets Sat-Sundays, Disp: 90 tablet, Rfl: 3    metoprolol succinate (TOPROL-XL) 25 mg 24 hr tablet, TAKE 1/2 TABLET BY MOUTH EVERY 12 HOURS, Disp: 90 tablet, Rfl: 3    Multiple Vitamin (multivitamin) capsule, Take 1 capsule by mouth daily, Disp: , Rfl:     omeprazole (PriLOSEC) 20 mg delayed release capsule, Take 20 mg by mouth daily, Disp: , Rfl:     potassium chloride (K-DUR,KLOR-CON) 10 mEq tablet, TAKE 1 TABLET(10 MEQ) BY MOUTH THREE TIMES DAILY, Disp: 270 tablet, Rfl: 1    psyllium (METAMUCIL) 58 6 % packet, Take 1 packet by mouth daily, Disp: 30 packet, Rfl: 2    simvastatin (ZOCOR) 20 mg tablet, TAKE 1 TABLET(20 MG) BY MOUTH DAILY AT BEDTIME, Disp: 90 tablet, Rfl: 1    sodium chloride 1 g tablet, Take 2 tablets (2 g total) by mouth 2 (two) times a day with meals, Disp: 60 tablet, Rfl: 0    metoprolol succinate (TOPROL-XL) 25 mg 24 hr tablet, Take 0 5 tablets (12 5 mg total) by mouth 2 (two) times a day (Patient not taking: Reported on 9/16/2021), Disp: 60 tablet, Rfl: 0    Laboratory Results:  Lab Results   Component Value Date    WBC 4 77 09/01/2021    HGB 9 5 (L) 09/01/2021    HCT 28 4 (L) 09/01/2021    MCV 95 09/01/2021     09/01/2021     Lab Results   Component Value Date    SODIUM 128 (L) 09/03/2021    K 3 7 09/03/2021    CL 96 (L) 09/03/2021    CO2 30 09/03/2021    BUN 11 09/03/2021    CREATININE 0 54 (L) 09/03/2021    GLUC 82 09/03/2021    CALCIUM 8 4 09/03/2021     Lab Results   Component Value Date    CALCIUM 8 4 09/03/2021    PHOS 2 8 08/30/2021     No results found for: LABPROT

## 2021-09-16 NOTE — ASSESSMENT & PLAN NOTE
Respiratory status remained stable without exacerbation  Recent follow-up with pulmonology on 09/11/2021  Continue oxygen supplementation with activity and HS    Patient no longer requires oxygen at rest unless he feels short of breath  Continue afia Kay  Follow-up with pulmonology outpatient as per routine or sooner if needed

## 2021-09-22 ENCOUNTER — APPOINTMENT (EMERGENCY)
Dept: RADIOLOGY | Facility: HOSPITAL | Age: 86
DRG: 871 | End: 2021-09-22
Payer: MEDICARE

## 2021-09-22 ENCOUNTER — TELEPHONE (OUTPATIENT)
Dept: OTHER | Facility: OTHER | Age: 86
End: 2021-09-22

## 2021-09-22 ENCOUNTER — HOSPITAL ENCOUNTER (INPATIENT)
Facility: HOSPITAL | Age: 86
LOS: 2 days | DRG: 871 | End: 2021-09-25
Attending: EMERGENCY MEDICINE | Admitting: EMERGENCY MEDICINE
Payer: MEDICARE

## 2021-09-22 DIAGNOSIS — A41.9 SEPSIS (HCC): Primary | ICD-10-CM

## 2021-09-22 DIAGNOSIS — N39.0 URINARY TRACT INFECTION: ICD-10-CM

## 2021-09-22 DIAGNOSIS — J96.01 ACUTE RESPIRATORY FAILURE WITH HYPOXIA (HCC): ICD-10-CM

## 2021-09-22 DIAGNOSIS — R65.21 SEPTIC SHOCK (HCC): ICD-10-CM

## 2021-09-22 DIAGNOSIS — A41.9 SEPTIC SHOCK (HCC): ICD-10-CM

## 2021-09-22 LAB
ALBUMIN SERPL BCP-MCNC: 3.2 G/DL (ref 3.5–5)
ALP SERPL-CCNC: 88 U/L (ref 46–116)
ALT SERPL W P-5'-P-CCNC: 19 U/L (ref 12–78)
ANION GAP SERPL CALCULATED.3IONS-SCNC: 2 MMOL/L (ref 4–13)
ANISOCYTOSIS BLD QL SMEAR: PRESENT
APTT PPP: 31 SECONDS (ref 23–37)
ARTIFACT: PRESENT
AST SERPL W P-5'-P-CCNC: 21 U/L (ref 5–45)
BACTERIA UR QL AUTO: ABNORMAL /HPF
BASOPHILS # BLD MANUAL: 0 THOUSAND/UL (ref 0–0.1)
BASOPHILS NFR MAR MANUAL: 0 % (ref 0–1)
BILIRUB SERPL-MCNC: 0.49 MG/DL (ref 0.2–1)
BILIRUB UR QL STRIP: NEGATIVE
BUN SERPL-MCNC: 23 MG/DL (ref 5–25)
CALCIUM ALBUM COR SERPL-MCNC: 9.4 MG/DL (ref 8.3–10.1)
CALCIUM SERPL-MCNC: 8.8 MG/DL (ref 8.3–10.1)
CHLORIDE SERPL-SCNC: 109 MMOL/L (ref 100–108)
CLARITY UR: ABNORMAL
CO2 SERPL-SCNC: 26 MMOL/L (ref 21–32)
COLOR UR: ABNORMAL
CREAT SERPL-MCNC: 1.26 MG/DL (ref 0.6–1.3)
EOSINOPHIL # BLD MANUAL: 0 THOUSAND/UL (ref 0–0.4)
EOSINOPHIL NFR BLD MANUAL: 0 % (ref 0–6)
ERYTHROCYTE [DISTWIDTH] IN BLOOD BY AUTOMATED COUNT: 13.6 % (ref 11.6–15.1)
GFR SERPL CREATININE-BSD FRML MDRD: 51 ML/MIN/1.73SQ M
GLUCOSE SERPL-MCNC: 100 MG/DL (ref 65–140)
GLUCOSE SERPL-MCNC: 98 MG/DL (ref 65–140)
GLUCOSE UR STRIP-MCNC: NEGATIVE MG/DL
HCT VFR BLD AUTO: 30 % (ref 36.5–49.3)
HGB BLD-MCNC: 9.7 G/DL (ref 12–17)
HGB UR QL STRIP.AUTO: ABNORMAL
INR PPP: 1.02 (ref 0.84–1.19)
KETONES UR STRIP-MCNC: ABNORMAL MG/DL
LACTATE SERPL-SCNC: 1.3 MMOL/L (ref 0.5–2)
LEUKOCYTE ESTERASE UR QL STRIP: ABNORMAL
LYMPHOCYTES # BLD AUTO: 0.7 THOUSAND/UL (ref 0.6–4.47)
LYMPHOCYTES # BLD AUTO: 3 % (ref 14–44)
MCH RBC QN AUTO: 31.3 PG (ref 26.8–34.3)
MCHC RBC AUTO-ENTMCNC: 32.3 G/DL (ref 31.4–37.4)
MCV RBC AUTO: 97 FL (ref 82–98)
METAMYELOCYTES NFR BLD MANUAL: 1 % (ref 0–1)
MONOCYTES # BLD AUTO: 0.93 THOUSAND/UL (ref 0–1.22)
MONOCYTES NFR BLD: 4 % (ref 4–12)
MUCOUS THREADS UR QL AUTO: ABNORMAL
NEUTROPHILS # BLD MANUAL: 21.42 THOUSAND/UL (ref 1.85–7.62)
NEUTS BAND NFR BLD MANUAL: 20 % (ref 0–8)
NEUTS SEG NFR BLD AUTO: 72 % (ref 43–75)
NITRITE UR QL STRIP: POSITIVE
NON-SQ EPI CELLS URNS QL MICRO: ABNORMAL /HPF
PH UR STRIP.AUTO: 6 [PH]
PLATELET # BLD AUTO: 237 THOUSANDS/UL (ref 149–390)
PLATELET BLD QL SMEAR: ADEQUATE
PMV BLD AUTO: 10.8 FL (ref 8.9–12.7)
POIKILOCYTOSIS BLD QL SMEAR: PRESENT
POLYCHROMASIA BLD QL SMEAR: PRESENT
POTASSIUM SERPL-SCNC: 3.8 MMOL/L (ref 3.5–5.3)
PROCALCITONIN SERPL-MCNC: 5.25 NG/ML
PROT SERPL-MCNC: 6.5 G/DL (ref 6.4–8.2)
PROT UR STRIP-MCNC: ABNORMAL MG/DL
PROTHROMBIN TIME: 13 SECONDS (ref 11.6–14.5)
RBC # BLD AUTO: 3.1 MILLION/UL (ref 3.88–5.62)
RBC #/AREA URNS AUTO: ABNORMAL /HPF
RBC MORPH BLD: PRESENT
SCHISTOCYTES BLD QL SMEAR: PRESENT
SODIUM SERPL-SCNC: 137 MMOL/L (ref 136–145)
SP GR UR STRIP.AUTO: 1.02 (ref 1–1.03)
TROPONIN I SERPL-MCNC: <0.02 NG/ML
UROBILINOGEN UR QL STRIP.AUTO: 1 E.U./DL
WBC # BLD AUTO: 23.28 THOUSAND/UL (ref 4.31–10.16)
WBC #/AREA URNS AUTO: ABNORMAL /HPF

## 2021-09-22 PROCEDURE — 87040 BLOOD CULTURE FOR BACTERIA: CPT | Performed by: EMERGENCY MEDICINE

## 2021-09-22 PROCEDURE — 96361 HYDRATE IV INFUSION ADD-ON: CPT

## 2021-09-22 PROCEDURE — 87186 SC STD MICRODIL/AGAR DIL: CPT | Performed by: EMERGENCY MEDICINE

## 2021-09-22 PROCEDURE — 85730 THROMBOPLASTIN TIME PARTIAL: CPT | Performed by: EMERGENCY MEDICINE

## 2021-09-22 PROCEDURE — 99291 CRITICAL CARE FIRST HOUR: CPT | Performed by: EMERGENCY MEDICINE

## 2021-09-22 PROCEDURE — 93005 ELECTROCARDIOGRAM TRACING: CPT

## 2021-09-22 PROCEDURE — 81001 URINALYSIS AUTO W/SCOPE: CPT | Performed by: EMERGENCY MEDICINE

## 2021-09-22 PROCEDURE — 84484 ASSAY OF TROPONIN QUANT: CPT | Performed by: EMERGENCY MEDICINE

## 2021-09-22 PROCEDURE — 36415 COLL VENOUS BLD VENIPUNCTURE: CPT | Performed by: EMERGENCY MEDICINE

## 2021-09-22 PROCEDURE — 85027 COMPLETE CBC AUTOMATED: CPT | Performed by: EMERGENCY MEDICINE

## 2021-09-22 PROCEDURE — 87077 CULTURE AEROBIC IDENTIFY: CPT | Performed by: EMERGENCY MEDICINE

## 2021-09-22 PROCEDURE — 87086 URINE CULTURE/COLONY COUNT: CPT | Performed by: EMERGENCY MEDICINE

## 2021-09-22 PROCEDURE — 80053 COMPREHEN METABOLIC PANEL: CPT | Performed by: EMERGENCY MEDICINE

## 2021-09-22 PROCEDURE — 83605 ASSAY OF LACTIC ACID: CPT | Performed by: EMERGENCY MEDICINE

## 2021-09-22 PROCEDURE — 71045 X-RAY EXAM CHEST 1 VIEW: CPT

## 2021-09-22 PROCEDURE — 94760 N-INVAS EAR/PLS OXIMETRY 1: CPT

## 2021-09-22 PROCEDURE — 82948 REAGENT STRIP/BLOOD GLUCOSE: CPT

## 2021-09-22 PROCEDURE — 96375 TX/PRO/DX INJ NEW DRUG ADDON: CPT

## 2021-09-22 PROCEDURE — 96366 THER/PROPH/DIAG IV INF ADDON: CPT

## 2021-09-22 PROCEDURE — 85007 BL SMEAR W/DIFF WBC COUNT: CPT | Performed by: EMERGENCY MEDICINE

## 2021-09-22 PROCEDURE — 96365 THER/PROPH/DIAG IV INF INIT: CPT

## 2021-09-22 PROCEDURE — 85610 PROTHROMBIN TIME: CPT | Performed by: EMERGENCY MEDICINE

## 2021-09-22 PROCEDURE — 99285 EMERGENCY DEPT VISIT HI MDM: CPT

## 2021-09-22 PROCEDURE — 84145 PROCALCITONIN (PCT): CPT | Performed by: EMERGENCY MEDICINE

## 2021-09-22 RX ORDER — ACETAMINOPHEN 325 MG/1
975 TABLET ORAL ONCE
Status: COMPLETED | OUTPATIENT
Start: 2021-09-22 | End: 2021-09-22

## 2021-09-22 RX ORDER — ONDANSETRON 2 MG/ML
INJECTION INTRAMUSCULAR; INTRAVENOUS
Status: COMPLETED
Start: 2021-09-22 | End: 2021-09-23

## 2021-09-22 RX ORDER — LORAZEPAM 2 MG/ML
0.5 INJECTION INTRAMUSCULAR ONCE
Status: COMPLETED | OUTPATIENT
Start: 2021-09-22 | End: 2021-09-22

## 2021-09-22 RX ADMIN — ACETAMINOPHEN 975 MG: 325 TABLET ORAL at 22:48

## 2021-09-22 RX ADMIN — MORPHINE SULFATE 2 MG: 2 INJECTION, SOLUTION INTRAMUSCULAR; INTRAVENOUS at 23:08

## 2021-09-22 RX ADMIN — SODIUM CHLORIDE 1000 ML: 0.9 INJECTION, SOLUTION INTRAVENOUS at 21:50

## 2021-09-22 RX ADMIN — SODIUM CHLORIDE 1000 ML: 0.9 INJECTION, SOLUTION INTRAVENOUS at 21:20

## 2021-09-22 RX ADMIN — VANCOMYCIN HYDROCHLORIDE 1250 MG: 1 INJECTION, POWDER, LYOPHILIZED, FOR SOLUTION INTRAVENOUS at 21:59

## 2021-09-22 RX ADMIN — LORAZEPAM 0.5 MG: 2 INJECTION INTRAMUSCULAR; INTRAVENOUS at 23:22

## 2021-09-22 RX ADMIN — AZTREONAM 1000 MG: 1 INJECTION, POWDER, LYOPHILIZED, FOR SOLUTION INTRAMUSCULAR; INTRAVENOUS at 23:35

## 2021-09-22 NOTE — TELEPHONE ENCOUNTER
Bhavna Key is calling to speak with on call provider regarding the patients complaints of chills and a stiff neck   Oxygen levels 79%, nurse gave PRN oxygen, levels came to 83-91%, unable to stand, legs are flaccid, bp measure: 125/61, Hr 98, T: 98 3   Nurse reported also that the patient was nauseous

## 2021-09-23 ENCOUNTER — APPOINTMENT (EMERGENCY)
Dept: RADIOLOGY | Facility: HOSPITAL | Age: 86
DRG: 871 | End: 2021-09-23
Payer: MEDICARE

## 2021-09-23 PROBLEM — R65.21 SEPTIC SHOCK (HCC): Status: ACTIVE | Noted: 2021-01-26

## 2021-09-23 LAB
ATRIAL RATE: 123 BPM
ATRIAL RATE: 92 BPM
ATRIAL RATE: 92 BPM
P AXIS: 49 DEGREES
P AXIS: 51 DEGREES
P AXIS: 64 DEGREES
PR INTERVAL: 200 MS
PR INTERVAL: 214 MS
PR INTERVAL: 220 MS
QRS AXIS: 20 DEGREES
QRS AXIS: 28 DEGREES
QRS AXIS: 37 DEGREES
QRSD INTERVAL: 136 MS
QRSD INTERVAL: 146 MS
QRSD INTERVAL: 150 MS
QT INTERVAL: 308 MS
QT INTERVAL: 368 MS
QT INTERVAL: 376 MS
QTC INTERVAL: 440 MS
QTC INTERVAL: 455 MS
QTC INTERVAL: 462 MS
SARS-COV-2 RNA RESP QL NAA+PROBE: NEGATIVE
T WAVE AXIS: -19 DEGREES
T WAVE AXIS: -19 DEGREES
T WAVE AXIS: 13 DEGREES
VENTRICULAR RATE: 123 BPM
VENTRICULAR RATE: 91 BPM
VENTRICULAR RATE: 92 BPM

## 2021-09-23 PROCEDURE — 96367 TX/PROPH/DG ADDL SEQ IV INF: CPT

## 2021-09-23 PROCEDURE — U0005 INFEC AGEN DETEC AMPLI PROBE: HCPCS | Performed by: EMERGENCY MEDICINE

## 2021-09-23 PROCEDURE — 96375 TX/PRO/DX INJ NEW DRUG ADDON: CPT

## 2021-09-23 PROCEDURE — 74177 CT ABD & PELVIS W/CONTRAST: CPT

## 2021-09-23 PROCEDURE — 99291 CRITICAL CARE FIRST HOUR: CPT | Performed by: EMERGENCY MEDICINE

## 2021-09-23 PROCEDURE — 93010 ELECTROCARDIOGRAM REPORT: CPT | Performed by: INTERNAL MEDICINE

## 2021-09-23 PROCEDURE — 71260 CT THORAX DX C+: CPT

## 2021-09-23 PROCEDURE — G1004 CDSM NDSC: HCPCS

## 2021-09-23 PROCEDURE — NC001 PR NO CHARGE: Performed by: NURSE PRACTITIONER

## 2021-09-23 PROCEDURE — U0003 INFECTIOUS AGENT DETECTION BY NUCLEIC ACID (DNA OR RNA); SEVERE ACUTE RESPIRATORY SYNDROME CORONAVIRUS 2 (SARS-COV-2) (CORONAVIRUS DISEASE [COVID-19]), AMPLIFIED PROBE TECHNIQUE, MAKING USE OF HIGH THROUGHPUT TECHNOLOGIES AS DESCRIBED BY CMS-2020-01-R: HCPCS | Performed by: EMERGENCY MEDICINE

## 2021-09-23 RX ORDER — AMOXICILLIN 250 MG
1 CAPSULE ORAL
Status: DISCONTINUED | OUTPATIENT
Start: 2021-09-23 | End: 2021-09-23

## 2021-09-23 RX ORDER — ALBUTEROL SULFATE 2.5 MG/3ML
5 SOLUTION RESPIRATORY (INHALATION) ONCE
Status: COMPLETED | OUTPATIENT
Start: 2021-09-23 | End: 2021-09-23

## 2021-09-23 RX ORDER — HEPARIN SODIUM 5000 [USP'U]/ML
5000 INJECTION, SOLUTION INTRAVENOUS; SUBCUTANEOUS EVERY 8 HOURS SCHEDULED
Status: DISCONTINUED | OUTPATIENT
Start: 2021-09-23 | End: 2021-09-23

## 2021-09-23 RX ORDER — PRAVASTATIN SODIUM 40 MG
40 TABLET ORAL
Status: DISCONTINUED | OUTPATIENT
Start: 2021-09-23 | End: 2021-09-23

## 2021-09-23 RX ORDER — PANTOPRAZOLE SODIUM 40 MG/1
40 INJECTION, POWDER, FOR SOLUTION INTRAVENOUS
Status: DISCONTINUED | OUTPATIENT
Start: 2021-09-23 | End: 2021-09-23

## 2021-09-23 RX ORDER — FENTANYL CITRATE 50 UG/ML
25 INJECTION, SOLUTION INTRAMUSCULAR; INTRAVENOUS EVERY 2 HOUR PRN
Status: DISCONTINUED | OUTPATIENT
Start: 2021-09-23 | End: 2021-09-23

## 2021-09-23 RX ORDER — GLYCOPYRROLATE 0.2 MG/ML
0.1 INJECTION INTRAMUSCULAR; INTRAVENOUS
Status: DISCONTINUED | OUTPATIENT
Start: 2021-09-23 | End: 2021-09-25 | Stop reason: HOSPADM

## 2021-09-23 RX ORDER — HALOPERIDOL 5 MG/ML
0.5 INJECTION INTRAMUSCULAR EVERY 2 HOUR PRN
Status: DISCONTINUED | OUTPATIENT
Start: 2021-09-23 | End: 2021-09-25 | Stop reason: HOSPADM

## 2021-09-23 RX ORDER — HALOPERIDOL 5 MG/ML
0.5 INJECTION INTRAMUSCULAR EVERY 2 HOUR PRN
Status: DISCONTINUED | OUTPATIENT
Start: 2021-09-23 | End: 2021-09-23

## 2021-09-23 RX ORDER — ASPIRIN 81 MG/1
81 TABLET ORAL DAILY
Status: DISCONTINUED | OUTPATIENT
Start: 2021-09-23 | End: 2021-09-23

## 2021-09-23 RX ORDER — LORAZEPAM 2 MG/ML
0.5 INJECTION INTRAMUSCULAR
Status: DISCONTINUED | OUTPATIENT
Start: 2021-09-23 | End: 2021-09-25 | Stop reason: HOSPADM

## 2021-09-23 RX ORDER — GLYCOPYRROLATE 0.2 MG/ML
0.1 INJECTION INTRAMUSCULAR; INTRAVENOUS EVERY 4 HOURS PRN
Status: DISCONTINUED | OUTPATIENT
Start: 2021-09-23 | End: 2021-09-23

## 2021-09-23 RX ORDER — FENTANYL CITRATE 50 UG/ML
50 INJECTION, SOLUTION INTRAMUSCULAR; INTRAVENOUS
Status: DISCONTINUED | OUTPATIENT
Start: 2021-09-23 | End: 2021-09-25 | Stop reason: HOSPADM

## 2021-09-23 RX ORDER — MELATONIN
1000 DAILY
Status: DISCONTINUED | OUTPATIENT
Start: 2021-09-23 | End: 2021-09-23

## 2021-09-23 RX ORDER — LEVOTHYROXINE SODIUM 0.07 MG/1
75 TABLET ORAL
Status: DISCONTINUED | OUTPATIENT
Start: 2021-09-23 | End: 2021-09-23

## 2021-09-23 RX ORDER — PANTOPRAZOLE SODIUM 20 MG/1
20 TABLET, DELAYED RELEASE ORAL
Status: DISCONTINUED | OUTPATIENT
Start: 2021-09-23 | End: 2021-09-23

## 2021-09-23 RX ORDER — SODIUM CHLORIDE, SODIUM GLUCONATE, SODIUM ACETATE, POTASSIUM CHLORIDE, MAGNESIUM CHLORIDE, SODIUM PHOSPHATE, DIBASIC, AND POTASSIUM PHOSPHATE .53; .5; .37; .037; .03; .012; .00082 G/100ML; G/100ML; G/100ML; G/100ML; G/100ML; G/100ML; G/100ML
75 INJECTION, SOLUTION INTRAVENOUS CONTINUOUS
Status: DISCONTINUED | OUTPATIENT
Start: 2021-09-23 | End: 2021-09-23

## 2021-09-23 RX ORDER — ONDANSETRON 2 MG/ML
4 INJECTION INTRAMUSCULAR; INTRAVENOUS ONCE
Status: COMPLETED | OUTPATIENT
Start: 2021-09-23 | End: 2021-09-23

## 2021-09-23 RX ORDER — ACETAMINOPHEN 325 MG/1
975 TABLET ORAL 3 TIMES DAILY
Status: DISCONTINUED | OUTPATIENT
Start: 2021-09-23 | End: 2021-09-23

## 2021-09-23 RX ORDER — TAMSULOSIN HYDROCHLORIDE 0.4 MG/1
0.4 CAPSULE ORAL
Status: DISCONTINUED | OUTPATIENT
Start: 2021-09-23 | End: 2021-09-23

## 2021-09-23 RX ADMIN — NOREPINEPHRINE BITARTRATE 5 MCG/MIN: 1 INJECTION, SOLUTION, CONCENTRATE INTRAVENOUS at 01:26

## 2021-09-23 RX ADMIN — FENTANYL CITRATE 50 MCG: 50 INJECTION INTRAMUSCULAR; INTRAVENOUS at 16:24

## 2021-09-23 RX ADMIN — FENTANYL CITRATE 50 MCG: 50 INJECTION INTRAMUSCULAR; INTRAVENOUS at 07:50

## 2021-09-23 RX ADMIN — SODIUM CHLORIDE, SODIUM GLUCONATE, SODIUM ACETATE, POTASSIUM CHLORIDE, MAGNESIUM CHLORIDE, SODIUM PHOSPHATE, DIBASIC, AND POTASSIUM PHOSPHATE 75 ML/HR: .53; .5; .37; .037; .03; .012; .00082 INJECTION, SOLUTION INTRAVENOUS at 02:48

## 2021-09-23 RX ADMIN — ONDANSETRON 4 MG: 2 INJECTION INTRAMUSCULAR; INTRAVENOUS at 01:23

## 2021-09-23 RX ADMIN — FENTANYL CITRATE 50 MCG: 50 INJECTION INTRAMUSCULAR; INTRAVENOUS at 18:08

## 2021-09-23 RX ADMIN — IPRATROPIUM BROMIDE 0.5 MG: 0.5 SOLUTION RESPIRATORY (INHALATION) at 01:51

## 2021-09-23 RX ADMIN — FENTANYL CITRATE 50 MCG: 50 INJECTION INTRAMUSCULAR; INTRAVENOUS at 13:38

## 2021-09-23 RX ADMIN — ALBUTEROL SULFATE 5 MG: 2.5 SOLUTION RESPIRATORY (INHALATION) at 01:51

## 2021-09-23 RX ADMIN — IOHEXOL 100 ML: 350 INJECTION, SOLUTION INTRAVENOUS at 02:09

## 2021-09-23 RX ADMIN — Medication 0.5 MG: at 01:51

## 2021-09-23 NOTE — SEPSIS NOTE
Sepsis Note   Arlene Motta 80 y o  male MRN: [id number]  Unit/Bed#: IVET Encounter: [id number]      qSOFA     Row Name 09/23/21 0145 09/23/21 0130 09/23/21 0115 09/23/21 0107 09/23/21 0000    Altered mental status GCS < 15  --  --  --  --  --    Respiratory Rate > / =22  0  1  1  0  1    Systolic BP < / =710  1  1  1  1  0    Q Sofa Score  1  2  2  1  1    Row Name 09/22/21 2345 09/22/21 2310 09/22/21 2253 09/22/21 2244 09/22/21 2200    Altered mental status GCS < 15  --  --  --  --  --    Respiratory Rate > / =22  0  1  1  0  0    Systolic BP < / =919  0  0  0  0  0    Q Sofa Score  0  1  1  0  0    Row Name 09/22/21 2115 09/22/21 2100 09/22/21 2027 09/22/21 2023 09/22/21 2021    Altered mental status GCS < 15  --  --  0  --  --    Respiratory Rate > / =22  1  0  --  --  0    Systolic BP < / =884  1  1  --  0  --    Q Sofa Score  2  1  0  0  --        Initial Sepsis Screening     Row Name 09/22/21 2200                Is the patient's history suggestive of a new or worsening infection? (!) Yes (Proceed)  -CE        Suspected source of infection  urinary tract infection  -CE        Are two or more of the following signs & symptoms of infection both present and new to the patient? (!) Yes (Proceed)  -CE        Indicate SIRS criteria  Tachycardia > 90 bpm;Tachypnea > 20 resp per min;Leukocytosis (WBC > 43078 IJL);WBC > 10% bands  -CE        If the answer is yes to both questions, suspicion of sepsis is present  --        If severe sepsis is present AND tissue hypoperfusion perists in the hour after fluid resuscitation or lactate > 4, the patient meets criteria for SEPTIC SHOCK  --        Are any of the following organ dysfunction criteria present within 6 hours of suspected infection and SIRS criteria that are NOT considered to be chronic conditions?   (!) Yes  -CE        Organ dysfunction  SBP < 90 mmHg  -CE        Date of presentation of severe sepsis  09/22/21  -CE        Time of presentation of severe sepsis 2200  -CE        Tissue hypoperfusion persists in the hour after crystalloid fluid administration, evidenced, by either:  --        Was hypotension present within one hour of the conclusion of crystalloid fluid administration?   Yes  -CE        Date of presentation of septic shock  09/22/21  -CE        Time of presentation of septic shock  2200  -CE          User Key  (r) = Recorded By, (t) = Taken By, (c) = Cosigned By    [address] Name Provider Austen Cross MD Resident

## 2021-09-23 NOTE — ED ATTENDING ATTESTATION
9/22/2021  I, Ulisses Nevarez DO, saw and evaluated the patient  I have discussed the patient with the resident/non-physician practitioner and agree with the resident's/non-physician practitioner's findings, Plan of Care, and MDM as documented in the resident's/non-physician practitioner's note, except where noted  All available labs and Radiology studies were reviewed  I was present for key portions of any procedure(s) performed by the resident/non-physician practitioner and I was immediately available to provide assistance  At this point I agree with the current assessment done in the Emergency Department  I have conducted an independent evaluation of this patient a history and physical is as follows:    26-year-old male presents for weakness and chills  The patient feels very lethargic recently  He is coming from a nursing home/rehab , had a Porter placed last month had a recent admission for hyponatremia  Today had chills  Afebrile  Sent in for concerns due to lethargy  On arrival the patient looks unwell but can talk to me and just states he does not feel well  The daughter is at bedside  Reports this is not his baseline  No nausea vomiting  Does have chronic back pain  Buttock pain  On my arrival the patient was slightly hypotensive as well  Systolic 85  He has a soft abdomen  He has minimal amount yellow urine in his leg bag  He has clear lungs  He is slightly hypoxic although for this is transient    Concern for gram-negative bacteremia sepsis given Porter placement as well as chills  Will evaluate for hyponatremia and other metabolic derangement as well  ED Course     Patient's blood pressure had improved however patient became hypoxic and more tachypneic  Chills  Placed on mid flow  D compensating clinically, prognosis is poor he did receive antibiotics early, again concern for Gram-negative bacteremia    Plan is to admit to the critical care service    Critical Care Time  CriticalCare Time  Performed by: Rakesh Subramanian DO  Authorized by: Rakesh Subramanian DO     Critical care provider statement:     Critical care time (minutes):  39    Critical care time was exclusive of:  Separately billable procedures and treating other patients and teaching time    Critical care was necessary to treat or prevent imminent or life-threatening deterioration of the following conditions:  Sepsis and shock    Critical care was time spent personally by me on the following activities:  Blood draw for specimens, obtaining history from patient or surrogate, re-evaluation of patient's condition, review of old charts, evaluation of patient's response to treatment, examination of patient, ordering and review of laboratory studies, ordering and performing treatments and interventions, development of treatment plan with patient or surrogate and ordering and review of radiographic studies

## 2021-09-23 NOTE — ED NOTES
Post morphine nausea noted  Verbal for 4mg IVP zofran given         Gill Atkinson, RN  78/17/14 [id number]

## 2021-09-23 NOTE — PROGRESS NOTES
[facility]  ICU Transfer Note - Tayla Faust 1933, 80 y o  male MRN: [id number]  Unit/Bed#: MICU 07 Encounter: [id number]  Primary Care Provider: Fer Hernandez MD   Date and time admitted to hospital: 9/22/2021  8:16 PM    * Septic shock [facility])  Assessment & Plan  · Secondary to gram negative bacteremia  · Now comfort care  · Continue current comfort measures  · Ativan 0 5mg q10 min PRN  · Haldol 0 5mg q2h PRN  · Fentanyl 50 mcg q10 min PRN  · Glycopyrrolate 0 1mg q1hr PRN  · Hospice liaison consult in place         Code Status: Level 4 - Comfort Care    Reason for ICU admission:   Septic Shock     Active problems:   Principal Problem:    Septic shock ([address] )  Active Problems:    Hypotension    Indwelling Porter catheter calcification (HCC)    Chronic respiratory failure with hypoxia ([facility] )    Obstructive uropathy  Resolved Problems:    * No resolved hospital problems  *      Consultants:   None    History of Present Illness:   "Tayla Faust is a 80 y o  male with a PMHx of severe aortic stenosis s/p tavr, pulmonary HTN, COPD, recurrent UTIs, prior bladder cancer, paroxysmal afib, who presented to the ED with increased fatigue with complaints of rigors earlier in day, who became febrile >103, decompensated in ED requiring 8L midflow and peripheral pressors  Aztreonam and vanc started in ED based off prior sensitivities  CT showed evidence of cystitis, right ureteritis, pyelitis  Patient declined and became very altered, unable to open eyes, grunting, and poorly following commands  Upon arrival to ICU, patient quickly required >10 of levophed  Discussion was had with daughter and wife (POA), who agreed that he would not want invasive procedures at this time   Decision was made to ultimately make patient comfort care, with plan to support hemodynamics without aggressive measures until wife arrives from home "    Summary of clinical course:   Patient was transitioned to comfort care, but has remained relatively stable in the MICU  Recent or scheduled procedures:   None     Outstanding/pending diagnostics:   None     Cultures:   Gram  Negative bacteremia        Mobilization Plan: Activity as toleated     Nutrition Plan:   Pleasure feeds    Invasive Devices Review  Invasive Devices     Peripheral Intravenous Line            Peripheral IV 09/22/21 Dorsal (posterior); Right Hand <1 day    Peripheral IV 09/22/21 Left Antecubital <1 day          Drain            Urethral Catheter Coude 16 Fr  24 days                Rationale for remaining devices: Porter remains for comfort     Discharge Plan:   Pending hospice consult       Spoke with Dr Kai Rizo  regarding transfer  Please contact critical care via [employer] with any questions or concerns  Portions of the record may have been created with voice recognition software  Occasional wrong word or "sound a like" substitutions may have occurred due to the inherent limitations of voice recognition software  Read the chart carefully and recognize, using context, where substitutions have occurred

## 2021-09-23 NOTE — ED PROVIDER NOTES
History  Chief Complaint   Patient presents with    Pain     Pt presents to the ED with complaints of back pain and right hip pain    Lethargy     Per [employer] staff was hypotensive and lethargic  61-year-old male history of CAD, hypertension, COPD chronically on 2 L oxygen, former bladder cancer presenting due to concerns from nursing facility for fatigue  Patient has no acute complaints and only complains of chronic low back pain and right hip pain as well as episode of rigors earlier today  Reported recent UTI and states that chavis has not been changed in about 1 month  Denies any dysuria or other urinary symptoms  Does reports chills earlier today  Additionally denies any headache, vision changes, CP, SOB, abd pain, nausea/vomiting, or any bladder or bowel changes  Prior to Admission Medications   Prescriptions Last Dose Informant Patient Reported? Taking? Cholecalciferol (HM VITAMIN D3) 2000 units CAPS  Spouse/Significant Other Yes No   Sig: Take 1 tablet by mouth daily   Multiple Vitamin (multivitamin) capsule  Spouse/Significant Other Yes No   Sig: Take 1 capsule by mouth daily   acetaminophen (TYLENOL) 325 mg tablet  Spouse/Significant Other No No   Sig: Take 3 tablets (975 mg total) by mouth 3 (three) times a day   alfuzosin (UROXATRAL) 10 mg 24 hr tablet  Spouse/Significant Other No No   Sig: Take 1 tablet (10 mg total) by mouth daily   aspirin 81 MG tablet  Spouse/Significant Other Yes No   Sig: Take 81 mg by mouth daily    fluticasone-umeclidinium-vilanterol (Trelegy Ellipta) 200-62 5-25 MCG/INH AEPB inhaler  Spouse/Significant Other No No   Sig: Inhale 1 puff daily Rinse mouth after use  furosemide (LASIX) 20 mg tablet  Spouse/Significant Other No No   Sig: Take 1 tablet (20 mg total) by mouth daily   levothyroxine 75 mcg tablet  Spouse/Significant Other No No   Si tablet daily -   1 and 1/2 tablets Sat-Sundays   metoprolol succinate (TOPROL-XL) 25 mg 24 hr tablet Spouse/Significant Other No No   Sig: TAKE 1/2 TABLET BY MOUTH EVERY 12 HOURS   metoprolol succinate (TOPROL-XL) 25 mg 24 hr tablet  Spouse/Significant Other No No   Sig: Take 0 5 tablets (12 5 mg total) by mouth 2 (two) times a day   Patient not taking: Reported on 9/16/2021   omeprazole (PriLOSEC) 20 mg delayed release capsule  Spouse/Significant Other Yes No   Sig: Take 20 mg by mouth daily   potassium chloride (K-DUR,KLOR-CON) 10 mEq tablet  Spouse/Significant Other No No   Sig: TAKE 1 TABLET(10 MEQ) BY MOUTH THREE TIMES DAILY   psyllium (METAMUCIL) 58 6 % packet  Spouse/Significant Other No No   Sig: Take 1 packet by mouth daily   simvastatin (ZOCOR) 20 mg tablet  Spouse/Significant Other No No   Sig: TAKE 1 TABLET(20 MG) BY MOUTH DAILY AT BEDTIME   sodium chloride 1 g tablet  Spouse/Significant Other No No   Sig: Take 2 tablets (2 g total) by mouth 2 (two) times a day with meals      Facility-Administered Medications: None       Past Medical History:   Diagnosis Date    Anemia     [facility]adder cancer ([facility] )     Cancer ([facility] )     bladder    Carotid artery occlusion     Cataract     Colon polyp     COPD (chronic obstructive pulmonary disease) ([facility] )     Coronary artery disease     Disease of thyroid gland     History of transfusion     Hyperlipidemia     Hypertension     Hypothyroidism 9/15/2017    Multiple pulmonary nodules     Peptic ulcer     Scoliosis     Sepsis without acute organ dysfunction [facility] ) 1/26/2021    Severe protein-calorie malnutrition ([facility] ) 4/17/2021    SIRS (systemic inflammatory response syndrome) ([facility] ) 12/19/2019    Transient cerebral ischemia     Tremors of nervous system        Past Surgical History:   Procedure Laterality Date   [facility] SURGERY      1973, 1987, 2005    BUNIONECTOMY Left     Simple exostectomy (silver procedure)    CAROTID ENDARTERECTOMY Right 09/10/2008    Randy Barrera MD    CATARACT EXTRACTION      CATARACT EXTRACTION, BILATERAL      CERVICAL DISCECTOMY  1969    COLONOSCOPY      CORONARY ARTERY BYPASS GRAFT  10/20/2010    x3 (LIMA-LAD, SVG-OM, SVG-RCA)    CYSTOSCOPY      ELBOW SURGERY Right 2012    FEMORAL ARTERY - TIBIAL ARTERY BYPASS GRAFT  2011    using reversed saphenous vein from right leg  Bhupendra Sykes MD    AL ECHO TRANSESOPHAG R-T 2D W/PRB IMG ACQUISJ I&R N/A 10/6/2020    Procedure: TRANSESOPHAGEAL ECHOCARDIOGRAM (DAVID); Surgeon: Theresa Astorga DO;  [facility]: [facility];  Service: Cardiac Surgery    AL REPLACE AORTIC VALVE OPENFEMORAL ARTERY APPROACH N/A 10/6/2020    Procedure: REPLACEMENT AORTIC VALVE TRANSCATHETER (TAVR) TRANSFEMORAL W/ 26MM MONTALVO BREE S3 ULTRA VALVE(ACCESS ON LEFT); Surgeon: Theresa Astorga DO;  [facility];  Service: Cardiac Surgery    REPLACEMENT TOTAL KNEE Left     SHOULDER SURGERY Right        Family History   Problem Relation Age of Onset    Cervical cancer Mother     Cervical cancer Sister     Heart disease Sister     Coronary artery disease Sister     Lung cancer Brother      I have reviewed and agree with the history as documented  E-Cigarette/Vaping    E-Cigarette Use Never User      E-Cigarette/Vaping Substances    Nicotine No     THC No     CBD No     Flavoring No      Social History     Tobacco Use    Smoking status: Former Smoker     Packs/day: 1 00     Years: 50 00     Pack years: 50 00     Types: Cigarettes     Start date:      Quit date:      Years since quittin 7    Smokeless tobacco: Never Used   Vaping Use    Vaping Use: Never used   Substance Use Topics    Alcohol use: Not Currently     Alcohol/week: 0 0 standard drinks     Comment: Social     Drug use: Never        Review of Systems   Constitutional: Positive for chills  Negative for appetite change, diaphoresis and fever  HENT: Negative for congestion, rhinorrhea and sore throat  Eyes: Negative for photophobia and visual disturbance     Respiratory: Negative for chest tightness and shortness of breath  Cardiovascular: Negative for chest pain and leg swelling  Gastrointestinal: Negative for abdominal distention, abdominal pain, blood in stool, constipation, diarrhea, nausea and vomiting  Genitourinary: Negative for difficulty urinating and dysuria  Musculoskeletal: Positive for arthralgias and back pain  Negative for joint swelling  Skin: Negative for rash  Neurological: Negative for dizziness, weakness, light-headedness, numbness and headaches  Psychiatric/Behavioral: Negative for agitation and confusion  All other systems reviewed and are negative  Physical Exam  ED Triage Vitals   Temperature Pulse Respirations Blood Pressure SpO2   09/22/21 2021 09/22/21 2021 09/22/21 2021 09/22/21 2023 09/22/21 2021   98 9 °F (37 2 °C) 94 20 103/50 97 %      Temp Source Heart Rate Source Patient Position - Orthostatic VS BP Location FiO2 (%)   09/22/21 2021 09/22/21 2021 09/22/21 2021 09/22/21 2021 --   Oral Monitor Lying Right arm       Pain Score       09/22/21 2021       5             Orthostatic Vital Signs  Vitals:    09/23/21 0400 09/23/21 0800 09/24/21 1407 09/24/21 1500   BP: (!) 85/46  124/66    Pulse: (!) 120 94  78   Patient Position - Orthostatic VS:           Physical Exam  Vitals and nursing note reviewed  Constitutional:       General: He is not in acute distress  Appearance: Normal appearance  He is well-developed  He is not ill-appearing, toxic-appearing or diaphoretic  HENT:      Head: Normocephalic and atraumatic  Nose: Nose normal  No congestion or rhinorrhea  Mouth/Throat:      Mouth: Mucous membranes are moist       Pharynx: Oropharynx is clear  No oropharyngeal exudate or posterior oropharyngeal erythema  Eyes:      General: No scleral icterus  Right eye: No discharge  Left eye: No discharge  Extraocular Movements: Extraocular movements intact        Conjunctiva/sclera: Conjunctivae normal       Pupils: Pupils are equal, round, and reactive to light  Neck:      Vascular: No JVD  Trachea: No tracheal deviation  Cardiovascular:      Rate and Rhythm: Normal rate and regular rhythm  Heart sounds: Normal heart sounds  No murmur heard  No friction rub  No gallop  Comments: Normal rate and regular rhythm  Pulmonary:      Effort: Pulmonary effort is normal  No respiratory distress  Breath sounds: No stridor  No wheezing or rales  Comments: Bibasilar breath sounds on home 2L O2  Chest:      Chest wall: No tenderness  Abdominal:      General: Bowel sounds are normal  There is no distension  Palpations: Abdomen is soft  Tenderness: There is no abdominal tenderness  There is no right CVA tenderness, left CVA tenderness, guarding or rebound  Comments: Soft, nontender, nondistended  Normal bowel sounds throughout   Genitourinary:     Comments: Porter in place with dark urine and cloudy/sediment  Musculoskeletal:         General: No swelling, tenderness, deformity or signs of injury  Normal range of motion  Cervical back: Normal range of motion and neck supple  No rigidity  No muscular tenderness  Right lower leg: No edema  Left lower leg: No edema  Lymphadenopathy:      Cervical: No cervical adenopathy  Skin:     General: Skin is warm and dry  Coloration: Skin is not pale  Findings: No erythema or rash  Neurological:      General: No focal deficit present  Mental Status: He is alert and oriented to person, place, and time  Mental status is at baseline  Cranial Nerves: No cranial nerve deficit  Sensory: No sensory deficit  Motor: No weakness or abnormal muscle tone  Coordination: Coordination normal       Gait: Gait normal       Comments: A&Ox3 to person, place, and time  CN 2-12 intact  Strength 5/5 throughout  Sensation grossly intact  Cerebellar exam including gait intact     Psychiatric:         Behavior: Behavior normal  Thought Content:  Thought content normal          ED Medications  Medications   albuterol (5 mg/mL) 0 5 % inhalation solution **ADS Override Pull** (has no administration in time range)   glycopyrrolate (ROBINUL) injection 0 1 mg (0 1 mg Intravenous Given 9/24/21 1257)   fentanyl citrate (PF) 100 MCG/2ML 50 mcg (50 mcg Intravenous Given 9/24/21 2016)   LORazepam (ATIVAN) injection 0 5 mg ( Intravenous MAR Unhold 9/23/21 1801)   haloperidol lactate (HALDOL) injection 0 5 mg ( Intravenous MAR Unhold 9/23/21 1801)   sodium chloride 0 9 % bolus 1,000 mL (0 mL Intravenous Stopped 9/22/21 2220)   sodium chloride 0 9 % bolus 1,000 mL (0 mL Intravenous Stopped 9/22/21 2248)   aztreonam (AZACTAM) 1,000 mg in sodium chloride 0 9 % 50 mL IVPB (0 mg Intravenous Stopped 9/23/21 0005)   vancomycin (VANCOCIN) 1,250 mg in sodium chloride 0 9 % 250 mL IVPB (0 mg/kg × 59 9 kg Intravenous Stopped 9/22/21 2335)   acetaminophen (TYLENOL) tablet 975 mg (975 mg Oral Given 9/22/21 2248)   morphine injection 2 mg (2 mg Intravenous Given 9/22/21 2308)   LORazepam (ATIVAN) injection 0 5 mg (0 5 mg Intravenous Given 9/22/21 2322)   ondansetron (ZOFRAN) injection 4 mg (4 mg Intravenous Given 9/23/21 0123)   albuterol inhalation solution 5 mg (5 mg Nebulization Given 9/23/21 0151)   ipratropium (ATROVENT) 0 02 % inhalation solution 0 5 mg (0 5 mg Nebulization Given 9/23/21 0151)   iohexol (OMNIPAQUE) 350 MG/ML injection (MULTI-DOSE) 100 mL (100 mL Intravenous Given 9/23/21 0209)       Diagnostic Studies  Results Reviewed     Procedure Component Value Units Date/Time    Urine culture [[id number]]  (Abnormal)  (Susceptibility) Collected: 09/22/21 2155    Lab Status: Final result Specimen: Urine, Clean Catch Updated: 09/25/21 0725     Urine Culture >100,000 cfu/ml Escherichia coli      10,000-19,000 cfu/ml     Susceptibility     Escherichia coli (1)     Antibiotic Interpretation Microscan Method Status    ZID Performed  Yes  RODRICK Final Ampicillin ($$) Susceptible <=8 00 ug/ml RODRICK Final    Aztreonam ($$$)  Susceptible <=4 ug/ml RODRICK Final    Cefazolin ($) Susceptible <=2 00 ug/ml RODRICK Final    Ciprofloxacin ($)  Susceptible <=1 00 ug/ml RODRICK Final    Gentamicin ($$) Susceptible 2 ug/ml RODRICK Final    Levofloxacin ($) Susceptible <=0 25 ug/ml RODRICK Final    Nitrofurantoin Susceptible <=32 ug/ml RODRICK Final    Tetracycline Susceptible <=4 ug/ml RODRICK Final    Tobramycin ($) Susceptible <=1 ug/ml RODRICK Final    Trimethoprim + Sulfamethoxazole ($$$) Susceptible <=2/38 ug/ml RODRICK Final                   Blood culture #2 [[id number]] Collected: 09/22/21 2056    Lab Status: Preliminary result Specimen: Blood from Arm, Right Updated: 09/24/21 2301     Blood Culture No Growth at 48 hrs      Blood culture #1 [[id number]]  (Abnormal) Collected: 09/22/21 2056    Lab Status: Preliminary result Specimen: Blood from Arm, Left Updated: 09/24/21 1321     Blood Culture Escherichia coli     Gram Stain Result Gram negative rods    Novel Coronavirus (Covid-19),PCR [facility] - 2 Hour Stat [[id number]]  (Normal) Collected: 09/23/21 0140    Lab Status: Final result Specimen: Nares from Nose Updated: 09/23/21 0401     SARS-CoV-2 Negative    Narrative:           Urine Microscopic [[id number]]  (Abnormal) Collected: 09/22/21 2155    Lab Status: Final result Specimen: Urine, Clean Catch Updated: 09/22/21 2226     RBC, UA 10-20 /hpf      WBC, UA 20-30 /hpf      Epithelial Cells None Seen /hpf      Bacteria, UA Moderate /hpf      MUCUS THREADS None Seen    UA w Reflex to Microscopic w Reflex to Culture [[id number]]  (Abnormal) Collected: 09/22/21 2155    Lab Status: Final result Specimen: Urine, Clean Catch Updated: 09/22/21 2205     Color, UA Dk Yellow     Clarity, UA Turbid     Specific [place], UA 1 018     pH, UA 6 0     Leukocytes, UA Large     Nitrite, UA Positive     Protein,  (3+) mg/dl      Glucose, UA Negative mg/dl      Ketones, UA Trace mg/dl      Urobilinogen, UA 1 0 E U /dl Bilirubin, UA Negative     Blood, UA Large    Procalcitonin with AM Reflex [[id number]]  (Abnormal) Collected: 09/22/21 2056    Lab Status: Final result Specimen: Blood from Arm, Right Updated: 09/22/21 2155     Procalcitonin 5 25 ng/ml     CBC and differential [[id number]]  (Abnormal) Collected: 09/22/21 2056    Lab Status: Final result Specimen: Blood from Arm, Right Updated: 09/22/21 2138     WBC 23 28 Thousand/uL      RBC 3 10 Million/uL      Hemoglobin 9 7 g/dL      Hematocrit 30 0 %      MCV 97 fL      MCH 31 3 pg      MCHC 32 3 g/dL      RDW 13 6 %      MPV 10 8 fL      Platelets 648 Thousands/uL     Narrative: This is an appended report  These results have been appended to a previously verified report  Manual Differential(PHLEBS Do Not Order) [[id number]]  (Abnormal) Collected: 09/22/21 2056    Lab Status: Final result Specimen: Blood from Arm, Right Updated: 09/22/21 2138     Segmented % 72 %      Bands % 20 %      Lymphocytes % 3 %      Monocytes % 4 %      Eosinophils, % 0 %      Basophils % 0 %      Metamyelocytes% 1 %      Absolute Neutrophils 21 42 Thousand/uL      Lymphocytes Absolute 0 70 Thousand/uL      Monocytes Absolute 0 93 Thousand/uL      Eosinophils Absolute 0 00 Thousand/uL      Basophils Absolute 0 00 Thousand/uL      Total Counted --     RBC Morphology Present     Anisocytosis Present     Poikilocytes Present     Polychromasia Present     Schistocytes Present     Platelet Estimate Adequate     Artifact Present    Lactic acid [[id number]]  (Normal) Collected: 09/22/21 2056    Lab Status: Final result Specimen: Blood from Arm, Right Updated: 09/22/21 2130     LACTIC ACID 1 3 mmol/L     Narrative:      Result may be elevated if tourniquet was used during collection      Comprehensive metabolic panel [[id number]]  (Abnormal) Collected: 09/22/21 2056    Lab Status: Final result Specimen: Blood from Arm, Right Updated: 09/22/21 2127     Sodium 137 mmol/L      Potassium 3 8 mmol/L Chloride 109 mmol/L      CO2 26 mmol/L      ANION GAP 2 mmol/L      BUN 23 mg/dL      Creatinine 1 26 mg/dL      Glucose 100 mg/dL      Calcium 8 8 mg/dL      Corrected Calcium 9 4 mg/dL      AST 21 U/L      ALT 19 U/L      Alkaline Phosphatase 88 U/L      Total Protein 6 5 g/dL      Albumin 3 2 g/dL      Total Bilirubin 0 49 mg/dL      eGFR 51 ml/min/1 73sq m     Narrative:      [place] guidelines for Chronic Kidney Disease (CKD):     Stage 1 with normal or high GFR (GFR > 90 mL/min/1 73 square meters)    Stage 2 Mild CKD (GFR = 60-89 mL/min/1 73 square meters)    Stage 3A Moderate CKD (GFR = 45-59 mL/min/1 73 square meters)    Stage 3B Moderate CKD (GFR = 30-44 mL/min/1 73 square meters)    Stage 4 Severe CKD (GFR = 15-29 mL/min/1 73 square meters)    Stage 5 End Stage CKD (GFR <15 mL/min/1 73 square meters)  Note: GFR calculation is accurate only with a steady state creatinine    Troponin I [[id number]]  (Normal) Collected: 09/22/21 2056    Lab Status: Final result Specimen: Blood from Arm, Right Updated: 09/22/21 2127     Troponin I <0 02 ng/mL     Protime-INR [[id number]]  (Normal) Collected: 09/22/21 2056    Lab Status: Final result Specimen: Blood from Arm, Right Updated: 09/22/21 2121     Protime 13 0 seconds      INR 1 02    APTT [[id number]]  (Normal) Collected: 09/22/21 2056    Lab Status: Final result Specimen: Blood from Arm, Right Updated: 09/22/21 2121     PTT 31 seconds     Fingerstick Glucose (POCT) [[id number]]  (Normal) Collected: 09/22/21 2019    Lab Status: Final result Updated: 09/22/21 2020     POC Glucose 98 mg/dl                  CT chest abdomen pelvis w contrast   Final Result by Jeinffer Olmos MD (09/23 0253)      Findings consistent with cystitis/right ureteritis/pyelitis      Faint symmetric groundglass airspace disease throughout the bilateral upper lobes likely to represent pulmonary edema        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 and involving the bilateral common and internal iliac arteries as detailed above      Workstation performed: [id number]         XR chest portable   Final Result by Kaylyn Wilkins MD (09/23 1110)      Right greater than left bilateral upper lobe airspace disease similar to the prior examination and suspicious for the presence of infection  Workstation performed: [id number]               Procedures  ECG 12 Lead Documentation Only    Date/Time: 9/22/2021 9:10 PM  Performed by: Ruchi High MD  Authorized by: Ruchi High MD     Indications / Diagnosis:  Sepsis eval  ECG reviewed by me, the ED Provider: yes    Patient location:  ED  Previous ECG:     Previous ECG:  Compared to current    Similarity:  No change    Comparison to cardiac monitor: Yes    Interpretation:     Interpretation: abnormal    Rate:     ECG rate:  92    ECG rate assessment: normal    Rhythm:     Rhythm: sinus rhythm    Ectopy:     Ectopy: none    QRS:     QRS axis:  Normal    QRS intervals: Wide  Conduction:     Conduction: abnormal      Abnormal conduction: complete RBBB, 1st degree and non-specific intraventricular conduction delay    ST segments:     ST segments:  Non-specific  T waves:     T waves: non-specific            ED Course                         Initial Sepsis Screening     Row Name 09/22/21 2200                Is the patient's history suggestive of a new or worsening infection? (!) Yes (Proceed)  -CE        Suspected source of infection  urinary tract infection  -CE        Are two or more of the following signs & symptoms of infection both present and new to the patient?   (!) Yes (Proceed)  -CE        Indicate SIRS criteria  Tachycardia > 90 bpm;Tachypnea > 20 resp per min;Leukocytosis (WBC > 04896 IJL);WBC > 10% bands  -CE        If the answer is yes to both questions, suspicion of sepsis is present  --        If severe sepsis is present AND tissue hypoperfusion perists in the hour after fluid resuscitation or lactate > 4, the patient meets criteria for SEPTIC SHOCK  --        Are any of the following organ dysfunction criteria present within 6 hours of suspected infection and SIRS criteria that are NOT considered to be chronic conditions? (!) Yes  -CE        Organ dysfunction  SBP < 90 mmHg  -CE        Date of presentation of severe sepsis  09/22/21  -CE        Time of presentation of severe sepsis  2200  -CE        Tissue hypoperfusion persists in the hour after crystalloid fluid administration, evidenced, by either:  --        Was hypotension present within one hour of the conclusion of crystalloid fluid administration? Yes  -CE        Date of presentation of septic shock  09/22/21  -CE        Time of presentation of septic shock  2200  -CE          User Key  (r) = Recorded By, (t) = Taken By, (c) = Cosigned By    [address] Name Provider Evelio King MD Resident           Default Flowsheet Data (last 720 hours)      Sepsis Reassess     Row Name 09/23/21 0200 09/22/21 2300                Repeat Volume Status and Tissue Perfusion Assessment Performed    Repeat Volume Status and Tissue Perfusion Assessment Performed  Yes  -CE  Yes  -CE          Volume Status and Tissue Perfusion Post Fluid Resuscitation * Must Document All *    Vital Signs Reviewed (HR, RR, BP, T)  Yes  -CE  Yes  -CE       Shock Index Reviewed  Yes  -CE  Yes  -CE       Arterial Oxygen Saturation Reviewed (POx, SaO2 or SpO2)  Yes (comment %) 97  -CE  Yes (comment %) 96  -CE       Cardio  (!) Normal S1/S2; Tachycardia  -CE  (!) Normal S1/S2; Tachycardia  -CE       Pulmonary  (!) Wheezes  -CE  (!) Wheezes  -CE       Capillary Refill  Brisk  -CE  Brisk  -CE       Peripheral Pulses  Radial;Dorsalis Pedis; Posterior Tibialis  -CE  Radial;Dorsalis Pedis; Posterior Tibialis  -CE       Peripheral Pulse  +2  -CE  +2  -CE       Dorsalis Pedis  +2  -CE  +2  -CE       Posterior Tibialis  +2  -CE  +2  -CE       Skin  Warm;Dry  -CE  Warm;Dry  -CE       Urine output assessed  Adequate  -CE  Adequate  -CE          *OR*   Intensive Monitoring- Must Document One of the Following Four *:    Vital Signs Reviewed  --  --       * Central Venous Pressure (CVP or RAP)  --  --       * Central Venous Oxygen (SVO2, ScvO2 or Oxygen saturation via central catheter)  --  --       * Bedside Cardiovascular US in IVC diameter and % collapse  --  --       * Passive Leg Raise OR Crystalloid Challenge  --  --         User Key  (r) = Recorded By, (t) = Taken By, (c) = Cosigned By    Initials Name Provider Austen Cross MD Resident        SBIRT 20yo+      Most Recent Value   SBIRT (23 yo +)   In order to provide better care to our patients, we are screening all of our patients for alcohol and drug use  Would it be okay to ask you these screening questions? Unable to answer at this time Filed at: 09/22/2021 2024                MDM  Number of Diagnoses or Management Options  Acute respiratory failure with hypoxia (HCC)  Sepsis ([facility] )  Septic shock ([facility] )  Urinary tract infection  Diagnosis management comments: 77-year-old male history of CAD, hypertension, COPD chronically on 2 L oxygen, former bladder cancer presenting due to concerns from nursing facility for fatigue  Recent UTI and reported chavis not changed in about 1 month in setting of reported rigors and sediment/cloudy urine in chavis  Suspect UTI and possible urosepsis  Patient alert and oriented without and tenderness on exam, will defer imaging at this time  Plan for sepsis evaluation  UA and chavis exchange  Previous urine culture with e coli and multiple resistances and was on aztreonam previously  Reassess  UA concerning for infection  EKG no acute process  Labs notable for WBCs 32408 and normal lactate  Aztreonam and vanc ordered around 2130 for combo sepsis treatment in setting of anaphylactoid allergies and previous urine culture  30 cc/kilos bolus given based on body weight after systolics dropped to 03J   Shortly after administration of abx, patient became anxious and agitated  Worsening respiratory distress and attempting to remove oxygen  Intermittently following commands  Sats worsened to 80s despite increase in nasal O2 up to 6  Transferred to oxygen via mask with patient intlerance and attempted mask removal  Trialed IV morphine for pain and respiratory drive and anxiety but patient with immediate emesis  Given IV zofran with improvement but still agitated and intermittently following commands and continued to try to remove O2 mask  Given tenuous O2 sats and mental status and concern for possible volume overload inability to tolerate bipap at this time, tried IV ativan with improvement in agitation  Patient now resting tolerating O2 mask with improvement sats and tachypnea  Still disoriented and minimally following commands  Temp now 104 7  Received tylenol  Patient full code per daughter at bedside  Given respiratory and mental status in setting of presumed urosepsis and being full code with systolics that dropped in 80s again despite 30cc/kg bolus and now starting peripheral levo, discussed case with ICU and admitted          Amount and/or Complexity of Data Reviewed  Clinical lab tests: reviewed and ordered  Tests in the radiology section of CPT®: ordered and reviewed  Tests in the medicine section of CPT®: ordered and reviewed  Decide to obtain previous medical records or to obtain history from someone other than the patient: yes  Obtain history from someone other than the patient: yes  Review and summarize past medical records: yes  Discuss the patient with other providers: yes  Independent visualization of images, tracings, or specimens: yes    Risk of Complications, Morbidity, and/or Mortality  Presenting problems: high  Diagnostic procedures: high  Management options: high        Disposition  Final diagnoses:   Sepsis ([facility] )   Acute respiratory failure with hypoxia ([facility] )   Urinary tract infection   Septic shock [facility])     Time reflects when diagnosis was documented in both MDM as applicable and the Disposition within this note     Time User Action Codes Description Comment    9/23/2021  2:00 AM Juanpablo Kraft Add [A41 9] Sepsis ([facility] )     9/23/2021 12:39 PM Vishal OSULLIVAN Add [J96 01] Acute respiratory failure with hypoxia ([facility] )     9/23/2021 12:39 PM Vishal OSULLIVAN Add [N39 0] Urinary tract infection     9/23/2021 12:39 PM Kelvin Roque Add [A41 9,  R65 21] Septic shock [facility])       ED Disposition     ED Disposition Condition Date/Time Comment    Admit Stable Thu Sep 23, 2021  2:00 AM Case was discussed with Dr Thierry Casas and the patient's admission status was agreed to be Admission Status: inpatient status to the service of critical care   Follow-up Information    None         Current Discharge Medication List      CONTINUE these medications which have NOT CHANGED    Details   acetaminophen (TYLENOL) 325 mg tablet Take 3 tablets (975 mg total) by mouth 3 (three) times a day    Associated Diagnoses: S/P TAVR (transcatheter aortic valve replacement)      alfuzosin (UROXATRAL) 10 mg 24 hr tablet Take 1 tablet (10 mg total) by mouth daily  Qty: 90 tablet, Refills: 3    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      aspirin 81 MG tablet Take 81 mg by mouth daily       Cholecalciferol (HM VITAMIN D3) 2000 units CAPS Take 1 tablet by mouth daily      fluticasone-umeclidinium-vilanterol (Trelegy Ellipta) 200-62 5-25 MCG/INH AEPB inhaler Inhale 1 puff daily Rinse mouth after use  Qty: 60 blister, Refills: 2    Associated Diagnoses: Pulmonary emphysema, unspecified emphysema type (HCC)      furosemide (LASIX) 20 mg tablet Take 1 tablet (20 mg total) by mouth daily  Qty: 90 tablet, Refills: 2    Associated Diagnoses: HTN (hypertension)      levothyroxine 75 mcg tablet 1 tablet daily M-Fridays   1 and 1/2 tablets Sat-Sundays  Qty: 90 tablet, Refills: 3    Associated Diagnoses: Acquired hypothyroidism      !! metoprolol succinate (TOPROL-XL) 25 mg 24 hr tablet TAKE 1/2 TABLET BY MOUTH EVERY 12 HOURS  Qty: 90 tablet, Refills: 3    Comments: **Patient requests 90 days supply**  Associated Diagnoses: PAH (pulmonary artery hypertension) ([facility] )      ! ! metoprolol succinate (TOPROL-XL) 25 mg 24 hr tablet Take 0 5 tablets (12 5 mg total) by mouth 2 (two) times a day  Qty: 60 tablet, Refills: 0    Associated Diagnoses: PAF (paroxysmal atrial fibrillation) ([facility])      Multiple Vitamin (multivitamin) capsule Take 1 capsule by mouth daily      omeprazole (PriLOSEC) 20 mg delayed release capsule Take 20 mg by mouth daily      potassium chloride (K-DUR,KLOR-CON) 10 mEq tablet TAKE 1 TABLET(10 MEQ) BY MOUTH THREE TIMES DAILY  Qty: 270 tablet, Refills: 1    Comments: **Patient requests 90 days supply**  Associated Diagnoses: Hypokalemia      psyllium (METAMUCIL) 58 6 % packet Take 1 packet by mouth daily  Qty: 30 packet, Refills: 2    Associated Diagnoses: Full incontinence of feces      simvastatin (ZOCOR) 20 mg tablet TAKE 1 TABLET(20 MG) BY MOUTH DAILY AT BEDTIME  Qty: 90 tablet, Refills: 1    Associated Diagnoses: Dyslipidemia      sodium chloride 1 g tablet Take 2 tablets (2 g total) by mouth 2 (two) times a day with meals  Qty: 60 tablet, Refills: 0    Associated Diagnoses: Hyponatremia       !! - Potential duplicate medications found  Please discuss with provider  No discharge procedures on file  PDMP Review       Value Time User    PDMP Reviewed  Yes 2/8/2021 10:31 PM Kiarra Huertas DO           ED Provider  Attending physically available and evaluated Joana Tavarez I managed the patient along with the ED Attending      Electronically Signed by         Aman Singh MD  09/25/21 9804

## 2021-09-23 NOTE — ASSESSMENT & PLAN NOTE
· Secondary to gram negative bacteremia  · Now comfort care  · Continue current comfort measures  · Ativan 0 5mg q10 min PRN  · Haldol 0 5mg q2h PRN  · Fentanyl 50 mcg q10 min PRN  · Glycopyrrolate 0 1mg q1hr PRN  · Hospice liaison consult in place

## 2021-09-23 NOTE — PROGRESS NOTES
Pastoral Care Progress Note    2021  Patient: Hao Hamilton : 1933  Admission Date & Time: 2021  MRN: [id number] CSN: [id number]                     Chaplaincy Interventions Utilized:   Empowerment: Clarified, confirmed, or reviewed information from treatment team , Encouraged focus on present, Provided anticipatory guidance and Provided anxiety containment    Exploration: Explored hope and Explored spiritual needs & resources    Collaboration: Advocated for patient/family and Consulted with interdisciplinary team    Relationship Building: Cultivated a relationship of care and support and Listened empathically    Ritual: Chava Camera provided sacrament of the sick and Provided prayer    Chaplaincy Outcomes Achieved:  Distress reduced    Spiritual Coping Strategies Utilized:   Spiritual comfort

## 2021-09-23 NOTE — PROGRESS NOTES
Pastoral Care Progress Note    2021  Patient: Coco Wang : 1933  Admission Date & Time: 2021  MRN: [id number] CSN: [id number]    Reason for Visit: Physician Consult                Chaplaincy Interventions Utilized:     Exploration: Explored emotional needs & resources      Relationship Building: Cultivated a relationship of care and support, Listened empathically and Provided silent and supportive presence      Chaplaincy Outcomes Achieved:  Expressed gratitude, Expressed ultimate hope, Tearfully processed emotions and Verbally processed emotions    Spiritual Coping Strategies Utilized:   Spiritual comfort      met with Patient, Patient's Spouse, and Daughter  Patient's family expressed grief and sadness but also hope for patient's ultimate rest   provided active listening and supportive presence for processing emotions   Patient's family expressed gratitude for  visit

## 2021-09-23 NOTE — H&P
History and Physical - Critical Care  Juan Manuel Doyle 80 y o  male MRN: [id number]  [facility]: [id number]     Reason for Admission / Chief Complaint: Septic shock     History of Present Illness:  Juan Manuel Doyle is a 80 y o  male with a PMHx of severe aortic stenosis s/p tavr, pulmonary HTN, COPD, recurrent UTIs, prior bladder cancer, paroxysmal afib, who presented to the ED with increased fatigue with complaints of rigors earlier in day, who became febrile >103, decompensated in ED requiring 8L midflow and peripheral pressors  Aztreonam and vanc started in ED based off prior sensitivities  CT showed evidence of cystitis, right ureteritis, pyelitis  Patient declined and became very altered, unable to open eyes, grunting, and poorly following commands  Upon arrival to ICU, patient quickly required >10 of levophed  Discussion was had with daughter and wife (POA), who agreed that he would not want invasive procedures at this time  Decision was made to ultimately make patient comfort care, with plan to support hemodynamics without aggressive measures until wife arrives from home  History obtained from chart review       Past Medical History:  Past Medical History:   Diagnosis Date    Anemia     Bladder cancer ([facility] )     [facility]     bladder    Carotid artery occlusion     Cataract     Colon polyp     COPD (chronic obstructive pulmonary disease) (HCC)     Coronary artery disease     Disease of thyroid gland     History of transfusion     Hyperlipidemia     Hypertension     Hypothyroidism 9/15/2017    Multiple pulmonary nodules     Peptic ulcer     Scoliosis     Sepsis without acute organ dysfunction ([facility] ) 1/26/2021    Severe protein-calorie malnutrition ([facility] ) 4/17/2021    SIRS (systemic inflammatory response syndrome) ([facility] 12/19/2019    Transient cerebral ischemia     Tremors of nervous system         Past Surgical History:  Past Surgical History:   Procedure Laterality Date   [facility] SURGERY      1973, ,     BUNIONECTOMY Left     Simple exostectomy (silver procedure)    CAROTID ENDARTERECTOMY Right 09/10/2008    Randy LEDBETTER MD    CATARACT EXTRACTION      CATARACT EXTRACTION, BILATERAL      CERVICAL DISCECTOMY  1969    COLONOSCOPY      CORONARY ARTERY BYPASS GRAFT  10/20/2010    x3 (LIMA-LAD, SVG-OM, SVG-RCA)    CYSTOSCOPY      ELBOW SURGERY Right 2012    FEMORAL ARTERY - TIBIAL ARTERY BYPASS GRAFT  2011    using reversed saphenous vein from right leg  Sita Frederick MD    NM ECHO TRANSESOPHAG R-T 2D W/PRB IMG ACQUISJ I&R N/A 10/6/2020    Procedure: TRANSESOPHAGEAL ECHOCARDIOGRAM (DAVID); Surgeon: Jared Thorpe DO;  Location: [facility];  Service: Cardiac Surgery    NM REPLACE AORTIC VALVE OPENFEMORAL ARTERY APPROACH N/A 10/6/2020    Procedure: REPLACEMENT AORTIC VALVE TRANSCATHETER (TAVR) TRANSFEMORAL W/ 26MM MONTALVO BREE S3 ULTRA VALVE(ACCESS ON LEFT);   Surgeon: Jared Thorpe DO;  Location: [facility];  Service: Cardiac Surgery    REPLACEMENT TOTAL KNEE Left     SHOULDER SURGERY Right         Past Family History:  Family History   Problem Relation Age of Onset    Cervical cancer Mother     Cervical cancer Sister     Heart disease Sister     Coronary artery disease Sister     Lung cancer Brother         Social History:  E-Cigarette/Vaping    E-Cigarette Use Never User      E-Cigarette/Vaping Substances    Nicotine No     THC No     CBD No     Flavoring No      Social History     Tobacco Use   Smoking Status Former Smoker    Packs/day: 1 00    Years: 50 00    Pack years: 50 00    Types: Cigarettes    Start date: 56    Quit date: 200    Years since quittin 7   Smokeless Tobacco Never Used     Social History     Substance and Sexual Activity   Alcohol Use Not Currently    Alcohol/week: 0 0 standard drinks    Comment: Social      Social History     Substance and Sexual Activity   Drug Use Never     Marital Status: /Civil Union     Medications:  Current Facility-Administered Medications   Medication Dose Route Frequency    acetaminophen (TYLENOL) tablet 975 mg  975 mg Oral TID    albuterol (5 mg/mL) 0 5 % inhalation solution **ADS Override Pull**        aspirin (ECOTRIN LOW STRENGTH) EC tablet 81 mg  81 mg Oral Daily    bisacodyl (DULCOLAX) EC tablet 5 mg  5 mg Oral Daily PRN    cholecalciferol (VITAMIN D3) tablet 1,000 Units  1,000 Units Oral Daily    heparin (porcine) subcutaneous injection 5,000 Units  5,000 Units Subcutaneous Q8H [address]    hydrocortisone sodium succinate (PF) (Solu-CORTEF) injection 100 mg  100 mg Intravenous Once    levothyroxine tablet 75 mcg  75 mcg Oral Early Morning    multi-electrolyte (PLASMALYTE-A/ISOLYTE-S PH 7 4) IV solution  75 mL/hr Intravenous Continuous    norepinephrine (LEVOPHED) 4 mg (STANDARD CONCENTRATION) IV in sodium chloride 0 9% 250 mL  1-30 mcg/min Intravenous Titrated    pantoprazole (PROTONIX) injection 40 mg  40 mg Intravenous Q24H FAVIAN    pravastatin (PRAVACHOL) tablet 40 mg  40 mg Oral Daily With Dinner    senna-docusate sodium (SENOKOT S) 8 6-50 mg per tablet 1 tablet  1 tablet Oral HS    tamsulosin (FLOMAX) capsule 0 4 mg  0 4 mg Oral Daily With Dinner     Home medications:  Prior to Admission medications    Medication Sig Start Date End Date Taking? Authorizing Provider   acetaminophen (TYLENOL) 325 mg tablet Take 3 tablets (975 mg total) by mouth 3 (three) times a day 2/8/21   Meli Kim DO   alfuzosin (UROXATRAL) 10 mg 24 hr tablet Take 1 tablet (10 mg total) by mouth daily 5/5/21   MANI Castillo   aspirin 81 MG tablet Take 81 mg by mouth daily  4/27/12   Historical Provider, MD   Cholecalciferol (HM VITAMIN D3) 2000 units CAPS Take 1 tablet by mouth daily    Historical Provider, MD   fluticasone-umeclidinium-vilanterol (Trelegy Ellipta) 200-62 5-25 MCG/INH AEPB inhaler Inhale 1 puff daily Rinse mouth after use  7/15/21 10/13/21  Otilia Sever, MD   furosemide (LASIX) 20 mg tablet Take 1 tablet (20 mg total) by mouth daily 21   Mando Patel MD   levothyroxine 75 mcg tablet 1 tablet daily -  1 and 1/2 tablets Sat-Sundays 5/14/21   Itz Borjas MD   metoprolol succinate (TOPROL-XL) 25 mg 24 hr tablet TAKE 1/2 TABLET BY MOUTH EVERY 12 HOURS 21   Mando Patel MD   metoprolol succinate (TOPROL-XL) 25 mg 24 hr tablet Take 0 5 tablets (12 5 mg total) by mouth 2 (two) times a day  Patient not taking: Reported on 2021 9/3/21   Sita Hayes MD   Multiple Vitamin (multivitamin) capsule Take 1 capsule by mouth daily    Historical Provider, MD   omeprazole (PriLOSEC) 20 mg delayed release capsule Take 20 mg by mouth daily    Historical Provider, MD   potassium chloride (K-DUR,KLOR-CON) 10 mEq tablet TAKE 1 TABLET(10 MEQ) BY MOUTH THREE TIMES DAILY 21   Itz Borjas MD   psyllium (METAMUCIL) 58 6 % packet Take 1 packet by mouth daily 21   Jean Carvajal MD   simvastatin (ZOCOR) 20 mg tablet TAKE 1 TABLET(20 MG) BY MOUTH DAILY AT BEDTIME 21   Mando Patel MD   sodium chloride 1 g tablet Take 2 tablets (2 g total) by mouth 2 (two) times a day with meals 9/3/21   Sita Hayes MD     Allergies: Allergies   Allergen Reactions    Aleve [Naproxen]      Other reaction(s): FACIAL SWELLING  Category: Allergy;  Annotation - [id number]: lip/eye edema    Ancef [Cefazolin] Anaphylaxis     Hypotension, rash, itching    Augmentin [Amoxicillin-Pot Clavulanate] Diarrhea and Vomiting        ROS:   Review of Systems   Unable to perform ROS: Mental status change        Vitals:  Vitals:    21 0253 21 0257 21 0300 21 0400   BP: (!) 91/46  98/51 (!) 85/46   Pulse: (!) 120  (!) 120 (!) 120   Resp: (!) 24  (!) 24 (!) 24   Temp:       TempSrc:       SpO2: 96%  96% 96%   Weight:  63 kg (138 lb 14 2 oz)       Temperature:   Temp (24hrs), Av 8 °F (38 8 °C), Min:98 9 °F (37 2 °C), Max:104 2 °F (40 1 °C)    Current: Temperature: (!) 104 2 °F (40 1 °C)     Weights:      Body mass index is 22 42 kg/m²  Hemodynamic Monitoring:  N/A, PAP:  , PAP mean:   , CVP:  , PCWP:   , SvO2:   , ScvO2:  , CO:  , CI:  , SVR:  , SVRI:  , PVR:  , SV:  , SVI:  , ICP Mean:  , CPP:       Non-Invasive/Invasive Ventilation Settings:  Respiratory    Lab Data (Last 4 hours)    None         O2/Vent Data (Last 4 hours)    None              No results found for: PHART, QZH4DAT, PO2ART, ZPD5TXW, F8COITAH, BEART, SOURCE  SpO2: SpO2: 96 %, SpO2 Activity: SpO2 Activity: At Rest, SpO2 Device: O2 Device: Mid flow nasal cannula, Capnography: Capnography: No,      Physical Exam:  Physical Exam  Constitutional:       Appearance: He is well-developed  HENT:      Head: Normocephalic and atraumatic  Eyes:      Pupils: Pupils are equal, round, and reactive to light  Cardiovascular:      Rate and Rhythm: Regular rhythm  Tachycardia present  Heart sounds: Normal heart sounds  No murmur heard  Pulmonary:      Effort: Pulmonary effort is normal  No respiratory distress  Breath sounds: Normal breath sounds  Comments: Normal breath sounds BL  Abdominal:      General: Bowel sounds are normal  There is no distension  Palpations: Abdomen is soft  Tenderness: There is no abdominal tenderness  Musculoskeletal:         General: No swelling or deformity  Right lower leg: Edema present  Left lower leg: Edema present  Skin:     General: Skin is warm  Findings: No rash  Comments: Patient was very warm to touch   Neurological:      Cranial Nerves: No cranial nerve deficit  Comments: Eyes closed, localizing to pain, grunting  GCS7  Not following commands  AAOx0   Psychiatric:         Behavior: Behavior normal          Thought Content:  Thought content normal          Judgment: Judgment normal           Labs:  Results from last 7 days   Lab Units 09/22/21  8486 WBC Thousand/uL 23 28*   HEMOGLOBIN g/dL 9 7*   HEMATOCRIT % 30 0*   PLATELETS Thousands/uL 237   MONO PCT % 4      Results from last 7 days   Lab Units 09/22/21 2056   SODIUM mmol/L 137   POTASSIUM mmol/L 3 8   CHLORIDE mmol/L 109*   CO2 mmol/L 26   BUN mg/dL 23   CREATININE mg/dL 1 26   CALCIUM mg/dL 8 8   ALK PHOS U/L 88   ALT U/L 19   AST U/L 21              Results from last 7 days   Lab Units 09/22/21 2056   INR  1 02   PTT seconds 31     Results from last 7 days   Lab Units 09/22/21 2056   LACTIC ACID mmol/L 1 3     0   Lab Value Date/Time    TROPONINI <0 02 09/22/2021 2056    Alona Nievesman <0 02 08/29/2021 1651    TROPONINI <0 02 06/22/2021 1148    TROPONINI <0 02 06/02/2021 1244    TROPONINI <0 02 04/11/2021 2227    TROPONINI <0 02 04/11/2021 1644    TROPONINI <0 02 04/11/2021 1152    TROPONINI <0 02 10/21/2020 0457    TROPONINI <0 02 10/21/2020 0028    TROPONINI <0 02 10/20/2020 2108    TROPONINI <0 02 10/20/2020 1114    TROPONINI <0 02 12/19/2019 1033        Imaging:  I have personally reviewed pertinent reports  and I have personally reviewed pertinent films in PACS  EKG: This was personally reviewed by myself  Micro:  Lab Results   Component Value Date    BLOODCX Received in Microbiology Lab  Culture in Progress  09/22/2021    BLOODCX Received in Microbiology Lab  Culture in Progress  09/22/2021    BLOODCX No Growth After 5 Days  08/29/2021    BLOODCX No Growth After 5 Days  08/29/2021    URINECX >100,000 cfu/ml Escherichia coli (A) 08/29/2021    URINECX 20,000-29,000 cfu/ml  08/29/2021    URINECX >100,000 cfu/ml Escherichia coli (A) 08/29/2021    URINECX 20,000-29,000 cfu/ml  08/29/2021    SPUTUMCULTUR Test not performed  Suggest repeat specimen   06/03/2021    SPUTUMCULTUR 2+ Growth of  04/11/2021       Assessment:         Plan:                 Neuro:   AMS  - Likely due to infection in the setting of septic shock  Sedation and analgesia  - Will work towards making patient comfortable at family's discretion                CV:   Hypotension  - 30 cc/kg fluid bolus given in ED  - Levophed given currently for hypotension, however, will remove as we work towards making pt comfort care                Lung:   Hypoxia  - Worsened during ED course, lowest recorded saturation at 86%  - Family does not want intubation  - Will support currently until family ready for comfort care  GI:   Ulcer ppx: protonix ordered  Nutrition: NPO  Bowel regimen ordered                   FEN:   Fluids: None  Electrolytes: K>4, Phos >3, Mag >2  Nutrition: NPO                :   BPH  - Chronic chavis catheter since September 2020  - Chavis catheter on arrival has been present >30 days  - Home tamsulosin ordered  - Patient to be made comfort care                  ID:   Septic shock, suspect urosepsis  - Started on aztreonam, vancomycin, given 1L fluid in in ED  - Lactate was normal (1 4)  - Leukocytosis of 23  - Urine cx and blood cx  - Plan to hold abx as we work towards comfort care                Heme:   Leukocytosis  - Likely secondary to infection  - Plan to make comfort care                   Endo:   Hypothyroidism  - Levothyroxine ordered, however, will likely be made comfort care                   Msk/Skin:    - No acute issues    Disposition: ICU     VTE Pharmacologic Prophylaxis: comfort care  VTE Mechanical Prophylaxis: sequential compression device     Invasive lines and devices: Invasive Devices     Peripheral Intravenous Line            Peripheral IV 09/22/21 Dorsal (posterior); Right Hand <1 day    Peripheral IV 09/22/21 Left Antecubital <1 day          Drain            Urethral Catheter Coude 16 Fr  24 days                 Code Status: Level 3 - DNAR and DNI  POA:    POLST:       Given critical illness, patient length of stay will require greater than two midnights  Portions of the record may have been created with voice recognition software    Occasional wrong word or "sound a like" substitutions may have occurred due to the inherent limitations of voice recognition software  Read the chart carefully and recognize, using context, where substitutions have occurred          Lila Mcwilliams MD

## 2021-09-24 VITALS
OXYGEN SATURATION: 93 % | SYSTOLIC BLOOD PRESSURE: 124 MMHG | TEMPERATURE: 99.3 F | BODY MASS INDEX: 22.42 KG/M2 | WEIGHT: 138.89 LBS | DIASTOLIC BLOOD PRESSURE: 66 MMHG | HEART RATE: 78 BPM | RESPIRATION RATE: 15 BRPM

## 2021-09-24 PROCEDURE — 99232 SBSQ HOSP IP/OBS MODERATE 35: CPT | Performed by: INTERNAL MEDICINE

## 2021-09-24 RX ADMIN — GLYCOPYRROLATE 0.1 MG: 0.2 INJECTION, SOLUTION INTRAMUSCULAR; INTRAVENOUS at 12:57

## 2021-09-24 RX ADMIN — FENTANYL CITRATE 50 MCG: 50 INJECTION INTRAMUSCULAR; INTRAVENOUS at 16:02

## 2021-09-24 RX ADMIN — FENTANYL CITRATE 50 MCG: 50 INJECTION INTRAMUSCULAR; INTRAVENOUS at 12:31

## 2021-09-24 RX ADMIN — FENTANYL CITRATE 50 MCG: 50 INJECTION INTRAMUSCULAR; INTRAVENOUS at 20:16

## 2021-09-24 NOTE — CASE MANAGEMENT
LOS: Day 1  Bundle: pt is not a bundle  Readmission risk: pt IS a 30 day readmit    Emergency contact is wife Jamel Lr ([phone])  Pt lives with spouse in a 1 ranch style home with 1 CHERRY  Wife is pt's caregiver  Pt previously IPTA with ADLs  Has a walker, SC and home O2  [facility] of STR at [facility] and [facility]  Hx of HHC with [facility]  PCP is Dr Amador Bo; pharmacy of choice is [facility] on [phone]  No hx of MH or D&A  Son and dtr are pt's POA  Pt currently placed under [facility] services  CM reviewed d/c planning process including the following: identifying help at home, patient preference for d/c planning needs, Discharge Lounge, [facility] Meds to Bed program, availability of treatment team to discuss questions or concerns patient and/or family may have regarding understanding medications and recognizing signs and symptoms once discharged  CM also encouraged patient to follow up with all recommended appointments after discharge  Patient advised of importance for patient and family to participate in managing patients medical well being

## 2021-09-24 NOTE — HOSPICE NOTE
Received hospice referral   I met with pt's wife and daughter to evaluate patient for hospice services  Hospice benefits reviewed per Medicare guidelines and all questions answered  Dr Reinoso approved for [facility] LOC  Consents reviewed and signed by pt's wife  Consents faxed to [facility] and report given to charge nurse  Aura Primrose, notified MD needs to have OOH DNR signed  Transport arranged for [phone] 21  Nurse informed to call hospice house with report 30 minutes prior to discharge  IVs and chavis to be left in  Pt may be medicated prior to discharge for comfort during transport

## 2021-09-24 NOTE — TRANSPORTATION MEDICAL NECESSITY
Section I - General Information    Name of Patient: Tayla Faust                 : 1933    Medicare #: [id number]  Transport Date: 21 (PCS is valid for round trips on this date and for all repetitive trips in the 60-day range as noted below )  Origin: [address] [facility]                                                         Destination: [address]  Is the pt's stay covered under Medicare Part A (PPS/DRG)   [x]     Closest appropriate facility? If no, why is transport to more distant facility required? Yes  If hospice pt, is this transport related to pt's terminal illness? Yes       Section II - Medical Necessity Questionnaire  Ambulance transportation is medically necessary only if other means of transport are contraindicated or would be potentially harmful to the patient  To meet this requirement, the patient must either be "bed confined" or suffer from a condition such that transport by means other than ambulance is contraindicated by the patient's condition  The following questions must be answered by the medical professional signing below for this form to be valid:    1)  Describe the MEDICAL CONDITION (physical and/or mental) of this patient AT [address] [place] [address] that requires the patient to be transported in an ambulance and why transport by other means is contraindicated by the patient's condition: Septic shock, obstructive uropathy, 2LNC, Comfort Care measures in place, Anemia, bed bound, high fall risk,    2) Is the patient "bed confined" as defined below? Yes  To be "be confined" the patient must satisfy all three of the following conditions: (1) unable to get up from bed without Assistance; AND (2) unable to ambulate; AND (3) unable to sit in a chair or wheelchair  3) Can this patient safely be transported by car or wheelchair van (i e , seated during transport without a medical attendant or monitoring)?    No    4) In addition to completing questions 1-3 above, please check any of the following conditions that apply*:   *Note: supporting documentation for any boxes checked must be maintained in the patient's medical records  If hosp-hosp transfer, describe services needed at 2nd facility not available at 1st facility? Moderate/severe pain on movement   Medical attendant required   Special handling/isolation/infection control precautions required   Unable to tolerate seated position for time needed to transport   Unable to sit in a chair or wheelchair due to decubitus ulcers or other wounds   Other(specify) high fall risk, on comfort care, [facility]      Section III - Signature of Physician or Healthcare Professional  I certify that the above information is true and correct based on my evaluation of this patient, and represent that the patient requires transport by ambulance and that other forms of transport are contraindicated  I understand that this information will be used by the Centers for Medicare and Medicaid Services (CMS) to support the determination of medical necessity for ambulance services, and I represent that I have personal knowledge of the patient's condition at time of transport  [x]  If this box is checked, I also certify that the patient is physically or mentally incapable of signing the ambulance service's claim and that the institution with which I am affiliated has furnished care, services, or assistance to the patient  My signature below is made on behalf of the patient pursuant to 42 CFR §424 36(b)(4)   In accordance with 42 CFR §424 37, the specific reason(s) that the patient is physically or mentally incapable of signing the claim form is as follows:       Signature of Physician* or Healthcare Professional____________________________________  Signature Date 09/24/21 (For scheduled repetitive transports, this form is not valid for transports performed more than 60 days after this date)    Printed Name & Credentials of Physician or Healthcare Professional (MD, DO, RN, etc )_LIO Andrew_  *Form must be signed by patient's attending physician for scheduled, repetitive transports   For non-repetitive, unscheduled ambulance transports, if unable to obtain the signature of the attending physician, any of the following may sign (choose appropriate option below)  [] Physician Assistant []  Clinical Nurse Specialist []  Registered Nurse  []  Nurse Practitioner  [x] Discharge Planner

## 2021-09-24 NOTE — PROGRESS NOTES
INTERNAL MEDICINE RESIDENCY PROGRESS NOTE     Name: Tiffanie Jasso   Age & Sex: 80 y o  male   MRN: [id number]  Unit/Bed#: [address] [address] 707[address]01   Encounter: [id number]  Team: SLIM    PATIENT INFORMATION     Name: Tiffanie Jasso   Age & Sex: 80 y o  male   MRN: [id number]  Hospital Stay Days: 1    ASSESSMENT/PLAN     Principal Problem:    Septic shock ([facility] )  Active Problems:    Hypotension    Indwelling Porter catheter calcification (HCC)    Chronic respiratory failure with hypoxia ([facility] )    Obstructive uropathy      * Septic shock ([facility] [address] )  Assessment & Plan  · Presenting with septic shock 2/2 gram negative bacteremia   · Family elected to undergo Level 4 - comfort care; hospice consultation placed  · Continue comfort medications  · Ativan 0 5 mg q10 min PRN  · Haldol 0 5 mg q2 hours PRN  · Fentanyl 50 mcg q10 min PRN  · Glycopyrrolate 0 1 mg q1hr PRN      Disposition: Grim prognosis; Level 4 - comfort care; hospice referral via CM placed  SUBJECTIVE     Patient seen and examined; family at bedside  Resting comfortably in no acute distress or discomfort  OBJECTIVE     Vitals:    21 0300 21 0400 21 0800 21 1407   BP: 98/51 (!) 85/46  124/66   Pulse: (!) 120 (!) 120 94    Resp: (!) 24 (!) 24 14 15   Temp:    99 3 °F (37 4 °C)   TempSrc:       SpO2: 96% 96% 95%    Weight:          Temperature:   Temp (24hrs), Av 3 °F (37 4 °C), Min:99 3 °F (37 4 °C), Max:99 3 °F (37 4 °C)    Temperature: 99 3 °F (37 4 °C)  Intake & Output:  I/O       701 -  0700  07 -  07 -  0700    P  O    0    I V  (mL/kg) 437 3 (6 9)      IV Piggyback 2300      Total Intake(mL/kg) 2737 3 (43 4)  0 (0)    Urine (mL/kg/hr)  600 (0 4) 850 (2)    Total Output  600 850    Net +2737 3 -600 -850               Weights:        Body mass index is 22 42 kg/m²    Weight (last 2 days)     Date/Time   Weight    21 0257   63 (138 89)    21 0238   63 (138 89)    21   59 9 (132)            Physical Exam  Constitutional:       Appearance: He is ill-appearing and toxic-appearing  HENT:      Mouth/Throat:      Mouth: Mucous membranes are dry  Pharynx: No oropharyngeal exudate  Eyes:      General: No scleral icterus  Cardiovascular:      Rate and Rhythm: Normal rate and regular rhythm  Pulses: Normal pulses  Heart sounds: Normal heart sounds  Pulmonary:      Effort: Pulmonary effort is normal    Abdominal:      General: Bowel sounds are normal       Palpations: Abdomen is soft  Skin:     General: Skin is warm  LABORATORY DATA     Labs: I have personally reviewed pertinent reports  Results from last 7 days   Lab Units 09/22/21 2056   WBC Thousand/uL 23 28*   HEMOGLOBIN g/dL 9 7*   HEMATOCRIT % 30 0*   PLATELETS Thousands/uL 237   MONO PCT % 4      Results from last 7 days   Lab Units 09/22/21 2056   POTASSIUM mmol/L 3 8   CHLORIDE mmol/L 109*   CO2 mmol/L 26   BUN mg/dL 23   CREATININE mg/dL 1 26   CALCIUM mg/dL 8 8   ALK PHOS U/L 88   ALT U/L 19   AST U/L 21              Results from last 7 days   Lab Units 09/22/21 2056   INR  1 02   PTT seconds 31     Results from last 7 days   Lab Units 09/22/21 2056   LACTIC ACID mmol/L 1 3     Results from last 7 days   Lab Units 09/22/21 2056   TROPONIN I ng/mL <0 02       IMAGING & DIAGNOSTIC TESTING     Radiology Results: I have personally reviewed pertinent reports  XR chest portable    Result Date: 9/23/2021  Impression: Right greater than left bilateral upper lobe airspace disease similar to the prior examination and suspicious for the presence of infection  Workstation performed: [id number]     CT chest abdomen pelvis w contrast    Result Date: 9/23/2021  Impression: Findings consistent with cystitis/right ureteritis/pyelitis Faint symmetric groundglass airspace disease throughout the bilateral upper lobes likely to represent pulmonary edema   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 and involving the bilateral common and internal iliac arteries as detailed above Workstation performed: [id number]     Other Diagnostic Testing: I have personally reviewed pertinent reports  ACTIVE MEDICATIONS     Current Facility-Administered Medications   Medication Dose Route Frequency    fentanyl citrate (PF) 100 MCG/2ML 50 mcg  50 mcg Intravenous Q10 Min PRN    glycopyrrolate (ROBINUL) injection 0 1 mg  0 1 mg Intravenous Q1H PRN    haloperidol lactate (HALDOL) injection 0 5 mg  0 5 mg Intravenous Q2H PRN    LORazepam (ATIVAN) injection 0 5 mg  0 5 mg Intravenous Q10 Min PRN       VTE Pharmacologic Prophylaxis: Reason for no pharmacologic prophylaxis level 4 - comfort care  VTE Mechanical Prophylaxis: sequential compression device    Portions of the record may have been created with voice recognition software  Occasional wrong word or "sound a like" substitutions may have occurred due to the inherent limitations of voice recognition software    Read the chart carefully and recognize, using context, where substitutions have occurred   ==  Yasmeen Sidhu, [address] [facility]  Internal Medicine Residency PGY-2

## 2021-09-24 NOTE — CASE MANAGEMENT
CM informed by Hospice liaison that pt was approved for [facility], unable to accept until tomorrow  CM TC to \[facility]\"" and confirmed a 12:30pm BLS  CMN form completed  Voice message left for wife, s/w dtr Zeke Olson over the phone and informed of the same

## 2021-09-24 NOTE — ASSESSMENT & PLAN NOTE
· Presenting with septic shock 2/2 gram negative bacteremia   · Family elected to undergo Level 4 - comfort care; hospice consultation placed  · Continue comfort medications  · Ativan 0 5 mg q10 min PRN  · Haldol 0 5 mg q2 hours PRN  · Fentanyl 50 mcg q10 min PRN  · Glycopyrrolate 0 1 mg q1hr PRN

## 2021-09-25 PROBLEM — I95.9 HYPOTENSION: Status: RESOLVED | Noted: 2020-09-07 | Resolved: 2021-09-25

## 2021-09-25 LAB
BACTERIA UR CULT: ABNORMAL
BACTERIA UR CULT: ABNORMAL

## 2021-09-25 PROCEDURE — 99238 HOSP IP/OBS DSCHRG MGMT 30/<: CPT | Performed by: INTERNAL MEDICINE

## 2021-09-25 RX ORDER — FENTANYL CITRATE 50 UG/ML
50 INJECTION, SOLUTION INTRAMUSCULAR; INTRAVENOUS
Qty: 20 ML | Refills: 0 | Status: CANCELLED | OUTPATIENT
Start: 2021-09-25 | End: 2021-10-05

## 2021-09-25 RX ORDER — HYDROMORPHONE HCL IN WATER/PF 6 MG/30 ML
0.2 PATIENT CONTROLLED ANALGESIA SYRINGE INTRAVENOUS
Qty: 1 ML | Refills: 0 | Status: SHIPPED | OUTPATIENT
Start: 2021-09-25 | End: 2021-10-05

## 2021-09-25 RX ORDER — LORAZEPAM 2 MG/ML
0.5 INJECTION INTRAMUSCULAR
Qty: 10 ML | Refills: 0 | Status: CANCELLED | OUTPATIENT
Start: 2021-09-25 | End: 2021-10-05

## 2021-09-25 RX ORDER — GLYCOPYRROLATE 0.2 MG/ML
0.1 INJECTION INTRAMUSCULAR; INTRAVENOUS
Qty: 1 ML | Refills: 0 | Status: SHIPPED | OUTPATIENT
Start: 2021-09-25

## 2021-09-25 RX ORDER — LORAZEPAM 2 MG/ML
0.5 INJECTION INTRAMUSCULAR
Qty: 10 ML | Refills: 0 | Status: SHIPPED | OUTPATIENT
Start: 2021-09-25 | End: 2021-10-05

## 2021-09-25 RX ORDER — GLYCOPYRROLATE 0.2 MG/ML
0.1 INJECTION INTRAMUSCULAR; INTRAVENOUS
Qty: 10 ML | Refills: 0 | Status: CANCELLED
Start: 2021-09-25

## 2021-09-25 RX ORDER — HALOPERIDOL 5 MG/ML
0.5 INJECTION INTRAMUSCULAR EVERY 2 HOUR PRN
Qty: 1 ML | Refills: 0 | Status: CANCELLED | OUTPATIENT
Start: 2021-09-25

## 2021-09-25 RX ORDER — HALOPERIDOL 5 MG/ML
0.5 INJECTION INTRAMUSCULAR EVERY 2 HOUR PRN
Qty: 1 ML | Refills: 0 | Status: SHIPPED | OUTPATIENT
Start: 2021-09-25

## 2021-09-25 RX ADMIN — FENTANYL CITRATE 50 MCG: 50 INJECTION INTRAMUSCULAR; INTRAVENOUS at 12:52

## 2021-09-25 RX ADMIN — FENTANYL CITRATE 50 MCG: 50 INJECTION INTRAMUSCULAR; INTRAVENOUS at 12:17

## 2021-09-25 NOTE — DISCHARGE SUMMARY
INTERNAL MEDICINE RESIDENCY DISCHARGE SUMMARY     Angel Hale   80 y o  male  MRN: [id number]  Room/Bed: [facility] 70/[facility] [address] [place] [address]   Encounter: [id number]    Principal Problem:    Septic shock [facility])  Active Problems:    Hypotension    Indwelling Porter catheter calcification (HCC)    Chronic respiratory failure with hypoxia ([address] )    Obstructive uropathy      * Septic shock ([facility] [address] )  Assessment & Plan  · Presenting with septic shock 2/2 gram negative bacteremia   · Family elected to undergo Level 4 - comfort care; hospice consultation placed  · Continue comfort medications  · Ativan 0 5 mg q10 min PRN  · Haldol 0 5 mg q2 hours PRN  · Fentanyl 50 mcg q10 min PRN  · Glycopyrrolate 0 1 mg q1hr PRN      DETAILS OF HOSPITAL COURSE     Per H&P completed by Donny Clarke  [address], [place], on 9/23/2021, "Yennifer treadwell 80 y  o  male with a PMHx of severe aortic stenosis s/p tavr, pulmonary HTN, COPD, recurrent UTIs, prior bladder cancer, paroxysmal afib, who presented to the ED with increased fatigue with complaints of rigors earlier in day, who became febrile >103, decompensated in ED requiring 8L midflow and peripheral pressors  Aztreonam and vanc started in ED based off prior sensitivities  CT showed evidence of cystitis, right ureteritis, pyelitis  Patient declined and became very altered, unable to open eyes, grunting, and poorly following commands  Upon arrival to ICU, patient quickly required >10 of levophed  Discussion was had with daughter and wife (POA), who agreed that he would not want invasive procedures at this time  Decision was made to ultimately make patient comfort care, with plan to support hemodynamics without aggressive measures until wife arrives from home "    On 9/23/2021, the patient is transition to comfort care with discontinuation of all life-sustaining treatments including vasopressors    Following the transition, the patient was transferred to the medical floors  Hospice referral placed  On 09/25/2021, the patient appeared comfortable and at peace; in no acute distress or visible discomfort  He was then transferred to [facility] Unit  Day of Discharge:   Subjective:  No events overnight per Nursing  Patient seen examined at bedside this morning  Resting peacefully in no discomfort  /66   Pulse 78   Temp 99 3 °F (37 4 °C)   Resp 15   Wt 63 kg (138 lb 14 2 oz)   SpO2 93%   BMI 22 42 kg/m²     Physical Exam  Constitutional:       Appearance: He is ill-appearing and toxic-appearing  HENT:      Mouth/Throat:      Mouth: Mucous membranes are dry  Pharynx: No oropharyngeal exudate  Eyes:      General: No scleral icterus  Cardiovascular:      Rate and Rhythm: Normal rate and regular rhythm  Pulses: Normal pulses  Heart sounds: Normal heart sounds  Pulmonary:      Effort: Pulmonary effort is normal    Abdominal:      General: Bowel sounds are normal       Palpations: Abdomen is soft  Skin:     General: Skin is warm  DISCHARGE INFORMATION     PCP at Discharge: Jovanny Barrera MD    Admitting Provider: Ej Mcnair DO  Admission Date: 9/22/2021    Discharge Provider: Clifton Johnson MD  Discharge Date: 9/25/2021    Discharge Disposition: Non [facility] SNF/TCU/SNU  Discharge Condition: grim  Discharge with Lines: yes  - IV for medication administration at [facility]  Discharge Diet: comfort feeds  Activity Restrictions: none  Test Results Pending at Discharge: none    Discharge Diagnoses:  Principal Problem:    Septic shock ([address] )  Active Problems:    Hypotension    Indwelling Porter catheter calcification (HCC)    Chronic respiratory failure with hypoxia ([facility] )    Obstructive uropathy  Resolved Problems:    * No resolved hospital problems   *      Consulting Providers: none      Diagnostic & Therapeutic Procedures Performed:  XR chest portable    Result Date: 9/23/2021  Impression: Right greater than left bilateral upper lobe airspace disease similar to the prior examination and suspicious for the presence of infection  Workstation performed: [id number]     CT chest abdomen pelvis w contrast    Result Date: 9/23/2021  Impression: Findings consistent with cystitis/right ureteritis/pyelitis Faint symmetric groundglass airspace disease throughout the bilateral upper lobes likely to represent pulmonary edema  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 and involving the bilateral common and internal iliac arteries as detailed above Workstation performed: [id number]       Code Status: Level 4 - Comfort Care  Advance Directive & Living Will: Received  Power of :    POLST:      Medications:  Current Discharge Medication List        Current Discharge Medication List        Current Discharge Medication List      CONTINUE these medications which have NOT CHANGED    Details   acetaminophen (TYLENOL) 325 mg tablet Take 3 tablets (975 mg total) by mouth 3 (three) times a day    Associated Diagnoses: S/P TAVR (transcatheter aortic valve replacement)      alfuzosin (UROXATRAL) 10 mg 24 hr tablet Take 1 tablet (10 mg total) by mouth daily  Qty: 90 tablet, Refills: 3    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      aspirin 81 MG tablet Take 81 mg by mouth daily       Cholecalciferol (HM VITAMIN D3) 2000 units CAPS Take 1 tablet by mouth daily      fluticasone-umeclidinium-vilanterol (Trelegy Ellipta) 200-62 5-25 MCG/INH AEPB inhaler Inhale 1 puff daily Rinse mouth after use  Qty: 60 blister, Refills: 2    Associated Diagnoses: Pulmonary emphysema, unspecified emphysema type (HCC)      furosemide (LASIX) 20 mg tablet Take 1 tablet (20 mg total) by mouth daily  Qty: 90 tablet, Refills: 2    Associated Diagnoses: HTN (hypertension)      levothyroxine 75 mcg tablet 1 tablet daily M-Fridays   1 and 1/2 tablets Sat-Sundays  Qty: 90 tablet, Refills: 3    Associated Diagnoses: Acquired hypothyroidism      !! metoprolol succinate (TOPROL-XL) 25 mg 24 hr tablet TAKE 1/2 TABLET BY MOUTH EVERY 12 HOURS  Qty: 90 tablet, Refills: 3    Comments: **Patient requests 90 days supply**  Associated Diagnoses: PAH (pulmonary artery hypertension) ([facility] [address] )      ! ! metoprolol succinate (TOPROL-XL) 25 mg 24 hr tablet Take 0 5 tablets (12 5 mg total) by mouth 2 (two) times a day  Qty: 60 tablet, Refills: 0    Associated Diagnoses: PAF (paroxysmal atrial fibrillation) (HCC)      Multiple Vitamin (multivitamin) capsule Take 1 capsule by mouth daily      omeprazole (PriLOSEC) 20 mg delayed release capsule Take 20 mg by mouth daily      potassium chloride (K-DUR,KLOR-CON) 10 mEq tablet TAKE 1 TABLET(10 MEQ) BY MOUTH THREE TIMES DAILY  Qty: 270 tablet, Refills: 1    Comments: **Patient requests 90 days supply**  Associated Diagnoses: Hypokalemia      psyllium (METAMUCIL) 58 6 % packet Take 1 packet by mouth daily  Qty: 30 packet, Refills: 2    Associated Diagnoses: Full incontinence of feces      simvastatin (ZOCOR) 20 mg tablet TAKE 1 TABLET(20 MG) BY MOUTH DAILY AT BEDTIME  Qty: 90 tablet, Refills: 1    Associated Diagnoses: Dyslipidemia      sodium chloride 1 g tablet Take 2 tablets (2 g total) by mouth 2 (two) times a day with meals  Qty: 60 tablet, Refills: 0    Associated Diagnoses: Hyponatremia       !! - Potential duplicate medications found  Please discuss with provider  Allergies: Allergies   Allergen Reactions    Aleve [Naproxen]      Other reaction(s): FACIAL SWELLING  Category: Allergy; Annotation - [id number]: lip/eye edema    Ancef [Cefazolin] Anaphylaxis     Hypotension, rash, itching    Augmentin [Amoxicillin-Pot Clavulanate] Diarrhea and Vomiting       FOLLOW-UP     PCP Outpatient Follow-up:  none required    Consulting Providers Follow-up:  none required     Active Issues Requiring Follow-up:   none    Discharge Statement:   I spent 30 minutes minutes discharging the patient   This time was spent on the day of discharge  I had direct contact with the patient on the day of discharge  Additional documentation is required if more than 30 minutes were spent on discharge  Portions of the record may have been created with voice recognition software  Occasional wrong word or "sound a like" substitutions may have occurred due to the inherent limitations of voice recognition software    Read the chart carefully and recognize, using context, where substitutions have occurred     ==  Yasmeen Sidhu, [facility]  Internal Medicine Resident PGY-2

## 2021-09-25 NOTE — CASE MANAGEMENT
Pt will be going to  home hospice  Report: [phone] fax number is    Unknown room number      CM informed nurse via TT

## 2021-09-25 NOTE — PLAN OF CARE
Problem: Potential for Falls  Goal: Patient will remain free of falls  Description: INTERVENTIONS:  - Educate patient/family on patient safety including physical limitations  - Instruct patient to call for assistance with activity   - Consult OT/PT to assist with strengthening/mobility   - Keep Call bell within reach  - Keep bed low and locked with side rails adjusted as appropriate  - Keep care items and personal belongings within reach  - Initiate and maintain comfort rounds  - Make Fall Risk Sign visible to staff  - Offer Toileting every 2 Hours, in advance of need  - Initiate/Maintain bed alarm  - Apply yellow socks and bracelet for high fall risk patients  - Consider moving patient to room near nurses station  Outcome: Completed     Problem: MOBILITY - ADULT  Goal: Maintain or return to baseline ADL function  Description: INTERVENTIONS:  -  Assess patient's ability to carry out ADLs; assess patient's baseline for ADL function and identify physical deficits which impact ability to perform ADLs (bathing, care of mouth/teeth, toileting, grooming, dressing, etc )  - Assess/evaluate cause of self-care deficits   - Assess range of motion  - Assess patient's mobility; develop plan if impaired  - Assess patient's need for assistive devices and provide as appropriate  - Encourage maximum independence but intervene and supervise when necessary  - Involve family in performance of ADLs  - Assess for home care needs following discharge   - Consider OT consult to assist with ADL evaluation and planning for discharge  - Provide patient education as appropriate  Outcome: Completed  Goal: Maintains/Returns to pre admission functional level  Description: INTERVENTIONS:  - Perform BMAT or MOVE assessment daily    - Set and communicate daily mobility goal to care team and patient/family/caregiver  - Collaborate with rehabilitation services on mobility goals if consulted  - Perform Range of Motion 3 times a day    - Reposition patient every 2 hours    - Dangle patient 3 times a day  - Stand patient 3 times a day  - Ambulate patient 3 times a day  - Out of bed to chair 3 times a day   - Out of bed for meals 3 times a day  - Out of bed for toileting  - Record patient progress and toleration of activity level   Outcome: Completed     Problem: Prexisting or High Potential for Compromised Skin Integrity  Goal: Skin integrity is maintained or improved  Description: INTERVENTIONS:  - Identify patients at risk for skin breakdown  - Assess and monitor skin integrity  - Assess and monitor nutrition and hydration status  - Monitor labs   - Assess for incontinence   - Turn and reposition patient  - Assist with mobility/ambulation  - Relieve pressure over bony prominences  - Avoid friction and shearing  - Provide appropriate hygiene as needed including keeping skin clean and dry  - Evaluate need for skin moisturizer/barrier cream  - Collaborate with interdisciplinary team   - Patient/family teaching  - Consider wound care consult   Outcome: Completed

## 2021-09-26 LAB
BACTERIA BLD CULT: ABNORMAL
BACTERIA BLD CULT: ABNORMAL
GRAM STN SPEC: ABNORMAL

## 2021-09-27 LAB — BACTERIA BLD CULT: NORMAL

## 2021-09-28 ENCOUNTER — TELEPHONE (OUTPATIENT)
Dept: FAMILY MEDICINE CLINIC | Facility: CLINIC | Age: 86
End: 2021-09-28

## 2021-09-28 ENCOUNTER — TRANSITIONAL CARE MANAGEMENT (OUTPATIENT)
Dept: FAMILY MEDICINE CLINIC | Facility: CLINIC | Age: 86
End: 2021-09-28

## 2021-11-09 NOTE — PROGRESS NOTES
9/2/2020      Chief Complaint   Patient presents with    Benign Prostatic Hypertrophy     Assessment and Plan    80 y o  male previously managed by Dr Danielle Bernstein    1  Bladder cancer  · With recurrence in 2004  · Status post cystoscopy 01/2018 revealing negative evidence for recurrence  · Schedule cystoscopy    2  Benign prostatic hyperplasia  · Bladder scan  mL  · Continue Alfuzosin  · Insert Porter catheter and schedule cystoscopy as above  · Ultrasound kidney and bladder ordered  · Follow up in the office in 1 month for cystoscopy    History of Present Illness  Remberto Rosa is a 80 y o  male here for follow up evaluation of  bladder cancer  He has a history of high-grade T1 lesion in February 2004  Most recent cystoscopy performed 01/30/2018 reveals no evidence of recurrence  He presents to the office today for evaluation of urinary symptoms  He currently complains of no lower urinary tract symptom but does note occasional sensation of incomplete bladder emptying after urinating  He reports his overall general health glans well  He denies any exacerbations or complaints of urinary symptoms and is currently satisfied at this time        Review of Systems   Constitutional: Negative for chills and fever  Respiratory: Negative for cough and shortness of breath  Cardiovascular: Negative for chest pain  Gastrointestinal: Negative for abdominal distention, abdominal pain, blood in stool, nausea and vomiting  Genitourinary: Negative for difficulty urinating, dysuria, enuresis, flank pain, frequency, hematuria and urgency  Musculoskeletal: Negative for back pain  Skin: Negative for rash  Neurological: Negative for dizziness       Past Medical History  Past Medical History:   Diagnosis Date    Anemia     Bladder cancer ([address] )     Carotid artery occlusion     Cataract     COPD (chronic obstructive pulmonary disease) (HCC)     Coronary artery disease     Hyperlipidemia     Hypertension     Patient informed refill request was denied as it has been over 1 year since she has been seen. Patient transferred to schedule appointment.     Shayna Sun CMA on 11/9/2021 at 4:44 PM       Hypothyroidism 9/15/2017    Multiple pulmonary nodules     Parkinson disease ([facility] [address] )     Peptic ulcer     Scoliosis     SIRS (systemic inflammatory response syndrome) ([facility] [address] ) 2019    Transient cerebral ischemia     Tremors of nervous system        Past Social History  Past Surgical History:   Procedure Laterality Date   [facility] SURGERY      1973, ,     BUNIONECTOMY Left     Simple exostectomy (silver procedure)    CAROTID ENDARTERECTOMY Right 09/10/2008    Randy LEDBETTER MD    CATARACT EXTRACTION, BILATERAL      CERVICAL DISCECTOMY  1969    CORONARY ARTERY BYPASS GRAFT  10/20/2010    x3 (LIMA-LAD, SVG-OM, SVG-RCA)    CYSTOSCOPY      ELBOW SURGERY Right 2012    FEMORAL ARTERY - TIBIAL ARTERY BYPASS GRAFT  2011    using reversed saphenous vein from right leg     Suraj Nation MD    REPLACEMENT TOTAL KNEE Left     SHOULDER SURGERY Right      Social History     Tobacco Use   Smoking Status Former Smoker    Packs/day: 1 00    Years: 50 00    Pack years: 50 00    Types: Cigarettes    Start date: 56    Last attempt to quit: [address] Years since quittin 6   Smokeless Tobacco Never Used       Past Family History  Family History   Problem Relation Age of Onset    Cervical cancer Mother     Cervical cancer Sister     Heart disease Sister     Coronary artery disease Sister     Lung cancer Brother        Past Social history  Social History     Socioeconomic History    Marital status: /Civil Union     Spouse name: Not on file    Number of children: Not on file    Years of education: Not on file    Highest education level: Not on file   Occupational History    Occupation: Retired   Social Needs    Financial resource strain: Not on file    Food insecurity     Worry: Not on file     Inability: Not on file   [employer] needs     Medical: Not on file     Non-medical: Not on file   Tobacco Use    Smoking status: Former Smoker     Packs/day: 1 00     Years: 50 00     Pack years: 50 00     Types: Cigarettes     Start date: 56     Last attempt to quit:      Years since quittin 6    Smokeless tobacco: Never Used   Substance and Sexual Activity    Alcohol use: Never     Frequency: Patient refused     Drinks per session: Patient refused     Binge frequency: Patient refused     Comment: Social     Drug use: Never    Sexual activity: Not Currently   Lifestyle    Physical activity     Days per week: Not on file     Minutes per session: Not on file    Stress: Not on file   Relationships    Social connections     Talks on phone: Not on file     Gets together: Not on file     Attends [employer] service: Not on file     Active member of club or organization: Not on file     Attends meetings of clubs or organizations: Not on file     Relationship status: Not on file    Intimate partner violence     Fear of current or ex partner: Not on file     Emotionally abused: Not on file     Physically abused: Not on file     Forced sexual activity: Not on file   Other Topics Concern    Not on file   Social History Narrative    Not on file       Current Medications  Current Outpatient Medications   Medication Sig Dispense Refill    alfuzosin (UROXATRAL) 10 mg 24 hr tablet TAKE 1 TABLET(10 MG) BY MOUTH DAILY 90 tablet 3    aspirin 81 MG tablet Take 81 mg by mouth      Cholecalciferol (HM VITAMIN D3) 2000 units CAPS Take 1 tablet by mouth daily      furosemide (LASIX) 20 mg tablet TAKE 1 TABLET(20 MG) BY MOUTH DAILY 90 tablet 1    hydrocortisone 2 5 % cream RENA TO POSTERIOR EARS AND ARMS ONCE A DAY PRN FOR 1-2 WEEKS  1    levothyroxine 75 mcg tablet TAKE 1 TABLET(75 MCG) BY MOUTH DAILY 90 tablet 3    metoprolol tartrate (LOPRESSOR) 25 mg tablet Take 0 5 tablets (12 5 mg total) by mouth every 12 (twelve) hours 90 tablet 3    Multiple Vitamins-Minerals (MULTIVITAL-M) TABS Take 1 tablet by mouth daily      nitroglycerin (NITROSTAT) 0 4 mg SL tablet Place 1 tablet (0 4 mg total) under the tongue every 5 (five) minutes as needed for chest pain (Patient not taking: Reported on 8/26/2020) 30 tablet 3    omeprazole (PriLOSEC) 20 mg delayed release capsule Take 1 tablet by mouth daily      simvastatin (ZOCOR) 20 mg tablet TAKE 1 TABLET(20 MG) BY MOUTH DAILY AT BEDTIME 90 tablet 1    SPIRIVA RESPIMAT 2 5 MCG/ACT AERS inhaler INHALE 2 PUFFS BY MOUTH DAILY 12 g 6     No current facility-administered medications for this visit  Allergies  Allergies   Allergen Reactions    Aleve [Naproxen]      Other reaction(s): FACIAL SWELLING  Category: Allergy; Annotation - [id number]: lip/eye edema         The following portions of the patient's history were reviewed and updated as appropriate: allergies, current medications, past medical history, past social history, past surgical history and problem list       Vitals  There were no vitals filed for this visit  Physical Exam  Physical Exam  Vitals signs reviewed  Constitutional:       General: He is not in acute distress  Appearance: Normal appearance  He is normal weight  HENT:      Head: Normocephalic  Eyes:      Pupils: Pupils are equal, round, and reactive to light  Cardiovascular:      Rate and Rhythm: Normal rate  Pulmonary:      Effort: No respiratory distress  Breath sounds: Normal breath sounds  Genitourinary:     Comments: Bladder scan PVR noted to be 600+ mL, will insert Porter catheter  Patient asymptomatic  Skin:     General: Skin is warm and dry  Neurological:      General: No focal deficit present  Mental Status: He is alert and oriented to person, place, and time  Psychiatric:         Mood and Affect: Mood normal          Behavior: Behavior normal            Results  No results found for this or any previous visit (from the past 1 hour(s))  ]  No results found for: PSA  Lab Results   Component Value Date    GLUCOSE 95 06/25/2015    CALCIUM 8 9 08/28/2020     06/25/2015 K 4 3 08/28/2020    CO2 28 08/28/2020     08/28/2020    BUN 17 08/28/2020    CREATININE 1 05 08/28/2020     Lab Results   Component Value Date    WBC 5 13 08/28/2020    HGB 13 9 08/28/2020    HCT 41 9 08/28/2020    MCV 97 08/28/2020     08/28/2020           Orders  No orders of the defined types were placed in this encounter        MANI Arriaga

## 2022-11-01 NOTE — RESULT NOTES
Verified Results  (1) TSH WITH FT4 REFLEX 95CXX0242 11:24AM Lexy Boyer Order Number: [id number]     Test Name Result Flag Reference   TSH 4 520 uIU/mL H 0 [id number] [id number]   Patients undergoing fluorescein dye angiography may retain small amounts of fluorescein in the body for 48-72 hours post procedure  Samples containing fluorescein can produce falsely depressed TSH values  If the patient had this procedure,a specimen should be resubmitted post fluorescein clearance  T4,FREE 1 05 ng/dL  0 76-1 46   Specimen collection should occur prior to Sulfasalazine administration due to the potential for falsely elevated results

## 2024-02-12 NOTE — ASSESSMENT & PLAN NOTE
Continue Trelegy; SpO2 96% on room air  Monitor [FreeTextEntry1] : Entered by Mesfin Beltran, acting as scribe for Dr. Silas London. The documentation recorded by the scribe accurately reflects the service I personally performed and the decisions made by me.

## 2024-10-22 NOTE — ASSESSMENT & PLAN NOTE
· In the setting of sepsis, also noted history of thrombocytopenia status post TAVR on DAPT  · Monitor for signs and symptoms of bleeding  · Improving [Negative] : Genitourinary [Intolerance to feeds] : tolerance to feeds [Vomiting] : no vomiting

## 2025-02-28 NOTE — TELEPHONE ENCOUNTER
Detail Level: Zone Patient remains inpatient at this time  Detail Level: Detailed Benzoyl Peroxide Counseling: Patient counseled that medicine may cause skin irritation and bleach clothing.  In the event of skin irritation, the patient was advised to reduce the amount of the drug applied or use it less frequently.   The patient verbalized understanding of the proper use and possible adverse effects of benzoyl peroxide.  All of the patient's questions and concerns were addressed. Spironolactone Pregnancy And Lactation Text: This medication can cause feminization of the male fetus and should be avoided during pregnancy. The active metabolite is also found in breast milk. Topical Clindamycin Pregnancy And Lactation Text: This medication is Pregnancy Category B and is considered safe during pregnancy. It is unknown if it is excreted in breast milk. Birth Control Pills Pregnancy And Lactation Text: This medication should be avoided if pregnant and for the first 30 days post-partum. Topical Sulfur Applications Pregnancy And Lactation Text: This medication is Pregnancy Category C and has an unknown safety profile during pregnancy. It is unknown if this topical medication is excreted in breast milk. Azelaic Acid Counseling: Patient counseled that medicine may cause skin irritation and to avoid applying near the eyes.  In the event of skin irritation, the patient was advised to reduce the amount of the drug applied or use it less frequently.   The patient verbalized understanding of the proper use and possible adverse effects of azelaic acid.  All of the patient's questions and concerns were addressed. Bactrim Pregnancy And Lactation Text: This medication is Pregnancy Category D and is known to cause fetal risk.  It is also excreted in breast milk. Include Pregnancy/Lactation Warning?: No Erythromycin Counseling:  I discussed with the patient the risks of erythromycin including but not limited to GI upset, allergic reaction, drug rash, diarrhea, increase in liver enzymes, and yeast infections. Sarecycline Pregnancy And Lactation Text: This medication is Pregnancy Category D and not consider safe during pregnancy. It is also excreted in breast milk. Isotretinoin Counseling: Patient should get monthly blood tests, not donate blood, not drive at night if vision affected, not share medication, and not undergo elective surgery for 6 months after tx completed. Side effects reviewed, pt to contact office should one occur. Azithromycin Pregnancy And Lactation Text: This medication is considered safe during pregnancy and is also secreted in breast milk. Aklief counseling:  Patient advised to apply a pea-sized amount only at bedtime and wait 30 minutes after washing their face before applying.  If too drying, patient may add a non-comedogenic moisturizer.  The most commonly reported side effects including irritation, redness, scaling, dryness, stinging, burning, itching, and increased risk of sunburn.  The patient verbalized understanding of the proper use and possible adverse effects of retinoids.  All of the patient's questions and concerns were addressed. Tazorac Pregnancy And Lactation Text: This medication is not safe during pregnancy. It is unknown if this medication is excreted in breast milk. Tetracycline Counseling: Patient counseled regarding possible photosensitivity and increased risk for sunburn.  Patient instructed to avoid sunlight, if possible.  When exposed to sunlight, patients should wear protective clothing, sunglasses, and sunscreen.  The patient was instructed to call the office immediately if the following severe adverse effects occur:  hearing changes, easy bruising/bleeding, severe headache, or vision changes.  The patient verbalized understanding of the proper use and possible adverse effects of tetracycline.  All of the patient's questions and concerns were addressed. Patient understands to avoid pregnancy while on therapy due to potential birth defects. Dapsone Counseling: I discussed with the patient the risks of dapsone including but not limited to hemolytic anemia, agranulocytosis, rashes, methemoglobinemia, kidney failure, peripheral neuropathy, headaches, GI upset, and liver toxicity.  Patients who start dapsone require monitoring including baseline LFTs and weekly CBCs for the first month, then every month thereafter.  The patient verbalized understanding of the proper use and possible adverse effects of dapsone.  All of the patient's questions and concerns were addressed. Winlevi Counseling:  I discussed with the patient the risks of topical clascoterone including but not limited to erythema, scaling, itching, and stinging. Patient voiced their understanding. Topical Retinoid Pregnancy And Lactation Text: This medication is Pregnancy Category C. It is unknown if this medication is excreted in breast milk. High Dose Vitamin A Pregnancy And Lactation Text: High dose vitamin A therapy is contraindicated during pregnancy and breast feeding. Doxycycline Counseling:  Patient counseled regarding possible photosensitivity and increased risk for sunburn.  Patient instructed to avoid sunlight, if possible.  When exposed to sunlight, patients should wear protective clothing, sunglasses, and sunscreen.  The patient was instructed to call the office immediately if the following severe adverse effects occur:  hearing changes, easy bruising/bleeding, severe headache, or vision changes.  The patient verbalized understanding of the proper use and possible adverse effects of doxycycline.  All of the patient's questions and concerns were addressed. Topical Sulfur Applications Counseling: Topical Sulfur Counseling: Patient counseled that this medication may cause skin irritation or allergic reactions.  In the event of skin irritation, the patient was advised to reduce the amount of the drug applied or use it less frequently.   The patient verbalized understanding of the proper use and possible adverse effects of topical sulfur application.  All of the patient's questions and concerns were addressed. Isotretinoin Pregnancy And Lactation Text: This medication is Pregnancy Category X and is considered extremely dangerous during pregnancy. It is unknown if it is excreted in breast milk. Spironolactone Counseling: Patient advised regarding risks of diarrhea, abdominal pain, hyperkalemia, birth defects (for female patients), liver toxicity and renal toxicity. The patient may need blood work to monitor liver and kidney function and potassium levels while on therapy. The patient verbalized understanding of the proper use and possible adverse effects of spironolactone.  All of the patient's questions and concerns were addressed. Benzoyl Peroxide Pregnancy And Lactation Text: This medication is Pregnancy Category C. It is unknown if benzoyl peroxide is excreted in breast milk. Erythromycin Pregnancy And Lactation Text: This medication is Pregnancy Category B and is considered safe during pregnancy. It is also excreted in breast milk. Sarecycline Counseling: Patient advised regarding possible photosensitivity and discoloration of the teeth, skin, lips, tongue and gums.  Patient instructed to avoid sunlight, if possible.  When exposed to sunlight, patients should wear protective clothing, sunglasses, and sunscreen.  The patient was instructed to call the office immediately if the following severe adverse effects occur:  hearing changes, easy bruising/bleeding, severe headache, or vision changes.  The patient verbalized understanding of the proper use and possible adverse effects of sarecycline.  All of the patient's questions and concerns were addressed. Aklief Pregnancy And Lactation Text: It is unknown if this medication is safe to use during pregnancy.  It is unknown if this medication is excreted in breast milk.  Breastfeeding women should use the topical cream on the smallest area of the skin for the shortest time needed while breastfeeding.  Do not apply to nipple and areola. Bactrim Counseling:  I discussed with the patient the risks of sulfa antibiotics including but not limited to GI upset, allergic reaction, drug rash, diarrhea, dizziness, photosensitivity, and yeast infections.  Rarely, more serious reactions can occur including but not limited to aplastic anemia, agranulocytosis, methemoglobinemia, blood dyscrasias, liver or kidney failure, lung infiltrates or desquamative/blistering drug rashes. Topical Clindamycin Counseling: Patient counseled that this medication may cause skin irritation or allergic reactions.  In the event of skin irritation, the patient was advised to reduce the amount of the drug applied or use it less frequently.   The patient verbalized understanding of the proper use and possible adverse effects of clindamycin.  All of the patient's questions and concerns were addressed. Birth Control Pills Counseling: Birth Control Pill Counseling: I discussed with the patient the potential side effects of OCPs including but not limited to increased risk of stroke, heart attack, thrombophlebitis, deep venous thrombosis, hepatic adenomas, breast changes, GI upset, headaches, and depression.  The patient verbalized understanding of the proper use and possible adverse effects of OCPs. All of the patient's questions and concerns were addressed. Azelaic Acid Pregnancy And Lactation Text: This medication is considered safe during pregnancy and breast feeding. Winlevi Pregnancy And Lactation Text: This medication is considered safe during pregnancy and breastfeeding. Tazorac Counseling:  Patient advised that medication is irritating and drying.  Patient may need to apply sparingly and wash off after an hour before eventually leaving it on overnight.  The patient verbalized understanding of the proper use and possible adverse effects of tazorac.  All of the patient's questions and concerns were addressed. Doxycycline Pregnancy And Lactation Text: This medication is Pregnancy Category D and not consider safe during pregnancy. It is also excreted in breast milk but is considered safe for shorter treatment courses. High Dose Vitamin A Counseling: Side effects reviewed, pt to contact office should one occur. Topical Retinoid counseling:  Patient advised to apply a pea-sized amount only at bedtime and wait 30 minutes after washing their face before applying.  If too drying, patient may add a non-comedogenic moisturizer. The patient verbalized understanding of the proper use and possible adverse effects of retinoids.  All of the patient's questions and concerns were addressed. Azithromycin Counseling:  I discussed with the patient the risks of azithromycin including but not limited to GI upset, allergic reaction, drug rash, diarrhea, and yeast infections. Dapsone Pregnancy And Lactation Text: This medication is Pregnancy Category C and is not considered safe during pregnancy or breast feeding. Minocycline Counseling: Patient advised regarding possible photosensitivity and discoloration of the teeth, skin, lips, tongue and gums.  Patient instructed to avoid sunlight, if possible.  When exposed to sunlight, patients should wear protective clothing, sunglasses, and sunscreen.  The patient was instructed to call the office immediately if the following severe adverse effects occur:  hearing changes, easy bruising/bleeding, severe headache, or vision changes.  The patient verbalized understanding of the proper use and possible adverse effects of minocycline.  All of the patient's questions and concerns were addressed.

## (undated) DEVICE — BETHLEHEM MAJOR GENERAL PACK: Brand: CARDINAL HEALTH

## (undated) DEVICE — HEAVY DUTY TABLE COVER: Brand: CONVERTORS

## (undated) DEVICE — SUT SILK 0 CT-1 30 IN 424H

## (undated) DEVICE — INTENDED FOR TISSUE SEPARATION, AND OTHER PROCEDURES THAT REQUIRE A SHARP SURGICAL BLADE TO PUNCTURE OR CUT.: Brand: BARD-PARKER ® CARBON RIB-BACK BLADES

## (undated) DEVICE — THERMOFLECT BLANKET, L, 25EA                               TS THERMOFLECT BLANKET, 48" X 84", SILVER, 5/BG, 5 BG/CS NW: Brand: THERMOFLECT

## (undated) DEVICE — 3M™ TEGADERM™ TRANSPARENT FILM DRESSING FRAME STYLE, 1626W, 4 IN X 4-3/4 IN (10 CM X 12 CM), 50/CT 4CT/CASE: Brand: 3M™ TEGADERM™

## (undated) DEVICE — GAUZE SPONGES,USP TYPE VII GAUZE, 12 PLY: Brand: CURITY

## (undated) DEVICE — 3000CC GUARDIAN II: Brand: GUARDIAN

## (undated) DEVICE — CARDIO PERI-GROIN: Brand: CONVERTORS

## (undated) DEVICE — X-RAY DETECTABLE SPONGES,16 PLY: Brand: VISTEC

## (undated) DEVICE — PAD GROUNDING ADULT

## (undated) DEVICE — GLOVE INDICATOR PI UNDERGLOVE SZ 7 BLUE

## (undated) DEVICE — ADHESIVE SKIN HIGH VISCOSITY EXOFIN 1ML

## (undated) DEVICE — GLOVE SRG BIOGEL ECLIPSE 7

## (undated) DEVICE — Device

## (undated) DEVICE — ADHESIVE SKN CLSR HISTOACRYL FLEX 0.5ML LF

## (undated) DEVICE — CARDIOVASCULAR SPLIT DRAPE: Brand: CONVERTORS

## (undated) DEVICE — DEFIB ADULT ELECTRODE CARDINAL

## (undated) DEVICE — CHLORAPREP HI-LITE 26ML ORANGE

## (undated) DEVICE — TRAY FOLEY 16FR SURESTEP TEMP SENS URIMETER STAT LOK

## (undated) DEVICE — DRESSING ALLEVYN LIFE SACRAL 6.75 X 6.5 IN